# Patient Record
Sex: FEMALE | Race: WHITE | NOT HISPANIC OR LATINO | Employment: OTHER | URBAN - METROPOLITAN AREA
[De-identification: names, ages, dates, MRNs, and addresses within clinical notes are randomized per-mention and may not be internally consistent; named-entity substitution may affect disease eponyms.]

---

## 2017-01-23 ENCOUNTER — HOSPITAL ENCOUNTER (OUTPATIENT)
Facility: MEDICAL CENTER | Age: 72
End: 2017-01-23
Attending: SPECIALIST
Payer: MEDICARE

## 2017-01-23 LAB — CYTOLOGY REG CYTOL: NORMAL

## 2017-01-23 PROCEDURE — 88175 CYTOPATH C/V AUTO FLUID REDO: CPT

## 2017-04-17 ENCOUNTER — HOSPITAL ENCOUNTER (OUTPATIENT)
Facility: MEDICAL CENTER | Age: 72
End: 2017-04-17
Attending: SPECIALIST
Payer: MEDICARE

## 2017-04-17 LAB — CYTOLOGY REG CYTOL: NORMAL

## 2017-04-17 PROCEDURE — 88175 CYTOPATH C/V AUTO FLUID REDO: CPT

## 2017-11-27 ENCOUNTER — HOSPITAL ENCOUNTER (OUTPATIENT)
Facility: MEDICAL CENTER | Age: 72
End: 2017-11-27
Attending: SPECIALIST
Payer: MEDICARE

## 2017-11-27 PROCEDURE — 87624 HPV HI-RISK TYP POOLED RSLT: CPT

## 2017-11-27 PROCEDURE — 88175 CYTOPATH C/V AUTO FLUID REDO: CPT

## 2017-11-29 LAB
CYTOLOGY REG CYTOL: NORMAL
HPV HR 12 DNA CVX QL NAA+PROBE: NEGATIVE
HPV16 DNA SPEC QL NAA+PROBE: NEGATIVE
HPV18 DNA SPEC QL NAA+PROBE: NEGATIVE
SPECIMEN SOURCE: NORMAL

## 2018-03-19 ENCOUNTER — HOSPITAL ENCOUNTER (OUTPATIENT)
Dept: LAB | Facility: MEDICAL CENTER | Age: 73
End: 2018-03-19
Attending: SPECIALIST
Payer: MEDICARE

## 2018-03-19 PROCEDURE — 87624 HPV HI-RISK TYP POOLED RSLT: CPT

## 2018-03-19 PROCEDURE — 88175 CYTOPATH C/V AUTO FLUID REDO: CPT

## 2018-06-10 ENCOUNTER — HOSPITAL ENCOUNTER (EMERGENCY)
Facility: MEDICAL CENTER | Age: 73
End: 2018-06-10
Attending: EMERGENCY MEDICINE
Payer: MEDICARE

## 2018-06-10 VITALS
BODY MASS INDEX: 26.84 KG/M2 | WEIGHT: 136.69 LBS | DIASTOLIC BLOOD PRESSURE: 86 MMHG | RESPIRATION RATE: 16 BRPM | HEART RATE: 95 BPM | HEIGHT: 60 IN | OXYGEN SATURATION: 95 % | TEMPERATURE: 99 F | SYSTOLIC BLOOD PRESSURE: 154 MMHG

## 2018-06-10 DIAGNOSIS — T14.8XXA ABRASION: ICD-10-CM

## 2018-06-10 DIAGNOSIS — F41.9 ANXIETY: ICD-10-CM

## 2018-06-10 LAB
APPEARANCE UR: CLEAR
APPEARANCE UR: CLEAR
BACTERIA #/AREA URNS HPF: NEGATIVE /HPF
BACTERIA #/AREA URNS HPF: NEGATIVE /HPF
BILIRUB UR QL STRIP.AUTO: NEGATIVE
BILIRUB UR QL STRIP.AUTO: NEGATIVE
COLOR UR: YELLOW
COLOR UR: YELLOW
EPI CELLS #/AREA URNS HPF: ABNORMAL /HPF
EPI CELLS #/AREA URNS HPF: ABNORMAL /HPF
GLUCOSE UR STRIP.AUTO-MCNC: NEGATIVE MG/DL
GLUCOSE UR STRIP.AUTO-MCNC: NEGATIVE MG/DL
HYALINE CASTS #/AREA URNS LPF: ABNORMAL /LPF
HYALINE CASTS #/AREA URNS LPF: ABNORMAL /LPF
KETONES UR STRIP.AUTO-MCNC: NEGATIVE MG/DL
KETONES UR STRIP.AUTO-MCNC: NEGATIVE MG/DL
LEUKOCYTE ESTERASE UR QL STRIP.AUTO: ABNORMAL
LEUKOCYTE ESTERASE UR QL STRIP.AUTO: ABNORMAL
MICRO URNS: ABNORMAL
MICRO URNS: ABNORMAL
NITRITE UR QL STRIP.AUTO: NEGATIVE
NITRITE UR QL STRIP.AUTO: NEGATIVE
PH UR STRIP.AUTO: 5 [PH]
PH UR STRIP.AUTO: 5 [PH]
PROT UR QL STRIP: NEGATIVE MG/DL
PROT UR QL STRIP: NEGATIVE MG/DL
RBC # URNS HPF: ABNORMAL /HPF
RBC # URNS HPF: ABNORMAL /HPF
RBC UR QL AUTO: ABNORMAL
RBC UR QL AUTO: ABNORMAL
SP GR UR STRIP.AUTO: 1.01
SP GR UR STRIP.AUTO: 1.01
UROBILINOGEN UR STRIP.AUTO-MCNC: 0.2 MG/DL
UROBILINOGEN UR STRIP.AUTO-MCNC: 0.2 MG/DL
WBC #/AREA URNS HPF: ABNORMAL /HPF
WBC #/AREA URNS HPF: ABNORMAL /HPF

## 2018-06-10 PROCEDURE — 99284 EMERGENCY DEPT VISIT MOD MDM: CPT

## 2018-06-10 PROCEDURE — 81001 URINALYSIS AUTO W/SCOPE: CPT

## 2018-06-10 ASSESSMENT — PAIN SCALES - GENERAL: PAINLEVEL_OUTOF10: 0

## 2018-06-10 ASSESSMENT — LIFESTYLE VARIABLES: DO YOU DRINK ALCOHOL: NO

## 2018-06-10 NOTE — ED TRIAGE NOTES
Pt self caths at home as well as has a nephrostomy to left kidney with drainage bag. Pt provided with 12 Indonesian straight cath and kit to obtain a urine sample.

## 2018-06-10 NOTE — ED TRIAGE NOTES
Chief Complaint   Patient presents with   • Blood in Urine     Ambulatory to room, c/o blood in urine x 1 hour. Hx cancer, not currently receiving treatment for. Being treated for a kidney infection.  Given urine cup for sample.     /86   Pulse 99   Temp 37.2 °C (99 °F)   Resp 16   Ht 1.524 m (5')   Wt 62 kg (136 lb 11 oz)   SpO2 95%   BMI 26.69 kg/m²     Pt Informed regarding triage process and verbalized understanding to inform triage tech or RN for any changes in condition.  Placed in lobby.

## 2018-06-10 NOTE — ED PROVIDER NOTES
ED Provider Note    CHIEF COMPLAINT  Chief Complaint   Patient presents with   • Blood in Urine       HPI  Nessa Dennis is a 72 y.o. female who presents for blood on the toilet paper upon wiping after urinating today. History of cervical cancer status post total abdominal hysterectomy. She's not under currently active treatment. [place] [facility] pyelonephritis and sepsis. She had ureteral obstruction on the left and nephrostomy tube was placed. Patient straight caths at baseline. No diabetes or immune compromise. No blood thinner use. She is finishing a 10 day course of ciprofloxacin and has 4 more days left.    REVIEW OF SYSTEMS  Pertinent positives include: Hematuria, recent UTI, recent left ureteral obstruction.  Pertinent negatives include: Fever, abdominal pain, flank pain, nausea, vomiting, diarrhea, constipation.    PAST MEDICAL HISTORY  Past Medical History:   Diagnosis Date   • Arthritis     tailbone   • Cancer 2005, 2015    cervical   • Colostomy in place    • Other specified symptom associated with female genital organs    • Unspecified urinary incontinence     supra public cath   • Urinary bladder disorder     supra pubic cath       CURRENT MEDICATIONS  Home Medications     Reviewed by Adry Amador R.N. (Registered Nurse) on 06/10/18 at 1427  Med List Status: <None>   Medication Last Dose Status   ascorbic acid (ASCORBIC ACID) 500 MG TABS 10/26/2015 Active   Calcium Carb-Cholecalciferol (CALCIUM 600 + D PO) 10/26/2015 Active   Cholecalciferol (VITAMIN D) 2000 UNITS CAPS 10/26/2015 Active   IRON PO 10/26/2015 Active   ondansetron (ZOFRAN ODT) 4 MG TABLET DISPERSIBLE  Active   Oxycodone-Acetaminophen (PERCOCET)  MG Tab  Active   therapeutic multivitamin-minerals (THERAGRAN-M) TABS 10/26/2015 Active                ALLERGIES  Allergies   Allergen Reactions   • Sulfa Drugs Unspecified     Pt states it runs her down.       PHYSICAL EXAM  VITAL  SIGNS: /86   Pulse 99   Temp 37.2 °C (99 °F)   Resp 16   Ht 1.524 m (5')   Wt 62 kg (136 lb 11 oz)   SpO2 95%   BMI 26.69 kg/m² elevated blood pressure, afebrile  Constitutional : Well developed, Well nourished, well appearing.   Cardiovascular: Regular, No murmurs, No rubs, No gallops.   Respiratory: Respiratory rate and excursion normal.   Abdomen: Soft, nontender.  Genitourinary: No flank tenderness, left nephrostomy tube. Peroneal exam performed with Ángel as a chaperone and there was a small left labial tear which had been the source of bleeding.  Skin: Warm, Dry, No erythema, No rash.   Back: Spine nontender.      LABORATORY:  Results for orders placed or performed during the hospital encounter of 06/10/18   URINALYSIS,CULTURE IF INDICATED   Result Value Ref Range    Color Yellow     Character Clear     Specific Gravity 1.011 <1.035    Ph 5.0 5.0 - 8.0    Glucose Negative Negative mg/dL    Ketones Negative Negative mg/dL    Protein Negative Negative mg/dL    Bilirubin Negative Negative    Urobilinogen, Urine 0.2 Negative    Nitrite Negative Negative    Leukocyte Esterase Trace (A) Negative    Occult Blood Trace (A) Negative    Micro Urine Req Microscopic    URINE MICROSCOPIC (W/UA)   Result Value Ref Range    WBC 5-10 (A) /hpf    RBC 5-10 (A) /hpf    Bacteria Negative None /hpf    Epithelial Cells Few /hpf    Hyaline Cast 0-2 /lpf   URINALYSIS   Result Value Ref Range    Color Yellow     Character Clear     Specific Gravity 1.014 <1.035    Ph 5.0 5.0 - 8.0    Glucose Negative Negative mg/dL    Ketones Negative Negative mg/dL    Protein Negative Negative mg/dL    Bilirubin Negative Negative    Urobilinogen, Urine 0.2 Negative    Nitrite Negative Negative    Leukocyte Esterase Small (A) Negative    Occult Blood Small (A) Negative    Micro Urine Req Microscopic    URINE MICROSCOPIC (W/UA)   Result Value Ref Range    WBC 5-10 (A) /hpf    RBC 10-20 (A) /hpf    Bacteria Negative None /hpf    Epithelial  Cells Few /hpf    Hyaline Cast 6-10 (A) /lpf       COURSE & MEDICAL DECISION MAKING;  This patient presents with blood on the perineum after urination which is due to a small wound on the perineum. Her urinalysis appears to be improving on Omnicef and her urinary tract infection symptoms have resolved. There is no significant blood in the urine.    PLAN:  Reassurance  Follow-up Dr. Hays  Finish antibiotics    CONDITION:  good.    FINAL IMPRESSION  1. Abrasion    2. Anxiety            Electronically signed by: Toro Woodard,

## 2018-06-11 ENCOUNTER — PATIENT OUTREACH (OUTPATIENT)
Dept: HEALTH INFORMATION MANAGEMENT | Facility: OTHER | Age: 73
End: 2018-06-11

## 2018-06-11 NOTE — ED NOTES
Pt discharged home with , given discharged instructions and is understanding. Ambulatory out the door without assistance.

## 2018-06-11 NOTE — DISCHARGE INSTRUCTIONS
Apply bacitracion or polysporin to wound twice daily for three to five days.  Finish the antibiotics.

## 2018-07-30 ENCOUNTER — HOSPITAL ENCOUNTER (OUTPATIENT)
Dept: LAB | Facility: MEDICAL CENTER | Age: 73
End: 2018-07-30
Attending: SPECIALIST
Payer: MEDICARE

## 2018-07-30 ENCOUNTER — HOSPITAL ENCOUNTER (OUTPATIENT)
Dept: RADIOLOGY | Facility: MEDICAL CENTER | Age: 73
End: 2018-07-30
Attending: SPECIALIST
Payer: MEDICARE

## 2018-07-30 DIAGNOSIS — C53.9 MALIGNANT NEOPLASM OF CERVIX, UNSPECIFIED SITE (HCC): ICD-10-CM

## 2018-07-30 PROCEDURE — 87624 HPV HI-RISK TYP POOLED RSLT: CPT

## 2018-07-30 PROCEDURE — A9552 F18 FDG: HCPCS

## 2018-07-30 PROCEDURE — 88175 CYTOPATH C/V AUTO FLUID REDO: CPT

## 2018-08-10 ENCOUNTER — HOSPITAL ENCOUNTER (OUTPATIENT)
Dept: RADIOLOGY | Facility: MEDICAL CENTER | Age: 73
End: 2018-08-10
Attending: SPECIALIST
Payer: MEDICARE

## 2018-08-10 DIAGNOSIS — N13.39 OTHER HYDRONEPHROSIS: ICD-10-CM

## 2018-08-10 PROCEDURE — 700117 HCHG RX CONTRAST REV CODE 255: Performed by: SPECIALIST

## 2018-08-10 PROCEDURE — 50430 NJX PX NFROSGRM &/URTRGRM: CPT | Mod: LT

## 2018-08-10 RX ADMIN — IOHEXOL 22 ML: 300 INJECTION, SOLUTION INTRAVENOUS at 14:10

## 2018-08-16 ENCOUNTER — HOSPITAL ENCOUNTER (OUTPATIENT)
Dept: RADIOLOGY | Facility: MEDICAL CENTER | Age: 73
End: 2018-08-16
Attending: SPECIALIST
Payer: MEDICARE

## 2018-08-16 DIAGNOSIS — N13.39 OTHER HYDRONEPHROSIS: ICD-10-CM

## 2018-08-16 PROCEDURE — 78708 K FLOW/FUNCT IMAGE W/DRUG: CPT

## 2018-08-16 RX ORDER — FUROSEMIDE 10 MG/ML
INJECTION INTRAMUSCULAR; INTRAVENOUS
Status: DISCONTINUED
Start: 2018-08-16 | End: 2018-08-17 | Stop reason: HOSPADM

## 2018-09-10 ENCOUNTER — HOSPITAL ENCOUNTER (OUTPATIENT)
Dept: LAB | Facility: MEDICAL CENTER | Age: 73
End: 2018-09-10
Attending: SPECIALIST
Payer: MEDICARE

## 2018-09-10 ENCOUNTER — HOSPITAL ENCOUNTER (OUTPATIENT)
Dept: RADIOLOGY | Facility: MEDICAL CENTER | Age: 73
End: 2018-09-10
Attending: SPECIALIST
Payer: MEDICARE

## 2018-09-10 DIAGNOSIS — N81.9: ICD-10-CM

## 2018-09-10 DIAGNOSIS — C53.9 MALIGNANT NEOPLASM OF CERVIX, UNSPECIFIED SITE (HCC): ICD-10-CM

## 2018-09-10 DIAGNOSIS — N31.9: ICD-10-CM

## 2018-09-10 LAB
ANION GAP SERPL CALC-SCNC: 9 MMOL/L (ref 0–11.9)
BUN SERPL-MCNC: 27 MG/DL (ref 8–22)
CALCIUM SERPL-MCNC: 10.5 MG/DL (ref 8.5–10.5)
CHLORIDE SERPL-SCNC: 103 MMOL/L (ref 96–112)
CO2 SERPL-SCNC: 24 MMOL/L (ref 20–33)
CREAT SERPL-MCNC: 0.7 MG/DL (ref 0.5–1.4)
GLUCOSE SERPL-MCNC: 86 MG/DL (ref 65–99)
POTASSIUM SERPL-SCNC: 4.1 MMOL/L (ref 3.6–5.5)
SODIUM SERPL-SCNC: 136 MMOL/L (ref 135–145)

## 2018-09-10 PROCEDURE — 76775 US EXAM ABDO BACK WALL LIM: CPT

## 2018-09-10 PROCEDURE — 80048 BASIC METABOLIC PNL TOTAL CA: CPT

## 2018-09-10 PROCEDURE — 36415 COLL VENOUS BLD VENIPUNCTURE: CPT

## 2018-09-15 ENCOUNTER — HOSPITAL ENCOUNTER (EMERGENCY)
Facility: MEDICAL CENTER | Age: 73
End: 2018-09-15
Attending: EMERGENCY MEDICINE
Payer: MEDICARE

## 2018-09-15 VITALS
WEIGHT: 140.65 LBS | BODY MASS INDEX: 27.61 KG/M2 | RESPIRATION RATE: 16 BRPM | HEIGHT: 60 IN | OXYGEN SATURATION: 92 % | HEART RATE: 88 BPM | SYSTOLIC BLOOD PRESSURE: 146 MMHG | TEMPERATURE: 98.7 F | DIASTOLIC BLOOD PRESSURE: 95 MMHG

## 2018-09-15 DIAGNOSIS — T83.098A MALFUNCTION OF NEPHROSTOMY TUBE (HCC): ICD-10-CM

## 2018-09-15 PROCEDURE — 99283 EMERGENCY DEPT VISIT LOW MDM: CPT

## 2018-09-15 PROCEDURE — 303485 HCHG DRESSING MEDIUM

## 2018-09-15 ASSESSMENT — PAIN SCALES - GENERAL: PAINLEVEL_OUTOF10: 0

## 2018-09-15 NOTE — ED NOTES
Gauze bandage applied to L flank.  Pt ready for discharge.  Pt instructions provided.  Pt verbalized understanding & signed.  Leaves ER ambulatory, steady gait, no distress.

## 2018-09-15 NOTE — ED PROVIDER NOTES
ED Provider Note    Scribed for Dr. Rafi Menon M.D. by Dafne Lamb. 9/15/2018  2:17 PM    Primary care provider: Julio Ross M.D.  Means of arrival: walk-in  History obtained from: patient  History limited by: none    CHIEF COMPLAINT  Chief Complaint   Patient presents with   • Other     states nephrostomy tube is possibly clogged. had it for months. today noticed that its not draining.        HPI  Nessa Dennis is a 72 y.o. Female with a history of cervical cancer who presents to the Emergency Department for evaluation of her nephrostomy tube and drainage bag. She reports that she has had this in place for months, however, recently began experiencing difficulties draining the catch bag. The tube does not seem clogged, however, she is unable to empty the contents via the drain in the bag, and it is all 1 unit, unable to take the bag off to empty it that way. Patient was evaluated within the last week by Dr. Saúl URIBE who placed the nephrostomy tube, and they are currently discussing whether this needs to be removed or replaced within the next few days. Patient has no complaints of any flank pain, abdominal pain, fever, or any other complaints at this time.    REVIEW OF SYSTEMS  Pertinent positives include nephrostomy tube malfunction. Pertinent negatives include no  flank pain, abdominal pain, fever, or any other complaints.  See HPI for further details.     PAST MEDICAL HISTORY   has a past medical history of Arthritis; Cancer (2005, 2015); Colostomy in place; Other specified symptom associated with female genital organs; Unspecified urinary incontinence; and Urinary bladder disorder.    SURGICAL HISTORY   has a past surgical history that includes gyn surgery (5/2005); recovery (4/6/2015); colostomy creation laparoscopic (4/14/2015); other (04/2015); pelvic exam under anesthesia (7/31/2015); cervical conization (7/31/2015); and cystoscopy (7/31/2015).    SOCIAL HISTORY  Social History    Substance Use Topics   • Smoking status: Never Smoker   • Smokeless tobacco: Never Used   • Alcohol use No      History   Drug Use No       FAMILY HISTORY  none noted    CURRENT MEDICATIONS  No current facility-administered medications for this encounter.     Current Outpatient Prescriptions:   •  Oxycodone-Acetaminophen (PERCOCET)  MG Tab, Take 1-2 Tabs by mouth every four hours as needed for Severe Pain., Disp: 20 Tab, Rfl: 0  •  ondansetron (ZOFRAN ODT) 4 MG TABLET DISPERSIBLE, Take 1 Tab by mouth every 8 hours as needed for Nausea/Vomiting., Disp: 20 Tab, Rfl: 0  •  IRON PO, Take  by mouth., Disp: , Rfl:   •  ascorbic acid (ASCORBIC ACID) 500 MG TABS, Take 1,000 mg by mouth every day., Disp: , Rfl:   •  therapeutic multivitamin-minerals (THERAGRAN-M) TABS, Take 1 Tab by mouth every day., Disp: , Rfl:   •  Calcium Carb-Cholecalciferol (CALCIUM 600 + D PO), Take  by mouth every day., Disp: , Rfl:   •  Cholecalciferol (VITAMIN D) 2000 UNITS CAPS, Take  by mouth every day., Disp: , Rfl:     ALLERGIES  Allergies   Allergen Reactions   • Sulfa Drugs Unspecified     Pt states it runs her down.       PHYSICAL EXAM  VITAL SIGNS: /99   Pulse 90   Temp 36.9 °C (98.5 °F)   Resp 20   Ht 1.524 m (5')   Wt 63.8 kg (140 lb 10.5 oz)   SpO2 94%   BMI 27.47 kg/m²     Constitutional: Well developed, Well nourished, no distress, Non-toxic appearance.      Abdomen: Soft, No tenderness, No masses, No pulsatile masses.   Skin: Warm, Dry, No erythema, No rash.  Nephrostomy tube left flank.  This is removed per request without difficulty by traction  Extremities:, No edema No cyanosis.   Musculoskeletal: no CVA tenderness, No major deformities noted.  Intact distal pulses  Neurologic: Awake, alert. Moves all extremities spontaneously.  Psychiatric: Affect normal, Judgment normal, Mood normal.     COURSE & MEDICAL DECISION MAKING  Pertinent Labs & Imaging studies reviewed. (See chart for details)    2:17 PM - Patient  seen and examined at bedside. I informed the patient I would consult with Dr. Hays about the next appropriate treatment step. She understands and agrees with treatment plan.    2:23 PM Paged Dr. Hays [facility]    2:42 PM Spoke with NP for Dr. Hays, about the patient's condition. Advises removing the nephrostomy tube currently in place, and re-evaluation in their office in the coming week.    3:10 PM I removed the patient's pigtail nephrostomy tube without difficulty or complication. A sterile dressing will be placed over that site, and patient will be discharged home to follow up with Dr. Hays in the next few days.    Decision Making:  Patient presents with a history of cervical cancer involving the bladder, with nephrostomy tube currently in place, seeking evaluation for a malfunction of her collection bag. Spoke with Dr. Hays's NP. Nephrostomy tube was removed without complication, as suggested, and she will be discharged with follow up for Dr. Hays and strict return precautions.     The patient will return for new or worsening symptoms and is stable at the time of discharge.    DISPOSITION:  Patient will be discharged home in stable condition.    FOLLOW UP:  No follow-up provider specified.    FINAL IMPRESSION  1. Malfunction of nephrostomy tube (HCC)          Dafne BRADY (Scribe), am scribing for, and in the presence of, Rafi Menon M.D..    Electronically signed by: Dafne Lamb (Fatuma), 9/15/2018    Rafi BRADY M.D. personally performed the services described in this documentation, as scribed by Dafne Lamb in my presence, and it is both accurate and complete. E    The note accurately reflects work and decisions made by me.  Rafi Menon  9/15/2018  3:17 PM

## 2018-09-15 NOTE — ED NOTES
Pt amb to 55.  Pt has a L sided nephrostomy tube, the tube itself is draining however the drainage bag will not drain to empty the contents.  Pt states this is just 1 unit and cannot be taken apart.  Changing into a gown, chart up for ERP.

## 2018-09-15 NOTE — ED TRIAGE NOTES
Chief Complaint   Patient presents with   • Other     states nephrostomy tube is possibly clogged. had it for months. today noticed that its not draining.      Denies any complaints. No flank pain/no fever. Educated on triage process.

## 2018-09-16 ENCOUNTER — PATIENT OUTREACH (OUTPATIENT)
Dept: HEALTH INFORMATION MANAGEMENT | Facility: OTHER | Age: 73
End: 2018-09-16

## 2018-09-16 NOTE — PROGRESS NOTES
Placed discharge outreach phone call to pt s/p ER discharge 9/15/18.  Left voicemail providing my contact information and instructions to call with any questions or concerns.

## 2018-09-19 ENCOUNTER — HOSPITAL ENCOUNTER (OUTPATIENT)
Dept: RADIOLOGY | Facility: MEDICAL CENTER | Age: 73
End: 2018-09-19
Attending: SPECIALIST
Payer: MEDICARE

## 2018-09-19 DIAGNOSIS — N13.30 HYDRONEPHROSIS, UNSPECIFIED HYDRONEPHROSIS TYPE: ICD-10-CM

## 2018-09-19 PROCEDURE — 76775 US EXAM ABDO BACK WALL LIM: CPT

## 2018-11-01 ENCOUNTER — HOSPITAL ENCOUNTER (OUTPATIENT)
Dept: LAB | Facility: MEDICAL CENTER | Age: 73
End: 2018-11-01
Attending: SPECIALIST
Payer: MEDICARE

## 2018-11-01 PROCEDURE — 88175 CYTOPATH C/V AUTO FLUID REDO: CPT

## 2018-11-01 PROCEDURE — 87624 HPV HI-RISK TYP POOLED RSLT: CPT

## 2019-02-17 ENCOUNTER — APPOINTMENT (OUTPATIENT)
Dept: RADIOLOGY | Facility: MEDICAL CENTER | Age: 74
DRG: 699 | End: 2019-02-17
Attending: EMERGENCY MEDICINE
Payer: MEDICARE

## 2019-02-17 ENCOUNTER — APPOINTMENT (OUTPATIENT)
Dept: RADIOLOGY | Facility: MEDICAL CENTER | Age: 74
DRG: 699 | End: 2019-02-17
Attending: SPECIALIST
Payer: MEDICARE

## 2019-02-17 ENCOUNTER — HOSPITAL ENCOUNTER (INPATIENT)
Facility: MEDICAL CENTER | Age: 74
LOS: 6 days | DRG: 699 | End: 2019-02-23
Attending: EMERGENCY MEDICINE | Admitting: SPECIALIST
Payer: MEDICARE

## 2019-02-17 DIAGNOSIS — N13.39 OTHER HYDRONEPHROSIS: ICD-10-CM

## 2019-02-17 DIAGNOSIS — C53.0 MALIGNANT NEOPLASM OF ENDOCERVIX (HCC): ICD-10-CM

## 2019-02-17 DIAGNOSIS — N39.0 ACUTE UTI: ICD-10-CM

## 2019-02-17 LAB
ALBUMIN SERPL BCP-MCNC: 4.4 G/DL (ref 3.2–4.9)
ALBUMIN/GLOB SERPL: 1.5 G/DL
ALP SERPL-CCNC: 89 U/L (ref 30–99)
ALT SERPL-CCNC: 8 U/L (ref 2–50)
ANION GAP SERPL CALC-SCNC: 11 MMOL/L (ref 0–11.9)
APPEARANCE UR: ABNORMAL
AST SERPL-CCNC: 17 U/L (ref 12–45)
BACTERIA #/AREA URNS HPF: ABNORMAL /HPF
BASOPHILS # BLD AUTO: 0.3 % (ref 0–1.8)
BASOPHILS # BLD: 0.04 K/UL (ref 0–0.12)
BILIRUB SERPL-MCNC: 0.5 MG/DL (ref 0.1–1.5)
BILIRUB UR QL STRIP.AUTO: NEGATIVE
BUN SERPL-MCNC: 24 MG/DL (ref 8–22)
CALCIUM SERPL-MCNC: 10 MG/DL (ref 8.5–10.5)
CHLORIDE SERPL-SCNC: 106 MMOL/L (ref 96–112)
CO2 SERPL-SCNC: 25 MMOL/L (ref 20–33)
COLOR UR: YELLOW
CREAT SERPL-MCNC: 0.88 MG/DL (ref 0.5–1.4)
EOSINOPHIL # BLD AUTO: 0.07 K/UL (ref 0–0.51)
EOSINOPHIL NFR BLD: 0.5 % (ref 0–6.9)
EPI CELLS #/AREA URNS HPF: ABNORMAL /HPF
ERYTHROCYTE [DISTWIDTH] IN BLOOD BY AUTOMATED COUNT: 40.9 FL (ref 35.9–50)
GLOBULIN SER CALC-MCNC: 3 G/DL (ref 1.9–3.5)
GLUCOSE SERPL-MCNC: 117 MG/DL (ref 65–99)
GLUCOSE UR STRIP.AUTO-MCNC: NEGATIVE MG/DL
HCT VFR BLD AUTO: 48.7 % (ref 37–47)
HGB BLD-MCNC: 16.3 G/DL (ref 12–16)
IMM GRANULOCYTES # BLD AUTO: 0.04 K/UL (ref 0–0.11)
IMM GRANULOCYTES NFR BLD AUTO: 0.3 % (ref 0–0.9)
KETONES UR STRIP.AUTO-MCNC: NEGATIVE MG/DL
LACTATE BLD-SCNC: 1.5 MMOL/L (ref 0.5–2)
LEUKOCYTE ESTERASE UR QL STRIP.AUTO: ABNORMAL
LIPASE SERPL-CCNC: 14 U/L (ref 11–82)
LYMPHOCYTES # BLD AUTO: 1.34 K/UL (ref 1–4.8)
LYMPHOCYTES NFR BLD: 9 % (ref 22–41)
MCH RBC QN AUTO: 27.7 PG (ref 27–33)
MCHC RBC AUTO-ENTMCNC: 33.5 G/DL (ref 33.6–35)
MCV RBC AUTO: 82.7 FL (ref 81.4–97.8)
MICRO URNS: ABNORMAL
MONOCYTES # BLD AUTO: 0.71 K/UL (ref 0–0.85)
MONOCYTES NFR BLD AUTO: 4.8 % (ref 0–13.4)
MUCOUS THREADS #/AREA URNS HPF: ABNORMAL /HPF
NEUTROPHILS # BLD AUTO: 12.74 K/UL (ref 2–7.15)
NEUTROPHILS NFR BLD: 85.1 % (ref 44–72)
NITRITE UR QL STRIP.AUTO: POSITIVE
NRBC # BLD AUTO: 0 K/UL
NRBC BLD-RTO: 0 /100 WBC
PH UR STRIP.AUTO: 6.5 [PH]
PLATELET # BLD AUTO: 211 K/UL (ref 164–446)
PMV BLD AUTO: 9.7 FL (ref 9–12.9)
POTASSIUM SERPL-SCNC: 3.7 MMOL/L (ref 3.6–5.5)
PROT SERPL-MCNC: 7.4 G/DL (ref 6–8.2)
PROT UR QL STRIP: 100 MG/DL
RBC # BLD AUTO: 5.89 M/UL (ref 4.2–5.4)
RBC # URNS HPF: ABNORMAL /HPF
RBC UR QL AUTO: ABNORMAL
SODIUM SERPL-SCNC: 142 MMOL/L (ref 135–145)
SP GR UR STRIP.AUTO: 1.02
UROBILINOGEN UR STRIP.AUTO-MCNC: 0.2 MG/DL
WBC # BLD AUTO: 14.9 K/UL (ref 4.8–10.8)
WBC #/AREA URNS HPF: ABNORMAL /HPF

## 2019-02-17 PROCEDURE — 96375 TX/PRO/DX INJ NEW DRUG ADDON: CPT

## 2019-02-17 PROCEDURE — 96376 TX/PRO/DX INJ SAME DRUG ADON: CPT

## 2019-02-17 PROCEDURE — 87077 CULTURE AEROBIC IDENTIFY: CPT

## 2019-02-17 PROCEDURE — 700105 HCHG RX REV CODE 258: Performed by: EMERGENCY MEDICINE

## 2019-02-17 PROCEDURE — 87086 URINE CULTURE/COLONY COUNT: CPT

## 2019-02-17 PROCEDURE — 96365 THER/PROPH/DIAG IV INF INIT: CPT

## 2019-02-17 PROCEDURE — 700111 HCHG RX REV CODE 636 W/ 250 OVERRIDE (IP): Performed by: EMERGENCY MEDICINE

## 2019-02-17 PROCEDURE — 36415 COLL VENOUS BLD VENIPUNCTURE: CPT

## 2019-02-17 PROCEDURE — 74176 CT ABD & PELVIS W/O CONTRAST: CPT

## 2019-02-17 PROCEDURE — 83690 ASSAY OF LIPASE: CPT

## 2019-02-17 PROCEDURE — 76775 US EXAM ABDO BACK WALL LIM: CPT

## 2019-02-17 PROCEDURE — 99285 EMERGENCY DEPT VISIT HI MDM: CPT

## 2019-02-17 PROCEDURE — 83605 ASSAY OF LACTIC ACID: CPT

## 2019-02-17 PROCEDURE — 80053 COMPREHEN METABOLIC PANEL: CPT

## 2019-02-17 PROCEDURE — 85025 COMPLETE CBC W/AUTO DIFF WBC: CPT

## 2019-02-17 PROCEDURE — 700111 HCHG RX REV CODE 636 W/ 250 OVERRIDE (IP): Performed by: SPECIALIST

## 2019-02-17 PROCEDURE — 87186 SC STD MICRODIL/AGAR DIL: CPT

## 2019-02-17 PROCEDURE — 51701 INSERT BLADDER CATHETER: CPT

## 2019-02-17 PROCEDURE — 770006 HCHG ROOM/CARE - MED/SURG/GYN SEMI*

## 2019-02-17 PROCEDURE — 81001 URINALYSIS AUTO W/SCOPE: CPT

## 2019-02-17 RX ORDER — ONDANSETRON 2 MG/ML
4 INJECTION INTRAMUSCULAR; INTRAVENOUS EVERY 6 HOURS PRN
Status: DISCONTINUED | OUTPATIENT
Start: 2019-02-17 | End: 2019-02-23 | Stop reason: HOSPADM

## 2019-02-17 RX ORDER — OXYCODONE AND ACETAMINOPHEN 10; 325 MG/1; MG/1
1 TABLET ORAL EVERY 4 HOURS PRN
Status: DISCONTINUED | OUTPATIENT
Start: 2019-02-17 | End: 2019-02-23 | Stop reason: HOSPADM

## 2019-02-17 RX ORDER — FERROUS SULFATE 325(65) MG
325 TABLET ORAL DAILY
COMMUNITY
End: 2019-03-04

## 2019-02-17 RX ORDER — CIPROFLOXACIN 500 MG/1
500 TABLET, FILM COATED ORAL 2 TIMES DAILY
Status: ON HOLD | COMMUNITY
End: 2019-02-23

## 2019-02-17 RX ORDER — ONDANSETRON 2 MG/ML
4 INJECTION INTRAMUSCULAR; INTRAVENOUS ONCE
Status: COMPLETED | OUTPATIENT
Start: 2019-02-17 | End: 2019-02-17

## 2019-02-17 RX ORDER — HYDROMORPHONE HYDROCHLORIDE 1 MG/ML
1 INJECTION, SOLUTION INTRAMUSCULAR; INTRAVENOUS; SUBCUTANEOUS ONCE
Status: COMPLETED | OUTPATIENT
Start: 2019-02-17 | End: 2019-02-17

## 2019-02-17 RX ORDER — ACETAMINOPHEN 325 MG/1
650 TABLET ORAL EVERY 6 HOURS PRN
Status: DISCONTINUED | OUTPATIENT
Start: 2019-02-17 | End: 2019-02-23 | Stop reason: HOSPADM

## 2019-02-17 RX ADMIN — HYDROMORPHONE HYDROCHLORIDE 1 MG: 1 INJECTION, SOLUTION INTRAMUSCULAR; INTRAVENOUS; SUBCUTANEOUS at 12:14

## 2019-02-17 RX ADMIN — ONDANSETRON 4 MG: 2 INJECTION INTRAMUSCULAR; INTRAVENOUS at 12:15

## 2019-02-17 RX ADMIN — HYDROMORPHONE HYDROCHLORIDE 1 MG: 1 INJECTION, SOLUTION INTRAMUSCULAR; INTRAVENOUS; SUBCUTANEOUS at 08:54

## 2019-02-17 RX ADMIN — ONDANSETRON 4 MG: 2 INJECTION INTRAMUSCULAR; INTRAVENOUS at 08:55

## 2019-02-17 RX ADMIN — ONDANSETRON 4 MG: 2 INJECTION INTRAMUSCULAR; INTRAVENOUS at 20:37

## 2019-02-17 RX ADMIN — CEFTRIAXONE SODIUM 2 G: 2 INJECTION, POWDER, FOR SOLUTION INTRAMUSCULAR; INTRAVENOUS at 09:19

## 2019-02-17 ASSESSMENT — COGNITIVE AND FUNCTIONAL STATUS - GENERAL
SUGGESTED CMS G CODE MODIFIER MOBILITY: CH
DAILY ACTIVITIY SCORE: 24
SUGGESTED CMS G CODE MODIFIER DAILY ACTIVITY: CH
MOBILITY SCORE: 24

## 2019-02-17 ASSESSMENT — PATIENT HEALTH QUESTIONNAIRE - PHQ9
SUM OF ALL RESPONSES TO PHQ9 QUESTIONS 1 AND 2: 0
1. LITTLE INTEREST OR PLEASURE IN DOING THINGS: NOT AT ALL
2. FEELING DOWN, DEPRESSED, IRRITABLE, OR HOPELESS: NOT AT ALL

## 2019-02-17 ASSESSMENT — LIFESTYLE VARIABLES
ALCOHOL_USE: NO
EVER_SMOKED: NEVER

## 2019-02-17 ASSESSMENT — COPD QUESTIONNAIRES
DO YOU EVER COUGH UP ANY MUCUS OR PHLEGM?: NO/ONLY WITH OCCASIONAL COLDS OR INFECTIONS
DURING THE PAST 4 WEEKS HOW MUCH DID YOU FEEL SHORT OF BREATH: NONE/LITTLE OF THE TIME
COPD SCREENING SCORE: 2
HAVE YOU SMOKED AT LEAST 100 CIGARETTES IN YOUR ENTIRE LIFE: NO/DON'T KNOW
IN THE PAST 12 MONTHS DO YOU DO LESS THAN YOU USED TO BECAUSE OF YOUR BREATHING PROBLEMS: DISAGREE/UNSURE

## 2019-02-17 NOTE — ED TRIAGE NOTES
Wheeled to triage  Chief Complaint   Patient presents with   • Flank Pain     Pt was just treated last week for a UTI, cipro ended on Wed, pain started coming pack to her L kidney on and of yesterday, much worse today.

## 2019-02-17 NOTE — ED NOTES
Med rec updated and complete.  Allergies reviewed.  Pt denies taking prescription medications.  Pt finished a course of ciprofloxacin  On 02/13/18.

## 2019-02-17 NOTE — ED PROVIDER NOTES
ED Provider Note    Scribed for Adin Gonzalez M.D. by Dafne Lamb. 2/17/2019, 8:34 AM.    Primary care provider: Julio Ross M.D.  Means of arrival: walk-in  History obtained from: patient  History limited by: none    CHIEF COMPLAINT  Chief Complaint   Patient presents with   • Flank Pain       HPI  Nessa Dennis is a 73 y.o. female who presents to the Emergency Department for severe left sided flank pain that was intermittently present all of yesterday, significantly worsening to a severe pain this morning with associated nausea and vomiting. Patient has a history of left sided hydronephrosis, requiring a nephrostomy tube in the past. She also used to have a suprapubic catheter, but currently self caths at home. Patient was just treated for a UTI with Cipro, completing this regimen 4 days ago. She did not undergo a CT scan at that evaluation for her UTI. Patient has a history of cervical cancer, but us unsure whether this contributed to her hydronephrosis and subsequent need for a nephrostomy tube.  No complaints of fever, chills, abdominal pain.    REVIEW OF SYSTEMS  Pertinent positives include left flank pain, nausea, vomiting. Pertinent negatives include no fevers, chills, abdominal pain. As above, all other systems reviewed and are negative.   See HPI for further details.     PAST MEDICAL HISTORY   has a past medical history of Arthritis; Cancer (HCC) (2005, 2015); Colostomy in place ([facility]); Other specified symptom associated with female genital organs; Unspecified urinary incontinence; and Urinary bladder disorder.    SURGICAL HISTORY   has a past surgical history that includes gyn surgery (5/2005); recovery (4/6/2015); colostomy creation laparoscopic (4/14/2015); other (04/2015); pelvic exam under anesthesia (7/31/2015); cervical conization (7/31/2015); and cystoscopy (7/31/2015).    SOCIAL HISTORY  Social History   Substance Use Topics   • Smoking status: Never Smoker   • Smokeless  tobacco: Never Used   • Alcohol use No      History   Drug Use No       FAMILY HISTORY  History reviewed. No pertinent family history.    CURRENT MEDICATIONS  No current facility-administered medications for this encounter.     Current Outpatient Prescriptions:   •  Oxycodone-Acetaminophen (PERCOCET)  MG Tab, Take 1-2 Tabs by mouth every four hours as needed for Severe Pain., Disp: 20 Tab, Rfl: 0  •  ondansetron (ZOFRAN ODT) 4 MG TABLET DISPERSIBLE, Take 1 Tab by mouth every 8 hours as needed for Nausea/Vomiting., Disp: 20 Tab, Rfl: 0  •  IRON PO, Take  by mouth., Disp: , Rfl:   •  ascorbic acid (ASCORBIC ACID) 500 MG TABS, Take 1,000 mg by mouth every day., Disp: , Rfl:   •  therapeutic multivitamin-minerals (THERAGRAN-M) TABS, Take 1 Tab by mouth every day., Disp: , Rfl:   •  Calcium Carb-Cholecalciferol (CALCIUM 600 + D PO), Take  by mouth every day., Disp: , Rfl:   •  Cholecalciferol (VITAMIN D) 2000 UNITS CAPS, Take  by mouth every day., Disp: , Rfl:     ALLERGIES  Allergies   Allergen Reactions   • Sulfa Drugs Unspecified     Pt states it runs her down.       PHYSICAL EXAM  VITAL SIGNS: BP (!) 187/96   Pulse 92   Temp 37 °C (98.6 °F) (Temporal)   Resp 17   Ht 1.524 m (5')   Wt 64.3 kg (141 lb 12.1 oz)   SpO2 94%   BMI 27.68 kg/m²   Vitals reviewed.    Constitutional: Alert   HENT: No signs of trauma, Bilateral external ears normal, Nose normal.   Eyes: Pupils are equal and reactive, Conjunctiva normal, Non-icteric.   Neck: Normal range of motion, No tenderness, Supple, No stridor.   Lymphatic: No lymphadenopathy noted.   Cardiovascular: Regular rate and rhythm, no murmurs.   Thorax & Lungs: Normal breath sounds, No respiratory distress, No wheezing, No chest tenderness.   Abdomen: Bowel sounds normal, Soft, No tenderness, No peritoneal signs, No masses, No pulsatile masses.   Skin: Warm, Dry, No erythema, No rash.   Back: No bony tenderness, left CVA tenderness.   Extremities: Intact distal  pulses, No edema, No tenderness, No cyanosis  Musculoskeletal: Good range of motion in all major joints. No tenderness to palpation or major deformities noted.   Neurologic: Alert , Normal motor function, Normal sensory function, No focal deficits noted.   Psychiatric: Affect normal, Judgment normal, Mood normal.     DIAGNOSTIC STUDIES / PROCEDURES    LABS  Labs Reviewed   CBC WITH DIFFERENTIAL - Abnormal; Notable for the following:        Result Value    WBC 14.9 (*)     RBC 5.89 (*)     Hemoglobin 16.3 (*)     Hematocrit 48.7 (*)     MCHC 33.5 (*)     Neutrophils-Polys 85.10 (*)     Lymphocytes 9.00 (*)     Neutrophils (Absolute) 12.74 (*)     All other components within normal limits   COMP METABOLIC PANEL - Abnormal; Notable for the following:     Glucose 117 (*)     Bun 24 (*)     All other components within normal limits   URINALYSIS,CULTURE IF INDICATED - Abnormal; Notable for the following:     Character Turbid (*)     Protein 100 (*)     Nitrite Positive (*)     Leukocyte Esterase Large (*)     Occult Blood Large (*)     All other components within normal limits   URINE MICROSCOPIC (W/UA) - Abnormal; Notable for the following:     WBC Packed (*)     RBC 20-50 (*)     Bacteria Moderate (*)     All other components within normal limits   LIPASE   LACTIC ACID   URINE CULTURE(NEW)   ESTIMATED GFR      All labs reviewed by me.    RADIOLOGY  CT-RENAL COLIC EVALUATION(A/P W/O)   Final Result         1. Severe left hydronephrosis due to suspected stricture of the torturous left proximal ureter.   2. Tiny punctate nonobstructing stone in the left kidney.   3. Ureteral anastomoses in the left mid and distal ureter.   4. Circumferential mural thickening of the bladder, suggestive of cystitis.   5. No lymphadenopathy in abdomen or pelvis.        The radiologist's interpretation of all radiological studies have been reviewed by me.    COURSE & MEDICAL DECISION MAKING  Nursing notes, VS, PMSFHx reviewed in  chart.    Differential diagnoses include but not limited to: hydronephrosis, UTI.     Obtained and reviewed past medical records which indicated a history of cervical cancer with bladder outlet obstruction secondary to this, requiring a suprapubic catheter placed 4/6/15. History of hydronephrosis as well with previous left sided nephrostomy tube. Positive urine in Octover 27, 2015 that grew out E.coli pan sensitive. No controlled substance prescriptions over the last year.    8:34 AM Patient seen and examined at bedside. She presents today, uncomfortable in appearance, with a history of cervical cancer, currently self cathing, and left sided hydronephrosis, for evaluation of severe left flank pain that began earlier this morning. Ordered for renal colic CT, CBC, CMP, lipase, UA, lactic acid to evaluate. Patient will be treated with 4mg IV Zofran and 1mg IV Dilaudid for her symptoms.  I informed the patient that I would evaluate for acute processes with lab work and imaging, and manage her discomfort with pain medication as well as nausea medication. Patient understands and agrees with treatment plan.    12:12 PM Paged Dr. Hays, [facility]. I informed the patient of her imaging and lab results. I explained that it does appear she still has a urine infection, however, treating this does not necessarily treat the hydronephrosis. I explained I would contact her [facility] and form a next appropriate treatment plan step. She understands and agrees with treatment plan. Patient will be treated with IV Rocephin, and I will order for additional pain and nausea medication.    12:30 PM Spoke with BROOKE Mckeon, about the patient's condition. He agrees to admit the patient.    DISPOSITION:  Patient will be admitted to [facility] in guarded condition.    FINAL IMPRESSION  1. Acute UTI    2. Other hydronephrosis          I, Dafne Lamb (Scribe), am scribing for, and in the presence of, Adin Gonzalez M.D..    Electronically  signed by: Dafne Lamb (Scribe), 2/17/2019    IAdin M.D. personally performed the services described in this documentation, as scribed by Dafne Lamb in my presence, and it is both accurate and complete.    The note accurately reflects work and decisions made by me.  Adin Gonzalez  2/17/2019  1:35 PM

## 2019-02-18 ENCOUNTER — APPOINTMENT (OUTPATIENT)
Dept: RADIOLOGY | Facility: MEDICAL CENTER | Age: 74
DRG: 699 | End: 2019-02-18
Attending: SPECIALIST
Payer: MEDICARE

## 2019-02-18 PROCEDURE — 700102 HCHG RX REV CODE 250 W/ 637 OVERRIDE(OP): Performed by: SPECIALIST

## 2019-02-18 PROCEDURE — 770006 HCHG ROOM/CARE - MED/SURG/GYN SEMI*

## 2019-02-18 PROCEDURE — A9270 NON-COVERED ITEM OR SERVICE: HCPCS | Performed by: SPECIALIST

## 2019-02-18 PROCEDURE — 700111 HCHG RX REV CODE 636 W/ 250 OVERRIDE (IP): Performed by: SPECIALIST

## 2019-02-18 PROCEDURE — 76775 US EXAM ABDO BACK WALL LIM: CPT

## 2019-02-18 PROCEDURE — 700105 HCHG RX REV CODE 258: Performed by: SPECIALIST

## 2019-02-18 RX ADMIN — ONDANSETRON 4 MG: 2 INJECTION INTRAMUSCULAR; INTRAVENOUS at 06:05

## 2019-02-18 RX ADMIN — CEFTRIAXONE SODIUM 2 G: 2 INJECTION, POWDER, FOR SOLUTION INTRAMUSCULAR; INTRAVENOUS at 05:25

## 2019-02-18 RX ADMIN — ACETAMINOPHEN 650 MG: 325 TABLET, FILM COATED ORAL at 23:11

## 2019-02-18 RX ADMIN — ONDANSETRON 4 MG: 2 INJECTION INTRAMUSCULAR; INTRAVENOUS at 22:39

## 2019-02-18 NOTE — PROGRESS NOTES
Patient is AOx4. Plan of care discussed. Verbalized understanding. Denies pain. Complains of nausea, given IV zofran, ginger ale and saltines.  On  2 L of O2 per NC. Patient straight caths at home, educated patient on how to use the hospital's straight cath kits. Observed patient successfully straight cath herself. Educated patient about fall prevention strategies. Educated patient to call and wait for staff assistance before attempting to ambulate. Verbalized understanding. Call light in use, belongings within reach, treaded socks on, bed locked in low position

## 2019-02-18 NOTE — PROGRESS NOTES
Late entry 1530 - Patient arrived from ED via transport.  Patient is AOx4, no complaints of pain.   at bedside.  Patient is ambulatory to bathroom, safety education provided.  All needs attended.

## 2019-02-18 NOTE — PROGRESS NOTES
2 RN skin check completed with MAYCOL Vasquez.  Blanchable redness noted to coccyx.  All other areas of skin intact.

## 2019-02-18 NOTE — PROGRESS NOTES
Patient AAOx4, denies pain, no complaints of nausea on shift.  Refusing strip alarm and yellow bracelet, patient deemed a fall risk, has stated she will call for assistance if need to get OOB arises.

## 2019-02-18 NOTE — CARE PLAN
Problem: Knowledge Deficit  Goal: Knowledge of disease process/condition, treatment plan, diagnostic tests, and medications will improve    Intervention: Explain information regarding disease process/condition, treatment plan, diagnostic tests, and medications and document in education  Educated patient on methods to control nausea and vomitting such as zofran, ginger ale, and saltines. Verbalized understanding       Problem: Urinary Elimination:  Goal: Ability to reestablish a normal urinary elimination pattern will improve    Intervention: Assess and monitor for signs and symptoms of urinary retention  Patient self caths at home. Educated patient on use of hospital cath kit and observed patient successfully self cath. Patient also has episodes of stress incontinence. Patient given pads and educated to call staff as needed for more supplies or assistance staying clean and dry

## 2019-02-18 NOTE — PROGRESS NOTES
HD #1  Pt feeling better. No fever and chills and no flank pain.   VSS afebrile  Skin warm   Abdomen soft ND NT  Ext No edema NT  Crt normal   Renal U/S left kidney worse compared to previous  Imp: 1  UTI GNR on ceftriaxone await final cx  2. Left hydro worse based on crt. Not a candidate for NT/Stent at this time due to UTI. Will place NT follow by stent after infection clears.

## 2019-02-18 NOTE — CARE PLAN
Problem: Safety  Goal: Will remain free from falls  Outcome: PROGRESSING AS EXPECTED  Bed locked in lowest position, treaded socks in place, call light within reach.  Pt instructed to call for assistance.    Problem: Knowledge Deficit  Goal: Knowledge of disease process/condition, treatment plan, diagnostic tests, and medications will improve  Outcome: PROGRESSING AS EXPECTED  Patient new to [facility].  Oriented to room, call light, and unit routines.

## 2019-02-18 NOTE — CONSULTS
DATE OF SERVICE:  02/17/2019    GYN ONCOLOGY CONSULTATION    REASON FOR ADMISSION:  Left pyelo, left flank pain.    HISTORY OF PRESENT ILLNESS:  The patient is a very pleasant 73-year-old white   female who is well known to me.  Patient has a history of stage IB, grade III   cervical cancer, which was initially treated with laparoscopic radical   hysterectomy with bilateral salpingo-oophorectomy with bilateral pelvic and   periaortic lymph node dissection on 05/27/2005.  She did not require any   adjuvant therapy.  She remained in remission until 10 years later on   03/20/2015, she developed a recurrence.  The recurrence at that time involved   vaginal vulvar area.  She was treated with primary concurrent chemoradiation   therapy.  She also developed a rectovaginal fistula, which she had a diverting   loop colostomy, which she has not wanted to have this reversed since that   time.  In addition, the patient subsequently developed atonic bladder, which   she has been self-catheterizing since 2015.  Furthermore, in other sequellae   she had developed was back in 2018.  She was admitted for pyelonephritis and   she was noted to have left hydronephrosis.  Left nephrostomy tube was placed.    Left stent was not placed at that time, she discharged.  She then followed up   with me and she underwent further workup which consisted renal ultrasound on   09/19/2018 that showed mild left hydronephrosis.  She underwent nephrostogram   which showed no obvious blockage in the pelvic ureter below the L5, there was   a stricture site.  The patient also had renal Lasix washout for further   evaluation and the renal Lasix washout showed smooth function of 41% on the   left kidney and 59% on the right.  Given the fact that her hydro, there was no   obvious blockage, the left nephrostomy tube was subsequently removed and she   has been monitored in my office.  She was last seen back in 11/2018, which she   had no evidence of disease;  this was 2 months following removal of her   nephrostomy tube.  There was no indication that her renal function has   worsened.  She was in her usual state of health until approximately a week ago   she had developed some urinary tract infection symptoms.  Thus, patient   contacted my office to get treated for these symptoms.  Patient was prescribed   ciprofloxacin, which she completed last Wednesday.   However, her symptoms   have progressively worsened; the left flank pain which was not associated with   fever.  She presented to the emergency room today for further evaluation.    She was seen and evaluated in the emergency room today and her white blood   cell count was noted to be mildly elevated at 15.9, hemoglobin 16.3.    Chemistry panel shows an unremarkable chemistry panel with a creatinine of   0.8.  Urinalysis showed that she had packed white blood cells, nitrites and   leukocyte esterase was positive.  CT scan of the abdomen and pelvis was   performed.  CT renal colic scan on 02/17/2019 showed hydronephrosis.  Dr. Gonzalez who evaluated her in the emergency room had contacted me.    Patient was seen in [place] [id number] in the emergency room today.  Her history is as   stated above.  Again, she denies any nausea or vomiting.  She does complain   about left flank pain and some dysuria.  She is self-catheterizing.  She   otherwise has no other symptoms.    REVIEW OF SYSTEMS:  A 14-point review of system is unremarkable with the   exception of what has been described on HPI.    ALLERGIES:  SULFA.    CURRENT MEDICATIONS:  She takes Percocet 10/325 one to two tablets q. 4 hours   p.r.n. pain, Zofran 4 mg 1 p.o. q. 8 hours p.r.n. nausea and vomiting,   ascorbic acid, multivitamin, calcium, cholecalciferol, and vitamin B.    PAST MEDICAL HISTORY:  Significant for cervical cancer, bladder atony,   rectovaginal fistula, which she does not want surgically repaired and history   of left hydronephrosis.    PAST SURGICAL  HISTORY:  Previous history of total laparoscopic radical   hysterectomy with bilateral salpingooophorectomy with bilateral pelvic and   periaortic lymphadenectomy, previous history of laparoscopic diverting   colostomy and previous history of examination under anesthesia, cone biopsy   and cystoscopy.    SOCIAL HISTORY:  She denies any smoking, drug use or alcohol use.    FAMILY HISTORY:  Unremarkable for ovarian, breast, uterine or colon cancer.    PHYSICAL EXAMINATION:  VITAL SIGNS:  Stable.  She is afebrile.  GENERAL APPEARANCE:  Well-developed, well-nourished female, in no obvious   acute distress.  HEENT:  Normocephalic, atraumatic.  Sclerae is anicteric.  NECK:  Supple.  No thyroid masses noted.  No supraclavicular adenopathy   appreciated.  HEART:  Regular rate and rhythm.  Normal S1, S2.  No murmur, no S3 or S4.  LUNGS:  Clear to auscultation.  ABDOMEN:  Soft, nondistended and nontender.  Diverting colostomy is noted that   is functioning well.  Inspection of the back revealed no obvious CVA   tenderness.  PELVIC:  Deferred.  EXTREMITIES:  Shows no clubbing, cyanosis, or edema, nontender.  NEUROLOGIC:  Grossly intact.    IMPRESSION:  This is a 73-year-old female with history of cervical cancer   diagnosed initially in 2005, now 14 years out and nearly 5 years out from her   recurrent disease.  She has sequellae which includes rectovaginal fistula and   bladder atony, and left hydronephrosis secondary to left pelvic ureteral   stricture.  She is status post nephrostomy tube placement, which we removed   back in 09/2018.  She now presents with recurrent urinary tract infection,   failed outpatient Cipro.  She does not appear to be uroseptic but because of   the fact that she failed outpatient antibiotics therapy, I will go ahead and   admit her.    PLAN:  1.  Admit for IV antibiotics.  I am going to start her on ceftriaxone.  2.  IV hydration.  3.  We will repeat renal ultrasound to assess and compare her left  hydro since   09/2018.    Thank you again for the opportunity for allowing me to participate in her   care.  If you should have any questions or I could be of any help, please do   not hesitate to call.       ____________________________________     MD JAMIE CASTILLO / NTS    DD:  02/17/2019 23:29:41  DT:  02/18/2019 01:31:26    D#:  [id number]  Job#:  [id number]    cc: FAUSTINO SAWANT MD

## 2019-02-19 LAB
BACTERIA UR CULT: ABNORMAL
BACTERIA UR CULT: ABNORMAL
BASOPHILS # BLD AUTO: 0.2 % (ref 0–1.8)
BASOPHILS # BLD: 0.02 K/UL (ref 0–0.12)
EOSINOPHIL # BLD AUTO: 0.16 K/UL (ref 0–0.51)
EOSINOPHIL NFR BLD: 1.7 % (ref 0–6.9)
ERYTHROCYTE [DISTWIDTH] IN BLOOD BY AUTOMATED COUNT: 43 FL (ref 35.9–50)
HCT VFR BLD AUTO: 42.3 % (ref 37–47)
HGB BLD-MCNC: 13.6 G/DL (ref 12–16)
IMM GRANULOCYTES # BLD AUTO: 0.04 K/UL (ref 0–0.11)
IMM GRANULOCYTES NFR BLD AUTO: 0.4 % (ref 0–0.9)
LYMPHOCYTES # BLD AUTO: 1.74 K/UL (ref 1–4.8)
LYMPHOCYTES NFR BLD: 18.2 % (ref 22–41)
MCH RBC QN AUTO: 27.1 PG (ref 27–33)
MCHC RBC AUTO-ENTMCNC: 32.2 G/DL (ref 33.6–35)
MCV RBC AUTO: 84.4 FL (ref 81.4–97.8)
MONOCYTES # BLD AUTO: 0.75 K/UL (ref 0–0.85)
MONOCYTES NFR BLD AUTO: 7.9 % (ref 0–13.4)
NEUTROPHILS # BLD AUTO: 6.84 K/UL (ref 2–7.15)
NEUTROPHILS NFR BLD: 71.6 % (ref 44–72)
NRBC # BLD AUTO: 0 K/UL
NRBC BLD-RTO: 0 /100 WBC
PLATELET # BLD AUTO: 182 K/UL (ref 164–446)
PMV BLD AUTO: 9.7 FL (ref 9–12.9)
RBC # BLD AUTO: 5.01 M/UL (ref 4.2–5.4)
SIGNIFICANT IND 70042: ABNORMAL
SITE SITE: ABNORMAL
SOURCE SOURCE: ABNORMAL
WBC # BLD AUTO: 9.6 K/UL (ref 4.8–10.8)

## 2019-02-19 PROCEDURE — 700111 HCHG RX REV CODE 636 W/ 250 OVERRIDE (IP): Performed by: SPECIALIST

## 2019-02-19 PROCEDURE — 85025 COMPLETE CBC W/AUTO DIFF WBC: CPT

## 2019-02-19 PROCEDURE — 700105 HCHG RX REV CODE 258: Performed by: INTERNAL MEDICINE

## 2019-02-19 PROCEDURE — 700105 HCHG RX REV CODE 258: Performed by: SPECIALIST

## 2019-02-19 PROCEDURE — 770021 HCHG ROOM/CARE - ISO PRIVATE

## 2019-02-19 PROCEDURE — 700111 HCHG RX REV CODE 636 W/ 250 OVERRIDE (IP): Performed by: INTERNAL MEDICINE

## 2019-02-19 PROCEDURE — 36415 COLL VENOUS BLD VENIPUNCTURE: CPT

## 2019-02-19 RX ADMIN — CEFTRIAXONE SODIUM 2 G: 2 INJECTION, POWDER, FOR SOLUTION INTRAMUSCULAR; INTRAVENOUS at 05:38

## 2019-02-19 RX ADMIN — MEROPENEM 500 MG: 500 INJECTION, POWDER, FOR SOLUTION INTRAVENOUS at 21:45

## 2019-02-19 NOTE — PROGRESS NOTES
Gynecology Oncology Progress Note               Author: Veronica Guerrero Date & Time created: 2019  11:34 AM     Interval History:  Patient feels well, understands poc    Review of Systems:  ROS    Physical Exam:  Physical Exam    Labs:          Recent Labs      19   0855   SODIUM  142   POTASSIUM  3.7   CHLORIDE  106   CO2  25   BUN  24*   CREATININE  0.88   CALCIUM  10.0     Recent Labs      19   0855   ALTSGPT  8   ASTSGOT  17   ALKPHOSPHAT  89   TBILIRUBIN  0.5   LIPASE  14   GLUCOSE  117*     Recent Labs      19   0855  19   0412   RBC  5.89*  5.01   HEMOGLOBIN  16.3*  13.6   HEMATOCRIT  48.7*  42.3   PLATELETCT  211  182     Recent Labs      19   0855  19   0412   WBC  14.9*  9.6   NEUTSPOLYS  85.10*  71.60   LYMPHOCYTES  9.00*  18.20*   MONOCYTES  4.80  7.90   EOSINOPHILS  0.50  1.70   BASOPHILS  0.30  0.20   ASTSGOT  17   --    ALTSGPT  8   --    ALKPHOSPHAT  89   --    TBILIRUBIN  0.5   --      Recent Labs      19   0855   SODIUM  142   POTASSIUM  3.7   CHLORIDE  106   CO2  25   GLUCOSE  117*   BUN  24*   CREATININE  0.88   CALCIUM  10.0     Hemodynamics:  Temp (24hrs), Av.7 °C (98 °F), Min:36 °C (96.8 °F), Max:37.3 °C (99.2 °F)  Temperature: 36 °C (96.8 °F)  Pulse  Av.4  Min: 75  Max: 105   Blood Pressure : 134/88     Respiratory:    Respiration: 19, Pulse Oximetry: 91 %           Fluids:    Intake/Output Summary (Last 24 hours) at 19 1134  Last data filed at 19 0900   Gross per 24 hour   Intake              740 ml   Output              700 ml   Net               40 ml        GI/Nutrition:  Orders Placed This Encounter   Procedures   • Diet Order Regular     Standing Status:   Standing     Number of Occurrences:   1     Order Specific Question:   Diet:     Answer:   Regular [1]     Medical Decision Making, by Problem:  There are no active hospital problems to display for this patient.      Plan:  Stage Ib1 grade 3 cervical cancer:  5/27/2005  s/p RAH/BSO/BPLND: 5/27/2015  Recurrence: 3/2015  diverting colostomy by Dr. Claros on 4/4/2015 for rectovaginal fistula  s/p concurrent chemoradiation: 4/20/15 to 5/18/15 (last XRT 5/27/15)    1. Moderate left hydronephrosis- creat 0.88 on 2/17. Not a candidate for stent at this time due to UTI.  2. ESBL Klebsiella pneumoniae- sensitive to cefotan, consult to ID, we appreciate consult.   3. Hx colostomy- stable.    Discussed case with Dr Hays.  Quality-Core Measures

## 2019-02-19 NOTE — PROGRESS NOTES
Patient is AOx4. Plan of care discussed. Verbalized understanding. Denies pain, nausea or vomiting. Educated patient about fall prevention strategies. Educated patient to call and wait for staff assistance before attempting to ambulate. Verbalized understanding. Call light in use, belongings within reach, treaded socks on, bed locked in low position

## 2019-02-19 NOTE — CARE PLAN
Problem: Safety  Goal: Will remain free from falls    Intervention: Implement fall precautions  Patient able to ambulate without assistance. Educated patient to call for assistance if needed. Verbalized understanding. Call light in use, belongings within reach, treaded socks on, bed locked in low position      Problem: Knowledge Deficit  Goal: Knowledge of disease process/condition, treatment plan, diagnostic tests, and medications will improve    Intervention: Explain information regarding disease process/condition, treatment plan, diagnostic tests, and medications and document in education  Educated patient on her plan of care including IV antibiotics and gyn/urology consult. Verbalized understanding

## 2019-02-20 PROCEDURE — 87040 BLOOD CULTURE FOR BACTERIA: CPT | Mod: 91

## 2019-02-20 PROCEDURE — 700105 HCHG RX REV CODE 258: Performed by: INTERNAL MEDICINE

## 2019-02-20 PROCEDURE — 700111 HCHG RX REV CODE 636 W/ 250 OVERRIDE (IP): Performed by: INTERNAL MEDICINE

## 2019-02-20 PROCEDURE — 36415 COLL VENOUS BLD VENIPUNCTURE: CPT

## 2019-02-20 PROCEDURE — 770009 HCHG ROOM/CARE - ONCOLOGY SEMI PRI*

## 2019-02-20 PROCEDURE — 99221 1ST HOSP IP/OBS SF/LOW 40: CPT | Performed by: INTERNAL MEDICINE

## 2019-02-20 RX ADMIN — MEROPENEM 500 MG: 500 INJECTION, POWDER, FOR SOLUTION INTRAVENOUS at 05:34

## 2019-02-20 RX ADMIN — MEROPENEM 500 MG: 500 INJECTION, POWDER, FOR SOLUTION INTRAVENOUS at 21:59

## 2019-02-20 RX ADMIN — MEROPENEM 500 MG: 500 INJECTION, POWDER, FOR SOLUTION INTRAVENOUS at 14:11

## 2019-02-20 ASSESSMENT — ENCOUNTER SYMPTOMS
SPUTUM PRODUCTION: 0
ABDOMINAL PAIN: 0
SHORTNESS OF BREATH: 0
FEVER: 0
CHILLS: 0
MYALGIAS: 0
DIARRHEA: 0
VOMITING: 0
COUGH: 0
NAUSEA: 0
FLANK PAIN: 1

## 2019-02-20 NOTE — PROGRESS NOTES
Assumed care of pt at 19:15. Pt ambulating independently around room and appears comfortable and in no distress. Pt transferred to [place] [id number] at 19:30 via wheelchair on 2L NC with all personal belongings. Verified with receiving oncology nurse that report had been called by day shift.

## 2019-02-20 NOTE — CONSULTS
Consults   INFECTIOUS DISEASES INPATIENT CONSULT NOTE     Date of Service: 2/20/2019    Consult Requested By: Adam Hays M.D.    Reason for Consultation: ESBL Klebsiella UTI     History of Present Illness:   Nessa Dennis is a 73 y.o. female admitted 2/17/2019. Pt has a past medical history of cervical cancer status post radical hysterectomy and bilateral salpingo-oophorectomy with lymph node dissection in 2005.  She then remained in remission until 2015 when she had recurrence.  She was treated with chemoradiation and developed a rectovaginal fistula.  Due to this she had a diverting loop colostomy which has not been reversed due to patient preference.  She also has an atonic bladder and has been self catheterizing since 2015.  She had an episode of pyelonephritis in 2018 with associated left hydronephrosis.  Left nephrostomy tube was placed, no stenting.  Imaging has revealed some partial stricture but no obvious blockage to the left nephrostomy tube was removed in 9/2018.     She recently noticed blood in her urine and left flank pain, due to concern for UTI she was given a course of ciprofloxacin.  She continued to have symptoms with progression to nausea and vomiting and was evaluated in the emergency department.  In the ED she was found to have a mild leukocytosis of 14.9.  UA with packed WBC.  She has been afebrile, initially hypertensive but now resolved, patient had been on 4 L nasal cannula now satting well on 2 L nasal cannula.  CT with severe left hydronephrosis due to suspected stricture in proximal left ureter.  Bladder wall thickening suggestive of cystitis.  She was initially placed on ceftriaxone then transition to meropenem on 2/19 after cultures returned with ESBL Klebsiella pneumo in the urine.     Today she reports no significant hematuria, no cloudiness in urine, she still has some dull flank pain but it is improved.  She reports no ongoing nausea vomiting or diarrhea.         Review  Of Systems:  Review of Systems   Constitutional: Negative for chills, fever and malaise/fatigue.   Respiratory: Negative for cough, sputum production and shortness of breath.    Cardiovascular: Negative for chest pain.   Gastrointestinal: Negative for abdominal pain, diarrhea, nausea and vomiting.   Genitourinary: Positive for flank pain. Negative for hematuria.   Musculoskeletal: Negative for joint pain and myalgias.   Skin: Negative for rash.         PMH:   Past Medical History:   Diagnosis Date   • Arthritis     tailbone   • Cancer (HCC) 2005, 2015    cervical   • Colostomy in place (HCC)    • Other specified symptom associated with female genital organs    • Unspecified urinary incontinence     supra public cath   • Urinary bladder disorder     supra pubic cath       PSH:  Past Surgical History:   Procedure Laterality Date   • PELVIC EXAM UNDER ANESTHESIA  7/31/2015    Procedure: PELVIC EXAM UNDER ANESTHESIA;  Surgeon: Adam Hays M.D.;  Location: SURGERY [facility];  Service:    • CERVICAL CONIZATION  7/31/2015    Procedure: CERVICAL CONIZATION FOR: CONE BIOPSY;  Surgeon: Adam Hays M.D.;  Location: SURGERY [facility];  Service:    • CYSTOSCOPY  7/31/2015    Procedure: CYSTOSCOPY;  Surgeon: Adam Hays M.D.;  Location: SURGERY [facility];  Service:    • COLOSTOMY CREATION LAPAROSCOPIC  4/14/2015    Performed by Mariusz Barajas M.D. at SURGERY [facility]   • RECOVERY  4/6/2015    Performed by [facility] at SURGERY PRE-POST PROC UNIT [facility]   • OTHER  04/2015    supra pubic cath placement   • GYN SURGERY  5/2005    hysterectomy       FAMILY HX:  History reviewed. No pertinent family history.    SOCIAL HX:  Social History     Social History   • Marital status:      Spouse name: N/A   • Number of children: N/A   • Years of education: N/A     Occupational History   • Not on file.     Social History Main Topics   • Smoking status: Never Smoker   • Smokeless tobacco: Never Used    • Alcohol use No   • Drug use: No   • Sexual activity: Not on file     Other Topics Concern   • Not on file     Social History Narrative   • No narrative on file     History   Smoking Status   • Never Smoker   Smokeless Tobacco   • Never Used     History   Alcohol Use No       Allergies/Intolerances:  Allergies   Allergen Reactions   • Sulfa Drugs Unspecified     Pt states it runs her down.       History reviewed with the patient     Other Current Medications:    Current Facility-Administered Medications:   •  meropenem (MERREM) 500 mg in  mL IVPB, 500 mg, Intravenous, Q8HRS, Keisha Arizmendi M.D., Stopped at 19 0604  •  acetaminophen (TYLENOL) tablet 650 mg, 650 mg, Oral, Q6HRS PRN, Adam Hays M.D., 650 mg at 19 2311  •  oxyCODONE-acetaminophen (PERCOCET-10)  MG per tablet 1 Tab, 1 Tab, Oral, Q4HRS PRN, Adam Hays M.D.  •  ondansetron (ZOFRAN) syringe/vial injection 4 mg, 4 mg, Intravenous, Q6HRS PRN, Adam Hays M.D., 4 mg at 19 2239  [unfilled]    Most Recent Vital Signs:  /81   Pulse 74   Temp 36.6 °C (97.8 °F) (Oral)   Resp 16   Ht 1.524 m (5')   Wt 64.3 kg (141 lb 12.1 oz)   SpO2 93%   Breastfeeding? No   BMI 27.68 kg/m²   Temp  Av.6 °C (97.8 °F)  Min: 36 °C (96.8 °F)  Max: 37.3 °C (99.2 °F)    Physical Exam:  Physical Exam   Constitutional: She is oriented to person, place, and time and well-developed, well-nourished, and in no distress.   HENT:   Head: Normocephalic and atraumatic.   Eyes: Pupils are equal, round, and reactive to light. Conjunctivae and EOM are normal.   Cardiovascular: Normal rate, regular rhythm and normal heart sounds.    Pulmonary/Chest: Effort normal and breath sounds normal.   Abdominal: Soft. Bowel sounds are normal. She exhibits no distension. There is no tenderness. There is no rebound.   Left-sided CVA tenderness   Musculoskeletal: Normal range of motion. She exhibits no edema.   Neurological: She is alert and  oriented to person, place, and time.   Skin: Skin is warm and dry.   Psychiatric: Affect and judgment normal.         Pertinent Lab Results:  Recent Labs      02/19/19 0412   WBC  9.6      Recent Labs      02/19/19 0412   HEMOGLOBIN  13.6   HEMATOCRIT  42.3   MCV  84.4   MCH  27.1   PLATELETCT  182         No results for input(s): SODIUM, POTASSIUM, CHLORIDE, CO2, CREATININE in the last 72 hours.    Invalid input(s): UREANITROGEN, EGFR, GULCOSE     No results for input(s): ALBUMIN in the last 72 hours.    Invalid input(s): AST, ALT, ALKPHOS, BILITOT, TOTALBILIRUB, BILIRUBINTOT, BILIRUBINDIR, BILIRUBININD, ALKALINEPHOS     Pertinent Micro:  Results     Procedure Component Value Units Date/Time    URINE CULTURE(NEW) [[id number]]  (Abnormal)  (Susceptibility) Collected:  02/17/19 0836    Order Status:  Completed Specimen:  Urine Updated:  02/19/19 1016     Significant Indicator POS (POS)     Source UR     Site -     Urine Culture - (A)      Klebsiella pneumoniae ESBL  >100,000 cfu/mL  Extended Spectrum Beta-lactamase (ESBL) isolated.  ESBL's may be clinically resistant to therapy with  Penicillins,Cephalosporins or Aztreonam despite  apparent in vitro susceptibility to some of these agents.  The patient requires contact isolation.  Please contact pharmacy or an Infectious Disease Specialist  if you have any questions about appropriate therapy.  Extended Spectrum Beta-lactamase (ESBL) isolated.  ESBL's may be clinically resistant to therapy with  Penicillins,Cephalosporins or Aztreonam despite  apparent in vitro susceptibility to some of these agents.  The patient requires contact isolation.  Please contact pharmacy or an Infectious Disease Specialist  if you have any questions about appropriate therapy.   (A)    Narrative:       CALL  [place]  61 tel. [phone],  CALLED  61 tel. [phone] 02/19/2019, 10:15, RB PERF. RESULTS CALLED  TO:[phone],RN + faxed to inf ctl    Culture & Susceptibility     KLEBSIELLA PNEUMONIAE  ESBL     Antibiotic Sensitivity Microscan Unit Status    Ampicillin Resistant >16 mcg/mL Final    Method: SENSITIVITY, EVELIN    Cefepime Resistant >16 mcg/mL Final    Method: SENSITIVITY, EVELIN    Cefotaxime Extended Spectrum Beta Lactamase >32 mcg/mL Final    Method: SENSITIVITY, EVELIN    Cefotetan Sensitive <=16 mcg/mL Final    Method: SENSITIVITY, EVELIN    Ceftazidime Extended Spectrum Beta Lactamase >16 mcg/mL Final    Method: SENSITIVITY, EVELIN    Ceftriaxone Extended Spectrum Beta Lactamase >32 mcg/mL Final    Method: SENSITIVITY, EVELIN    Cefuroxime Resistant >16 mcg/mL Final    Method: SENSITIVITY, EVELIN    Cephalothin Resistant >16 mcg/mL Final    Method: SENSITIVITY, EVELIN    Ciprofloxacin Resistant >2 mcg/mL Final    Method: SENSITIVITY, EVELIN    Gentamicin Resistant >8 mcg/mL Final    Method: SENSITIVITY, EVELIN    Levofloxacin Resistant >4 mcg/mL Final    Method: SENSITIVITY, EVELIN    Nitrofurantoin Resistant >64 mcg/mL Final    Method: SENSITIVITY, EVELIN    Pip/Tazobactam Intermediate 64 mcg/mL Final    Method: SENSITIVITY, EVELIN    Piperacillin Resistant >64 mcg/mL Final    Method: SENSITIVITY, EVELIN    Tigecycline Sensitive <=2 mcg/mL Final    Method: SENSITIVITY, EVELIN    Tobramycin Resistant >8 mcg/mL Final    Method: SENSITIVITY, EVELIN    Trimeth/Sulfa Resistant >2/38 mcg/mL Final    Method: SENSITIVITY, EVELIN                       URINALYSIS CULTURE, IF INDICATED [[id number]]  (Abnormal) Collected:  02/17/19 0836    Order Status:  Completed Specimen:  Urine Updated:  02/17/19 0903     Color Yellow     Character Turbid (A)     Specific Gravity 1.025     Ph 6.5     Glucose Negative mg/dL      Ketones Negative mg/dL      Protein 100 (A) mg/dL      Bilirubin Negative     Urobilinogen, Urine 0.2     Nitrite Positive (A)     Leukocyte Esterase Large (A)     Occult Blood Large (A)     Micro Urine Req Microscopic        No results found for: BLOODCULTU, BLDCULT, BCHOLD     Studies:  Ct-renal Colic Evaluation(a/p W/o)    Result Date:  2/17/2019 2/17/2019 10:42 AM HISTORY/REASON FOR EXAM:  FLANK PAIN. TECHNIQUE/EXAM DESCRIPTION AND NUMBER OF VIEWS: CT scan renal/colic without contrast. 5 mm helical images of the abdomen and pelvis were obtained from the diaphragmatic domes through the pubic symphysis. Low dose optimization technique was utilized for this CT exam including automated exposure control and adjustment of the mA and/or kV according to patient size. COMPARISON: 10/12/2016 FINDINGS: Lower chest: Atelectasis/scarring in the dependent lower lobes. Liver: No mass. No intrahepatic biliary dilatation. Gallbladder: No mural thickening. No radiopaque gallstones. Common bile duct: Nondilated. Pancreas: Unremarkable. Spleen: No mass. Adrenals: No mass. Kidneys: Severe left hydronephrosis and proximal hydroureter, with suspected stricture of the left proximal ureter. Anastomoses are seen in the mid and distal left ureter. Tiny punctate nonobstructing stone in the upper pole of the left kidney. Normal right kidney. Stomach, small bowel, colon: Tiny hiatal hernia. No bowel wall thickening or obstruction. Prior bowel surgery, with a left lower quadrant ostomy. Peritoneal cavity: No ascites. Lymph nodes: No enlarged nodes by size criteria. Aorta: No aneurysm. Pelvic organs: Circumferential mural thickening of the bladder. Hysterectomy. Musculoskeletal structures: No acute fracture or destructive lesion. Old T11 compression deformity. Thoracolumbar dextroscoliosis. Multilevel spondylosis.     1. Severe left hydronephrosis due to suspected stricture of the torturous left proximal ureter. 2. Tiny punctate nonobstructing stone in the left kidney. 3. Ureteral anastomoses in the left mid and distal ureter. 4. Circumferential mural thickening of the bladder, suggestive of cystitis. 5. No lymphadenopathy in abdomen or pelvis.    Us-renal    Result Date: 2/18/2019 2/18/2019 11:07 AM HISTORY/REASON FOR EXAM:  Left hydronephrosis TECHNIQUE/EXAM DESCRIPTION:  Renal ultrasound. COMPARISON:  2/17/2019 FINDINGS: The right kidney measures 10.06 cm.  No hydronephrosis is seen on the right. The left kidney measures 9.89 cm. There is moderate left hydronephrosis, improved compared to prior. The bladder is partially decompressed. Bladder volume is 32.4 mL. There is a right ureteral jet visualized. Left ureteral jet was not seen.     Moderate left hydronephrosis, improved compared to prior. Left ureteral jet is not seen. No hydronephrosis is seen on the right.     Us-renal    Result Date: 2/17/2019 2/17/2019 9:53 PM HISTORY/REASON FOR EXAM:  HIDA follow-up TECHNIQUE/EXAM DESCRIPTION:  Renal ultrasound. COMPARISON:  September 19, 2018 FINDINGS: The right kidney measures 9.77 cm.  The left kidney measures 9.88 cm. There is severe left hydronephrosis, measuring 5.2 cm. There are no abnormal calcifications. The bladder demonstrates no focal wall abnormality. Ureteral jets are not visualized.     1.  Severe left hydronephrosis, increased since prior study.      ASSESSMENT:     73 y.o. female admitted 2/17/2019. Pt has a past medical history of cervical cancer status post radical hysterectomy and bilateral salpingo-oophorectomy with lymph node dissection in 2005.  She then remained in remission until 2015 when she had recurrence.  She was treated with chemoradiation and developed a rectovaginal fistula.  Due to this she had a diverting loop colostomy which has not been reversed due to patient preference.  She also has an atonic bladder and has been self catheterizing since 2015.  She had an episode of pyelonephritis in 2018 with associated left hydronephrosis.  Left nephrostomy tube was placed, no stenting.  Left nephrostomy tube was removed in 9/2018.     She recently noticed blood in her urine and left flank pain, due to concern for UTI she was given a course of ciprofloxacin.  She continued to have symptoms with progression to nausea and vomiting and was evaluated in the emergency  department.  In the ED she was found to have a mild leukocytosis of 14.9.  UA with packed WBC. CT with severe left hydronephrosis due to suspected stricture in proximal left ureter.  Bladder wall thickening suggestive of cystitis.  She was initially placed on ceftriaxone then transition to meropenem on 2/19 after cultures returned with ESBL Klebsiella pneumo in the urine.       UTI/cystitis/left hydronephritis   - No specific pyelonephritis findings on CT but high suspicion  - UA on 2/17 with packed WBC  - 2/17 UCx+ ESBL Kleb pneumo, susceptible to meropenem   - No plan for neph tube or stent at this time due to infection, per notes we will plan on placing ENT followed by stent after infection clear      Atonic bladder with self-catheterization since 2015  Hx of Cervical cancer, see assessment for details  Antibiotic allergies: sulfa drugs, unspecified       PLAN:     - Continue meropenem 500 mg every 8 hours, she will need 2 weeks of antibiotics (end 3/5/19) there are no viable oral options.  She plans on doing home or infusion center antibiotics we can transition to ertapenem 1 gram daily.   - Discuss antibiotic options with case management and ID will place orders as needed  -Agree with neph tube or stenting to prevent further urinary infections  -Monitor labs with CBC and CMP        Plan of care discussed with SAURABH Hays M.D.. Will continue to follow    Rashida Solorio M.D.

## 2019-02-20 NOTE — PROGRESS NOTES
Patient made aware of isolation status due to urine culture, informed of plan for new IV antibiotics.  Pt is up self, no need ofr strip alarm, asked to call for any needs.  Informed if she wants to get out of room and ambulate hallways to ask staff for a new gown and to wash hands.

## 2019-02-20 NOTE — PROGRESS NOTES
GYN/Oncology Progress Note               Author: Mercedes Llamas Date & Time created: 2019  8:40 AM     Interval History:  Patient up walking around room, self cathing and had a little blood this am    Review of Systems:  Review of Systems   Constitutional: Negative for chills and fever.   Respiratory: Negative for shortness of breath.    Cardiovascular: Negative for chest pain and leg swelling.   Gastrointestinal: Negative for nausea and vomiting.   Genitourinary: Positive for flank pain and hematuria. Negative for dysuria.       Physical Exam:  Physical Exam   Constitutional: She is oriented to person, place, and time. She appears well-developed and well-nourished. No distress.   Pulmonary/Chest: Effort normal.   Abdominal: Soft.   Musculoskeletal: She exhibits no edema.   Neurological: She is alert and oriented to person, place, and time.   Skin: Skin is warm and dry.       Labs:          Recent Labs      19   0855   SODIUM  142   POTASSIUM  3.7   CHLORIDE  106   CO2  25   BUN  24*   CREATININE  0.88   CALCIUM  10.0     Recent Labs      19   0855   ALTSGPT  8   ASTSGOT  17   ALKPHOSPHAT  89   TBILIRUBIN  0.5   LIPASE  14   GLUCOSE  117*     Recent Labs      19   0855  19   0412   RBC  5.89*  5.01   HEMOGLOBIN  16.3*  13.6   HEMATOCRIT  48.7*  42.3   PLATELETCT  211  182     Recent Labs      19   0855  19   0412   WBC  14.9*  9.6   NEUTSPOLYS  85.10*  71.60   LYMPHOCYTES  9.00*  18.20*   MONOCYTES  4.80  7.90   EOSINOPHILS  0.50  1.70   BASOPHILS  0.30  0.20   ASTSGOT  17   --    ALTSGPT  8   --    ALKPHOSPHAT  89   --    TBILIRUBIN  0.5   --      Recent Labs      19   0855   SODIUM  142   POTASSIUM  3.7   CHLORIDE  106   CO2  25   GLUCOSE  117*   BUN  24*   CREATININE  0.88   CALCIUM  10.0     Hemodynamics:  Temp (24hrs), Av.5 °C (97.7 °F), Min:36.1 °C (97 °F), Max:36.7 °C (98.1 °F)  Temperature: 36.6 °C (97.8 °F)  Pulse  Av  Min: 71  Max: 105   Blood  Pressure : 131/81     Respiratory:    Respiration: 16, Pulse Oximetry: 93 %           Fluids:    Intake/Output Summary (Last 24 hours) at 02/20/19 0840  Last data filed at 02/19/19 1912   Gross per 24 hour   Intake              540 ml   Output                0 ml   Net              540 ml        GI/Nutrition:  Orders Placed This Encounter   Procedures   • Diet Order Regular     Standing Status:   Standing     Number of Occurrences:   1     Order Specific Question:   Diet:     Answer:   Regular [1]     Medical Decision Making, by Problem:  There are no active hospital problems to display for this patient.      Plan:  Stage Ib1 grade 3 cervical cancer: 5/27/2005  s/p RAH/BSO/BPLND: 5/27/2015  Recurrence: 3/2015  diverting colostomy by Dr. Claros on 4/4/2015 for rectovaginal fistula  s/p concurrent chemoradiation: 4/20/15 to 5/18/15 (last XRT 5/27/15)     1. Moderate left hydronephrosis- creat 0.88 on 2/17. Hold on stent or NT placement until UTI clears.  2. ESBL Klebsiella pneumoniae- ID consulted, switched from cefotan to meropenum, will follow their recommendations for duration, may need outpatient infusion and PICC placement  3. Hx colostomy- stable.  4. Discharge planning-may need PICC and outpatient antibiotic infusion     Discussed case with Dr Hays.    Quality-Core Measures

## 2019-02-20 NOTE — PROGRESS NOTES
Assumed care @ 0715. Bedside report from MAYCOL Roland. Resting in bed and in no distress. Bed in low position, call light w/in reach.

## 2019-02-20 NOTE — PROGRESS NOTES
"/86   Pulse 83   Temp 36.6 °C (97.9 °F) (Oral)   Resp 18   Ht 1.524 m (5')   Wt 64.3 kg (141 lb 12.1 oz)   SpO2 95%   Breastfeeding? No   BMI 27.68 kg/m²     Pt is AOX4. Denies SOB, chest pain, n/v, pain. Pt states, \"I feel great.\"  Pt is using 2 L O2 via nasal cannula. Right PIV- TKO. Bed is locked in lowest position. POC discussed.   "

## 2019-02-21 DIAGNOSIS — N30.00 ACUTE CYSTITIS WITHOUT HEMATURIA: ICD-10-CM

## 2019-02-21 PROBLEM — N39.0 UTI (URINARY TRACT INFECTION): Status: ACTIVE | Noted: 2019-02-21

## 2019-02-21 LAB
ANION GAP SERPL CALC-SCNC: 8 MMOL/L (ref 0–11.9)
BUN SERPL-MCNC: 25 MG/DL (ref 8–22)
CALCIUM SERPL-MCNC: 10.2 MG/DL (ref 8.5–10.5)
CHLORIDE SERPL-SCNC: 105 MMOL/L (ref 96–112)
CO2 SERPL-SCNC: 26 MMOL/L (ref 20–33)
CREAT SERPL-MCNC: 0.76 MG/DL (ref 0.5–1.4)
GLUCOSE SERPL-MCNC: 99 MG/DL (ref 65–99)
POTASSIUM SERPL-SCNC: 4.1 MMOL/L (ref 3.6–5.5)
SODIUM SERPL-SCNC: 139 MMOL/L (ref 135–145)

## 2019-02-21 PROCEDURE — 36415 COLL VENOUS BLD VENIPUNCTURE: CPT

## 2019-02-21 PROCEDURE — 99232 SBSQ HOSP IP/OBS MODERATE 35: CPT | Performed by: INTERNAL MEDICINE

## 2019-02-21 PROCEDURE — 700111 HCHG RX REV CODE 636 W/ 250 OVERRIDE (IP): Performed by: INTERNAL MEDICINE

## 2019-02-21 PROCEDURE — 700105 HCHG RX REV CODE 258: Performed by: INTERNAL MEDICINE

## 2019-02-21 PROCEDURE — 80048 BASIC METABOLIC PNL TOTAL CA: CPT

## 2019-02-21 PROCEDURE — 770021 HCHG ROOM/CARE - ISO PRIVATE

## 2019-02-21 RX ORDER — 0.9 % SODIUM CHLORIDE 0.9 %
5 VIAL (ML) INJECTION PRN
Status: CANCELLED | OUTPATIENT
Start: 2019-02-21

## 2019-02-21 RX ORDER — 0.9 % SODIUM CHLORIDE 0.9 %
10-20 VIAL (ML) INJECTION PRN
Status: CANCELLED | OUTPATIENT
Start: 2019-02-21

## 2019-02-21 RX ADMIN — MEROPENEM 500 MG: 500 INJECTION, POWDER, FOR SOLUTION INTRAVENOUS at 17:02

## 2019-02-21 RX ADMIN — MEROPENEM 500 MG: 500 INJECTION, POWDER, FOR SOLUTION INTRAVENOUS at 22:39

## 2019-02-21 RX ADMIN — MEROPENEM 500 MG: 500 INJECTION, POWDER, FOR SOLUTION INTRAVENOUS at 05:29

## 2019-02-21 ASSESSMENT — ENCOUNTER SYMPTOMS
CHILLS: 0
SHORTNESS OF BREATH: 0
ABDOMINAL PAIN: 0
FLANK PAIN: 0
FEVER: 0
COUGH: 0
DIARRHEA: 0
VOMITING: 0
NAUSEA: 0
WEAKNESS: 0

## 2019-02-21 NOTE — PROGRESS NOTES
/86   Pulse 92   Temp 36.7 °C (98 °F) (Oral)   Resp 16   Ht 1.524 m (5')   Wt 64.3 kg (141 lb 12.1 oz)   SpO2 96%   Breastfeeding? No   BMI 27.68 kg/m²     Pt is AOX4. Denies SOB, chest pain, n/v, pain. Ambulating in room-steady. On 2 L O2 via nasal cannula. Bed is locked in lowest position. POC discussed.

## 2019-02-21 NOTE — PROGRESS NOTES
Infectious Disease Progress Note    Author: Rashida Solorio M.D. Date & Time of service: 2019  11:58 AM    Chief Complaint:  UTI/Pyelonephritis     Interval History:   Interval 24 hours of Vitals/Labs/Micro,and imaging results reviewed and compared to prior.  See assessment.     Review of Systems:  Review of Systems   Constitutional: Negative for chills, fever and malaise/fatigue.   Respiratory: Negative for cough and shortness of breath.    Cardiovascular: Negative for chest pain.   Gastrointestinal: Negative for abdominal pain, diarrhea, nausea and vomiting.   Genitourinary: Negative for dysuria, flank pain and hematuria.   Skin: Negative for rash.   Neurological: Negative for weakness.       Hemodynamics:  Temp (24hrs), Av.8 °C (98.2 °F), Min:36.6 °C (97.9 °F), Max:36.9 °C (98.5 °F)  Temperature: 36.8 °C (98.3 °F)  Pulse  Av.2  Min: 71  Max: 105   Blood Pressure : 141/81       Physical Exam:  Physical Exam   Constitutional: She is oriented to person, place, and time. She appears well-developed and well-nourished.   HENT:   Head: Normocephalic and atraumatic.   Cardiovascular: Normal rate, regular rhythm and normal heart sounds.    Pulmonary/Chest: Effort normal and breath sounds normal.   Abdominal: Soft. Bowel sounds are normal. She exhibits no distension. There is no tenderness. There is no rebound and no guarding.   Colostomy, no CVA tenderness   Musculoskeletal: Normal range of motion. She exhibits no edema.   Neurological: She is alert and oriented to person, place, and time.   Skin: Skin is warm and dry.   Psychiatric: She has a normal mood and affect. Her behavior is normal.       Meds:    Current Facility-Administered Medications:   •  meropenem  •  acetaminophen  •  oxyCODONE-acetaminophen  •  ondansetron    Labs:  Recent Labs      19   0412   WBC  9.6   RBC  5.01   HEMOGLOBIN  13.6   HEMATOCRIT  42.3   MCV  84.4   MCH  27.1   RDW  43.0   PLATELETCT  182   MPV  9.7    NEUTSPOLYS  71.60   LYMPHOCYTES  18.20*   MONOCYTES  7.90   EOSINOPHILS  1.70   BASOPHILS  0.20     Recent Labs      02/21/19   0019   SODIUM  139   POTASSIUM  4.1   CHLORIDE  105   CO2  26   GLUCOSE  99   BUN  25*     Recent Labs      02/21/19   0019   CREATININE  0.76       Imaging:  Ct-renal Colic Evaluation(a/p W/o)    Result Date: 2/17/2019 2/17/2019 10:42 AM HISTORY/REASON FOR EXAM:  FLANK PAIN. TECHNIQUE/EXAM DESCRIPTION AND NUMBER OF VIEWS: CT scan renal/colic without contrast. 5 mm helical images of the abdomen and pelvis were obtained from the diaphragmatic domes through the pubic symphysis. Low dose optimization technique was utilized for this CT exam including automated exposure control and adjustment of the mA and/or kV according to patient size. COMPARISON: 10/12/2016 FINDINGS: Lower chest: Atelectasis/scarring in the dependent lower lobes. Liver: No mass. No intrahepatic biliary dilatation. Gallbladder: No mural thickening. No radiopaque gallstones. Common bile duct: Nondilated. Pancreas: Unremarkable. Spleen: No mass. Adrenals: No mass. Kidneys: Severe left hydronephrosis and proximal hydroureter, with suspected stricture of the left proximal ureter. Anastomoses are seen in the mid and distal left ureter. Tiny punctate nonobstructing stone in the upper pole of the left kidney. Normal right kidney. Stomach, small bowel, colon: Tiny hiatal hernia. No bowel wall thickening or obstruction. Prior bowel surgery, with a left lower quadrant ostomy. Peritoneal cavity: No ascites. Lymph nodes: No enlarged nodes by size criteria. Aorta: No aneurysm. Pelvic organs: Circumferential mural thickening of the bladder. Hysterectomy. Musculoskeletal structures: No acute fracture or destructive lesion. Old T11 compression deformity. Thoracolumbar dextroscoliosis. Multilevel spondylosis.     1. Severe left hydronephrosis due to suspected stricture of the torturous left proximal ureter. 2. Tiny punctate nonobstructing  "stone in the left kidney. 3. Ureteral anastomoses in the left mid and distal ureter. 4. Circumferential mural thickening of the bladder, suggestive of cystitis. 5. No lymphadenopathy in abdomen or pelvis.    Us-renal    Result Date: 2/18/2019 2/18/2019 11:07 AM HISTORY/REASON FOR EXAM:  Left hydronephrosis TECHNIQUE/EXAM DESCRIPTION: Renal ultrasound. COMPARISON:  2/17/2019 FINDINGS: The right kidney measures 10.06 cm.  No hydronephrosis is seen on the right. The left kidney measures 9.89 cm. There is moderate left hydronephrosis, improved compared to prior. The bladder is partially decompressed. Bladder volume is 32.4 mL. There is a right ureteral jet visualized. Left ureteral jet was not seen.     Moderate left hydronephrosis, improved compared to prior. Left ureteral jet is not seen. No hydronephrosis is seen on the right.     Us-renal    Result Date: 2/17/2019 2/17/2019 9:53 PM HISTORY/REASON FOR EXAM:  HIDA follow-up TECHNIQUE/EXAM DESCRIPTION:  Renal ultrasound. COMPARISON:  September 19, 2018 FINDINGS: The right kidney measures 9.77 cm.  The left kidney measures 9.88 cm. There is severe left hydronephrosis, measuring 5.2 cm. There are no abnormal calcifications. The bladder demonstrates no focal wall abnormality. Ureteral jets are not visualized.     1.  Severe left hydronephrosis, increased since prior study.      Micro:  Results     Procedure Component Value Units Date/Time    BLOOD CULTURE [[id number]] Collected:  02/20/19 1022    Order Status:  Completed Specimen:  Blood from Peripheral Updated:  02/21/19 0849     Significant Indicator NEG     Source BLD     Site PERIPHERAL     Blood Culture No Growth    Note: Blood cultures are incubated for 5 days and  are monitored continuously.Positive blood cultures  are called to the RN and reported as soon as  they are identified.      Narrative:       Contact  Per Hospital Policy: Only change Specimen Src: to \"Line\" if  specified by physician order.    BLOOD " "CULTURE [[id number]] Collected:  02/20/19 1022    Order Status:  Completed Specimen:  Blood from Peripheral Updated:  02/21/19 0849     Significant Indicator NEG     Source BLD     Site PERIPHERAL     Blood Culture No Growth    Note: Blood cultures are incubated for 5 days and  are monitored continuously.Positive blood cultures  are called to the RN and reported as soon as  they are identified.      Narrative:       Contact  Per Hospital Policy: Only change Specimen Src: to \"Line\" if  specified by physician order.    URINE CULTURE(NEW) [[id number]]  (Abnormal)  (Susceptibility) Collected:  02/17/19 0836    Order Status:  Completed Specimen:  Urine Updated:  02/20/19 0935     Significant Indicator POS (POS)     Source UR     Site -     Urine Culture - (A)      Klebsiella pneumoniae ESBL  >100,000 cfu/mL  Extended Spectrum Beta-lactamase (ESBL) isolated.  ESBL's may be clinically resistant to therapy with  Penicillins,Cephalosporins or Aztreonam despite  apparent in vitro susceptibility to some of these agents.  The patient requires contact isolation.  Please contact pharmacy or an Infectious Disease Specialist  if you have any questions about appropriate therapy.   (A)    Narrative:       CALL  [place]  61 tel. [phone],  CALLED  61 tel. [phone] 02/19/2019, 10:15, RB PERF. RESULTS CALLED  TO:[id number],RN + faxed to inf ctl    Culture & Susceptibility     KLEBSIELLA PNEUMONIAE ESBL     Antibiotic Sensitivity Microscan Unit Status    Ampicillin Resistant >16 mcg/mL Final    Method: SENSITIVITY, EVELIN    Cefepime Resistant >16 mcg/mL Final    Method: SENSITIVITY, EVELIN    Cefotaxime Extended Spectrum Beta Lactamase >32 mcg/mL Final    Method: SENSITIVITY, EVELIN    Cefotetan Sensitive <=16 mcg/mL Final    Method: SENSITIVITY, EVELIN    Ceftazidime Extended Spectrum Beta Lactamase >16 mcg/mL Final    Method: SENSITIVITY, EVELIN    Ceftriaxone Extended Spectrum Beta Lactamase >32 mcg/mL Final    Method: SENSITIVITY, EVELIN    Cefuroxime " Resistant >16 mcg/mL Final    Method: SENSITIVITY, EVELIN    Cephalothin Resistant >16 mcg/mL Final    Method: SENSITIVITY, EVELIN    Ciprofloxacin Resistant >2 mcg/mL Final    Method: SENSITIVITY, EVELIN    Ertapenem Sensitive <=1 mcg/mL Final    Method: SENSITIVITY, EVELIN    Gentamicin Resistant >8 mcg/mL Final    Method: SENSITIVITY, EVELIN    Levofloxacin Resistant >4 mcg/mL Final    Method: SENSITIVITY, EVELIN    Meropenem Sensitive <=1 mcg/mL Final    Method: SENSITIVITY, EVELIN    Nitrofurantoin Resistant >64 mcg/mL Final    Method: SENSITIVITY, EVELIN    Pip/Tazobactam Intermediate 64 mcg/mL Final    Method: SENSITIVITY, EVELIN    Piperacillin Resistant >64 mcg/mL Final    Method: SENSITIVITY, EVELIN    Tigecycline Sensitive <=2 mcg/mL Final    Method: SENSITIVITY, EVELIN    Tobramycin Resistant >8 mcg/mL Final    Method: SENSITIVITY, EVELIN    Trimeth/Sulfa Resistant >2/38 mcg/mL Final    Method: SENSITIVITY, EVELIN                       URINALYSIS CULTURE, IF INDICATED [[id number]]  (Abnormal) Collected:  02/17/19 0836    Order Status:  Completed Specimen:  Urine Updated:  02/17/19 0903     Color Yellow     Character Turbid (A)     Specific Gravity 1.025     Ph 6.5     Glucose Negative mg/dL      Ketones Negative mg/dL      Protein 100 (A) mg/dL      Bilirubin Negative     Urobilinogen, Urine 0.2     Nitrite Positive (A)     Leukocyte Esterase Large (A)     Occult Blood Large (A)     Micro Urine Req Microscopic           ASSESSMENT:      73 y.o. female admitted 2/17/2019. Pt has a past medical history of cervical cancer status post radical hysterectomy and bilateral salpingo-oophorectomy with lymph node dissection in 2005.  She then remained in remission until 2015 when she had recurrence.  She was treated with chemoradiation and developed a rectovaginal fistula.  Due to this she had a diverting loop colostomy which has not been reversed due to patient preference.  She also has an atonic bladder and has been self catheterizing since 2015.  She  had an episode of pyelonephritis in 2018 with associated left hydronephrosis.  Left nephrostomy tube was placed, no stenting.  Left nephrostomy tube was removed in 9/2018.      She recently noticed blood in her urine and left flank pain, due to concern for UTI she was given a course of ciprofloxacin.  She continued to have symptoms with progression to nausea and vomiting and was evaluated in the emergency department.  In the ED she was found to have a mild leukocytosis of 14.9.  UA with packed WBC. CT with severe left hydronephrosis due to suspected stricture in proximal left ureter.  Bladder wall thickening suggestive of cystitis.  She was initially placed on ceftriaxone then transition to meropenem on 2/19 after cultures returned with ESBL Klebsiella pneumo in the urine.     Interval 24 hour assessment:    AF, O2 2 L NC, stable     Labs reviewed  Micro reviewed    Pt continues on meropenem 500 mg 3 times daily.  She is doing well today and reports no further hematuria, no CVA tenderness on exam or flank pain.  No change in stool output.    UTI/cystitis/left hydronephritis   - No specific pyelonephritis findings on CT but high suspicion  - UA on 2/17 with packed WBC  - 2/17 UCx+ ESBL Kleb pneumo, susceptible to meropenem   - No plan for neph tube or stent at this time due to infection, per notes we will plan on placing ENT followed by stent after infection clear      Atonic bladder with self-catheterization since 2015  Hx of Cervical cancer, see assessment for details  Antibiotic allergies: sulfa drugs, unspecified         PLAN:      - Continue meropenem 500 mg every 8 hours, she will need 2 weeks of antibiotics (end 3/5/19) there are no viable oral options.  She plans on doing home or infusion center antibiotics we can transition to ertapenem 1 gram daily.  Orders written on 2/21 in epic.  -While on IV antibiotics please obtain CBC and CMP weekly  -Orders given to case management on 2/21   -Agree with neph tube or  stenting to prevent further urinary infections  -Monitor labs with CBC and CMP         Plan of care discussed with patient and  at bedside, case management .  ID will sign off.       Rashida Solorio M.D.

## 2019-02-21 NOTE — PROGRESS NOTES
No new complaints. OOB ad emelia, ambulating in room w/ steady gait. Self care w/ ostomy, straight cath self w/ proper hand hygiene. Juwan po's.

## 2019-02-21 NOTE — PROGRESS NOTES
GYN/Oncology Progress Note               Author: Mercedes LANDIS Farhad Date & Time created: 2019  2:04 PM     Interval History:  Patient denies any dysuria or hematuria    Review of Systems:  Review of Systems   Constitutional: Negative for chills and fever.   Respiratory: Negative for shortness of breath.    Cardiovascular: Negative for chest pain.   Gastrointestinal: Negative for nausea and vomiting.   Genitourinary: Negative for dysuria, flank pain, hematuria and urgency.       Physical Exam:  Physical Exam   Constitutional: She is oriented to person, place, and time. She appears well-developed and well-nourished. No distress.   Pulmonary/Chest: Effort normal.   Abdominal: Soft.   Rounded     Musculoskeletal: She exhibits no edema.   Neurological: She is alert and oriented to person, place, and time.   Skin: Skin is warm and dry.       Labs:          Recent Labs      19   0019   SODIUM  139   POTASSIUM  4.1   CHLORIDE  105   CO2  26   BUN  25*   CREATININE  0.76   CALCIUM  10.2     Recent Labs      19   0019   GLUCOSE  99     Recent Labs      19   0412   RBC  5.01   HEMOGLOBIN  13.6   HEMATOCRIT  42.3   PLATELETCT  182     Recent Labs      19   0412   WBC  9.6   NEUTSPOLYS  71.60   LYMPHOCYTES  18.20*   MONOCYTES  7.90   EOSINOPHILS  1.70   BASOPHILS  0.20     Recent Labs      19   0019   SODIUM  139   POTASSIUM  4.1   CHLORIDE  105   CO2  26   GLUCOSE  99   BUN  25*   CREATININE  0.76   CALCIUM  10.2     Hemodynamics:  Temp (24hrs), Av.8 °C (98.2 °F), Min:36.6 °C (97.9 °F), Max:36.9 °C (98.5 °F)  Temperature: 36.8 °C (98.3 °F)  Pulse  Av.2  Min: 71  Max: 105   Blood Pressure : 141/81     Respiratory:    Respiration: 18, Pulse Oximetry: 90 %           Fluids:    Intake/Output Summary (Last 24 hours) at 19 1404  Last data filed at 19 2212   Gross per 24 hour   Intake              480 ml   Output              200 ml   Net              280 ml         GI/Nutrition:  Orders Placed This Encounter   Procedures   • Diet Order Regular     Standing Status:   Standing     Number of Occurrences:   1     Order Specific Question:   Diet:     Answer:   Regular [1]     Medical Decision Making, by Problem:  There are no active hospital problems to display for this patient.      Plan:  Stage Ib1 grade 3 cervical cancer: 5/27/2005  s/p RAH/BSO/BPLND: 5/27/2015  Recurrence: 3/2015  diverting colostomy by Dr. Claros on 4/4/2015 for rectovaginal fistula  s/p concurrent chemoradiation: 4/20/15 to 5/18/15 (last XRT 5/27/15)     1. Moderate left hydronephrosis- creat 0.76 today. Hold on stent or NT placement until UTI clears.  2. ESBL Klebsiella pneumoniae- ID consulted, switched from cefotan to meropenum, recommendation of 2 week course of outpatient infusion of ertapenem 1 gram daily, PICC order placed  3. Hx colostomy- stable.  4. Discharge planning- await PICC line and arrangement of outpatient infusion, discussed with      Discussed case with Dr Hays.    Quality-Core Measures

## 2019-02-21 NOTE — DISCHARGE PLANNING
Care Transition Team Assessment  SW met with pt at bedside.  Pt stated that she resides with her spouse.  Pt is independent in her ADLs/IADLs.  Pt drives herself.  Per ID's note, pt will need IV ABX until 3/5 and a PICC.  Pt is okay with going to infusion center since Medicare will not cover her medications for home infusion.  SW spoke to Dr. Solorio, she stated that there might be an oral option.  SW will remain available to assist with dc needs.      Information Source Nessa Dennis  Information Given By: Patient  Who is responsible for making decisions for patient? : Patient    Elopement Risk  Legal Hold: No  Ambulatory or Self Mobile in Wheelchair: Yes  Disoriented: No  Psychiatric Symptoms: None  History of Wandering: No  Elopement this Admit: No  Vocalizing Wanting to Leave: No  Displays Behaviors, Body Language Wanting to Leave: No-Not at Risk for Elopement  Elopement Risk: Not at Risk for Elopement    Interdisciplinary Discharge Planning  Does Admitting Nurse Feel This Could be a Complex Discharge?: No  Lives with - Patient's Self Care Capacity: Spouse  Patient or legal guardian wants to designate a caregiver (see row info): No  Support Systems: Spouse / Significant Other  Housing / Facility: [address]  Do You Take your Prescribed Medications Regularly:  (NA)  Able to Return to Previous ADL's: Yes  Mobility Issues: No  Prior Services: Home-Independent  Patient Expects to be Discharged to:: HOME  Assistance Needed: No  Durable Medical Equipment: Not Applicable    Discharge Preparedness  What is your plan after discharge?: Home with help  What are your discharge supports?: Spouse  Prior Functional Level: Ambulatory    Functional Assesment  Prior Functional Level: Ambulatory    Finances  Financial Barriers to Discharge: No  Prescription Coverage: Yes    Vision / Hearing Impairment  Vision Impairment : Yes  Right Eye Vision: Wears Glasses  Left Eye Vision: Wears Glasses  Hearing Impairment : Yes  Hearing  Impairment: Both Ears, Hearing Device(s) Available  Does Pt Need Special Equipment for the Hearing Impaired?: No    Values / Beliefs / Concerns  Values / Beliefs Concerns : Yes  Spiritual Requests During Hospitalization: Yes (LDS)    Domestic Abuse  Have you ever been the victim of abuse or violence?: No  Physical Abuse or Sexual Abuse: No  Verbal Abuse or Emotional Abuse: No  Possible Abuse Reported to:: Not Applicable    Discharge Risks or Barriers  Discharge risks or barriers?: No    Anticipated Discharge Information  Anticipated discharge disposition: Home, Infusion Center (outpatient)

## 2019-02-21 NOTE — CARE PLAN
Problem: Communication  Goal: The ability to communicate needs accurately and effectively will improve  Outcome: PROGRESSING AS EXPECTED  Pt calls appropriately for assistance. Call light within reach.     Problem: Infection  Goal: Will remain free from infection  Outcome: PROGRESSING AS EXPECTED  Assess for signs and symptoms of infection. On IVABX currently.

## 2019-02-22 ENCOUNTER — APPOINTMENT (OUTPATIENT)
Dept: RADIOLOGY | Facility: MEDICAL CENTER | Age: 74
DRG: 699 | End: 2019-02-22
Attending: NURSE PRACTITIONER
Payer: MEDICARE

## 2019-02-22 ENCOUNTER — APPOINTMENT (OUTPATIENT)
Dept: RADIOLOGY | Facility: MEDICAL CENTER | Age: 74
DRG: 699 | End: 2019-02-22
Attending: SPECIALIST
Payer: MEDICARE

## 2019-02-22 PROCEDURE — 700105 HCHG RX REV CODE 258: Performed by: INTERNAL MEDICINE

## 2019-02-22 PROCEDURE — C1751 CATH, INF, PER/CENT/MIDLINE: HCPCS

## 2019-02-22 PROCEDURE — 700111 HCHG RX REV CODE 636 W/ 250 OVERRIDE (IP): Performed by: INTERNAL MEDICINE

## 2019-02-22 PROCEDURE — 770021 HCHG ROOM/CARE - ISO PRIVATE

## 2019-02-22 PROCEDURE — 700111 HCHG RX REV CODE 636 W/ 250 OVERRIDE (IP): Performed by: NURSE PRACTITIONER

## 2019-02-22 PROCEDURE — 700105 HCHG RX REV CODE 258: Performed by: NURSE PRACTITIONER

## 2019-02-22 RX ADMIN — MEROPENEM 500 MG: 500 INJECTION, POWDER, FOR SOLUTION INTRAVENOUS at 22:58

## 2019-02-22 RX ADMIN — MEROPENEM 500 MG: 500 INJECTION, POWDER, FOR SOLUTION INTRAVENOUS at 10:29

## 2019-02-22 RX ADMIN — MEROPENEM 500 MG: 500 INJECTION, POWDER, FOR SOLUTION INTRAVENOUS at 17:02

## 2019-02-22 RX ADMIN — MEROPENEM 500 MG: 500 INJECTION, POWDER, FOR SOLUTION INTRAVENOUS at 04:23

## 2019-02-22 ASSESSMENT — ENCOUNTER SYMPTOMS
NAUSEA: 0
VOMITING: 0
ABDOMINAL PAIN: 0

## 2019-02-22 NOTE — PROCEDURES
Vascular Access Team    Date of Insertion: 2/22/19  Arm Circumference: 27  Internal length: 12  External Length: 0  Vein Occupancy %: 43  Reason for Midline: antibiotic therapy  Labs: on 2/19/19 WBC 9.6, , on 2/21/19BUN 25, Cr 0.76, GFR >60, INR n/a    Orders confirmed, vessel patency confirmed with ultrasound. Risks and benefits of procedure explained to patient and education regarding line associated bloodstream infections provided. Questions answered.     Power Midline placed in RUE per MD order with ultrasound guidance, initial arm circumference 27cm. 4 Fr, single lumen Power Midline placed in basilic vein after 1 attempt(s). 2 cc's of 1% lidocaine injected intradermally, 21 gauge microintroducer needle and modified Seldinger technique used. 12 cm catheter inserted with good blood return. Secured at 0 cm marker. Each lumen flushed without resistance with 10 mL 0.9% normal saline. Midline secured with Biopatch and Tegaderm.     Midline placement is confirmed by nurse using ultrasound and ability to flush and draw blood. Midline is appropriate for use at this time.  No X-ray is needed for placement confirmation. Pt tolerated procedure well.  Patient condition relayed to unit RN or ordering physician via this post procedure note in the EMR.     Ultrasound images uploaded to PACS and viewable in the EMR - yes  Ultrasound imaged printed and placed in paper chart - no     BARD Power Midline ref # [id number], Lot # [id number]

## 2019-02-22 NOTE — PROGRESS NOTES
Assumed patient care at 0700.  Patient is alert, awake, and oriented x4, non labored breathing on room air, no signs of acute distress noted.  Patient denies any chest pain, shortness of breath, or difficulty breathing.  Lungs are clear, on room air.  Abdomen is soft, non tender.  Patient has left quadrant colostomy bag-per patient, she changed 2 piece independently.  Patient straight catheterized x 3 today--educated patient on importance of dayton-care and handwashing during and prior to procedure--patient verbalized understanding.  Patient has been ambulating in the room, up to the bathroom, steady on her feet. Patient on contact precautions for ESBL in urine.  PICC line placement scheduled tomorrow. Bed in lowest position. [facility]

## 2019-02-22 NOTE — CARE PLAN
Problem: Infection  Goal: Will remain free from infection    Intervention: Assess for removal of potential routes of infection, such as IV, central line, intra-arterial or urinary catheters  Patient will have PICC placement tomorrow.  Patient educated regarding signs and symptoms of infection including fever, drainage, redness, tenderness at site. Patient verbalized understanding.       Problem: Knowledge Deficit  Goal: Knowledge of disease process/condition, treatment plan, diagnostic tests, and medications will improve    Intervention: Assess knowledge level of disease process/condition, treatment plan, diagnostic tests, and medications  Patient educated regarding proper way to self-catherize including importance of handwashing prior to procedure and good dayton care techniques-patient verbalized understanding.

## 2019-02-22 NOTE — DISCHARGE PLANNING
Anticipated Discharge Disposition: [facility]    Action: Received call from Hannah with [facility]. Pt has been scheduled for Sunday 2/24 at 0900.   Notified BSN    Barriers to Discharge: None    Plan: D/c when cleared and f/u with [facility] 2/24

## 2019-02-22 NOTE — PROGRESS NOTES
"A&O x4. Up self. RLQ colostomy, stoma cherry red. Pt has atonic bladder & straight caths her self. Pt has no complaints of pain \"0/10.\"Pt had midline placed for antibiotic therapy. D/C tomorrow after giving    "

## 2019-02-22 NOTE — PROGRESS NOTES
Assumed care of patient at 1900. Pt is A&Ox4, up self with steady gait. Denies pain or nausea. LLQ colostomy in place, pt performs self care, stoma pink/red. Pt self catheterizes and performs correctly. Contact precautions in place for ESBL in urine. PIV patent and infusing. No further needs at this time. Call light within reach, will continue to round hourly.

## 2019-02-22 NOTE — DISCHARGE PLANNING
Anticipated Discharge Disposition: [facility]    Action: Called infusion center ext [phone] to schedule pt's outpatient infusions. Spoke with Sarah who requested return call once PICC has been placed.     Barriers to Discharge: PICC placement; [facility] appointment     Plan: F/U with infusion center    Update: Called infusion center to arrange appointment. Spoke thai Young who will call back

## 2019-02-22 NOTE — CARE PLAN
Problem: Safety  Goal: Will remain free from falls    Intervention: Assess risk factors for falls  Pt is A&Ox4, up self with steady gait. Pt calls appropriately if needing assistance.       Problem: Infection  Goal: Will remain free from infection    Intervention: Implement standard precautions and perform hand washing before and after patient contact  Hand hygiene performed before and after patient interaction.

## 2019-02-22 NOTE — PROGRESS NOTES
Gyn Oncology Progress Note               Author: Coco Irwin Date & Time created: 2019  9:12 AM     Interval History:  Doing well and has gotten her picc line    Review of Systems:  Review of Systems   Gastrointestinal: Negative for abdominal pain, nausea and vomiting.       Physical Exam:  Physical Exam   Constitutional: She appears well-developed and well-nourished.   Musculoskeletal:   Left PICC line       Labs:          Recent Labs      19   0019   SODIUM  139   POTASSIUM  4.1   CHLORIDE  105   CO2  26   BUN  25*   CREATININE  0.76   CALCIUM  10.2     Recent Labs      19   0019   GLUCOSE  99     No results for input(s): RBC, HEMOGLOBIN, HEMATOCRIT, PLATELETCT, PROTHROMBTM, APTT, INR, IRON, FERRITIN, TOTIRONBC in the last 72 hours.      Recent Labs      19   0019   SODIUM  139   POTASSIUM  4.1   CHLORIDE  105   CO2  26   GLUCOSE  99   BUN  25*   CREATININE  0.76   CALCIUM  10.2     Hemodynamics:  Temp (24hrs), Av.8 °C (98.3 °F), Min:36.7 °C (98 °F), Max:37.1 °C (98.8 °F)  Temperature: 36.7 °C (98.1 °F)  Pulse  Av.5  Min: 71  Max: 105   Blood Pressure : 152/84     Respiratory:    Respiration: 18, Pulse Oximetry: 92 %           Fluids:    Intake/Output Summary (Last 24 hours) at 19 0912  Last data filed at 19 2100   Gross per 24 hour   Intake                0 ml   Output              200 ml   Net             -200 ml        GI/Nutrition:  Orders Placed This Encounter   Procedures   • Diet Order Regular     Standing Status:   Standing     Number of Occurrences:   1     Order Specific Question:   Diet:     Answer:   Regular [1]     Medical Decision Making, by Problem:  There are no active hospital problems to display for this patient.      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 Moderate left hydronephrosis- Hold on stent or NT placement until UTI fully treated. Monitor  2. ESBL Klebsiella pneumoniae- ID consulted. Thank you ID. Pt switched from cefotan to meropenum, recommendation of 2 week course of outpatient infusion of ertapenem 1 gram daily, PICC order placed  3. Hx colostomy- stable.  4. Discharge planning- PICC line placed on 2/21 and arrangement of outpatient infusion. Once arranged will dc home    Discussed case with Dr Hays.  Quality-Core Measures

## 2019-02-23 VITALS
WEIGHT: 141.76 LBS | SYSTOLIC BLOOD PRESSURE: 131 MMHG | OXYGEN SATURATION: 91 % | DIASTOLIC BLOOD PRESSURE: 83 MMHG | BODY MASS INDEX: 27.83 KG/M2 | HEIGHT: 60 IN | TEMPERATURE: 99.4 F | RESPIRATION RATE: 16 BRPM | HEART RATE: 88 BPM

## 2019-02-23 PROCEDURE — 700111 HCHG RX REV CODE 636 W/ 250 OVERRIDE (IP): Performed by: NURSE PRACTITIONER

## 2019-02-23 PROCEDURE — 700111 HCHG RX REV CODE 636 W/ 250 OVERRIDE (IP): Performed by: SPECIALIST

## 2019-02-23 PROCEDURE — 700105 HCHG RX REV CODE 258: Performed by: NURSE PRACTITIONER

## 2019-02-23 RX ORDER — OXYCODONE HYDROCHLORIDE AND ACETAMINOPHEN 5; 325 MG/1; MG/1
1 TABLET ORAL EVERY 6 HOURS PRN
Qty: 28 TAB | Refills: 0 | Status: SHIPPED | OUTPATIENT
Start: 2019-02-23 | End: 2019-03-02

## 2019-02-23 RX ORDER — ONDANSETRON 4 MG/1
4 TABLET, FILM COATED ORAL EVERY 6 HOURS PRN
Qty: 30 TAB | Refills: 0 | Status: SHIPPED | OUTPATIENT
Start: 2019-02-23 | End: 2019-04-10

## 2019-02-23 RX ADMIN — ONDANSETRON 4 MG: 2 INJECTION INTRAMUSCULAR; INTRAVENOUS at 09:43

## 2019-02-23 RX ADMIN — ERTAPENEM SODIUM 1000 MG: 1 INJECTION, POWDER, LYOPHILIZED, FOR SOLUTION INTRAMUSCULAR; INTRAVENOUS at 09:00

## 2019-02-23 ASSESSMENT — ENCOUNTER SYMPTOMS
SHORTNESS OF BREATH: 0
CHILLS: 0
VOMITING: 0
FEVER: 0
FLANK PAIN: 0
NAUSEA: 0

## 2019-02-23 NOTE — DISCHARGE SUMMARY
DATE OF ADMISSION:  02/17/2019    DATE OF DISCHARGE:  02/23/2019    ADMITTING DIAGNOSES:  1.  Left pyelonephritis.  2.  Left hydronephrosis.  3.  History of cervical cancer.  4.  History of rectovaginal fistula, status post diverting loop colostomy.    DISCHARGE DIAGNOSES:  1.  Left pyelonephritis.  2.  Klebsiella pneumoniae urinary tract infection with extended-spectrum   beta-lactamases.    DISCHARGE LABORATORY:  On 02/19/2019, white blood cell count 9.6 with   hemoglobin 13.6, normal electrolytes with a creatinine of 0.76.    PERTINENT IMAGING STUDY:  02/17/2019 renal colic study demonstrating severe   left hydronephrosis due to suspected stricture of the tortuous left proximal   ureter, circumferential mural thickening of the bladder suggestive of   cystitis, no lymphadenopathy in the abdomen or pelvis.  Renal ultrasound on   02/18/2019 shows moderate left hydronephrosis, improved compared to prior,   left ureteral jets not seen, no hydronephrosis seen on the right.    INHOUSE CONSULTATION:  Infectious disease for treatment of ESBL Klebsiella   pneumoniae.    THERAPEUTIC INTERVENTION:  IV antibiotics, Invanz, and meropenem.  Placement   of a midline catheter for administration of IV antibiotics.    HOSPITAL COURSE:  The patient was admitted on the above date with the above   diagnoses.  She was admitted and was started empirically initially on Zosyn,   as this was suspected to be a UTI with early pyelonephritis.  Patient   underwent the above workup with the above imaging studies.  She never really   had urosepsis.  The cultures eventually grew out ESBL Klebsiella.  ID was   consulted.  Patient's antibiotics were adjusted appropriately and was   eventually on Invanz and meropenem.  Per ID, it was recommended that she   receive a total of 2 weeks of IV antibiotics.    In addition, patient was also noted to have a left hydro.  Patient did not   demonstrate any evidence of renal failure.  Her creatinine was stable.   She   was asymptomatic from a standpoint of hydronephrosis with no evidence of left   flank pain.  The decision was made, given the fact that her creatinine was   normal and she was asymptomatic, to monitor her and not put a stent as she had   a UTI.  She will be reevaluated on an outpatient basis and we will check a   creatinine and assess her.  More than likely, patient will need to have a left   nephrostomy tube and subsequently ureteral stent placement.  I discussed with   her these options during her hospitalization.  She will be followed up in my   office on 03/11/2019 for followup.    DISCHARGE INSTRUCTIONS:  1.  Patient is to complete IV antibiotic therapy arranged in the outpatient   infusion.  2.  Patient to report any fevers and chills.    DIET:  Regular.    ACTIVITIES:  Regular.    DISCHARGE MEDICATIONS:  Patient to resume her home medication.  Patient was   discharged home on ertapenem 1 g daily.  She will continue this and to end on   03/05/2019.    Patient will have a repeat urine culture when she follows up with us on   03/11/2019 to evaluate her response to the IV antibiotic therapy.       ____________________________________     JASON FREY MD    PCL / NTS    DD:  02/23/2019 13:41:55  DT:  02/23/2019 14:10:56    D#:  [id number]  Job#:  [id number]    cc: FAUSTINO SAWANT MD

## 2019-02-23 NOTE — PROGRESS NOTES
Assumed care of patient at 1900. Pt is A&Ox4, up self with steady gait. Denies pain or nausea. Colostomy present, pt performs self care. Atonic bladder, pt self catheterizes. Midline to R upper extremity, TKO. PIV saline locked. Contact isolation for ESBL. No further needs at this time. Call light within reach, will continue to round hourly.

## 2019-02-23 NOTE — CARE PLAN
Problem: Bowel/Gastric:  Goal: Normal bowel function is maintained or improved  Outcome: PROGRESSING AS EXPECTED  Colostomy present. Patient provides self care. Stoma red, small stool present.    Problem: Pain Management  Goal: Pain level will decrease to patient's comfort goal  Outcome: PROGRESSING AS EXPECTED  Pt denies pain at this time. Will continue to assess.

## 2019-02-23 NOTE — CARE PLAN
Problem: Safety  Goal: Will remain free from falls  Outcome: PROGRESSING AS EXPECTED  Pt upself with steady gait. She uses call light for help appropriately. Safety precautions in place.    Problem: Infection  Goal: Will remain free from infection  Outcome: PROGRESSING AS EXPECTED  Contact isolation precautions for ESBL in urine.

## 2019-02-23 NOTE — PROGRESS NOTES
GYN/Oncology Progress Note               Author: Mercedes LANDIS Farhad Date & Time created: 2019  11:57 AM     Interval History:  Patient ready to go home, nauseated with new antibiotic infusion this am    Review of Systems:  Review of Systems   Constitutional: Negative for chills and fever.   Respiratory: Negative for shortness of breath.    Cardiovascular: Negative for chest pain.   Gastrointestinal: Negative for nausea and vomiting.   Genitourinary: Negative for dysuria, flank pain and hematuria.       Physical Exam:  Physical Exam   Constitutional: She is oriented to person, place, and time. No distress.   Pulmonary/Chest: Effort normal.   Abdominal: Soft.   Musculoskeletal: She exhibits no edema.   Neurological: She is alert and oriented to person, place, and time.   Skin: Skin is warm and dry.       Labs:          Recent Labs      19   0019   SODIUM  139   POTASSIUM  4.1   CHLORIDE  105   CO2  26   BUN  25*   CREATININE  0.76   CALCIUM  10.2     Recent Labs      19   0019   GLUCOSE  99     No results for input(s): RBC, HEMOGLOBIN, HEMATOCRIT, PLATELETCT, PROTHROMBTM, APTT, INR, IRON, FERRITIN, TOTIRONBC in the last 72 hours.      Recent Labs      19   0019   SODIUM  139   POTASSIUM  4.1   CHLORIDE  105   CO2  26   GLUCOSE  99   BUN  25*   CREATININE  0.76   CALCIUM  10.2     Hemodynamics:  Temp (24hrs), Av.1 °C (98.7 °F), Min:36.7 °C (98 °F), Max:37.7 °C (99.8 °F)  Temperature: 37.4 °C (99.4 °F)  Pulse  Av.8  Min: 71  Max: 105   Blood Pressure : 131/83     Respiratory:    Respiration: 16, Pulse Oximetry: 91 %           Fluids:  No intake or output data in the 24 hours ending 19 1157     GI/Nutrition:  Orders Placed This Encounter   Procedures   • Diet Order Regular     Standing Status:   Standing     Number of Occurrences:   1     Order Specific Question:   Diet:     Answer:   Regular [1]   • Discontinue Diet Tray     Standing Status:   Standing     Number of Occurrences:   1      Medical Decision Making, by Problem:  There are no active hospital problems to display for this patient.      Plan:  Stage Ib1 grade 3 cervical cancer: 5/27/2005  s/p RAH/BSO/BPLND: 5/27/2015  Recurrence: 3/2015  diverting colostomy by Dr. Claros on 4/4/2015 for rectovaginal fistula  s/p concurrent chemoradiation: 4/20/15 to 5/18/15 (last XRT 5/27/15)     1. Moderate left hydronephrosis- Hold on stent or NT placement until UTI fully treated. Monitor  2. ESBL Klebsiella pneumoniae- ID consulted. Thank you ID. Pt switched from cefotan to meropenum, recommendation of 2 week course of outpatient infusion of ertapenem 1 gram daily, PICC placed  3. Hx colostomy- stable.  4. Discharge planning- [facility] appointment on 2/24 @ 9 am, f/u with our office in 2 weeks      Discussed case with Dr Hays.    Quality-Core Measures

## 2019-02-23 NOTE — CARE PLAN
Problem: Safety  Goal: Will remain free from falls    Intervention: Assess risk factors for falls  Pt is A&Ox4, up self with steady gait. Treaded socks on, bed locked in lowest position, call light within reach.       Problem: Infection  Goal: Will remain free from infection    Intervention: Implement standard precautions and perform hand washing before and after patient contact  Hand hygiene performed before and after pt interaction.

## 2019-02-23 NOTE — DISCHARGE INSTRUCTIONS
Discharge Instructions    Discharged to home by car with relative. Discharged via walking, hospital escort: Yes.  Special equipment needed: Not Applicable    Be sure to schedule a follow-up appointment with your primary care doctor or any specialists as instructed.     Discharge Plan:   Diet Plan: Discussed  Activity Level: Discussed  Confirmed Follow up Appointment: Patient to Call and Schedule Appointment  Confirmed Symptoms Management: Discussed  Medication Reconciliation Updated: Yes  Influenza Vaccine Indication: Not indicated: Previously immunized this influenza season and > 8 years of age    I understand that a diet low in cholesterol, fat, and sodium is recommended for good health. Unless I have been given specific instructions below for another diet, I accept this instruction as my diet prescription.   Other diet: Regular    Special Instructions: None    · Is patient discharged on Warfarin / Coumadin?   No     Depression / Suicide Risk    As you are discharged from this [facility] facility, it is important to learn how to keep safe from harming yourself.    Recognize the warning signs:  · Abrupt changes in personality, positive or negative- including increase in energy   · Giving away possessions  · Change in eating patterns- significant weight changes-  positive or negative  · Change in sleeping patterns- unable to sleep or sleeping all the time   · Unwillingness or inability to communicate  · Depression  · Unusual sadness, discouragement and loneliness  · Talk of wanting to die  · Neglect of personal appearance   · Rebelliousness- reckless behavior  · Withdrawal from people/activities they love  · Confusion- inability to concentrate     If you or a loved one observes any of these behaviors or has concerns about self-harm, here's what you can do:  · Talk about it- your feelings and reasons for harming yourself  · Remove any means that you might use to hurt yourself (examples: pills, rope, extension  cords, firearm)  · Get professional help from the community (Mental Health, Substance Abuse, psychological counseling)  · Do not be alone:Call your Safe Contact- someone whom you trust who will be there for you.  · Call your local CRISIS HOTLINE 214-8124 or 602-232-2910  · Call your local Children's Mobile Crisis Response Team [place] (060) 215-4893 or www.[employer]  · Call the toll free National Suicide Prevention Hotlines   · National Suicide Prevention Lifeline 272-120-BIJM (1955)  · [employer] Line Network 800-SUICIDE (921-3877)        Urinary Tract Infection, Adult  Introduction  A urinary tract infection (UTI) is an infection of any part of the urinary tract. The urinary tract includes the:  · Kidneys.  · Ureters.  · Bladder.  · Urethra.  These organs make, store, and get rid of pee (urine) in the body.  Follow these instructions at home:  · Take over-the-counter and prescription medicines only as told by your doctor.  · If you were prescribed an antibiotic medicine, take it as told by your doctor. Do not stop taking the antibiotic even if you start to feel better.  · Avoid the following drinks:  ¨ Alcohol.  ¨ Caffeine.  ¨ Tea.  ¨ Carbonated drinks.  · Drink enough fluid to keep your pee clear or pale yellow.  · Keep all follow-up visits as told by your doctor. This is important.  · Make sure to:  ¨ Empty your bladder often and completely. Do not to hold pee for long periods of time.  ¨ Empty your bladder before and after sex.  ¨ Wipe from front to back after a bowel movement if you are female. Use each tissue one time when you wipe.  Contact a doctor if:  · You have back pain.  · You have a fever.  · You feel sick to your stomach (nauseous).  · You throw up (vomit).  · Your symptoms do not get better after 3 days.  · Your symptoms go away and then come back.  Get help right away if:  · You have very bad back pain.  · You have very bad lower belly (abdominal) pain.  · You are throwing up and  "cannot keep down any medicines or water.  This information is not intended to replace advice given to you by your health care provider. Make sure you discuss any questions you have with your health care provider.  Document Released: 06/05/2009 Document Revised: 05/25/2017 Document Reviewed: 11/07/2016  © 2017 [employer]    Power Midline Instructions How to Care for your Power Midline:  Do not take a bath, swim, or use hot tubs when you have a Power Midline. Cover Power Midline line with clear plastic wrap and tape to keep it dry while showering. Check the Power Midline insertion site daily for leakage, redness, swelling, or pain. Flush the Power Midline as directed by your health care provider. Let your health care provider know right away if the Power Midline is difficult to flush or does not flush. Do not use force to flush the Power Midline. Do not use a syringe that is less than 10 mL to flush the Power Midline. Avoid blood pressure checks on the arm with the Power Midline. Do not take the Power Midline out yourself. Only a trained clinical professional should remove the Power Midline. Make sure you or anyone who access your Power Midline Line washes their hands before using the line. Make sure the hub of the line is \"scrubbed\" prior to using the line. Dressing Changes Change the Power Midline dressing as instructed by your health care provider. Change your Power Midline dressing if it becomes loose or wet. When to seek medical attention Power Midline is accidentally pulled all the way out. There is any type of drainage, redness, or swelling where the Power Midline enters the skin. Noticeable increase in arm circumference due to swelling of arm. You cannot flush the Power Midline, it is difficult to flush, or the Power Midline leaks around the insertion site when it is flushed. You hear a \"flushing\" sound when the Power Midline is flushed. You notice a hole or tear in the Power Midline. You develop chills or a " fever.

## 2019-02-23 NOTE — PROGRESS NOTES
D/c instructions provided to patient and . PIV removed; gauze and tape applied. Midline saline locked; dressing c/d/i. Patient is d/c home with midline intact for IV abx with [facility]. [facility] appointments scheduled. Patient understands to make appointment with Dr. Vanessa office in 2 weeks. Prescriptions provided to patient. Patient left floor walking with all belongings; all questions answered at this time.

## 2019-02-23 NOTE — PROGRESS NOTES
Patient A&O x4, upself with steady gait. Colostomy present; patient provides self care. Patient with atonic bladder and self catheterizes as needed without difficulty. She denies pain. Midline placed to RUE today. [facility] appt not available until 2/24; will give first dose of ertapenem tomorrow AM before patient d/c per SHILOH Monteiro with Dr. Hays, and ID MD. Patient understands and agrees to POC. Contact precautions for ESBL in urine. All needs met at this time.

## 2019-02-24 ENCOUNTER — OUTPATIENT INFUSION SERVICES (OUTPATIENT)
Dept: ONCOLOGY | Facility: MEDICAL CENTER | Age: 74
End: 2019-02-24
Attending: INTERNAL MEDICINE
Payer: MEDICARE

## 2019-02-24 VITALS
SYSTOLIC BLOOD PRESSURE: 150 MMHG | BODY MASS INDEX: 28.44 KG/M2 | HEART RATE: 83 BPM | TEMPERATURE: 97.1 F | RESPIRATION RATE: 18 BRPM | HEIGHT: 59 IN | DIASTOLIC BLOOD PRESSURE: 92 MMHG | OXYGEN SATURATION: 97 % | WEIGHT: 141.09 LBS

## 2019-02-24 DIAGNOSIS — N30.00 ACUTE CYSTITIS WITHOUT HEMATURIA: ICD-10-CM

## 2019-02-24 PROCEDURE — 96365 THER/PROPH/DIAG IV INF INIT: CPT

## 2019-02-24 PROCEDURE — 700105 HCHG RX REV CODE 258: Performed by: INTERNAL MEDICINE

## 2019-02-24 PROCEDURE — 700111 HCHG RX REV CODE 636 W/ 250 OVERRIDE (IP): Performed by: INTERNAL MEDICINE

## 2019-02-24 RX ORDER — 0.9 % SODIUM CHLORIDE 0.9 %
10-20 VIAL (ML) INJECTION PRN
Status: CANCELLED | OUTPATIENT
Start: 2019-02-24

## 2019-02-24 RX ORDER — 0.9 % SODIUM CHLORIDE 0.9 %
5 VIAL (ML) INJECTION PRN
Status: CANCELLED | OUTPATIENT
Start: 2019-02-24

## 2019-02-24 RX ADMIN — ERTAPENEM SODIUM 1000 MG: 1 INJECTION, POWDER, LYOPHILIZED, FOR SOLUTION INTRAMUSCULAR; INTRAVENOUS at 08:59

## 2019-02-24 NOTE — PROGRESS NOTES
Pt is here for her first Invanz. Midline in place - bruising at the site. Pt voices no concerns except for fatigue.  present. Infusion completed without an incident. Discharged home to self care. Returns daily. Printout of the future appointments provided.

## 2019-02-25 ENCOUNTER — OUTPATIENT INFUSION SERVICES (OUTPATIENT)
Dept: ONCOLOGY | Facility: MEDICAL CENTER | Age: 74
End: 2019-02-25
Attending: INTERNAL MEDICINE
Payer: MEDICARE

## 2019-02-25 VITALS
TEMPERATURE: 97.4 F | BODY MASS INDEX: 28.36 KG/M2 | HEIGHT: 59 IN | SYSTOLIC BLOOD PRESSURE: 150 MMHG | DIASTOLIC BLOOD PRESSURE: 87 MMHG | RESPIRATION RATE: 18 BRPM | WEIGHT: 140.65 LBS | OXYGEN SATURATION: 96 % | HEART RATE: 85 BPM

## 2019-02-25 DIAGNOSIS — N30.00 ACUTE CYSTITIS WITHOUT HEMATURIA: ICD-10-CM

## 2019-02-25 LAB
ALBUMIN SERPL BCP-MCNC: 4 G/DL (ref 3.2–4.9)
ALBUMIN/GLOB SERPL: 1.4 G/DL
ALP SERPL-CCNC: 71 U/L (ref 30–99)
ALT SERPL-CCNC: 7 U/L (ref 2–50)
ANION GAP SERPL CALC-SCNC: 7 MMOL/L (ref 0–11.9)
AST SERPL-CCNC: 14 U/L (ref 12–45)
BACTERIA BLD CULT: NORMAL
BACTERIA BLD CULT: NORMAL
BASOPHILS # BLD AUTO: 0.3 % (ref 0–1.8)
BASOPHILS # BLD: 0.02 K/UL (ref 0–0.12)
BILIRUB SERPL-MCNC: 0.5 MG/DL (ref 0.1–1.5)
BUN SERPL-MCNC: 18 MG/DL (ref 8–22)
CALCIUM SERPL-MCNC: 9.9 MG/DL (ref 8.5–10.5)
CHLORIDE SERPL-SCNC: 105 MMOL/L (ref 96–112)
CO2 SERPL-SCNC: 25 MMOL/L (ref 20–33)
CREAT SERPL-MCNC: 0.6 MG/DL (ref 0.5–1.4)
EOSINOPHIL # BLD AUTO: 0.19 K/UL (ref 0–0.51)
EOSINOPHIL NFR BLD: 3.2 % (ref 0–6.9)
ERYTHROCYTE [DISTWIDTH] IN BLOOD BY AUTOMATED COUNT: 39.8 FL (ref 35.9–50)
GLOBULIN SER CALC-MCNC: 2.9 G/DL (ref 1.9–3.5)
GLUCOSE SERPL-MCNC: 94 MG/DL (ref 65–99)
HCT VFR BLD AUTO: 43.4 % (ref 37–47)
HGB BLD-MCNC: 14.5 G/DL (ref 12–16)
IMM GRANULOCYTES # BLD AUTO: 0.02 K/UL (ref 0–0.11)
IMM GRANULOCYTES NFR BLD AUTO: 0.3 % (ref 0–0.9)
LYMPHOCYTES # BLD AUTO: 1.32 K/UL (ref 1–4.8)
LYMPHOCYTES NFR BLD: 22.3 % (ref 22–41)
MCH RBC QN AUTO: 27.3 PG (ref 27–33)
MCHC RBC AUTO-ENTMCNC: 33.4 G/DL (ref 33.6–35)
MCV RBC AUTO: 81.7 FL (ref 81.4–97.8)
MONOCYTES # BLD AUTO: 0.55 K/UL (ref 0–0.85)
MONOCYTES NFR BLD AUTO: 9.3 % (ref 0–13.4)
NEUTROPHILS # BLD AUTO: 3.82 K/UL (ref 2–7.15)
NEUTROPHILS NFR BLD: 64.6 % (ref 44–72)
NRBC # BLD AUTO: 0 K/UL
NRBC BLD-RTO: 0 /100 WBC
PLATELET # BLD AUTO: 250 K/UL (ref 164–446)
PMV BLD AUTO: 9.4 FL (ref 9–12.9)
POTASSIUM SERPL-SCNC: 3.8 MMOL/L (ref 3.6–5.5)
PROT SERPL-MCNC: 6.9 G/DL (ref 6–8.2)
RBC # BLD AUTO: 5.31 M/UL (ref 4.2–5.4)
SIGNIFICANT IND 70042: NORMAL
SIGNIFICANT IND 70042: NORMAL
SITE SITE: NORMAL
SITE SITE: NORMAL
SODIUM SERPL-SCNC: 137 MMOL/L (ref 135–145)
SOURCE SOURCE: NORMAL
SOURCE SOURCE: NORMAL
WBC # BLD AUTO: 5.9 K/UL (ref 4.8–10.8)

## 2019-02-25 PROCEDURE — 700105 HCHG RX REV CODE 258: Performed by: INTERNAL MEDICINE

## 2019-02-25 PROCEDURE — 80053 COMPREHEN METABOLIC PANEL: CPT

## 2019-02-25 PROCEDURE — 36415 COLL VENOUS BLD VENIPUNCTURE: CPT

## 2019-02-25 PROCEDURE — 96365 THER/PROPH/DIAG IV INF INIT: CPT

## 2019-02-25 PROCEDURE — 85025 COMPLETE CBC W/AUTO DIFF WBC: CPT

## 2019-02-25 PROCEDURE — 700111 HCHG RX REV CODE 636 W/ 250 OVERRIDE (IP): Performed by: INTERNAL MEDICINE

## 2019-02-25 RX ORDER — 0.9 % SODIUM CHLORIDE 0.9 %
10-20 VIAL (ML) INJECTION PRN
Status: CANCELLED | OUTPATIENT
Start: 2019-02-25

## 2019-02-25 RX ORDER — 0.9 % SODIUM CHLORIDE 0.9 %
5 VIAL (ML) INJECTION PRN
Status: CANCELLED | OUTPATIENT
Start: 2019-02-25

## 2019-02-25 RX ADMIN — ERTAPENEM SODIUM 1000 MG: 1 INJECTION, POWDER, LYOPHILIZED, FOR SOLUTION INTRAMUSCULAR; INTRAVENOUS at 07:04

## 2019-02-25 NOTE — PROGRESS NOTES
Patient presents for daily IV antibiotics.  Midline flushes well with no blood return. Labs drawn peripherally as ordered. Invanz infused as ordered, line flushed clear. Midline flushed per protocol and site secured. Patient returns tomorrow and released in no acute distress.

## 2019-02-26 ENCOUNTER — OUTPATIENT INFUSION SERVICES (OUTPATIENT)
Dept: ONCOLOGY | Facility: MEDICAL CENTER | Age: 74
End: 2019-02-26
Attending: INTERNAL MEDICINE
Payer: MEDICARE

## 2019-02-26 VITALS
TEMPERATURE: 97.5 F | RESPIRATION RATE: 18 BRPM | OXYGEN SATURATION: 94 % | DIASTOLIC BLOOD PRESSURE: 91 MMHG | HEART RATE: 87 BPM | SYSTOLIC BLOOD PRESSURE: 151 MMHG

## 2019-02-26 DIAGNOSIS — N30.00 ACUTE CYSTITIS WITHOUT HEMATURIA: ICD-10-CM

## 2019-02-26 PROCEDURE — 96365 THER/PROPH/DIAG IV INF INIT: CPT

## 2019-02-26 PROCEDURE — 700105 HCHG RX REV CODE 258: Performed by: INTERNAL MEDICINE

## 2019-02-26 PROCEDURE — 700111 HCHG RX REV CODE 636 W/ 250 OVERRIDE (IP): Performed by: INTERNAL MEDICINE

## 2019-02-26 RX ORDER — 0.9 % SODIUM CHLORIDE 0.9 %
10-20 VIAL (ML) INJECTION PRN
Status: CANCELLED | OUTPATIENT
Start: 2019-02-26

## 2019-02-26 RX ORDER — 0.9 % SODIUM CHLORIDE 0.9 %
5 VIAL (ML) INJECTION PRN
Status: CANCELLED | OUTPATIENT
Start: 2019-02-26

## 2019-02-26 RX ADMIN — ERTAPENEM SODIUM 1000 MG: 1 INJECTION, POWDER, LYOPHILIZED, FOR SOLUTION INTRAMUSCULAR; INTRAVENOUS at 07:11

## 2019-02-26 NOTE — PROGRESS NOTES
Patient presents for daily IV antibiotics.  Mid-line flushes well with no blood return. Invanz infused as ordered, line flushed clear. Mid-line flushed per protocol and site secured. Patient returns tomorrow and released in no acute distress.

## 2019-02-27 ENCOUNTER — OUTPATIENT INFUSION SERVICES (OUTPATIENT)
Dept: ONCOLOGY | Facility: MEDICAL CENTER | Age: 74
End: 2019-02-27
Attending: INTERNAL MEDICINE
Payer: MEDICARE

## 2019-02-27 VITALS
RESPIRATION RATE: 18 BRPM | HEART RATE: 89 BPM | TEMPERATURE: 97.5 F | DIASTOLIC BLOOD PRESSURE: 91 MMHG | OXYGEN SATURATION: 94 % | SYSTOLIC BLOOD PRESSURE: 149 MMHG

## 2019-02-27 DIAGNOSIS — N30.00 ACUTE CYSTITIS WITHOUT HEMATURIA: ICD-10-CM

## 2019-02-27 PROCEDURE — 96365 THER/PROPH/DIAG IV INF INIT: CPT

## 2019-02-27 PROCEDURE — 700111 HCHG RX REV CODE 636 W/ 250 OVERRIDE (IP): Performed by: INTERNAL MEDICINE

## 2019-02-27 PROCEDURE — 700105 HCHG RX REV CODE 258: Performed by: INTERNAL MEDICINE

## 2019-02-27 RX ORDER — 0.9 % SODIUM CHLORIDE 0.9 %
5 VIAL (ML) INJECTION PRN
Status: CANCELLED | OUTPATIENT
Start: 2019-02-27

## 2019-02-27 RX ORDER — 0.9 % SODIUM CHLORIDE 0.9 %
10-20 VIAL (ML) INJECTION PRN
Status: CANCELLED | OUTPATIENT
Start: 2019-02-27

## 2019-02-27 RX ADMIN — ERTAPENEM SODIUM 1000 MG: 1 INJECTION, POWDER, LYOPHILIZED, FOR SOLUTION INTRAMUSCULAR; INTRAVENOUS at 09:33

## 2019-02-27 NOTE — PROGRESS NOTES
Patient here for daily IV antibiotics today. Denies any complaints. Mid-line flushes well with no blood return. Site is bruised from recent placement, but healing well. No s/s of infection present. Invanz infused as ordered. Line flushed per protocol and secured. Returns tomorrow, discharged in no distress.

## 2019-02-28 ENCOUNTER — OUTPATIENT INFUSION SERVICES (OUTPATIENT)
Dept: ONCOLOGY | Facility: MEDICAL CENTER | Age: 74
End: 2019-02-28
Attending: INTERNAL MEDICINE
Payer: MEDICARE

## 2019-02-28 VITALS
DIASTOLIC BLOOD PRESSURE: 90 MMHG | HEART RATE: 82 BPM | SYSTOLIC BLOOD PRESSURE: 152 MMHG | RESPIRATION RATE: 18 BRPM | TEMPERATURE: 97.1 F | OXYGEN SATURATION: 95 %

## 2019-02-28 DIAGNOSIS — N30.00 ACUTE CYSTITIS WITHOUT HEMATURIA: ICD-10-CM

## 2019-02-28 PROCEDURE — 700105 HCHG RX REV CODE 258: Performed by: INTERNAL MEDICINE

## 2019-02-28 PROCEDURE — 96365 THER/PROPH/DIAG IV INF INIT: CPT

## 2019-02-28 PROCEDURE — 700111 HCHG RX REV CODE 636 W/ 250 OVERRIDE (IP): Performed by: INTERNAL MEDICINE

## 2019-02-28 RX ORDER — 0.9 % SODIUM CHLORIDE 0.9 %
10-20 VIAL (ML) INJECTION PRN
Status: CANCELLED | OUTPATIENT
Start: 2019-02-28

## 2019-02-28 RX ORDER — 0.9 % SODIUM CHLORIDE 0.9 %
5 VIAL (ML) INJECTION PRN
Status: CANCELLED | OUTPATIENT
Start: 2019-02-28

## 2019-02-28 RX ADMIN — ERTAPENEM SODIUM 1000 MG: 1 INJECTION, POWDER, LYOPHILIZED, FOR SOLUTION INTRAMUSCULAR; INTRAVENOUS at 07:43

## 2019-03-01 ENCOUNTER — OUTPATIENT INFUSION SERVICES (OUTPATIENT)
Dept: ONCOLOGY | Facility: MEDICAL CENTER | Age: 74
End: 2019-03-01
Attending: INTERNAL MEDICINE
Payer: MEDICARE

## 2019-03-01 VITALS
OXYGEN SATURATION: 94 % | TEMPERATURE: 98.2 F | SYSTOLIC BLOOD PRESSURE: 153 MMHG | DIASTOLIC BLOOD PRESSURE: 81 MMHG | RESPIRATION RATE: 18 BRPM | HEART RATE: 85 BPM

## 2019-03-01 DIAGNOSIS — N30.00 ACUTE CYSTITIS WITHOUT HEMATURIA: ICD-10-CM

## 2019-03-01 PROCEDURE — 700111 HCHG RX REV CODE 636 W/ 250 OVERRIDE (IP): Performed by: INTERNAL MEDICINE

## 2019-03-01 PROCEDURE — 700105 HCHG RX REV CODE 258: Performed by: INTERNAL MEDICINE

## 2019-03-01 PROCEDURE — 96365 THER/PROPH/DIAG IV INF INIT: CPT

## 2019-03-01 RX ORDER — 0.9 % SODIUM CHLORIDE 0.9 %
10-20 VIAL (ML) INJECTION PRN
Status: CANCELLED | OUTPATIENT
Start: 2019-03-01

## 2019-03-01 RX ORDER — 0.9 % SODIUM CHLORIDE 0.9 %
5 VIAL (ML) INJECTION PRN
Status: CANCELLED | OUTPATIENT
Start: 2019-03-01

## 2019-03-01 RX ADMIN — SODIUM CHLORIDE 1000 MG: 900 INJECTION INTRAVENOUS at 07:42

## 2019-03-01 NOTE — PROGRESS NOTES
Pt is here for her scheduled daily IVABX. Midline dressing change per protocol - site CDI with resolving bruising. Pt voices no concerns. Infusion completed without an incident. Discharged home to self care. Returns daily.

## 2019-03-02 ENCOUNTER — OUTPATIENT INFUSION SERVICES (OUTPATIENT)
Dept: ONCOLOGY | Facility: MEDICAL CENTER | Age: 74
End: 2019-03-02
Attending: INTERNAL MEDICINE
Payer: MEDICARE

## 2019-03-02 VITALS
DIASTOLIC BLOOD PRESSURE: 97 MMHG | RESPIRATION RATE: 18 BRPM | TEMPERATURE: 97.6 F | HEART RATE: 82 BPM | OXYGEN SATURATION: 99 % | SYSTOLIC BLOOD PRESSURE: 151 MMHG

## 2019-03-02 DIAGNOSIS — N30.00 ACUTE CYSTITIS WITHOUT HEMATURIA: ICD-10-CM

## 2019-03-02 PROCEDURE — 96365 THER/PROPH/DIAG IV INF INIT: CPT

## 2019-03-02 PROCEDURE — 700105 HCHG RX REV CODE 258: Performed by: INTERNAL MEDICINE

## 2019-03-02 PROCEDURE — 700111 HCHG RX REV CODE 636 W/ 250 OVERRIDE (IP): Performed by: INTERNAL MEDICINE

## 2019-03-02 RX ORDER — 0.9 % SODIUM CHLORIDE 0.9 %
5 VIAL (ML) INJECTION PRN
Status: CANCELLED | OUTPATIENT
Start: 2019-03-02

## 2019-03-02 RX ORDER — 0.9 % SODIUM CHLORIDE 0.9 %
10-20 VIAL (ML) INJECTION PRN
Status: CANCELLED | OUTPATIENT
Start: 2019-03-02

## 2019-03-02 RX ADMIN — ERTAPENEM SODIUM 1000 MG: 1 INJECTION, POWDER, LYOPHILIZED, FOR SOLUTION INTRAMUSCULAR; INTRAVENOUS at 07:44

## 2019-03-02 NOTE — PROGRESS NOTES
Pt presented to infusion center for daily Invanz. Reports no significant changes from yesterday. Right arm midline present, flushed easily, no blood return observed. Invanz infused without s/s of adverse reaction. Midline flushed, clamped and wrapped in gauze and protective sleeve. Has next appt, left on foot with  in good spirits.

## 2019-03-03 ENCOUNTER — OUTPATIENT INFUSION SERVICES (OUTPATIENT)
Dept: ONCOLOGY | Facility: MEDICAL CENTER | Age: 74
End: 2019-03-03
Attending: INTERNAL MEDICINE
Payer: MEDICARE

## 2019-03-03 VITALS
SYSTOLIC BLOOD PRESSURE: 142 MMHG | OXYGEN SATURATION: 98 % | TEMPERATURE: 97.6 F | HEART RATE: 77 BPM | DIASTOLIC BLOOD PRESSURE: 91 MMHG | RESPIRATION RATE: 18 BRPM

## 2019-03-03 DIAGNOSIS — N30.00 ACUTE CYSTITIS WITHOUT HEMATURIA: ICD-10-CM

## 2019-03-03 PROCEDURE — 700111 HCHG RX REV CODE 636 W/ 250 OVERRIDE (IP): Performed by: INTERNAL MEDICINE

## 2019-03-03 PROCEDURE — 96365 THER/PROPH/DIAG IV INF INIT: CPT

## 2019-03-03 PROCEDURE — 700105 HCHG RX REV CODE 258: Performed by: INTERNAL MEDICINE

## 2019-03-03 RX ORDER — 0.9 % SODIUM CHLORIDE 0.9 %
10-20 VIAL (ML) INJECTION PRN
Status: CANCELLED | OUTPATIENT
Start: 2019-03-03

## 2019-03-03 RX ORDER — 0.9 % SODIUM CHLORIDE 0.9 %
5 VIAL (ML) INJECTION PRN
Status: CANCELLED | OUTPATIENT
Start: 2019-03-03

## 2019-03-03 RX ADMIN — SODIUM CHLORIDE 1000 MG: 900 INJECTION INTRAVENOUS at 07:31

## 2019-03-03 NOTE — PROGRESS NOTES
Pt is here for her scheduled daily IVABX. Pt voices no concerns. Infusion completed without an incident. Discharged home to self care. Returns daily.

## 2019-03-04 ENCOUNTER — OUTPATIENT INFUSION SERVICES (OUTPATIENT)
Dept: ONCOLOGY | Facility: MEDICAL CENTER | Age: 74
End: 2019-03-04
Attending: INTERNAL MEDICINE
Payer: MEDICARE

## 2019-03-04 VITALS
HEIGHT: 60 IN | SYSTOLIC BLOOD PRESSURE: 142 MMHG | BODY MASS INDEX: 27.66 KG/M2 | DIASTOLIC BLOOD PRESSURE: 92 MMHG | WEIGHT: 140.87 LBS | HEART RATE: 91 BPM | OXYGEN SATURATION: 94 % | RESPIRATION RATE: 18 BRPM | TEMPERATURE: 98.1 F

## 2019-03-04 DIAGNOSIS — N30.00 ACUTE CYSTITIS WITHOUT HEMATURIA: ICD-10-CM

## 2019-03-04 LAB
ALBUMIN SERPL BCP-MCNC: 4 G/DL (ref 3.2–4.9)
ALBUMIN/GLOB SERPL: 1.5 G/DL
ALP SERPL-CCNC: 74 U/L (ref 30–99)
ALT SERPL-CCNC: 10 U/L (ref 2–50)
ANION GAP SERPL CALC-SCNC: 10 MMOL/L (ref 0–11.9)
AST SERPL-CCNC: 15 U/L (ref 12–45)
BASOPHILS # BLD AUTO: 0.5 % (ref 0–1.8)
BASOPHILS # BLD: 0.03 K/UL (ref 0–0.12)
BILIRUB SERPL-MCNC: 0.5 MG/DL (ref 0.1–1.5)
BUN SERPL-MCNC: 21 MG/DL (ref 8–22)
CALCIUM SERPL-MCNC: 9.6 MG/DL (ref 8.5–10.5)
CHLORIDE SERPL-SCNC: 106 MMOL/L (ref 96–112)
CO2 SERPL-SCNC: 23 MMOL/L (ref 20–33)
CREAT SERPL-MCNC: 0.61 MG/DL (ref 0.5–1.4)
EOSINOPHIL # BLD AUTO: 0.17 K/UL (ref 0–0.51)
EOSINOPHIL NFR BLD: 2.8 % (ref 0–6.9)
ERYTHROCYTE [DISTWIDTH] IN BLOOD BY AUTOMATED COUNT: 39.8 FL (ref 35.9–50)
GLOBULIN SER CALC-MCNC: 2.7 G/DL (ref 1.9–3.5)
GLUCOSE SERPL-MCNC: 87 MG/DL (ref 65–99)
HCT VFR BLD AUTO: 43.9 % (ref 37–47)
HGB BLD-MCNC: 14.7 G/DL (ref 12–16)
IMM GRANULOCYTES # BLD AUTO: 0.02 K/UL (ref 0–0.11)
IMM GRANULOCYTES NFR BLD AUTO: 0.3 % (ref 0–0.9)
LYMPHOCYTES # BLD AUTO: 1.08 K/UL (ref 1–4.8)
LYMPHOCYTES NFR BLD: 17.8 % (ref 22–41)
MCH RBC QN AUTO: 27.5 PG (ref 27–33)
MCHC RBC AUTO-ENTMCNC: 33.5 G/DL (ref 33.6–35)
MCV RBC AUTO: 82.2 FL (ref 81.4–97.8)
MONOCYTES # BLD AUTO: 0.43 K/UL (ref 0–0.85)
MONOCYTES NFR BLD AUTO: 7.1 % (ref 0–13.4)
NEUTROPHILS # BLD AUTO: 4.35 K/UL (ref 2–7.15)
NEUTROPHILS NFR BLD: 71.5 % (ref 44–72)
NRBC # BLD AUTO: 0 K/UL
NRBC BLD-RTO: 0 /100 WBC
PLATELET # BLD AUTO: 236 K/UL (ref 164–446)
PMV BLD AUTO: 9.9 FL (ref 9–12.9)
POTASSIUM SERPL-SCNC: 3.8 MMOL/L (ref 3.6–5.5)
PROT SERPL-MCNC: 6.7 G/DL (ref 6–8.2)
RBC # BLD AUTO: 5.34 M/UL (ref 4.2–5.4)
SODIUM SERPL-SCNC: 139 MMOL/L (ref 135–145)
WBC # BLD AUTO: 6.1 K/UL (ref 4.8–10.8)

## 2019-03-04 PROCEDURE — 36415 COLL VENOUS BLD VENIPUNCTURE: CPT

## 2019-03-04 PROCEDURE — 700105 HCHG RX REV CODE 258: Performed by: INTERNAL MEDICINE

## 2019-03-04 PROCEDURE — 80053 COMPREHEN METABOLIC PANEL: CPT

## 2019-03-04 PROCEDURE — 96365 THER/PROPH/DIAG IV INF INIT: CPT

## 2019-03-04 PROCEDURE — 85025 COMPLETE CBC W/AUTO DIFF WBC: CPT

## 2019-03-04 PROCEDURE — 700111 HCHG RX REV CODE 636 W/ 250 OVERRIDE (IP): Performed by: INTERNAL MEDICINE

## 2019-03-04 RX ORDER — ERTAPENEM 1 G/1
INJECTION, POWDER, LYOPHILIZED, FOR SOLUTION INTRAMUSCULAR; INTRAVENOUS
Status: DISCONTINUED
Start: 2019-03-04 | End: 2019-03-04 | Stop reason: HOSPADM

## 2019-03-04 RX ORDER — 0.9 % SODIUM CHLORIDE 0.9 %
10-20 VIAL (ML) INJECTION PRN
Status: CANCELLED | OUTPATIENT
Start: 2019-03-04

## 2019-03-04 RX ORDER — 0.9 % SODIUM CHLORIDE 0.9 %
5 VIAL (ML) INJECTION PRN
Status: CANCELLED | OUTPATIENT
Start: 2019-03-04

## 2019-03-04 RX ADMIN — SODIUM CHLORIDE 1000 MG: 900 INJECTION INTRAVENOUS at 07:25

## 2019-03-04 NOTE — PROGRESS NOTES
Patient here for IVABX today.  Patient denies any fevers/chills. Labs drawn with 25g butterfly to L hand.  Site wrapped with gauze and coban.  Patient tolerated infusion without complications.  Midline flushed per protocol.  Patient dc home with spouse.  Patient aware of next appt.

## 2019-03-05 ENCOUNTER — OUTPATIENT INFUSION SERVICES (OUTPATIENT)
Dept: ONCOLOGY | Facility: MEDICAL CENTER | Age: 74
End: 2019-03-05
Attending: INTERNAL MEDICINE
Payer: MEDICARE

## 2019-03-05 VITALS
DIASTOLIC BLOOD PRESSURE: 86 MMHG | TEMPERATURE: 97.3 F | HEART RATE: 84 BPM | RESPIRATION RATE: 18 BRPM | SYSTOLIC BLOOD PRESSURE: 137 MMHG | OXYGEN SATURATION: 94 %

## 2019-03-05 DIAGNOSIS — N30.00 ACUTE CYSTITIS WITHOUT HEMATURIA: ICD-10-CM

## 2019-03-05 PROCEDURE — 700105 HCHG RX REV CODE 258: Performed by: INTERNAL MEDICINE

## 2019-03-05 PROCEDURE — 96365 THER/PROPH/DIAG IV INF INIT: CPT

## 2019-03-05 PROCEDURE — 700111 HCHG RX REV CODE 636 W/ 250 OVERRIDE (IP): Performed by: INTERNAL MEDICINE

## 2019-03-05 RX ORDER — 0.9 % SODIUM CHLORIDE 0.9 %
10-20 VIAL (ML) INJECTION PRN
Status: CANCELLED | OUTPATIENT
Start: 2019-03-05

## 2019-03-05 RX ORDER — 0.9 % SODIUM CHLORIDE 0.9 %
5 VIAL (ML) INJECTION PRN
Status: CANCELLED | OUTPATIENT
Start: 2019-03-05

## 2019-03-05 RX ADMIN — SODIUM CHLORIDE 1000 MG: 900 INJECTION INTRAVENOUS at 07:30

## 2019-03-05 NOTE — PROGRESS NOTES
Patient presents for last dose of IV antibiotics.  Midline flushes well with no blood return. Invanz infused as ordered, line flushed clear. Mid-line flushed per protocol and removed as ordered after last dose. Patient states she will follow up with MD as needed and released in no acute distress with her .

## 2019-03-12 ENCOUNTER — HOSPITAL ENCOUNTER (INPATIENT)
Facility: MEDICAL CENTER | Age: 74
LOS: 5 days | DRG: 690 | End: 2019-03-18
Attending: EMERGENCY MEDICINE | Admitting: SPECIALIST
Payer: MEDICARE

## 2019-03-12 ENCOUNTER — APPOINTMENT (OUTPATIENT)
Dept: RADIOLOGY | Facility: MEDICAL CENTER | Age: 74
DRG: 690 | End: 2019-03-12
Attending: EMERGENCY MEDICINE
Payer: MEDICARE

## 2019-03-12 DIAGNOSIS — N10 ACUTE PYELONEPHRITIS: ICD-10-CM

## 2019-03-12 DIAGNOSIS — R10.9 FLANK PAIN: ICD-10-CM

## 2019-03-12 LAB
APPEARANCE UR: ABNORMAL
BASOPHILS # BLD AUTO: 0.3 % (ref 0–1.8)
BASOPHILS # BLD: 0.04 K/UL (ref 0–0.12)
BILIRUB UR QL STRIP.AUTO: NEGATIVE
COLOR UR: ABNORMAL
EOSINOPHIL # BLD AUTO: 0.03 K/UL (ref 0–0.51)
EOSINOPHIL NFR BLD: 0.2 % (ref 0–6.9)
ERYTHROCYTE [DISTWIDTH] IN BLOOD BY AUTOMATED COUNT: 40.7 FL (ref 35.9–50)
GLUCOSE UR STRIP.AUTO-MCNC: NEGATIVE MG/DL
HCT VFR BLD AUTO: 48.4 % (ref 37–47)
HGB BLD-MCNC: 15.6 G/DL (ref 12–16)
IMM GRANULOCYTES # BLD AUTO: 0.05 K/UL (ref 0–0.11)
IMM GRANULOCYTES NFR BLD AUTO: 0.3 % (ref 0–0.9)
KETONES UR STRIP.AUTO-MCNC: NEGATIVE MG/DL
LEUKOCYTE ESTERASE UR QL STRIP.AUTO: ABNORMAL
LYMPHOCYTES # BLD AUTO: 1.13 K/UL (ref 1–4.8)
LYMPHOCYTES NFR BLD: 7.1 % (ref 22–41)
MCH RBC QN AUTO: 26.9 PG (ref 27–33)
MCHC RBC AUTO-ENTMCNC: 32.2 G/DL (ref 33.6–35)
MCV RBC AUTO: 83.3 FL (ref 81.4–97.8)
MICRO URNS: ABNORMAL
MONOCYTES # BLD AUTO: 0.69 K/UL (ref 0–0.85)
MONOCYTES NFR BLD AUTO: 4.3 % (ref 0–13.4)
NEUTROPHILS # BLD AUTO: 14 K/UL (ref 2–7.15)
NEUTROPHILS NFR BLD: 87.8 % (ref 44–72)
NITRITE UR QL STRIP.AUTO: POSITIVE
NRBC # BLD AUTO: 0 K/UL
NRBC BLD-RTO: 0 /100 WBC
PH UR STRIP.AUTO: 6 [PH]
PLATELET # BLD AUTO: 256 K/UL (ref 164–446)
PMV BLD AUTO: 9.9 FL (ref 9–12.9)
PROT UR QL STRIP: 300 MG/DL
RBC # BLD AUTO: 5.81 M/UL (ref 4.2–5.4)
RBC UR QL AUTO: ABNORMAL
SP GR UR STRIP.AUTO: 1.02
UROBILINOGEN UR STRIP.AUTO-MCNC: 0.2 MG/DL
WBC # BLD AUTO: 15.9 K/UL (ref 4.8–10.8)

## 2019-03-12 PROCEDURE — 87086 URINE CULTURE/COLONY COUNT: CPT

## 2019-03-12 PROCEDURE — 700111 HCHG RX REV CODE 636 W/ 250 OVERRIDE (IP): Performed by: EMERGENCY MEDICINE

## 2019-03-12 PROCEDURE — 81001 URINALYSIS AUTO W/SCOPE: CPT

## 2019-03-12 PROCEDURE — 87186 SC STD MICRODIL/AGAR DIL: CPT

## 2019-03-12 PROCEDURE — 85025 COMPLETE CBC W/AUTO DIFF WBC: CPT

## 2019-03-12 PROCEDURE — 96375 TX/PRO/DX INJ NEW DRUG ADDON: CPT

## 2019-03-12 PROCEDURE — 80053 COMPREHEN METABOLIC PANEL: CPT

## 2019-03-12 PROCEDURE — 99285 EMERGENCY DEPT VISIT HI MDM: CPT

## 2019-03-12 PROCEDURE — 87077 CULTURE AEROBIC IDENTIFY: CPT

## 2019-03-12 RX ORDER — MORPHINE SULFATE 4 MG/ML
4 INJECTION, SOLUTION INTRAMUSCULAR; INTRAVENOUS ONCE
Status: COMPLETED | OUTPATIENT
Start: 2019-03-12 | End: 2019-03-12

## 2019-03-12 RX ADMIN — MORPHINE SULFATE 4 MG: 4 INJECTION INTRAVENOUS at 23:21

## 2019-03-12 ASSESSMENT — PAIN DESCRIPTION - DESCRIPTORS: DESCRIPTORS: ACHING;PRESSURE;SHARP;SHOOTING

## 2019-03-13 ENCOUNTER — APPOINTMENT (OUTPATIENT)
Dept: RADIOLOGY | Facility: MEDICAL CENTER | Age: 74
DRG: 690 | End: 2019-03-13
Attending: NURSE PRACTITIONER
Payer: MEDICARE

## 2019-03-13 LAB
ALBUMIN SERPL BCP-MCNC: 4.6 G/DL (ref 3.2–4.9)
ALBUMIN/GLOB SERPL: 1.6 G/DL
ALP SERPL-CCNC: 92 U/L (ref 30–99)
ALT SERPL-CCNC: 12 U/L (ref 2–50)
ANION GAP SERPL CALC-SCNC: 11 MMOL/L (ref 0–11.9)
AST SERPL-CCNC: 15 U/L (ref 12–45)
BACTERIA #/AREA URNS HPF: ABNORMAL /HPF
BILIRUB SERPL-MCNC: 0.5 MG/DL (ref 0.1–1.5)
BUN SERPL-MCNC: 23 MG/DL (ref 8–22)
CALCIUM SERPL-MCNC: 10.3 MG/DL (ref 8.5–10.5)
CHLORIDE SERPL-SCNC: 103 MMOL/L (ref 96–112)
CO2 SERPL-SCNC: 25 MMOL/L (ref 20–33)
CREAT SERPL-MCNC: 0.98 MG/DL (ref 0.5–1.4)
EPI CELLS #/AREA URNS HPF: ABNORMAL /HPF
GLOBULIN SER CALC-MCNC: 2.8 G/DL (ref 1.9–3.5)
GLUCOSE SERPL-MCNC: 134 MG/DL (ref 65–99)
INR PPP: 0.99 (ref 0.87–1.13)
POTASSIUM SERPL-SCNC: 4 MMOL/L (ref 3.6–5.5)
PROT SERPL-MCNC: 7.4 G/DL (ref 6–8.2)
PROTHROMBIN TIME: 13.2 SEC (ref 12–14.6)
RBC # URNS HPF: ABNORMAL /HPF
SODIUM SERPL-SCNC: 139 MMOL/L (ref 135–145)
WBC #/AREA URNS HPF: ABNORMAL /HPF

## 2019-03-13 PROCEDURE — 700111 HCHG RX REV CODE 636 W/ 250 OVERRIDE (IP): Performed by: RADIOLOGY

## 2019-03-13 PROCEDURE — 700102 HCHG RX REV CODE 250 W/ 637 OVERRIDE(OP): Performed by: FAMILY MEDICINE

## 2019-03-13 PROCEDURE — 700111 HCHG RX REV CODE 636 W/ 250 OVERRIDE (IP): Performed by: EMERGENCY MEDICINE

## 2019-03-13 PROCEDURE — A9270 NON-COVERED ITEM OR SERVICE: HCPCS | Performed by: FAMILY MEDICINE

## 2019-03-13 PROCEDURE — 85610 PROTHROMBIN TIME: CPT

## 2019-03-13 PROCEDURE — 87205 SMEAR GRAM STAIN: CPT

## 2019-03-13 PROCEDURE — 36415 COLL VENOUS BLD VENIPUNCTURE: CPT

## 2019-03-13 PROCEDURE — 700111 HCHG RX REV CODE 636 W/ 250 OVERRIDE (IP): Performed by: NURSE PRACTITIONER

## 2019-03-13 PROCEDURE — 96365 THER/PROPH/DIAG IV INF INIT: CPT

## 2019-03-13 PROCEDURE — 700105 HCHG RX REV CODE 258: Performed by: EMERGENCY MEDICINE

## 2019-03-13 PROCEDURE — 700117 HCHG RX CONTRAST REV CODE 255: Performed by: NURSE PRACTITIONER

## 2019-03-13 PROCEDURE — G0378 HOSPITAL OBSERVATION PER HR: HCPCS

## 2019-03-13 PROCEDURE — 770006 HCHG ROOM/CARE - MED/SURG/GYN SEMI*

## 2019-03-13 PROCEDURE — 76775 US EXAM ABDO BACK WALL LIM: CPT

## 2019-03-13 PROCEDURE — 87186 SC STD MICRODIL/AGAR DIL: CPT

## 2019-03-13 PROCEDURE — BT121ZZ FLUOROSCOPY OF LEFT KIDNEY USING LOW OSMOLAR CONTRAST: ICD-10-PCS | Performed by: RADIOLOGY

## 2019-03-13 PROCEDURE — 87086 URINE CULTURE/COLONY COUNT: CPT

## 2019-03-13 PROCEDURE — 87077 CULTURE AEROBIC IDENTIFY: CPT

## 2019-03-13 PROCEDURE — 160002 HCHG RECOVERY MINUTES (STAT)

## 2019-03-13 PROCEDURE — 700105 HCHG RX REV CODE 258: Performed by: NURSE PRACTITIONER

## 2019-03-13 PROCEDURE — 96376 TX/PRO/DX INJ SAME DRUG ADON: CPT

## 2019-03-13 PROCEDURE — 700111 HCHG RX REV CODE 636 W/ 250 OVERRIDE (IP)

## 2019-03-13 PROCEDURE — 99153 MOD SED SAME PHYS/QHP EA: CPT

## 2019-03-13 PROCEDURE — 96375 TX/PRO/DX INJ NEW DRUG ADDON: CPT

## 2019-03-13 PROCEDURE — 0T9130Z DRAINAGE OF LEFT KIDNEY WITH DRAINAGE DEVICE, PERCUTANEOUS APPROACH: ICD-10-PCS | Performed by: RADIOLOGY

## 2019-03-13 RX ORDER — ONDANSETRON 2 MG/ML
4 INJECTION INTRAMUSCULAR; INTRAVENOUS PRN
Status: DISCONTINUED | OUTPATIENT
Start: 2019-03-13 | End: 2019-03-13 | Stop reason: HOSPADM

## 2019-03-13 RX ORDER — MIDAZOLAM HYDROCHLORIDE 1 MG/ML
INJECTION INTRAMUSCULAR; INTRAVENOUS
Status: COMPLETED
Start: 2019-03-13 | End: 2019-03-13

## 2019-03-13 RX ORDER — ONDANSETRON 2 MG/ML
4 INJECTION INTRAMUSCULAR; INTRAVENOUS ONCE
Status: COMPLETED | OUTPATIENT
Start: 2019-03-13 | End: 2019-03-13

## 2019-03-13 RX ORDER — MORPHINE SULFATE 10 MG/ML
5 INJECTION, SOLUTION INTRAMUSCULAR; INTRAVENOUS
Status: DISCONTINUED | OUTPATIENT
Start: 2019-03-13 | End: 2019-03-18 | Stop reason: HOSPADM

## 2019-03-13 RX ORDER — MIDAZOLAM HYDROCHLORIDE 1 MG/ML
.5-2 INJECTION INTRAMUSCULAR; INTRAVENOUS PRN
Status: DISCONTINUED | OUTPATIENT
Start: 2019-03-13 | End: 2019-03-13 | Stop reason: HOSPADM

## 2019-03-13 RX ORDER — SODIUM CHLORIDE 9 MG/ML
500 INJECTION, SOLUTION INTRAVENOUS
Status: DISCONTINUED | OUTPATIENT
Start: 2019-03-13 | End: 2019-03-13 | Stop reason: HOSPADM

## 2019-03-13 RX ORDER — ONDANSETRON 2 MG/ML
4 INJECTION INTRAMUSCULAR; INTRAVENOUS
Status: COMPLETED | OUTPATIENT
Start: 2019-03-13 | End: 2019-03-13

## 2019-03-13 RX ORDER — SODIUM CHLORIDE 9 MG/ML
INJECTION, SOLUTION INTRAVENOUS CONTINUOUS
Status: DISCONTINUED | OUTPATIENT
Start: 2019-03-13 | End: 2019-03-18 | Stop reason: HOSPADM

## 2019-03-13 RX ORDER — PROMETHAZINE HYDROCHLORIDE 25 MG/1
25 SUPPOSITORY RECTAL EVERY 6 HOURS PRN
Status: DISCONTINUED | OUTPATIENT
Start: 2019-03-13 | End: 2019-03-16

## 2019-03-13 RX ORDER — PROMETHAZINE HYDROCHLORIDE 25 MG/1
25 TABLET ORAL EVERY 6 HOURS PRN
Status: DISCONTINUED | OUTPATIENT
Start: 2019-03-13 | End: 2019-03-16

## 2019-03-13 RX ADMIN — MEROPENEM 500 MG: 500 INJECTION, POWDER, FOR SOLUTION INTRAVENOUS at 01:45

## 2019-03-13 RX ADMIN — FENTANYL CITRATE 50 MCG: 50 INJECTION, SOLUTION INTRAMUSCULAR; INTRAVENOUS at 18:28

## 2019-03-13 RX ADMIN — MORPHINE SULFATE 5 MG: 10 INJECTION INTRAVENOUS at 20:43

## 2019-03-13 RX ADMIN — FENTANYL CITRATE 50 MCG: 50 INJECTION, SOLUTION INTRAMUSCULAR; INTRAVENOUS at 18:05

## 2019-03-13 RX ADMIN — FENTANYL CITRATE 50 MCG: 50 INJECTION, SOLUTION INTRAMUSCULAR; INTRAVENOUS at 18:22

## 2019-03-13 RX ADMIN — MEROPENEM 500 MG: 500 INJECTION, POWDER, FOR SOLUTION INTRAVENOUS at 21:44

## 2019-03-13 RX ADMIN — MIDAZOLAM HYDROCHLORIDE 2 MG: 1 INJECTION, SOLUTION INTRAMUSCULAR; INTRAVENOUS at 18:05

## 2019-03-13 RX ADMIN — SODIUM CHLORIDE: 9 INJECTION, SOLUTION INTRAVENOUS at 13:51

## 2019-03-13 RX ADMIN — SODIUM CHLORIDE: 9 INJECTION, SOLUTION INTRAVENOUS at 05:18

## 2019-03-13 RX ADMIN — MIDAZOLAM 2 MG: 1 INJECTION INTRAMUSCULAR; INTRAVENOUS at 18:05

## 2019-03-13 RX ADMIN — IOHEXOL 15 ML: 300 INJECTION, SOLUTION INTRAVENOUS at 11:49

## 2019-03-13 RX ADMIN — PROMETHAZINE HYDROCHLORIDE 25 MG: 25 TABLET ORAL at 08:39

## 2019-03-13 RX ADMIN — ONDANSETRON 4 MG: 2 INJECTION INTRAMUSCULAR; INTRAVENOUS at 00:15

## 2019-03-13 RX ADMIN — SODIUM CHLORIDE: 9 INJECTION, SOLUTION INTRAVENOUS at 20:47

## 2019-03-13 RX ADMIN — MIDAZOLAM 2 MG: 1 INJECTION INTRAMUSCULAR; INTRAVENOUS at 18:21

## 2019-03-13 RX ADMIN — ONDANSETRON 4 MG: 2 INJECTION INTRAMUSCULAR; INTRAVENOUS at 03:12

## 2019-03-13 ASSESSMENT — COGNITIVE AND FUNCTIONAL STATUS - GENERAL
TURNING FROM BACK TO SIDE WHILE IN FLAT BAD: A LITTLE
MOVING FROM LYING ON BACK TO SITTING ON SIDE OF FLAT BED: A LITTLE
PERSONAL GROOMING: A LITTLE
DRESSING REGULAR LOWER BODY CLOTHING: A LITTLE
HELP NEEDED FOR BATHING: A LITTLE
MOBILITY SCORE: 18
MOVING TO AND FROM BED TO CHAIR: A LITTLE
SUGGESTED CMS G CODE MODIFIER MOBILITY: CK
TOILETING: A LITTLE
WALKING IN HOSPITAL ROOM: A LITTLE
STANDING UP FROM CHAIR USING ARMS: A LITTLE
SUGGESTED CMS G CODE MODIFIER DAILY ACTIVITY: CK
DAILY ACTIVITIY SCORE: 19
CLIMB 3 TO 5 STEPS WITH RAILING: A LITTLE
DRESSING REGULAR UPPER BODY CLOTHING: A LITTLE

## 2019-03-13 ASSESSMENT — PATIENT HEALTH QUESTIONNAIRE - PHQ9
SUM OF ALL RESPONSES TO PHQ9 QUESTIONS 1 AND 2: 0
1. LITTLE INTEREST OR PLEASURE IN DOING THINGS: NOT AT ALL
2. FEELING DOWN, DEPRESSED, IRRITABLE, OR HOPELESS: NOT AT ALL
2. FEELING DOWN, DEPRESSED, IRRITABLE, OR HOPELESS: NOT AT ALL
1. LITTLE INTEREST OR PLEASURE IN DOING THINGS: NOT AT ALL
SUM OF ALL RESPONSES TO PHQ9 QUESTIONS 1 AND 2: 0

## 2019-03-13 ASSESSMENT — ENCOUNTER SYMPTOMS
FEVER: 0
VOMITING: 0
SHORTNESS OF BREATH: 0
CHILLS: 0
NAUSEA: 1

## 2019-03-13 NOTE — ED TRIAGE NOTES
Nessa Dennis  73 y.o. female  Chief Complaint   Patient presents with   • Flank Pain     Chronic L flank pains and complicatoins, to see Urology  tomorrow for possible stent placement. Pt states tonight the pain is too much to bear. N/V today , Self caths x 4 years. No GI/ changes.     BP (!) 174/106   Pulse 100   Temp 37.3 °C (99.2 °F) (Temporal)   Resp 18   Ht 1.524 m (5')   Wt 64.4 kg (141 lb 15.6 oz)   SpO2 91%   BMI 27.73 kg/m²     Pt amb to triage with steady gait for above complaint. Pt is in obvious pain and discomfort, OTC pain medications are not helping.   Pt is alert and oriented, speaking in full sentences, follows commands and responds appropriately to questions. NAD. Resp are even and unlabored.  Pt placed in lobby. Pt educated on triage process. Pt encouraged to alert staff for any changes.

## 2019-03-13 NOTE — PROGRESS NOTES
GYN/Oncology Progress Note               Author: Mercedes Llamas Date & Time created: 3/13/2019  9:47 AM     Interval History:  Still having left flank pain, nauseated this am    Review of Systems:  Review of Systems   Constitutional: Negative for chills and fever.   Respiratory: Negative for shortness of breath.    Cardiovascular: Negative for chest pain.   Gastrointestinal: Positive for nausea. Negative for vomiting.       Physical Exam:  Physical Exam   Constitutional: She is oriented to person, place, and time. She appears well-developed and well-nourished. No distress.   Pulmonary/Chest: Effort normal.   Abdominal: Soft.   Musculoskeletal: She exhibits no edema.   Neurological: She is alert and oriented to person, place, and time.       Labs:          Recent Labs      19   2324   SODIUM  139   POTASSIUM  4.0   CHLORIDE  103   CO2  25   BUN  23*   CREATININE  0.98   CALCIUM  10.3     Recent Labs      19   2324   ALTSGPT  12   ASTSGOT  15   ALKPHOSPHAT  92   TBILIRUBIN  0.5   GLUCOSE  134*     Recent Labs      19   2324   RBC  5.81*   HEMOGLOBIN  15.6   HEMATOCRIT  48.4*   PLATELETCT  256     Recent Labs      19   2324   WBC  15.9*   NEUTSPOLYS  87.80*   LYMPHOCYTES  7.10*   MONOCYTES  4.30   EOSINOPHILS  0.20   BASOPHILS  0.30   ASTSGOT  15   ALTSGPT  12   ALKPHOSPHAT  92   TBILIRUBIN  0.5     Recent Labs      19   2324   SODIUM  139   POTASSIUM  4.0   CHLORIDE  103   CO2  25   GLUCOSE  134*   BUN  23*   CREATININE  0.98   CALCIUM  10.3     Hemodynamics:  Temp (24hrs), Av.8 °C (98.2 °F), Min:36.4 °C (97.6 °F), Max:37.3 °C (99.2 °F)  Temperature: 36.6 °C (97.9 °F)  Pulse  Av.8  Min: 76  Max: 117   Blood Pressure : 156/94     Respiratory:    Respiration: 20, Pulse Oximetry: 96 %        RUL Breath Sounds: Clear, RML Breath Sounds: Clear, RLL Breath Sounds: Diminished, AVERY Breath Sounds: Clear, LLL Breath Sounds: Diminished  Fluids:    Intake/Output Summary (Last 24 hours) at  03/13/19 0947  Last data filed at 03/13/19 0215   Gross per 24 hour   Intake              100 ml   Output                0 ml   Net              100 ml     Weight: 64.4 kg (141 lb 15.6 oz)  GI/Nutrition:  Orders Placed This Encounter   Procedures   • DIET NPO     Standing Status:   Standing     Number of Occurrences:   1     Order Specific Question:   Type:     Answer:   Now [1]     Order Specific Question:   Restrict to:     Answer:   Strict [1]     Medical Decision Making, by Problem:  There are no active hospital problems to display for this patient.      Plan:  1. Recurrent urinary tract infection- elevated WBC, afebrile, emperic IV antibiotics and with hx of ESBL will place on Meropenum, await final culture  2. Left hydronephrosis- cr stable, increased flank pain, unable to internalize stent due to UTI, NT planned for today  3. Leukocytosis- secondary to UTI, possible pylenophorsis, antibiotics  3. Hx cervical cancer- no evidence of recurrence    Case Discussed with Dr. Hays    Quality-Core Measures

## 2019-03-13 NOTE — CARE PLAN
Problem: Bowel/Gastric:  Goal: Normal bowel function is maintained or improved    Intervention: Educate patient and significant other/support system about diet, fluid intake, medications and activity to promote bowel function  Per patient, last bowel movement 3/11. Colostomy to LUQ. Encouraging ambulation, hydration, and oral intake. Medicated per MAR for nausea. Normoactive bowel sounds x 4.      Problem: Urinary Elimination:  Goal: Ability to reestablish a normal urinary elimination pattern will improve    Intervention: Evaluate need to continue indwelling urinary catheter  Patient performs self catheterization. Voiding well.       Problem: Pain Management  Goal: Pain level will decrease to patient's comfort goal    Intervention: Follow pain managment plan developed in collaboration with patient and Interdisciplinary Team  Provided patient education on pain management using pain scale. Patient states pain is 2/10; medicated per MAR. Patient receiving morphine for L. flank pain. Patient verbalized understanding of education and communicates pain level effectively with RN.

## 2019-03-13 NOTE — ED PROVIDER NOTES
ED Provider Note    CHIEF COMPLAINT  Chief Complaint   Patient presents with   • Flank Pain     Chronic L flank pains and complicatoins, to see Oncology and Nephrology tomorrow for possible stent placement. Pt states tonight the pain is too much to bear. N/V today , Self caths x 4 years. No GI/ changes.         HPI  Nessa Dennis is a 73 y.o. female who presents for sudden onset left flank pain around 7:00 tonight.  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  She also notes that she has been on potent antibiotics for a drug resistant bacterial infection that may have caused pyelonephritis recently.  The patient sees the surgical oncologist regarding this issue and is scheduled to see him tomorrow for discussion of a possible stent placement in the ureter itself.  Patient denies any fevers, chills, or chest pain.  She does have nausea and vomiting however.    REVIEW OF SYSTEMS  Constitutional: No fevers or chills  Skin: No rashes, abrasions, lacerations, or pruritus  HEENT: No ear pain, ringing in ears, or decreased hearing. No sore throat, runny nose, sores, trouble swallowing, trouble speaking.  Neck: No neck pain, stiffness, or masses.  Chest: No pain or rashes  Pulm: No shortness of breath, cough, wheezing, stridor, or pain with inspiration/expiration  Gastrointestinal: No diarrhea, constipation, bloating, melena, hematochezia   Genitourinary: No dysuria or hematuria  Musculoskeletal: No recent trauma, pain, swelling, weakness  Neurologic: No sensory or motor changes. No confusion or disorientation.  Heme: No bleeding or bruising problems.   Immuno: No hx of recurrent infections      PAST MEDICAL HISTORY   has a past medical history of Arthritis; Cancer (HCC) (2005, 2015); Colostomy in place (HCC); Other specified symptom associated with female genital organs; Unspecified urinary incontinence; and  Urinary bladder disorder.    SOCIAL HISTORY  Social History     Social History Main Topics   • Smoking status: Never Smoker   • Smokeless tobacco: Never Used   • Alcohol use No   • Drug use: No   • Sexual activity: Not on file       SURGICAL HISTORY   has a past surgical history that includes gyn surgery (5/2005); recovery (4/6/2015); colostomy creation laparoscopic (4/14/2015); other (04/2015); pelvic exam under anesthesia (7/31/2015); cervical conization (7/31/2015); and cystoscopy (7/31/2015).    CURRENT MEDICATIONS  Home Medications     Reviewed by Taylor Palmer R.N. (Registered Nurse) on 03/12/19 at 2147  Med List Status: Complete   Medication Last Dose Status   ascorbic acid (ASCORBIC ACID) 500 MG TABS 3/12/2019 Active   Calcium Carb-Cholecalciferol (CALCIUM 600 + D PO) 3/12/2019 Active   Cholecalciferol (VITAMIN D) 2000 UNITS CAPS 3/12/2019 Active   Cyanocobalamin (VITAMIN B 12 PO)  Active   ondansetron (ZOFRAN) 4 MG Tab tablet 3/11/2019 Active   therapeutic multivitamin-minerals (THERAGRAN-M) TABS 3/11/2019 Active                ALLERGIES  Allergies   Allergen Reactions   • Sulfa Drugs Unspecified     Pt states it runs her down.       PHYSICAL EXAM  VITAL SIGNS: /92   Pulse 97   Temp 37.3 °C (99.1 °F) (Temporal)   Resp 17   Ht 1.524 m (5')   Wt 64.4 kg (141 lb 15.6 oz)   SpO2 93%   Breastfeeding? No   BMI 27.73 kg/m²    Gen: Restless, splinting left flank, occasionally grimacing  HEENT: No signs of trauma, Bilateral external ears normal, Nose normal. Conjunctiva normal, Non-icteric.   Neck:  No tenderness, Supple, No masses  Lymphatic: No cervical lymphadenopathy noted.   Cardiovascular: Tachycardia, no murmurs.   Thorax & Lungs: Normal breath sounds, No respiratory distress, No wheezing bilateral chest rise  Abdomen: Bowel sounds normal, Soft, No tenderness, No masses, No pulsatile masses. No Guarding or rebound  Skin: Warm, Dry, No erythema, No rash.   Back: No bony tenderness, left CVA  tenderness  Extremities: Intact distal pulses, No edema  Neurologic: Alert , no facial droop, grossly normal coordination and strength  Psychiatric: Affect normal, Judgment normal, Mood normal.       INITIAL IMPRESSION  Patient has findings suggestive of ureteral colic however the source is unclear.  It is likely this is a repeat episode of her chronic pain however feel laboratory evaluation is necessary given the recent extended spectrum beta-lactamase producing Klebsiella infection which required rather long course of potent antibiotics.  She does not appear septic or toxic at this time but is quite uncomfortable appearing.  I will treat aggressively with narcotic pain medication while her screening labs and ultrasound are being performed.    LABS  Results for orders placed or performed during the hospital encounter of 03/12/19   CBC WITH DIFFERENTIAL   Result Value Ref Range    WBC 15.9 (H) 4.8 - 10.8 K/uL    RBC 5.81 (H) 4.20 - 5.40 M/uL    Hemoglobin 15.6 12.0 - 16.0 g/dL    Hematocrit 48.4 (H) 37.0 - 47.0 %    MCV 83.3 81.4 - 97.8 fL    MCH 26.9 (L) 27.0 - 33.0 pg    MCHC 32.2 (L) 33.6 - 35.0 g/dL    RDW 40.7 35.9 - 50.0 fL    Platelet Count 256 164 - 446 K/uL    MPV 9.9 9.0 - 12.9 fL    Neutrophils-Polys 87.80 (H) 44.00 - 72.00 %    Lymphocytes 7.10 (L) 22.00 - 41.00 %    Monocytes 4.30 0.00 - 13.40 %    Eosinophils 0.20 0.00 - 6.90 %    Basophils 0.30 0.00 - 1.80 %    Immature Granulocytes 0.30 0.00 - 0.90 %    Nucleated RBC 0.00 /100 WBC    Neutrophils (Absolute) 14.00 (H) 2.00 - 7.15 K/uL    Lymphs (Absolute) 1.13 1.00 - 4.80 K/uL    Monos (Absolute) 0.69 0.00 - 0.85 K/uL    Eos (Absolute) 0.03 0.00 - 0.51 K/uL    Baso (Absolute) 0.04 0.00 - 0.12 K/uL    Immature Granulocytes (abs) 0.05 0.00 - 0.11 K/uL    NRBC (Absolute) 0.00 K/uL   COMP METABOLIC PANEL   Result Value Ref Range    Sodium 139 135 - 145 mmol/L    Potassium 4.0 3.6 - 5.5 mmol/L    Chloride 103 96 - 112 mmol/L    Co2 25 20 - 33 mmol/L     Anion Gap 11.0 0.0 - 11.9    Glucose 134 (H) 65 - 99 mg/dL    Bun 23 (H) 8 - 22 mg/dL    Creatinine 0.98 0.50 - 1.40 mg/dL    Calcium 10.3 8.5 - 10.5 mg/dL    AST(SGOT) 15 12 - 45 U/L    ALT(SGPT) 12 2 - 50 U/L    Alkaline Phosphatase 92 30 - 99 U/L    Total Bilirubin 0.5 0.1 - 1.5 mg/dL    Albumin 4.6 3.2 - 4.9 g/dL    Total Protein 7.4 6.0 - 8.2 g/dL    Globulin 2.8 1.9 - 3.5 g/dL    A-G Ratio 1.6 g/dL   URINALYSIS CULTURE, IF INDICATED   Result Value Ref Range    Color DK Yellow     Character Turbid (A)     Specific Gravity 1.017 <1.035    Ph 6.0 5.0 - 8.0    Glucose Negative Negative mg/dL    Ketones Negative Negative mg/dL    Protein 300 (A) Negative mg/dL    Bilirubin Negative Negative    Urobilinogen, Urine 0.2 Negative    Nitrite Positive (A) Negative    Leukocyte Esterase Large (A) Negative    Occult Blood Moderate (A) Negative    Micro Urine Req Microscopic    URINE MICROSCOPIC (W/UA)   Result Value Ref Range    WBC Packed (A) /hpf    RBC 5-10 (A) /hpf    Bacteria Moderate (A) None /hpf    Epithelial Cells Rare /hpf   ESTIMATED GFR   Result Value Ref Range    GFR If African American >60 >60 mL/min/1.73 m 2    GFR If Non African American 55 (A) >60 mL/min/1.73 m 2   Prothrombin Time   Result Value Ref Range    PT 13.2 12.0 - 14.6 sec    INR 0.99 0.87 - 1.13       RADIOLOGY  US-RENAL   Final Result      1.  Moderate hydronephrosis of the left kidney with no evidence of a left-sided ureteral jet. This appearance has worsened since previous exam.      2.  8 mm right renal cyst.      IR-NEPHROSTOGRAM W/ NEW TUBE PLACEMENT (ALL RADIOLOGY) LEFT    (Results Pending)       Reevaluation   Time:1:06 AM  Vital signs:   Assessment: Sleeping, wakes easily, appears comfortable but tired.  Informed of radiology and lab results.  Will attempt to contact Dr. Hays regarding her condition    Reevaluation   Time:1:17 AM  Vital signs:   Assessment: Discussed the case with Dr. Hays.  Will give the patient a dose of meropenem  presumptively as this may be a recurrence of her Klebsiella infection and will admit the patient to Dr. Hays    COURSE & MEDICAL DECISION MAKING  Pertinent Labs & Imaging studies reviewed. (See chart for details)  Patient appears to have findings once again of ureter colic with obstruction and likely infection superimposed.  Although the patient was not septic or toxic and did not experience any deterioration, she will be admitted to Dr. Hays, treated empirically for Klebsiella and will be seen by him in the morning to discuss treatment options.  Patient stated understanding this and was fine with plan for admission.    FINAL IMPRESSION  1.  Left-sided hydronephrosis  2.  Pyelonephritis  3.  Ureteral colic  Electronically signed by: Justin Gillette, 3/12/2019 10:27 PM

## 2019-03-13 NOTE — H&P
CHIEF COMPLAINT:  left flank pain, N/V     HISTORY OF PRESENT ILLNESS:  The patient is a very pleasant 73-year-old white   female who is well known to me.  Patient has a history of stage IB, grade III   cervical cancer, which was initially treated with laparoscopic radical   hysterectomy with bilateral salpingo-oophorectomy with bilateral pelvic and   periaortic lymph node dissection on 05/27/2005.  She did not require any   adjuvant therapy.  She remained in remission until 10 years later on   03/20/2015, she developed a recurrence.  The recurrence at that time involved   vaginal vulvar area.  She was treated with primary concurrent chemoradiation   therapy.  She also developed a rectovaginal fistula, which she had a diverting   loop colostomy, which she has not wanted to have this reversed since that   time.  In addition, the patient subsequently developed atonic bladder, which   she has been self-catheterizing since 2015.  Furthermore, in other sequellae   she had developed was back in 2018.  She was admitted for pyelonephritis and   she was noted to have left hydronephrosis.  Left nephrostomy tube was placed.    Left stent was not placed at that time, she discharged.  She then followed up   with me and she underwent further workup which consisted renal ultrasound on   09/19/2018 that showed mild left hydronephrosis.  She underwent nephrostogram   which showed no obvious blockage in the pelvic ureter below the L5, there was   a stricture site.  The patient also had renal Lasix washout for further   evaluation and the renal Lasix washout showed smooth function of 41% on the   left kidney and 59% on the right.  Given the fact that her hydro, there was no   obvious blockage, the left nephrostomy tube was subsequently removed and she   has been monitored in my office.  She was last seen back in 11/2018, which she   had no evidence of disease; this was 2 months following removal of her   nephrostomy tube.  There was  no indication that her renal function has   worsened.  She then developed some urinary tract infection symptoms in the beginning of February. Thus, patient contacted my office and was prescribed ciprofloxacin. She failed outpatient treatment and was then admitted to the hospital on 2/17/19.  She was treated with 2 weeks of ertapenum 1 g daily for Klebsiella pneumoniae ESBL. She completed outpatient antibiotics and then developed left flank pain with nausea and vomiting. She presented to the emergency room today for further evaluation.  She was seen and evaluated today and her white blood cell count was noted to be mildly elevated at 15.9, hemoglobin 15.6.    Chemistry panel shows an unremarkable chemistry panel with a creatinine of   0.98.  Urinalysis showed that she had packed white blood cells, nitrites and   leukocyte esterase was positive, culture pending.  Renal ultrasound was performed and showed moderate hydronephrosis of the left kidney with no evidence of a left-sided ureteral jet.     Her history is as stated above. She has had some nausea and vomiting.  She does complain   about left flank pain. She is self-catheterizing.  She otherwise has no other symptoms.     REVIEW OF SYSTEMS:  A 14-point review of system is unremarkable with the   exception of what has been described on HPI.     ALLERGIES:  SULFA.     CURRENT MEDICATIONS:  She takes Percocet 10/325 one to two tablets q. 4 hours   p.r.n. pain, Zofran 4 mg 1 p.o. q. 8 hours p.r.n. nausea and vomiting,   ascorbic acid, multivitamin, calcium, cholecalciferol, and vitamin B.     PAST MEDICAL HISTORY:  Significant for cervical cancer, bladder atony,   rectovaginal fistula, which she does not want surgically repaired and history   of left hydronephrosis.     PAST SURGICAL HISTORY:  Previous history of total laparoscopic radical   hysterectomy with bilateral salpingooophorectomy with bilateral pelvic and   periaortic lymphadenectomy, previous history of  laparoscopic diverting   colostomy and previous history of examination under anesthesia, cone biopsy   and cystoscopy.     SOCIAL HISTORY:  She denies any smoking, drug use or alcohol use.     FAMILY HISTORY:  Unremarkable for ovarian, breast, uterine or colon cancer.     PHYSICAL EXAMINATION:  VITAL SIGNS:  Stable.  She is afebrile.  GENERAL APPEARANCE:  Well-developed, well-nourished female, in no obvious   acute distress.  HEENT:  Normocephalic, atraumatic.  Sclerae is anicteric.  NECK:  Supple.  No thyroid masses noted.  No supraclavicular adenopathy   appreciated.  HEART:  Regular rate and rhythm.  Normal S1, S2.  No murmur, no S3 or S4.  LUNGS:  Clear to auscultation.  ABDOMEN:  Soft, nondistended and nontender.  Diverting colostomy is noted that   is functioning well.  Inspection of the back revealed no obvious CVA   tenderness.  PELVIC:  Deferred.  EXTREMITIES:  Shows no clubbing, cyanosis, or edema, nontender.  NEUROLOGIC:  Grossly intact.     IMPRESSION:  This is a 73-year-old female with history of cervical cancer   diagnosed initially in 2005, now 14 years out and nearly 5 years out from her   recurrent disease.  She has sequellae which includes rectovaginal fistula and   bladder atony, and left hydronephrosis secondary to left pelvic ureteral   stricture.  She is status post nephrostomy tube placement, which we removed   back in 09/2018.  She now presents with recurrent urinary tract infection, despite 2 week course of IV antibiotics of erapenum 1g. She also has a persistent left hydronephrosis. Unable to place ureteral stent at this time due to recurrent infection and will need to place a left nephrostomy tube for symptom management.      PLAN:  1.  Admit for pain management  2.  IV hydration.  3.  Left nephrostomy tube placement  4. IV antibiotics with merepenum.

## 2019-03-13 NOTE — PROGRESS NOTES
Patient received via gurney at approximately 0530AM, report received from ER Nurse. Patient on O2 at 2L/min via NC, no distress noted.     · 2 RN skin check complete with MAYCOL Black.  · Devices in place: SCD, silicone nasal cannula  · Skin assessed under devices: yes, skin intact  · Confirmed pressure ulcers found on: no pressure injury noted  · New potential pressure ulcers noted on: n/a.   · Generalized skin condition intact, blanchable redness to bilateral heels, colostomy in place.  · The following interventions in place: Waffle cushion, pillows in use for repositioning, patient able to turn self from side to side, moisturizer provided.

## 2019-03-13 NOTE — PROGRESS NOTES
Paged IR, nephrostogram with tube placement planned for today. IR will let RN know when procedure is scheduled. Notified patient and answered all questions regarding care. Patient has been NPO since 0000.

## 2019-03-13 NOTE — CARE PLAN
Problem: Communication  Goal: The ability to communicate needs accurately and effectively will improve    Intervention: Reorient patient to environment as needed  Oriented to room and call light use. On moderate risk for falls, bed alarm utilized.       Problem: Safety  Goal: Will remain free from injury    Intervention: Provide assistance with mobility  Assisted by staff with transfers, ambulated to the rest room standby assist x 1, gait steady. Tolerates well.       Problem: Venous Thromboembolism (VTW)/Deep Vein Thrombosis (DVT) Prevention:  Goal: Patient will participate in Venous Thrombosis (VTE)/Deep Vein Thrombosis (DVT)Prevention Measures    Intervention: Ensure patient wears graduated elastic stockings (SULMA hose) and/or SCDs, if ordered, when in bed or chair (Remove at least once per shift for skin check)  SCD in use to BLE

## 2019-03-14 LAB
ANION GAP SERPL CALC-SCNC: 7 MMOL/L (ref 0–11.9)
BUN SERPL-MCNC: 17 MG/DL (ref 8–22)
CALCIUM SERPL-MCNC: 8.8 MG/DL (ref 8.5–10.5)
CHLORIDE SERPL-SCNC: 108 MMOL/L (ref 96–112)
CO2 SERPL-SCNC: 24 MMOL/L (ref 20–33)
CREAT SERPL-MCNC: 0.65 MG/DL (ref 0.5–1.4)
ERYTHROCYTE [DISTWIDTH] IN BLOOD BY AUTOMATED COUNT: 41.4 FL (ref 35.9–50)
GLUCOSE SERPL-MCNC: 108 MG/DL (ref 65–99)
GRAM STN SPEC: NORMAL
HCT VFR BLD AUTO: 40.2 % (ref 37–47)
HGB BLD-MCNC: 13.4 G/DL (ref 12–16)
MCH RBC QN AUTO: 27.5 PG (ref 27–33)
MCHC RBC AUTO-ENTMCNC: 33.3 G/DL (ref 33.6–35)
MCV RBC AUTO: 82.4 FL (ref 81.4–97.8)
PLATELET # BLD AUTO: 137 K/UL (ref 164–446)
PMV BLD AUTO: 12.4 FL (ref 9–12.9)
POTASSIUM SERPL-SCNC: 4.6 MMOL/L (ref 3.6–5.5)
RBC # BLD AUTO: 4.88 M/UL (ref 4.2–5.4)
SIGNIFICANT IND 70042: NORMAL
SITE SITE: NORMAL
SODIUM SERPL-SCNC: 139 MMOL/L (ref 135–145)
SOURCE SOURCE: NORMAL
WBC # BLD AUTO: 8.5 K/UL (ref 4.8–10.8)

## 2019-03-14 PROCEDURE — A9270 NON-COVERED ITEM OR SERVICE: HCPCS | Performed by: FAMILY MEDICINE

## 2019-03-14 PROCEDURE — 700111 HCHG RX REV CODE 636 W/ 250 OVERRIDE (IP): Performed by: NURSE PRACTITIONER

## 2019-03-14 PROCEDURE — 700105 HCHG RX REV CODE 258: Performed by: EMERGENCY MEDICINE

## 2019-03-14 PROCEDURE — 700105 HCHG RX REV CODE 258: Performed by: NURSE PRACTITIONER

## 2019-03-14 PROCEDURE — A9270 NON-COVERED ITEM OR SERVICE: HCPCS | Performed by: NURSE PRACTITIONER

## 2019-03-14 PROCEDURE — 36415 COLL VENOUS BLD VENIPUNCTURE: CPT

## 2019-03-14 PROCEDURE — 700102 HCHG RX REV CODE 250 W/ 637 OVERRIDE(OP): Performed by: NURSE PRACTITIONER

## 2019-03-14 PROCEDURE — 700102 HCHG RX REV CODE 250 W/ 637 OVERRIDE(OP): Performed by: FAMILY MEDICINE

## 2019-03-14 PROCEDURE — 80048 BASIC METABOLIC PNL TOTAL CA: CPT

## 2019-03-14 PROCEDURE — 85027 COMPLETE CBC AUTOMATED: CPT

## 2019-03-14 PROCEDURE — 770021 HCHG ROOM/CARE - ISO PRIVATE

## 2019-03-14 RX ORDER — HYDRALAZINE HYDROCHLORIDE 20 MG/ML
5 INJECTION INTRAMUSCULAR; INTRAVENOUS EVERY 6 HOURS PRN
Status: DISCONTINUED | OUTPATIENT
Start: 2019-03-14 | End: 2019-03-18 | Stop reason: HOSPADM

## 2019-03-14 RX ORDER — ACETAMINOPHEN 325 MG/1
650 TABLET ORAL EVERY 4 HOURS PRN
Status: DISCONTINUED | OUTPATIENT
Start: 2019-03-14 | End: 2019-03-18 | Stop reason: HOSPADM

## 2019-03-14 RX ORDER — OXYCODONE HYDROCHLORIDE AND ACETAMINOPHEN 5; 325 MG/1; MG/1
1 TABLET ORAL EVERY 6 HOURS PRN
Status: DISCONTINUED | OUTPATIENT
Start: 2019-03-14 | End: 2019-03-18 | Stop reason: HOSPADM

## 2019-03-14 RX ADMIN — HYDRALAZINE HYDROCHLORIDE 5 MG: 20 INJECTION INTRAMUSCULAR; INTRAVENOUS at 23:53

## 2019-03-14 RX ADMIN — SODIUM CHLORIDE: 9 INJECTION, SOLUTION INTRAVENOUS at 02:51

## 2019-03-14 RX ADMIN — MEROPENEM 500 MG: 500 INJECTION, POWDER, FOR SOLUTION INTRAVENOUS at 15:24

## 2019-03-14 RX ADMIN — HYDRALAZINE HYDROCHLORIDE 5 MG: 20 INJECTION INTRAMUSCULAR; INTRAVENOUS at 15:18

## 2019-03-14 RX ADMIN — ACETAMINOPHEN 650 MG: 325 TABLET, FILM COATED ORAL at 15:18

## 2019-03-14 RX ADMIN — SODIUM CHLORIDE: 9 INJECTION, SOLUTION INTRAVENOUS at 11:56

## 2019-03-14 RX ADMIN — MEROPENEM 500 MG: 500 INJECTION, POWDER, FOR SOLUTION INTRAVENOUS at 23:12

## 2019-03-14 RX ADMIN — SODIUM CHLORIDE: 9 INJECTION, SOLUTION INTRAVENOUS at 23:18

## 2019-03-14 RX ADMIN — PROMETHAZINE HYDROCHLORIDE 25 MG: 25 TABLET ORAL at 11:54

## 2019-03-14 RX ADMIN — MEROPENEM 500 MG: 500 INJECTION, POWDER, FOR SOLUTION INTRAVENOUS at 05:21

## 2019-03-14 ASSESSMENT — ENCOUNTER SYMPTOMS
VOMITING: 0
NAUSEA: 0
SHORTNESS OF BREATH: 0
HEADACHES: 1
CHILLS: 0
FEVER: 0

## 2019-03-14 NOTE — CARE PLAN
Problem: Knowledge Deficit  Goal: Knowledge of disease process/condition, treatment plan, diagnostic tests, and medications will improve  Outcome: PROGRESSING AS EXPECTED  POC discussed, care board updated    Problem: Pain Management  Goal: Pain level will decrease to patient's comfort goal  Outcome: PROGRESSING AS EXPECTED  Patient pain is well controlled with medication per MAR

## 2019-03-14 NOTE — PROGRESS NOTES
GYN/Oncology Progress Note               Author: Mercedes Katkarol Date & Time created: 3/14/2019  2:00 PM     Interval History:  Patient has a headache today and developed some vaginal bleeding. Left flank pain has resolved with NT.     Review of Systems:  Review of Systems   Constitutional: Negative for chills and fever.   Respiratory: Negative for shortness of breath.    Cardiovascular: Negative for chest pain and leg swelling.   Gastrointestinal: Negative for nausea and vomiting.   Genitourinary:        Vaginal bleeding   Neurological: Positive for headaches.       Physical Exam:  Physical Exam   Constitutional: She is oriented to person, place, and time. No distress.   Pulmonary/Chest: Effort normal.   Abdominal: Soft.   Left ileoconduit, left NT   Musculoskeletal: She exhibits no edema.   Neurological: She is alert and oriented to person, place, and time.   Skin: Skin is warm and dry.       Labs:          Recent Labs      03/12/19 2324 03/14/19 0433   SODIUM  139  139   POTASSIUM  4.0  4.6   CHLORIDE  103  108   CO2  25  24   BUN  23*  17   CREATININE  0.98  0.65   CALCIUM  10.3  8.8     Recent Labs      03/12/19 2324 03/14/19   0433   ALTSGPT  12   --    ASTSGOT  15   --    ALKPHOSPHAT  92   --    TBILIRUBIN  0.5   --    GLUCOSE  134*  108*     Recent Labs      03/12/19 2324 03/13/19   1007  03/14/19 0433   RBC  5.81*   --   4.88   HEMOGLOBIN  15.6   --   13.4   HEMATOCRIT  48.4*   --   40.2   PLATELETCT  256   --   137*   PROTHROMBTM   --   13.2   --    INR   --   0.99   --      Recent Labs      03/12/19 2324 03/14/19 0433   WBC  15.9*  8.5   NEUTSPOLYS  87.80*   --    LYMPHOCYTES  7.10*   --    MONOCYTES  4.30   --    EOSINOPHILS  0.20   --    BASOPHILS  0.30   --    ASTSGOT  15   --    ALTSGPT  12   --    ALKPHOSPHAT  92   --    TBILIRUBIN  0.5   --      Recent Labs      03/12/19 2324 03/14/19 0433   SODIUM  139  139   POTASSIUM  4.0  4.6   CHLORIDE  103  108   CO2  25  24   GLUCOSE   134*  108*   BUN  23*  17   CREATININE  0.98  0.65   CALCIUM  10.3  8.8     Hemodynamics:  Temp (24hrs), Av.7 °C (98.1 °F), Min:36.2 °C (97.2 °F), Max:37.3 °C (99.1 °F)  Temperature: 37.3 °C (99.1 °F)  Pulse  Av.1  Min: 76  Max: 118Heart Rate (Monitored): 99  Blood Pressure : (!) 173/113 (RN notified), NIBP: 152/85     Respiratory:    Respiration: (!) 24 (RN notified), Pulse Oximetry: 92 %        RUL Breath Sounds: Clear, RML Breath Sounds: Clear, RLL Breath Sounds: Diminished, AVERY Breath Sounds: Clear, LLL Breath Sounds: Diminished  Fluids:    Intake/Output Summary (Last 24 hours) at 19 1400  Last data filed at 19 1055   Gross per 24 hour   Intake             1585 ml   Output              504 ml   Net             1081 ml        GI/Nutrition:  Orders Placed This Encounter   Procedures   • Diet Order Regular     Standing Status:   Standing     Number of Occurrences:   1     Order Specific Question:   Diet:     Answer:   Regular [1]     Medical Decision Making, by Problem:  There are no active hospital problems to display for this patient.      Plan:  1. Recurrent urinary tract infection- elevated WBC, afebrile, IV antibiotics and with hx of ESBL will place on Meropenum, await final culture  2. Left hydronephrosis- cr stable, increased flank pain, unable to internalize stent due to UTI, NT placed with resolution of pain  3. Leukocytosis- secondary to UTI, possible pylenophorsis, antibiotics  3. Hx cervical cancer- vaginal bleeding today, will need to further evaluate once acute UTI has resolved  4. Hypertension- associated with headache today, denies pain, ordered hydralazine 5 mg IV   5. Discharge planning- await final cultures, possible need for outpatient IV antibiotics     Case Discussed with Dr. Hays    Quality-Core Measures

## 2019-03-14 NOTE — OR NURSING
Pt on 2 L NC, baseline.  No c/o nausea.  Left flank discomfort tolerable per pt.  Left flank surgical dressing CDI.  Left nephrostomy patent and draining, nicholas, clear drainage.  VSS, afebrile, CLEARY, A/O x4.    Glasses in place.

## 2019-03-14 NOTE — PROGRESS NOTES
Having small sanguinous discharge from vagina with small clots.  Call placed to Dr. Hays's office, spoke with JEEVAN Lizarraga.  Per Veronica drainage to be expected after nephrostomy tube placement, patient updated.  Oral pain medication orders received.

## 2019-03-14 NOTE — PROGRESS NOTES
Received report from PAC-U.  Pt arrived to T331 bed 2 via gurney  No immediate distress.  A&Ox4  C/O pain, medicated per MAR  Denies n/v  Drains nephrostomy  Oriented to room, educated on welcome packet, white board, TV, call light, call before fall, plan of care, oral care expectations, ambulation goals, and up to chair for all meals goal.  Call light and personal belongings within reach.  Fall precautions and hourly rounding in place      Contact precautions

## 2019-03-14 NOTE — PROGRESS NOTES
IR RN note:    Site Marked and Confirmed with MD, patient and RN pre procedure   Left nephrostomy tube placement with moderate sedation by MD Block assisted by RT Rupa,Left flank access site;  End Tidal CO2 range 34-37 during procedure   4 mL sample sent to lab   Left neph tube placed -   BS Flexima Nephrostomy catheter 8 fr x 25 cm REF# [id number] LOT# [id number]    Patient tolerated procedure, hemodynamically stable; pt awake and talking post procedure; report given to \[facility]\""CU RN martha;   patient transported to [facility] via IR RN monitored then transferred care to report RN

## 2019-03-14 NOTE — OR SURGEON
Immediate Post- Operative Note    PostOp Diagnosis: RENAL OBSTRUCTION. PT HAD LEFT PERC NEPH IN THE PAST.       Procedure(s): LEFT PERCUTANEOUS NEPHROSTOMY PLACEMENT AND NEPHROSTOGRAM WITH U/S AND FLUOROSCOPIC GUIDANCE    8F LOCKING LOOP TO GRAVITY.     3 ML SPECIMEN URINE SENT FOR C+S, GRAM.       Estimated Blood Loss: <5CC        Complications: NONE          3/13/2019     6:37 PM     Michael Block

## 2019-03-14 NOTE — PROGRESS NOTES
Call placed to Dr. Hays's office regarding patient's BP of 173/113 .  Patient feels a slight headache.  Awaiting call back.  Dr. Hays here to see patient.  New order rec'd.

## 2019-03-15 LAB
BACTERIA UR CULT: ABNORMAL
BACTERIA UR CULT: ABNORMAL
SIGNIFICANT IND 70042: ABNORMAL
SITE SITE: ABNORMAL
SOURCE SOURCE: ABNORMAL

## 2019-03-15 PROCEDURE — A9270 NON-COVERED ITEM OR SERVICE: HCPCS | Performed by: NURSE PRACTITIONER

## 2019-03-15 PROCEDURE — 99222 1ST HOSP IP/OBS MODERATE 55: CPT | Performed by: INTERNAL MEDICINE

## 2019-03-15 PROCEDURE — 770021 HCHG ROOM/CARE - ISO PRIVATE

## 2019-03-15 PROCEDURE — 87040 BLOOD CULTURE FOR BACTERIA: CPT

## 2019-03-15 PROCEDURE — 700102 HCHG RX REV CODE 250 W/ 637 OVERRIDE(OP): Performed by: NURSE PRACTITIONER

## 2019-03-15 PROCEDURE — 700111 HCHG RX REV CODE 636 W/ 250 OVERRIDE (IP): Performed by: NURSE PRACTITIONER

## 2019-03-15 PROCEDURE — 700105 HCHG RX REV CODE 258: Performed by: NURSE PRACTITIONER

## 2019-03-15 PROCEDURE — 700102 HCHG RX REV CODE 250 W/ 637 OVERRIDE(OP): Performed by: FAMILY MEDICINE

## 2019-03-15 PROCEDURE — 36415 COLL VENOUS BLD VENIPUNCTURE: CPT

## 2019-03-15 PROCEDURE — A9270 NON-COVERED ITEM OR SERVICE: HCPCS | Performed by: FAMILY MEDICINE

## 2019-03-15 RX ORDER — LISINOPRIL 10 MG/1
10 TABLET ORAL
Status: DISCONTINUED | OUTPATIENT
Start: 2019-03-15 | End: 2019-03-17

## 2019-03-15 RX ORDER — 0.9 % SODIUM CHLORIDE 0.9 %
5 VIAL (ML) INJECTION PRN
Status: CANCELLED | OUTPATIENT
Start: 2019-03-18

## 2019-03-15 RX ORDER — 0.9 % SODIUM CHLORIDE 0.9 %
10-20 VIAL (ML) INJECTION PRN
Status: CANCELLED | OUTPATIENT
Start: 2019-03-18

## 2019-03-15 RX ADMIN — MEROPENEM 500 MG: 500 INJECTION, POWDER, FOR SOLUTION INTRAVENOUS at 21:09

## 2019-03-15 RX ADMIN — PROMETHAZINE HYDROCHLORIDE 25 MG: 25 TABLET ORAL at 04:21

## 2019-03-15 RX ADMIN — SODIUM CHLORIDE: 9 INJECTION, SOLUTION INTRAVENOUS at 22:42

## 2019-03-15 RX ADMIN — PROMETHAZINE HYDROCHLORIDE 25 MG: 25 TABLET ORAL at 18:46

## 2019-03-15 RX ADMIN — LISINOPRIL 10 MG: 10 TABLET ORAL at 15:21

## 2019-03-15 RX ADMIN — MEROPENEM 500 MG: 500 INJECTION, POWDER, FOR SOLUTION INTRAVENOUS at 13:26

## 2019-03-15 RX ADMIN — HYDRALAZINE HYDROCHLORIDE 5 MG: 20 INJECTION INTRAMUSCULAR; INTRAVENOUS at 01:07

## 2019-03-15 RX ADMIN — HYDRALAZINE HYDROCHLORIDE 5 MG: 20 INJECTION INTRAMUSCULAR; INTRAVENOUS at 10:07

## 2019-03-15 RX ADMIN — MEROPENEM 500 MG: 500 INJECTION, POWDER, FOR SOLUTION INTRAVENOUS at 04:23

## 2019-03-15 RX ADMIN — HYDRALAZINE HYDROCHLORIDE 5 MG: 20 INJECTION INTRAMUSCULAR; INTRAVENOUS at 21:05

## 2019-03-15 RX ADMIN — HYDRALAZINE HYDROCHLORIDE 5 MG: 20 INJECTION INTRAMUSCULAR; INTRAVENOUS at 13:04

## 2019-03-15 ASSESSMENT — ENCOUNTER SYMPTOMS
ABDOMINAL PAIN: 0
NAUSEA: 0
FEVER: 0
SHORTNESS OF BREATH: 0
VOMITING: 0
CHILLS: 0
FLANK PAIN: 0

## 2019-03-15 NOTE — PROGRESS NOTES
[facility] appointment has been made for Invanz 1 g daily infusion starting on Monday 3/18/19 at 4:45 pm. Plan to repeat urinalysis and urine culture on 3/28/19.

## 2019-03-15 NOTE — PROGRESS NOTES
GYN/Oncology Progress Note               Author: Mercedes LANDIS Farhad Date & Time created: 3/15/2019  9:03 AM     Interval History:  Patients headache has improved, but still comes and goes. Denies any pain, nausea or vomiting    Review of Systems:  Review of Systems   Constitutional: Negative for chills and fever.   Respiratory: Negative for shortness of breath.    Cardiovascular: Negative for chest pain.   Gastrointestinal: Negative for abdominal pain, nausea and vomiting.   Genitourinary: Negative for flank pain.       Physical Exam:  Physical Exam   Constitutional: She is oriented to person, place, and time. She appears well-developed and well-nourished. No distress.   Pulmonary/Chest: Effort normal.   Abdominal: Soft.   Left ostomy   Musculoskeletal: She exhibits no edema.   Neurological: She is alert and oriented to person, place, and time.   Skin: Skin is warm and dry.       Labs:          Recent Labs      03/12/19 2324 03/14/19 0433   SODIUM  139  139   POTASSIUM  4.0  4.6   CHLORIDE  103  108   CO2  25  24   BUN  23*  17   CREATININE  0.98  0.65   CALCIUM  10.3  8.8     Recent Labs      03/12/19 2324 03/14/19   0433   ALTSGPT  12   --    ASTSGOT  15   --    ALKPHOSPHAT  92   --    TBILIRUBIN  0.5   --    GLUCOSE  134*  108*     Recent Labs      03/12/19 2324 03/13/19   1007  03/14/19 0433   RBC  5.81*   --   4.88   HEMOGLOBIN  15.6   --   13.4   HEMATOCRIT  48.4*   --   40.2   PLATELETCT  256   --   137*   PROTHROMBTM   --   13.2   --    INR   --   0.99   --      Recent Labs      03/12/19 2324 03/14/19 0433   WBC  15.9*  8.5   NEUTSPOLYS  87.80*   --    LYMPHOCYTES  7.10*   --    MONOCYTES  4.30   --    EOSINOPHILS  0.20   --    BASOPHILS  0.30   --    ASTSGOT  15   --    ALTSGPT  12   --    ALKPHOSPHAT  92   --    TBILIRUBIN  0.5   --      Recent Labs      03/12/19 2324 03/14/19 0433   SODIUM  139  139   POTASSIUM  4.0  4.6   CHLORIDE  103  108   CO2  25  24   GLUCOSE  134*  108*   BUN   23*  17   CREATININE  0.98  0.65   CALCIUM  10.3  8.8     Hemodynamics:  Temp (24hrs), Av.9 °C (98.4 °F), Min:36.2 °C (97.1 °F), Max:37.3 °C (99.1 °F)  Temperature: 36.9 °C (98.4 °F)  Pulse  Av.7  Min: 76  Max: 118   Blood Pressure : (!) 167/103 (RN notified)     Respiratory:    Respiration: 20, Pulse Oximetry: 91 %        RUL Breath Sounds: Clear, RML Breath Sounds: Clear, RLL Breath Sounds: Diminished, AVERY Breath Sounds: Clear, LLL Breath Sounds: Diminished  Fluids:    Intake/Output Summary (Last 24 hours) at 03/15/19 0903  Last data filed at 03/15/19 0438   Gross per 24 hour   Intake             1550 ml   Output             2900 ml   Net            -1350 ml        GI/Nutrition:  Orders Placed This Encounter   Procedures   • Diet Order Regular     Standing Status:   Standing     Number of Occurrences:   1     Order Specific Question:   Diet:     Answer:   Regular [1]     Medical Decision Making, by Problem:  There are no active hospital problems to display for this patient.      Plan:  1. 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 Left hydronephrosis- cr stable, unable to internalize stent due to UTI, NT placed with resolution of pain  3. Leukocytosis- resolved, secondary to UTI, possible pylenophorsis, antibiotics  4. Hx cervical cancer- vaginal bleeding x1 episode will need to further evaluate once acute UTI has resolved  5. Hypertension- new onset, persistent despite hydralazine PRN, lisinopril started today, mild headache    6. Discharge planning- possible need for outpatient IV antibiotics     Case Discussed with Dr. Hays      Quality-Core Measures

## 2019-03-15 NOTE — PROGRESS NOTES
A&O, VSS, CLEARY.  Left flank dressing is CDI, Leg bag with clear yellow urine present.  LUQ colostomy present with no stool present.  Patient does self care with colostomy.  IVF infusing.  Denies pain.  Denies N/T to all extremities.  SCD's in place to BLE.  POC discussed, pt agrees and verbalizes understanding.  Hourly rounding in place, call light is within reach.  Assessment completed as charted.

## 2019-03-15 NOTE — CARE PLAN
Problem: Safety  Goal: Will remain free from falls    Intervention: Assess risk factors for falls  Fall risk assessment completed.        Problem: Bowel/Gastric:  Goal: Will not experience complications related to bowel motility    Intervention: Assess baseline bowel pattern  Colostomy present on LUQ.

## 2019-03-15 NOTE — DISCHARGE PLANNING
Care Transition Team Assessment    Information Source  Orientation : Oriented x 4  Information Given By: Patient  Informant's Name: Nessa Dennis  Who is responsible for making decisions for patient? : Patient    Readmission Evaluation  Is this a readmission?: No    Elopement Risk  Legal Hold: No  Ambulatory or Self Mobile in Wheelchair: Yes  Disoriented: No  Psychiatric Symptoms: None  History of Wandering: No  Elopement this Admit: No  Vocalizing Wanting to Leave: No  Displays Behaviors, Body Language Wanting to Leave: No-Not at Risk for Elopement  Elopement Risk: Not at Risk for Elopement    Interdisciplinary Discharge Planning  Patient or legal guardian wants to designate a caregiver (see row info): No    Discharge Preparedness  What is your plan after discharge?: Home health care, Home with help  What are your discharge supports?: Spouse  Prior Functional Level: Ambulatory, Independent with Activities of Daily Living, Independent with Medication Management    Functional Assesment  Prior Functional Level: Ambulatory, Independent with Activities of Daily Living, Independent with Medication Management    Finances  Financial Barriers to Discharge: No  Prescription Coverage: Yes    Vision / Hearing Impairment  Vision Impairment : No  Right Eye Vision: Impaired, Wears Glasses  Left Eye Vision: Impaired, Wears Glasses  Hearing Impairment : Yes  Hearing Impairment: Both Ears  Does Pt Need Special Equipment for the Hearing Impaired?: Yes-But Does not Need for Facility to Arrange Equipment              Domestic Abuse  Have you ever been the victim of abuse or violence?: No  Physical Abuse or Sexual Abuse: No  Verbal Abuse or Emotional Abuse: No  Possible Abuse Reported to:: Not Applicable    Psychological Assessment  History of Substance Abuse: None  History of Psychiatric Problems: No  Non-compliant with Treatment: No  Newly Diagnosed Illness: No    Discharge Risks or Barriers  Discharge risks or barriers?:  No    Anticipated Discharge Information  Anticipated discharge disposition: Home, [facility]

## 2019-03-15 NOTE — CARE PLAN
Problem: Infection  Goal: Will remain free from infection  Outcome: PROGRESSING AS EXPECTED  Pt is not showing S/S of developing an infection at this time. IV hub is scrubbed before every use, RN washes hands, dons, gloves, and gown before entering room, and urine is clear.     Problem: Knowledge Deficit  Goal: Knowledge of the prescribed therapeutic regimen will improve  Outcome: PROGRESSING AS EXPECTED  Pt is educated on her medications, test, and procedures and is able to verbalize back why she is getting them and their importance.

## 2019-03-16 PROCEDURE — A9270 NON-COVERED ITEM OR SERVICE: HCPCS | Performed by: NURSE PRACTITIONER

## 2019-03-16 PROCEDURE — 99232 SBSQ HOSP IP/OBS MODERATE 35: CPT | Performed by: INTERNAL MEDICINE

## 2019-03-16 PROCEDURE — 93010 ELECTROCARDIOGRAM REPORT: CPT | Performed by: INTERNAL MEDICINE

## 2019-03-16 PROCEDURE — 700102 HCHG RX REV CODE 250 W/ 637 OVERRIDE(OP): Performed by: NURSE PRACTITIONER

## 2019-03-16 PROCEDURE — A9270 NON-COVERED ITEM OR SERVICE: HCPCS | Performed by: FAMILY MEDICINE

## 2019-03-16 PROCEDURE — 700111 HCHG RX REV CODE 636 W/ 250 OVERRIDE (IP): Performed by: NURSE PRACTITIONER

## 2019-03-16 PROCEDURE — 93005 ELECTROCARDIOGRAM TRACING: CPT | Performed by: NURSE PRACTITIONER

## 2019-03-16 PROCEDURE — 700105 HCHG RX REV CODE 258: Performed by: NURSE PRACTITIONER

## 2019-03-16 PROCEDURE — 700102 HCHG RX REV CODE 250 W/ 637 OVERRIDE(OP): Performed by: FAMILY MEDICINE

## 2019-03-16 PROCEDURE — 770021 HCHG ROOM/CARE - ISO PRIVATE

## 2019-03-16 RX ORDER — ONDANSETRON 2 MG/ML
4 INJECTION INTRAMUSCULAR; INTRAVENOUS EVERY 6 HOURS PRN
Status: DISCONTINUED | OUTPATIENT
Start: 2019-03-16 | End: 2019-03-18 | Stop reason: HOSPADM

## 2019-03-16 RX ADMIN — HYDRALAZINE HYDROCHLORIDE 5 MG: 20 INJECTION INTRAMUSCULAR; INTRAVENOUS at 01:38

## 2019-03-16 RX ADMIN — MEROPENEM 500 MG: 500 INJECTION, POWDER, FOR SOLUTION INTRAVENOUS at 06:02

## 2019-03-16 RX ADMIN — MEROPENEM 500 MG: 500 INJECTION, POWDER, FOR SOLUTION INTRAVENOUS at 18:06

## 2019-03-16 RX ADMIN — PROMETHAZINE HYDROCHLORIDE 25 MG: 25 TABLET ORAL at 02:43

## 2019-03-16 RX ADMIN — HYDRALAZINE HYDROCHLORIDE 5 MG: 20 INJECTION INTRAMUSCULAR; INTRAVENOUS at 08:23

## 2019-03-16 RX ADMIN — MEROPENEM 500 MG: 500 INJECTION, POWDER, FOR SOLUTION INTRAVENOUS at 22:37

## 2019-03-16 RX ADMIN — LISINOPRIL 10 MG: 10 TABLET ORAL at 06:01

## 2019-03-16 ASSESSMENT — ENCOUNTER SYMPTOMS
VOMITING: 0
SHORTNESS OF BREATH: 0
FLANK PAIN: 0
FEVER: 0
HEADACHES: 0
ABDOMINAL PAIN: 0
NAUSEA: 0
CHILLS: 0

## 2019-03-16 ASSESSMENT — PATIENT HEALTH QUESTIONNAIRE - PHQ9
SUM OF ALL RESPONSES TO PHQ9 QUESTIONS 1 AND 2: 0
2. FEELING DOWN, DEPRESSED, IRRITABLE, OR HOPELESS: NOT AT ALL
1. LITTLE INTEREST OR PLEASURE IN DOING THINGS: NOT AT ALL

## 2019-03-16 NOTE — CARE PLAN
Problem: Venous Thromboembolism (VTW)/Deep Vein Thrombosis (DVT) Prevention:  Goal: Patient will participate in Venous Thrombosis (VTE)/Deep Vein Thrombosis (DVT)Prevention Measures  Outcome: PROGRESSING AS EXPECTED  SCDs in place.    Problem: Pain Management  Goal: Pain level will decrease to patient's comfort goal  Outcome: PROGRESSING AS EXPECTED  Pain on flank currently controlled. Declines pain meds at this time.

## 2019-03-16 NOTE — CARE PLAN
Problem: Discharge Barriers/Planning  Goal: Patient's continuum of care needs will be met    Intervention: Assess potential discharge barriers on admission and throughout hospital stay  POC discussed with pt and  at bedside      Problem: Pain Management  Goal: Pain level will decrease to patient's comfort goal    Intervention: Follow pain managment plan developed in collaboration with patient and Interdisciplinary Team  Pt states no pain at the moment, slight nausea

## 2019-03-16 NOTE — PROGRESS NOTES
Pt SBP has been consistently above 170s. Hydralazine given. Midnight vitals show elevated BP and tachycardia - 182/114 pulse 109. Pt is sleeping and nausea appears to be resolved with phenergan. Updated NP Mercedes Llamas. Currently waiting for orders.

## 2019-03-16 NOTE — PROGRESS NOTES
Infectious Disease Progress Note    Author: Micheline Rutledge M.D. Date & Time of service: 3/16/2019  2:45 PM    Chief Complaint:  Extended-spectrum beta-lactamase Klebsiella urinary tract infection.    Interval History:  73-year-old female with cervical cancer, prior rectovaginal fistula and neurogenic bladder and recent treatment for ESBL Klebsiella UTI, presents with leukocytosis, nausea, vomiting, flank pain, and   worsening hydronephrosis  3/16 AF WBC not done No further N/V since yesterday. Decreased flank pain  Labs Reviewed, Medications Reviewed and Wound Reviewed.    Review of Systems:  Review of Systems   Constitutional: Positive for malaise/fatigue. Negative for chills and fever.   Gastrointestinal: Negative for abdominal pain, nausea and vomiting.   Genitourinary: Positive for hematuria. Negative for dysuria and flank pain.   All other systems reviewed and are negative.      Hemodynamics:  Temp (24hrs), Av.9 °C (98.4 °F), Min:36.8 °C (98.2 °F), Max:37.1 °C (98.7 °F)  Temperature: 37.1 °C (98.7 °F)  Pulse  Av  Min: 76  Max: 118   Blood Pressure : 152/94       Physical Exam:  Physical Exam   Constitutional: She is oriented to person, place, and time. No distress.   HENT:   Head: Normocephalic.   Mouth/Throat: No oropharyngeal exudate.   Eyes: Pupils are equal, round, and reactive to light. EOM are normal. No scleral icterus.   Neck: Neck supple.   Cardiovascular: Normal rate.    No murmur heard.  Pulmonary/Chest: Effort normal. No respiratory distress.   Abdominal: Soft. She exhibits no distension. There is no tenderness.   Musculoskeletal: She exhibits no edema or tenderness.   Neurological: She is alert and oriented to person, place, and time. No cranial nerve deficit.   Skin: Skin is warm. No rash noted. She is not diaphoretic. There is erythema.   IV left arm with erythema   Nursing note and vitals reviewed.      Meds:    Current Facility-Administered Medications:   •  ondansetron  •   lisinopril  •  oxyCODONE-acetaminophen  •  hydrALAZINE  •  acetaminophen  •  NS  •  morphine injection  •  meropenem    Labs:  Recent Labs      03/14/19   0433   WBC  8.5   RBC  4.88   HEMOGLOBIN  13.4   HEMATOCRIT  40.2   MCV  82.4   MCH  27.5   RDW  41.4   PLATELETCT  137*   MPV  12.4     Recent Labs      03/14/19   0433   SODIUM  139   POTASSIUM  4.6   CHLORIDE  108   CO2  24   GLUCOSE  108*   BUN  17     Recent Labs      03/14/19   0433   CREATININE  0.65       Imaging:  Ct-renal Colic Evaluation(a/p W/o)    Result Date: 2/17/2019 2/17/2019 10:42 AM HISTORY/REASON FOR EXAM:  FLANK PAIN. TECHNIQUE/EXAM DESCRIPTION AND NUMBER OF VIEWS: CT scan renal/colic without contrast. 5 mm helical images of the abdomen and pelvis were obtained from the diaphragmatic domes through the pubic symphysis. Low dose optimization technique was utilized for this CT exam including automated exposure control and adjustment of the mA and/or kV according to patient size. COMPARISON: 10/12/2016 FINDINGS: Lower chest: Atelectasis/scarring in the dependent lower lobes. Liver: No mass. No intrahepatic biliary dilatation. Gallbladder: No mural thickening. No radiopaque gallstones. Common bile duct: Nondilated. Pancreas: Unremarkable. Spleen: No mass. Adrenals: No mass. Kidneys: Severe left hydronephrosis and proximal hydroureter, with suspected stricture of the left proximal ureter. Anastomoses are seen in the mid and distal left ureter. Tiny punctate nonobstructing stone in the upper pole of the left kidney. Normal right kidney. Stomach, small bowel, colon: Tiny hiatal hernia. No bowel wall thickening or obstruction. Prior bowel surgery, with a left lower quadrant ostomy. Peritoneal cavity: No ascites. Lymph nodes: No enlarged nodes by size criteria. Aorta: No aneurysm. Pelvic organs: Circumferential mural thickening of the bladder. Hysterectomy. Musculoskeletal structures: No acute fracture or destructive lesion. Old T11 compression  deformity. Thoracolumbar dextroscoliosis. Multilevel spondylosis.     1. Severe left hydronephrosis due to suspected stricture of the torturous left proximal ureter. 2. Tiny punctate nonobstructing stone in the left kidney. 3. Ureteral anastomoses in the left mid and distal ureter. 4. Circumferential mural thickening of the bladder, suggestive of cystitis. 5. No lymphadenopathy in abdomen or pelvis.    Us-renal    Result Date: 3/13/2019  3/12/2019 11:38 PM HISTORY/REASON FOR EXAM:  Send onset left flank pain. TECHNIQUE/EXAM DESCRIPTION: Renal ultrasound. COMPARISON:  Renal ultrasound 2/18/2019 FINDINGS: The right kidney measures 10.78 cm.  There is an 8 mm right renal cysts identified. The left kidney measures 11.19 cm. There is moderate hydronephrosis of the left kidney. There is no evidence for left-sided ureteral jet. There is a normal right-sided ureteral jet and no evidence of right-sided hydronephrosis. There are no abnormal calcifications. The bladder demonstrates no focal wall abnormality.     1.  Moderate hydronephrosis of the left kidney with no evidence of a left-sided ureteral jet. This appearance has worsened since previous exam. 2.  8 mm right renal cyst.    Us-renal    Result Date: 2/18/2019 2/18/2019 11:07 AM HISTORY/REASON FOR EXAM:  Left hydronephrosis TECHNIQUE/EXAM DESCRIPTION: Renal ultrasound. COMPARISON:  2/17/2019 FINDINGS: The right kidney measures 10.06 cm.  No hydronephrosis is seen on the right. The left kidney measures 9.89 cm. There is moderate left hydronephrosis, improved compared to prior. The bladder is partially decompressed. Bladder volume is 32.4 mL. There is a right ureteral jet visualized. Left ureteral jet was not seen.     Moderate left hydronephrosis, improved compared to prior. Left ureteral jet is not seen. No hydronephrosis is seen on the right.     Us-renal    Result Date: 2/17/2019 2/17/2019 9:53 PM HISTORY/REASON FOR EXAM:  HIDA follow-up TECHNIQUE/EXAM DESCRIPTION:   Renal ultrasound. COMPARISON:  September 19, 2018 FINDINGS: The right kidney measures 9.77 cm.  The left kidney measures 9.88 cm. There is severe left hydronephrosis, measuring 5.2 cm. There are no abnormal calcifications. The bladder demonstrates no focal wall abnormality. Ureteral jets are not visualized.     1.  Severe left hydronephrosis, increased since prior study.    Ir-nephrostogram W/ New Tube Placement (all Radiology) Left    Result Date: 3/13/2019  3/13/2019 5:40 PM HISTORY/REASON FOR EXAM:  Left hydronephrosis. Stage III cervical cancer. Patient had previous left nephrostomy with films from 8/10/2018 showing a left-sided pigtail nephrostomy catheter. TECHNIQUE/EXAM DESCRIPTION: LEFT Percutaneous Nephrostomy and Nephrostogram with ultrasound and fluoroscopic guidance. PROCEDURE: Informed consent was obtained. The skin was prepped and draped in a sterile fashion using chlorhexidine antiseptic. Moderate sedation with Fentanyl and Versed was administered during the procedure with appropriate continuous patient monitoring by the radiology nurse. SEDATION DURATION: 30 minutes CURRENT ALARA PRINCIPLES FOR DOSE REDUCTION TECHNIQUES WERE UTILIZED FOR THIS EXAMINATION. FLUOROSCOPY TIME: 2.2 minutes NUMBER OF FILMS: 5 fluoroscopic frame capture images and/or digital series obtained. CONTRAST AMOUNT: 15 mL Omnipaque The LEFT kidney was localized with real time ultrasound. Following local anesthesia with 1% lidocaine, an 18 G needle with a francisco tip stylet was advanced into the renal collecting system at a left upper pole calyx under real time ultrasound guidance. A 3 mL urine sample was collected and submitted for culture and sensitivity, Gram stain, and cell count. An antegrade pyelogram was performed. An angled glide wire was advanced into the renal pelvis and following serial tract dilatation, a 8 Upper sor[place] locking loop pigtail nephrostomy catheter was placed and reformed in the renal pelvis. The locking loop was  "secured and a nephrostogram obtained documenting satisfactory catheter position with all side holes within the collecting system. The catheter was secured to the skin and connected to a gravity drainage bag. The patient tolerated the procedure well with no evidence of complication. COMPARISON:  CT of the abdomen and pelvis, renal colic CT 2/17/2019. Renal ultrasound 3/12/2019 FINDINGS:  The nephrostogram shows the catheter to be in satisfactory position. There is no extravasation. There is mild calyceal dilatation more so in the left lower pole, however, there is marked dilatation of the renal pelvis and tortuosity at the UPJ  and proximal ureter. There is prompt transit of contrast from the renal pelvis down the left ureter. No focal \"UPJ\" obstruction is identified.     1. ULTRASOUND AND FLUOROSCOPIC GUIDED PLACEMENT OF A LEFT 8 [place]  PIGTAIL LOCKING LOOP PERCUTANEOUS NEPHROSTOMY CATHETER. 2. NEPHROSTOGRAM SHOWING SATISFACTORY CATHETER POSITION WITHOUT EXTRAVASATION. MARKED DILATATION OF THE LEFT RENAL PELVIS.    Ir-midline Catheter Insertion Wo Guidance > Age 3    Result Date: 2/22/2019  HISTORY/REASON FOR EXAM:  Midline Placement   TECHNIQUE/EXAM DESCRIPTION AND NUMBER OF VIEWS: Midline insertion with ultrasound guidance.  FINDINGS: Midline insertion with Ultrasound Guidance was performed by qualified nursing staff without the assistance of a Radiologist. Midline positioning as measured by RN or as appropriate length of catheter selected.              Ultrasound-guided midline placement performed by qualified nursing staff as above.       Micro:  Results     Procedure Component Value Units Date/Time    URINE CULTURE(NEW) [[id number]]  (Abnormal)  (Susceptibility) Collected:  03/13/19 1833    Order Status:  Completed Specimen:  Urine Updated:  03/16/19 1048     Significant Indicator POS (POS)     Source UR     Site Left Nephrostomy     Urine Culture - (A)      Klebsiella pneumoniae ESBL  400 cfu/mL  Extended " Spectrum Beta-lactamase (ESBL) isolated.  ESBL's may be clinically resistant to therapy with  Penicillins,Cephalosporins or Aztreonam despite  apparent in vitro susceptibility to some of these agents.  The patient requires contact isolation.  Please contact pharmacy or an Infectious Disease Specialist  if you have any questions about appropriate therapy.   (A)    Culture & Susceptibility     KLEBSIELLA PNEUMONIAE ESBL     Antibiotic Sensitivity Microscan Unit Status    Ampicillin Resistant >16 mcg/mL Final    Method: EVELIN    Cefepime Resistant >16 mcg/mL Final    Method: EVELIN    Cefotaxime Extended Spectrum Beta Lactamase >32 mcg/mL Final    Method: EVELIN    Cefotetan Sensitive <=16 mcg/mL Final    Method: EVELIN    Ceftazidime Extended Spectrum Beta Lactamase >16 mcg/mL Final    Method: EVELIN    Ceftriaxone Extended Spectrum Beta Lactamase >32 mcg/mL Final    Method: EVELIN    Cefuroxime Resistant >16 mcg/mL Final    Method: EVELIN    Cephalothin Resistant >16 mcg/mL Final    Method: EVELIN    Ciprofloxacin Resistant >2 mcg/mL Final    Method: EVELIN    Gentamicin Resistant >8 mcg/mL Final    Method: EVELIN    Levofloxacin Resistant >4 mcg/mL Final    Method: EVELIN    Nitrofurantoin Resistant >64 mcg/mL Final    Method: EVELIN    Pip/Tazobactam Resistant >64 mcg/mL Final    Method: EVELIN    Piperacillin Resistant >64 mcg/mL Final    Method: EVELIN    Tigecycline Sensitive <=2 mcg/mL Final    Method: EVELIN    Tobramycin Resistant >8 mcg/mL Final    Method: EVELIN    Trimeth/Sulfa Resistant >2/38 mcg/mL Final    Method: EVELIN                       GRAM STAIN ONLY [[id number]] Collected:  03/13/19 1835    Order Status:  Completed Specimen:  Urine Updated:  03/16/19 1048     Significant Indicator NEG     Source UR     Site Left Nephrostomy     Gram Stain Result Many WBCs.  No organisms seen.      BLOOD CULTURE [[id number]] Collected:  03/15/19 1758    Order Status:  Completed Specimen:  Blood from Peripheral Updated:  03/16/19 0856     Significant Indicator NEG  "    Source BLD     Site PERIPHERAL     Blood Culture No Growth    Note: Blood cultures are incubated for 5 days and  are monitored continuously.Positive blood cultures  are called to the RN and reported as soon as  they are identified.      Narrative:       Contact  Per Hospital Policy: Only change Specimen Src: to \"Line\" if  specified by physician order.    BLOOD CULTURE [[id number]] Collected:  03/15/19 1758    Order Status:  Completed Specimen:  Blood from Peripheral Updated:  03/16/19 0856     Significant Indicator NEG     Source BLD     Site PERIPHERAL     Blood Culture No Growth    Note: Blood cultures are incubated for 5 days and  are monitored continuously.Positive blood cultures  are called to the RN and reported as soon as  they are identified.      Narrative:       Contact  Per Hospital Policy: Only change Specimen Src: to \"Line\" if  specified by physician order.    URINE CULTURE(NEW) [[id number]]  (Abnormal)  (Susceptibility) Collected:  03/12/19 2300    Order Status:  Completed Specimen:  Urine Updated:  03/15/19 1024     Significant Indicator POS (POS)     Source UR     Site -     Urine Culture - (A)      Klebsiella pneumoniae ESBL  >100,000 cfu/mL  Extended Spectrum Beta-lactamase (ESBL) isolated.  ESBL's may be clinically resistant to therapy with  Penicillins,Cephalosporins or Aztreonam despite  apparent in vitro susceptibility to some of these agents.  The patient requires contact isolation.  Please contact pharmacy or an Infectious Disease Specialist  if you have any questions about appropriate therapy.   (A)    Narrative:       CALL  [place]  131 tel. [phone],  CALLED  131 tel. [phone] 03/15/2019, 10:24, RB PERF. RESULTS CALLED  TO:[id number]    Culture & Susceptibility     KLEBSIELLA PNEUMONIAE ESBL     Antibiotic Sensitivity Microscan Unit Status    Ampicillin Resistant >16 mcg/mL Final    Method: EVELIN    Cefepime Resistant >16 mcg/mL Final    Method: EVELIN    Cefotaxime Extended Spectrum Beta " Lactamase >32 mcg/mL Final    Method: EVELIN    Cefotetan Sensitive <=16 mcg/mL Final    Method: EVELIN    Ceftazidime Extended Spectrum Beta Lactamase >16 mcg/mL Final    Method: EVELIN    Ceftriaxone Extended Spectrum Beta Lactamase >32 mcg/mL Final    Method: EVELIN    Cefuroxime Resistant >16 mcg/mL Final    Method: EVELIN    Cephalothin Resistant >16 mcg/mL Final    Method: EVELIN    Ciprofloxacin Resistant >2 mcg/mL Final    Method: EVELIN    Gentamicin Resistant >8 mcg/mL Final    Method: EVELIN    Levofloxacin Resistant >4 mcg/mL Final    Method: EVELIN    Nitrofurantoin Resistant >64 mcg/mL Final    Method: EVELIN    Pip/Tazobactam Intermediate 64 mcg/mL Final    Method: EVELIN    Piperacillin Resistant >64 mcg/mL Final    Method: EVELIN    Tigecycline Sensitive <=2 mcg/mL Final    Method: EVELIN    Tobramycin Resistant >8 mcg/mL Final    Method: EVELIN    Trimeth/Sulfa Resistant >2/38 mcg/mL Final    Method: EVELIN                       FLUID CULTURE W/GRAM STAIN [[id number]]     Order Status:  No result Specimen:  Body Fluid from Other Body Fluid     URINALYSIS CULTURE, IF INDICATED [[id number]]  (Abnormal) Collected:  03/12/19 2300    Order Status:  Completed Specimen:  Urine from Urine, Clean Catch Updated:  03/12/19 6327     Color DK Yellow     Character Turbid (A)     Specific Gravity 1.017     Ph 6.0     Glucose Negative mg/dL      Ketones Negative mg/dL      Protein 300 (A) mg/dL      Bilirubin Negative     Urobilinogen, Urine 0.2     Nitrite Positive (A)     Leukocyte Esterase Large (A)     Occult Blood Moderate (A)     Micro Urine Req Microscopic          Assessment:  ESBL Kleb UTI  Cancer      Plan:  ESBL Klebsiella pneumoniae UTI  Afebrile  No leukocytosis at last check  Decreased pain, N/V  S/p nephrostomy tube 3/13-likely contaminated/biofilm   Blood cultures x2 neg so far   Continue meropenem while in the hospital  Recheck urine culture at the end of the month while she is on the ertapenem  if this is negative, can replace the nephrostomy  tube and place indwelling stent at that time and then continue theIV antibiotics for another week from that procedure.   Anticipate 4 week course  Stop date 4/12/2019-orders in EPIC    IV infiltration  +erythema  DC peripheral IV  OK to place midline    Discussed with Gyn-Onc/RN

## 2019-03-16 NOTE — CONSULTS
DATE OF SERVICE:  03/15/2019    INFECTIOUS DISEASE CONSULTATION    REASON FOR CONSULT:  Extended-spectrum beta-lactamase Klebsiella urinary tract   infection.    CONSULTING PHYSICIAN:  Dr. Adam Hays.    HISTORY OF PRESENT ILLNESS:  This is a very pleasant 73-year-old white female,   who has a history of cervical cancer that has been treated with laparoscopic   radical hysterectomy, bilateral salpingo-oophorectomy, bilateral pelvic and   periaortic lymph node dissection on 05/27/2005.  She was in remission until   2015, when she developed a recurrence, which involves the vaginal vulvar area.    She was treated with chemoradiation.  Unfortunately that was complicated by   rectovaginal fistula for which she required a diverting loop colostomy.    Postoperative course was further complicated by neurogenic bladder and she has   been self cathing since 2015.  In around 01/2018, she started developing   problems with recurrent pyelonephritis, UTI, as well as hydronephrosis.    Because of hydronephrosis she had a left nephrostomy tube placed.    Unfortunately, in February of this year, she feels that ESBL Klebsiella UTI   resistant to oral antibiotics.  She was treated with 2 weeks of ertapenem,   which she completed per the  and the patient.  Her urinary tract   symptoms of flank pain had resolved at the completion of treatment; however,   she has completed her therapy on 03/05/2019.  She felt well for several days.    She was seen in clinic on followup and she was noted to have a leukocytosis,   abnormal urinalysis and increased hydronephrosis of the left kidney.  On   03/12/2019, this associated with left flank pain as well as nausea and   vomiting.  As nephrostomy tube was placed and now urine culture is growing   ESBL Klebsiella, she has been started on meropenem.  Infectious disease was   consulted for antibiotic recommendations and management.  She had been   attempted to be treated with ciprofloxacin as an  outpatient; however, there is   no response to her symptoms, no improvement in her symptoms.  She states her   nausea and vomiting have improved since admission.    ALLERGIES:  SHE IS ALLERGIC TO SULFA DRUGS.    REVIEW OF SYSTEMS:  Positive for left flank pain, nausea, vomiting.  All other   systems are reviewed and are negative.    PAST MEDICAL HISTORY:  Cervical cancer, neurogenic bladder, rectovaginal   fistula, diverting colostomy and hydronephrosis, prior ESBL Klebsiella UTI.    FAMILY HISTORY:  She has a sister with cancer.    SOCIAL HISTORY:  She does not smoke, drink or use illicit drugs.    PHYSICAL EXAMINATION:  GENERAL:  She is a well-nourished, well-developed female, in no acute   distress, pleasant and cooperative.  VITAL SIGNS:  Temperature is 98.7, blood pressure 172/111, pulse 105,   respiratory rate 14, oxygen saturation 93% on 2 L nasal cannula.  She weighs   64 kilos.  HEENT:  Normocephalic, atraumatic.  Pupils equal, round, and reactive to   light.  Extraocular movements intact.  Oropharynx is clear.  She has   conjunctivitis.  She has anicteric sclerae.  NECK:  Supple.  There is no thyromegaly.  No lymphadenopathy.  No cervical or   supraclavicular nodes are noted.  There is no JVD or stridor.  CARDIOVASCULAR:  Tachycardic.  Regular rate and rhythm, unable to auscultate   any murmurs.  CHEST:  Clear to auscultation bilaterally, unlabored.  She has no chest   tenderness.  ABDOMEN:  Obese, soft, no tenderness, nondistended.  There is no rebound or   guarding.  Diverting colostomy is in place.  EXTREMITIES:  Show no cyanosis, clubbing, or edema.  She has no joint   swelling.  SKIN:  Reveals no rashes.  She is somewhat pale.  NEUROLOGIC:  She is awake, alert, oriented.  Speech is fluent without   dysarthria.  Cranial nerves are intact.  She is moving all extremities.  PSYCHIATRIC:  Behavior is normal, affect is normal and she has a left   percutaneous nephrostomy tube in place.    LABORATORY DATA:   Current labs show white blood cell count of 8.5, H and H of   13.4 and 48.2, platelets are 137.  Sodium 139, potassium 4.6, chloride 108,   bicarbonate 24, glucose 108, BUN 17, creatinine 0.65, alkaline phosphatase 92,   total bilirubin 0.5.  Urinalysis done on 03/12, showed positive protein,   positive nitrate, large leukocyte esterase, packed wbc's, 5-10 rbc's.  Culture   is growing ESBL Klebsiella that was resistant to all oral antibiotics.  Blood   cultures have not been done.  Renal ultrasound done on 03/13 showed moderate   hydronephrosis of the left kidney.  It is worse from previous exam.    ASSESSMENT AND PLAN:  A 73-year-old female with cervical cancer, prior   rectovaginal fistula and neurogenic bladder and recent treatment for ESBL   Klebsiella UTI, presents with leukocytosis, nausea, vomiting, flank pain, and   worsening hydronephrosis.  She again has an ESBL Klebsiella pneumoniae urinary   tract infection.  She just had a new nephrostomy tube placed on 03/13.    Unfortunately, this is likely contaminated that ESBL Klebsiella may produced   biofilm which will affect the nephrostomy tube.  Unfortunately, there are no   oral options for treatment.  We will check blood cultures x2.  If those are   negative for 48 hours, can place midline and anticipate a longer course of IV   antimicrobial therapy.  I would recheck urine culture at the end of the month   while she is on the ertapenem, if this is negative, can replace the   nephrostomy tube and place indwelling stent at that time and then continue the   IV antibiotics for another week from that procedure.  Overall, her nausea and   vomiting has improved.  However, she did vomit just prior to exam.  Symptoms   were not completely resolved, she should continue in contact isolation to   discuss with gynecology/oncology.  Further recommendations per culture results   and clinical course.    Thank you and we will follow with you.        ____________________________________     MD KAELA KISER / CARRIE    DD:  03/15/2019 17:42:34  DT:  03/15/2019 21:30:03    D#:  [id number]  Job#:  [id number]

## 2019-03-16 NOTE — PROGRESS NOTES
GYN/Oncology Progress Note               Author: Mercedes Llamas Date & Time created: 3/16/2019  9:15 AM     Interval History:  Patient doing better today, headache has resolved, intermittent nausea    Review of Systems:  Review of Systems   Constitutional: Negative for chills and fever.   Respiratory: Negative for shortness of breath.    Cardiovascular: Negative for chest pain and leg swelling.   Gastrointestinal: Negative for abdominal pain, nausea and vomiting.   Genitourinary: Negative for flank pain.   Neurological: Negative for headaches.       Physical Exam:  Physical Exam   Constitutional: She is oriented to person, place, and time. She appears well-developed and well-nourished. No distress.   Pulmonary/Chest: Effort normal.   Abdominal: Soft.   LLQ ostomy   Musculoskeletal: She exhibits no edema.   Neurological: She is alert and oriented to person, place, and time.   Skin: Skin is warm and dry.       Labs:          Recent Labs      19   0433   SODIUM  139   POTASSIUM  4.6   CHLORIDE  108   CO2  24   BUN  17   CREATININE  0.65   CALCIUM  8.8     Recent Labs      19   0433   GLUCOSE  108*     Recent Labs      19   1007  19   0433   RBC   --   4.88   HEMOGLOBIN   --   13.4   HEMATOCRIT   --   40.2   PLATELETCT   --   137*   PROTHROMBTM  13.2   --    INR  0.99   --      Recent Labs      19   0433   WBC  8.5     Recent Labs      19   0433   SODIUM  139   POTASSIUM  4.6   CHLORIDE  108   CO2  24   GLUCOSE  108*   BUN  17   CREATININE  0.65   CALCIUM  8.8     Hemodynamics:  Temp (24hrs), Av.9 °C (98.4 °F), Min:36.8 °C (98.2 °F), Max:37.1 °C (98.7 °F)  Temperature: 36.8 °C (98.2 °F)  Pulse  Av  Min: 76  Max: 118   Blood Pressure : (!) 176/102 (RN notified )     Respiratory:    Respiration: 17, Pulse Oximetry: 93 %        RUL Breath Sounds: Clear, RML Breath Sounds: Clear, RLL Breath Sounds: Diminished, AVERY Breath Sounds: Clear, LLL Breath Sounds:  Diminished  Fluids:    Intake/Output Summary (Last 24 hours) at 03/16/19 0915  Last data filed at 03/16/19 0602   Gross per 24 hour   Intake                0 ml   Output             1450 ml   Net            -1450 ml        GI/Nutrition:  Orders Placed This Encounter   Procedures   • Diet Order Regular     Standing Status:   Standing     Number of Occurrences:   1     Order Specific Question:   Diet:     Answer:   Regular [1]     Medical Decision Making, by Problem:  There are no active hospital problems to display for this patient.      Plan:  1. Recurrent urinary tract infection- leukocytosis has resolved, afebrile, culture with ESBL, appreciate ID consult, BC x2 obtained per ID, plan for midline placement if BC negative and then outpatient Invanz, recheck urine culture on 3/28  2. Left hydronephrosis- cr stable, unable to internalize stent due to UTI, NT placed with resolution of pain  3. Hx cervical cancer- vaginal bleeding x1 episode will need to further evaluate once acute UTI has resolved  4. Hypertension- improved, new onset, continue hydralazine PRN, lisinopril started 3/15/19   5. Discharge planning- possible need for outpatient IV antibiotics     Case Discussed with Dr. Hays    Quality-Core Measures

## 2019-03-17 ENCOUNTER — APPOINTMENT (OUTPATIENT)
Dept: RADIOLOGY | Facility: MEDICAL CENTER | Age: 74
DRG: 690 | End: 2019-03-17
Attending: INTERNAL MEDICINE
Payer: MEDICARE

## 2019-03-17 LAB
ANION GAP SERPL CALC-SCNC: 10 MMOL/L (ref 0–11.9)
BASOPHILS # BLD AUTO: 0.3 % (ref 0–1.8)
BASOPHILS # BLD: 0.03 K/UL (ref 0–0.12)
BUN SERPL-MCNC: 22 MG/DL (ref 8–22)
CALCIUM SERPL-MCNC: 9.2 MG/DL (ref 8.5–10.5)
CHLORIDE SERPL-SCNC: 105 MMOL/L (ref 96–112)
CO2 SERPL-SCNC: 25 MMOL/L (ref 20–33)
CREAT SERPL-MCNC: 0.56 MG/DL (ref 0.5–1.4)
EKG IMPRESSION: NORMAL
EOSINOPHIL # BLD AUTO: 0.31 K/UL (ref 0–0.51)
EOSINOPHIL NFR BLD: 3.6 % (ref 0–6.9)
ERYTHROCYTE [DISTWIDTH] IN BLOOD BY AUTOMATED COUNT: 39.8 FL (ref 35.9–50)
GLUCOSE SERPL-MCNC: 99 MG/DL (ref 65–99)
HCT VFR BLD AUTO: 41.7 % (ref 37–47)
HGB BLD-MCNC: 13.9 G/DL (ref 12–16)
IMM GRANULOCYTES # BLD AUTO: 0.07 K/UL (ref 0–0.11)
IMM GRANULOCYTES NFR BLD AUTO: 0.8 % (ref 0–0.9)
LYMPHOCYTES # BLD AUTO: 1.51 K/UL (ref 1–4.8)
LYMPHOCYTES NFR BLD: 17.6 % (ref 22–41)
MCH RBC QN AUTO: 26.9 PG (ref 27–33)
MCHC RBC AUTO-ENTMCNC: 33.3 G/DL (ref 33.6–35)
MCV RBC AUTO: 80.8 FL (ref 81.4–97.8)
MONOCYTES # BLD AUTO: 1.02 K/UL (ref 0–0.85)
MONOCYTES NFR BLD AUTO: 11.9 % (ref 0–13.4)
NEUTROPHILS # BLD AUTO: 5.66 K/UL (ref 2–7.15)
NEUTROPHILS NFR BLD: 65.8 % (ref 44–72)
NRBC # BLD AUTO: 0 K/UL
NRBC BLD-RTO: 0 /100 WBC
PLATELET # BLD AUTO: 251 K/UL (ref 164–446)
PMV BLD AUTO: 9.8 FL (ref 9–12.9)
POTASSIUM SERPL-SCNC: 3 MMOL/L (ref 3.6–5.5)
RBC # BLD AUTO: 5.16 M/UL (ref 4.2–5.4)
SODIUM SERPL-SCNC: 140 MMOL/L (ref 135–145)
WBC # BLD AUTO: 8.6 K/UL (ref 4.8–10.8)

## 2019-03-17 PROCEDURE — 700102 HCHG RX REV CODE 250 W/ 637 OVERRIDE(OP): Performed by: NURSE PRACTITIONER

## 2019-03-17 PROCEDURE — 770021 HCHG ROOM/CARE - ISO PRIVATE

## 2019-03-17 PROCEDURE — 700111 HCHG RX REV CODE 636 W/ 250 OVERRIDE (IP): Performed by: NURSE PRACTITIONER

## 2019-03-17 PROCEDURE — 80048 BASIC METABOLIC PNL TOTAL CA: CPT

## 2019-03-17 PROCEDURE — 700105 HCHG RX REV CODE 258: Performed by: NURSE PRACTITIONER

## 2019-03-17 PROCEDURE — C1751 CATH, INF, PER/CENT/MIDLINE: HCPCS

## 2019-03-17 PROCEDURE — 36415 COLL VENOUS BLD VENIPUNCTURE: CPT

## 2019-03-17 PROCEDURE — 85025 COMPLETE CBC W/AUTO DIFF WBC: CPT

## 2019-03-17 PROCEDURE — 05HY33Z INSERTION OF INFUSION DEVICE INTO UPPER VEIN, PERCUTANEOUS APPROACH: ICD-10-PCS | Performed by: INTERNAL MEDICINE

## 2019-03-17 PROCEDURE — A9270 NON-COVERED ITEM OR SERVICE: HCPCS | Performed by: NURSE PRACTITIONER

## 2019-03-17 PROCEDURE — 99232 SBSQ HOSP IP/OBS MODERATE 35: CPT | Performed by: INTERNAL MEDICINE

## 2019-03-17 RX ORDER — POTASSIUM CHLORIDE 20 MEQ/1
40 TABLET, EXTENDED RELEASE ORAL ONCE
Status: COMPLETED | OUTPATIENT
Start: 2019-03-17 | End: 2019-03-17

## 2019-03-17 RX ORDER — LISINOPRIL 10 MG/1
10 TABLET ORAL ONCE
Status: COMPLETED | OUTPATIENT
Start: 2019-03-17 | End: 2019-03-17

## 2019-03-17 RX ORDER — LISINOPRIL 20 MG/1
20 TABLET ORAL
Status: DISCONTINUED | OUTPATIENT
Start: 2019-03-18 | End: 2019-03-18 | Stop reason: HOSPADM

## 2019-03-17 RX ADMIN — POTASSIUM CHLORIDE 40 MEQ: 1500 TABLET, EXTENDED RELEASE ORAL at 13:01

## 2019-03-17 RX ADMIN — MEROPENEM 500 MG: 500 INJECTION, POWDER, FOR SOLUTION INTRAVENOUS at 06:24

## 2019-03-17 RX ADMIN — LISINOPRIL 10 MG: 10 TABLET ORAL at 06:25

## 2019-03-17 RX ADMIN — ERTAPENEM SODIUM 1000 MG: 1 INJECTION, POWDER, LYOPHILIZED, FOR SOLUTION INTRAMUSCULAR; INTRAVENOUS at 14:53

## 2019-03-17 RX ADMIN — ONDANSETRON 4 MG: 2 INJECTION INTRAMUSCULAR; INTRAVENOUS at 21:54

## 2019-03-17 RX ADMIN — LISINOPRIL 10 MG: 10 TABLET ORAL at 14:50

## 2019-03-17 RX ADMIN — HYDRALAZINE HYDROCHLORIDE 5 MG: 20 INJECTION INTRAMUSCULAR; INTRAVENOUS at 06:25

## 2019-03-17 ASSESSMENT — ENCOUNTER SYMPTOMS
FLANK PAIN: 0
VOMITING: 0
ABDOMINAL PAIN: 0
FEVER: 0
NAUSEA: 0
CHILLS: 0
SHORTNESS OF BREATH: 0

## 2019-03-17 NOTE — PROCEDURES
Vascular Access Team    Date of Insertion: 3/17/19  Arm Circumference: 27  Internal length: 14  External Length: 0  Vein Occupancy %: 32  Reason for Midline: IV abx  Labs: WBC 8.6, , BUN 22, Cr 0.56, GFR >60, INR 0.99    Orders confirmed, vessel patency confirmed with ultrasound. Risks and benefits of procedure explained to patient and education regarding line associated bloodstream infections provided. Questions answered.     Power Midline placed in RUE per MD order with ultrasound guidance, initial arm circumference 27cm. 4 Fr, 1 lumen Power Midline placed in brachial vein after 1 attempt(s). 2 cc's of 1% lidocaine injected intradermally, 21 gauge microintroducer needle and modified Seldinger technique used. 14 cm catheter inserted with good blood return. Secured at 0 cm marker. Each lumen flushed without resistance with 10 mL 0.9% normal saline. Midline secured with Biopatch and Tegaderm.     Midline placement is confirmed by nurse using ultrasound and ability to flush and draw blood. Midline is appropriate for use at this time.  No X-ray is needed for placement confirmation. Pt tolerated procedure well.  Patient condition relayed to unit RN or ordering physician via this post procedure note in the EMR.     Ultrasound images uploaded to PACS and viewable in the EMR - yes  Ultrasound imaged printed and placed in paper chart - no     BARD Power Midline ref # [id number], Lot # [id number]

## 2019-03-17 NOTE — CARE PLAN
Problem: Safety  Goal: Will remain free from injury  Outcome: PROGRESSING AS EXPECTED  Pt. Calls appropriately, call light within reach, bed in lowest position, locked, upper side rails up, treaded socks in place, bedside table within reach    Problem: Pain Management  Goal: Pain level will decrease to patient's comfort goal  Outcome: PROGRESSING AS EXPECTED  Pt, calm, repositions herself from side to side when feeling pain, comfort at rest

## 2019-03-17 NOTE — PROGRESS NOTES
Infectious Disease Progress Note    Author: Micheline Rutledge M.D. Date & Time of service: 3/17/2019  1:12 PM    Chief Complaint:  Extended-spectrum beta-lactamase Klebsiella urinary tract infection.    Interval History:  73-year-old female with cervical cancer, prior rectovaginal fistula and neurogenic bladder and recent treatment for ESBL Klebsiella UTI, presents with leukocytosis, nausea, vomiting, flank pain, and   worsening hydronephrosis  3/16 AF WBC not done No further N/V since yesterday. Decreased flank pain  3/17 AF feeling back to her usual self today. No fever, N/V/flank pain. Midline not placed yet  Labs Reviewed, Medications Reviewed and Wound Reviewed.    Review of Systems:  Review of Systems   Constitutional: Negative for chills, fever and malaise/fatigue.   Gastrointestinal: Negative for abdominal pain, nausea and vomiting.   Genitourinary: Negative for dysuria and flank pain.   All other systems reviewed and are negative.      Hemodynamics:  Temp (24hrs), Av.8 °C (98.2 °F), Min:36.7 °C (98 °F), Max:36.9 °C (98.5 °F)  Temperature: 36.7 °C (98 °F)  Pulse  Av.9  Min: 76  Max: 118   Blood Pressure : 160/92       Physical Exam:  Physical Exam   Constitutional: She is oriented to person, place, and time. No distress.   HENT:   Head: Normocephalic.   Mouth/Throat: No oropharyngeal exudate.   Eyes: Pupils are equal, round, and reactive to light. EOM are normal. No scleral icterus.   Neck: Neck supple.   Cardiovascular: Normal rate.    No murmur heard.  Pulmonary/Chest: Effort normal. No respiratory distress.   Abdominal: Soft. She exhibits no distension. There is no tenderness.   Musculoskeletal: She exhibits no edema or tenderness.   Neurological: She is alert and oriented to person, place, and time. No cranial nerve deficit.   Skin: Skin is warm. No rash noted. She is not diaphoretic. There is erythema.   IV left arm with decreased erythema   Nursing note and vitals  reviewed.      Meds:    Current Facility-Administered Medications:   •  ondansetron  •  lisinopril  •  oxyCODONE-acetaminophen  •  hydrALAZINE  •  acetaminophen  •  NS  •  morphine injection  •  meropenem    Labs:  Recent Labs      03/17/19 0412   WBC  8.6   RBC  5.16   HEMOGLOBIN  13.9   HEMATOCRIT  41.7   MCV  80.8*   MCH  26.9*   RDW  39.8   PLATELETCT  251   MPV  9.8   NEUTSPOLYS  65.80   LYMPHOCYTES  17.60*   MONOCYTES  11.90   EOSINOPHILS  3.60   BASOPHILS  0.30     Recent Labs      03/17/19 0412   SODIUM  140   POTASSIUM  3.0*   CHLORIDE  105   CO2  25   GLUCOSE  99   BUN  22     Recent Labs      03/17/19 0412   CREATININE  0.56       Imaging:  Ct-renal Colic Evaluation(a/p W/o)    Result Date: 2/17/2019 2/17/2019 10:42 AM HISTORY/REASON FOR EXAM:  FLANK PAIN. TECHNIQUE/EXAM DESCRIPTION AND NUMBER OF VIEWS: CT scan renal/colic without contrast. 5 mm helical images of the abdomen and pelvis were obtained from the diaphragmatic domes through the pubic symphysis. Low dose optimization technique was utilized for this CT exam including automated exposure control and adjustment of the mA and/or kV according to patient size. COMPARISON: 10/12/2016 FINDINGS: Lower chest: Atelectasis/scarring in the dependent lower lobes. Liver: No mass. No intrahepatic biliary dilatation. Gallbladder: No mural thickening. No radiopaque gallstones. Common bile duct: Nondilated. Pancreas: Unremarkable. Spleen: No mass. Adrenals: No mass. Kidneys: Severe left hydronephrosis and proximal hydroureter, with suspected stricture of the left proximal ureter. Anastomoses are seen in the mid and distal left ureter. Tiny punctate nonobstructing stone in the upper pole of the left kidney. Normal right kidney. Stomach, small bowel, colon: Tiny hiatal hernia. No bowel wall thickening or obstruction. Prior bowel surgery, with a left lower quadrant ostomy. Peritoneal cavity: No ascites. Lymph nodes: No enlarged nodes by size criteria. Aorta:  No aneurysm. Pelvic organs: Circumferential mural thickening of the bladder. Hysterectomy. Musculoskeletal structures: No acute fracture or destructive lesion. Old T11 compression deformity. Thoracolumbar dextroscoliosis. Multilevel spondylosis.     1. Severe left hydronephrosis due to suspected stricture of the torturous left proximal ureter. 2. Tiny punctate nonobstructing stone in the left kidney. 3. Ureteral anastomoses in the left mid and distal ureter. 4. Circumferential mural thickening of the bladder, suggestive of cystitis. 5. No lymphadenopathy in abdomen or pelvis.    Us-renal    Result Date: 3/13/2019  3/12/2019 11:38 PM HISTORY/REASON FOR EXAM:  Send onset left flank pain. TECHNIQUE/EXAM DESCRIPTION: Renal ultrasound. COMPARISON:  Renal ultrasound 2/18/2019 FINDINGS: The right kidney measures 10.78 cm.  There is an 8 mm right renal cysts identified. The left kidney measures 11.19 cm. There is moderate hydronephrosis of the left kidney. There is no evidence for left-sided ureteral jet. There is a normal right-sided ureteral jet and no evidence of right-sided hydronephrosis. There are no abnormal calcifications. The bladder demonstrates no focal wall abnormality.     1.  Moderate hydronephrosis of the left kidney with no evidence of a left-sided ureteral jet. This appearance has worsened since previous exam. 2.  8 mm right renal cyst.    Us-renal    Result Date: 2/18/2019 2/18/2019 11:07 AM HISTORY/REASON FOR EXAM:  Left hydronephrosis TECHNIQUE/EXAM DESCRIPTION: Renal ultrasound. COMPARISON:  2/17/2019 FINDINGS: The right kidney measures 10.06 cm.  No hydronephrosis is seen on the right. The left kidney measures 9.89 cm. There is moderate left hydronephrosis, improved compared to prior. The bladder is partially decompressed. Bladder volume is 32.4 mL. There is a right ureteral jet visualized. Left ureteral jet was not seen.     Moderate left hydronephrosis, improved compared to prior. Left ureteral jet  is not seen. No hydronephrosis is seen on the right.     Us-renal    Result Date: 2/17/2019 2/17/2019 9:53 PM HISTORY/REASON FOR EXAM:  HIDA follow-up TECHNIQUE/EXAM DESCRIPTION:  Renal ultrasound. COMPARISON:  September 19, 2018 FINDINGS: The right kidney measures 9.77 cm.  The left kidney measures 9.88 cm. There is severe left hydronephrosis, measuring 5.2 cm. There are no abnormal calcifications. The bladder demonstrates no focal wall abnormality. Ureteral jets are not visualized.     1.  Severe left hydronephrosis, increased since prior study.    Ir-nephrostogram W/ New Tube Placement (all Radiology) Left    Result Date: 3/13/2019  3/13/2019 5:40 PM HISTORY/REASON FOR EXAM:  Left hydronephrosis. Stage III cervical cancer. Patient had previous left nephrostomy with films from 8/10/2018 showing a left-sided pigtail nephrostomy catheter. TECHNIQUE/EXAM DESCRIPTION: LEFT Percutaneous Nephrostomy and Nephrostogram with ultrasound and fluoroscopic guidance. PROCEDURE: Informed consent was obtained. The skin was prepped and draped in a sterile fashion using chlorhexidine antiseptic. Moderate sedation with Fentanyl and Versed was administered during the procedure with appropriate continuous patient monitoring by the radiology nurse. SEDATION DURATION: 30 minutes CURRENT ALARA PRINCIPLES FOR DOSE REDUCTION TECHNIQUES WERE UTILIZED FOR THIS EXAMINATION. FLUOROSCOPY TIME: 2.2 minutes NUMBER OF FILMS: 5 fluoroscopic frame capture images and/or digital series obtained. CONTRAST AMOUNT: 15 mL Omnipaque The LEFT kidney was localized with real time ultrasound. Following local anesthesia with 1% lidocaine, an 18 G needle with a francisco tip stylet was advanced into the renal collecting system at a left upper pole calyx under real time ultrasound guidance. A 3 mL urine sample was collected and submitted for culture and sensitivity, Gram stain, and cell count. An antegrade pyelogram was performed. An angled glide wire was advanced  "into the renal pelvis and following serial tract dilatation, a 8 Indonesian locking loop pigtail nephrostomy catheter was placed and reformed in the renal pelvis. The locking loop was secured and a nephrostogram obtained documenting satisfactory catheter position with all side holes within the collecting system. The catheter was secured to the skin and connected to a gravity drainage bag. The patient tolerated the procedure well with no evidence of complication. COMPARISON:  CT of the abdomen and pelvis, renal colic CT 2/17/2019. Renal ultrasound 3/12/2019 FINDINGS:  The nephrostogram shows the catheter to be in satisfactory position. There is no extravasation. There is mild calyceal dilatation more so in the left lower pole, however, there is marked dilatation of the renal pelvis and tortuosity at the UPJ  and proximal ureter. There is prompt transit of contrast from the renal pelvis down the left ureter. No focal \"UPJ\" obstruction is identified.     1. ULTRASOUND AND FLUOROSCOPIC GUIDED PLACEMENT OF A LEFT 8 [place]  PIGTAIL LOCKING LOOP PERCUTANEOUS NEPHROSTOMY CATHETER. 2. NEPHROSTOGRAM SHOWING SATISFACTORY CATHETER POSITION WITHOUT EXTRAVASATION. MARKED DILATATION OF THE LEFT RENAL PELVIS.    Ir-midline Catheter Insertion Wo Guidance > Age 3    Result Date: 2/22/2019  HISTORY/REASON FOR EXAM:  Midline Placement   TECHNIQUE/EXAM DESCRIPTION AND NUMBER OF VIEWS: Midline insertion with ultrasound guidance.  FINDINGS: Midline insertion with Ultrasound Guidance was performed by qualified nursing staff without the assistance of a Radiologist. Midline positioning as measured by RN or as appropriate length of catheter selected.              Ultrasound-guided midline placement performed by qualified nursing staff as above.       Micro:  Results     Procedure Component Value Units Date/Time    URINE CULTURE(NEW) [[id number]]  (Abnormal)  (Susceptibility) Collected:  03/13/19 1832    Order Status:  Completed Specimen:  Urine " Updated:  03/16/19 1627     Significant Indicator POS (POS)     Source UR     Site Left Nephrostomy     Urine Culture - (A)      Klebsiella pneumoniae ESBL  400 cfu/mL  Extended Spectrum Beta-lactamase (ESBL) isolated.  ESBL's may be clinically resistant to therapy with  Penicillins,Cephalosporins or Aztreonam despite  apparent in vitro susceptibility to some of these agents.  The patient requires contact isolation.  Please contact pharmacy or an Infectious Disease Specialist  if you have any questions about appropriate therapy.   (A)    Culture & Susceptibility     KLEBSIELLA PNEUMONIAE ESBL     Antibiotic Sensitivity Microscan Unit Status    Ampicillin Resistant >16 mcg/mL Final    Method: EVELIN    Cefepime Resistant >16 mcg/mL Final    Method: EVELIN    Cefotaxime Extended Spectrum Beta Lactamase >32 mcg/mL Final    Method: EVELIN    Cefotetan Sensitive <=16 mcg/mL Final    Method: EVELIN    Ceftazidime Extended Spectrum Beta Lactamase >16 mcg/mL Final    Method: EVELIN    Ceftriaxone Extended Spectrum Beta Lactamase >32 mcg/mL Final    Method: EVELIN    Cefuroxime Resistant >16 mcg/mL Final    Method: EVELIN    Cephalothin Resistant >16 mcg/mL Final    Method: EVELIN    Ciprofloxacin Resistant >2 mcg/mL Final    Method: EVELIN    Ertapenem Sensitive <=1 mcg/mL Final    Method: EVELIN    Gentamicin Resistant >8 mcg/mL Final    Method: EVELIN    Imipenem Sensitive <=1 mcg/mL Final    Method: EVELIN    Levofloxacin Resistant >4 mcg/mL Final    Method: EVELIN    Meropenem Sensitive <=1 mcg/mL Final    Method: EVELIN    Nitrofurantoin Resistant >64 mcg/mL Final    Method: EVELIN    Pip/Tazobactam Resistant >64 mcg/mL Final    Method: EVELIN    Piperacillin Resistant >64 mcg/mL Final    Method: EVELIN    Tigecycline Sensitive <=2 mcg/mL Final    Method: EVELIN    Tobramycin Resistant >8 mcg/mL Final    Method: EVELIN    Trimeth/Sulfa Resistant >2/38 mcg/mL Final    Method: EVELIN                       GRAM STAIN ONLY [[id number]] Collected:  03/13/19 5284    Order Status:   "Completed Specimen:  Urine Updated:  03/16/19 1627     Significant Indicator NEG     Source UR     Site Left Nephrostomy     Gram Stain Result Many WBCs.  No organisms seen.      BLOOD CULTURE [[id number]] Collected:  03/15/19 1758    Order Status:  Completed Specimen:  Blood from Peripheral Updated:  03/16/19 0856     Significant Indicator NEG     Source BLD     Site PERIPHERAL     Blood Culture No Growth    Note: Blood cultures are incubated for 5 days and  are monitored continuously.Positive blood cultures  are called to the RN and reported as soon as  they are identified.      Narrative:       Contact  Per Hospital Policy: Only change Specimen Src: to \"Line\" if  specified by physician order.    BLOOD CULTURE [[id number]] Collected:  03/15/19 1758    Order Status:  Completed Specimen:  Blood from Peripheral Updated:  03/16/19 0856     Significant Indicator NEG     Source BLD     Site PERIPHERAL     Blood Culture No Growth    Note: Blood cultures are incubated for 5 days and  are monitored continuously.Positive blood cultures  are called to the RN and reported as soon as  they are identified.      Narrative:       Contact  Per Hospital Policy: Only change Specimen Src: to \"Line\" if  specified by physician order.    URINE CULTURE(NEW) [[id number]]  (Abnormal)  (Susceptibility) Collected:  03/12/19 2300    Order Status:  Completed Specimen:  Urine Updated:  03/15/19 1024     Significant Indicator POS (POS)     Source UR     Site -     Urine Culture - (A)      Klebsiella pneumoniae ESBL  >100,000 cfu/mL  Extended Spectrum Beta-lactamase (ESBL) isolated.  ESBL's may be clinically resistant to therapy with  Penicillins,Cephalosporins or Aztreonam despite  apparent in vitro susceptibility to some of these agents.  The patient requires contact isolation.  Please contact pharmacy or an Infectious Disease Specialist  if you have any questions about appropriate therapy.   (A)    Narrative:       CALL  [place]  [address] tel. " [phone],  CALLED  131 tel. [phone] 03/15/2019, 10:24, RB PERF. RESULTS CALLED  TO:[id number]    Culture & Susceptibility     KLEBSIELLA PNEUMONIAE ESBL     Antibiotic Sensitivity Microscan Unit Status    Ampicillin Resistant >16 mcg/mL Final    Method: EVELIN    Cefepime Resistant >16 mcg/mL Final    Method: EVELIN    Cefotaxime Extended Spectrum Beta Lactamase >32 mcg/mL Final    Method: EVELIN    Cefotetan Sensitive <=16 mcg/mL Final    Method: EVELIN    Ceftazidime Extended Spectrum Beta Lactamase >16 mcg/mL Final    Method: EVELIN    Ceftriaxone Extended Spectrum Beta Lactamase >32 mcg/mL Final    Method: EVELIN    Cefuroxime Resistant >16 mcg/mL Final    Method: EVELIN    Cephalothin Resistant >16 mcg/mL Final    Method: EVELIN    Ciprofloxacin Resistant >2 mcg/mL Final    Method: EVELIN    Gentamicin Resistant >8 mcg/mL Final    Method: EVELIN    Levofloxacin Resistant >4 mcg/mL Final    Method: EVELIN    Nitrofurantoin Resistant >64 mcg/mL Final    Method: EVELIN    Pip/Tazobactam Intermediate 64 mcg/mL Final    Method: EVELIN    Piperacillin Resistant >64 mcg/mL Final    Method: EVELIN    Tigecycline Sensitive <=2 mcg/mL Final    Method: EVELIN    Tobramycin Resistant >8 mcg/mL Final    Method: EVELIN    Trimeth/Sulfa Resistant >2/38 mcg/mL Final    Method: EVELIN                       FLUID CULTURE W/GRAM STAIN [[id number]]     Order Status:  No result Specimen:  Body Fluid from Other Body Fluid     URINALYSIS CULTURE, IF INDICATED [[id number]]  (Abnormal) Collected:  03/12/19 2300    Order Status:  Completed Specimen:  Urine from Urine, Clean Catch Updated:  03/12/19 3326     Color DK Yellow     Character Turbid (A)     Specific Gravity 1.017     Ph 6.0     Glucose Negative mg/dL      Ketones Negative mg/dL      Protein 300 (A) mg/dL      Bilirubin Negative     Urobilinogen, Urine 0.2     Nitrite Positive (A)     Leukocyte Esterase Large (A)     Occult Blood Moderate (A)     Micro Urine Req Microscopic          Assessment:  ESBL Kleb  UTI  Cancer      Plan:  ESBL Klebsiella pneumoniae UTI  Afebrile  No leukocytosis at last check  Decreased pain, N/V  S/p nephrostomy tube 3/13-likely contaminated/biofilm   Blood cultures x2 neg so far   Continue meropenem for now  Recheck urine culture at the end of the month while she is on the ertapenem  If urine culture negative, can replace the nephrostomy tube or place indwelling stent at that time and then continue theIV antibiotics for another week from that procedure.   Anticipate 4 week course-give dose ertapenem prior to discharge  Stop date 4/12/2019-orders in EPIC    IV infiltration  Decreased erythema  DC'd peripheral IV  OK to place midline    I have performed a physical exam and reviewed and updated ROS as of today.  In review of yesterday's note dated  3/16/2019, there are no changes except as documented above.    FU ID clinic 1-2 weeks after discharge    [facility]

## 2019-03-17 NOTE — CARE PLAN
Problem: Safety  Goal: Will remain free from falls  Outcome: PROGRESSING AS EXPECTED  Pt. Notifies RN/CNA when needing assistance, Uses walker appropriately, treaded socks in place, bed in lowest position, communicates when mobilizing    Problem: Knowledge Deficit  Goal: Knowledge of disease process/condition, treatment plan, diagnostic tests, and medications will improve  Outcome: PROGRESSING AS EXPECTED  Pt. Educated about care, treatment, medications, diagnostic test

## 2019-03-17 NOTE — PROGRESS NOTES
GYN/Oncology Progress Note               Author: Mercedes Llamas Date & Time created: 3/17/2019  11:52 AM     Interval History:  Patient doing well today. Denies any headaches or N/V    Review of Systems:  Review of Systems   Constitutional: Negative for chills and fever.   Respiratory: Negative for shortness of breath.    Cardiovascular: Negative for chest pain and leg swelling.   Gastrointestinal: Negative for nausea and vomiting.   Genitourinary: Negative for flank pain.       Physical Exam:  Physical Exam   Constitutional: She is oriented to person, place, and time. She appears well-developed and well-nourished. No distress.   Pulmonary/Chest: Effort normal.   Abdominal: Soft.   Left ostomy   Musculoskeletal: She exhibits no edema.   Neurological: She is alert and oriented to person, place, and time.   Skin: Skin is warm and dry.       Labs:          Recent Labs      19   0412   SODIUM  140   POTASSIUM  3.0*   CHLORIDE  105   CO2  25   BUN  22   CREATININE  0.56   CALCIUM  9.2     Recent Labs      19   0412   GLUCOSE  99     Recent Labs      19   0412   RBC  5.16   HEMOGLOBIN  13.9   HEMATOCRIT  41.7   PLATELETCT  251     Recent Labs      19   0412   WBC  8.6   NEUTSPOLYS  65.80   LYMPHOCYTES  17.60*   MONOCYTES  11.90   EOSINOPHILS  3.60   BASOPHILS  0.30     Recent Labs      19   0412   SODIUM  140   POTASSIUM  3.0*   CHLORIDE  105   CO2  25   GLUCOSE  99   BUN  22   CREATININE  0.56   CALCIUM  9.2     Hemodynamics:  Temp (24hrs), Av.8 °C (98.3 °F), Min:36.7 °C (98 °F), Max:37.1 °C (98.7 °F)  Temperature: 36.7 °C (98 °F)  Pulse  Av.9  Min: 76  Max: 118   Blood Pressure : 160/99     Respiratory:    Respiration: 16, Pulse Oximetry: 91 %        RUL Breath Sounds: Clear, RML Breath Sounds: Clear, RLL Breath Sounds: Clear, AVERY Breath Sounds: Clear, LLL Breath Sounds: Clear  Fluids:    Intake/Output Summary (Last 24 hours) at 19 [id number]  Last data filed at 19 0813    Gross per 24 hour   Intake                0 ml   Output             1100 ml   Net            -1100 ml        GI/Nutrition:  Orders Placed This Encounter   Procedures   • Diet Order Regular     Standing Status:   Standing     Number of Occurrences:   1     Order Specific Question:   Diet:     Answer:   Regular [1]     Medical Decision Making, by Problem:  There are no active hospital problems to display for this patient.      Plan:  1. Recurrent urinary tract infection- leukocytosis has resolved, afebrile, culture with ESBL, appreciate ID consult, BC x2 obtained per ID, midline in place, plan for outpatient Invanz 1g, recheck urine culture on 3/28  2. Left hydronephrosis- cr stable, unable to internalize stent due to UTI, NT placed with resolution of pain  3. Hypokalemia- K 3.0 today, replace with 40 meQ today, recheck in am  4. Hx cervical cancer- vaginal bleeding x1 episode will need to further evaluate once acute UTI has resolved  5. Hypertension- improved, new onset, continue hydralazine PRN, lisinopril started 3/15/19, increased to 20 mg qday on 3/17  6. Discharge planning- monitor BP with change in medication, [facility] appointment on 3/18 at 4:45 pm, possible d/c home tomorrow     Case Discussed with Dr. Hays    Quality-Core Measures

## 2019-03-18 ENCOUNTER — APPOINTMENT (OUTPATIENT)
Dept: ONCOLOGY | Facility: MEDICAL CENTER | Age: 74
End: 2019-03-18
Attending: SPECIALIST
Payer: MEDICARE

## 2019-03-18 VITALS
WEIGHT: 141.98 LBS | SYSTOLIC BLOOD PRESSURE: 105 MMHG | TEMPERATURE: 97.9 F | DIASTOLIC BLOOD PRESSURE: 66 MMHG | RESPIRATION RATE: 18 BRPM | HEART RATE: 100 BPM | OXYGEN SATURATION: 90 % | HEIGHT: 60 IN | BODY MASS INDEX: 27.87 KG/M2

## 2019-03-18 LAB
ANION GAP SERPL CALC-SCNC: 8 MMOL/L (ref 0–11.9)
BUN SERPL-MCNC: 20 MG/DL (ref 8–22)
CALCIUM SERPL-MCNC: 9.3 MG/DL (ref 8.5–10.5)
CHLORIDE SERPL-SCNC: 105 MMOL/L (ref 96–112)
CO2 SERPL-SCNC: 25 MMOL/L (ref 20–33)
CREAT SERPL-MCNC: 0.57 MG/DL (ref 0.5–1.4)
GLUCOSE SERPL-MCNC: 106 MG/DL (ref 65–99)
POTASSIUM SERPL-SCNC: 3.7 MMOL/L (ref 3.6–5.5)
SODIUM SERPL-SCNC: 138 MMOL/L (ref 135–145)

## 2019-03-18 PROCEDURE — 700105 HCHG RX REV CODE 258: Performed by: NURSE PRACTITIONER

## 2019-03-18 PROCEDURE — 36415 COLL VENOUS BLD VENIPUNCTURE: CPT

## 2019-03-18 PROCEDURE — 700102 HCHG RX REV CODE 250 W/ 637 OVERRIDE(OP): Performed by: NURSE PRACTITIONER

## 2019-03-18 PROCEDURE — A9270 NON-COVERED ITEM OR SERVICE: HCPCS | Performed by: NURSE PRACTITIONER

## 2019-03-18 PROCEDURE — 700111 HCHG RX REV CODE 636 W/ 250 OVERRIDE (IP): Performed by: NURSE PRACTITIONER

## 2019-03-18 PROCEDURE — 80048 BASIC METABOLIC PNL TOTAL CA: CPT

## 2019-03-18 RX ORDER — LISINOPRIL 20 MG/1
20 TABLET ORAL DAILY
Qty: 30 TAB | Refills: 1 | Status: SHIPPED | OUTPATIENT
Start: 2019-03-19 | End: 2019-08-16

## 2019-03-18 RX ORDER — LISINOPRIL 20 MG/1
20 TABLET ORAL DAILY
Qty: 30 TAB | Refills: 1 | Status: SHIPPED | OUTPATIENT
Start: 2019-03-19 | End: 2019-03-18

## 2019-03-18 RX ORDER — OXYCODONE HYDROCHLORIDE AND ACETAMINOPHEN 5; 325 MG/1; MG/1
1 TABLET ORAL EVERY 6 HOURS PRN
Qty: 28 TAB | Refills: 0 | Status: SHIPPED | OUTPATIENT
Start: 2019-03-18 | End: 2019-03-25

## 2019-03-18 RX ADMIN — ERTAPENEM SODIUM 1000 MG: 1 INJECTION, POWDER, LYOPHILIZED, FOR SOLUTION INTRAMUSCULAR; INTRAVENOUS at 04:48

## 2019-03-18 RX ADMIN — LISINOPRIL 20 MG: 20 TABLET ORAL at 04:46

## 2019-03-18 RX ADMIN — SODIUM CHLORIDE: 9 INJECTION, SOLUTION INTRAVENOUS at 04:48

## 2019-03-18 ASSESSMENT — ENCOUNTER SYMPTOMS
FLANK PAIN: 0
VOMITING: 0
FEVER: 0
NAUSEA: 0

## 2019-03-18 NOTE — DISCHARGE INSTRUCTIONS
Discharge Instructions    Discharged to home by car with relative. Discharged via wheelchair, hospital escort: Yes.  Special equipment needed: Not Applicable    Be sure to schedule a follow-up appointment with your primary care doctor or any specialists as instructed.     Discharge Plan:   Diet Plan: Discussed  Activity Level: Discussed  Confirmed Follow up Appointment: Appointment Scheduled  Confirmed Symptoms Management: Discussed  Medication Reconciliation Updated: Yes  Pneumococcal Vaccine Administered/Refused: Not given - Patient refused pneumococcal vaccine  Influenza Vaccine Indication: Patient Refuses (Received 10/2018)    I understand that a diet low in cholesterol, fat, and sodium is recommended for good health. Unless I have been given specific instructions below for another diet, I accept this instruction as my diet prescription.   Other diet: Regular  Depression / Suicide Risk    As you are discharged from this [facility] facility, it is important to learn how to keep safe from harming yourself.    Recognize the warning signs:  · Abrupt changes in personality, positive or negative- including increase in energy   · Giving away possessions  · Change in eating patterns- significant weight changes-  positive or negative  · Change in sleeping patterns- unable to sleep or sleeping all the time   · Unwillingness or inability to communicate  · Depression  · Unusual sadness, discouragement and loneliness  · Talk of wanting to die  · Neglect of personal appearance   · Rebelliousness- reckless behavior  · Withdrawal from people/activities they love  · Confusion- inability to concentrate     If you or a loved one observes any of these behaviors or has concerns about self-harm, here's what you can do:  · Talk about it- your feelings and reasons for harming yourself  · Remove any means that you might use to hurt yourself (examples: pills, rope, extension cords, firearm)  · Get professional help from the community  (Mental Health, Substance Abuse, psychological counseling)  · Do not be alone:Call your Safe Contact- someone whom you trust who will be there for you.  · Call your local CRISIS HOTLINE 570-2643 or 749-764-3338  · Call your local Children's Mobile Crisis Response Team [place] (257) 774-4227 or www.Jelastic  · Call the toll free National Suicide Prevention Hotlines   · National Suicide Prevention Lifeline 395-362-QACV (7422)  · [employer] Network 800-SUICIDE (603-5223)      Percutaneous Nephrostomy  Percutaneous nephrostomy is a procedure to insert a flexible tube into your kidney so that urine can leave your body. This procedure may be done if a medical condition prevents urine from leaving your kidney in the usual way. Urine is normally carried from the kidneys to the bladder through narrow tubes called ureters. A ureter can become blocked because of conditions such as kidney stones, tumors, infection, or blood clots.  The nephrostomy tube will be inserted through your back. After the procedure, the tube will remain in place, and urine will drain from the kidney into a drainage bag outside your body. Draining the urine will relieve pressure and help prevent infection that could damage the kidney. Often, this procedure allows your health care provider to identify the cause of the blockage and plan appropriate treatment.  Tell a health care provider about:  · Any allergies you have.  · All medicines you are taking, including vitamins, herbs, eye drops, creams, and over-the-counter medicines.  · Any problems you or family members have had with anesthetic medicines.  · Any blood disorders you have.  · Any surgeries you have had.  · Any medical conditions you have.  · Whether you are pregnant or may be pregnant.  What are the risks?  Generally, this is a safe procedure. However, problems may occur, including:  · Infection.  · Bleeding.  · Allergic reactions to medicines or dyes used in the  procedure.  · Damage to other structures or organs.  What happens before the procedure?  · Follow instructions from your health care provider about eating or drinking restrictions.  · Ask your health care provider about:  ¨ Changing or stopping your regular medicines. This is especially important if you are taking diabetes medicines or blood thinners.  ¨ Taking medicines such as aspirin and ibuprofen. These medicines can thin your blood. Do not take these medicines before your procedure if your health care provider instructs you not to.  · You may be given antibiotic medicine to help prevent infection.  · You may have blood tests to see how well your kidneys and liver are working and to see how well your blood can clot.  · Plan to have someone take you home from the hospital or clinic.  What happens during the procedure?  · To reduce your risk of infection:  ¨ Your health care team will wash or sanitize their hands.  ¨ Your skin will be washed with soap.  · An IV tube will be inserted into one of your veins. You may be given medicines through this IV tube to help prevent nausea and pain.  · You will be positioned on your abdomen.  · You will be given one or more of the following:  ¨ A medicine to help you relax (sedative).  ¨ A medicine to numb the area (local anesthetic) where the nephrostomy tube will be inserted.  · The nephrostomy tube, which is thin and flexible, will be inserted into a needle.  · The needle will be inserted into your body and guided to your kidney. An imaging method that uses X-ray images (fluoroscopy) will be used to help guide the needle to the kidney.  · A dye will be injected through the nephrostomy tube. Then, X-ray images that highlight your kidney will be taken.  · Next, the needle will be removed, but the nephrostomy tube will be left in your kidney. The tube may be secured to your skin with stitches (sutures).  · A drainage bag will be attached to the nephrostomy tube. Urine will be  able to drain from your kidney to this drainage bag outside your body.  The procedure may vary among health care providers and hospitals.  What happens after the procedure?  · Your blood pressure, heart rate, breathing rate, and blood oxygen level will be monitored until the medicines you were given have worn off.  · You will need to remain lying down for several hours.  · You will be taught how to care for the nephrostomy tube and the drainage bag.  · Do not drive for 24 hours if you were given a sedative.  This information is not intended to replace advice given to you by your health care provider. Make sure you discuss any questions you have with your health care provider.  Document Released: 10/08/2014 Document Revised: 09/29/2017 Document Reviewed: 09/29/2017  [employer] Interactive Patient Education © 2017 [employer]    Percutaneous Nephrostomy Home Guide  A nephrostomy tube allows urine to leave your body when a medical condition prevents it from leaving your kidney normally. Urine is normally carried from the kidneys to the bladder through narrow tubes called ureters. The ureter can become obstructed due to conditions such as kidney stones, tumors, infection, or blood clots. A nephrostomy tube is a hollow, flexible tube placed into the kidney to restore the flow of urine. The tube is placed on the right or left side of your lower back and is connected to an external drainage bag.  PERSONAL HYGIENE  · You may shower unless otherwise told by your health care provider. Prepare for a shower by placing a plastic covering over the nephrostomy tube dressing.  · Change the dressing immediately after showering. Make sure your skin around the nephrostomy tube exit site is dry.  · Avoid immersing your nephrostomy tube in water, such as taking a bath or swimming.  NEPHROSTOMY TUBE CARE  · Your nephrostomy tube is connected to a leg bag or bedside drainage bag. Always keep your tubing, leg bag, or bedside drainage bag  below the level of your kidney so that your urine drains freely.  · Your activity is not restricted as long as your activity does not pull or tug on your tube.  · During the day, if you are connecting your nephrostomy tube to a leg bag, ensure that your tubing does not have any kinks and that your urine is draining freely. One helpful technique to prevent kinking or inadvertent dislodging of your nephrostomy tubing is to gently wrap an elastic bandage over the tubing. This will help to secure the tubing in place. Make sure there is no tension on the tubing so it does not become dislodged.  · At night, you may want to connect your nephrostomy tube to a larger bedside drainage bag.  EMPTYING THE LEG BAG OR BEDSIDE DRAINAGE BAG   The leg bag or bedside drainage bag should be emptied when it becomes  full and before going to sleep. Most leg bags and bedside drainage bags have a drain at the bottom that allows urine to be emptied.  2. Hold the leg bag or bedside drainage bag over a toilet or collection container. Use a measuring container if you are directed to measure your urine.  3. Open the drain and allow the urine to drain.  4. Once all the urine is drained from the leg bag or bedside drainage bag, close the drain fully to avoid urine leakage.  5. Flush urine down toilet. If a collection container was used, rinse the container.  NEPHROSTOMY TUBE EXIT SITE CARE  The exit site for your nephrostomy tube is covered with a bandage (dressing). Clean your exit site and change your dressing as directed by your health care provider or if your dressing becomes wet.  Supplies Needed:  · Mild soap and water.  · 4×4 inch (10x10 cm) split gauze pads.  · 4×4 inch (10x10 cm) gauze pads.  · Paper tape.  Exit Site Care and Dressing Change:For the first 2 weeks after having a nephrostomy tube inserted, you should change the dressing every day. After 2 weeks, you can change the dressing 2 times per week, whenever the dressing becomes  wet, or as told by your health care provider. Because of the location of your nephrostomy tube, you may need help from another person to complete dressing changes. The steps to changing a dressing are:  1. Wash hands well with soap and water.  2. Gently remove the tapes and dressing from around the nephrostomy tube. Be careful not to pull on the tube while removing the dressing. Avoid using scissors to remove the dressing since this may lead to accidental damage to the tube.  3. Wash the skin around the tube with the mild soap and water, rinse well, and dry with a clean cloth.  4. Inspect the skin around the drain for redness, swelling, and foul-smelling yellow or green discharge.  5. If the drain was sutured to the skin, inspect the suture to verify that it is still anchored in the skin.  6. Place two split gauze pads in and around the tube exit site. Do not apply ointments or alcohol to the site.  7. Place a gauze pad on top of the split gauze pad.  8. Coil the tube on top of the gauze. The tubing should rest on the gauze, not on the skin.  9. Place tape around each edge of the gauze pad.  10. Secure the nephrostomy tubing. Ensure that the nephrostomy tube does not kink or become pinched. The tubing should rest on the gauze pad and not on the skin.  11. Dispose of used supplies properly.  FLUSHING YOUR NEPHROSTOMY TUBE   Flush your nephrostomy tube as directed by your health care provider. Flushing of a nephrostomy tube is easier if a three-way stopcock is placed between the tube and the leg bag or bedside drainage bag. One connection of the three-way stopcock connects to your tube, the second connects to the leg bag or bedside drainage bag, and the third connection is usually covered with a cap. The three-way stopcock lever points to the direction on the stopcock that is closed to flow. Normally, the lever points in the direction of the cap to allow urine to drain from the tube to the leg bag or bedside drainage  bag.  Supplies Needed:  · Rubbing alcohol wipe.  · 10 mL 0.9% saline syringe.  Flushing Your Nephrostomy Tube  1. Gather needed supplies.  2. Move the lever of the three-way stopcock so that it points toward the leg bag or bedside drainage bag.  3. Clean the cap with a rubbing alcohol wipe and then screw the tip of a 10 mL 0.9% saline syringe onto the cap.  4. Using the syringe plunger, slowly push the 10 mL 0.9% saline in the syringe over 5-10 seconds. If resistance is met or pain occurs while pushing, stop pushing the saline immediately.  5. Remove the syringe from the cap.  6. Return the stopcock lever to the usual position which is pointing in the direction of the cap.  7. Dispose of used supplies properly.  REPLACING YOUR LEG BAG OR BEDSIDE DRAINAGE BAG   Replace your leg bag or bedside drainage bag, three-way stopcock, and any extension tubing as directed by your health care provider. Make sure you always have an extra drainage bag and connecting tubing available.  1. Empty urine from your leg bag or bedside drainage bag.  2. Gather new leg bag or bedside drainage bag, three-way stopcock, and any extension tubing.  3. Remove the leg bag or bedside drainage bag, three-way stopcock, and any extension tubing from the nephrostomy tube.  4. Attach the new leg bag or bedside drainage bag, three-way stopcock, and any extension tubing to the nephrostomy tube.  5. Dispose of the used leg bag or bedside drainage bag, three-way stopcock, and any extension tubing from the nephrostomy tube.  SEEK IMMEDIATE MEDICAL CARE IF:  · You have a fever.  · You have abdominal pain during the first week after nephrostomy tube insertion.  · You have new appearance of blood in your urine.  · You have drainage, redness, swelling or pain at your nephrostomy tube insertion site.  · You have difficulty or pain with flushing the tube.  · You notice a decrease in your urine output not explained by drinking less fluids.  · Your nephrostomy  tube comes out or the suture securing the tube comes free.     This information is not intended to replace advice given to you by your health care provider. Make sure you discuss any questions you have with your health care provider.     Document Released: 10/08/2014 Document Revised: 12/23/2014 Document Reviewed: 10/08/2014  [employer] Interactive Patient Education ©2016 [employer]    Acetaminophen; Oxycodone tablets  What is this medicine?  ACETAMINOPHEN; OXYCODONE (a set a CARLOS sofia fen; ox i KOE done) is a pain reliever. It is used to treat moderate to severe pain.  This medicine may be used for other purposes; ask your health care provider or pharmacist if you have questions.  COMMON BRAND NAME(S): Endocet, Magnacet, Narvox, Percocet, Perloxx, Primalev, Primlev, Roxicet, Xolox  What should I tell my health care provider before I take this medicine?  They need to know if you have any of these conditions:  -brain tumor  -Crohn's disease, inflammatory bowel disease, or ulcerative colitis  -drug abuse or addiction  -head injury  -heart or circulation problems  -if you often drink alcohol  -kidney disease or problems going to the bathroom  -liver disease  -lung disease, asthma, or breathing problems  -an unusual or allergic reaction to acetaminophen, oxycodone, other opioid analgesics, other medicines, foods, dyes, or preservatives  -pregnant or trying to get pregnant  -breast-feeding  How should I use this medicine?  Take this medicine by mouth with a full glass of water. Follow the directions on the prescription label. You can take it with or without food. If it upsets your stomach, take it with food. Take your medicine at regular intervals. Do not take it more often than directed.  A special MedGuide will be given to you by the pharmacist with each prescription and refill. Be sure to read this information carefully each time.  Talk to your pediatrician regarding the use of this medicine in children. Special care  may be needed.  Overdosage: If you think you have taken too much of this medicine contact a poison control center or emergency room at once.  NOTE: This medicine is only for you. Do not share this medicine with others.  What if I miss a dose?  If you miss a dose, take it as soon as you can. If it is almost time for your next dose, take only that dose. Do not take double or extra doses.  What may interact with this medicine?  This medicine may interact with the following medications:  -alcohol  -antihistamines for allergy, cough and cold  -antiviral medicines for HIV or AIDS  -atropine  -certain antibiotics like clarithromycin, erythromycin, linezolid, rifampin  -certain medicines for anxiety or sleep  -certain medicines for bladder problems like oxybutynin, tolterodine  -certain medicines for depression like amitriptyline, fluoxetine, sertraline  -certain medicines for fungal infections like ketoconazole, itraconazole, voriconazole  -certain medicines for migraine headache like almotriptan, eletriptan, frovatriptan, naratriptan, rizatriptan, sumatriptan, zolmitriptan  -certain medicines for nausea or vomiting like dolasetron, ondansetron, palonosetron  -certain medicines for Parkinson's disease like benztropine, trihexyphenidyl  -certain medicines for seizures like phenobarbital, phenytoin, primidone  -certain medicines for stomach problems like dicyclomine, hyoscyamine  -certain medicines for travel sickness like scopolamine  -diuretics  -general anesthetics like halothane, isoflurane, methoxyflurane, propofol  -ipratropium  -local anesthetics like lidocaine, pramoxine, tetracaine  -MAOIs like Carbex, Eldepryl, Marplan, Nardil, and Parnate  -medicines that relax muscles for surgery  -methylene blue  -nilotinib  -other medicines with acetaminophen  -other narcotic medicines for pain or cough  -phenothiazines like chlorpromazine, mesoridazine, prochlorperazine, thioridazine  This list may not describe all possible  interactions. Give your health care provider a list of all the medicines, herbs, non-prescription drugs, or dietary supplements you use. Also tell them if you smoke, drink alcohol, or use illegal drugs. Some items may interact with your medicine.  What should I watch for while using this medicine?  Tell your doctor or health care professional if your pain does not go away, if it gets worse, or if you have new or a different type of pain. You may develop tolerance to the medicine. Tolerance means that you will need a higher dose of the medication for pain relief. Tolerance is normal and is expected if you take this medicine for a long time.  Do not suddenly stop taking your medicine because you may develop a severe reaction. Your body becomes used to the medicine. This does NOT mean you are addicted. Addiction is a behavior related to getting and using a drug for a non-medical reason. If you have pain, you have a medical reason to take pain medicine. Your doctor will tell you how much medicine to take. If your doctor wants you to stop the medicine, the dose will be slowly lowered over time to avoid any side effects.  There are different types of narcotic medicines (opiates). If you take more than one type at the same time or if you are taking another medicine that also causes drowsiness, you may have more side effects. Give your health care provider a list of all medicines you use. Your doctor will tell you how much medicine to take. Do not take more medicine than directed. Call emergency for help if you have problems breathing or unusual sleepiness.  Do not take other medicines that contain acetaminophen with this medicine. Always read labels carefully. If you have questions, ask your doctor or pharmacist.  If you take too much acetaminophen get medical help right away. Too much acetaminophen can be very dangerous and cause liver damage. Even if you do not have symptoms, it is important to get help right away.  You  may get drowsy or dizzy. Do not drive, use machinery, or do anything that needs mental alertness until you know how this medicine affects you. Do not stand or sit up quickly, especially if you are an older patient. This reduces the risk of dizzy or fainting spells. Alcohol may interfere with the effect of this medicine. Avoid alcoholic drinks.  The medicine will cause constipation. Try to have a bowel movement at least every 2 to 3 days. If you do not have a bowel movement for 3 days, call your doctor or health care professional.  Your mouth may get dry. Chewing sugarless gum or sucking hard candy, and drinking plenty or water may help. Contact your doctor if the problem does not go away or is severe.  What side effects may I notice from receiving this medicine?  Side effects that you should report to your doctor or health care professional as soon as possible:  -allergic reactions like skin rash, itching or hives, swelling of the face, lips, or tongue  -breathing problems  -confusion  -redness, blistering, peeling or loosening of the skin, including inside the mouth  -signs and symptoms of liver injury like dark yellow or brown urine; general ill feeling or flu-like symptoms; light-colored stools; loss of appetite; nausea; right upper belly pain; unusually weak or tired; yellowing of the eyes or skin  -signs and symptoms of low blood pressure like dizziness; feeling faint or lightheaded, falls; unusually weak or tired  -trouble passing urine or change in the amount of urine  Side effects that usually do not require medical attention (report to your doctor or health care professional if they continue or are bothersome):  -constipation  -dry mouth  -nausea, vomiting  -tiredness  This list may not describe all possible side effects. Call your doctor for medical advice about side effects. You may report side effects to FDA at 9-963-FDA-8688.  Where should I keep my medicine?  Keep out of the reach of children. This  medicine can be abused. Keep your medicine in a safe place to protect it from theft. Do not share this medicine with anyone. Selling or giving away this medicine is dangerous and against the law.  This medicine may cause accidental overdose and death if it taken by other adults, children, or pets. Mix any unused medicine with a substance like cat litter or coffee grounds. Then throw the medicine away in a sealed container like a sealed bag or a coffee can with a lid. Do not use the medicine after the expiration date.  Store at room temperature between 20 and 25 degrees C (68 and 77 degrees F).  NOTE: This sheet is a summary. It may not cover all possible information. If you have questions about this medicine, talk to your doctor, pharmacist, or health care provider.  © 2018 [employer] (2016-09-12 21:48:12)    Lisinopril tablets  What is this medicine?  LISINOPRIL (lyse IN oh pril) is an ACE inhibitor. This medicine is used to treat high blood pressure and heart failure. It is also used to protect the heart immediately after a heart attack.  This medicine may be used for other purposes; ask your health care provider or pharmacist if you have questions.  COMMON BRAND NAME(S): Prinivil, Zestril  What should I tell my health care provider before I take this medicine?  They need to know if you have any of these conditions:  -diabetes  -heart or blood vessel disease  -kidney disease  -low blood pressure  -previous swelling of the tongue, face, or lips with difficulty breathing, difficulty swallowing, hoarseness, or tightening of the throat  -an unusual or allergic reaction to lisinopril, other ACE inhibitors, insect venom, foods, dyes, or preservatives  -pregnant or trying to get pregnant  -breast-feeding  How should I use this medicine?  Take this medicine by mouth with a glass of water. Follow the directions on your prescription label. You may take this medicine with or without food. If it upsets your  stomach, take it with food. Take your medicine at regular intervals. Do not take it more often than directed. Do not stop taking except on your doctor's advice.  Talk to your pediatrician regarding the use of this medicine in children. Special care may be needed. While this drug may be prescribed for children as young as 6 years of age for selected conditions, precautions do apply.  Overdosage: If you think you have taken too much of this medicine contact a poison control center or emergency room at once.  NOTE: This medicine is only for you. Do not share this medicine with others.  What if I miss a dose?  If you miss a dose, take it as soon as you can. If it is almost time for your next dose, take only that dose. Do not take double or extra doses.  What may interact with this medicine?  Do not take this medicine with any of the following medications:  -hymenoptera venom  -sacubitril; valsartan  This medicines may also interact with the following medications:  -aliskiren  -angiotensin receptor blockers, like losartan or valsartan  -certain medicines for diabetes  -diuretics  -everolimus  -gold compounds  -lithium  -NSAIDs, medicines for pain and inflammation, like ibuprofen or naproxen  -potassium salts or supplements  -salt substitutes  -sirolimus  -temsirolimus  This list may not describe all possible interactions. Give your health care provider a list of all the medicines, herbs, non-prescription drugs, or dietary supplements you use. Also tell them if you smoke, drink alcohol, or use illegal drugs. Some items may interact with your medicine.  What should I watch for while using this medicine?  Visit your doctor or health care professional for regular check ups. Check your blood pressure as directed. Ask your doctor what your blood pressure should be, and when you should contact him or her.  Do not treat yourself for coughs, colds, or pain while you are using this medicine without asking your doctor or health  care professional for advice. Some ingredients may increase your blood pressure.  Women should inform their doctor if they wish to become pregnant or think they might be pregnant. There is a potential for serious side effects to an unborn child. Talk to your health care professional or pharmacist for more information.  Check with your doctor or health care professional if you get an attack of severe diarrhea, nausea and vomiting, or if you sweat a lot. The loss of too much body fluid can make it dangerous for you to take this medicine.  You may get drowsy or dizzy. Do not drive, use machinery, or do anything that needs mental alertness until you know how this drug affects you. Do not stand or sit up quickly, especially if you are an older patient. This reduces the risk of dizzy or fainting spells. Alcohol can make you more drowsy and dizzy. Avoid alcoholic drinks.  Avoid salt substitutes unless you are told otherwise by your doctor or health care professional.  What side effects may I notice from receiving this medicine?  Side effects that you should report to your doctor or health care professional as soon as possible:  -allergic reactions like skin rash, itching or hives, swelling of the hands, feet, face, lips, throat, or tongue  -breathing problems  -signs and symptoms of kidney injury like trouble passing urine or change in the amount of urine  -signs and symptoms of increased potassium like muscle weakness; chest pain; or fast, irregular heartbeat  -signs and symptoms of liver injury like dark yellow or brown urine; general ill feeling or flu-like symptoms; light-colored stools; loss of appetite; nausea; right upper belly pain; unusually weak or tired; yellowing of the eyes or skin  -signs and symptoms of low blood pressure like dizziness; feeling faint or lightheaded, falls; unusually weak or tired  -stomach pain with or without nausea and vomiting  Side effects that usually do not require medical attention  (report to your doctor or health care professional if they continue or are bothersome):  -changes in taste  -cough  -dizziness  -fever  -headache  -sensitivity to light  This list may not describe all possible side effects. Call your doctor for medical advice about side effects. You may report side effects to FDA at 0-461-FDA-6830.  Where should I keep my medicine?  Keep out of the reach of children.  Store at room temperature between 15 and 30 degrees C (59 and 86 degrees F). Protect from moisture. Keep container tightly closed. Throw away any unused medicine after the expiration date.  NOTE: This sheet is a summary. It may not cover all possible information. If you have questions about this medicine, talk to your doctor, pharmacist, or health care provider.  © 2018 [employer]/Gold Standard (2017-02-06 12:52:35)

## 2019-03-18 NOTE — CARE PLAN
Problem: Safety  Goal: Will remain free from falls    Intervention: Implement fall precautions  Call light and belongings within reach, bed down and locked, hourly rounding in progress. Treaded socks in use, no DME at bedside. Pt calls appropriately.         Problem: Pain Management  Goal: Pain level will decrease to patient's comfort goal  Pt denies pain at this time.

## 2019-03-18 NOTE — PROGRESS NOTES
Oncology/Hematology Progress Note               Author: Mercedes Llamas Date & Time created: 3/18/2019  7:28 AM     Interval History:    Patient amenable to going home and following up at [facility] for abx infusion    Review of Systems:  Review of Systems   Constitutional: Negative for fever.   Gastrointestinal: Negative for nausea and vomiting.   Genitourinary: Negative for dysuria, flank pain, frequency, hematuria and urgency.       Physical Exam:  Physical Exam   Constitutional: She is oriented to person, place, and time. She appears well-developed and well-nourished.   HENT:   Head: Normocephalic and atraumatic.   Cardiovascular: Normal rate.    Musculoskeletal: She exhibits no edema.   Neurological: She is alert and oriented to person, place, and time.   Skin: Skin is warm and dry.       Labs:          Recent Labs      19   SODIUM  140  138   POTASSIUM  3.0*  3.7   CHLORIDE  105  105   CO2  25  25   BUN  22  20   CREATININE  0.56  0.57   CALCIUM  9.2  9.3     Recent Labs      19   GLUCOSE  99  106*     Recent Labs      19   RBC  5.16   HEMOGLOBIN  13.9   HEMATOCRIT  41.7   PLATELETCT  251     Recent Labs      19   WBC  8.6   NEUTSPOLYS  65.80   LYMPHOCYTES  17.60*   MONOCYTES  11.90   EOSINOPHILS  3.60   BASOPHILS  0.30     Recent Labs      19   032   SODIUM  140  138   POTASSIUM  3.0*  3.7   CHLORIDE  105  105   CO2  25  25   GLUCOSE  99  106*   BUN  22  20   CREATININE  0.56  0.57   CALCIUM  9.2  9.3     Hemodynamics:  Temp (24hrs), Av.7 °C (98.1 °F), Min:36.2 °C (97.1 °F), Max:37.1 °C (98.8 °F)  Temperature: 36.2 °C (97.1 °F)  Pulse  Av.1  Min: 76  Max: 118   Blood Pressure : 130/90     Respiratory:    Respiration: 18, Pulse Oximetry: 94 %        RUL Breath Sounds: Clear, RML Breath Sounds: Clear, RLL Breath Sounds: Clear, AVERY Breath Sounds: Clear, LLL Breath Sounds:  Clear  Fluids:    Intake/Output Summary (Last 24 hours) at 03/18/19 0728  Last data filed at 03/18/19 0518   Gross per 24 hour   Intake                0 ml   Output             1200 ml   Net            -1200 ml        GI/Nutrition:  Orders Placed This Encounter   Procedures   • Diet Order Regular     Standing Status:   Standing     Number of Occurrences:   1     Order Specific Question:   Diet:     Answer:   Regular [1]     Medical Decision Making, by Problem:  There are no active hospital problems to display for this patient.      Plan:  1. Recurrent urinary tract infection- leukocytosis has resolved, afebrile, culture with ESBL, appreciate ID consult, BC x2 obtained per ID, midline in place, plan for outpatient Invanz 1g, recheck urine culture on 3/28  2. Left hydronephrosis- cr stable, unable to internalize stent due to UTI, NT placed with resolution of pain  3. Hypokalemia- resolved   4. Hx cervical cancer- vaginal bleeding x1 episode will need to further evaluate once acute UTI has resolved  5. Hypertension- improved, new onset, continue hydralazine PRN, lisinopril started 3/15/19, increased to 20 mg qday on 3/17. Stable   6. Discharge planning- monitor BP with change in medication, [facility] appointment on 3/19 at 4:00pm, d/c home today.     Case Discussed with Dr. Hays  Quality-Core Measures

## 2019-03-19 ENCOUNTER — OUTPATIENT INFUSION SERVICES (OUTPATIENT)
Dept: ONCOLOGY | Facility: MEDICAL CENTER | Age: 74
End: 2019-03-19
Attending: SPECIALIST
Payer: MEDICARE

## 2019-03-19 VITALS
HEIGHT: 59 IN | OXYGEN SATURATION: 90 % | TEMPERATURE: 98.4 F | DIASTOLIC BLOOD PRESSURE: 93 MMHG | WEIGHT: 139.33 LBS | SYSTOLIC BLOOD PRESSURE: 153 MMHG | RESPIRATION RATE: 18 BRPM | BODY MASS INDEX: 28.09 KG/M2 | HEART RATE: 94 BPM

## 2019-03-19 DIAGNOSIS — N30.00 ACUTE CYSTITIS WITHOUT HEMATURIA: ICD-10-CM

## 2019-03-19 PROCEDURE — 700105 HCHG RX REV CODE 258: Performed by: NURSE PRACTITIONER

## 2019-03-19 PROCEDURE — 96365 THER/PROPH/DIAG IV INF INIT: CPT

## 2019-03-19 PROCEDURE — 700111 HCHG RX REV CODE 636 W/ 250 OVERRIDE (IP): Performed by: NURSE PRACTITIONER

## 2019-03-19 RX ORDER — 0.9 % SODIUM CHLORIDE 0.9 %
10-20 VIAL (ML) INJECTION PRN
Status: CANCELLED | OUTPATIENT
Start: 2019-03-19

## 2019-03-19 RX ORDER — 0.9 % SODIUM CHLORIDE 0.9 %
5 VIAL (ML) INJECTION PRN
Status: CANCELLED | OUTPATIENT
Start: 2019-03-19

## 2019-03-19 RX ADMIN — ERTAPENEM SODIUM 1000 MG: 1 INJECTION, POWDER, LYOPHILIZED, FOR SOLUTION INTRAMUSCULAR; INTRAVENOUS at 15:27

## 2019-03-19 ASSESSMENT — PAIN SCALES - GENERAL: PAINLEVEL: NO PAIN

## 2019-03-19 NOTE — DISCHARGE SUMMARY
DATE OF ADMISSION:  03/12/2019    DATE OF DISCHARGE:  03/18/2019    ADMITTING DIAGNOSES:  1.  Recurrent urinary tract infection, status post 2 weeks' IV antibiotics of   ertapenem 1 g.  2.  Left hydronephrosis.  3.  History of cervical cancer initially diagnosed in 2005 with recurrence 5   years ago.  4.  History of left pelvic ureteral stricture, status post nephrostomy tube   placement and removal in 09/2018.    DISCHARGE DIAGNOSES:  1.  Recurrent urinary tract infection with extended-spectrum beta-lactamase.  2.  Left hydronephrosis, status post nephrostomy tube placement.  3.  New-onset hypertension.  4.  History of cervical cancer.    PROCEDURES:  1.  Left percutaneous nephrostomy tube placement with nephrostogram on   03/13/2019.  2.  Midline catheter placed on 03/17/2019 for outpatient IV antibiotics.    LABORATORY DATA:  White blood cells 8.6, hemoglobin 13.9, hematocrit 41.7,   platelets 251.  Sodium 138, potassium 3.7, chloride 105, CO2 of 25, glucose   106, BUN 20, creatinine 0.57.    IMAGING:  Renal ultrasound on 03/13/2019 showed moderate hydronephrosis of the   left kidney with no evidence of left-sided ureteral jet.  This appeared to   have worsened since previous exam.  An 8 mm right renal cyst.    HOSPITAL COURSE:  This patient was admitted on the above date for increased   left-sided flank pain associated with nausea and vomiting.  She was found to   have a urinary tract infection.  Cultures resulted as ESBL.  She was placed on   meropenem on 03/14/2019.  Infectious disease was then consulted;   recommendation for 2 weeks of outpatient IV Invanz, repeat urine culture on   03/28/2019.  If negative, may place ureteral stents and continue Invanz for 1   week more.  During her hospital course, she developed hypertension with a   systolic blood pressure into the 180s, diastolic 110s associated with headache   as well as nausea.  She required hydralazine IV q.6 hours and was placed on   lisinopril p.o.  daily.  This was then increased to lisinopril 20 mg daily on   03/17/2019.  Today, her blood pressure is systolic 130s without requiring IV   hydralazine.  She received her Invanz this a.m. and has a followup outpatient   infusion appointment tomorrow at 4 p.m.  Her headache and nausea has resolved.    She will be discharged home with left nephrostomy tube in place.    DISCHARGE INSTRUCTIONS:  1.  Follow up with Dr. Rutledge, infectious disease in 1-2 weeks.  2.  Outpatient infusion appointment on 03/19/2019 at 4 p.m. for daily   outpatient Invanz 1 g.  3.  Repeat urine culture on 03/28/2019.  4.  Follow up with Dr. Hays's office for urine culture results as well as   preoperative visit for possible stent placement.    DISCHARGE MEDICATIONS:  1.  Lisinopril 20 mg p.o. daily, #30, 1 refill.  2.  Percocet 7.5/325 mg, #28, 7-day supply, ICD-10 R10.9, no refills.  3.  Zofran 4 mg 1 tab p.o. q.6 hours p.r.n. nausea.  4.  May continue home vitamin B12 as well as calcium.  5.  Ertapenem 1 g IV q.24 hours for 4 weeks.    DIET:  As tolerated.    ACTIVITY:  As tolerated.       ____________________________________     SHILOH Candelaria / CARRIE    DD:  03/18/2019 18:14:37  DT:  03/18/2019 22:43:33    D#:  [id number]  Job#:  [id number]

## 2019-03-20 ENCOUNTER — TELEPHONE (OUTPATIENT)
Dept: INFECTIOUS DISEASES | Facility: MEDICAL CENTER | Age: 74
End: 2019-03-20

## 2019-03-20 ENCOUNTER — OUTPATIENT INFUSION SERVICES (OUTPATIENT)
Dept: ONCOLOGY | Facility: MEDICAL CENTER | Age: 74
End: 2019-03-20
Attending: INTERNAL MEDICINE
Payer: MEDICARE

## 2019-03-20 VITALS
TEMPERATURE: 98 F | DIASTOLIC BLOOD PRESSURE: 91 MMHG | RESPIRATION RATE: 18 BRPM | HEART RATE: 88 BPM | SYSTOLIC BLOOD PRESSURE: 148 MMHG | OXYGEN SATURATION: 98 %

## 2019-03-20 DIAGNOSIS — N30.00 ACUTE CYSTITIS WITHOUT HEMATURIA: ICD-10-CM

## 2019-03-20 LAB
BACTERIA BLD CULT: NORMAL
BACTERIA BLD CULT: NORMAL
SIGNIFICANT IND 70042: NORMAL
SIGNIFICANT IND 70042: NORMAL
SITE SITE: NORMAL
SITE SITE: NORMAL
SOURCE SOURCE: NORMAL
SOURCE SOURCE: NORMAL

## 2019-03-20 PROCEDURE — 96365 THER/PROPH/DIAG IV INF INIT: CPT

## 2019-03-20 PROCEDURE — 700105 HCHG RX REV CODE 258: Performed by: NURSE PRACTITIONER

## 2019-03-20 PROCEDURE — 700111 HCHG RX REV CODE 636 W/ 250 OVERRIDE (IP): Performed by: NURSE PRACTITIONER

## 2019-03-20 RX ORDER — 0.9 % SODIUM CHLORIDE 0.9 %
10-20 VIAL (ML) INJECTION PRN
Status: CANCELLED | OUTPATIENT
Start: 2019-03-20

## 2019-03-20 RX ORDER — 0.9 % SODIUM CHLORIDE 0.9 %
5 VIAL (ML) INJECTION PRN
Status: CANCELLED | OUTPATIENT
Start: 2019-03-20

## 2019-03-20 RX ADMIN — ERTAPENEM SODIUM 1000 MG: 1 INJECTION, POWDER, LYOPHILIZED, FOR SOLUTION INTRAMUSCULAR; INTRAVENOUS at 15:10

## 2019-03-20 NOTE — PROGRESS NOTES
Patient here for daily IV antibiotics today. Denies any complaints. Mid-line flushes well with scant blood return.  No s/s of infection present. Invanz infused as ordered. Line flushed per protocol and secured. Returns tomorrow, discharged in no distress.

## 2019-03-20 NOTE — PROGRESS NOTES
Nessa returns for IVABX. Clave changed. Invanz infused as ordered. Nessa tolerated well and without incident. Right mid-line in good condition, no blood return noted. Mid-line flushed with saline per protocol. Next appointment scheduled. Discharged to self care; no apparent distress noted.

## 2019-03-20 NOTE — TELEPHONE ENCOUNTER
Called and LM for pt to return my call to schedule a hospital follow up appointment with Infectious Disease.  -AMP

## 2019-03-21 ENCOUNTER — TELEPHONE (OUTPATIENT)
Dept: INFECTIOUS DISEASES | Facility: MEDICAL CENTER | Age: 74
End: 2019-03-21

## 2019-03-21 ENCOUNTER — OUTPATIENT INFUSION SERVICES (OUTPATIENT)
Dept: ONCOLOGY | Facility: MEDICAL CENTER | Age: 74
End: 2019-03-21
Attending: INTERNAL MEDICINE
Payer: MEDICARE

## 2019-03-21 VITALS
OXYGEN SATURATION: 98 % | RESPIRATION RATE: 18 BRPM | SYSTOLIC BLOOD PRESSURE: 119 MMHG | DIASTOLIC BLOOD PRESSURE: 79 MMHG | HEART RATE: 94 BPM | TEMPERATURE: 97.6 F

## 2019-03-21 DIAGNOSIS — N30.00 ACUTE CYSTITIS WITHOUT HEMATURIA: ICD-10-CM

## 2019-03-21 PROCEDURE — 96365 THER/PROPH/DIAG IV INF INIT: CPT

## 2019-03-21 PROCEDURE — 700111 HCHG RX REV CODE 636 W/ 250 OVERRIDE (IP): Performed by: NURSE PRACTITIONER

## 2019-03-21 PROCEDURE — 700105 HCHG RX REV CODE 258: Performed by: NURSE PRACTITIONER

## 2019-03-21 RX ORDER — 0.9 % SODIUM CHLORIDE 0.9 %
10-20 VIAL (ML) INJECTION PRN
Status: CANCELLED | OUTPATIENT
Start: 2019-03-21

## 2019-03-21 RX ORDER — 0.9 % SODIUM CHLORIDE 0.9 %
5 VIAL (ML) INJECTION PRN
Status: CANCELLED | OUTPATIENT
Start: 2019-03-21

## 2019-03-21 RX ADMIN — ERTAPENEM SODIUM 1000 MG: 1 INJECTION, POWDER, LYOPHILIZED, FOR SOLUTION INTRAMUSCULAR; INTRAVENOUS at 15:41

## 2019-03-21 ASSESSMENT — PAIN SCALES - GENERAL: PAINLEVEL: NO PAIN

## 2019-03-21 NOTE — PROGRESS NOTES
Nessa returns for IVABX. Invanz infused as ordered. Nessa tolerated well and without incident. Right mid-line in good condition, blood return noted. Mid-line flushed with saline per protocol. Next appointment scheduled. Discharged to self care; no apparent distress noted.

## 2019-03-22 ENCOUNTER — OUTPATIENT INFUSION SERVICES (OUTPATIENT)
Dept: ONCOLOGY | Facility: MEDICAL CENTER | Age: 74
End: 2019-03-22
Attending: INTERNAL MEDICINE
Payer: MEDICARE

## 2019-03-22 VITALS
HEART RATE: 96 BPM | TEMPERATURE: 97.2 F | OXYGEN SATURATION: 98 % | RESPIRATION RATE: 18 BRPM | SYSTOLIC BLOOD PRESSURE: 112 MMHG | WEIGHT: 139.33 LBS | DIASTOLIC BLOOD PRESSURE: 64 MMHG | BODY MASS INDEX: 27.9 KG/M2

## 2019-03-22 DIAGNOSIS — N30.00 ACUTE CYSTITIS WITHOUT HEMATURIA: ICD-10-CM

## 2019-03-22 PROCEDURE — 96365 THER/PROPH/DIAG IV INF INIT: CPT

## 2019-03-22 PROCEDURE — 700111 HCHG RX REV CODE 636 W/ 250 OVERRIDE (IP): Performed by: NURSE PRACTITIONER

## 2019-03-22 PROCEDURE — 700105 HCHG RX REV CODE 258: Performed by: NURSE PRACTITIONER

## 2019-03-22 RX ORDER — 0.9 % SODIUM CHLORIDE 0.9 %
10-20 VIAL (ML) INJECTION PRN
Status: CANCELLED | OUTPATIENT
Start: 2019-03-22

## 2019-03-22 RX ORDER — 0.9 % SODIUM CHLORIDE 0.9 %
5 VIAL (ML) INJECTION PRN
Status: CANCELLED | OUTPATIENT
Start: 2019-03-22

## 2019-03-22 RX ADMIN — ERTAPENEM SODIUM 1000 MG: 1 INJECTION, POWDER, LYOPHILIZED, FOR SOLUTION INTRAMUSCULAR; INTRAVENOUS at 16:09

## 2019-03-23 ENCOUNTER — OUTPATIENT INFUSION SERVICES (OUTPATIENT)
Dept: ONCOLOGY | Facility: MEDICAL CENTER | Age: 74
End: 2019-03-23
Attending: INTERNAL MEDICINE
Payer: MEDICARE

## 2019-03-23 VITALS
DIASTOLIC BLOOD PRESSURE: 79 MMHG | TEMPERATURE: 97.7 F | RESPIRATION RATE: 18 BRPM | SYSTOLIC BLOOD PRESSURE: 132 MMHG | HEART RATE: 84 BPM | OXYGEN SATURATION: 95 %

## 2019-03-23 DIAGNOSIS — N30.00 ACUTE CYSTITIS WITHOUT HEMATURIA: ICD-10-CM

## 2019-03-23 PROCEDURE — 700105 HCHG RX REV CODE 258: Performed by: NURSE PRACTITIONER

## 2019-03-23 PROCEDURE — 700111 HCHG RX REV CODE 636 W/ 250 OVERRIDE (IP): Performed by: NURSE PRACTITIONER

## 2019-03-23 PROCEDURE — 96365 THER/PROPH/DIAG IV INF INIT: CPT

## 2019-03-23 RX ORDER — 0.9 % SODIUM CHLORIDE 0.9 %
5 VIAL (ML) INJECTION PRN
Status: CANCELLED | OUTPATIENT
Start: 2019-03-23

## 2019-03-23 RX ORDER — 0.9 % SODIUM CHLORIDE 0.9 %
10-20 VIAL (ML) INJECTION PRN
Status: CANCELLED | OUTPATIENT
Start: 2019-03-23

## 2019-03-23 RX ADMIN — ERTAPENEM SODIUM 1000 MG: 1 INJECTION, POWDER, LYOPHILIZED, FOR SOLUTION INTRAMUSCULAR; INTRAVENOUS at 15:23

## 2019-03-23 NOTE — PROGRESS NOTES
Patient arrives to [facility] for daily antibiotics. RUE midline flushed, no blood return noted and IV connector changed.  Invanz infused as ordered.  Patient tolerated infusion without adverse effects.  Midline flushed per protocol.  Reminded patient of tomorrow's appt time.  Patient dc home with spouse.

## 2019-03-23 NOTE — PROGRESS NOTES
Pt presented to infusion center for daily Invanz. Reports no significant changes from yesterday. Right arm midline present, flushed easily, no blood return observed. Invanz infused without s/s of adverse reaction. Midline flushed, clamped and wrapped in gauze and protective sleeve. Has next appt, left on foot in good spirits.

## 2019-03-24 ENCOUNTER — OUTPATIENT INFUSION SERVICES (OUTPATIENT)
Dept: ONCOLOGY | Facility: MEDICAL CENTER | Age: 74
End: 2019-03-24
Attending: INTERNAL MEDICINE
Payer: MEDICARE

## 2019-03-24 VITALS
RESPIRATION RATE: 18 BRPM | TEMPERATURE: 98.3 F | DIASTOLIC BLOOD PRESSURE: 73 MMHG | HEART RATE: 94 BPM | OXYGEN SATURATION: 92 % | SYSTOLIC BLOOD PRESSURE: 109 MMHG

## 2019-03-24 DIAGNOSIS — N30.00 ACUTE CYSTITIS WITHOUT HEMATURIA: ICD-10-CM

## 2019-03-24 PROCEDURE — 96365 THER/PROPH/DIAG IV INF INIT: CPT

## 2019-03-24 PROCEDURE — 700111 HCHG RX REV CODE 636 W/ 250 OVERRIDE (IP): Performed by: NURSE PRACTITIONER

## 2019-03-24 PROCEDURE — 700105 HCHG RX REV CODE 258: Performed by: NURSE PRACTITIONER

## 2019-03-24 RX ORDER — 0.9 % SODIUM CHLORIDE 0.9 %
10-20 VIAL (ML) INJECTION PRN
Status: CANCELLED | OUTPATIENT
Start: 2019-03-24

## 2019-03-24 RX ORDER — 0.9 % SODIUM CHLORIDE 0.9 %
5 VIAL (ML) INJECTION PRN
Status: CANCELLED | OUTPATIENT
Start: 2019-03-24

## 2019-03-24 RX ADMIN — ERTAPENEM SODIUM 1000 MG: 1 INJECTION, POWDER, LYOPHILIZED, FOR SOLUTION INTRAMUSCULAR; INTRAVENOUS at 15:32

## 2019-03-24 NOTE — PROGRESS NOTES
Pt returns for daily IV abx for chronic UTI. R midline in place. Line flushed and patent, negative for blood return. Invanz infused per MAR. Dressing to midline changed in sterile field. Invanz completed. Line flushed clear. Midline flushed and site wrapped. Pt knows to return for daily treatment. Discharged home under care of  in no apparent distress.

## 2019-03-25 ENCOUNTER — OUTPATIENT INFUSION SERVICES (OUTPATIENT)
Dept: ONCOLOGY | Facility: MEDICAL CENTER | Age: 74
End: 2019-03-25
Attending: INTERNAL MEDICINE
Payer: MEDICARE

## 2019-03-25 VITALS
DIASTOLIC BLOOD PRESSURE: 71 MMHG | HEART RATE: 99 BPM | OXYGEN SATURATION: 93 % | TEMPERATURE: 98.8 F | HEIGHT: 59 IN | BODY MASS INDEX: 27.91 KG/M2 | SYSTOLIC BLOOD PRESSURE: 98 MMHG | RESPIRATION RATE: 18 BRPM | WEIGHT: 138.45 LBS

## 2019-03-25 DIAGNOSIS — N30.00 ACUTE CYSTITIS WITHOUT HEMATURIA: ICD-10-CM

## 2019-03-25 LAB
ALBUMIN SERPL BCP-MCNC: 4 G/DL (ref 3.2–4.9)
ALBUMIN/GLOB SERPL: 1.5 G/DL
ALP SERPL-CCNC: 78 U/L (ref 30–99)
ALT SERPL-CCNC: 7 U/L (ref 2–50)
ANION GAP SERPL CALC-SCNC: 6 MMOL/L (ref 0–11.9)
AST SERPL-CCNC: 14 U/L (ref 12–45)
BASOPHILS # BLD AUTO: 0.3 % (ref 0–1.8)
BASOPHILS # BLD: 0.02 K/UL (ref 0–0.12)
BILIRUB SERPL-MCNC: 0.4 MG/DL (ref 0.1–1.5)
BUN SERPL-MCNC: 22 MG/DL (ref 8–22)
CALCIUM SERPL-MCNC: 9.6 MG/DL (ref 8.5–10.5)
CHLORIDE SERPL-SCNC: 103 MMOL/L (ref 96–112)
CO2 SERPL-SCNC: 28 MMOL/L (ref 20–33)
CREAT SERPL-MCNC: 1.01 MG/DL (ref 0.5–1.4)
CRP SERPL HS-MCNC: 1.07 MG/DL (ref 0–0.75)
EOSINOPHIL # BLD AUTO: 0.11 K/UL (ref 0–0.51)
EOSINOPHIL NFR BLD: 1.5 % (ref 0–6.9)
ERYTHROCYTE [DISTWIDTH] IN BLOOD BY AUTOMATED COUNT: 40.9 FL (ref 35.9–50)
ERYTHROCYTE [SEDIMENTATION RATE] IN BLOOD BY WESTERGREN METHOD: 26 MM/HOUR (ref 0–30)
GLOBULIN SER CALC-MCNC: 2.6 G/DL (ref 1.9–3.5)
GLUCOSE SERPL-MCNC: 117 MG/DL (ref 65–99)
HCT VFR BLD AUTO: 40.3 % (ref 37–47)
HGB BLD-MCNC: 13.2 G/DL (ref 12–16)
IMM GRANULOCYTES # BLD AUTO: 0.03 K/UL (ref 0–0.11)
IMM GRANULOCYTES NFR BLD AUTO: 0.4 % (ref 0–0.9)
LYMPHOCYTES # BLD AUTO: 1.3 K/UL (ref 1–4.8)
LYMPHOCYTES NFR BLD: 17.2 % (ref 22–41)
MCH RBC QN AUTO: 27.3 PG (ref 27–33)
MCHC RBC AUTO-ENTMCNC: 32.8 G/DL (ref 33.6–35)
MCV RBC AUTO: 83.4 FL (ref 81.4–97.8)
MONOCYTES # BLD AUTO: 0.67 K/UL (ref 0–0.85)
MONOCYTES NFR BLD AUTO: 8.9 % (ref 0–13.4)
NEUTROPHILS # BLD AUTO: 5.44 K/UL (ref 2–7.15)
NEUTROPHILS NFR BLD: 71.7 % (ref 44–72)
NRBC # BLD AUTO: 0 K/UL
NRBC BLD-RTO: 0 /100 WBC
PLATELET # BLD AUTO: 300 K/UL (ref 164–446)
PMV BLD AUTO: 9.7 FL (ref 9–12.9)
POTASSIUM SERPL-SCNC: 4.1 MMOL/L (ref 3.6–5.5)
PROT SERPL-MCNC: 6.6 G/DL (ref 6–8.2)
RBC # BLD AUTO: 4.83 M/UL (ref 4.2–5.4)
SODIUM SERPL-SCNC: 137 MMOL/L (ref 135–145)
WBC # BLD AUTO: 7.6 K/UL (ref 4.8–10.8)

## 2019-03-25 PROCEDURE — 700105 HCHG RX REV CODE 258: Performed by: NURSE PRACTITIONER

## 2019-03-25 PROCEDURE — 86140 C-REACTIVE PROTEIN: CPT

## 2019-03-25 PROCEDURE — 96365 THER/PROPH/DIAG IV INF INIT: CPT

## 2019-03-25 PROCEDURE — 80053 COMPREHEN METABOLIC PANEL: CPT

## 2019-03-25 PROCEDURE — 85652 RBC SED RATE AUTOMATED: CPT

## 2019-03-25 PROCEDURE — 36592 COLLECT BLOOD FROM PICC: CPT

## 2019-03-25 PROCEDURE — 700111 HCHG RX REV CODE 636 W/ 250 OVERRIDE (IP): Performed by: NURSE PRACTITIONER

## 2019-03-25 PROCEDURE — 85025 COMPLETE CBC W/AUTO DIFF WBC: CPT

## 2019-03-25 RX ORDER — 0.9 % SODIUM CHLORIDE 0.9 %
10-20 VIAL (ML) INJECTION PRN
Status: CANCELLED | OUTPATIENT
Start: 2019-03-25

## 2019-03-25 RX ORDER — 0.9 % SODIUM CHLORIDE 0.9 %
5 VIAL (ML) INJECTION PRN
Status: CANCELLED | OUTPATIENT
Start: 2019-03-25

## 2019-03-25 RX ADMIN — SODIUM CHLORIDE 1000 MG: 900 INJECTION INTRAVENOUS at 16:10

## 2019-03-26 ENCOUNTER — OUTPATIENT INFUSION SERVICES (OUTPATIENT)
Dept: ONCOLOGY | Facility: MEDICAL CENTER | Age: 74
End: 2019-03-26
Attending: INTERNAL MEDICINE
Payer: MEDICARE

## 2019-03-26 VITALS
DIASTOLIC BLOOD PRESSURE: 80 MMHG | SYSTOLIC BLOOD PRESSURE: 97 MMHG | RESPIRATION RATE: 18 BRPM | TEMPERATURE: 97.6 F | HEART RATE: 98 BPM | OXYGEN SATURATION: 96 %

## 2019-03-26 DIAGNOSIS — N30.00 ACUTE CYSTITIS WITHOUT HEMATURIA: ICD-10-CM

## 2019-03-26 PROCEDURE — 96365 THER/PROPH/DIAG IV INF INIT: CPT

## 2019-03-26 PROCEDURE — 700105 HCHG RX REV CODE 258: Performed by: NURSE PRACTITIONER

## 2019-03-26 PROCEDURE — 700111 HCHG RX REV CODE 636 W/ 250 OVERRIDE (IP): Performed by: NURSE PRACTITIONER

## 2019-03-26 RX ORDER — 0.9 % SODIUM CHLORIDE 0.9 %
5 VIAL (ML) INJECTION PRN
Status: CANCELLED | OUTPATIENT
Start: 2019-03-26

## 2019-03-26 RX ORDER — 0.9 % SODIUM CHLORIDE 0.9 %
10-20 VIAL (ML) INJECTION PRN
Status: CANCELLED | OUTPATIENT
Start: 2019-03-26

## 2019-03-26 RX ADMIN — ERTAPENEM SODIUM 1000 MG: 1 INJECTION, POWDER, LYOPHILIZED, FOR SOLUTION INTRAMUSCULAR; INTRAVENOUS at 15:03

## 2019-03-26 NOTE — DOCUMENTATION QUERY
"                                                                         [facility]                                                                         Query Response Note      PATIENT:               LAURIE BRUNNER  ACCT #:                  [id number]  MRN:                       [id number]  :                       1945  ADMIT DATE:       2019 8:25 AM  DISCH DATE:        2019 1:05 PM  RESPONDING  PROVIDER #:        [id number]           RESPONSE TEXT:    Condition UTI is due to or associated with self catheterizing    QUERY TEXT:    Cause and Effect Relationship [id number]    Please clarify in documentation the relationship, if any, between UTI and self catheterizing.    The patient's Clinical Indicators include:  Clinical Indicators:  \"UTI\" documented.  \"Self Catheterizing\" also documented.    Treatment:  Rocephin 2g IV once on  then Q24H until stopped .   Merrem 500 mg IV Q8H since     Risk Factors:   UTI, Left Hydronephrosis, Self catheterizes for bladder atony.  Query created by: Yuli York on 3/6/2019 12:44 PM        Electronically signed by:  GILMA BAEZ APRN 3/26/2019 9:41 AM         "

## 2019-03-26 NOTE — PROGRESS NOTES
Pt arrived ambulatory to [facility] for daily Invanz infusion.  POC discussed.  Pt reports lightheadedness the past couple of days.  Reviewed medications, pt states she was discharged from hospital on Lisinopril.  Encouraged pt to discuss BP with prescriber, Mercedes Llamas.  Pt verbalized understanding.  RUE midline in place, flushes briskly with no blood return.  Invanz infused as ordered, pt tolerated well.  Line flushed and clave change.  Next appointment confirmed.  Pt discharged from IS in NAD with spouse.

## 2019-03-26 NOTE — PROGRESS NOTES
Patient here for daily Invanz infusion. Patient denies any change in condition since yesterday.  RUE midline flushed and brisk blood return noted.   Labs drawn via midline.  Invanz infused as ordered.  Patient tolerated infusion well. Midline flushed per protocol.  Patient confirmed appt time for tomorrow.  Patient dc home with spouse.

## 2019-03-27 ENCOUNTER — OUTPATIENT INFUSION SERVICES (OUTPATIENT)
Dept: ONCOLOGY | Facility: MEDICAL CENTER | Age: 74
End: 2019-03-27
Attending: INTERNAL MEDICINE
Payer: MEDICARE

## 2019-03-27 VITALS
DIASTOLIC BLOOD PRESSURE: 73 MMHG | OXYGEN SATURATION: 95 % | HEART RATE: 98 BPM | RESPIRATION RATE: 18 BRPM | SYSTOLIC BLOOD PRESSURE: 112 MMHG | TEMPERATURE: 97.6 F

## 2019-03-27 DIAGNOSIS — N30.00 ACUTE CYSTITIS WITHOUT HEMATURIA: ICD-10-CM

## 2019-03-27 PROCEDURE — 700111 HCHG RX REV CODE 636 W/ 250 OVERRIDE (IP): Performed by: NURSE PRACTITIONER

## 2019-03-27 PROCEDURE — 700105 HCHG RX REV CODE 258: Performed by: NURSE PRACTITIONER

## 2019-03-27 PROCEDURE — 96365 THER/PROPH/DIAG IV INF INIT: CPT

## 2019-03-27 RX ORDER — 0.9 % SODIUM CHLORIDE 0.9 %
10-20 VIAL (ML) INJECTION PRN
Status: CANCELLED | OUTPATIENT
Start: 2019-03-27

## 2019-03-27 RX ORDER — 0.9 % SODIUM CHLORIDE 0.9 %
5 VIAL (ML) INJECTION PRN
Status: CANCELLED | OUTPATIENT
Start: 2019-03-27

## 2019-03-27 RX ADMIN — ERTAPENEM SODIUM 1000 MG: 1 INJECTION, POWDER, LYOPHILIZED, FOR SOLUTION INTRAMUSCULAR; INTRAVENOUS at 15:53

## 2019-03-27 NOTE — PROGRESS NOTES
Pt to [facility] for daily Invanz infusion for UTI.  Pt midline line flushed per protocol w/ no blood return noted.  Invanz infused through midline with no adverse reactions.  Pt midline again flushed per protocol w/ no blood return noted, wrapped in gauze and protective sleeve placed.  Pt left on foot in NAD, next appt in place

## 2019-03-28 ENCOUNTER — OUTPATIENT INFUSION SERVICES (OUTPATIENT)
Dept: ONCOLOGY | Facility: MEDICAL CENTER | Age: 74
End: 2019-03-28
Attending: INTERNAL MEDICINE
Payer: MEDICARE

## 2019-03-28 VITALS
DIASTOLIC BLOOD PRESSURE: 88 MMHG | SYSTOLIC BLOOD PRESSURE: 116 MMHG | OXYGEN SATURATION: 96 % | RESPIRATION RATE: 18 BRPM | TEMPERATURE: 97.6 F | HEART RATE: 100 BPM

## 2019-03-28 DIAGNOSIS — N30.00 ACUTE CYSTITIS WITHOUT HEMATURIA: ICD-10-CM

## 2019-03-28 LAB
APPEARANCE UR: CLEAR
BACTERIA #/AREA URNS HPF: NEGATIVE /HPF
BILIRUB UR QL STRIP.AUTO: NEGATIVE
COLOR UR: YELLOW
EPI CELLS #/AREA URNS HPF: NEGATIVE /HPF
GLUCOSE UR STRIP.AUTO-MCNC: NEGATIVE MG/DL
HYALINE CASTS #/AREA URNS LPF: ABNORMAL /LPF
KETONES UR STRIP.AUTO-MCNC: NEGATIVE MG/DL
LEUKOCYTE ESTERASE UR QL STRIP.AUTO: ABNORMAL
MICRO URNS: ABNORMAL
NITRITE UR QL STRIP.AUTO: NEGATIVE
PH UR STRIP.AUTO: 5.5 [PH]
PROT UR QL STRIP: NEGATIVE MG/DL
RBC # URNS HPF: ABNORMAL /HPF
RBC UR QL AUTO: ABNORMAL
SP GR UR STRIP.AUTO: 1.01
UROBILINOGEN UR STRIP.AUTO-MCNC: 0.2 MG/DL
WBC #/AREA URNS HPF: ABNORMAL /HPF

## 2019-03-28 PROCEDURE — 96365 THER/PROPH/DIAG IV INF INIT: CPT

## 2019-03-28 PROCEDURE — 700111 HCHG RX REV CODE 636 W/ 250 OVERRIDE (IP): Performed by: NURSE PRACTITIONER

## 2019-03-28 PROCEDURE — 87086 URINE CULTURE/COLONY COUNT: CPT

## 2019-03-28 PROCEDURE — 81001 URINALYSIS AUTO W/SCOPE: CPT

## 2019-03-28 PROCEDURE — 700105 HCHG RX REV CODE 258: Performed by: NURSE PRACTITIONER

## 2019-03-28 RX ORDER — 0.9 % SODIUM CHLORIDE 0.9 %
10-20 VIAL (ML) INJECTION PRN
Status: CANCELLED | OUTPATIENT
Start: 2019-03-28

## 2019-03-28 RX ORDER — 0.9 % SODIUM CHLORIDE 0.9 %
5 VIAL (ML) INJECTION PRN
Status: CANCELLED | OUTPATIENT
Start: 2019-03-28

## 2019-03-28 RX ADMIN — ERTAPENEM SODIUM 1000 MG: 1 INJECTION, POWDER, LYOPHILIZED, FOR SOLUTION INTRAMUSCULAR; INTRAVENOUS at 15:58

## 2019-03-28 NOTE — PROGRESS NOTES
Pt arrived ambulatory to IS for daily Invanz infusion.  POC discussed.  UA/Cx collected as ordered.  RUE midline in place, flushes briskly, no blood return.  Medication infused as ordered, tolerated well.  Line flushed per policy, site protected with gauze and arm wrap.  Next appointment confirmed.  Pt discharged from IS in NAD with spouse.    UA results faxed to MD office.

## 2019-03-29 ENCOUNTER — OUTPATIENT INFUSION SERVICES (OUTPATIENT)
Dept: ONCOLOGY | Facility: MEDICAL CENTER | Age: 74
End: 2019-03-29
Attending: INTERNAL MEDICINE
Payer: MEDICARE

## 2019-03-29 VITALS
RESPIRATION RATE: 18 BRPM | TEMPERATURE: 97.7 F | HEART RATE: 109 BPM | OXYGEN SATURATION: 97 % | DIASTOLIC BLOOD PRESSURE: 72 MMHG | SYSTOLIC BLOOD PRESSURE: 112 MMHG

## 2019-03-29 DIAGNOSIS — N30.00 ACUTE CYSTITIS WITHOUT HEMATURIA: ICD-10-CM

## 2019-03-29 PROCEDURE — 700111 HCHG RX REV CODE 636 W/ 250 OVERRIDE (IP): Performed by: NURSE PRACTITIONER

## 2019-03-29 PROCEDURE — 96365 THER/PROPH/DIAG IV INF INIT: CPT

## 2019-03-29 PROCEDURE — 700105 HCHG RX REV CODE 258: Performed by: NURSE PRACTITIONER

## 2019-03-29 RX ORDER — 0.9 % SODIUM CHLORIDE 0.9 %
10-20 VIAL (ML) INJECTION PRN
Status: CANCELLED | OUTPATIENT
Start: 2019-03-29

## 2019-03-29 RX ORDER — 0.9 % SODIUM CHLORIDE 0.9 %
5 VIAL (ML) INJECTION PRN
Status: CANCELLED | OUTPATIENT
Start: 2019-03-29

## 2019-03-29 RX ADMIN — SODIUM CHLORIDE 1000 MG: 900 INJECTION INTRAVENOUS at 16:00

## 2019-03-29 NOTE — PROGRESS NOTES
returns for IVABX. Invanz infused as ordered. Clau tolerated well and without incident. MidLine line in good condition and has positive blood return. MidLine line flushed with saline per protocol. Clau DC'd home with  in good condition and in NAD. Returns daily. Appointment confirm for next treatment.  Physician to review UA / Culture results with patient next week.

## 2019-03-30 ENCOUNTER — OUTPATIENT INFUSION SERVICES (OUTPATIENT)
Dept: ONCOLOGY | Facility: MEDICAL CENTER | Age: 74
End: 2019-03-30
Attending: INTERNAL MEDICINE
Payer: MEDICARE

## 2019-03-30 VITALS
RESPIRATION RATE: 18 BRPM | HEART RATE: 95 BPM | DIASTOLIC BLOOD PRESSURE: 75 MMHG | TEMPERATURE: 98.5 F | OXYGEN SATURATION: 93 % | SYSTOLIC BLOOD PRESSURE: 121 MMHG

## 2019-03-30 DIAGNOSIS — N30.00 ACUTE CYSTITIS WITHOUT HEMATURIA: ICD-10-CM

## 2019-03-30 LAB
BACTERIA UR CULT: NORMAL
SIGNIFICANT IND 70042: NORMAL
SITE SITE: NORMAL
SOURCE SOURCE: NORMAL

## 2019-03-30 PROCEDURE — 700111 HCHG RX REV CODE 636 W/ 250 OVERRIDE (IP): Performed by: NURSE PRACTITIONER

## 2019-03-30 PROCEDURE — 700105 HCHG RX REV CODE 258: Performed by: NURSE PRACTITIONER

## 2019-03-30 PROCEDURE — 96365 THER/PROPH/DIAG IV INF INIT: CPT

## 2019-03-30 RX ORDER — 0.9 % SODIUM CHLORIDE 0.9 %
10-20 VIAL (ML) INJECTION PRN
Status: CANCELLED | OUTPATIENT
Start: 2019-03-30

## 2019-03-30 RX ORDER — 0.9 % SODIUM CHLORIDE 0.9 %
5 VIAL (ML) INJECTION PRN
Status: CANCELLED | OUTPATIENT
Start: 2019-03-30

## 2019-03-30 RX ADMIN — SODIUM CHLORIDE 1000 MG: 900 INJECTION INTRAVENOUS at 16:01

## 2019-03-31 ENCOUNTER — OUTPATIENT INFUSION SERVICES (OUTPATIENT)
Dept: ONCOLOGY | Facility: MEDICAL CENTER | Age: 74
End: 2019-03-31
Attending: INTERNAL MEDICINE
Payer: MEDICARE

## 2019-03-31 VITALS
OXYGEN SATURATION: 92 % | SYSTOLIC BLOOD PRESSURE: 107 MMHG | HEART RATE: 110 BPM | DIASTOLIC BLOOD PRESSURE: 68 MMHG | RESPIRATION RATE: 18 BRPM | TEMPERATURE: 97.9 F

## 2019-03-31 DIAGNOSIS — N30.00 ACUTE CYSTITIS WITHOUT HEMATURIA: ICD-10-CM

## 2019-03-31 PROCEDURE — 96365 THER/PROPH/DIAG IV INF INIT: CPT

## 2019-03-31 PROCEDURE — 700111 HCHG RX REV CODE 636 W/ 250 OVERRIDE (IP): Performed by: NURSE PRACTITIONER

## 2019-03-31 PROCEDURE — 700105 HCHG RX REV CODE 258: Performed by: NURSE PRACTITIONER

## 2019-03-31 RX ORDER — 0.9 % SODIUM CHLORIDE 0.9 %
10-20 VIAL (ML) INJECTION PRN
Status: CANCELLED | OUTPATIENT
Start: 2019-03-31

## 2019-03-31 RX ORDER — 0.9 % SODIUM CHLORIDE 0.9 %
5 VIAL (ML) INJECTION PRN
Status: CANCELLED | OUTPATIENT
Start: 2019-03-31

## 2019-03-31 RX ADMIN — SODIUM CHLORIDE 1000 MG: 900 INJECTION INTRAVENOUS at 16:48

## 2019-04-01 ENCOUNTER — OUTPATIENT INFUSION SERVICES (OUTPATIENT)
Dept: ONCOLOGY | Facility: MEDICAL CENTER | Age: 74
End: 2019-04-01
Attending: INTERNAL MEDICINE
Payer: MEDICARE

## 2019-04-01 VITALS
HEART RATE: 102 BPM | TEMPERATURE: 97 F | DIASTOLIC BLOOD PRESSURE: 83 MMHG | RESPIRATION RATE: 18 BRPM | WEIGHT: 139.33 LBS | OXYGEN SATURATION: 95 % | BODY MASS INDEX: 28.09 KG/M2 | SYSTOLIC BLOOD PRESSURE: 140 MMHG | HEIGHT: 59 IN

## 2019-04-01 DIAGNOSIS — N30.00 ACUTE CYSTITIS WITHOUT HEMATURIA: ICD-10-CM

## 2019-04-01 LAB
ALBUMIN SERPL BCP-MCNC: 3.8 G/DL (ref 3.2–4.9)
ALBUMIN/GLOB SERPL: 2 G/DL
ALP SERPL-CCNC: 79 U/L (ref 30–99)
ALT SERPL-CCNC: 9 U/L (ref 2–50)
ANION GAP SERPL CALC-SCNC: 5 MMOL/L (ref 0–11.9)
AST SERPL-CCNC: 13 U/L (ref 12–45)
BASOPHILS # BLD AUTO: 0.6 % (ref 0–1.8)
BASOPHILS # BLD: 0.03 K/UL (ref 0–0.12)
BILIRUB SERPL-MCNC: 0.3 MG/DL (ref 0.1–1.5)
BUN SERPL-MCNC: 17 MG/DL (ref 8–22)
CALCIUM SERPL-MCNC: 9.2 MG/DL (ref 8.5–10.5)
CHLORIDE SERPL-SCNC: 108 MMOL/L (ref 96–112)
CO2 SERPL-SCNC: 24 MMOL/L (ref 20–33)
CREAT SERPL-MCNC: 0.79 MG/DL (ref 0.5–1.4)
EOSINOPHIL # BLD AUTO: 0.11 K/UL (ref 0–0.51)
EOSINOPHIL NFR BLD: 2.3 % (ref 0–6.9)
ERYTHROCYTE [DISTWIDTH] IN BLOOD BY AUTOMATED COUNT: 42 FL (ref 35.9–50)
GLOBULIN SER CALC-MCNC: 1.9 G/DL (ref 1.9–3.5)
GLUCOSE SERPL-MCNC: 109 MG/DL (ref 65–99)
HCT VFR BLD AUTO: 38.2 % (ref 37–47)
HGB BLD-MCNC: 12.4 G/DL (ref 12–16)
IMM GRANULOCYTES # BLD AUTO: 0.03 K/UL (ref 0–0.11)
IMM GRANULOCYTES NFR BLD AUTO: 0.6 % (ref 0–0.9)
LYMPHOCYTES # BLD AUTO: 1.09 K/UL (ref 1–4.8)
LYMPHOCYTES NFR BLD: 22.8 % (ref 22–41)
MCH RBC QN AUTO: 27.1 PG (ref 27–33)
MCHC RBC AUTO-ENTMCNC: 32.5 G/DL (ref 33.6–35)
MCV RBC AUTO: 83.6 FL (ref 81.4–97.8)
MONOCYTES # BLD AUTO: 0.5 K/UL (ref 0–0.85)
MONOCYTES NFR BLD AUTO: 10.5 % (ref 0–13.4)
NEUTROPHILS # BLD AUTO: 3.02 K/UL (ref 2–7.15)
NEUTROPHILS NFR BLD: 63.2 % (ref 44–72)
NRBC # BLD AUTO: 0 K/UL
NRBC BLD-RTO: 0 /100 WBC
PLATELET # BLD AUTO: 249 K/UL (ref 164–446)
PMV BLD AUTO: 9.6 FL (ref 9–12.9)
POTASSIUM SERPL-SCNC: 4.4 MMOL/L (ref 3.6–5.5)
PROT SERPL-MCNC: 5.7 G/DL (ref 6–8.2)
RBC # BLD AUTO: 4.57 M/UL (ref 4.2–5.4)
SODIUM SERPL-SCNC: 137 MMOL/L (ref 135–145)
WBC # BLD AUTO: 4.8 K/UL (ref 4.8–10.8)

## 2019-04-01 PROCEDURE — 96365 THER/PROPH/DIAG IV INF INIT: CPT | Performed by: CLINICAL NURSE SPECIALIST

## 2019-04-01 PROCEDURE — 99195 PHLEBOTOMY: CPT | Performed by: CLINICAL NURSE SPECIALIST

## 2019-04-01 PROCEDURE — 700111 HCHG RX REV CODE 636 W/ 250 OVERRIDE (IP): Performed by: NURSE PRACTITIONER

## 2019-04-01 PROCEDURE — 80053 COMPREHEN METABOLIC PANEL: CPT

## 2019-04-01 PROCEDURE — 700105 HCHG RX REV CODE 258: Performed by: NURSE PRACTITIONER

## 2019-04-01 PROCEDURE — 36415 COLL VENOUS BLD VENIPUNCTURE: CPT

## 2019-04-01 PROCEDURE — 85025 COMPLETE CBC W/AUTO DIFF WBC: CPT

## 2019-04-01 RX ORDER — 0.9 % SODIUM CHLORIDE 0.9 %
10-20 VIAL (ML) INJECTION PRN
Status: CANCELLED | OUTPATIENT
Start: 2019-04-01

## 2019-04-01 RX ORDER — 0.9 % SODIUM CHLORIDE 0.9 %
5 VIAL (ML) INJECTION PRN
Status: CANCELLED | OUTPATIENT
Start: 2019-04-01

## 2019-04-01 RX ADMIN — SODIUM CHLORIDE 1000 MG: 900 INJECTION INTRAVENOUS at 15:56

## 2019-04-01 NOTE — PROGRESS NOTES
Patient arrived to infusion center with  for daily antibiotic infusion of Ertapenem. Patient had no complaints today. Midline flushed with no blood return, but patient stated it was cool and no swelling was observed. After starting Ertapenem (about 10 cc infused) the patient stated it started hurting and some swelling was noted on the inner part of her arm. Stopped infusion and called Dr. Parker. PIV established to left forearm and finished infusing the rest of the Ertapenem. Dr. Parker said to d/c the midline and okay to leave PIV in place over night. Since she was unfamiliar with patient and plan of care, note left in chart for follow up tomorrow morning to determine further plan of care. PIV site covered with gauze and coban. Patient confirms appointment for tomorrow. Discharged from infusion center with  and no apparent distress.

## 2019-04-01 NOTE — PROGRESS NOTES
Patient to infusion for Ertapenem for urinary tract infection. Patient with nephrostomy states draining clear fluid.  Denies pain.  Denies dysuria.  Patient has colostomy in place and denies issues.  PIV in left forearm in place upon arrival.  Ertapenem infused without incident.  Labs drawn.  PIV saline locked and kept in place for treatment tomorrow.  Patient discharged to home with supportive  in stable condition.

## 2019-04-02 ENCOUNTER — OUTPATIENT INFUSION SERVICES (OUTPATIENT)
Dept: ONCOLOGY | Facility: MEDICAL CENTER | Age: 74
End: 2019-04-02
Attending: INTERNAL MEDICINE
Payer: MEDICARE

## 2019-04-02 VITALS
DIASTOLIC BLOOD PRESSURE: 86 MMHG | TEMPERATURE: 97.3 F | SYSTOLIC BLOOD PRESSURE: 127 MMHG | OXYGEN SATURATION: 96 % | HEART RATE: 91 BPM | RESPIRATION RATE: 18 BRPM

## 2019-04-02 DIAGNOSIS — N30.00 ACUTE CYSTITIS WITHOUT HEMATURIA: ICD-10-CM

## 2019-04-02 PROCEDURE — 700111 HCHG RX REV CODE 636 W/ 250 OVERRIDE (IP): Performed by: NURSE PRACTITIONER

## 2019-04-02 PROCEDURE — 700105 HCHG RX REV CODE 258: Performed by: NURSE PRACTITIONER

## 2019-04-02 PROCEDURE — 96365 THER/PROPH/DIAG IV INF INIT: CPT

## 2019-04-02 RX ORDER — 0.9 % SODIUM CHLORIDE 0.9 %
10-20 VIAL (ML) INJECTION PRN
Status: CANCELLED | OUTPATIENT
Start: 2019-04-02

## 2019-04-02 RX ORDER — 0.9 % SODIUM CHLORIDE 0.9 %
5 VIAL (ML) INJECTION PRN
Status: CANCELLED | OUTPATIENT
Start: 2019-04-02

## 2019-04-02 RX ADMIN — SODIUM CHLORIDE 1000 MG: 900 INJECTION INTRAVENOUS at 17:01

## 2019-04-03 ENCOUNTER — OUTPATIENT INFUSION SERVICES (OUTPATIENT)
Dept: ONCOLOGY | Facility: MEDICAL CENTER | Age: 74
End: 2019-04-03
Attending: INTERNAL MEDICINE
Payer: MEDICARE

## 2019-04-03 DIAGNOSIS — N30.00 ACUTE CYSTITIS WITHOUT HEMATURIA: ICD-10-CM

## 2019-04-03 PROCEDURE — 700111 HCHG RX REV CODE 636 W/ 250 OVERRIDE (IP): Performed by: NURSE PRACTITIONER

## 2019-04-03 PROCEDURE — 700105 HCHG RX REV CODE 258: Performed by: NURSE PRACTITIONER

## 2019-04-03 PROCEDURE — 96365 THER/PROPH/DIAG IV INF INIT: CPT

## 2019-04-03 RX ORDER — 0.9 % SODIUM CHLORIDE 0.9 %
5 VIAL (ML) INJECTION PRN
Status: CANCELLED | OUTPATIENT
Start: 2019-04-03

## 2019-04-03 RX ORDER — 0.9 % SODIUM CHLORIDE 0.9 %
10-20 VIAL (ML) INJECTION PRN
Status: CANCELLED | OUTPATIENT
Start: 2019-04-03

## 2019-04-03 RX ADMIN — SODIUM CHLORIDE 1000 MG: 900 INJECTION INTRAVENOUS at 17:34

## 2019-04-03 NOTE — PROGRESS NOTES
Patient arrived for daily Invanz, reports no significant changes or concerns.  PIV previously placed flushed well.  No blood return noted, but pt denies pain or discomfort. No evidence of infiltration.  Invanz infused without issue.  PIV flushed, saline locked.  Confirmed PIV will need to be replaced before tentative last day on 4/8.  Patient agreed.  PIV wrapped in new gauze dressing. Returns tomorrow.  Discharged in no apparent distress.

## 2019-04-04 ENCOUNTER — OFFICE VISIT (OUTPATIENT)
Dept: INFECTIOUS DISEASES | Facility: MEDICAL CENTER | Age: 74
End: 2019-04-04
Payer: MEDICARE

## 2019-04-04 ENCOUNTER — OUTPATIENT INFUSION SERVICES (OUTPATIENT)
Dept: ONCOLOGY | Facility: MEDICAL CENTER | Age: 74
End: 2019-04-04
Attending: INTERNAL MEDICINE
Payer: MEDICARE

## 2019-04-04 VITALS
DIASTOLIC BLOOD PRESSURE: 70 MMHG | WEIGHT: 139 LBS | BODY MASS INDEX: 28.02 KG/M2 | HEART RATE: 87 BPM | SYSTOLIC BLOOD PRESSURE: 122 MMHG | OXYGEN SATURATION: 96 % | HEIGHT: 59 IN | TEMPERATURE: 97.7 F

## 2019-04-04 VITALS
TEMPERATURE: 97.5 F | SYSTOLIC BLOOD PRESSURE: 131 MMHG | OXYGEN SATURATION: 96 % | RESPIRATION RATE: 18 BRPM | HEART RATE: 83 BPM | DIASTOLIC BLOOD PRESSURE: 79 MMHG

## 2019-04-04 DIAGNOSIS — N30.00 ACUTE CYSTITIS WITHOUT HEMATURIA: ICD-10-CM

## 2019-04-04 DIAGNOSIS — N39.0 URINARY TRACT INFECTION DUE TO ESBL KLEBSIELLA: Primary | ICD-10-CM

## 2019-04-04 DIAGNOSIS — B96.89 URINARY TRACT INFECTION DUE TO ESBL KLEBSIELLA: Primary | ICD-10-CM

## 2019-04-04 DIAGNOSIS — N12 PYELONEPHRITIS: ICD-10-CM

## 2019-04-04 PROCEDURE — 700105 HCHG RX REV CODE 258: Performed by: NURSE PRACTITIONER

## 2019-04-04 PROCEDURE — 99214 OFFICE O/P EST MOD 30 MIN: CPT | Performed by: INTERNAL MEDICINE

## 2019-04-04 PROCEDURE — 700111 HCHG RX REV CODE 636 W/ 250 OVERRIDE (IP): Performed by: NURSE PRACTITIONER

## 2019-04-04 PROCEDURE — 96365 THER/PROPH/DIAG IV INF INIT: CPT

## 2019-04-04 RX ORDER — 0.9 % SODIUM CHLORIDE 0.9 %
10-20 VIAL (ML) INJECTION PRN
Status: CANCELLED | OUTPATIENT
Start: 2019-04-04

## 2019-04-04 RX ORDER — 0.9 % SODIUM CHLORIDE 0.9 %
5 VIAL (ML) INJECTION PRN
Status: CANCELLED | OUTPATIENT
Start: 2019-04-04

## 2019-04-04 RX ADMIN — SODIUM CHLORIDE 1000 MG: 900 INJECTION INTRAVENOUS at 14:58

## 2019-04-04 NOTE — PROGRESS NOTES
Infectious Disease Follow up Note      Subjective:     Chief Complaint   Patient presents with   • Hospital Follow-up     ESBL UTI         Interval History:     73-year-old female with cervical cancer, prior rectovaginal fistula and neurogenic bladder and recent treatment for ESBL Klebsiella UTI, presents with leukocytosis, nausea, vomiting, flank pain, and worsening hydronephrosis.  Patient found to have an ESBL Klebsiella urinary tract infection.  She is status post percutaneous nephrostomy tube placement on 3/13/19.  Patient was discharged on ertapenem to be completed on 4/8/19 per the therapy plan.    Hospital records reviewed    Patient here for follow-up.  She is doing well clinically and tolerating IV antibiotics without any issues.  She still has her left percutaneous nephrostomy tube in place and states that the urine has been pale yellow without any blood.  She denies any flank pain.  No fevers, chills, nausea, vomiting, abdominal pain or diarrhea.  She denies any dysuria or hematuria.  Patient will follow up with Dr. Adam Hays in regards to possible bilateral ureteral stent placement.  She had a follow-up urinalysis and urine culture on 3/28/19 with urine cultures negative.  Of note patient lost midline access over the weekend and that she is receiving IV antibiotics through a peripheral IV.    Past Medical History:   Diagnosis Date   • Arthritis     tailbone   • Cancer (HCC) 2005, 2015    cervical   • Colostomy in place ([facility])    • Other specified symptom associated with female genital organs    • Unspecified urinary incontinence     supra public cath   • Urinary bladder disorder     supra pubic cath       Past Surgical History:   Procedure Laterality Date   • PELVIC EXAM UNDER ANESTHESIA  7/31/2015    Procedure: PELVIC EXAM UNDER ANESTHESIA;  Surgeon: Adam Hays M.D.;  Location: SURGERY [facility];  Service:    • CERVICAL CONIZATION  7/31/2015    Procedure: CERVICAL CONIZATION FOR: CONE BIOPSY;   "Surgeon: Adam Hays M.D.;  Location: SURGERY [facility];  Service:    • CYSTOSCOPY  7/31/2015    Procedure: CYSTOSCOPY;  Surgeon: Adam Hays M.D.;  Location: SURGERY [facility];  Service:    • COLOSTOMY CREATION LAPAROSCOPIC  4/14/2015    Performed by Mariusz Barajas M.D. at SURGERY [facility] ORS   • RECOVERY  4/6/2015    Performed by [facility] at SURGERY PRE-POST PROC UNIT [facility]   • OTHER  04/2015    supra pubic cath placement   • GYN SURGERY  5/2005    hysterectomy       Allergies:   Allergies   Allergen Reactions   • Sulfa Drugs Unspecified     Pt states it runs her down.         Medications:  Current Outpatient Prescriptions on File Prior to Visit   Medication Sig Dispense Refill   • lisinopril (PRINIVIL) 20 MG Tab Take 1 Tab by mouth every day. 30 Tab 1   • Cyanocobalamin (VITAMIN B 12 PO) Take  by mouth.     • ascorbic acid (ASCORBIC ACID) 500 MG TABS Take 1,000 mg by mouth every day.     • therapeutic multivitamin-minerals (THERAGRAN-M) TABS Take 1 Tab by mouth every day.     • Calcium Carb-Cholecalciferol (CALCIUM 600 + D PO) Take 1 Tab by mouth every day.     • Cholecalciferol (VITAMIN D) 2000 UNITS CAPS Take 2,000 Units by mouth every day.     • ondansetron (ZOFRAN) 4 MG Tab tablet Take 1 Tab by mouth every 6 hours as needed for Nausea/Vomiting. 30 Tab 0     No current facility-administered medications on file prior to visit.          ROS  As documented above in my HPI       Objective:     PE:  /70   Pulse 87   Temp 36.5 °C (97.7 °F) (Temporal)   Ht 1.51 m (4' 11.45\")   Wt 63 kg (139 lb)   SpO2 96%   BMI 27.65 kg/m²      Vital signs reviewed  Constitutional: patient is oriented to person, place, and time. Appears well-developed and well-nourished. No distress  Eyes: Conjunctivae normal and EOM are normal. Pupils are equal, round, and reactive to light.   Mouth/Throat: Lips without lesions, good dentition, oropharynx is clear and moist.  Neck: Trachea midline. Normal " range of motion. Neck supple. No masses  Cardiovascular: Normal rate, regular rhythm, normal heart sounds and intact distal pulses. No murmur, gallop, or friction rub. No edema.  Pulmonary/Chest: No respiratory distress. Unlabored respiratory effort, lungs clear to auscultation. No wheezes or rales.   Abdominal: Soft, non tender. BS + x 4. No masses or hepatosplenomegaly.  No left CVA tenderness.  Left percutaneous nephrostomy tube with pale yellow urine in bag  Musculoskeletal: Normal range of motion. No tenderness, swelling, erythema, deformity noted.  Neurological: alert and oriented to person, place, and time. No cranial nerve deficit. Coordination normal. Moves all extremities  Skin: Skin is warm and dry. Good turgor. No rashes visable.  Psychiatric: Normal mood and affect. Behavior is normal.     LABS:  WBC   Date/Time Value Ref Range Status   04/01/2019 04:30 PM 4.8 4.8 - 10.8 K/uL Final     RBC   Date/Time Value Ref Range Status   04/01/2019 04:30 PM 4.57 4.20 - 5.40 M/uL Final     Hemoglobin   Date/Time Value Ref Range Status   04/01/2019 04:30 PM 12.4 12.0 - 16.0 g/dL Final     Hematocrit   Date/Time Value Ref Range Status   04/01/2019 04:30 PM 38.2 37.0 - 47.0 % Final     MCV   Date/Time Value Ref Range Status   04/01/2019 04:30 PM 83.6 81.4 - 97.8 fL Final     MCH   Date/Time Value Ref Range Status   04/01/2019 04:30 PM 27.1 27.0 - 33.0 pg Final     MCHC   Date/Time Value Ref Range Status   04/01/2019 04:30 PM 32.5 (L) 33.6 - 35.0 g/dL Final     MPV   Date/Time Value Ref Range Status   04/01/2019 04:30 PM 9.6 9.0 - 12.9 fL Final        Sodium   Date/Time Value Ref Range Status   04/01/2019 04:30  135 - 145 mmol/L Final     Potassium   Date/Time Value Ref Range Status   04/01/2019 04:30 PM 4.4 3.6 - 5.5 mmol/L Final     Chloride   Date/Time Value Ref Range Status   04/01/2019 04:30  96 - 112 mmol/L Final     Co2   Date/Time Value Ref Range Status   04/01/2019 04:30 PM 24 20 - 33 mmol/L Final      Glucose   Date/Time Value Ref Range Status   04/01/2019 04:30  (H) 65 - 99 mg/dL Final     Bun   Date/Time Value Ref Range Status   04/01/2019 04:30 PM 17 8 - 22 mg/dL Final     Creatinine   Date/Time Value Ref Range Status   04/01/2019 04:30 PM 0.79 0.50 - 1.40 mg/dL Final   03/27/2006 02:58 PM 0.7 0.5 - 1.4 mg/dL Final     Alkaline Phosphatase   Date/Time Value Ref Range Status   04/01/2019 04:30 PM 79 30 - 99 U/L Final     AST(SGOT)   Date/Time Value Ref Range Status   04/01/2019 04:30 PM 13 12 - 45 U/L Final     ALT(SGPT)   Date/Time Value Ref Range Status   04/01/2019 04:30 PM 9 2 - 50 U/L Final     Total Bilirubin   Date/Time Value Ref Range Status   04/01/2019 04:30 PM 0.3 0.1 - 1.5 mg/dL Final        No results found for: CPKTOTAL     MICRO:  Blood Culture   Date Value Ref Range Status   03/15/2019 No growth after 5 days of incubation.  Final   03/15/2019 No growth after 5 days of incubation.  Final          IMAGING STUDIES:  Impression   Renal ultrasound on 3/13/19    1.  Moderate hydronephrosis of the left kidney with no evidence of a left-sided ureteral jet. This appearance has worsened since previous exam.    2.  8 mm right renal cyst.         Assessment/Plan:     Problem List Items Addressed This Visit     None      Visit Diagnoses     Urinary tract infection due to ESBL Klebsiella    -  Primary    Pyelonephritis            Patient is doing well clinically and tolerating IV antibiotics without any issues.  Her repeat UA and urine culture on 3/29/19 are negative.  Continue IV ertapenem through 04/08/19 as indicated on the therapy plan.    Patient to follow-up with Dr. Hays regarding possible ureteral stent placement and management of her left percutaneous nephrostomy tube.    Follow-up in the ID clinic as needed    Follow up: PRN RTC sooner if needed. FU with PCP for ongoing chronic medical conditions.     Keisha Arizmendi M.D.

## 2019-04-05 ENCOUNTER — OUTPATIENT INFUSION SERVICES (OUTPATIENT)
Dept: ONCOLOGY | Facility: MEDICAL CENTER | Age: 74
End: 2019-04-05
Attending: INTERNAL MEDICINE
Payer: MEDICARE

## 2019-04-05 VITALS
DIASTOLIC BLOOD PRESSURE: 74 MMHG | OXYGEN SATURATION: 96 % | TEMPERATURE: 98.4 F | RESPIRATION RATE: 18 BRPM | HEART RATE: 101 BPM | SYSTOLIC BLOOD PRESSURE: 144 MMHG

## 2019-04-05 DIAGNOSIS — N30.00 ACUTE CYSTITIS WITHOUT HEMATURIA: ICD-10-CM

## 2019-04-05 PROCEDURE — 700105 HCHG RX REV CODE 258: Performed by: NURSE PRACTITIONER

## 2019-04-05 PROCEDURE — 700111 HCHG RX REV CODE 636 W/ 250 OVERRIDE (IP): Performed by: NURSE PRACTITIONER

## 2019-04-05 PROCEDURE — 96365 THER/PROPH/DIAG IV INF INIT: CPT

## 2019-04-05 RX ORDER — 0.9 % SODIUM CHLORIDE 0.9 %
5 VIAL (ML) INJECTION PRN
Status: CANCELLED | OUTPATIENT
Start: 2019-04-05

## 2019-04-05 RX ORDER — 0.9 % SODIUM CHLORIDE 0.9 %
10-20 VIAL (ML) INJECTION PRN
Status: CANCELLED | OUTPATIENT
Start: 2019-04-05

## 2019-04-05 RX ADMIN — SODIUM CHLORIDE 1000 MG: 900 INJECTION INTRAVENOUS at 14:17

## 2019-04-05 NOTE — PROGRESS NOTES
Pt returns to infusion center for IV antibiotics.  PIV in place, flushes easily but no blood return noted.  Pt tolerated infusion without incident.  PIV flushed with saline per policy, removed, gauze dressing placed.  Pt left infusion center ambulatory and in good condition.  Returns tomorrow.

## 2019-04-05 NOTE — PROGRESS NOTES
Pt arrived to [facility] for Invanz (Ertapenem) infusion. Pt with SL in place from infusion on 4/3/2019. Site is clean/dry/intact and flushed easily, no blood return. Pt's  at chair side. Pt given infusion Invanz. Pt tolerated well. IV site flushed and left in place upon discharge for use tomorrow, 4/5/2019. Pt discharged to self care with no acute distress.

## 2019-04-06 ENCOUNTER — OUTPATIENT INFUSION SERVICES (OUTPATIENT)
Dept: ONCOLOGY | Facility: MEDICAL CENTER | Age: 74
End: 2019-04-06
Attending: INTERNAL MEDICINE
Payer: MEDICARE

## 2019-04-06 VITALS
DIASTOLIC BLOOD PRESSURE: 73 MMHG | HEART RATE: 94 BPM | TEMPERATURE: 98.6 F | OXYGEN SATURATION: 93 % | SYSTOLIC BLOOD PRESSURE: 115 MMHG | RESPIRATION RATE: 18 BRPM

## 2019-04-06 DIAGNOSIS — N30.00 ACUTE CYSTITIS WITHOUT HEMATURIA: ICD-10-CM

## 2019-04-06 PROCEDURE — 700105 HCHG RX REV CODE 258: Performed by: NURSE PRACTITIONER

## 2019-04-06 PROCEDURE — 700111 HCHG RX REV CODE 636 W/ 250 OVERRIDE (IP): Performed by: NURSE PRACTITIONER

## 2019-04-06 PROCEDURE — 96365 THER/PROPH/DIAG IV INF INIT: CPT

## 2019-04-06 RX ORDER — 0.9 % SODIUM CHLORIDE 0.9 %
5 VIAL (ML) INJECTION PRN
Status: CANCELLED | OUTPATIENT
Start: 2019-04-06

## 2019-04-06 RX ORDER — 0.9 % SODIUM CHLORIDE 0.9 %
10-20 VIAL (ML) INJECTION PRN
Status: CANCELLED | OUTPATIENT
Start: 2019-04-06

## 2019-04-06 RX ADMIN — SODIUM CHLORIDE 1000 MG: 900 INJECTION INTRAVENOUS at 14:18

## 2019-04-06 NOTE — PROGRESS NOTES
Pt to [facility] for daily Invanz infusion for UTI.  PIV placed in right medial posterior lower forearm w/ brisk blood return noted, flushed per protocol.  Invanz infused through PIV with no adverse reactions.  Pt PIV again flushed per protocol w/ brisk blood return noted, PIV left in place for tomorrow's infusion, wrapped in gauze and protective sleeve placed.  Pt left on foot in NAD, next appt in place

## 2019-04-07 ENCOUNTER — OUTPATIENT INFUSION SERVICES (OUTPATIENT)
Dept: ONCOLOGY | Facility: MEDICAL CENTER | Age: 74
End: 2019-04-07
Attending: INTERNAL MEDICINE
Payer: MEDICARE

## 2019-04-07 VITALS
DIASTOLIC BLOOD PRESSURE: 84 MMHG | RESPIRATION RATE: 18 BRPM | TEMPERATURE: 97 F | BODY MASS INDEX: 27.63 KG/M2 | SYSTOLIC BLOOD PRESSURE: 136 MMHG | OXYGEN SATURATION: 92 % | HEART RATE: 93 BPM | WEIGHT: 138.89 LBS

## 2019-04-07 DIAGNOSIS — N30.00 ACUTE CYSTITIS WITHOUT HEMATURIA: ICD-10-CM

## 2019-04-07 PROCEDURE — 96365 THER/PROPH/DIAG IV INF INIT: CPT

## 2019-04-07 PROCEDURE — 700111 HCHG RX REV CODE 636 W/ 250 OVERRIDE (IP): Performed by: NURSE PRACTITIONER

## 2019-04-07 PROCEDURE — 700105 HCHG RX REV CODE 258: Performed by: NURSE PRACTITIONER

## 2019-04-07 RX ORDER — 0.9 % SODIUM CHLORIDE 0.9 %
10-20 VIAL (ML) INJECTION PRN
Status: CANCELLED | OUTPATIENT
Start: 2019-04-07

## 2019-04-07 RX ORDER — 0.9 % SODIUM CHLORIDE 0.9 %
5 VIAL (ML) INJECTION PRN
Status: CANCELLED | OUTPATIENT
Start: 2019-04-07

## 2019-04-07 RX ADMIN — SODIUM CHLORIDE 1000 MG: 900 INJECTION INTRAVENOUS at 17:59

## 2019-04-08 ENCOUNTER — OUTPATIENT INFUSION SERVICES (OUTPATIENT)
Dept: ONCOLOGY | Facility: MEDICAL CENTER | Age: 74
End: 2019-04-08
Attending: INTERNAL MEDICINE
Payer: MEDICARE

## 2019-04-08 VITALS
DIASTOLIC BLOOD PRESSURE: 76 MMHG | SYSTOLIC BLOOD PRESSURE: 125 MMHG | OXYGEN SATURATION: 94 % | TEMPERATURE: 97 F | RESPIRATION RATE: 18 BRPM | HEART RATE: 85 BPM | BODY MASS INDEX: 28.27 KG/M2 | WEIGHT: 140.21 LBS | HEIGHT: 59 IN

## 2019-04-08 DIAGNOSIS — N30.00 ACUTE CYSTITIS WITHOUT HEMATURIA: ICD-10-CM

## 2019-04-08 LAB
ALBUMIN SERPL BCP-MCNC: 4 G/DL (ref 3.2–4.9)
ALBUMIN/GLOB SERPL: 1.6 G/DL
ALP SERPL-CCNC: 97 U/L (ref 30–99)
ALT SERPL-CCNC: 13 U/L (ref 2–50)
ANION GAP SERPL CALC-SCNC: 8 MMOL/L (ref 0–11.9)
AST SERPL-CCNC: 19 U/L (ref 12–45)
BASOPHILS # BLD AUTO: 0.4 % (ref 0–1.8)
BASOPHILS # BLD: 0.02 K/UL (ref 0–0.12)
BILIRUB SERPL-MCNC: 0.4 MG/DL (ref 0.1–1.5)
BUN SERPL-MCNC: 21 MG/DL (ref 8–22)
CALCIUM SERPL-MCNC: 10 MG/DL (ref 8.5–10.5)
CHLORIDE SERPL-SCNC: 105 MMOL/L (ref 96–112)
CO2 SERPL-SCNC: 23 MMOL/L (ref 20–33)
CREAT SERPL-MCNC: 0.75 MG/DL (ref 0.5–1.4)
CRP SERPL HS-MCNC: 1.03 MG/DL (ref 0–0.75)
EOSINOPHIL # BLD AUTO: 0.17 K/UL (ref 0–0.51)
EOSINOPHIL NFR BLD: 3.4 % (ref 0–6.9)
ERYTHROCYTE [DISTWIDTH] IN BLOOD BY AUTOMATED COUNT: 41.9 FL (ref 35.9–50)
ERYTHROCYTE [SEDIMENTATION RATE] IN BLOOD BY WESTERGREN METHOD: 22 MM/HOUR (ref 0–30)
GLOBULIN SER CALC-MCNC: 2.5 G/DL (ref 1.9–3.5)
GLUCOSE SERPL-MCNC: 115 MG/DL (ref 65–99)
HCT VFR BLD AUTO: 41.6 % (ref 37–47)
HGB BLD-MCNC: 13.6 G/DL (ref 12–16)
IMM GRANULOCYTES # BLD AUTO: 0.01 K/UL (ref 0–0.11)
IMM GRANULOCYTES NFR BLD AUTO: 0.2 % (ref 0–0.9)
LYMPHOCYTES # BLD AUTO: 1.14 K/UL (ref 1–4.8)
LYMPHOCYTES NFR BLD: 22.6 % (ref 22–41)
MCH RBC QN AUTO: 26.9 PG (ref 27–33)
MCHC RBC AUTO-ENTMCNC: 32.7 G/DL (ref 33.6–35)
MCV RBC AUTO: 82.2 FL (ref 81.4–97.8)
MONOCYTES # BLD AUTO: 0.5 K/UL (ref 0–0.85)
MONOCYTES NFR BLD AUTO: 9.9 % (ref 0–13.4)
NEUTROPHILS # BLD AUTO: 3.21 K/UL (ref 2–7.15)
NEUTROPHILS NFR BLD: 63.5 % (ref 44–72)
NRBC # BLD AUTO: 0 K/UL
NRBC BLD-RTO: 0 /100 WBC
PLATELET # BLD AUTO: 244 K/UL (ref 164–446)
PMV BLD AUTO: 9.9 FL (ref 9–12.9)
POTASSIUM SERPL-SCNC: 3.8 MMOL/L (ref 3.6–5.5)
PROT SERPL-MCNC: 6.5 G/DL (ref 6–8.2)
RBC # BLD AUTO: 5.06 M/UL (ref 4.2–5.4)
SODIUM SERPL-SCNC: 136 MMOL/L (ref 135–145)
WBC # BLD AUTO: 5.1 K/UL (ref 4.8–10.8)

## 2019-04-08 PROCEDURE — 85025 COMPLETE CBC W/AUTO DIFF WBC: CPT

## 2019-04-08 PROCEDURE — 85652 RBC SED RATE AUTOMATED: CPT

## 2019-04-08 PROCEDURE — 36592 COLLECT BLOOD FROM PICC: CPT

## 2019-04-08 PROCEDURE — 700105 HCHG RX REV CODE 258: Performed by: NURSE PRACTITIONER

## 2019-04-08 PROCEDURE — 80053 COMPREHEN METABOLIC PANEL: CPT

## 2019-04-08 PROCEDURE — 86140 C-REACTIVE PROTEIN: CPT

## 2019-04-08 PROCEDURE — 96365 THER/PROPH/DIAG IV INF INIT: CPT

## 2019-04-08 PROCEDURE — 700111 HCHG RX REV CODE 636 W/ 250 OVERRIDE (IP): Performed by: NURSE PRACTITIONER

## 2019-04-08 RX ORDER — 0.9 % SODIUM CHLORIDE 0.9 %
10-20 VIAL (ML) INJECTION PRN
Status: CANCELLED | OUTPATIENT
Start: 2019-04-08

## 2019-04-08 RX ORDER — 0.9 % SODIUM CHLORIDE 0.9 %
5 VIAL (ML) INJECTION PRN
Status: CANCELLED | OUTPATIENT
Start: 2019-04-08

## 2019-04-08 RX ADMIN — SODIUM CHLORIDE 1000 MG: 900 INJECTION INTRAVENOUS at 17:17

## 2019-04-09 NOTE — PROGRESS NOTES
Patient arrived for final Invanz infusion; reports no changes or concerns.  PIV flushed well, unable to draw labs however.  Lab drawn from L hand with 23G butterfly, pressure dressing applied.  Invanz infused per MAR, tolerated well. PIV flushed, removed.  No further appts at this time; discharged home in good spirits under no apparent distress.

## 2019-04-10 ENCOUNTER — APPOINTMENT (OUTPATIENT)
Dept: ADMISSIONS | Facility: MEDICAL CENTER | Age: 74
End: 2019-04-10
Attending: SPECIALIST
Payer: MEDICARE

## 2019-04-10 DIAGNOSIS — Z01.812 PRE-OPERATIVE LABORATORY EXAMINATION: ICD-10-CM

## 2019-04-10 LAB
INR PPP: 0.98 (ref 0.87–1.13)
PROTHROMBIN TIME: 13.1 SEC (ref 12–14.6)

## 2019-04-10 PROCEDURE — 85610 PROTHROMBIN TIME: CPT

## 2019-04-10 PROCEDURE — 36415 COLL VENOUS BLD VENIPUNCTURE: CPT

## 2019-04-12 NOTE — ADDENDUM NOTE
Encounter addended by: Corrie Huston on: 4/12/2019 11:50 AM<BR>    Actions taken: Order list changed, MAR administration accepted

## 2019-04-16 ENCOUNTER — HOSPITAL ENCOUNTER (OUTPATIENT)
Facility: MEDICAL CENTER | Age: 74
End: 2019-04-16
Attending: SPECIALIST | Admitting: SPECIALIST
Payer: MEDICARE

## 2019-04-16 ENCOUNTER — APPOINTMENT (OUTPATIENT)
Dept: RADIOLOGY | Facility: MEDICAL CENTER | Age: 74
End: 2019-04-16
Attending: SPECIALIST
Payer: MEDICARE

## 2019-04-16 VITALS
OXYGEN SATURATION: 95 % | HEIGHT: 60 IN | WEIGHT: 140.21 LBS | RESPIRATION RATE: 16 BRPM | BODY MASS INDEX: 27.53 KG/M2 | SYSTOLIC BLOOD PRESSURE: 136 MMHG | TEMPERATURE: 98 F | HEART RATE: 78 BPM | DIASTOLIC BLOOD PRESSURE: 89 MMHG

## 2019-04-16 DIAGNOSIS — N13.39 OTHER HYDRONEPHROSIS: ICD-10-CM

## 2019-04-16 PROCEDURE — 700111 HCHG RX REV CODE 636 W/ 250 OVERRIDE (IP)

## 2019-04-16 PROCEDURE — 160002 HCHG RECOVERY MINUTES (STAT)

## 2019-04-16 PROCEDURE — 50693 PLMT URETERAL STENT PRQ: CPT

## 2019-04-16 PROCEDURE — 700105 HCHG RX REV CODE 258: Performed by: RADIOLOGY

## 2019-04-16 PROCEDURE — 700117 HCHG RX CONTRAST REV CODE 255: Performed by: RADIOLOGY

## 2019-04-16 RX ORDER — ONDANSETRON 2 MG/ML
4 INJECTION INTRAMUSCULAR; INTRAVENOUS EVERY 8 HOURS PRN
Status: DISCONTINUED | OUTPATIENT
Start: 2019-04-16 | End: 2019-04-16 | Stop reason: HOSPADM

## 2019-04-16 RX ORDER — MIDAZOLAM HYDROCHLORIDE 1 MG/ML
INJECTION INTRAMUSCULAR; INTRAVENOUS
Status: COMPLETED
Start: 2019-04-16 | End: 2019-04-16

## 2019-04-16 RX ORDER — CEFAZOLIN SODIUM 1 G/3ML
INJECTION, POWDER, FOR SOLUTION INTRAMUSCULAR; INTRAVENOUS
Status: COMPLETED
Start: 2019-04-16 | End: 2019-04-16

## 2019-04-16 RX ORDER — ONDANSETRON 2 MG/ML
4 INJECTION INTRAMUSCULAR; INTRAVENOUS PRN
Status: DISCONTINUED | OUTPATIENT
Start: 2019-04-16 | End: 2019-04-16 | Stop reason: HOSPADM

## 2019-04-16 RX ORDER — SODIUM CHLORIDE 9 MG/ML
1000 INJECTION, SOLUTION INTRAVENOUS
Status: DISCONTINUED | OUTPATIENT
Start: 2019-04-16 | End: 2019-04-16 | Stop reason: HOSPADM

## 2019-04-16 RX ORDER — SODIUM CHLORIDE 9 MG/ML
500 INJECTION, SOLUTION INTRAVENOUS
Status: DISCONTINUED | OUTPATIENT
Start: 2019-04-16 | End: 2019-04-16 | Stop reason: HOSPADM

## 2019-04-16 RX ORDER — CEFAZOLIN SODIUM 2 G/100ML
2 INJECTION, SOLUTION INTRAVENOUS ONCE
Status: COMPLETED | OUTPATIENT
Start: 2019-04-16 | End: 2019-04-16

## 2019-04-16 RX ORDER — OXYCODONE HYDROCHLORIDE 10 MG/1
10 TABLET ORAL
Status: DISCONTINUED | OUTPATIENT
Start: 2019-04-16 | End: 2019-04-16 | Stop reason: HOSPADM

## 2019-04-16 RX ORDER — MIDAZOLAM HYDROCHLORIDE 1 MG/ML
.5-2 INJECTION INTRAMUSCULAR; INTRAVENOUS PRN
Status: DISCONTINUED | OUTPATIENT
Start: 2019-04-16 | End: 2019-04-16 | Stop reason: HOSPADM

## 2019-04-16 RX ORDER — OXYCODONE HYDROCHLORIDE 5 MG/1
5 TABLET ORAL
Status: DISCONTINUED | OUTPATIENT
Start: 2019-04-16 | End: 2019-04-16 | Stop reason: HOSPADM

## 2019-04-16 RX ADMIN — FENTANYL CITRATE 50 MCG: 50 INJECTION, SOLUTION INTRAMUSCULAR; INTRAVENOUS at 10:49

## 2019-04-16 RX ADMIN — IOHEXOL 14 ML: 300 INJECTION, SOLUTION INTRAVENOUS at 11:00

## 2019-04-16 RX ADMIN — MIDAZOLAM HYDROCHLORIDE 1 MG: 1 INJECTION INTRAMUSCULAR; INTRAVENOUS at 10:49

## 2019-04-16 RX ADMIN — MIDAZOLAM 1 MG: 1 INJECTION INTRAMUSCULAR; INTRAVENOUS at 10:49

## 2019-04-16 RX ADMIN — CEFAZOLIN 2000 MG: 330 INJECTION, POWDER, FOR SOLUTION INTRAMUSCULAR; INTRAVENOUS at 10:43

## 2019-04-16 RX ADMIN — SODIUM CHLORIDE 1000 ML: 9 INJECTION, SOLUTION INTRAVENOUS at 09:15

## 2019-04-16 NOTE — OR NURSING
1120 Pt arrived from IR, rec'd report from MAYCOL Mcdaniel. VSS. No s/s of resp distress.     1240 Spouse to bedside. Pt denies pain or discomfort. Pt sitting up, drinking water without difficulty.     1315 Pt expressed readiness for d/c. Discussed d/c instructions with pt and pt's spouse. Answered all questions. Pt verbalized understanding. Pt taken out via wheelchair by this RN

## 2019-04-16 NOTE — OR SURGEON
Immediate Post- Operative Note        PostOp Diagnosis: LEFT URETERAL OBSTRUCTION    Procedure(s): LEFT URETERAL STENT PLACEMENT    Estimated Blood Loss: Less than 5 ml        Complications: None            4/16/2019     11:09 AM     Wade Gonzalez

## 2019-04-16 NOTE — PROGRESS NOTES
IR Procedure Note:    Dr. Gonzalez consented patient and  in PPU prior to procedure; all questions answered.    Site confirmed with patient, physician, RT, and RN.     Dr. Gonzalez completed left ureteral stent placement and removal of existing left nephrostomy tube.  The patient tolerated the procedure well; ETCo2 range 26-32, with consistent waveform during the procedure.      Gauze and tegaderm applied to left flank, CDI.  Patient alert, oriented and verbally appropriate post procedure, vital signs stable during procedure and transport, see flow sheet for vital signs.  Report given to MAYCOL Miller.  RN transported [facility] to [facility] and patient attached to bedside monitors and VSS.       Left ureteral stent:  [employer] Percuflex Ureteral Stent 8F x 22cm REF [id number]  LOT [id number]

## 2019-04-16 NOTE — H&P
History and Physical    Date: 4/16/2019    PCP: Julio Ross M.D.      CC: Needs Ureteral Stent  HPI: This is a 73 y.o. female who is presenting placement of ureteral stent    Past Medical History:   Diagnosis Date   • Arthritis     tailbone   • Cancer (HCC) 2005, 2015    cervical   • Colostomy in place ([facility])    • Hypertension    • Other specified symptom associated with female genital organs    • Unspecified urinary incontinence     supra public cath   • Urinary bladder disorder     pt self caths       Past Surgical History:   Procedure Laterality Date   • CYSTOSCOPY  7/31/2015    Procedure: CYSTOSCOPY;  Surgeon: Adam Hays M.D.;  Location: SURGERY [facility];  Service:    • PELVIC EXAM UNDER ANESTHESIA  7/31/2015    Procedure: PELVIC EXAM UNDER ANESTHESIA;  Surgeon: Adam Hays M.D.;  Location: SURGERY [facility];  Service:    • CERVICAL CONIZATION  7/31/2015    Procedure: CERVICAL CONIZATION FOR: CONE BIOPSY;  Surgeon: Adam Hays M.D.;  Location: SURGERY [facility];  Service:    • COLOSTOMY CREATION LAPAROSCOPIC  4/14/2015    Performed by Mariusz Barajas M.D. at SURGERY [facility]   • RECOVERY  4/6/2015    Performed by [facility] Surgery at SURGERY PRE-POST PROC UN[facility]   • OTHER  04/2015    supra pubic cath placement   • GYN SURGERY  5/2005    hysterectomy   • OTHER ABDOMINAL SURGERY      bowel resection- has colostomy       Current Facility-Administered Medications   Medication Dose Route Frequency Provider Last Rate Last Dose   • NS infusion 1,000 mL  1,000 mL Intravenous PPU Continuous Hebert Tirado M.D. 120 mL/hr at 04/16/19 0855 1,000 mL at 04/16/19 0855        Social History     Social History   • Marital status:      Spouse name: N/A   • Number of children: N/A   • Years of education: N/A     Occupational History   • Not on file.     Social History Main Topics   • Smoking status: Never Smoker   • Smokeless tobacco: Never Used   • Alcohol use No   • Drug use:  No   • Sexual activity: Not on file     Other Topics Concern   • Not on file     Social History Narrative   • No narrative on file       History reviewed. No pertinent family history.    Allergies:  Sulfa drugs    Review of Systems:  Negative    Physical Exam    Vital Signs                           Labs:                    Radiology:  IR-PLACEMENT URETERAL STENT THROUGH EXT CATH (ALL RADIOLOGY) LEFT    (Results Pending)             Assessment and Plan:This is a 73 y.o. Presents for placement of ureteral stent.

## 2019-04-16 NOTE — DISCHARGE INSTRUCTIONS
Discharge Instructions      ACTIVITY: Rest and take it easy for the first 24 hours.  A responsible adult is recommended to remain with you during that time.  It is normal to feel sleepy.  We encourage you to not do anything that requires balance, judgment or coordination.    MILD FLU-LIKE SYMPTOMS ARE NORMAL. YOU MAY EXPERIENCE GENERALIZED MUSCLE ACHES, THROAT IRRITATION, HEADACHE AND/OR SOME NAUSEA.    FOR 24 HOURS DO NOT:  Drive, operate machinery or run household appliances.  Drink beer or alcoholic beverages.   Make important decisions or sign legal documents.    SPECIAL INSTRUCTIONS: *  Ureteral Stent Implantation    What happens after the procedure?  · Your blood pressure, heart rate, breathing rate, and blood oxygen level will be monitored often until the medicines you were given have worn off.  · You may continue to receive medicine and fluids through an IV tube.  · You may have some soreness or pain in your abdomen and urethra. Medicines will be available to help you.  · You will be encouraged to get up and walk around as soon as you can.  · You will have some blood in your urine.  · Do not drive for 24 hours if you received a sedative.  This information is not intended to replace advice given to you by your health care provider. Make sure you discuss any questions you have with your health care provider.  Document Released: 12/15/2001 Document Revised: 05/25/2017 Document Reviewed: 07/01/2016  © 2017 [employer]  **    DIET: To avoid nausea, slowly advance diet as tolerated, avoiding spicy or greasy foods for the first day.  Add more substantial food to your diet according to your physician's instructions.  Babies can be fed formula or breast milk as soon as they are hungry.  INCREASE FLUIDS AND FIBER TO AVOID CONSTIPATION.    SURGICAL DRESSING/BATHING: **May shower in 24 hours. No baths, hot tubs, or swimming pools*    FOLLOW-UP APPOINTMENT:  A follow-up appointment should be arranged with your doctor in  *call to schedule*    You should CALL YOUR PHYSICIAN if you develop:  Fever greater than 101 degrees F.  Pain not relieved by medication, or persistent nausea or vomiting.  Excessive bleeding (blood soaking through dressing) or unexpected drainage from the wound.  Extreme redness or swelling around the incision site, drainage of pus or foul smelling drainage.  Inability to urinate or empty your bladder within 8 hours.  Problems with breathing or chest pain.    You should call 911 if you develop problems with breathing or chest pain.  If you are unable to contact your doctor or surgical center, you should go to the nearest emergency room or urgent care center.  Physician's telephone #: * [phone] **    If any questions arise, call your doctor.  If your doctor is not available, please feel free to call the Surgical Center at [phone].  The Center is open Monday through Friday from 7AM to 7PM.  You can also call the HEALTH HOTLINE open 24 hours/day, 7 days/week and speak to a nurse at [phone], or toll free at [phone].    A registered nurse may call you a few days after your surgery to see how you are doing after your procedure.    MEDICATIONS: Resume taking daily medication.  Take prescribed pain medication with food.  If no medication is prescribed, you may take non-aspirin pain medication if needed.  PAIN MEDICATION CAN BE VERY CONSTIPATING.  Take a stool softener or laxative such as senokot, pericolace, or milk of magnesia if needed.      If your physician has prescribed pain medication that includes Acetaminophen (Tylenol), do not take additional Acetaminophen (Tylenol) while taking the prescribed medication.    Depression / Suicide Risk    As you are discharged from this [facility] facility, it is important to learn how to keep safe from harming yourself.    Recognize the warning signs:  · Abrupt changes in personality, positive or negative- including increase in energy   · Giving  away possessions  · Change in eating patterns- significant weight changes-  positive or negative  · Change in sleeping patterns- unable to sleep or sleeping all the time   · Unwillingness or inability to communicate  · Depression  · Unusual sadness, discouragement and loneliness  · Talk of wanting to die  · Neglect of personal appearance   · Rebelliousness- reckless behavior  · Withdrawal from people/activities they love  · Confusion- inability to concentrate     If you or a loved one observes any of these behaviors or has concerns about self-harm, here's what you can do:  · Talk about it- your feelings and reasons for harming yourself  · Remove any means that you might use to hurt yourself (examples: pills, rope, extension cords, firearm)  · Get professional help from the community (Mental Health, Substance Abuse, psychological counseling)  · Do not be alone:Call your Safe Contact- someone whom you trust who will be there for you.  · Call your local CRISIS HOTLINE 505-9308 or 060-370-6042  · Call your local Children's Mobile Crisis Response Team [place] (225) 224-2061 or www.Whatever  · Call the toll free National Suicide Prevention Hotlines   · National Suicide Prevention Lifeline 476-821-ADPW (5491)  · National Hope Line Network 800-SUICIDE (197-4129)

## 2019-05-30 ENCOUNTER — HOSPITAL ENCOUNTER (OUTPATIENT)
Dept: LAB | Facility: MEDICAL CENTER | Age: 74
End: 2019-05-30
Attending: SPECIALIST
Payer: MEDICARE

## 2019-05-30 PROCEDURE — 87624 HPV HI-RISK TYP POOLED RSLT: CPT

## 2019-05-30 PROCEDURE — 88175 CYTOPATH C/V AUTO FLUID REDO: CPT

## 2019-08-16 ENCOUNTER — HOSPITAL ENCOUNTER (OUTPATIENT)
Dept: RADIOLOGY | Facility: MEDICAL CENTER | Age: 74
End: 2019-08-16
Attending: SPECIALIST | Admitting: SPECIALIST
Payer: MEDICARE

## 2019-08-16 DIAGNOSIS — Z01.810 PRE-OPERATIVE CARDIOVASCULAR EXAMINATION: ICD-10-CM

## 2019-08-16 DIAGNOSIS — Z01.811 PRE-OPERATIVE RESPIRATORY EXAMINATION: ICD-10-CM

## 2019-08-16 DIAGNOSIS — Z01.812 PRE-OPERATIVE LABORATORY EXAMINATION: ICD-10-CM

## 2019-08-16 LAB
ABO GROUP BLD: NORMAL
ALBUMIN SERPL BCP-MCNC: 4.5 G/DL (ref 3.2–4.9)
ALBUMIN/GLOB SERPL: 1.7 G/DL
ALP SERPL-CCNC: 90 U/L (ref 30–99)
ALT SERPL-CCNC: 12 U/L (ref 2–50)
ANION GAP SERPL CALC-SCNC: 10 MMOL/L (ref 0–11.9)
APTT PPP: 36.5 SEC (ref 24.7–36)
AST SERPL-CCNC: 16 U/L (ref 12–45)
BASOPHILS # BLD AUTO: 0.6 % (ref 0–1.8)
BASOPHILS # BLD: 0.04 K/UL (ref 0–0.12)
BILIRUB SERPL-MCNC: 0.4 MG/DL (ref 0.1–1.5)
BLD GP AB SCN SERPL QL: NORMAL
BUN SERPL-MCNC: 23 MG/DL (ref 8–22)
CALCIUM SERPL-MCNC: 10.7 MG/DL (ref 8.5–10.5)
CHLORIDE SERPL-SCNC: 107 MMOL/L (ref 96–112)
CO2 SERPL-SCNC: 22 MMOL/L (ref 20–33)
CREAT SERPL-MCNC: 0.91 MG/DL (ref 0.5–1.4)
EKG IMPRESSION: NORMAL
EOSINOPHIL # BLD AUTO: 0.11 K/UL (ref 0–0.51)
EOSINOPHIL NFR BLD: 1.7 % (ref 0–6.9)
ERYTHROCYTE [DISTWIDTH] IN BLOOD BY AUTOMATED COUNT: 44.9 FL (ref 35.9–50)
GLOBULIN SER CALC-MCNC: 2.7 G/DL (ref 1.9–3.5)
GLUCOSE SERPL-MCNC: 97 MG/DL (ref 65–99)
HCT VFR BLD AUTO: 42.9 % (ref 37–47)
HGB BLD-MCNC: 13.4 G/DL (ref 12–16)
IMM GRANULOCYTES # BLD AUTO: 0.02 K/UL (ref 0–0.11)
IMM GRANULOCYTES NFR BLD AUTO: 0.3 % (ref 0–0.9)
INR PPP: 0.98 (ref 0.87–1.13)
LYMPHOCYTES # BLD AUTO: 1.32 K/UL (ref 1–4.8)
LYMPHOCYTES NFR BLD: 20.7 % (ref 22–41)
MCH RBC QN AUTO: 25.8 PG (ref 27–33)
MCHC RBC AUTO-ENTMCNC: 31.2 G/DL (ref 33.6–35)
MCV RBC AUTO: 82.7 FL (ref 81.4–97.8)
MONOCYTES # BLD AUTO: 0.48 K/UL (ref 0–0.85)
MONOCYTES NFR BLD AUTO: 7.5 % (ref 0–13.4)
NEUTROPHILS # BLD AUTO: 4.41 K/UL (ref 2–7.15)
NEUTROPHILS NFR BLD: 69.2 % (ref 44–72)
NRBC # BLD AUTO: 0 K/UL
NRBC BLD-RTO: 0 /100 WBC
PLATELET # BLD AUTO: 232 K/UL (ref 164–446)
PMV BLD AUTO: 10.6 FL (ref 9–12.9)
POTASSIUM SERPL-SCNC: 4.1 MMOL/L (ref 3.6–5.5)
PROT SERPL-MCNC: 7.2 G/DL (ref 6–8.2)
PROTHROMBIN TIME: 13.2 SEC (ref 12–14.6)
RBC # BLD AUTO: 5.19 M/UL (ref 4.2–5.4)
RH BLD: NORMAL
SODIUM SERPL-SCNC: 139 MMOL/L (ref 135–145)
WBC # BLD AUTO: 6.4 K/UL (ref 4.8–10.8)

## 2019-08-16 PROCEDURE — 85730 THROMBOPLASTIN TIME PARTIAL: CPT

## 2019-08-16 PROCEDURE — 71045 X-RAY EXAM CHEST 1 VIEW: CPT

## 2019-08-16 PROCEDURE — 86900 BLOOD TYPING SEROLOGIC ABO: CPT

## 2019-08-16 PROCEDURE — 86850 RBC ANTIBODY SCREEN: CPT

## 2019-08-16 PROCEDURE — 93005 ELECTROCARDIOGRAM TRACING: CPT | Performed by: SPECIALIST

## 2019-08-16 PROCEDURE — 80053 COMPREHEN METABOLIC PANEL: CPT

## 2019-08-16 PROCEDURE — 36415 COLL VENOUS BLD VENIPUNCTURE: CPT

## 2019-08-16 PROCEDURE — 86901 BLOOD TYPING SEROLOGIC RH(D): CPT

## 2019-08-16 PROCEDURE — 85610 PROTHROMBIN TIME: CPT

## 2019-08-16 PROCEDURE — 85025 COMPLETE CBC W/AUTO DIFF WBC: CPT

## 2019-08-16 RX ORDER — LISINOPRIL 20 MG/1
20 TABLET ORAL EVERY MORNING
Status: ON HOLD | COMMUNITY
End: 2019-09-25

## 2019-08-29 ENCOUNTER — HOSPITAL ENCOUNTER (OUTPATIENT)
Facility: MEDICAL CENTER | Age: 74
End: 2019-08-29
Attending: SPECIALIST | Admitting: SPECIALIST
Payer: MEDICARE

## 2019-08-29 ENCOUNTER — APPOINTMENT (OUTPATIENT)
Dept: RADIOLOGY | Facility: MEDICAL CENTER | Age: 74
End: 2019-08-29
Attending: SPECIALIST
Payer: MEDICARE

## 2019-08-29 ENCOUNTER — ANESTHESIA (OUTPATIENT)
Dept: SURGERY | Facility: MEDICAL CENTER | Age: 74
End: 2019-08-29
Payer: MEDICARE

## 2019-08-29 ENCOUNTER — ANESTHESIA EVENT (OUTPATIENT)
Dept: SURGERY | Facility: MEDICAL CENTER | Age: 74
End: 2019-08-29
Payer: MEDICARE

## 2019-08-29 VITALS
RESPIRATION RATE: 17 BRPM | HEART RATE: 74 BPM | TEMPERATURE: 96.9 F | OXYGEN SATURATION: 95 % | HEIGHT: 63 IN | BODY MASS INDEX: 24.18 KG/M2 | SYSTOLIC BLOOD PRESSURE: 133 MMHG | DIASTOLIC BLOOD PRESSURE: 74 MMHG | WEIGHT: 136.47 LBS

## 2019-08-29 LAB
GRAM STN SPEC: NORMAL
SIGNIFICANT IND 70042: NORMAL
SITE SITE: NORMAL
SOURCE SOURCE: NORMAL

## 2019-08-29 PROCEDURE — 160039 HCHG SURGERY MINUTES - EA ADDL 1 MIN LEVEL 3: Performed by: SPECIALIST

## 2019-08-29 PROCEDURE — C1758 CATHETER, URETERAL: HCPCS | Performed by: SPECIALIST

## 2019-08-29 PROCEDURE — 160035 HCHG PACU - 1ST 60 MINS PHASE I: Performed by: SPECIALIST

## 2019-08-29 PROCEDURE — 160009 HCHG ANES TIME/MIN: Performed by: SPECIALIST

## 2019-08-29 PROCEDURE — 160046 HCHG PACU - 1ST 60 MINS PHASE II: Performed by: SPECIALIST

## 2019-08-29 PROCEDURE — 501330 HCHG SET, CYSTO IRRIG TUBING: Performed by: SPECIALIST

## 2019-08-29 PROCEDURE — 160025 RECOVERY II MINUTES (STATS): Performed by: SPECIALIST

## 2019-08-29 PROCEDURE — 87070 CULTURE OTHR SPECIMN AEROBIC: CPT

## 2019-08-29 PROCEDURE — 160048 HCHG OR STATISTICAL LEVEL 1-5: Performed by: SPECIALIST

## 2019-08-29 PROCEDURE — 700105 HCHG RX REV CODE 258: Performed by: ANESTHESIOLOGY

## 2019-08-29 PROCEDURE — C1769 GUIDE WIRE: HCPCS | Performed by: SPECIALIST

## 2019-08-29 PROCEDURE — 87075 CULTR BACTERIA EXCEPT BLOOD: CPT

## 2019-08-29 PROCEDURE — 160028 HCHG SURGERY MINUTES - 1ST 30 MINS LEVEL 3: Performed by: SPECIALIST

## 2019-08-29 PROCEDURE — 700105 HCHG RX REV CODE 258: Performed by: SPECIALIST

## 2019-08-29 PROCEDURE — 87205 SMEAR GRAM STAIN: CPT

## 2019-08-29 PROCEDURE — 160002 HCHG RECOVERY MINUTES (STAT): Performed by: SPECIALIST

## 2019-08-29 PROCEDURE — 500880 HCHG PACK, CYSTO W/SEP LEGGINGS: Performed by: SPECIALIST

## 2019-08-29 PROCEDURE — 87186 SC STD MICRODIL/AGAR DIL: CPT

## 2019-08-29 PROCEDURE — 700101 HCHG RX REV CODE 250: Performed by: ANESTHESIOLOGY

## 2019-08-29 PROCEDURE — 700111 HCHG RX REV CODE 636 W/ 250 OVERRIDE (IP)

## 2019-08-29 PROCEDURE — 87077 CULTURE AEROBIC IDENTIFY: CPT

## 2019-08-29 PROCEDURE — 700111 HCHG RX REV CODE 636 W/ 250 OVERRIDE (IP): Performed by: ANESTHESIOLOGY

## 2019-08-29 RX ORDER — HALOPERIDOL 5 MG/ML
1 INJECTION INTRAMUSCULAR
Status: DISCONTINUED | OUTPATIENT
Start: 2019-08-29 | End: 2019-08-29 | Stop reason: HOSPADM

## 2019-08-29 RX ORDER — HYDROMORPHONE HYDROCHLORIDE 1 MG/ML
0.4 INJECTION, SOLUTION INTRAMUSCULAR; INTRAVENOUS; SUBCUTANEOUS
Status: DISCONTINUED | OUTPATIENT
Start: 2019-08-29 | End: 2019-08-29 | Stop reason: HOSPADM

## 2019-08-29 RX ORDER — SODIUM CHLORIDE, SODIUM LACTATE, POTASSIUM CHLORIDE, CALCIUM CHLORIDE 600; 310; 30; 20 MG/100ML; MG/100ML; MG/100ML; MG/100ML
INJECTION, SOLUTION INTRAVENOUS CONTINUOUS
Status: DISCONTINUED | OUTPATIENT
Start: 2019-08-29 | End: 2019-08-29 | Stop reason: HOSPADM

## 2019-08-29 RX ORDER — CEFAZOLIN SODIUM 1 G/3ML
INJECTION, POWDER, FOR SOLUTION INTRAMUSCULAR; INTRAVENOUS PRN
Status: DISCONTINUED | OUTPATIENT
Start: 2019-08-29 | End: 2019-08-29 | Stop reason: SURG

## 2019-08-29 RX ORDER — HYDROMORPHONE HYDROCHLORIDE 1 MG/ML
1 INJECTION, SOLUTION INTRAMUSCULAR; INTRAVENOUS; SUBCUTANEOUS
Status: DISCONTINUED | OUTPATIENT
Start: 2019-08-29 | End: 2019-08-29 | Stop reason: HOSPADM

## 2019-08-29 RX ORDER — OXYCODONE HCL 5 MG/5 ML
5 SOLUTION, ORAL ORAL
Status: DISCONTINUED | OUTPATIENT
Start: 2019-08-29 | End: 2019-08-29 | Stop reason: HOSPADM

## 2019-08-29 RX ORDER — HYDROMORPHONE HYDROCHLORIDE 1 MG/ML
0.2 INJECTION, SOLUTION INTRAMUSCULAR; INTRAVENOUS; SUBCUTANEOUS
Status: DISCONTINUED | OUTPATIENT
Start: 2019-08-29 | End: 2019-08-29 | Stop reason: HOSPADM

## 2019-08-29 RX ORDER — MEPERIDINE HYDROCHLORIDE 25 MG/ML
12.5 INJECTION INTRAMUSCULAR; INTRAVENOUS; SUBCUTANEOUS
Status: DISCONTINUED | OUTPATIENT
Start: 2019-08-29 | End: 2019-08-29 | Stop reason: HOSPADM

## 2019-08-29 RX ORDER — DIPHENHYDRAMINE HYDROCHLORIDE 50 MG/ML
12.5 INJECTION INTRAMUSCULAR; INTRAVENOUS
Status: DISCONTINUED | OUTPATIENT
Start: 2019-08-29 | End: 2019-08-29 | Stop reason: HOSPADM

## 2019-08-29 RX ORDER — IPRATROPIUM BROMIDE AND ALBUTEROL SULFATE 2.5; .5 MG/3ML; MG/3ML
3 SOLUTION RESPIRATORY (INHALATION)
Status: DISCONTINUED | OUTPATIENT
Start: 2019-08-29 | End: 2019-08-29 | Stop reason: HOSPADM

## 2019-08-29 RX ORDER — KETOROLAC TROMETHAMINE 30 MG/ML
INJECTION, SOLUTION INTRAMUSCULAR; INTRAVENOUS PRN
Status: DISCONTINUED | OUTPATIENT
Start: 2019-08-29 | End: 2019-08-29 | Stop reason: SURG

## 2019-08-29 RX ORDER — LIDOCAINE HYDROCHLORIDE 20 MG/ML
INJECTION, SOLUTION EPIDURAL; INFILTRATION; INTRACAUDAL; PERINEURAL PRN
Status: DISCONTINUED | OUTPATIENT
Start: 2019-08-29 | End: 2019-08-29 | Stop reason: SURG

## 2019-08-29 RX ORDER — LIDOCAINE HYDROCHLORIDE 10 MG/ML
INJECTION, SOLUTION EPIDURAL; INFILTRATION; INTRACAUDAL; PERINEURAL
Status: COMPLETED
Start: 2019-08-29 | End: 2019-08-29

## 2019-08-29 RX ORDER — SODIUM CHLORIDE, SODIUM GLUCONATE, SODIUM ACETATE, POTASSIUM CHLORIDE AND MAGNESIUM CHLORIDE 526; 502; 368; 37; 30 MG/100ML; MG/100ML; MG/100ML; MG/100ML; MG/100ML
500 INJECTION, SOLUTION INTRAVENOUS CONTINUOUS
Status: DISCONTINUED | OUTPATIENT
Start: 2019-08-29 | End: 2019-08-29 | Stop reason: HOSPADM

## 2019-08-29 RX ORDER — ONDANSETRON 2 MG/ML
4 INJECTION INTRAMUSCULAR; INTRAVENOUS
Status: DISCONTINUED | OUTPATIENT
Start: 2019-08-29 | End: 2019-08-29 | Stop reason: HOSPADM

## 2019-08-29 RX ORDER — SODIUM CHLORIDE, SODIUM GLUCONATE, SODIUM ACETATE, POTASSIUM CHLORIDE AND MAGNESIUM CHLORIDE 526; 502; 368; 37; 30 MG/100ML; MG/100ML; MG/100ML; MG/100ML; MG/100ML
INJECTION, SOLUTION INTRAVENOUS
Status: DISCONTINUED | OUTPATIENT
Start: 2019-08-29 | End: 2019-08-29 | Stop reason: SURG

## 2019-08-29 RX ORDER — ROCURONIUM BROMIDE 10 MG/ML
INJECTION, SOLUTION INTRAVENOUS PRN
Status: DISCONTINUED | OUTPATIENT
Start: 2019-08-29 | End: 2019-08-29 | Stop reason: SURG

## 2019-08-29 RX ORDER — METOPROLOL TARTRATE 1 MG/ML
INJECTION, SOLUTION INTRAVENOUS PRN
Status: DISCONTINUED | OUTPATIENT
Start: 2019-08-29 | End: 2019-08-29 | Stop reason: SURG

## 2019-08-29 RX ORDER — OXYCODONE HCL 5 MG/5 ML
10 SOLUTION, ORAL ORAL
Status: DISCONTINUED | OUTPATIENT
Start: 2019-08-29 | End: 2019-08-29 | Stop reason: HOSPADM

## 2019-08-29 RX ORDER — ONDANSETRON 2 MG/ML
INJECTION INTRAMUSCULAR; INTRAVENOUS PRN
Status: DISCONTINUED | OUTPATIENT
Start: 2019-08-29 | End: 2019-08-29 | Stop reason: SURG

## 2019-08-29 RX ORDER — DEXAMETHASONE SODIUM PHOSPHATE 4 MG/ML
INJECTION, SOLUTION INTRA-ARTICULAR; INTRALESIONAL; INTRAMUSCULAR; INTRAVENOUS; SOFT TISSUE PRN
Status: DISCONTINUED | OUTPATIENT
Start: 2019-08-29 | End: 2019-08-29 | Stop reason: SURG

## 2019-08-29 RX ORDER — MIDAZOLAM HYDROCHLORIDE 1 MG/ML
1 INJECTION INTRAMUSCULAR; INTRAVENOUS
Status: DISCONTINUED | OUTPATIENT
Start: 2019-08-29 | End: 2019-08-29 | Stop reason: HOSPADM

## 2019-08-29 RX ADMIN — EPHEDRINE SULFATE 10 MG: 50 INJECTION, SOLUTION INTRAVENOUS at 16:02

## 2019-08-29 RX ADMIN — METOPROLOL TARTRATE 5 MG: 5 INJECTION, SOLUTION INTRAVENOUS at 15:37

## 2019-08-29 RX ADMIN — SUGAMMADEX 200 MG: 100 INJECTION, SOLUTION INTRAVENOUS at 16:14

## 2019-08-29 RX ADMIN — SODIUM CHLORIDE, POTASSIUM CHLORIDE, SODIUM LACTATE AND CALCIUM CHLORIDE: 600; 310; 30; 20 INJECTION, SOLUTION INTRAVENOUS at 11:03

## 2019-08-29 RX ADMIN — Medication 0.5 ML: at 11:04

## 2019-08-29 RX ADMIN — DEXAMETHASONE SODIUM PHOSPHATE 8 MG: 4 INJECTION, SOLUTION INTRA-ARTICULAR; INTRALESIONAL; INTRAMUSCULAR; INTRAVENOUS; SOFT TISSUE at 15:35

## 2019-08-29 RX ADMIN — SODIUM CHLORIDE, SODIUM GLUCONATE, SODIUM ACETATE, POTASSIUM CHLORIDE AND MAGNESIUM CHLORIDE: 526; 502; 368; 37; 30 INJECTION, SOLUTION INTRAVENOUS at 15:46

## 2019-08-29 RX ADMIN — LIDOCAINE HYDROCHLORIDE 2.5 ML: 20 INJECTION, SOLUTION EPIDURAL; INFILTRATION; INTRACAUDAL at 15:33

## 2019-08-29 RX ADMIN — KETOROLAC TROMETHAMINE 15 MG: 30 INJECTION, SOLUTION INTRAMUSCULAR at 16:14

## 2019-08-29 RX ADMIN — ONDANSETRON 4 MG: 2 INJECTION INTRAMUSCULAR; INTRAVENOUS at 16:14

## 2019-08-29 RX ADMIN — PROPOFOL 150 MG: 10 INJECTION, EMULSION INTRAVENOUS at 15:33

## 2019-08-29 RX ADMIN — ROCURONIUM BROMIDE 50 MG: 10 INJECTION, SOLUTION INTRAVENOUS at 15:33

## 2019-08-29 RX ADMIN — CEFAZOLIN 2 G: 330 INJECTION, POWDER, FOR SOLUTION INTRAMUSCULAR; INTRAVENOUS at 15:30

## 2019-08-29 RX ADMIN — LIDOCAINE HYDROCHLORIDE 0.5 ML: 10 INJECTION, SOLUTION EPIDURAL; INFILTRATION; INTRACAUDAL at 11:04

## 2019-08-29 ASSESSMENT — PAIN SCALES - GENERAL: PAIN_LEVEL: 0

## 2019-08-29 NOTE — ANESTHESIA PREPROCEDURE EVALUATION
Relevant Problems   No relevant active problems       Physical Exam    Airway   Mallampati: III  TM distance: >3 FB  Neck ROM: limited       Cardiovascular - normal exam  Rhythm: regular  Rate: normal  (-) murmur     Dental - normal exam         Pulmonary - normal exam  Breath sounds clear to auscultation     Abdominal    Neurological - normal exam                 Anesthesia Plan    ASA 3       Plan - general       Airway plan will be LMA  (CKD, hydronephrosis)                Informed Consent:

## 2019-08-29 NOTE — ANESTHESIA PROCEDURE NOTES
Airway  Date/Time: 8/29/2019 3:33 PM  Performed by: Thomas Villalobos III, M.D.  Authorized by: Thomas Villalobos III, M.D.     Location:  OR  Urgency:  Elective  Difficult Airway: No    Indications for Airway Management:  Anesthesia  Spontaneous Ventilation: absent    Sedation Level:  Deep  Preoxygenated: Yes    Final Airway Type:  Supraglottic airway  Final Supraglottic Airway:  Standard LMA  SGA Size:  3  Number of Attempts at Approach:  1

## 2019-08-29 NOTE — ANESTHESIA TIME REPORT
Anesthesia Start and Stop Event Times     Date Time Event    8/29/2019 1507 Ready for Procedure     1525 Anesthesia Start     1626 Anesthesia Stop        Responsible Staff  08/29/19    Name Role Begin End    Thomas Villalobos III, M.D. Anesth 1525 1626        Preop Diagnosis (Free Text):  Pre-op Diagnosis     URETERAL STRICTURE        Preop Diagnosis (Codes):    Post op Diagnosis  Ureteral stricture      Premium Reason  A. 3PM - 7AM    Comments:

## 2019-08-29 NOTE — ANESTHESIA POSTPROCEDURE EVALUATION
Patient: Nessa Dennis    Procedure Summary     Date:  08/29/19 Room / Location:  [place] [id number] / SURGERY [facility]    Anesthesia Start:  1525 Anesthesia Stop:  1626    Procedure:  CYSTOSCOPY, WITH URETERAL STENT REMOVAL (Left Bladder) Diagnosis:  (URETERAL STRICTURE)    Surgeon:  Adam Hays M.D. Responsible Provider:  Thomas Villalobos III, M.D.    Anesthesia Type:  general ASA Status:  3          Final Anesthesia Type: general  Last vitals  BP   Blood Pressure : 128/82, NIBP: 138/86    Temp   36.4 °C (97.6 °F)    Pulse   Pulse: 95   Resp   18    SpO2   98 %      Anesthesia Post Evaluation    Patient location during evaluation: PACU  Patient participation: complete - patient participated  Level of consciousness: awake and alert  Pain score: 0    Airway patency: patent  Anesthetic complications: no  Cardiovascular status: hemodynamically stable  Respiratory status: acceptable  Hydration status: euvolemic    PONV: none           Nurse Pain Score: 0 (NPRS)

## 2019-08-30 NOTE — DISCHARGE INSTRUCTIONS
ACTIVITY: Rest and take it easy for the first 24 hours.  A responsible adult is recommended to remain with you during that time.  It is normal to feel sleepy.  We encourage you to not do anything that requires balance, judgment or coordination.    MILD FLU-LIKE SYMPTOMS ARE NORMAL. YOU MAY EXPERIENCE GENERALIZED MUSCLE ACHES, THROAT IRRITATION, HEADACHE AND/OR SOME NAUSEA.    FOR 24 HOURS DO NOT:  Drive, operate machinery or run household appliances.  Drink beer or alcoholic beverages.   Make important decisions or sign legal documents.    SPECIAL INSTRUCTIONS:   1. Call [phone] if you have not heard about renal ultrasound by Tuesday    2. Call [phone] for results of renal ultrasound to schedule NT tube and placement through IR (Interventional Radiology)    3. Call [phone] if any questions or concerns      DIET: To avoid nausea, slowly advance diet as tolerated, avoiding spicy or greasy foods for the first day.  Add more substantial food to your diet according to your physician's instructions. INCREASE FLUIDS AND FIBER TO AVOID CONSTIPATION.        FOLLOW-UP APPOINTMENT:  A follow-up appointment should be arranged with your doctor in 1-2 weeks; call to schedule.    You should CALL YOUR PHYSICIAN if you develop:  Fever greater than 101 degrees F.  Pain not relieved by medication, or persistent nausea or vomiting.  Excessive bleeding (blood soaking through dressing) or unexpected drainage from the wound.  Extreme redness or swelling around the incision site, drainage of pus or foul smelling drainage.  Inability to urinate or empty your bladder within 8 hours.  Problems with breathing or chest pain.    You should call 911 if you develop problems with breathing or chest pain.  If you are unable to contact your doctor or surgical center, you should go to the nearest emergency room or urgent care center.    Dr Hays-Physician's telephone #: [phone]    If any questions arise, call your doctor.  If your doctor is  not available, please feel free to call the Surgical Center at [phone].  The Center is open Monday through Friday from 7AM to 7PM.  You can also call the HEALTH HOTLINE open 24 hours/day, 7 days/week and speak to a nurse at [phone], or toll free at [phone].    A registered nurse may call you a few days after your surgery to see how you are doing after your procedure.    MEDICATIONS: Resume taking daily medication.  Take prescribed pain medication with food.  If no medication is prescribed, you may take non-aspirin pain medication if needed.  PAIN MEDICATION CAN BE VERY CONSTIPATING.  Take a stool softener or laxative such as senokot, pericolace, or milk of magnesia if needed.    Prescription given for none.  Last pain medication given at none.    If your physician has prescribed pain medication that includes Acetaminophen (Tylenol), do not take additional Acetaminophen (Tylenol) while taking the prescribed medication.    Depression / Suicide Risk    As you are discharged from this [facility] facility, it is important to learn how to keep safe from harming yourself.    Recognize the warning signs:  · Abrupt changes in personality, positive or negative- including increase in energy   · Giving away possessions  · Change in eating patterns- significant weight changes-  positive or negative  · Change in sleeping patterns- unable to sleep or sleeping all the time   · Unwillingness or inability to communicate  · Depression  · Unusual sadness, discouragement and loneliness  · Talk of wanting to die  · Neglect of personal appearance   · Rebelliousness- reckless behavior  · Withdrawal from people/activities they love  · Confusion- inability to concentrate     If you or a loved one observes any of these behaviors or has concerns about self-harm, here's what you can do:  · Talk about it- your feelings and reasons for harming yourself  · Remove any means that you might use to hurt yourself (examples:  pills, rope, extension cords, firearm)  · Get professional help from the community (Mental Health, Substance Abuse, psychological counseling)  · Do not be alone:Call your Safe Contact- someone whom you trust who will be there for you.  · Call your local CRISIS HOTLINE 585-3094 or 627-607-3624  · Call your local Children's Mobile Crisis Response Team [place] (012) 807-6735 or www.[employer]  · Call the toll free National Suicide Prevention Hotlines   · National Suicide Prevention Lifeline 506-779-KZPR (9898)  · National Hope Line Network 800-SUICIDE (038-9920)

## 2019-08-30 NOTE — OP REPORT
DATE OF SERVICE:  08/29/2019    PREOPERATIVE DIAGNOSES:  1.  History of cervical cancer.  2.  Status post chemoradiation therapy.  3.  History of left ureteral stricture.  4.  History of rectovaginal fistula, status post diverting colostomy.  5.  History of left nephrostomy tube placement with subsequent left ureteral   stent placement in 03/2019.    POSTOPERATIVE DIAGNOSES:  1.  History of cervical cancer.  2.  Persistent left ureteral stricture at the ureterovesical junction.  3.  Calcification of the left ureteral stent.    PROCEDURE PERFORMED:  1.  Cystoscopy with removal of left ureteral stent.  2.  Attempted replacement of the left ureteral stent without success.    SURGEON:  Adam Hays MD    ASSISTANT:  Thomas Villalobos MD    ESTIMATED BLOOD LOSS:  Minimal.    FLUIDS:  Per Dr. Villalobos.    URINE OUTPUT:  Incomplete measure secondary to procedure.    INDICATIONS FOR SURGERY:  The patient is a very pleasant 73-year-old female   who is well known to me.  The patient has had a prolonged remote history of   cervical cancer, which she initially underwent a radical hysterectomy with   pelvic lymphadenectomy.  She remained in remission until she had a recurrence,   which required concurrent chemoradiation therapy.  The patient then   subsequently developed ureteral stricture on the left side requiring left   nephrostomy tube placement and subsequent left ureteral stent placement in   03/2019.  In addition, patient also has a history of rectovaginal fistula with   a subsequent colostomy placement.  Patient is due for left ureteral stent   change.  Most recently, patient has developed left flank pain.  It is since   her last ureteral stent was placed nearly 5 months ago.  Patient was advised   to undergo a left ureteral stent exchange via a guidewire.  Risks, benefits,   and rationale for procedures were reviewed with the patient in detail.    Patient is understanding of these risks and wished to proceed with the  surgery   as planned.    INTRAOPERATIVE FINDINGS:  Examination under anesthesia again revealed a   rectovaginal fistula in the posterior aspect of the vagina.  I could not   appreciate any obvious mass or tumor.  Cystoscopic evaluation of the bladder   revealed normal bladder mucosa; however, the left ureteral stent in the   ureteral orifice had displaced superiorly and medially in relation to the   right ureteral orifice.  The ureteral stent was calcified.  We were unable to   pass a guidewire through this calcified.    PROCEDURE NOTE:  Patient was given IV antibiotics prior to procedure.  Patient   was prepped and draped and placed in modified dorsal lithotomy position.  I   began with a cystoscope.  A 30-degree cystoscope was inserted transurethrally.    Initially, we had a difficult time to find a ureteral stent, mainly because   it was not in its normal location; however, as we examined that in the   superior aspect of the bladder, there appears to be the right ureteral stent.    This ureteral stent was easily retrieved.  I tried to pass a guidewire   through the ureteral stent as we delivered the proximal portion of the   ureteral stent up to the urethral orifice.  Initially we were unable to pass   the guidewire where the proximal opening was mainly because of the   calcification.  I cut the tip of that to 1 cm and then I passed the guidewire.    I was able to pass the guidewire up until the proximity of the proximal   ureteral stent on the side of the kidney.  I was unable to pass guidewire   through; this was confirmed via fluoroscopy.  I suspect the patient had   calcification of the ureteral stent; thus, we removed the entire ureteral   stent along with the guidewire.  I then attempted to place a guidewire through   the left ureteral orifice, it was exceedingly very difficult.  I was not able   to pass it.  I tried sensor guidewire and I was not successful.  After   testing for nearly 20 minutes, the  patient appears to have a ureteral   stricture, right at the level of the ureterovesical junction, which has   precluded me from placing a left ureteral stent.  Thus, I felt that at this   point in time, I should not proceed as I may potentially compromise the   patient more; thus we elected not to proceed.  My plan is to repeat an   ultrasound in the midweek.  If she has urethral stricture, I am anticipating   that the left kidney will be hydronephrotic, for better access for the left   kidney, we will attempt to place the ureteral stent in an antegrade fashion   via nephrostomy.    At this point in time, the bladder was emptied.  The patient was awakened   without any problems, transferred to the PACU in stable condition.       ____________________________________     MD JAMIE CASTILLO / NTS    DD:  08/29/2019 23:45:21  DT:  08/30/2019 01:17:00    D#:  [id number]  Job#:  [id number]

## 2019-09-02 LAB
BACTERIA SPEC ANAEROBE CULT: ABNORMAL
BACTERIA SPEC ANAEROBE CULT: ABNORMAL
BACTERIA WND AEROBE CULT: ABNORMAL
GRAM STN SPEC: ABNORMAL
SIGNIFICANT IND 70042: ABNORMAL
SIGNIFICANT IND 70042: ABNORMAL
SITE SITE: ABNORMAL
SITE SITE: ABNORMAL
SOURCE SOURCE: ABNORMAL
SOURCE SOURCE: ABNORMAL

## 2019-09-04 ENCOUNTER — HOSPITAL ENCOUNTER (OUTPATIENT)
Dept: RADIOLOGY | Facility: MEDICAL CENTER | Age: 74
End: 2019-09-04
Attending: SPECIALIST
Payer: MEDICARE

## 2019-09-04 ENCOUNTER — APPOINTMENT (OUTPATIENT)
Dept: ONCOLOGY | Facility: MEDICAL CENTER | Age: 74
End: 2019-09-04
Attending: SPECIALIST
Payer: MEDICARE

## 2019-09-04 DIAGNOSIS — N13.1 HYDRONEPHROSIS WITH URETERAL STRICTURE: ICD-10-CM

## 2019-09-04 PROCEDURE — 76775 US EXAM ABDO BACK WALL LIM: CPT

## 2019-09-05 ENCOUNTER — APPOINTMENT (OUTPATIENT)
Dept: ONCOLOGY | Facility: MEDICAL CENTER | Age: 74
End: 2019-09-05
Attending: INTERNAL MEDICINE
Payer: MEDICARE

## 2019-09-06 ENCOUNTER — APPOINTMENT (OUTPATIENT)
Dept: ONCOLOGY | Facility: MEDICAL CENTER | Age: 74
End: 2019-09-06
Attending: INTERNAL MEDICINE
Payer: MEDICARE

## 2019-09-07 ENCOUNTER — APPOINTMENT (OUTPATIENT)
Dept: ONCOLOGY | Facility: MEDICAL CENTER | Age: 74
End: 2019-09-07
Attending: INTERNAL MEDICINE
Payer: MEDICARE

## 2019-09-08 ENCOUNTER — APPOINTMENT (OUTPATIENT)
Dept: ONCOLOGY | Facility: MEDICAL CENTER | Age: 74
End: 2019-09-08
Attending: INTERNAL MEDICINE
Payer: MEDICARE

## 2019-09-09 ENCOUNTER — APPOINTMENT (OUTPATIENT)
Dept: ONCOLOGY | Facility: MEDICAL CENTER | Age: 74
End: 2019-09-09
Attending: INTERNAL MEDICINE
Payer: MEDICARE

## 2019-09-09 NOTE — PROGRESS NOTES
Patient arrived ambulatory to the [facility] for Invan[facility]. Reviewed vital signs, labs, and physician order. Patient reports urinating without issue. Arrives with SL midline IV access established in RUE, no blood return visualized, flushes with ease. Ertapenem administered, no adverse reaction observed. IV flushed per protocol, Kenisha SEVERINO to visualize midline prior to infusion on 3/31/19. 4X4 gauze and mesh sleeve placed to RUE for protection. Confirmed upcoming appointment date and time with patient. Patient left the [facility] ambulatory in no sign of distress.   
4

## 2019-09-16 ENCOUNTER — HOSPITAL ENCOUNTER (INPATIENT)
Facility: MEDICAL CENTER | Age: 74
LOS: 9 days | DRG: 872 | End: 2019-09-25
Attending: EMERGENCY MEDICINE | Admitting: INTERNAL MEDICINE
Payer: MEDICARE

## 2019-09-16 ENCOUNTER — APPOINTMENT (OUTPATIENT)
Dept: RADIOLOGY | Facility: MEDICAL CENTER | Age: 74
DRG: 872 | End: 2019-09-16
Attending: EMERGENCY MEDICINE
Payer: MEDICARE

## 2019-09-16 DIAGNOSIS — N12 PYELONEPHRITIS: ICD-10-CM

## 2019-09-16 LAB
ALBUMIN SERPL BCP-MCNC: 4.3 G/DL (ref 3.2–4.9)
ALBUMIN/GLOB SERPL: 1.6 G/DL
ALP SERPL-CCNC: 93 U/L (ref 30–99)
ALT SERPL-CCNC: 12 U/L (ref 2–50)
ANION GAP SERPL CALC-SCNC: 14 MMOL/L (ref 0–11.9)
APPEARANCE UR: ABNORMAL
AST SERPL-CCNC: 22 U/L (ref 12–45)
BACTERIA #/AREA URNS HPF: ABNORMAL /HPF
BASOPHILS # BLD AUTO: 0.3 % (ref 0–1.8)
BASOPHILS # BLD: 0.04 K/UL (ref 0–0.12)
BILIRUB SERPL-MCNC: 0.6 MG/DL (ref 0.1–1.5)
BILIRUB UR QL STRIP.AUTO: NEGATIVE
BUN SERPL-MCNC: 27 MG/DL (ref 8–22)
CALCIUM SERPL-MCNC: 9.7 MG/DL (ref 8.5–10.5)
CHLORIDE SERPL-SCNC: 103 MMOL/L (ref 96–112)
CO2 SERPL-SCNC: 18 MMOL/L (ref 20–33)
COLOR UR: ABNORMAL
CREAT SERPL-MCNC: 1.12 MG/DL (ref 0.5–1.4)
EOSINOPHIL # BLD AUTO: 0.03 K/UL (ref 0–0.51)
EOSINOPHIL NFR BLD: 0.2 % (ref 0–6.9)
EPI CELLS #/AREA URNS HPF: NEGATIVE /HPF
ERYTHROCYTE [DISTWIDTH] IN BLOOD BY AUTOMATED COUNT: 44.8 FL (ref 35.9–50)
GLOBULIN SER CALC-MCNC: 2.7 G/DL (ref 1.9–3.5)
GLUCOSE SERPL-MCNC: 125 MG/DL (ref 65–99)
GLUCOSE UR STRIP.AUTO-MCNC: NEGATIVE MG/DL
HCT VFR BLD AUTO: 43.4 % (ref 37–47)
HGB BLD-MCNC: 14.4 G/DL (ref 12–16)
HYALINE CASTS #/AREA URNS LPF: ABNORMAL /LPF
IMM GRANULOCYTES # BLD AUTO: 0.06 K/UL (ref 0–0.11)
IMM GRANULOCYTES NFR BLD AUTO: 0.4 % (ref 0–0.9)
KETONES UR STRIP.AUTO-MCNC: NEGATIVE MG/DL
LACTATE BLD-SCNC: 1.3 MMOL/L (ref 0.5–2)
LEUKOCYTE ESTERASE UR QL STRIP.AUTO: ABNORMAL
LYMPHOCYTES # BLD AUTO: 0.78 K/UL (ref 1–4.8)
LYMPHOCYTES NFR BLD: 4.9 % (ref 22–41)
MCH RBC QN AUTO: 27.8 PG (ref 27–33)
MCHC RBC AUTO-ENTMCNC: 33.2 G/DL (ref 33.6–35)
MCV RBC AUTO: 83.8 FL (ref 81.4–97.8)
MICRO URNS: ABNORMAL
MONOCYTES # BLD AUTO: 0.85 K/UL (ref 0–0.85)
MONOCYTES NFR BLD AUTO: 5.4 % (ref 0–13.4)
NEUTROPHILS # BLD AUTO: 14.01 K/UL (ref 2–7.15)
NEUTROPHILS NFR BLD: 88.8 % (ref 44–72)
NITRITE UR QL STRIP.AUTO: POSITIVE
NRBC # BLD AUTO: 0 K/UL
NRBC BLD-RTO: 0 /100 WBC
PH UR STRIP.AUTO: 6 [PH] (ref 5–8)
PLATELET # BLD AUTO: 228 K/UL (ref 164–446)
PMV BLD AUTO: 10.4 FL (ref 9–12.9)
POTASSIUM SERPL-SCNC: 4.3 MMOL/L (ref 3.6–5.5)
PROT SERPL-MCNC: 7 G/DL (ref 6–8.2)
PROT UR QL STRIP: 100 MG/DL
RBC # BLD AUTO: 5.18 M/UL (ref 4.2–5.4)
RBC # URNS HPF: ABNORMAL /HPF
RBC UR QL AUTO: ABNORMAL
SODIUM SERPL-SCNC: 135 MMOL/L (ref 135–145)
SP GR UR STRIP.AUTO: 1.02
UROBILINOGEN UR STRIP.AUTO-MCNC: 1 MG/DL
WBC # BLD AUTO: 15.8 K/UL (ref 4.8–10.8)
WBC #/AREA URNS HPF: ABNORMAL /HPF

## 2019-09-16 PROCEDURE — 96376 TX/PRO/DX INJ SAME DRUG ADON: CPT

## 2019-09-16 PROCEDURE — 80053 COMPREHEN METABOLIC PANEL: CPT

## 2019-09-16 PROCEDURE — 83735 ASSAY OF MAGNESIUM: CPT

## 2019-09-16 PROCEDURE — 700111 HCHG RX REV CODE 636 W/ 250 OVERRIDE (IP)

## 2019-09-16 PROCEDURE — 36415 COLL VENOUS BLD VENIPUNCTURE: CPT

## 2019-09-16 PROCEDURE — 87040 BLOOD CULTURE FOR BACTERIA: CPT | Mod: 91

## 2019-09-16 PROCEDURE — 700105 HCHG RX REV CODE 258: Performed by: STUDENT IN AN ORGANIZED HEALTH CARE EDUCATION/TRAINING PROGRAM

## 2019-09-16 PROCEDURE — 87086 URINE CULTURE/COLONY COUNT: CPT

## 2019-09-16 PROCEDURE — 81001 URINALYSIS AUTO W/SCOPE: CPT

## 2019-09-16 PROCEDURE — 83605 ASSAY OF LACTIC ACID: CPT

## 2019-09-16 PROCEDURE — 96375 TX/PRO/DX INJ NEW DRUG ADDON: CPT

## 2019-09-16 PROCEDURE — 700105 HCHG RX REV CODE 258: Performed by: EMERGENCY MEDICINE

## 2019-09-16 PROCEDURE — 99285 EMERGENCY DEPT VISIT HI MDM: CPT

## 2019-09-16 PROCEDURE — 85025 COMPLETE CBC W/AUTO DIFF WBC: CPT

## 2019-09-16 PROCEDURE — 770020 HCHG ROOM/CARE - TELE (206)

## 2019-09-16 PROCEDURE — 96365 THER/PROPH/DIAG IV INF INIT: CPT

## 2019-09-16 PROCEDURE — 74176 CT ABD & PELVIS W/O CONTRAST: CPT

## 2019-09-16 PROCEDURE — 700111 HCHG RX REV CODE 636 W/ 250 OVERRIDE (IP): Performed by: EMERGENCY MEDICINE

## 2019-09-16 RX ORDER — ASCORBIC ACID 500 MG
1000 TABLET ORAL
Status: DISCONTINUED | OUTPATIENT
Start: 2019-09-17 | End: 2019-09-25 | Stop reason: HOSPADM

## 2019-09-16 RX ORDER — BISACODYL 10 MG
10 SUPPOSITORY, RECTAL RECTAL
Status: DISCONTINUED | OUTPATIENT
Start: 2019-09-16 | End: 2019-09-25 | Stop reason: HOSPADM

## 2019-09-16 RX ORDER — HEPARIN SODIUM 5000 [USP'U]/ML
5000 INJECTION, SOLUTION INTRAVENOUS; SUBCUTANEOUS EVERY 8 HOURS
Status: DISCONTINUED | OUTPATIENT
Start: 2019-09-16 | End: 2019-09-19

## 2019-09-16 RX ORDER — ONDANSETRON 4 MG/1
4 TABLET, ORALLY DISINTEGRATING ORAL EVERY 4 HOURS PRN
Status: DISCONTINUED | OUTPATIENT
Start: 2019-09-16 | End: 2019-09-25 | Stop reason: HOSPADM

## 2019-09-16 RX ORDER — ONDANSETRON 2 MG/ML
INJECTION INTRAMUSCULAR; INTRAVENOUS
Status: COMPLETED
Start: 2019-09-16 | End: 2019-09-16

## 2019-09-16 RX ORDER — ONDANSETRON 2 MG/ML
4 INJECTION INTRAMUSCULAR; INTRAVENOUS EVERY 4 HOURS PRN
Status: DISCONTINUED | OUTPATIENT
Start: 2019-09-16 | End: 2019-09-25 | Stop reason: HOSPADM

## 2019-09-16 RX ORDER — SODIUM CHLORIDE, SODIUM LACTATE, POTASSIUM CHLORIDE, AND CALCIUM CHLORIDE .6; .31; .03; .02 G/100ML; G/100ML; G/100ML; G/100ML
1000 INJECTION, SOLUTION INTRAVENOUS
Status: DISCONTINUED | OUTPATIENT
Start: 2019-09-16 | End: 2019-09-25 | Stop reason: HOSPADM

## 2019-09-16 RX ORDER — NITROFURANTOIN 25; 75 MG/1; MG/1
100 CAPSULE ORAL 2 TIMES DAILY
COMMUNITY
Start: 2019-09-03 | End: 2019-10-10

## 2019-09-16 RX ORDER — SODIUM CHLORIDE, SODIUM LACTATE, POTASSIUM CHLORIDE, AND CALCIUM CHLORIDE .6; .31; .03; .02 G/100ML; G/100ML; G/100ML; G/100ML
1000 INJECTION, SOLUTION INTRAVENOUS ONCE
Status: COMPLETED | OUTPATIENT
Start: 2019-09-16 | End: 2019-09-16

## 2019-09-16 RX ORDER — POLYETHYLENE GLYCOL 3350 17 G/17G
1 POWDER, FOR SOLUTION ORAL
Status: DISCONTINUED | OUTPATIENT
Start: 2019-09-16 | End: 2019-09-25 | Stop reason: HOSPADM

## 2019-09-16 RX ORDER — ONDANSETRON 2 MG/ML
4 INJECTION INTRAMUSCULAR; INTRAVENOUS ONCE
Status: COMPLETED | OUTPATIENT
Start: 2019-09-16 | End: 2019-09-16

## 2019-09-16 RX ORDER — AMOXICILLIN 250 MG
2 CAPSULE ORAL 2 TIMES DAILY
Status: DISCONTINUED | OUTPATIENT
Start: 2019-09-16 | End: 2019-09-25 | Stop reason: HOSPADM

## 2019-09-16 RX ORDER — SODIUM CHLORIDE 9 MG/ML
INJECTION, SOLUTION INTRAVENOUS CONTINUOUS
Status: DISCONTINUED | OUTPATIENT
Start: 2019-09-16 | End: 2019-09-21

## 2019-09-16 RX ORDER — ACETAMINOPHEN 325 MG/1
650 TABLET ORAL EVERY 6 HOURS PRN
Status: DISCONTINUED | OUTPATIENT
Start: 2019-09-16 | End: 2019-09-25 | Stop reason: HOSPADM

## 2019-09-16 RX ADMIN — ONDANSETRON 4 MG: 2 INJECTION INTRAMUSCULAR; INTRAVENOUS at 23:55

## 2019-09-16 RX ADMIN — MEROPENEM 500 MG: 500 INJECTION, POWDER, FOR SOLUTION INTRAVENOUS at 20:45

## 2019-09-16 RX ADMIN — SODIUM CHLORIDE, POTASSIUM CHLORIDE, SODIUM LACTATE AND CALCIUM CHLORIDE 1000 ML: 600; 310; 30; 20 INJECTION, SOLUTION INTRAVENOUS at 19:51

## 2019-09-16 RX ADMIN — FENTANYL CITRATE 50 MCG: 50 INJECTION, SOLUTION INTRAMUSCULAR; INTRAVENOUS at 19:53

## 2019-09-16 RX ADMIN — SODIUM CHLORIDE: 9 INJECTION, SOLUTION INTRAVENOUS at 23:45

## 2019-09-16 RX ADMIN — ONDANSETRON 4 MG: 2 INJECTION INTRAMUSCULAR; INTRAVENOUS at 19:51

## 2019-09-16 ASSESSMENT — LIFESTYLE VARIABLES: DO YOU DRINK ALCOHOL: NO

## 2019-09-17 PROBLEM — E87.20 METABOLIC ACIDOSIS: Status: ACTIVE | Noted: 2019-09-17

## 2019-09-17 PROBLEM — N13.30 HYDROURETERONEPHROSIS: Status: ACTIVE | Noted: 2019-09-17

## 2019-09-17 PROBLEM — N12 PYELONEPHRITIS: Status: ACTIVE | Noted: 2019-09-17

## 2019-09-17 PROBLEM — N17.9 AKI (ACUTE KIDNEY INJURY) (HCC): Status: ACTIVE | Noted: 2019-09-17

## 2019-09-17 PROBLEM — A41.9 SEPSIS (HCC): Status: ACTIVE | Noted: 2019-09-17

## 2019-09-17 PROBLEM — I10 HTN (HYPERTENSION): Status: ACTIVE | Noted: 2019-09-17

## 2019-09-17 LAB
ALBUMIN SERPL BCP-MCNC: 3.9 G/DL (ref 3.2–4.9)
ALBUMIN/GLOB SERPL: 1.6 G/DL
ALP SERPL-CCNC: 81 U/L (ref 30–99)
ALT SERPL-CCNC: 9 U/L (ref 2–50)
ANION GAP SERPL CALC-SCNC: 9 MMOL/L (ref 0–11.9)
AST SERPL-CCNC: 11 U/L (ref 12–45)
BASOPHILS # BLD AUTO: 0.3 % (ref 0–1.8)
BASOPHILS # BLD: 0.03 K/UL (ref 0–0.12)
BILIRUB SERPL-MCNC: 0.7 MG/DL (ref 0.1–1.5)
BUN SERPL-MCNC: 23 MG/DL (ref 8–22)
CALCIUM SERPL-MCNC: 9.4 MG/DL (ref 8.5–10.5)
CHLORIDE SERPL-SCNC: 105 MMOL/L (ref 96–112)
CO2 SERPL-SCNC: 24 MMOL/L (ref 20–33)
CREAT SERPL-MCNC: 1.15 MG/DL (ref 0.5–1.4)
EOSINOPHIL # BLD AUTO: 0 K/UL (ref 0–0.51)
EOSINOPHIL NFR BLD: 0 % (ref 0–6.9)
ERYTHROCYTE [DISTWIDTH] IN BLOOD BY AUTOMATED COUNT: 44.6 FL (ref 35.9–50)
GLOBULIN SER CALC-MCNC: 2.5 G/DL (ref 1.9–3.5)
GLUCOSE SERPL-MCNC: 154 MG/DL (ref 65–99)
HCT VFR BLD AUTO: 41.1 % (ref 37–47)
HGB BLD-MCNC: 13.1 G/DL (ref 12–16)
IMM GRANULOCYTES # BLD AUTO: 0.02 K/UL (ref 0–0.11)
IMM GRANULOCYTES NFR BLD AUTO: 0.2 % (ref 0–0.9)
INR PPP: 1.02 (ref 0.87–1.13)
LACTATE BLD-SCNC: 1.1 MMOL/L (ref 0.5–2)
LACTATE BLD-SCNC: 1.2 MMOL/L (ref 0.5–2)
LYMPHOCYTES # BLD AUTO: 0.8 K/UL (ref 1–4.8)
LYMPHOCYTES NFR BLD: 7.2 % (ref 22–41)
MAGNESIUM SERPL-MCNC: 1.8 MG/DL (ref 1.5–2.5)
MCH RBC QN AUTO: 26.3 PG (ref 27–33)
MCHC RBC AUTO-ENTMCNC: 31.9 G/DL (ref 33.6–35)
MCV RBC AUTO: 82.5 FL (ref 81.4–97.8)
MONOCYTES # BLD AUTO: 0.9 K/UL (ref 0–0.85)
MONOCYTES NFR BLD AUTO: 8.1 % (ref 0–13.4)
NEUTROPHILS # BLD AUTO: 9.39 K/UL (ref 2–7.15)
NEUTROPHILS NFR BLD: 84.2 % (ref 44–72)
NRBC # BLD AUTO: 0 K/UL
NRBC BLD-RTO: 0 /100 WBC
PLATELET # BLD AUTO: 197 K/UL (ref 164–446)
PMV BLD AUTO: 9.6 FL (ref 9–12.9)
POTASSIUM SERPL-SCNC: 4.2 MMOL/L (ref 3.6–5.5)
PROT SERPL-MCNC: 6.4 G/DL (ref 6–8.2)
PROTHROMBIN TIME: 13.6 SEC (ref 12–14.6)
RBC # BLD AUTO: 4.98 M/UL (ref 4.2–5.4)
SODIUM SERPL-SCNC: 138 MMOL/L (ref 135–145)
WBC # BLD AUTO: 11.1 K/UL (ref 4.8–10.8)

## 2019-09-17 PROCEDURE — 306782 CATH,FOLEY 16FR LATEX FREE: Performed by: INTERNAL MEDICINE

## 2019-09-17 PROCEDURE — 36415 COLL VENOUS BLD VENIPUNCTURE: CPT

## 2019-09-17 PROCEDURE — 700102 HCHG RX REV CODE 250 W/ 637 OVERRIDE(OP): Performed by: STUDENT IN AN ORGANIZED HEALTH CARE EDUCATION/TRAINING PROGRAM

## 2019-09-17 PROCEDURE — 80053 COMPREHEN METABOLIC PANEL: CPT

## 2019-09-17 PROCEDURE — 700105 HCHG RX REV CODE 258: Performed by: STUDENT IN AN ORGANIZED HEALTH CARE EDUCATION/TRAINING PROGRAM

## 2019-09-17 PROCEDURE — 770020 HCHG ROOM/CARE - TELE (206)

## 2019-09-17 PROCEDURE — 83605 ASSAY OF LACTIC ACID: CPT

## 2019-09-17 PROCEDURE — A9270 NON-COVERED ITEM OR SERVICE: HCPCS | Performed by: STUDENT IN AN ORGANIZED HEALTH CARE EDUCATION/TRAINING PROGRAM

## 2019-09-17 PROCEDURE — 700111 HCHG RX REV CODE 636 W/ 250 OVERRIDE (IP): Performed by: STUDENT IN AN ORGANIZED HEALTH CARE EDUCATION/TRAINING PROGRAM

## 2019-09-17 PROCEDURE — 85610 PROTHROMBIN TIME: CPT

## 2019-09-17 PROCEDURE — 85025 COMPLETE CBC W/AUTO DIFF WBC: CPT

## 2019-09-17 PROCEDURE — 99221 1ST HOSP IP/OBS SF/LOW 40: CPT | Mod: AI,GC | Performed by: INTERNAL MEDICINE

## 2019-09-17 RX ADMIN — OXYCODONE HYDROCHLORIDE AND ACETAMINOPHEN 1000 MG: 500 TABLET ORAL at 12:22

## 2019-09-17 RX ADMIN — SODIUM CHLORIDE: 9 INJECTION, SOLUTION INTRAVENOUS at 20:02

## 2019-09-17 RX ADMIN — MEROPENEM 500 MG: 500 INJECTION, POWDER, FOR SOLUTION INTRAVENOUS at 17:11

## 2019-09-17 RX ADMIN — SODIUM CHLORIDE: 9 INJECTION, SOLUTION INTRAVENOUS at 12:24

## 2019-09-17 RX ADMIN — MEROPENEM 500 MG: 500 INJECTION, POWDER, FOR SOLUTION INTRAVENOUS at 12:22

## 2019-09-17 RX ADMIN — MEROPENEM 500 MG: 500 INJECTION, POWDER, FOR SOLUTION INTRAVENOUS at 03:00

## 2019-09-17 RX ADMIN — SODIUM CHLORIDE: 9 INJECTION, SOLUTION INTRAVENOUS at 03:40

## 2019-09-17 SDOH — ECONOMIC STABILITY: TRANSPORTATION INSECURITY
IN THE PAST 12 MONTHS, HAS THE LACK OF TRANSPORTATION KEPT YOU FROM MEDICAL APPOINTMENTS OR FROM GETTING MEDICATIONS?: NO

## 2019-09-17 SDOH — ECONOMIC STABILITY: FOOD INSECURITY: WITHIN THE PAST 12 MONTHS, YOU WORRIED THAT YOUR FOOD WOULD RUN OUT BEFORE YOU GOT MONEY TO BUY MORE.: NEVER TRUE

## 2019-09-17 SDOH — ECONOMIC STABILITY: FOOD INSECURITY: WITHIN THE PAST 12 MONTHS, THE FOOD YOU BOUGHT JUST DIDN'T LAST AND YOU DIDN'T HAVE MONEY TO GET MORE.: NEVER TRUE

## 2019-09-17 SDOH — ECONOMIC STABILITY: INCOME INSECURITY: HOW HARD IS IT FOR YOU TO PAY FOR THE VERY BASICS LIKE FOOD, HOUSING, MEDICAL CARE, AND HEATING?: NOT HARD AT ALL

## 2019-09-17 SDOH — ECONOMIC STABILITY: TRANSPORTATION INSECURITY
IN THE PAST 12 MONTHS, HAS LACK OF TRANSPORTATION KEPT YOU FROM MEETINGS, WORK, OR FROM GETTING THINGS NEEDED FOR DAILY LIVING?: NO

## 2019-09-17 ASSESSMENT — ENCOUNTER SYMPTOMS
DIZZINESS: 0
HEADACHES: 0
TINGLING: 0
CONSTIPATION: 0
SPUTUM PRODUCTION: 0
HEARTBURN: 0
WEIGHT LOSS: 0
PALPITATIONS: 0
CHILLS: 0
CLAUDICATION: 0
FEVER: 0
BLOOD IN STOOL: 0
PHOTOPHOBIA: 0
ORTHOPNEA: 0
FLANK PAIN: 1
VOMITING: 1
NAUSEA: 1
BRUISES/BLEEDS EASILY: 0
MEMORY LOSS: 0
COUGH: 0
NAUSEA: 0
DIARRHEA: 0
DEPRESSION: 0
MYALGIAS: 0
NECK PAIN: 0
SORE THROAT: 0
LOSS OF CONSCIOUSNESS: 0
ABDOMINAL PAIN: 0
SHORTNESS OF BREATH: 0
VOMITING: 0
DOUBLE VISION: 0
TREMORS: 0
BLURRED VISION: 0
HEMOPTYSIS: 0
BACK PAIN: 0

## 2019-09-17 ASSESSMENT — LIFESTYLE VARIABLES
EVER HAD A DRINK FIRST THING IN THE MORNING TO STEADY YOUR NERVES TO GET RID OF A HANGOVER: NO
HAVE PEOPLE ANNOYED YOU BY CRITICIZING YOUR DRINKING: NO
SUBSTANCE_ABUSE: 0
TOTAL SCORE: 0
CONSUMPTION TOTAL: NEGATIVE
TOTAL SCORE: 0
HAVE YOU EVER FELT YOU SHOULD CUT DOWN ON YOUR DRINKING: NO
AVERAGE NUMBER OF DAYS PER WEEK YOU HAVE A DRINK CONTAINING ALCOHOL: 0
HOW MANY TIMES IN THE PAST YEAR HAVE YOU HAD 5 OR MORE DRINKS IN A DAY: 0
EVER_SMOKED: NEVER
ALCOHOL_USE: NO
ON A TYPICAL DAY WHEN YOU DRINK ALCOHOL HOW MANY DRINKS DO YOU HAVE: 0
DOES PATIENT WANT TO STOP DRINKING: NO
TOTAL SCORE: 0
EVER FELT BAD OR GUILTY ABOUT YOUR DRINKING: NO

## 2019-09-17 ASSESSMENT — COGNITIVE AND FUNCTIONAL STATUS - GENERAL
DAILY ACTIVITIY SCORE: 24
SUGGESTED CMS G CODE MODIFIER DAILY ACTIVITY: CH
SUGGESTED CMS G CODE MODIFIER MOBILITY: CH
MOBILITY SCORE: 24

## 2019-09-17 ASSESSMENT — PATIENT HEALTH QUESTIONNAIRE - PHQ9
2. FEELING DOWN, DEPRESSED, IRRITABLE, OR HOPELESS: NOT AT ALL
SUM OF ALL RESPONSES TO PHQ9 QUESTIONS 1 AND 2: 0
1. LITTLE INTEREST OR PLEASURE IN DOING THINGS: NOT AT ALL

## 2019-09-17 NOTE — PROGRESS NOTES
Internal Medicine Interval Note  Note Author: Andrea Shi M.D.     Name Nessa Dennis     1945   Age/Sex 73 y.o. female   MRN [id number]   Code Status FULL CODE     After 5PM or if no immediate response to page, please call for cross-coverage  Attending/Team: Dr. Briceño/Monica See Patient List for primary contact information  Call [phone] to page    1st Call - Day Intern (R1):   Dr. Shi 2nd Call - Day Sr. Resident (R2/R3):   Dr. Villela         Reason for interval visit  (Principal Problem)   Sepsis  Pyelonephritis    Interval Problem Daily Status Update  (24 hours)   Ms. 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 She was found to have tachycardia , fever 100.1 and elevated WBCs on arrival in ED. U/A is positive for leukocyte esterase, nitrate, WBC, RBC and bacteria. She has been admitted for management of sepsis secondary to pyelonephritis.      No acute events overnight. Patient was laying in bed comfortably. VS are stable.  Patient is afebrile and asymptomatic; nausea/vomiting, left flank pain resolved; normal abdominal exam and no CVA tenderness b/l.   WBC trended down to 11.1; electrolytes are WNL; bicarb and anion gap came back to normal;  LA and WNL.   BUN/Cr 23/1.15; Cr increased from baseline. Continue IVF.  BCs shows NGTD; CT revealed hydroureteronephrosis with perinephric fat stranding.  On meropenem, day 2; IV zofran prn for vomiting.  Will trend Cr and consult IR for nephrostomy tube placement.         Review of Systems   Constitutional: Negative for chills, fever and weight loss.   HENT: Negative for congestion and sore throat.    Eyes: Negative for blurred vision, double vision and photophobia.   Respiratory: Negative for cough, sputum production and shortness of breath.    Cardiovascular: Negative for chest pain, palpitations, orthopnea and leg swelling.   Gastrointestinal: Negative  for abdominal pain, blood in stool, heartburn, nausea and vomiting.   Genitourinary: Negative for dysuria, frequency and urgency.   Musculoskeletal: Negative for back pain and myalgias.   Skin: Negative for rash.   Neurological: Negative for dizziness, loss of consciousness and headaches.   Psychiatric/Behavioral: Negative for depression and memory loss.       Consultants/Specialty  None    Disposition  Patient has been admitted for the management of pyelonephritis.    Quality Measures  Quality-Core Measures   Reviewed items::  EKG reviewed, Labs reviewed, Medications reviewed and Radiology images reviewed  Greene catheter::  No Greene  DVT prophylaxis pharmacological::  Heparin  Antibiotics:  Treating active infection/contamination beyond 24 hours perioperative coverage          Physical Exam       Vitals:    09/17/19 0427 09/17/19 0459 09/17/19 0727 09/17/19 1130   BP:  115/76 119/75 130/90   Pulse:  94 92 93   Resp:  16 16 16   Temp:  37 °C (98.6 °F) 36.9 °C (98.4 °F) 37 °C (98.6 °F)   TempSrc:  Temporal Temporal Temporal   SpO2:  92% 92% 96%   Weight: 63.8 kg (140 lb 10.5 oz)      Height:         Body mass index is 27.47 kg/m². Weight: 63.8 kg (140 lb 10.5 oz)  Oxygen Therapy:  Pulse Oximetry: 96 %, O2 (LPM): 1, O2 Delivery: Silicone Nasal Cannula    Physical Exam   Constitutional: She is oriented to person, place, and time and well-developed, well-nourished, and in no distress. No distress.   HENT:   Head: Normocephalic and atraumatic.   Eyes: Pupils are equal, round, and reactive to light.   Neck: Normal range of motion. Neck supple. No JVD present.   Cardiovascular: Normal rate, regular rhythm, normal heart sounds and intact distal pulses. Exam reveals no gallop and no friction rub.   No murmur heard.  Pulmonary/Chest: Effort normal and breath sounds normal. No respiratory distress. She has no wheezes.   Abdominal: Soft. Bowel sounds are normal. She exhibits no distension. There is no tenderness.   Colostomy  bag present   Musculoskeletal: Normal range of motion. She exhibits no edema or tenderness.   Lymphadenopathy:     She has no cervical adenopathy.   Neurological: She is alert and oriented to person, place, and time.   Skin: Skin is warm. She is not diaphoretic.   Psychiatric: Mood and affect normal.         Lab Data Review:         9/17/2019  1:45 PM    Recent Labs     09/16/19 1921 09/17/19  0303   SODIUM 135 138   POTASSIUM 4.3 4.2   CHLORIDE 103 105   CO2 18* 24   BUN 27* 23*   CREATININE 1.12 1.15   MAGNESIUM 1.8  --    CALCIUM 9.7 9.4       Recent Labs     09/16/19 1921 09/17/19  0303   ALTSGPT 12 9   ASTSGOT 22 11*   ALKPHOSPHAT 93 81   TBILIRUBIN 0.6 0.7   GLUCOSE 125* 154*       Recent Labs     09/16/19 1921 09/17/19  0303   RBC 5.18 4.98   HEMOGLOBIN 14.4 13.1   HEMATOCRIT 43.4 41.1   PLATELETCT 228 197   PROTHROMBTM  --  13.6   INR  --  1.02       Recent Labs     09/16/19 1921 09/17/19  0303   WBC 15.8* 11.1*   NEUTSPOLYS 88.80* 84.20*   LYMPHOCYTES 4.90* 7.20*   MONOCYTES 5.40 8.10   EOSINOPHILS 0.20 0.00   BASOPHILS 0.30 0.30   ASTSGOT 22 11*   ALTSGPT 12 9   ALKPHOSPHAT 93 81   TBILIRUBIN 0.6 0.7           Assessment/Plan     * Sepsis secondary to pyelonephritis- (present on admission)  Assessment & Plan  Patient presented to ED with c/o nausea/vomiting and left flank pain  - tachycardic, fever and elevated WBC; 3/4 SIRS   - U/A U/A is positive for leukocyte esterase, nitrate, WBC, RBC and bacteria  - no CVA tenderness; CT revealed hydroureteronephrosis with perinephric fat stranding  - sepsis secondary to pyelonephritis as a source    -Admitted to telemetry  -IV fluids, received bolus of 1000 ml in ED, resuscitated @30 ml/kg; on  ml/hr  -received first dose Meropenem in ED and received Ertapenem in the past, will continue meropenem; ABx day 5.  - I/O monitoring.   - will continue to monitor    Pyelonephritis- (present on admission)  Assessment & Plan  Patient c/o nausea/vomiting and left  flank pain; no CVA tenderness b/l;  H/o recurrent UTI; treated for ESBL klebsiella UTI in 3/19  With IV ABx and nephrostomy tube; ureteric stent in 4/19 which was removed in 8/29/19; stent culture was positive and was treated with 10 days of nitrofurantoin   - U/A is positive for leukocyte esterase, nitrate, WBC, RBC and bacteria;    -CT revealed hydroureteronephrosis with perinephric fat stranding.  - IV fluids  - IV meropenem.  - Zofran prn for nausea; morphine prn for pain.  - will continue to monitor    Hydroureteronephrosis- (present on admission)  Assessment & Plan  CT revealed left sided hydroureteronephrosis which is secondary to pyelonephritis or ureteric stricture (h/o previous radiation for cervical cancer).  - On IVF and IV meropenem for pyelonephritis  - ureteric stent in 4/19 which was removed on 8/29/19; was unable to put another stent due to mid and distal ureteric stricture.   - will consult IR for a nephrostomy tube tomorrow after Cr trends to baseline.     Metabolic acidosis  Assessment & Plan  - Bicarb was 18 on admission with AG of 14; LA normal   - might be starvation acidosis-   - Bicarb trended up to 24 and AG is 9  - resolved: continue IVF       HTN (hypertension)- (present on admission)  Assessment & Plan  - BP stable  - On lisinopril 20 mg/day  - continue current treatment    ADA (acute kidney injury) (HCC)- (present on admission)  Assessment & Plan  - BUN/Cr 23/1.15; Cr increased from baseline.  - CT revealed left sided hydroureteronephrosis  - ADA might be prerenal (due to sepsis_ or post renal (ureteric stricture)  - Continue IVF; will trend Cr  - will consult IR for nephrostomy tube placement    Cervical cancer (HCC)- (present on admission)  Assessment & Plan  - H/o cervical cancer s/p radial hysterectomy and b/l salpingo-oophorectomy in 2005  - Recurrence in 2015- treated with chemoradiation  - following up with Dr. Hays    Digestive-genital tract fistula, female- (present on  admission)  Assessment & Plan  - H/o cervical cancer treatment with chemoradiation complicated rectovaginal fistula s/p colostomy  - patient is asymptomatic  - will continue to monitor

## 2019-09-17 NOTE — SENIOR ADMIT NOTE
Senior Admit Note     CC: left flank pain     HPI:  Mrs. Dennis is a pleasant 72 yo female with PMHx of Cervical cancer s/p radical hysterectomy with bilateral oopherectomy (2005), with recurrence in 2015 s/p chemoradiation that resulted in urinary retention requiring self catheterization as well as  colostomy and scarring of the left ureter. She recently underwent stent replacement however they were unable to replace the stent due to scarring. She presents today with left flank pain, nausea, 1 episode of emesis, headache and general malaise, she denies fever but has rigors on exam.     In ED, Tmax 100, , RR 18, /101, 93% on RA. Labs remarkable for WBC 15.8 with left shift, bicarb 18, AG 14, Glu 125, BUN/Cr 27/1.12, LA 1.3, urine is cloudy with large occult blood, 100 protein, large LE, + nitrite, packed WBC and few bacteria, previous Cx positive for ESBL.     Renal CT shows severe left hydronephrosis and hyrourter with perinephric fat stranding and mid uretral obstruction.     Physical Exam   Constitutional: She is oriented to person, place, and time. She appears well-developed and well-nourished.   Non-toxic appearing elderly female, resting comfortably in NAD   HENT:   Head: Normocephalic and atraumatic.   Dry mucous membranes    Eyes: Pupils are equal, round, and reactive to light. EOM are normal. No scleral icterus.   Neck: No JVD present.   Cardiovascular: Regular rhythm and normal heart sounds.   No murmur heard.  tachycardic   Pulmonary/Chest: Effort normal and breath sounds normal. No respiratory distress. She has no wheezes. She has no rales.   Abdominal: Soft. Bowel sounds are normal. She exhibits no distension. There is no tenderness. There is no rebound and no guarding.   Right sided colostomy bag in place, mild left CVA tenderness    Musculoskeletal: Normal range of motion. She exhibits no edema.   Neurological: She is alert and oriented to person, place, and time. No cranial nerve deficit.    Skin: Skin is warm and dry. Capillary refill takes less than 2 seconds. No erythema.   Psychiatric: She has a normal mood and affect. Her behavior is normal.     Assessment and Plan:    74 yo female with complex PMHx including cervical cancer s/p chemoradiation causing obstructed ureter with recurrent UTI and hx of ESBL presents with sepsis (2/4 SIRS) due to pyelonephritis.     - admit to tele   - sepsis fluids   - continue meropenum   - blood and urine Cx pending   - ERP to call Dr. Bender requarding IR guided nephrostomy tube placement, per ERP he does not recommend NT placement with active infection  - NPO at midnight, day team to consider NT further  - supportive care with pain control and antiemetics     Chiquis Alcala MD

## 2019-09-17 NOTE — CARE PLAN
Discussed with patient the importance to use call light when assistance is needed. Patient verbalized understanding. Patient has bed alarm on, call light within reach, treaded socks on, and hourly rounding in place.  Patient educated regarding activity, diet, meds and plan of care. Patient verbalized understanding.

## 2019-09-17 NOTE — NON-PROVIDER
Internal Medicine Interval Note  Note Author: Malinda Meyer, Student     Name Nessa Dennis     1945   Age/Sex 73 y.o. female   MRN [id number]   Code Status FULL     After 5PM or if no immediate response to page, please call for cross-coverage  Attending/Team: Dr. Briceño See Patient List for primary contact information  Call [phone] to page    1st Call - Day Intern (R1):   Dr. Shi 2nd Call - Day Sr. Resident (R2/R3):   Dr. Villela         Reason for interval visit  (Principal Problem)   N/V and left flank pain    Interval Problem Daily Status Update  (24 hours)   Mrs. Dennis is a 74 y/o female with a PMH of cervical cancer s/p radical hysterectomy and bilateral salpingoophorectomy in  with relapse of cancer in  treated with chemotherapy (Cisplatin) and radiation which resulted in rectovaginal fistula requiring colostomy, neurogenic bladder, and left ureteral stricture. She presented yesterday due to a one day history of left flank pain and associated N/V. She had one episode of vomiting. She has a history of recurrent UTIs w/ ESBL and usually detects them herself when she notices cloudy urine. She self caths ~4 times per day secondary to neurogenic bladder. She denies F/C, abd pain, and fatigue.  She was admitted to the hospital 3/12/19 for similar symptoms and was diagnosed with ESBL pyelonephritis and hydronephrosis requiring nephrostomy tube placement. She was treated with meropenem in the hospital and discharged on outpatient IV ertapenem x 2 weeks. She was then seen on 19 for removal of NT and ureteral stent placement. She was seen by Dr. Hays for stent replacement on 19, but the replacement could not be done secondary to ureteral calcifications. The stent was found to grow bacteria and she was placed on a 10 day course of nitrofurantoin. She finished her course of ABX 1 day before the onset of her current symptoms.    ED course:  Vitals- pulse-123, POX-93%,  BP-146/100, RR-18/min. Labs showed WBC 15.8, creatinine-1.12, BUN-27. UA was positive for nitrites, leukocyte esterase, occult blood, and WBC. CT abdomen & pelvis showed left hydronephrosis with hydroureter, peripherally calcified structure in the mid and distal left ureter, hazy perinephric fat stranding is seen in the left kidney. She was given 1 L RL and meropenem was started.    She is feeling much better today. She is pain free and is no longer experiencing nausea. Her only complaint today is feeling tired.     Review of Systems   Constitutional: Negative for chills and fever.   HENT: Negative for ear discharge, ear pain, hearing loss and tinnitus.    Eyes: Negative for blurred vision, double vision and photophobia.   Respiratory: Negative for cough, hemoptysis and sputum production.    Cardiovascular: Negative for chest pain, palpitations, and LE swelling.   Gastrointestinal: No nausea or vomiting. Negative for abdominal pain, constipation, diarrhea and heartburn.   Genitourinary: No flank pain.   Musculoskeletal: Negative for back pain, myalgias and neck pain.   Skin: Negative for rash.   Neurological: Negative for dizziness, tingling, tremors and headaches.   Psychiatric/Behavioral: Negative for depression, substance abuse and suicidal ideas.     Quality Measures  Quality-Core Measures   Reviewed items::  EKG reviewed, Labs reviewed, Medications reviewed and Radiology images reviewed  Greene catheter::  No Greene  DVT prophylaxis pharmacological::  Heparin        Physical Exam       Vitals:    09/17/19 0125 09/17/19 0427 09/17/19 0459 09/17/19 0727   BP: 144/94  115/76 119/75   Pulse: (!) 123  94 92   Resp: 16  16 16   Temp: 36.7 °C (98 °F)  37 °C (98.6 °F) 36.9 °C (98.4 °F)   TempSrc: Temporal  Temporal Temporal   SpO2: 93%  92% 92%   Weight: 63.8 kg (140 lb 10.5 oz) 63.8 kg (140 lb 10.5 oz)     Height:         Body mass index is 27.47 kg/m². Weight: 63.8 kg (140 lb 10.5 oz)  Oxygen Therapy:  Pulse Oximetry:  92 %, O2 (LPM): 1, O2 Delivery: Silicone Nasal Cannula    Physical Exam   Constitutional: She is oriented to person, place, and time and well-developed, well-nourished, and in no distress.   HENT:   Head: Normocephalic and atraumatic.   Eyes: Pupils are equal, round, and reactive to light. Conjunctivae and EOM are normal.   Neck: Normal range of motion. Neck supple.   Cardiovascular: Regular rhythm and normal heart sounds. No murmur heard.  Pulmonary/Chest: Effort normal and breath sounds normal. No respiratory distress. She has no wheezes.   Abdominal: Soft. Bowel sounds are normal. She exhibits no distension. There is no tenderness.   Colostomy bag present. No CVA tenderness.   Musculoskeletal: Normal range of motion. She exhibits no edema or deformity.   Neurological: She is alert and oriented to person, place, and time.   Skin: Skin is dry.   Psychiatric: Memory, affect and judgment normal.        Lab Data Review:         9/17/2019  11:32 AM    Recent Labs     09/16/19 1921 09/17/19  0303   SODIUM 135 138   POTASSIUM 4.3 4.2   CHLORIDE 103 105   CO2 18* 24   BUN 27* 23*   CREATININE 1.12 1.15   MAGNESIUM 1.8  --    CALCIUM 9.7 9.4       Recent Labs     09/16/19 1921 09/17/19  0303   ALTSGPT 12 9   ASTSGOT 22 11*   ALKPHOSPHAT 93 81   TBILIRUBIN 0.6 0.7   GLUCOSE 125* 154*       Recent Labs     09/16/19 1921 09/17/19  0303   RBC 5.18 4.98   HEMOGLOBIN 14.4 13.1   HEMATOCRIT 43.4 41.1   PLATELETCT 228 197   PROTHROMBTM  --  13.6   INR  --  1.02       Recent Labs     09/16/19 1921 09/17/19  0303   WBC 15.8* 11.1*   NEUTSPOLYS 88.80* 84.20*   LYMPHOCYTES 4.90* 7.20*   MONOCYTES 5.40 8.10   EOSINOPHILS 0.20 0.00   BASOPHILS 0.30 0.30   ASTSGOT 22 11*   ALTSGPT 12 9   ALKPHOSPHAT 93 81   TBILIRUBIN 0.6 0.7           Assessment/Plan     1. Sepsis secondary to acute pyelonephritis  Nessa Denins is a 74 y/o female with a history of recurrent complicated ESBL UTIs with past nephrostomy tube and ureteral stent placement  presents with left flank pain and N/V consistent with pyelonephrosis. This was confirmed by WBC of 15.8, UA positive for nitrites, leukocyte esterase, and WBCs, and CT abdomen/pelvis showing hydroureteronephrosis with perinephric fat stranding. She met 2/4 SIRS criteria (tachycardic at 128 and WBC). She is starting to feel better already with the current management.     Plan:  -Admitted to tele  -Continue meropenem 500 mg secondary to history of ESBL UTI  -Given 1 L of IV RL in the ED. Will continue with maintenance normal saline at 150 ml/hour  -Zofran 4 mg PRN nausea  -Fentanyl 50 mcg given last night; Tylenol PRN for pain   -Blood cultures and urine cultures pending  -Monitor I/O    2. Hydronephrosis  CT abdomen/pelvis showed hydroureteronephrosis. She has a PMH of pyelonephritis with hydronephrosis that required nephrostomy tube placement in March of this year. Because of her current infection, placement of the nephrostomy tube will be avoided if possible. ED physician consulted Dr. Hays who recommended trending her kidney function to determine need for NT. Cr and BUN today were 1.15 and 23.     Plan:  -Recheck creatinine and BUN tomorrow  -Consult Dr. Hays and likely place NT tomorrow if kidney function worsens    3. Acute kidney injury  Patient presented with a Cr and BUN of 1.12 and 27 and her Cr and BUN today is 1.15 and 23 (baseline 0.79 and 17). She has an acute kidney injury based on KDIGO criteria. Her ADA is most likely post-renal due to obstruction from the left ureteral stricture vs neurogenic bladder. Her BUN/Cr ratio rules out intrinsic renal injury, and she is unlikely to have pre-renal injury as she does not show signs of poor perfusion (hypotension, dehydration, blood loss, etc.). She was placed on an increased dose of her ACE inhibitor in March, but this did not show any signs of kidney injury from her lab work done in April.     Plan:  -Monitor changes in creatinine and BUN, which will also  dictate if nephrostomy is placed  -Hold lisinopril  -Avoid nephrotoxins    4. Hypertension  Patient has a history of HTN with BP measurements at home estimated to be 135/60, per patient. She is currently on lisinopril 20 mg daily    Plan:  -Hold lisinopril secondary to ADA

## 2019-09-17 NOTE — PROGRESS NOTES
Report received from MAYCOL Hollis. Assumed care of patient. Updated patient on plan of care. Fall precautions in place, call light within reach.

## 2019-09-17 NOTE — ED PROVIDER NOTES
ED Provider Note    CHIEF COMPLAINT  Chief Complaint   Patient presents with   • Flank Pain     LT flank pain today. had a stent removed from LT ureter this last month. had been doing well until today. denies dysuria, self caths. recurrent UTIs.        HPI  Nessa Dennis is a 73 y.o. female who presents with left flank pain starting earlier today.  Had a left ureteral stent removed by Dr. Hays, her gynecology oncologist, about 2 weeks ago in late August.  She states that it was supposed to be replaced however he could not advance another ureteral stent after removal.  Patient has a complicated medical history secondary to cervical cancer.  Has undergone surgeries and radiation.  Last treatment for cervical cancer was about 4 5 years ago.  Has been cancer free since then however has been struggling with recurrent urinary tract infections.  The patient has a history of left lower quadrant colostomy.  Has had normal ostomy output.  Had one episode of vomiting earlier today.  Feels feverish.  Patient states that she performs self-catheterization procedures for urination periodically throughout the day.  Patient is concerned that she has developed another urine infection/kidney infection.    REVIEW OF SYSTEMS  See HPI for further details. All other systems are negative.     PAST MEDICAL HISTORY   has a past medical history of Arthritis, Cancer (HCC) (2005, 2015), Colostomy in place (HCC), Hypertension, Other specified symptom associated with female genital organs, Unspecified urinary incontinence, and Urinary bladder disorder.    SOCIAL HISTORY  Social History     Tobacco Use   • Smoking status: Never Smoker   • Smokeless tobacco: Never Used   Substance and Sexual Activity   • Alcohol use: No   • Drug use: No   • Sexual activity: Not on file       SURGICAL HISTORY   has a past surgical history that includes recovery (4/6/2015); colostomy creation laparoscopic (4/14/2015); cystoscopy (7/31/2015); gyn surgery  (5/2005); pelvic exam under anesthesia (7/31/2015); cervical conization (7/31/2015); other abdominal surgery; other (04/2015); and cysto stent placemnt pre surg (Left, 8/29/2019).    CURRENT MEDICATIONS  Home Medications     Reviewed by Murali Ospina R.N. (Registered Nurse) on 09/16/19 at 1804  Med List Status: Partial   Medication Last Dose Status   ascorbic acid (ASCORBIC ACID) 500 MG TABS  Active   Calcium Carb-Cholecalciferol (CALCIUM 600 + D PO)  Active   Cholecalciferol (VITAMIN D) 2000 UNITS CAPS  Active   Cyanocobalamin (VITAMIN B 12 PO)  Active   lisinopril (PRINIVIL) 20 MG Tab  Active   Multiple Vitamins-Minerals (OCULAR VITAMINS PO)  Active   Probiotic Product (PROBIOTIC DAILY PO)  Active   therapeutic multivitamin-minerals (THERAGRAN-M) TABS  Active                ALLERGIES  Allergies   Allergen Reactions   • Sulfa Drugs Unspecified     Pt states it runs her down.       PHYSICAL EXAM  VITAL SIGNS: /101   Pulse (!) 123   Temp 37 °C (98.6 °F) (Temporal)   Resp 18   Ht 1.524 m (5')   Wt 64 kg (141 lb 1.5 oz)   SpO2 93%   BMI 27.56 kg/m²   Pulse ox interpretation: I interpret this pulse ox as normal.  Constitutional: Alert in no apparent distress.  HENT: No signs of trauma, Bilateral external ears normal, Nose normal.   Eyes: Pupils are equal and reactive, Conjunctiva normal, Non-icteric.   Neck: Normal range of motion, No tenderness, Supple, No stridor.   Cardiovascular: Regular rate and rhythm.   Thorax & Lungs: Normal breath sounds, No respiratory distress, No wheezing, No chest tenderness.   Abdomen: Bowel sounds normal, Soft, No tenderness, No masses, No pulsatile masses. No peritoneal signs.  Left lower quadrant colostomy in place.  Skin: Warm, Dry, No erythema, No rash.   Back: No bony tenderness, left CVA tenderness.   Extremities: Intact distal pulses, No edema, No tenderness, No cyanosis  Musculoskeletal: Good range of motion in all major joints. No tenderness to palpation or major  deformities noted.   Neurologic: Alert, No focal deficits noted.       DIAGNOSTIC STUDIES / PROCEDURES      LABS  Labs Reviewed   CBC WITH DIFFERENTIAL - Abnormal; Notable for the following components:       Result Value    WBC 15.8 (*)     MCHC 33.2 (*)     Neutrophils-Polys 88.80 (*)     Lymphocytes 4.90 (*)     Neutrophils (Absolute) 14.01 (*)     Lymphs (Absolute) 0.78 (*)     All other components within normal limits   URINALYSIS,CULTURE IF INDICATED - Abnormal; Notable for the following components:    Character Cloudy (*)     Protein 100 (*)     Nitrite Positive (*)     Leukocyte Esterase Large (*)     Occult Blood Large (*)     All other components within normal limits   COMP METABOLIC PANEL - Abnormal; Notable for the following components:    Co2 18 (*)     Anion Gap 14.0 (*)     Glucose 125 (*)     Bun 27 (*)     All other components within normal limits   ESTIMATED GFR - Abnormal; Notable for the following components:    GFR If  57 (*)     GFR If Non  47 (*)     All other components within normal limits   URINE MICROSCOPIC (W/UA) - Abnormal; Notable for the following components:    WBC Packed (*)     RBC 2-5 (*)     Bacteria Few (*)     Hyaline Cast 3-5 (*)     All other components within normal limits   CBC WITH DIFFERENTIAL - Abnormal; Notable for the following components:    WBC 11.1 (*)     MCH 26.3 (*)     MCHC 31.9 (*)     Neutrophils-Polys 84.20 (*)     Lymphocytes 7.20 (*)     Neutrophils (Absolute) 9.39 (*)     Lymphs (Absolute) 0.80 (*)     Monos (Absolute) 0.90 (*)     All other components within normal limits   COMP METABOLIC PANEL - Abnormal; Notable for the following components:    Glucose 154 (*)     Bun 23 (*)     AST(SGOT) 11 (*)     All other components within normal limits   ESTIMATED GFR - Abnormal; Notable for the following components:    GFR If  56 (*)     GFR If Non  46 (*)     All other components within normal limits  "  LACTIC ACID   BLOOD CULTURE    Narrative:     1 of 2 for Blood Culture x 2 sites order. Per Hospital  Policy: Only change Specimen Src: to \"Line\" if specified by  physician order.   BLOOD CULTURE    Narrative:     2 of 2 blood culture x2  Sites order. Per Hospital Policy:  Only change Specimen Src: to \"Line\" if specified by physician  order.   URINE CULTURE(NEW)   LACTIC ACID   MAGNESIUM   PROTHROMBIN TIME   LACTIC ACID       RADIOLOGY  No orders to display       COURSE & MEDICAL DECISION MAKING    Medications   lactated ringers infusion (BOLUS) (0 mL Intravenous Stopped 9/16/19 2030)   ondansetron (ZOFRAN) syringe/vial injection 4 mg (4 mg Intravenous Given 9/16/19 1951)   fentaNYL (SUBLIMAZE) injection 50 mcg (50 mcg Intravenous Given 9/16/19 1953)   meropenem (MERREM) 500 mg in  mL IVPB (0 mg Intravenous Stopped 9/16/19 2115)       Pertinent Labs & Imaging studies reviewed. (See chart for details)  73 y.o. female presenting with left flank pain.  Has a history of recurrent pyelonephritis and she feels like she has a kidney infection once again.  She performs self catheterizations and recently had a left ureteral stent removed by Dr. Hays.  Was unable to have the stent replaced due to strictures.  Apparently, the patient discussed possibility of nephrostomy tube with Dr. Hays.  Since then, the patient has developed worsening pain similar to prior kidney infections in the past.  Low-grade temperature here at 37 8 upon arrival.  Tachycardic as well.  Also with leukocytosis.  Concerning for sepsis.  She was started on meropenem empirically given prior history of ESBL.  CT renal study was performed without contrast due to patient stated allergy to contrast.  Found to have hydronephrosis on the left consistent with the patient's pain symptoms.  Given the patient's presence of urinary tract infection, I suspect that the patient will likely need to undergo nephrostomy tube placement to drain the potential " hydronephrosis with infected urine.    10:50 PM spoke with [facility] internal medicine resident service.  They are agreeable with the admission.      11:00 PM spoke with Dr. Hays from gynecology oncology.  Recommending observing the patient and should her creatinine and renal function decline, to perform nephrostomy tube placement by IR.  Will be available for reconsultation by telephone tomorrow as needed.    The total critical care time on this patient is 35 minutes, resuscitating patient, speaking with admitting physician, and deciphering test results. This 35 minutes is exclusive of separately billable procedures.      /94   Pulse (!) 123   Temp 36.7 °C (98 °F) (Temporal)   Resp 16   Ht 1.524 m (5')   Wt 63.8 kg (140 lb 10.5 oz)   SpO2 93%   BMI 27.47 kg/m²     FINAL IMPRESSION  1. Pyelonephritis    2.      Sepsis  3.      Hydronephrosis      Electronically signed by: Rafi Black, 9/16/2019 7:16 PM

## 2019-09-17 NOTE — PROGRESS NOTES
Patient admitted to room 824-2. She is alert and oriented x4 with no complaints of pain currently. She is on 1LNC. 88% on RA.

## 2019-09-17 NOTE — ED NOTES
Patient resting in bed, sleeping, no signs of pain or distress, unlabored breathing noted, attached to cardiac monitor, bed in low position, call light within reach. Waiting for bed assignment, pt updated on plan. VSS. Will continue to monitor.

## 2019-09-17 NOTE — ED NOTES
Pt refused RN straight cath, pt self caths at home & successfully cath'd self for urine specimen. UA sent to lab. Pt repositioned for comfort, states pain & nausea almost completely resolved. VSS. Will continue to monitor.

## 2019-09-17 NOTE — ED TRIAGE NOTES
Chief Complaint   Patient presents with   • Flank Pain     LT flank pain today. had a stent removed from LT ureter this last month. had been doing well until today. denies dysuria, self caths. recurrent UTIs.      Pt to triage for above. Appears uncomfortable. HR noted. Denies fevers.    /101   Pulse (!) 123   Temp 37 °C (98.6 °F) (Temporal)   Resp 18   Ht 1.524 m (5')   Wt 64 kg (141 lb 1.5 oz)   SpO2 93%   BMI 27.56 kg/m²

## 2019-09-17 NOTE — ED NOTES
Med rec updated and complete. Allergies reviewed.  Met with pt/family at bedside.  Dicussed current medications and last doses taken.  Pt recently finished a course of antibiotics ( macrobid) on   09/13//19.

## 2019-09-17 NOTE — ED NOTES
Pt wheeled back to ED room blue 21. Pt changed into gown, placed on continuous monitoring, pt is sinus tachycardic on monitor. ERP at bedside.

## 2019-09-17 NOTE — ED NOTES
Pt medicated per EMAR for continued L flank pain & nausea. Pt repositioned for increased comfort, blanket & pillow provided. Will continue to monitor.

## 2019-09-17 NOTE — DISCHARGE PLANNING
Care Transition Team Assessment    LSW met with pt at bedside to complete assessment and discuss discharge plans/barriers. Pt reported her goal is to discharge home and her  will provide transportation. Pt denied any concerns/barriers related to discharge. LSW confirmed Facesheet information and contact information. Pt reported she follows with Dr. Julio Ross and obtains medications from [facility].     Pt is 73 year old  female who lives with her  in a 2 story home. Pt denies any ADL/iADL needs prior to admission and denies any difficulties with meeting her basic/financial needs. She reported her family, friends and [employer] are a good support system for her. Pt denied any substance abuse issues or behavioral health.     Pt reported she does have an Advanced Directive, per chart review, [email or web address] card has been scanned into chart. (This is a system for storing documents such as Advanced Directives but is not the document itself). LSW attempted to obtain copy from storing system via online and through calling but was unsuccessful. System appeared to be down, reporting errors when attempting to obtain. Pt notified and encourage to have spouse/family member bring in physical document to be scanned into her medical record.     Information Source  Orientation : Oriented x 4  Information Given By: Patient  Informant's Name: Nessa  Who is responsible for making decisions for patient? : Patient    Elopement Risk  Legal Hold: No  Ambulatory or Self Mobile in Wheelchair: Yes  Disoriented: No  Psychiatric Symptoms: None  History of Wandering: No  Elopement this Admit: No  Vocalizing Wanting to Leave: No  Displays Behaviors, Body Language Wanting to Leave: No-Not at Risk for Elopement  Elopement Risk: Not at Risk for Elopement    Interdisciplinary Discharge Planning  Primary Care Physician: Julio Ross  Lives with - Patient's Self Care Capacity: Spouse  Patient or legal guardian wants to  designate a caregiver (see row info): No  Support Systems: [place] [employer] [place] [employer], Friends / Neighbors  Housing / Facility: [address]  Prior Services: Home-Independent  Patient Expects to be Discharged to:: Home  Durable Medical Equipment: Not Applicable    Discharge Preparedness  What is your plan after discharge?: Home with help  What are your discharge supports?: Spouse  Prior Functional Level: Ambulatory, Drives Self, Independent with Activities of Daily Living, Independent with Medication Management  Difficulity with ADLs: None  Difficulity with IADLs: None    Functional Assesment  Prior Functional Level: Ambulatory, Drives Self, Independent with Activities of Daily Living, Independent with Medication Management    Finances  Financial Barriers to Discharge: No  Prescription Coverage: Yes    Vision / Hearing Impairment  Vision Impairment : Yes  Right Eye Vision: Impaired, Wears Glasses  Left Eye Vision: Impaired, Wears Glasses  Hearing Impairment : No    Values / Beliefs / Concerns  Values / Beliefs Concerns : No(Identified Hinduism is L[employer])    Domestic Abuse  Have you ever been the victim of abuse or violence?: No  Physical Abuse or Sexual Abuse: No  Verbal Abuse or Emotional Abuse: No  Possible Abuse Reported to:: Not Applicable    Psychological Assessment  History of Substance Abuse: None  History of Psychiatric Problems: No    Discharge Risks or Barriers  Discharge risks or barriers?: No    Anticipated Discharge Information  Anticipated discharge disposition: Home  Discharge Address: [facility] [id number] [facility] [id number]  Discharge Contact Phone Number: [phone]

## 2019-09-17 NOTE — H&P
Internal Medicine Admitting History and Physical    Note Author: Kervin Casper M.D.       Name Nessa Dennis     1945   Age/Sex 73 y.o. female   MRN [id number]   Code Status Full code     After 5PM or if no immediate response to page, please call for cross-coverage  Attending/Team: Dr. Garry Briceño See Patient List for primary contact information  Call [phone] to page    1st Call - Day Intern (R1):   Dr. Shi 2nd Call - Day Sr. Resident (R2/R3):   Dr. Villela       Chief Complaint:   Nausea, vomiting, left flank pain    HPI:  Ms. 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 She has pmh of ESBL klebsiella UTI, pyelonephritis and hydronephrosis in 2018 and treated with antibiotics, nephrostomy tube placement and later ureteric stent. She had her stent removed recently and was on antibiotics till last Friday (pt doesn't remember the name of the antibiotic, I believe Nitrofurantoin as per EMR).   She started developing left flank pain yesterday which worsened today, non radiating pain associated with nausea and an episode of vomiting. She denied any fever and chills, headache, abdominal pain, chest pain, shortness of breath.    ED course:  Vitals- pulse-123, spo2-93%, BP-146/100, RR-18/min.  Labs- leukocytosis with WBC of 15.8, creatine-1.12, BUN-27  UA- positive for Nitrite, occult blood, leukocyte esterase, packed WBC  CT abdomen & pelvis- left hydronephrosis with hydroureter, peripherally calcified structure in the mid and distal left ureter, hazy perinephric fat stranding is seen in the left kidney.    Pt received a bolus of 1000 ml of RL, IV  meropenem.           Review of Systems   Constitutional: Negative for chills and fever.   HENT: Negative for ear discharge, ear pain, hearing loss and tinnitus.    Eyes: Negative for blurred vision, double vision and photophobia.   Respiratory: Negative for cough, hemoptysis and sputum production.    Cardiovascular: Negative for chest pain, palpitations, orthopnea and claudication.   Gastrointestinal: Positive for nausea and vomiting. Negative for abdominal pain, constipation, diarrhea and heartburn.   Genitourinary: Positive for flank pain.        Left flank pain   Musculoskeletal: Negative for back pain, myalgias and neck pain.   Skin: Negative for rash.   Neurological: Negative for dizziness, tingling, tremors and headaches.   Endo/Heme/Allergies: Does not bruise/bleed easily.   Psychiatric/Behavioral: Negative for depression, substance abuse and suicidal ideas.             Past Medical History (Chronic medical problem, known complications and current treatment)    Past Medical History:   Diagnosis Date   • Arthritis     tailbone   • Cancer (HCC) 2005, 2015    cervical   • Colostomy in place (HCC)    • Hypertension    • Other specified symptom associated with female genital organs    • Unspecified urinary incontinence    • Urinary bladder disorder     pt self caths 4x per day           Past Surgical History:  Past Surgical History:   Procedure Laterality Date   • CYSTO STENT PLACEMNT PRE SURG Left 8/29/2019    Procedure: CYSTOSCOPY, WITH URETERAL STENT REMOVAL;  Surgeon: Adam Hays M.D.;  Location: [facility];  Service: Gynecology   • CYSTOSCOPY  7/31/2015    Procedure: CYSTOSCOPY;  Surgeon: Adam Hays M.D.;  Location: [facility];  Service:    • PELVIC EXAM UNDER ANESTHESIA  7/31/2015    Procedure: PELVIC EXAM UNDER ANESTHESIA;  Surgeon: Adam Hays M.D.;  Location: [facility];  Service:    • CERVICAL CONIZATION  7/31/2015    Procedure: CERVICAL CONIZATION FOR: CONE  BIOPSY;  Surgeon: Adam Hays M.D.;  Location: SURGERY [facility];  Service:    • COLOSTOMY CREATION LAPAROSCOPIC  4/14/2015    Performed by Mariusz Barajas M.D. at SURGERY [facility]   • RECOVERY  4/6/2015    Performed by [facility] at SURGERY PRE-POST PROC UNIT [facility]   • OTHER  04/2015    supra pubic cath placement   • GYN SURGERY  5/2005    hysterectomy   • OTHER ABDOMINAL SURGERY      bowel resection- has colostomy       Current Outpatient Medications:  Home Medications     Reviewed by Liz Mcwilliams (Pharmacy Tech) on 09/16/19 at 2040  Med List Status: Complete   Medication Last Dose Status   ascorbic acid (ASCORBIC ACID) 500 MG TABS 9/15/2019 Active   Calcium Carb-Cholecalciferol (CALCIUM 600 + D PO) 9/15/2019 Active   Cholecalciferol (VITAMIN D) 2000 UNITS CAPS 9/16/2019 Active   lisinopril (PRINIVIL) 20 MG Tab 9/16/2019 Active   Multiple Vitamins-Minerals (OCULAR VITAMINS PO) 9/16/2019 Active   nitrofurantoin monohyd macro (MACROBID) 100 MG Cap 9/13/2019 Active   Probiotic Product (PROBIOTIC DAILY PO) 9/15/2019 Active   therapeutic multivitamin-minerals (THERAGRAN-M) TABS 9/16/2019 Active                Medication Allergy/Sensitivities:  Allergies   Allergen Reactions   • Sulfa Drugs Unspecified     Pt states it runs her down.         Family History (mandatory)   No family history on file.    Social History (mandatory)   Social History     Socioeconomic History   • Marital status:      Spouse name: Not on file   • Number of children: Not on file   • Years of education: Not on file   • Highest education level: Not on file   Occupational History   • Not on file   Social Needs   • Financial resource strain: Not on file   • Food insecurity:     Worry: Not on file     Inability: Not on file   • Transportation needs:     Medical: Not on file     Non-medical: Not on file   Tobacco Use   • Smoking status: Never Smoker   • Smokeless tobacco: Never Used   Substance and Sexual Activity    • Alcohol use: No   • Drug use: No   • Sexual activity: Not on file   Lifestyle   • Physical activity:     Days per week: Not on file     Minutes per session: Not on file   • Stress: Not on file   Relationships   • Social connections:     Talks on phone: Not on file     Gets together: Not on file     Attends Che[employer] service: Not on file     Active member of club or organization: Not on file     Attends meetings of clubs or organizations: Not on file     Relationship status: Not on file   • Intimate partner violence:     Fear of current or ex partner: Not on file     Emotionally abused: Not on file     Physically abused: Not on file     Forced sexual activity: Not on file   Other Topics Concern   • Not on file   Social History Narrative   • Not on file     Living situation:   PCP : Julio Ross M.D.    Physical Exam     Vitals:    09/16/19 2100 09/16/19 2200 09/16/19 2300 09/16/19 2338   BP: 139/89 144/87 146/87 153/88   Pulse: (!) 103 (!) 104 (!) 102 (!) 109   Resp: 17 16 18 17   Temp: 37.7 °C (99.8 °F)  37.7 °C (99.9 °F) 37 °C (98.6 °F)   TempSrc: Oral  Temporal Temporal   SpO2: 96% 96% 97% 97%   Weight:       Height:         Body mass index is 27.56 kg/m².  O2 therapy: Pulse Oximetry: 97 %, O2 Delivery: None (Room Air)    Physical Exam   Constitutional: She is oriented to person, place, and time and well-developed, well-nourished, and in no distress.   HENT:   Head: Normocephalic and atraumatic.   Eyes: Pupils are equal, round, and reactive to light. Conjunctivae and EOM are normal.   Neck: Normal range of motion. Neck supple.   Cardiovascular: Regular rhythm and normal heart sounds. Exam reveals no friction rub.   No murmur heard.  tachycardia   Pulmonary/Chest: Effort normal and breath sounds normal. No respiratory distress. She has no wheezes.   Abdominal: Soft. Bowel sounds are normal. She exhibits no distension. There is no tenderness.   Colostomy bag   Musculoskeletal: Normal range of motion. She  exhibits no edema or deformity.   Neurological: She is alert and oriented to person, place, and time. Gait normal.   Skin: Skin is dry.   Psychiatric: Memory, affect and judgment normal.         Data Review       Old Records Request:   Completed  Current Records review/summary: Completed    Lab Data Review:  Recent Results (from the past 24 hour(s))   CBC WITH DIFFERENTIAL    Collection Time: 09/16/19  7:21 PM   Result Value Ref Range    WBC 15.8 (H) 4.8 - 10.8 K/uL    RBC 5.18 4.20 - 5.40 M/uL    Hemoglobin 14.4 12.0 - 16.0 g/dL    Hematocrit 43.4 37.0 - 47.0 %    MCV 83.8 81.4 - 97.8 fL    MCH 27.8 27.0 - 33.0 pg    MCHC 33.2 (L) 33.6 - 35.0 g/dL    RDW 44.8 35.9 - 50.0 fL    Platelet Count 228 164 - 446 K/uL    MPV 10.4 9.0 - 12.9 fL    Neutrophils-Polys 88.80 (H) 44.00 - 72.00 %    Lymphocytes 4.90 (L) 22.00 - 41.00 %    Monocytes 5.40 0.00 - 13.40 %    Eosinophils 0.20 0.00 - 6.90 %    Basophils 0.30 0.00 - 1.80 %    Immature Granulocytes 0.40 0.00 - 0.90 %    Nucleated RBC 0.00 /100 WBC    Neutrophils (Absolute) 14.01 (H) 2.00 - 7.15 K/uL    Lymphs (Absolute) 0.78 (L) 1.00 - 4.80 K/uL    Monos (Absolute) 0.85 0.00 - 0.85 K/uL    Eos (Absolute) 0.03 0.00 - 0.51 K/uL    Baso (Absolute) 0.04 0.00 - 0.12 K/uL    Immature Granulocytes (abs) 0.06 0.00 - 0.11 K/uL    NRBC (Absolute) 0.00 K/uL   LACTIC ACID    Collection Time: 09/16/19  7:21 PM   Result Value Ref Range    Lactic Acid 1.3 0.5 - 2.0 mmol/L   Comp Metabolic Panel    Collection Time: 09/16/19  7:21 PM   Result Value Ref Range    Sodium 135 135 - 145 mmol/L    Potassium 4.3 3.6 - 5.5 mmol/L    Chloride 103 96 - 112 mmol/L    Co2 18 (L) 20 - 33 mmol/L    Anion Gap 14.0 (H) 0.0 - 11.9    Glucose 125 (H) 65 - 99 mg/dL    Bun 27 (H) 8 - 22 mg/dL    Creatinine 1.12 0.50 - 1.40 mg/dL    Calcium 9.7 8.5 - 10.5 mg/dL    AST(SGOT) 22 12 - 45 U/L    ALT(SGPT) 12 2 - 50 U/L    Alkaline Phosphatase 93 30 - 99 U/L    Total Bilirubin 0.6 0.1 - 1.5 mg/dL    Albumin 4.3  3.2 - 4.9 g/dL    Total Protein 7.0 6.0 - 8.2 g/dL    Globulin 2.7 1.9 - 3.5 g/dL    A-G Ratio 1.6 g/dL   ESTIMATED GFR    Collection Time: 09/16/19  7:21 PM   Result Value Ref Range    GFR If  57 (A) >60 mL/min/1.73 m 2    GFR If Non  47 (A) >60 mL/min/1.73 m 2   URINALYSIS,CULTURE IF INDICATED    Collection Time: 09/16/19  9:49 PM   Result Value Ref Range    Color DK Yellow     Character Cloudy (A)     Specific Gravity 1.016 <1.035    Ph 6.0 5.0 - 8.0    Glucose Negative Negative mg/dL    Ketones Negative Negative mg/dL    Protein 100 (A) Negative mg/dL    Bilirubin Negative Negative    Urobilinogen, Urine 1.0 Negative    Nitrite Positive (A) Negative    Leukocyte Esterase Large (A) Negative    Occult Blood Large (A) Negative    Micro Urine Req Microscopic    URINE MICROSCOPIC (W/UA)    Collection Time: 09/16/19  9:49 PM   Result Value Ref Range    WBC Packed (A) /hpf    RBC 2-5 (A) /hpf    Bacteria Few (A) None /hpf    Epithelial Cells Negative /hpf    Hyaline Cast 3-5 (A) /lpf       Imaging/Procedures Review:    Independant Imaging Review: Completed  CT-RENAL COLIC EVALUATION(A/P W/O)   Final Result         1.  Left hydronephrosis and left hydroureter. Peripherally calcified structure in the mid and distal left ureter are seen, which appear similar to prior study.               EKG:   EKG Independent Review: Completed  QTc:, HR: , Normal Sinus Rhythm, no ST/T changes     Records reviewed and summarized in current documentation :  Yes  [employer] teaching service handout given to patient:  No         Assessment/Plan     Sepsis (HCC)  Assessment & Plan  This is sepsis (without associated acute organ dysfunction).   73 yr old pt with elevated WBC count of 10999, tachycardia, source of infection- 2 out of 4 positive for SIRS criteria. However no altered mentation, respiratory rate <22/min and systolic BP >90 mm of Hg.  Likely secondary to UTI and pyelonephritis( CT showed perinephric fat  stranding, however negative for CVA tenderness).  -Admit to telemetry  -IV fluids, received bolus of 1000 ml in ED, resuscitated @30 ml/kg  -received first dose Meropenem in ED and received Ertapenem in the past, will continue meropenem.  -I/O monitoring.     Pyelonephritis  Assessment & Plan  Left flank tenderness, leukocytosis, nausea, vomiting.  -CT revealed hydroureteronephrosis with perinephric fat stranding.  -IV fluids  -Iv antibiotics.  -Zofran for nausea  -morphine prn for pain.    Hydroureteronephrosis  Assessment & Plan  CT revealed left sided hydroureteronephrosis likely secondary to UTI and pyelonephritis.  -IV antibiotics  -will likely need a nephrostomy tube tomorrow.  -NPO for possible nephrostomy.    UTI (urinary tract infection)- (present on admission)  Assessment & Plan  H/o recurrent UTI requiring antibiotics. UA- leukocyte esterase positive, nitrite positive, pyuria.  Urine culture pending.  -IV fluids  -antibiotics.      Anticipated Hospital stay:  >2 midnights        Quality Measures  Quality-Core Measures   Reviewed items::  EKG reviewed, Labs reviewed, Medications reviewed and Radiology images reviewed  Greene catheter::  No Greene  DVT prophylaxis pharmacological::  Heparin    PCP: Julio Ross M.D.

## 2019-09-18 ENCOUNTER — APPOINTMENT (OUTPATIENT)
Dept: RADIOLOGY | Facility: MEDICAL CENTER | Age: 74
DRG: 872 | End: 2019-09-18
Attending: SPECIALIST
Payer: MEDICARE

## 2019-09-18 LAB
ANION GAP SERPL CALC-SCNC: 6 MMOL/L (ref 0–11.9)
BASOPHILS # BLD AUTO: 0.3 % (ref 0–1.8)
BASOPHILS # BLD: 0.02 K/UL (ref 0–0.12)
BUN SERPL-MCNC: 15 MG/DL (ref 8–22)
CALCIUM SERPL-MCNC: 8.7 MG/DL (ref 8.5–10.5)
CHLORIDE SERPL-SCNC: 109 MMOL/L (ref 96–112)
CO2 SERPL-SCNC: 21 MMOL/L (ref 20–33)
CREAT SERPL-MCNC: 0.92 MG/DL (ref 0.5–1.4)
EOSINOPHIL # BLD AUTO: 0.13 K/UL (ref 0–0.51)
EOSINOPHIL NFR BLD: 2.2 % (ref 0–6.9)
ERYTHROCYTE [DISTWIDTH] IN BLOOD BY AUTOMATED COUNT: 45.6 FL (ref 35.9–50)
GLUCOSE SERPL-MCNC: 111 MG/DL (ref 65–99)
HCT VFR BLD AUTO: 37.4 % (ref 37–47)
HGB BLD-MCNC: 11.9 G/DL (ref 12–16)
IMM GRANULOCYTES # BLD AUTO: 0.02 K/UL (ref 0–0.11)
IMM GRANULOCYTES NFR BLD AUTO: 0.3 % (ref 0–0.9)
LYMPHOCYTES # BLD AUTO: 0.75 K/UL (ref 1–4.8)
LYMPHOCYTES NFR BLD: 12.9 % (ref 22–41)
MCH RBC QN AUTO: 26.8 PG (ref 27–33)
MCHC RBC AUTO-ENTMCNC: 31.8 G/DL (ref 33.6–35)
MCV RBC AUTO: 84.2 FL (ref 81.4–97.8)
MONOCYTES # BLD AUTO: 0.56 K/UL (ref 0–0.85)
MONOCYTES NFR BLD AUTO: 9.7 % (ref 0–13.4)
NEUTROPHILS # BLD AUTO: 4.32 K/UL (ref 2–7.15)
NEUTROPHILS NFR BLD: 74.6 % (ref 44–72)
NRBC # BLD AUTO: 0 K/UL
NRBC BLD-RTO: 0 /100 WBC
PLATELET # BLD AUTO: 174 K/UL (ref 164–446)
PMV BLD AUTO: 10 FL (ref 9–12.9)
POTASSIUM SERPL-SCNC: 3.8 MMOL/L (ref 3.6–5.5)
RBC # BLD AUTO: 4.44 M/UL (ref 4.2–5.4)
SODIUM SERPL-SCNC: 136 MMOL/L (ref 135–145)
WBC # BLD AUTO: 5.8 K/UL (ref 4.8–10.8)

## 2019-09-18 PROCEDURE — 770020 HCHG ROOM/CARE - TELE (206)

## 2019-09-18 PROCEDURE — 700111 HCHG RX REV CODE 636 W/ 250 OVERRIDE (IP)

## 2019-09-18 PROCEDURE — 99233 SBSQ HOSP IP/OBS HIGH 50: CPT | Mod: GC | Performed by: INTERNAL MEDICINE

## 2019-09-18 PROCEDURE — 78708 K FLOW/FUNCT IMAGE W/DRUG: CPT

## 2019-09-18 PROCEDURE — A9270 NON-COVERED ITEM OR SERVICE: HCPCS | Performed by: STUDENT IN AN ORGANIZED HEALTH CARE EDUCATION/TRAINING PROGRAM

## 2019-09-18 PROCEDURE — 36415 COLL VENOUS BLD VENIPUNCTURE: CPT

## 2019-09-18 PROCEDURE — 700111 HCHG RX REV CODE 636 W/ 250 OVERRIDE (IP): Performed by: STUDENT IN AN ORGANIZED HEALTH CARE EDUCATION/TRAINING PROGRAM

## 2019-09-18 PROCEDURE — 85025 COMPLETE CBC W/AUTO DIFF WBC: CPT

## 2019-09-18 PROCEDURE — 700102 HCHG RX REV CODE 250 W/ 637 OVERRIDE(OP): Performed by: STUDENT IN AN ORGANIZED HEALTH CARE EDUCATION/TRAINING PROGRAM

## 2019-09-18 PROCEDURE — 306782 CATH,FOLEY 16FR LATEX FREE: Performed by: INTERNAL MEDICINE

## 2019-09-18 PROCEDURE — 80048 BASIC METABOLIC PNL TOTAL CA: CPT

## 2019-09-18 PROCEDURE — 700105 HCHG RX REV CODE 258: Performed by: STUDENT IN AN ORGANIZED HEALTH CARE EDUCATION/TRAINING PROGRAM

## 2019-09-18 RX ORDER — FUROSEMIDE 10 MG/ML
INJECTION INTRAMUSCULAR; INTRAVENOUS
Status: COMPLETED
Start: 2019-09-18 | End: 2019-09-18

## 2019-09-18 RX ADMIN — MEROPENEM 500 MG: 500 INJECTION, POWDER, FOR SOLUTION INTRAVENOUS at 04:48

## 2019-09-18 RX ADMIN — SODIUM CHLORIDE: 9 INJECTION, SOLUTION INTRAVENOUS at 12:15

## 2019-09-18 RX ADMIN — FUROSEMIDE 20 MG: 10 INJECTION, SOLUTION INTRAMUSCULAR; INTRAVENOUS at 14:30

## 2019-09-18 RX ADMIN — ONDANSETRON 4 MG: 4 TABLET, ORALLY DISINTEGRATING ORAL at 04:11

## 2019-09-18 RX ADMIN — MEROPENEM 500 MG: 500 INJECTION, POWDER, FOR SOLUTION INTRAVENOUS at 00:53

## 2019-09-18 RX ADMIN — MEROPENEM 500 MG: 500 INJECTION, POWDER, FOR SOLUTION INTRAVENOUS at 17:09

## 2019-09-18 RX ADMIN — MEROPENEM 500 MG: 500 INJECTION, POWDER, FOR SOLUTION INTRAVENOUS at 23:24

## 2019-09-18 RX ADMIN — SODIUM CHLORIDE: 9 INJECTION, SOLUTION INTRAVENOUS at 21:28

## 2019-09-18 RX ADMIN — MEROPENEM 500 MG: 500 INJECTION, POWDER, FOR SOLUTION INTRAVENOUS at 12:13

## 2019-09-18 RX ADMIN — OXYCODONE HYDROCHLORIDE AND ACETAMINOPHEN 1000 MG: 500 TABLET ORAL at 12:12

## 2019-09-18 RX ADMIN — SODIUM CHLORIDE: 9 INJECTION, SOLUTION INTRAVENOUS at 04:43

## 2019-09-18 ASSESSMENT — ENCOUNTER SYMPTOMS
DEPRESSION: 0
MYALGIAS: 0
DOUBLE VISION: 0
PHOTOPHOBIA: 0
NAUSEA: 1
BLOOD IN STOOL: 0
LOSS OF CONSCIOUSNESS: 0
PALPITATIONS: 0
FEVER: 0
BLURRED VISION: 0
CHILLS: 0
ABDOMINAL PAIN: 0
PND: 0
HEARTBURN: 0
DIZZINESS: 0
BACK PAIN: 0
WHEEZING: 0
HEADACHES: 0
FOCAL WEAKNESS: 0
VOMITING: 0
SORE THROAT: 0
SHORTNESS OF BREATH: 0
MEMORY LOSS: 0

## 2019-09-18 NOTE — CARE PLAN
Problem: Communication  Goal: The ability to communicate needs accurately and effectively will improve  Outcome: PROGRESSING AS EXPECTED  Patient educated on use of call light with any needs. Patient verbalized understanding. Patient calls appropriately. All questions answered at this time. Hourly rounding in place.       Problem: Safety  Goal: Will remain free from falls  Outcome: PROGRESSING AS EXPECTED  Patient educated on level of risk and verbalized understanding. Patient is steady on their feet and is upself. Call light is within reach. Bed is locked and in lowest position. Hourly rounding in place, will continue to monitor.

## 2019-09-18 NOTE — PROGRESS NOTES
Gyn Onc    Dr. Hays saw pt at bedside and discussed role of renal lasix washout. We will await NT tube until after infection is clears as this has been a chronic issue. If kidney not perfusing, may also hold off on NT tube.

## 2019-09-18 NOTE — PROGRESS NOTES
Internal Medicine Interval Note  Note Author: Andrea Shi M.D.     Name Nessa Dennis     1945   Age/Sex 73 y.o. female   MRN [id number]   Code Status FULL CODE     After 5PM or if no immediate response to page, please call for cross-coverage  Attending/Team: Dr. Briceño/Monica See Patient List for primary contact information  Call [phone] to page    1st Call - Day Intern (R1):   Dr. Shi 2nd Call - Day Sr. Resident (R2/R3):   Dr. Villela         Reason for interval visit  (Principal Problem)   Sepsis  Pyelonephritis    Interval Problem Daily Status Update  (24 hours)   Ms. 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 She was found to have tachycardia , fever 100.1 and elevated WBCs on arrival in ED. U/A is positive for leukocyte esterase, nitrate, WBC, RBC and bacteria. She has been admitted for management of sepsis secondary to pyelonephritis.       No acute events overnight. Patient was laying in bed comfortably. VS are stable.  Patient c/o nausea; on IV Zofran prn.   Patient is afebrile and asymptomatic; left flank pain resolved; normal abdominal exam and no CVA tenderness b/l.   WBC trended down to 5.8; electrolytes are WNL; bicarb and anion gap came back to normal;  LA and WNL.   Cr trended down to 0.9; Continue IVF.  BCs shows NGTD; CT revealed hydroureteronephrosis with perinephric fat stranding.  On meropenem, ABx day 3; IV zofran prn for vomiting.  Nephrostomy tube placement after complete ABx treatment.       Review of Systems   Constitutional: Negative for chills, fever and malaise/fatigue.   HENT: Negative for congestion and sore throat.    Eyes: Negative for blurred vision, double vision and photophobia.   Respiratory: Negative for shortness of breath and wheezing.    Cardiovascular: Negative for chest pain, palpitations, leg swelling and PND.   Gastrointestinal: Positive for nausea.  Negative for abdominal pain, blood in stool, heartburn and vomiting.   Genitourinary: Negative for dysuria and urgency.   Musculoskeletal: Negative for back pain and myalgias.   Skin: Negative for rash.   Neurological: Negative for dizziness, focal weakness, loss of consciousness and headaches.   Psychiatric/Behavioral: Negative for depression and memory loss.       Consultants/Specialty  None    Disposition  Patient has been admitted for the management of pyelonephritis.    Quality Measures  Quality-Core Measures   Reviewed items::  Labs reviewed and Medications reviewed  Greene catheter::  No Greene  DVT prophylaxis pharmacological::  Heparin  Antibiotics:  Treating active infection/contamination beyond 24 hours perioperative coverage          Physical Exam       Vitals:    09/18/19 0052 09/18/19 0505 09/18/19 0831 09/18/19 1200   BP: 147/89 133/86 133/85 136/85   Pulse: (!) 103 97 95 80   Resp: 18 17 18 16   Temp: 36.8 °C (98.3 °F) 36.7 °C (98.1 °F) 37.4 °C (99.3 °F) 37.1 °C (98.7 °F)   TempSrc: Temporal Temporal Temporal Temporal   SpO2: 90% 93% 93% 93%   Weight:       Height:         Body mass index is 27.13 kg/m². Weight: 63 kg (138 lb 14.2 oz)  Oxygen Therapy:  Pulse Oximetry: 93 %, O2 (LPM): 1, O2 Delivery: Nasal Cannula    Physical Exam   Constitutional: She is oriented to person, place, and time and well-developed, well-nourished, and in no distress. No distress.   HENT:   Head: Normocephalic and atraumatic.   Eyes: Pupils are equal, round, and reactive to light. Right eye exhibits no discharge.   Neck: No JVD present.   Cardiovascular: Normal rate, regular rhythm, normal heart sounds and intact distal pulses. Exam reveals no gallop and no friction rub.   No murmur heard.  Pulmonary/Chest: Effort normal and breath sounds normal. No respiratory distress. She has no wheezes. She exhibits no tenderness.   Abdominal: Soft. Bowel sounds are normal. She exhibits no distension. There is no tenderness.   Colostomy  bag present    Musculoskeletal: Normal range of motion. She exhibits no edema or tenderness.   Lymphadenopathy:     She has no cervical adenopathy.   Neurological: She is alert and oriented to person, place, and time.   Skin: Skin is warm. No rash noted. She is not diaphoretic. No erythema.   Psychiatric: Mood and affect normal.         Lab Data Review:         9/18/2019  8:11 AM    Recent Labs     09/16/19 1921 09/17/19 0303 09/18/19 0219   SODIUM 135 138 136   POTASSIUM 4.3 4.2 3.8   CHLORIDE 103 105 109   CO2 18* 24 21   BUN 27* 23* 15   CREATININE 1.12 1.15 0.92   MAGNESIUM 1.8  --   --    CALCIUM 9.7 9.4 8.7       Recent Labs     09/16/19 1921 09/17/19 0303 09/18/19 0219   ALTSGPT 12 9  --    ASTSGOT 22 11*  --    ALKPHOSPHAT 93 81  --    TBILIRUBIN 0.6 0.7  --    GLUCOSE 125* 154* 111*       Recent Labs     09/16/19 1921 09/17/19 0303 09/18/19 0219   RBC 5.18 4.98 4.44   HEMOGLOBIN 14.4 13.1 11.9*   HEMATOCRIT 43.4 41.1 37.4   PLATELETCT 228 197 174   PROTHROMBTM  --  13.6  --    INR  --  1.02  --        Recent Labs     09/16/19 1921 09/17/19 0303 09/18/19 0219   WBC 15.8* 11.1* 5.8   NEUTSPOLYS 88.80* 84.20* 74.60*   LYMPHOCYTES 4.90* 7.20* 12.90*   MONOCYTES 5.40 8.10 9.70   EOSINOPHILS 0.20 0.00 2.20   BASOPHILS 0.30 0.30 0.30   ASTSGOT 22 11*  --    ALTSGPT 12 9  --    ALKPHOSPHAT 93 81  --    TBILIRUBIN 0.6 0.7  --            Assessment/Plan     * Sepsis secondary to pyelonephritis- (present on admission)  Assessment & Plan  Patient presented to ED with c/o nausea/vomiting and left flank pain  - tachycardic, fever and elevated WBC; 3/4 SIRS   - U/A is positive for leukocyte esterase, nitrate, WBC, RBC and bacteria  - no CVA tenderness; CT revealed hydroureteronephrosis with perinephric fat stranding  - sepsis secondary to pyelonephritis as a source    -Admitted to telemetry  -IV fluids, received bolus of 1000 ml in ED, resuscitated @30 ml/kg; on  ml/hr  -received first dose Meropenem in  ED and received Ertapenem in the past, will continue meropenem; ABx day 3.  - I/O monitoring.   - will continue to monitor    Pyelonephritis- (present on admission)  Assessment & Plan  Patient c/o nausea/vomiting and left flank pain; no CVA tenderness b/l;  H/o recurrent UTI; treated for ESBL klebsiella UTI in 3/19  With IV ABx and nephrostomy tube; ureteric stent in 4/19 which was removed in 8/29/19; stent culture was positive and was treated with 10 days of nitrofurantoin   - U/A is positive for leukocyte esterase, nitrate, WBC, RBC and bacteria;    -CT revealed hydroureteronephrosis with perinephric fat stranding.  - IV fluids  - IV meropenem.  - Zofran prn for nausea; morphine prn for pain.  - will continue to monitor    Hydroureteronephrosis- (present on admission)  Assessment & Plan  CT revealed left sided hydroureteronephrosis which is secondary to pyelonephritis or ureteric stricture (h/o previous radiation for cervical cancer).  - On IVF and IV meropenem for pyelonephritis  - ureteric stent in 4/19 which was removed on 8/29/19; was unable to put another stent due to mid and distal ureteric stricture.   - will consult IR for a nephrostomy tube tomorrow after Cr trends to baseline.     Metabolic acidosis  Assessment & Plan  - Bicarb was 18 on admission with AG of 14; LA normal   - might be starvation acidosis  - Bicarb and AG normal   - resolved: continue IVF       HTN (hypertension)- (present on admission)  Assessment & Plan  - BP stable  - On lisinopril 20 mg/day  - continue current treatment    ADA (acute kidney injury) (HCC)- (present on admission)  Assessment & Plan  - Cr was increased from baseline; trended down to 0.9.  - CT revealed left sided hydroureteronephrosis  - ADA might be prerenal due to sepsis or post renal (ureteric stricture)  - Continue IVF; will trend Cr  - will consult IR for nephrostomy tube placement    Cervical cancer (HCC)- (present on admission)  Assessment & Plan  - H/o cervical  cancer s/p radial hysterectomy and b/l salpingo-oophorectomy in 2005  - Recurrence in 2015- treated with chemoradiation  - following up with Dr. 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

## 2019-09-18 NOTE — PROGRESS NOTES
Assumed care of patient, bedside report received from Toro SEVERINO. Updated on POC, call light within reach and fall precautions in place. Bed locked and in lowest position. Patient instructed to call with any needs. All questions answered, no other needs at this time.

## 2019-09-18 NOTE — NON-PROVIDER
Internal Medicine Interval Note  Note Author: Malinda Meyer, Student     Name Nessa Dennis     1945   Age/Sex 73 y.o. female   MRN [id number]   Code Status FULL     After 5PM or if no immediate response to page, please call for cross-coverage  Attending/Team: Dr. Briceño See Patient List for primary contact information  Call [phone] to page    1st Call - Day Intern (R1):   Dr. Shi 2nd Call - Day Sr. Resident (R2/R3):   Dr. Villela         Reason for interval visit  (Principal Problem)   Sepsis (HCC) secondary to pyelonephritis    Interval Problem Daily Status Update  (24 hours)   Mrs. Dennis is a 74 y/o female with a PMH of cervical cancer s/p radical hysterectomy and bilateral salpingoophorectomy in  with relapse of cancer in  treated with chemotherapy (Cisplatin) and radiation which resulted in rectovaginal fistula requiring colostomy, neurogenic bladder, left ureteral stricture, and recurrent  UTIs. She presented 2 days due to a one day history of left flank pain and associated N/V. WBC 15.8 and CT abdomen & pelvis showed left hydronephrosis with hydroureter, peripherally calcified structure in the mid and distal left ureter, hazy perinephric fat stranding is seen in the left kidney. She is currently being treated with meropenem secondary to history of ESBL. She also presented with ADA (Cr 1.12 and BUN 27, from baseline of 0.79 and 17). Dr. Hays was consulted to determine if nephrostomy tube should be placed. He recommended not placing the tube until bacteremia resolves, unless her kidney function worsens. Her ADA has since resolved and blood cultures have been negative thus far.     No acute events over night. Patient states she is feeling well and no longer has any flank pain, nausea, or vomiting.     Review of Systems   Constitutional: Negative for chills and fever.   HENT: Negative for ear discharge, ear pain, hearing loss and tinnitus.    Eyes: Negative for blurred  vision, double vision and photophobia.   Respiratory: Negative for cough, hemoptysis and sputum production.    Cardiovascular: Negative for chest pain, palpitations, and LE swelling.   Gastrointestinal: No nausea or vomiting. Negative for abdominal pain, constipation, diarrhea and heartburn.   Genitourinary: No flank pain.   Musculoskeletal: Negative for back pain, myalgias and neck pain.   Skin: Negative for rash.   Neurological: Negative for dizziness, tingling, tremors and headaches.   Psychiatric/Behavioral: Negative for depression, substance abuse and suicidal ideas.      Quality Measures  Quality-Core Measures   Reviewed items::  EKG reviewed, Labs reviewed, Medications reviewed and Radiology images reviewed  Greene catheter::  No Greene  DVT prophylaxis pharmacological::  Heparin      Physical Exam       Vitals:    09/17/19 1603 09/17/19 2020 09/18/19 0052 09/18/19 0505   BP: 128/82 114/69 147/89 133/86   Pulse: 98 (!) 102 (!) 103 97   Resp: 16 18 18 17   Temp: 36.7 °C (98 °F) 36.8 °C (98.3 °F) 36.8 °C (98.3 °F) 36.7 °C (98.1 °F)   TempSrc: Temporal Temporal Temporal Temporal   SpO2: 98% 94% 90% 93%   Weight:  63 kg (138 lb 14.2 oz)     Height:         Body mass index is 27.13 kg/m². Weight: 63 kg (138 lb 14.2 oz)  Oxygen Therapy:  Pulse Oximetry: 93 %, O2 (LPM): 1, O2 Delivery: Silicone Nasal Cannula    Physical Exam   Constitutional: She is oriented to person, place, and time and well-developed, well-nourished, and in no distress.   HENT:   Head: Normocephalic and atraumatic.   Eyes: Pupils are equal, round, and reactive to light. Conjunctivae and EOM are normal.   Neck: Normal range of motion. Neck supple.   Cardiovascular: Regular rhythm and normal heart sounds. No murmur heard.  Pulmonary/Chest: Effort normal and breath sounds normal. No respiratory distress. She has no wheezes.   Abdominal: Soft. Bowel sounds are normal. She exhibits no distension. There is no tenderness.   Colostomy bag present. No CVA  tenderness.   Musculoskeletal: Normal range of motion. She exhibits no edema or deformity.   Neurological: She is alert and oriented to person, place, and time.   Skin: Skin is dry.   Psychiatric: Memory, affect and judgment normal.     Lab Data Review:         9/18/2019  8:14 AM    Recent Labs     09/16/19 1921 09/17/19 0303 09/18/19 0219   SODIUM 135 138 136   POTASSIUM 4.3 4.2 3.8   CHLORIDE 103 105 109   CO2 18* 24 21   BUN 27* 23* 15   CREATININE 1.12 1.15 0.92   MAGNESIUM 1.8  --   --    CALCIUM 9.7 9.4 8.7       Recent Labs     09/16/19 1921 09/17/19 0303 09/18/19 0219   ALTSGPT 12 9  --    ASTSGOT 22 11*  --    ALKPHOSPHAT 93 81  --    TBILIRUBIN 0.6 0.7  --    GLUCOSE 125* 154* 111*       Recent Labs     09/16/19 1921 09/17/19 0303 09/18/19 0219   RBC 5.18 4.98 4.44   HEMOGLOBIN 14.4 13.1 11.9*   HEMATOCRIT 43.4 41.1 37.4   PLATELETCT 228 197 174   PROTHROMBTM  --  13.6  --    INR  --  1.02  --        Recent Labs     09/16/19 1921 09/17/19 0303 09/18/19 0219   WBC 15.8* 11.1* 5.8   NEUTSPOLYS 88.80* 84.20* 74.60*   LYMPHOCYTES 4.90* 7.20* 12.90*   MONOCYTES 5.40 8.10 9.70   EOSINOPHILS 0.20 0.00 2.20   BASOPHILS 0.30 0.30 0.30   ASTSGOT 22 11*  --    ALTSGPT 12 9  --    ALKPHOSPHAT 93 81  --    TBILIRUBIN 0.6 0.7  --            Assessment/Plan       1. Sepsis secondary to acute pyelonephritis  Nessa Dennis is a 74 y/o female with a history of recurrent complicated ESBL UTIs with past nephrostomy tube and ureteral stent placement presents with left flank pain and N/V consistent with pyelonephrosis. This was confirmed by WBC of 15.8, UA positive for nitrites, leukocyte esterase, and WBCs, and CT abdomen/pelvis showing hydroureteronephrosis with perinephric fat stranding. She met 2/4 SIRS criteria (tachycardic at 128 and WBC) at presentation, however blood cultures are negative thus far. She is starting to better already with the current management. WBC is trending down - 5.8  today.     Plan:  -Admitted to tele  -Continue meropenem 500 mg secondary to history of ESBL UTI  -Given 1 L of IV RL in the ED. Will continue with maintenance normal saline at 150 ml/hour  -Zofran 4 mg PRN for nausea  -Tylenol PRN for pain   -Blood cultures and urine cultures pending  -Monitor I/O     2. Hydronephrosis  CT abdomen/pelvis showed hydroureteronephrosis. She has a PMH of pyelonephritis with hydronephrosis that required nephrostomy tube placement in March of this year. Because of her current infection, placement of the nephrostomy tube will be avoided if possible. ED physician consulted Dr. Hays who recommended trending her kidney function to determine need for NT and also recommended not inserting the tube if the patient has bacteremia (cultures still pending). Cr and BUN today were 1.15 and 23 but improved to 0.92 and 15 today.       Plan:  -Per Dr. Hays, nephrostomy tube will be placed once the infection has cleared  -Monitor BUN and Cr     3. Acute kidney injury  Patient presented with a Cr and BUN of 1.12 and 27 and her Cr and BUN today is 0.92 and 15 (baseline 0.79 and 17). She had acute kidney injury based on KDIGO criteria, which has now resolved. Her ADA was most likely post-renal due to obstruction from the left ureteral stricture vs neurogenic bladder. Her BUN/Cr ratio rules out intrinsic renal injury, and she is unlikely to have pre-renal injury as she does not show signs of poor perfusion (hypotension, dehydration, blood loss, etc.). Although, if sepsis is present, this could be a cause of pre-renal ADA. She was placed on an increased dose of her ACE inhibitor in March, but this did not show any signs of kidney injury from her lab work done in April.      Plan:  -Monitor changes in creatinine and BUN  -Hold lisinopril  -Avoid nephrotoxins    4. Metabolic Acidosis  Patient was admitted with a bicarb of 18 and anion gap of 14. Lactic acid was suspected to be the cause, however the lactic acid  level was normal. It is more likely to be caused by starvation ketosis. However, bicarb improved to 21 and anion gap to 6.0 today.      Plan:  -Resolved  -Continue IVF     5. Hypertension  Patient has a history of HTN with BP measurements at home estimated to be 135/60, per patient. She is currently on lisinopril 20 mg daily. BP is stable at 133/86.      Plan:  -Hold lisinopril secondary to ADA

## 2019-09-19 LAB
ANION GAP SERPL CALC-SCNC: 6 MMOL/L (ref 0–11.9)
APPEARANCE UR: CLEAR
BACTERIA #/AREA URNS HPF: NEGATIVE /HPF
BACTERIA UR CULT: NORMAL
BASOPHILS # BLD AUTO: 0.4 % (ref 0–1.8)
BASOPHILS # BLD: 0.02 K/UL (ref 0–0.12)
BILIRUB UR QL STRIP.AUTO: NEGATIVE
BUN SERPL-MCNC: 13 MG/DL (ref 8–22)
CALCIUM SERPL-MCNC: 9.2 MG/DL (ref 8.5–10.5)
CHLORIDE SERPL-SCNC: 111 MMOL/L (ref 96–112)
CO2 SERPL-SCNC: 23 MMOL/L (ref 20–33)
COLOR UR: YELLOW
CREAT SERPL-MCNC: 0.79 MG/DL (ref 0.5–1.4)
EOSINOPHIL # BLD AUTO: 0.23 K/UL (ref 0–0.51)
EOSINOPHIL NFR BLD: 4.2 % (ref 0–6.9)
EPI CELLS #/AREA URNS HPF: ABNORMAL /HPF
ERYTHROCYTE [DISTWIDTH] IN BLOOD BY AUTOMATED COUNT: 44.1 FL (ref 35.9–50)
GLUCOSE SERPL-MCNC: 102 MG/DL (ref 65–99)
GLUCOSE UR STRIP.AUTO-MCNC: NEGATIVE MG/DL
HCT VFR BLD AUTO: 36.1 % (ref 37–47)
HGB BLD-MCNC: 11.6 G/DL (ref 12–16)
HYALINE CASTS #/AREA URNS LPF: ABNORMAL /LPF
IMM GRANULOCYTES # BLD AUTO: 0.02 K/UL (ref 0–0.11)
IMM GRANULOCYTES NFR BLD AUTO: 0.4 % (ref 0–0.9)
KETONES UR STRIP.AUTO-MCNC: NEGATIVE MG/DL
LEUKOCYTE ESTERASE UR QL STRIP.AUTO: ABNORMAL
LYMPHOCYTES # BLD AUTO: 0.77 K/UL (ref 1–4.8)
LYMPHOCYTES NFR BLD: 14.2 % (ref 22–41)
MCH RBC QN AUTO: 26.7 PG (ref 27–33)
MCHC RBC AUTO-ENTMCNC: 32.1 G/DL (ref 33.6–35)
MCV RBC AUTO: 83.2 FL (ref 81.4–97.8)
MICRO URNS: ABNORMAL
MONOCYTES # BLD AUTO: 0.53 K/UL (ref 0–0.85)
MONOCYTES NFR BLD AUTO: 9.8 % (ref 0–13.4)
NEUTROPHILS # BLD AUTO: 3.86 K/UL (ref 2–7.15)
NEUTROPHILS NFR BLD: 71 % (ref 44–72)
NITRITE UR QL STRIP.AUTO: NEGATIVE
NRBC # BLD AUTO: 0 K/UL
NRBC BLD-RTO: 0 /100 WBC
PH UR STRIP.AUTO: 6.5 [PH] (ref 5–8)
PLATELET # BLD AUTO: 190 K/UL (ref 164–446)
PMV BLD AUTO: 9.6 FL (ref 9–12.9)
POTASSIUM SERPL-SCNC: 3.7 MMOL/L (ref 3.6–5.5)
PROT UR QL STRIP: NEGATIVE MG/DL
RBC # BLD AUTO: 4.34 M/UL (ref 4.2–5.4)
RBC # URNS HPF: ABNORMAL /HPF
RBC UR QL AUTO: NEGATIVE
SIGNIFICANT IND 70042: NORMAL
SITE SITE: NORMAL
SODIUM SERPL-SCNC: 140 MMOL/L (ref 135–145)
SOURCE SOURCE: NORMAL
SP GR UR STRIP.AUTO: 1.01
UROBILINOGEN UR STRIP.AUTO-MCNC: 0.2 MG/DL
WBC # BLD AUTO: 5.4 K/UL (ref 4.8–10.8)
WBC #/AREA URNS HPF: ABNORMAL /HPF

## 2019-09-19 PROCEDURE — 770020 HCHG ROOM/CARE - TELE (206)

## 2019-09-19 PROCEDURE — 99233 SBSQ HOSP IP/OBS HIGH 50: CPT | Mod: GC | Performed by: INTERNAL MEDICINE

## 2019-09-19 PROCEDURE — 81001 URINALYSIS AUTO W/SCOPE: CPT

## 2019-09-19 PROCEDURE — 700111 HCHG RX REV CODE 636 W/ 250 OVERRIDE (IP): Performed by: STUDENT IN AN ORGANIZED HEALTH CARE EDUCATION/TRAINING PROGRAM

## 2019-09-19 PROCEDURE — 85025 COMPLETE CBC W/AUTO DIFF WBC: CPT

## 2019-09-19 PROCEDURE — 87086 URINE CULTURE/COLONY COUNT: CPT

## 2019-09-19 PROCEDURE — 80048 BASIC METABOLIC PNL TOTAL CA: CPT

## 2019-09-19 PROCEDURE — A9270 NON-COVERED ITEM OR SERVICE: HCPCS | Performed by: STUDENT IN AN ORGANIZED HEALTH CARE EDUCATION/TRAINING PROGRAM

## 2019-09-19 PROCEDURE — 700102 HCHG RX REV CODE 250 W/ 637 OVERRIDE(OP): Performed by: STUDENT IN AN ORGANIZED HEALTH CARE EDUCATION/TRAINING PROGRAM

## 2019-09-19 PROCEDURE — 700105 HCHG RX REV CODE 258: Performed by: STUDENT IN AN ORGANIZED HEALTH CARE EDUCATION/TRAINING PROGRAM

## 2019-09-19 PROCEDURE — 36415 COLL VENOUS BLD VENIPUNCTURE: CPT

## 2019-09-19 RX ORDER — POTASSIUM CHLORIDE 20 MEQ/1
20 TABLET, EXTENDED RELEASE ORAL ONCE
Status: COMPLETED | OUTPATIENT
Start: 2019-09-19 | End: 2019-09-19

## 2019-09-19 RX ORDER — LISINOPRIL 20 MG/1
20 TABLET ORAL
Status: DISCONTINUED | OUTPATIENT
Start: 2019-09-19 | End: 2019-09-20

## 2019-09-19 RX ADMIN — MEROPENEM 500 MG: 500 INJECTION, POWDER, FOR SOLUTION INTRAVENOUS at 18:10

## 2019-09-19 RX ADMIN — OXYCODONE HYDROCHLORIDE AND ACETAMINOPHEN 1000 MG: 500 TABLET ORAL at 12:09

## 2019-09-19 RX ADMIN — POTASSIUM CHLORIDE 20 MEQ: 20 TABLET, EXTENDED RELEASE ORAL at 10:09

## 2019-09-19 RX ADMIN — LISINOPRIL 20 MG: 20 TABLET ORAL at 12:09

## 2019-09-19 RX ADMIN — MEROPENEM 500 MG: 500 INJECTION, POWDER, FOR SOLUTION INTRAVENOUS at 05:00

## 2019-09-19 RX ADMIN — MEROPENEM 500 MG: 500 INJECTION, POWDER, FOR SOLUTION INTRAVENOUS at 12:10

## 2019-09-19 RX ADMIN — SODIUM CHLORIDE: 9 INJECTION, SOLUTION INTRAVENOUS at 14:13

## 2019-09-19 RX ADMIN — SODIUM CHLORIDE: 9 INJECTION, SOLUTION INTRAVENOUS at 05:00

## 2019-09-19 RX ADMIN — SODIUM CHLORIDE: 9 INJECTION, SOLUTION INTRAVENOUS at 21:30

## 2019-09-19 ASSESSMENT — ENCOUNTER SYMPTOMS
BACK PAIN: 0
DIZZINESS: 0
HEADACHES: 0
DEPRESSION: 0
ORTHOPNEA: 0
BLOOD IN STOOL: 0
SORE THROAT: 0
MEMORY LOSS: 0
ABDOMINAL PAIN: 0
PHOTOPHOBIA: 0
LOSS OF CONSCIOUSNESS: 0
DOUBLE VISION: 0
FEVER: 0
SINUS PAIN: 0
PALPITATIONS: 0
HEARTBURN: 0
WHEEZING: 0
SHORTNESS OF BREATH: 0
SEIZURES: 0
CHILLS: 0
NAUSEA: 0
BLURRED VISION: 0
MYALGIAS: 0
VOMITING: 0
COUGH: 0

## 2019-09-19 NOTE — NON-PROVIDER
Internal Medicine Interval Note  Note Author: Malinda Meyer, Student     Name Nessa Dennis     1945   Age/Sex 73 y.o. female   MRN [id number]   Code Status FULL     After 5PM or if no immediate response to page, please call for cross-coverage  Attending/Team: Dr. Ortez See Patient List for primary contact information  Call [phone] to page    1st Call - Day Intern (R1):   Dr. Shi 2nd Call - Day Sr. Resident (R2/R3):   Dr. Villela         Reason for interval visit  (Principal Problem)   Sepsis secondary to pyelonephritis     Interval Problem Daily Status Update  (24 hours)   Mrs. Dennis is a 72 y/o female with a PMH of cervical cancer s/p radical hysterectomy and bilateral salpingoophorectomy in  with relapse of cancer in  treated with chemotherapy (Cisplatin) and radiation which resulted in rectovaginal fistula requiring colostomy, neurogenic bladder, left ureteral stricture, and recurrent UTIs. She presented 3 days after a one day history of left flank pain and associated N/V. WBC 15.8 and CT abdomen & pelvis showed left hydronephrosis with hydroureter, peripherally calcified structure in the mid and distal left ureter, hazy perinephric fat stranding is seen in the left kidney. She is currently being treated with IV meropenem secondary to history of ESBL. She also presented with ADA (Cr 1.12 and BUN 27, from baseline of 0.79 and 17), however, this has improved to Cr 0.79 and BUN 13 today. Dr. Hays was consulted to determine if nephrostomy tube should be placed. He recommended not placing the tube until bacteremia resolves, unless her kidney function worsens. Her ADA has since resolved and blood cultures have been negative thus far.      No acute events over night. Patient states she is feeling well and no longer has any flank pain, nausea, or vomiting. She has no complaints at this time.      Review of Systems   Constitutional: Negative for chills and fever.   HENT: Negative  for ear discharge, ear pain, hearing loss and tinnitus.    Eyes: Negative for blurred vision, double vision and photophobia.   Respiratory: Negative for cough, hemoptysis and sputum production.    Cardiovascular: Negative for chest pain, palpitations, and LE swelling.   Gastrointestinal: No nausea or vomiting. Negative for abdominal pain, constipation, diarrhea and heartburn.   Genitourinary: No flank pain.   Musculoskeletal: Negative for back pain, myalgias and neck pain.   Skin: Negative for rash.   Neurological: Negative for dizziness, tingling, tremors and headaches.   Psychiatric/Behavioral: Negative for depression, substance abuse and suicidal ideas.       Quality Measures  Quality-Core Measures   Reviewed items::  EKG reviewed, Labs reviewed, Medications reviewed and Radiology images reviewed  Greene catheter::  No Greene  DVT prophylaxis pharmacological::  Heparin      Physical Exam       Vitals:    09/18/19 2008 09/19/19 0006 09/19/19 0500 09/19/19 0743   BP: 140/75 132/89 145/83 160/89   Pulse: (!) 103 91 (!) 101 89   Resp: 18 18 18 18   Temp: 36.7 °C (98 °F) 36.8 °C (98.2 °F) 36.2 °C (97.1 °F)    TempSrc: Temporal Temporal Temporal Temporal   SpO2: 94% 92% 92% 90%   Weight: 64.5 kg (142 lb 3.2 oz)      Height:         Body mass index is 27.77 kg/m². Weight: 64.5 kg (142 lb 3.2 oz)  Oxygen Therapy:  Pulse Oximetry: 90 %, O2 (LPM): 1, O2 Delivery: Silicone Nasal Cannula       Physical Exam   Constitutional: She is oriented to person, place, and time and well-developed, well-nourished, and in no distress.   HENT:   Head: Normocephalic and atraumatic.   Eyes: Pupils are equal, round, and reactive to light. Conjunctivae and EOM are normal.   Neck: Normal range of motion. Neck supple.   Cardiovascular: Regular rhythm and normal heart sounds. No murmur heard.  Pulmonary/Chest: Effort normal and breath sounds normal. No respiratory distress. She has no wheezes.   Abdominal: Soft. Bowel sounds are normal. She  exhibits no distension. There is no tenderness.   Colostomy bag present. No CVA tenderness.   Musculoskeletal: Normal range of motion. She exhibits no edema or deformity.   Neurological: She is alert and oriented to person, place, and time.   Skin: Skin is dry.   Psychiatric: Memory, affect and judgment normal.       Lab Data Review:     9/19/2019  10:50 AM    Recent Labs     09/16/19 1921 09/17/19 0303 09/18/19 0219 09/19/19  0629   SODIUM 135 138 136 140   POTASSIUM 4.3 4.2 3.8 3.7   CHLORIDE 103 105 109 111   CO2 18* 24 21 23   BUN 27* 23* 15 13   CREATININE 1.12 1.15 0.92 0.79   MAGNESIUM 1.8  --   --   --    CALCIUM 9.7 9.4 8.7 9.2       Recent Labs     09/16/19 1921 09/17/19 0303 09/18/19 0219 09/19/19  0629   ALTSGPT 12 9  --   --    ASTSGOT 22 11*  --   --    ALKPHOSPHAT 93 81  --   --    TBILIRUBIN 0.6 0.7  --   --    GLUCOSE 125* 154* 111* 102*       Recent Labs     09/17/19 0303 09/18/19 0219 09/19/19  0629   RBC 4.98 4.44 4.34   HEMOGLOBIN 13.1 11.9* 11.6*   HEMATOCRIT 41.1 37.4 36.1*   PLATELETCT 197 174 190   PROTHROMBTM 13.6  --   --    INR 1.02  --   --        Recent Labs     09/16/19 1921 09/17/19 0303 09/18/19 0219 09/19/19  0629   WBC 15.8* 11.1* 5.8 5.4   NEUTSPOLYS 88.80* 84.20* 74.60* 71.00   LYMPHOCYTES 4.90* 7.20* 12.90* 14.20*   MONOCYTES 5.40 8.10 9.70 9.80   EOSINOPHILS 0.20 0.00 2.20 4.20   BASOPHILS 0.30 0.30 0.30 0.40   ASTSGOT 22 11*  --   --    ALTSGPT 12 9  --   --    ALKPHOSPHAT 93 81  --   --    TBILIRUBIN 0.6 0.7  --   --            Assessment/Plan        1. Sepsis secondary to acute pyelonephritis  Nessa Dennis is a 72 y/o female with a history of recurrent complicated ESBL UTIs with past nephrostomy tube and ureteral stent placement who presented with left flank pain and N/V consistent with pyelonephrosis. This was confirmed by WBC of 15.8, UA positive for nitrites, leukocyte esterase, and WBCs, and CT abdomen/pelvis showing hydroureteronephrosis with perinephric fat  stranding. She met 2/4 SIRS criteria (tachycardic at 128 and WBC) at presentation, however blood cultures are negative thus far. She is starting to better already with the current management. WBC is trending down - 5.4 today. Nuclear medicine renal scan done which showed decreased perfusion to the left kidney, excretion incomplete on the left with increased half emptying time. The split function is 26% on the left and 74% on the right (worsened from 41 and 59 respectively on comparison).      Plan:  -Transfer from [facility] to medicine  -Blood cultures and urine cultures negative  -Continue IV meropenem 500 mg secondary to history of ESBL UTI'  -Continue with maintenance normal saline at 150 ml/hour  -Zofran 4 mg PRN for nausea  -Tylenol PRN for pain   -Monitor I/O  -ID consult  -We will consider continuing current ABX vs deescalating per ID recommendation   -Dr. Hays following for possible NT placement while in hospital     2. Hydronephrosis  CT abdomen/pelvis showed hydroureteronephrosis. She has a PMH of pyelonephritis with hydronephrosis that required nephrostomy tube placement in March of this year. Because of her current infection, placement of the nephrostomy tube will be avoided if possible, per Dr. Hays recommendation. Cr and BUN today were 1.15 and 23 but improved to 0.79 and 13 today.       Plan:  -Monitor BUN and Cr  -Per Dr. Hays, nephrostomy tube will be placed once the infection has cleared     3. Acute kidney injury - resolved  Patient presented with a Cr and BUN of 1.12 and 27 and her Cr and BUN today is 0.79 and 13. She had acute kidney injury based on KDIGO criteria, which has now resolved. Her ADA was most likely post-renal due to obstruction from the left ureteral stricture vs neurogenic bladder. Her BUN/Cr ratio rules out intrinsic renal injury, and she is unlikely to have pre-renal injury as she does not show signs of poor perfusion (hypotension, dehydration, blood loss, etc.). Although, sepsis could  be a cause of pre-renal ADA. She was placed on an increased dose of her ACE inhibitor in March, but this did not show any signs of kidney injury from her lab work done in April.      Plan:  -Monitor changes in creatinine and BUN  -ADA has resolved - no interventions necessary at this time     4. Metabolic Acidosis  Patient was admitted with a bicarb of 18 and anion gap of 14. Lactic acid was suspected to be the cause, however the lactic acid level was normal. It is more likely to be caused by starvation ketosis. However, bicarb improved to 23 and anion gap to 6.0 today.      Plan:  -Resolved  -Continue IVF     5. Hypertension  Patient has a history of HTN with BP measurements at home estimated to be 135/60, per patient. She is currently on lisinopril 20 mg daily.      Plan:  -Restart lisinopril secondary to resolved AAD

## 2019-09-19 NOTE — CONSULTS
INFECTIOUS DISEASES CONSULT NOTE    REASON FOR CONSULT  Pyelonephritis/complicated UTI with hx of ESBL    HPI:  Ms. Dennis is a 73F with PMHx of:   - cervical cancer s/p radical hysterectomy, salpingectomy, and oophorectomy in 2005, with relapse in 2015 treated with radiotherapy. Complicated by rectovaginal fistula requiring diverting colectomy as well as recurrent episodes of complicated UTI with hx of ESBL.   - 03/2018 episode of ESBL UTI, pyelo, hydronephrosis in 03/2018 now s/p nephrostomy tube    She presented on 9/16 with non-radiating flank pain she describes as 8-9/10 associated with n/v but no fevers, chills, dysuria, hematuria, or other symptoms. She reports that she self-catheterizes at home. She reported fever at home and presented with leukocytosis and tachycardia therefore met criteria for sepsis and was given 1L crystalloid and begun on meropenem in the ER. CT renal demonstrated left-sided hydronephrosis and hydroureter as well as a previously noted mid- and distal left ureter calcified stricture (likely secondary to radiotherapy). NM renal perfusion study showed new left-sided obstruction with delayed perfusion/excretion.     Dr. Hays (Gyn/Onc) was consulted and on 9/18 recommended waiting until infection clears to place nephrostomy tube; however, 9/16 urine and blood cultures have remained negative. Therefore on 9/19 Dr. Hays instead recommended one more urine culture, and if persistently negative NT may be placed with subsequent stent via IR.     Today, Ms. Dennis states that following fluids + meropenem she no longer has left flank tenderness or pain (0/10) and her n/v have also resolved. She denies fevers, chills, abdominal/suprapubic pain, chest pain, SOB, or any other sx at this time and feels well.          Past Surgical History:  Past Surgical History:   Procedure Laterality Date   • CYSTO STENT PLACEMNT PRE SURG Left 8/29/2019    Procedure: CYSTOSCOPY, WITH URETERAL STENT REMOVAL;  Surgeon:  Adam Hays M.D.;  Location: SURGERY [facility];  Service: Gynecology   • CYSTOSCOPY  7/31/2015    Procedure: CYSTOSCOPY;  Surgeon: Adam Hays M.D.;  Location: SURGERY [facility];  Service:    • PELVIC EXAM UNDER ANESTHESIA  7/31/2015    Procedure: PELVIC EXAM UNDER ANESTHESIA;  Surgeon: Adam Hays M.D.;  Location: SURGERY [facility];  Service:    • CERVICAL CONIZATION  7/31/2015    Procedure: CERVICAL CONIZATION FOR: CONE BIOPSY;  Surgeon: Adam Hays M.D.;  Location: SURGERY [facility];  Service:    • COLOSTOMY CREATION LAPAROSCOPIC  4/14/2015    Performed by Mariusz Barajas M.D. at SURGERY [facility]   • RECOVERY  4/6/2015    Performed by [facility] at SURGERY [facility]   • OTHER  04/2015    supra pubic cath placement   • GYN SURGERY  5/2005    hysterectomy   • OTHER ABDOMINAL SURGERY      bowel resection- has colostomy       Current Outpatient Medications:  Home Medications     Reviewed by Telma Costa R.N. (Registered Nurse) on 09/17/19 at 0422  Med List Status: Complete   Medication Last Dose Status   ascorbic acid (ASCORBIC ACID) 500 MG TABS 9/15/2019 Active   Calcium Carb-Cholecalciferol (CALCIUM 600 + D PO) 9/15/2019 Active   Cholecalciferol (VITAMIN D) 2000 UNITS CAPS 9/16/2019 Active   lisinopril (PRINIVIL) 20 MG Tab 9/16/2019 Active   Multiple Vitamins-Minerals (OCULAR VITAMINS PO) 9/16/2019 Active   nitrofurantoin monohyd macro (MACROBID) 100 MG Cap 9/13/2019 Active   Probiotic Product (PROBIOTIC DAILY PO) 9/15/2019 Active   therapeutic multivitamin-minerals (THERAGRAN-M) TABS 9/16/2019 Active                Medication Allergy/Sensitivities:  Allergies   Allergen Reactions   • Sulfa Drugs Unspecified     Pt states it runs her down.         Family History (mandatory)   History reviewed. No pertinent family history.    Social History (mandatory)   Social History     Socioeconomic History   • Marital status:      Spouse name: Heron  Sonny   • Number of children: Not on file   • Years of education: Not on file   • Highest education level: Not on file   Occupational History   • Occupation: Retired    Social Needs   • Financial resource strain: Not hard at all   • Food insecurity:     Worry: Never true     Inability: Never true   • Transportation needs:     Medical: No     Non-medical: No   Tobacco Use   • Smoking status: Never Smoker   • Smokeless tobacco: Never Used   Substance and Sexual Activity   • Alcohol use: No   • Drug use: No   • Sexual activity: Not on file   Lifestyle   • Physical activity:     Days per week: Not on file     Minutes per session: Not on file   • Stress: Not on file   Relationships   • Social connections:     Talks on phone: Not on file     Gets together: Not on file     Attends Sikh service: Not on file     Active member of club or organization: Not on file     Attends meetings of clubs or organizations: Not on file     Relationship status: Not on file   • Intimate partner violence:     Fear of current or ex partner: Not on file     Emotionally abused: Not on file     Physically abused: Not on file     Forced sexual activity: Not on file   Other Topics Concern   • Not on file   Social History Narrative   • Not on file       Physical Exam     Vitals:    09/19/19 0006 09/19/19 0500 09/19/19 0743 09/19/19 1152   BP: 132/89 145/83 160/89 (!) 178/100   Pulse: 91 (!) 101 89 94   Resp: 18 18 18 18   Temp: 36.8 °C (98.2 °F) 36.2 °C (97.1 °F)  36.6 °C (97.8 °F)   TempSrc: Temporal Temporal Temporal Temporal   SpO2: 92% 92% 90% 92%   Weight:       Height:         Body mass index is 27.77 kg/m².  O2 therapy: Pulse Oximetry: 92 %, O2 (LPM): 1, O2 Delivery: None (Room Air)    Physical Exam   Constitutional: She is oriented to person, place, and time. No distress.   Non-toxic appearance. Well-appearing elderly female.    HENT:   Head: Normocephalic and atraumatic.   Eyes: Conjunctivae are normal. Right eye exhibits no discharge.  Left eye exhibits no discharge. No scleral icterus.   Cardiovascular: Normal rate, regular rhythm and normal heart sounds. Exam reveals no gallop and no friction rub.   No murmur heard.  Pulmonary/Chest: Effort normal. No respiratory distress. She exhibits no tenderness.   Course basilar BS b/l.   Abdominal: Soft. Bowel sounds are normal. She exhibits no distension. There is no tenderness. There is no rebound and no guarding.   No suprapubic or flank tenderness.   Musculoskeletal: She exhibits no edema, tenderness or deformity.   Neurological: She is alert and oriented to person, place, and time.   Skin: Skin is warm and dry. She is not diaphoretic.   Psychiatric: Mood, memory, affect and judgment normal.   Vitals reviewed.        Data Review     Lab Data Review:  Recent Results (from the past 24 hour(s))   CBC WITH DIFFERENTIAL    Collection Time: 09/19/19  6:29 AM   Result Value Ref Range    WBC 5.4 4.8 - 10.8 K/uL    RBC 4.34 4.20 - 5.40 M/uL    Hemoglobin 11.6 (L) 12.0 - 16.0 g/dL    Hematocrit 36.1 (L) 37.0 - 47.0 %    MCV 83.2 81.4 - 97.8 fL    MCH 26.7 (L) 27.0 - 33.0 pg    MCHC 32.1 (L) 33.6 - 35.0 g/dL    RDW 44.1 35.9 - 50.0 fL    Platelet Count 190 164 - 446 K/uL    MPV 9.6 9.0 - 12.9 fL    Neutrophils-Polys 71.00 44.00 - 72.00 %    Lymphocytes 14.20 (L) 22.00 - 41.00 %    Monocytes 9.80 0.00 - 13.40 %    Eosinophils 4.20 0.00 - 6.90 %    Basophils 0.40 0.00 - 1.80 %    Immature Granulocytes 0.40 0.00 - 0.90 %    Nucleated RBC 0.00 /100 WBC    Neutrophils (Absolute) 3.86 2.00 - 7.15 K/uL    Lymphs (Absolute) 0.77 (L) 1.00 - 4.80 K/uL    Monos (Absolute) 0.53 0.00 - 0.85 K/uL    Eos (Absolute) 0.23 0.00 - 0.51 K/uL    Baso (Absolute) 0.02 0.00 - 0.12 K/uL    Immature Granulocytes (abs) 0.02 0.00 - 0.11 K/uL    NRBC (Absolute) 0.00 K/uL   Basic Metabolic Panel    Collection Time: 09/19/19  6:29 AM   Result Value Ref Range    Sodium 140 135 - 145 mmol/L    Potassium 3.7 3.6 - 5.5 mmol/L    Chloride 111 96 -  112 mmol/L    Co2 23 20 - 33 mmol/L    Glucose 102 (H) 65 - 99 mg/dL    Bun 13 8 - 22 mg/dL    Creatinine 0.79 0.50 - 1.40 mg/dL    Calcium 9.2 8.5 - 10.5 mg/dL    Anion Gap 6.0 0.0 - 11.9   ESTIMATED GFR    Collection Time: 09/19/19  6:29 AM   Result Value Ref Range    GFR If African American >60 >60 mL/min/1.73 m 2    GFR If Non African American >60 >60 mL/min/1.73 m 2       Imaging/Procedures Review:      NM-RENAL WITH DIURETIC WASHOUT   Final Result      New left obstruction with delayed perfusion and excretion.      Normal right perfusion and excretion      CT-RENAL COLIC EVALUATION(A/P W/O)   Final Result         1.  Left hydronephrosis and left hydroureter. Peripherally calcified structure in the mid and distal left ureter are seen, which appear similar to prior study.              Assessment/Plan   # Sepsis secondary to pyelonephritis   -met sepsis criteria on arrival   -sx now resolved following administration of IV meropenem and crystalloid   -urine cultures negative possibly because infection is concentrated behind the new left-sided obstruction   -continue meropenem for 7 days, with plan for Dr. Hays's anticipated procedures (nephrostomy tube, stent) to be done either inpatient during the meropenem course or outpatient following its completion  # Left hydronephrosis and left hydroureter    -secondary to new left-sided ureteral obstruction, likely due to stricture related to prior radiation   -Dr. Hays following, may place NT and have IR place stent if repeat urine culture negative

## 2019-09-19 NOTE — CARE PLAN
Problem: Communication  Goal: The ability to communicate needs accurately and effectively will improve  Outcome: PROGRESSING AS EXPECTED  Intervention: [place] patient and significant other/support system to call light to alert staff of needs  Flowsheets (Taken 9/18/2019 2156)  Oriented to:: All of the Following : Location of Bathroom, Visiting Policy, Unit Routine, Call Light and Bedside Controls, Bedside Rail Policy, Smoking Policy, Rights and Responsibilities, Bedside Report, and Patient Education Notebook  Note:   Pt educated on POC and medications. Pt verbalized understanding.      Problem: Safety  Goal: Will remain free from falls  Outcome: PROGRESSING AS EXPECTED  Intervention: Assess risk factors for falls  Flowsheets  Taken 9/18/2019 2020  Pt Calls for Assistance: Yes  Taken 9/18/2019 2156  History of fall: 0  Note:   Pt bedside table and call-light are within reach, bed in lowest position and locked. Treaded socks on and pt has been educated on call light use and asked to call before getting up.

## 2019-09-19 NOTE — PROGRESS NOTES
Bedside report receive from previous nurse regarding prior 12 hours.  Pt resting comfortably.  No signs of pain or distress.  White board updated.  Bed locked in lowest position.  Call light within reach.

## 2019-09-19 NOTE — CARE PLAN
Problem: Safety  Goal: Will remain free from injury  Outcome: PROGRESSING AS EXPECTED  Note:   Fall precautions in place. Bed in lowest position. Non-skid socks in place. Personal possessions within reach. Mobility sign on door. Bed-alarm on. Call light within reach. Pt educated regarding fall prevention and states understanding.        Problem: Knowledge Deficit  Goal: Knowledge of disease process/condition, treatment plan, diagnostic tests, and medications will improve  Outcome: PROGRESSING AS EXPECTED  Note:   Pt educated regarding plan of care and medications. All questions answered.

## 2019-09-19 NOTE — PROGRESS NOTES
Internal Medicine Interval Note  Note Author: Andrea Shi M.D.     Name Nessa Dennis     1945   Age/Sex 73 y.o. female   MRN [id number]   Code Status FULL CODE     After 5PM or if no immediate response to page, please call for cross-coverage  Attending/Team: Dr. Briceño/Monica See Patient List for primary contact information  Call [phone] to page    1st Call - Day Intern (R1):   Dr. Shi 2nd Call - Day Sr. Resident (R2/R3):   Dr. Villela         Reason for interval visit  (Principal Problem)   Sepsis  Pyelonephritis    Interval Problem Daily Status Update  (24 hours)   Ms. 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 She was found to have tachycardia , fever 100.1 and elevated WBCs on arrival in ED. U/A is positive for leukocyte esterase, nitrate, WBC, RBC and bacteria. She has been admitted for management of sepsis secondary to pyelonephritis.       No acute events overnight. Patient was laying in bed comfortably. VS are stable.  Patient is afebrile and asymptomatic; left flank pain resolved; normal abdominal exam and no CVA tenderness b/l.   WBC trended down to normal; electrolytes are WNL; Cr trended down to 0.7; Continue IVF.  BCs shows NGTD; CT revealed hydroureteronephrosis with perinephric fat stranding.  On meropenem, ABx day 5; IV zofran prn for vomiting.  Nephrostomy tube placement after complete ABx treatment.  ID consulted for ABx treatment; recommended urine cx and continue same ABx.       Review of Systems   Constitutional: Negative for chills and fever.   HENT: Negative for congestion, sinus pain and sore throat.    Eyes: Negative for blurred vision, double vision and photophobia.   Respiratory: Negative for cough, shortness of breath and wheezing.    Cardiovascular: Negative for chest pain, palpitations, orthopnea and leg swelling.   Gastrointestinal: Negative for abdominal pain, blood in  stool, heartburn, nausea and vomiting.   Genitourinary: Negative for dysuria and urgency.   Musculoskeletal: Negative for back pain and myalgias.   Skin: Negative for rash.   Neurological: Negative for dizziness, seizures, loss of consciousness and headaches.   Psychiatric/Behavioral: Negative for depression and memory loss.       Consultants/Specialty  ID    Disposition  Patient has been admitted for the management of pyelonephritis.    Quality Measures  Quality-Core Measures   Reviewed items::  Labs reviewed and Medications reviewed  Greene catheter::  No Greene  DVT prophylaxis pharmacological::  Enoxaparin (Lovenox)  Antibiotics:  Treating active infection/contamination beyond 24 hours perioperative coverage          Physical Exam       Vitals:    09/19/19 0006 09/19/19 0500 09/19/19 0743 09/19/19 1152   BP: 132/89 145/83 160/89 (!) 178/100   Pulse: 91 (!) 101 89 94   Resp: 18 18 18 18   Temp: 36.8 °C (98.2 °F) 36.2 °C (97.1 °F)  36.6 °C (97.8 °F)   TempSrc: Temporal Temporal Temporal Temporal   SpO2: 92% 92% 90% 92%   Weight:       Height:         Body mass index is 27.77 kg/m². Weight: 64.5 kg (142 lb 3.2 oz)  Oxygen Therapy:  Pulse Oximetry: 92 %, O2 (LPM): 1, O2 Delivery: None (Room Air)    Physical Exam   Constitutional: She is oriented to person, place, and time and well-developed, well-nourished, and in no distress. No distress.   HENT:   Head: Normocephalic and atraumatic.   Eyes: Pupils are equal, round, and reactive to light.   Neck: Normal range of motion. Neck supple. No JVD present.   Cardiovascular: Normal rate, regular rhythm, normal heart sounds and intact distal pulses. Exam reveals no gallop and no friction rub.   No murmur heard.  Pulmonary/Chest: Effort normal and breath sounds normal. No respiratory distress. She has no wheezes.   Abdominal: Soft. Bowel sounds are normal. She exhibits no distension. There is no tenderness.   Colostomy bag present     Musculoskeletal: Normal range of motion.  She exhibits no edema or tenderness.   Lymphadenopathy:     She has no cervical adenopathy.   Neurological: She is alert and oriented to person, place, and time.   Skin: Skin is warm. No rash noted. She is not diaphoretic. No erythema.   Psychiatric: Mood and affect normal.         Lab Data Review:         9/19/2019  10:30 AM    Recent Labs     09/16/19 1921 09/17/19 0303 09/18/19 0219 09/19/19  0629   SODIUM 135 138 136 140   POTASSIUM 4.3 4.2 3.8 3.7   CHLORIDE 103 105 109 111   CO2 18* 24 21 23   BUN 27* 23* 15 13   CREATININE 1.12 1.15 0.92 0.79   MAGNESIUM 1.8  --   --   --    CALCIUM 9.7 9.4 8.7 9.2       Recent Labs     09/16/19 1921 09/17/19 0303 09/18/19 0219 09/19/19  0629   ALTSGPT 12 9  --   --    ASTSGOT 22 11*  --   --    ALKPHOSPHAT 93 81  --   --    TBILIRUBIN 0.6 0.7  --   --    GLUCOSE 125* 154* 111* 102*       Recent Labs     09/17/19 0303 09/18/19 0219 09/19/19  0629   RBC 4.98 4.44 4.34   HEMOGLOBIN 13.1 11.9* 11.6*   HEMATOCRIT 41.1 37.4 36.1*   PLATELETCT 197 174 190   PROTHROMBTM 13.6  --   --    INR 1.02  --   --        Recent Labs     09/16/19 1921 09/17/19 0303 09/18/19 0219 09/19/19  0629   WBC 15.8* 11.1* 5.8 5.4   NEUTSPOLYS 88.80* 84.20* 74.60* 71.00   LYMPHOCYTES 4.90* 7.20* 12.90* 14.20*   MONOCYTES 5.40 8.10 9.70 9.80   EOSINOPHILS 0.20 0.00 2.20 4.20   BASOPHILS 0.30 0.30 0.30 0.40   ASTSGOT 22 11*  --   --    ALTSGPT 12 9  --   --    ALKPHOSPHAT 93 81  --   --    TBILIRUBIN 0.6 0.7  --   --            Assessment/Plan     * Sepsis secondary to pyelonephritis- (present on admission)  Assessment & Plan  Patient presented to ED with c/o nausea/vomiting and left flank pain  - tachycardic, fever and elevated WBC; 3/4 SIRS   - U/A is positive for leukocyte esterase, nitrate, WBC, RBC and bacteria  - no CVA tenderness; CT revealed hydroureteronephrosis with perinephric fat stranding  - sepsis secondary to pyelonephritis as a source  - received first dose Meropenem in ED and  received Ertapenem in the past, will continue meropenem; ABx day 5.  - I/O monitoring.   - ID consulted for ABx treatment; recommended urine cx and continue same ABx    Pyelonephritis- (present on admission)  Assessment & Plan  Patient c/o nausea/vomiting and left flank pain; no CVA tenderness b/l;  H/o recurrent UTI; treated for ESBL klebsiella UTI in 3/19  With IV ABx and nephrostomy tube; ureteric stent in 4/19 which was removed in 8/29/19; stent culture was positive and was treated with 10 days of nitrofurantoin   - U/A is positive for leukocyte esterase, nitrate, WBC, RBC and bacteria;    -CT revealed hydroureteronephrosis with perinephric fat stranding.  - IV fluids  - IV meropenem.  - Zofran prn for nausea; morphine prn for pain.  - will continue to monitor    Hydroureteronephrosis- (present on admission)  Assessment & Plan  CT revealed left sided hydroureteronephrosis which is secondary to pyelonephritis or ureteric stricture (h/o previous radiation for cervical cancer).  - On IVF and IV meropenem for pyelonephritis  - ureteric stent in 4/19 which was removed on 8/29/19; was unable to put another stent due to mid and distal ureteric stricture.   - will consult IR for a nephrostomy tube tomorrow after Cr trends to baseline.     Metabolic acidosis  Assessment & Plan  - Bicarb was 18 on admission with AG of 14; LA normal   - might be starvation acidosis  - Bicarb and AG normal   - resolved: continue IVF       HTN (hypertension)- (present on admission)  Assessment & Plan  - BP stable  - On lisinopril 20 mg/day  - continue current treatment    ADA (acute kidney injury) (HCC)- (present on admission)  Assessment & Plan  - Cr was increased from baseline; trended down to 0.7.  - CT revealed left sided hydroureteronephrosis  - ADA might be prerenal due to sepsis or post renal (ureteric stricture)  - Continue IVF; will trend Cr  - will consult IR for nephrostomy tube placement    Cervical cancer (HCC)- (present on  admission)  Assessment & Plan  - H/o cervical cancer s/p radial hysterectomy and b/l salpingo-oophorectomy in 2005  - Recurrence in 2015- treated with chemoradiation  - following up with Dr. 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

## 2019-09-19 NOTE — PROGRESS NOTES
Gyn Onc    Dr. Hays has reviewed renal mag 3/renal lasix washout results and plan is to repeat urine culture and if negative put a NT tube and then stent through IR. Repeat urine culture ordered.

## 2019-09-19 NOTE — PROGRESS NOTES
Assumed care of pt, beside report received from MAYCOL Lama. Updated on POC, call light within reach all fall precautions within place. Bed locked and lowered. Pt instructed to call for assistance before getting up. All questions answered, no other needs at this time.

## 2019-09-20 LAB
ANION GAP SERPL CALC-SCNC: 6 MMOL/L (ref 0–11.9)
BUN SERPL-MCNC: 11 MG/DL (ref 8–22)
CALCIUM SERPL-MCNC: 9.1 MG/DL (ref 8.5–10.5)
CHLORIDE SERPL-SCNC: 109 MMOL/L (ref 96–112)
CO2 SERPL-SCNC: 23 MMOL/L (ref 20–33)
CREAT SERPL-MCNC: 0.68 MG/DL (ref 0.5–1.4)
GLUCOSE SERPL-MCNC: 102 MG/DL (ref 65–99)
POTASSIUM SERPL-SCNC: 3.4 MMOL/L (ref 3.6–5.5)
SODIUM SERPL-SCNC: 138 MMOL/L (ref 135–145)

## 2019-09-20 PROCEDURE — 36415 COLL VENOUS BLD VENIPUNCTURE: CPT

## 2019-09-20 PROCEDURE — 700105 HCHG RX REV CODE 258: Performed by: INTERNAL MEDICINE

## 2019-09-20 PROCEDURE — 700101 HCHG RX REV CODE 250: Performed by: STUDENT IN AN ORGANIZED HEALTH CARE EDUCATION/TRAINING PROGRAM

## 2019-09-20 PROCEDURE — 770020 HCHG ROOM/CARE - TELE (206)

## 2019-09-20 PROCEDURE — 700102 HCHG RX REV CODE 250 W/ 637 OVERRIDE(OP): Performed by: STUDENT IN AN ORGANIZED HEALTH CARE EDUCATION/TRAINING PROGRAM

## 2019-09-20 PROCEDURE — 700111 HCHG RX REV CODE 636 W/ 250 OVERRIDE (IP): Performed by: INTERNAL MEDICINE

## 2019-09-20 PROCEDURE — 700111 HCHG RX REV CODE 636 W/ 250 OVERRIDE (IP): Performed by: STUDENT IN AN ORGANIZED HEALTH CARE EDUCATION/TRAINING PROGRAM

## 2019-09-20 PROCEDURE — A9270 NON-COVERED ITEM OR SERVICE: HCPCS | Performed by: STUDENT IN AN ORGANIZED HEALTH CARE EDUCATION/TRAINING PROGRAM

## 2019-09-20 PROCEDURE — 99232 SBSQ HOSP IP/OBS MODERATE 35: CPT | Mod: GC | Performed by: INTERNAL MEDICINE

## 2019-09-20 PROCEDURE — 700105 HCHG RX REV CODE 258: Performed by: STUDENT IN AN ORGANIZED HEALTH CARE EDUCATION/TRAINING PROGRAM

## 2019-09-20 PROCEDURE — 80048 BASIC METABOLIC PNL TOTAL CA: CPT

## 2019-09-20 RX ORDER — POTASSIUM CHLORIDE 20 MEQ/1
40 TABLET, EXTENDED RELEASE ORAL ONCE
Status: COMPLETED | OUTPATIENT
Start: 2019-09-20 | End: 2019-09-20

## 2019-09-20 RX ORDER — LABETALOL HYDROCHLORIDE 5 MG/ML
10 INJECTION, SOLUTION INTRAVENOUS EVERY 4 HOURS PRN
Status: DISCONTINUED | OUTPATIENT
Start: 2019-09-20 | End: 2019-09-20

## 2019-09-20 RX ORDER — LABETALOL HYDROCHLORIDE 5 MG/ML
10 INJECTION, SOLUTION INTRAVENOUS EVERY 4 HOURS PRN
Status: DISCONTINUED | OUTPATIENT
Start: 2019-09-20 | End: 2019-09-21

## 2019-09-20 RX ORDER — LISINOPRIL 20 MG/1
40 TABLET ORAL
Status: DISCONTINUED | OUTPATIENT
Start: 2019-09-21 | End: 2019-09-25 | Stop reason: HOSPADM

## 2019-09-20 RX ADMIN — SODIUM CHLORIDE: 9 INJECTION, SOLUTION INTRAVENOUS at 11:48

## 2019-09-20 RX ADMIN — MEROPENEM 500 MG: 500 INJECTION, POWDER, FOR SOLUTION INTRAVENOUS at 00:39

## 2019-09-20 RX ADMIN — SODIUM CHLORIDE: 9 INJECTION, SOLUTION INTRAVENOUS at 18:34

## 2019-09-20 RX ADMIN — MEROPENEM 500 MG: 500 INJECTION, POWDER, FOR SOLUTION INTRAVENOUS at 17:35

## 2019-09-20 RX ADMIN — MEROPENEM 500 MG: 500 INJECTION, POWDER, FOR SOLUTION INTRAVENOUS at 13:36

## 2019-09-20 RX ADMIN — POTASSIUM CHLORIDE 40 MEQ: 20 TABLET, EXTENDED RELEASE ORAL at 09:04

## 2019-09-20 RX ADMIN — MEROPENEM 500 MG: 500 INJECTION, POWDER, FOR SOLUTION INTRAVENOUS at 23:00

## 2019-09-20 RX ADMIN — OXYCODONE HYDROCHLORIDE AND ACETAMINOPHEN 1000 MG: 500 TABLET ORAL at 11:48

## 2019-09-20 RX ADMIN — LABETALOL HYDROCHLORIDE 10 MG: 5 INJECTION INTRAVENOUS at 04:33

## 2019-09-20 RX ADMIN — LISINOPRIL 20 MG: 20 TABLET ORAL at 04:32

## 2019-09-20 RX ADMIN — ACETAMINOPHEN 650 MG: 325 TABLET, FILM COATED ORAL at 04:32

## 2019-09-20 RX ADMIN — LABETALOL HYDROCHLORIDE 10 MG: 5 INJECTION INTRAVENOUS at 00:37

## 2019-09-20 RX ADMIN — MEROPENEM 500 MG: 500 INJECTION, POWDER, FOR SOLUTION INTRAVENOUS at 09:06

## 2019-09-20 RX ADMIN — SODIUM CHLORIDE: 9 INJECTION, SOLUTION INTRAVENOUS at 04:34

## 2019-09-20 ASSESSMENT — ENCOUNTER SYMPTOMS
MYALGIAS: 0
PHOTOPHOBIA: 0
ABDOMINAL PAIN: 0
HEARTBURN: 0
CHILLS: 0
ORTHOPNEA: 0
DOUBLE VISION: 0
NAUSEA: 0
SHORTNESS OF BREATH: 0
FEVER: 0
BLURRED VISION: 0
BLOOD IN STOOL: 0
DEPRESSION: 0
COUGH: 0
DIZZINESS: 0
WHEEZING: 0
BACK PAIN: 0
VOMITING: 0
HEADACHES: 0
PALPITATIONS: 0
SORE THROAT: 0
SINUS PAIN: 0

## 2019-09-20 NOTE — PROGRESS NOTES
Bedside report received from day RN, pt care assumed. Pt is A&Ox4, pain 0/10. Updated on POC, questions answered. Bed in lowest, locked position, treaded socks on, call light and belongings within reach.

## 2019-09-20 NOTE — PROGRESS NOTES
Bedside report received from previous nurse regarding prior 12 hours.  Pt resting comfortably.  No signs of pain or distress.  Bed locked in lowest position.  White board updated.  Call light within reach.

## 2019-09-20 NOTE — PROGRESS NOTES
2 RN skin check complete with MAYCOL German.   Devices in place nasal cannula.  Skin assessed under devices - yes.  Confirmed pressure ulcers found on N/A.  New potential pressure ulcers noted on N/A.   Wound consult placed - no.  The following interventions in place pillows in place to float heels    Heels boggy  No other breakdown noted

## 2019-09-20 NOTE — CARE PLAN
Problem: Communication  Goal: The ability to communicate needs accurately and effectively will improve  Outcome: PROGRESSING AS EXPECTED   Pt's whiteboard updated. Pt has been updated on POC. All questions have been answered.    Problem: Safety  Goal: Will remain free from injury  Outcome: PROGRESSING AS EXPECTED   Bed locked and in lowest position with bed alarm on. Treaded socks on. Call light and belongings within reach. Patient educated to call for assistance. Patient verbalized understanding. Hourly rounding in place.

## 2019-09-20 NOTE — PROGRESS NOTES
Internal Medicine Interval Note  Note Author: Glimer Villela M.D.     Name Nessa Dennis 1945   Age/Sex 73 y.o. female   MRN [id number]   Code Status FULL CODE     After 5PM or if no immediate response to page, please call for cross-coverage  Attending/Team: Dr. Briceño/Monica See Patient List for primary contact information  Call [phone] to page    1st Call - Day Intern (R1):   Dr. Shi 2nd Call - Day Sr. Resident (R2/R3):   Dr. Villela         Reason for interval visit  (Principal Problem)   Sepsis, Pyelonephritis    Interval Problem Daily Status Update  (24 hours)   Overnight had HTN and given PRN labetalol, asymptomatic at the time. No other acute events.  BP elevated this morning still but not significantly. Restarted lisinopril yesterday   Meropenem for 7 days per ID recs yesterday   Nephrostomy tube and sten placement per Dr Hays.    Review of Systems   Constitutional: Negative for chills and fever.   HENT: Negative for congestion, sinus pain and sore throat.    Eyes: Negative for blurred vision, double vision and photophobia.   Respiratory: Negative for cough, shortness of breath and wheezing.    Cardiovascular: Negative for chest pain, palpitations, orthopnea and leg swelling.   Gastrointestinal: Negative for abdominal pain, blood in stool, heartburn, nausea and vomiting.   Genitourinary: Negative for dysuria and urgency.   Musculoskeletal: Negative for back pain and myalgias.   Skin: Negative for rash.   Neurological: Negative for dizziness and headaches.   Psychiatric/Behavioral: Negative for depression.     Consultants/Specialty  ID  Surgery     Disposition  Patient has been admitted for the management of pyelonephritis.    Quality Measures  Quality-Core Measures   Reviewed items::  Labs reviewed and Medications reviewed  Greene catheter::  No Greene  DVT prophylaxis pharmacological::  Enoxaparin (Lovenox)  Antibiotics:  Treating active infection/contamination beyond 24 hours  perioperative coverage      Physical Exam       Vitals:    09/20/19 0012 09/20/19 0202 09/20/19 0409 09/20/19 0807   BP: (!) 181/111 152/96 (!) 169/99 143/85   Pulse: 98  99 98   Resp: 18  18 (!) 22   Temp: 36.9 °C (98.4 °F)  37.2 °C (98.9 °F) 37.5 °C (99.5 °F)   TempSrc: Temporal  Temporal Temporal   SpO2: 96%  91% 90%   Weight:       Height:         Body mass index is 28.03 kg/m². Weight: 65.1 kg (143 lb 8.3 oz)  Oxygen Therapy:  Pulse Oximetry: 90 %, O2 (LPM): 0, O2 Delivery: None (Room Air)    Physical Exam   Constitutional: She is oriented to person, place, and time and well-developed, well-nourished, and in no distress. No distress.   HENT:   Head: Normocephalic and atraumatic.   Eyes: Pupils are equal, round, and reactive to light.   Neck: Normal range of motion. No JVD present.   Cardiovascular: Normal rate, regular rhythm, normal heart sounds and intact distal pulses.   No murmur heard.  Pulmonary/Chest: Effort normal and breath sounds normal. No respiratory distress. She has no wheezes.   Abdominal: Soft. Bowel sounds are normal. She exhibits no distension. There is no tenderness.   Colostomy bag present     Musculoskeletal: Normal range of motion. She exhibits no edema or tenderness.   Lymphadenopathy:     She has no cervical adenopathy.   Neurological: She is alert and oriented to person, place, and time.   Skin: Skin is warm. No rash noted. She is not diaphoretic. No erythema.   Psychiatric: Mood and affect normal.     Lab Data Review:   9/19/2019  10:30 AM    Recent Labs     09/18/19  0219 09/19/19  0629 09/20/19  0351   SODIUM 136 140 138   POTASSIUM 3.8 3.7 3.4*   CHLORIDE 109 111 109   CO2 21 23 23   BUN 15 13 11   CREATININE 0.92 0.79 0.68   CALCIUM 8.7 9.2 9.1       Recent Labs     09/18/19  0219 09/19/19  0629 09/20/19  0351   GLUCOSE 111* 102* 102*       Recent Labs     09/18/19  0219 09/19/19  0629   RBC 4.44 4.34   HEMOGLOBIN 11.9* 11.6*   HEMATOCRIT 37.4 36.1*   PLATELETCT 174 190        Recent Labs     09/18/19  0219 09/19/19  0629   WBC 5.8 5.4   NEUTSPOLYS 74.60* 71.00   LYMPHOCYTES 12.90* 14.20*   MONOCYTES 9.70 9.80   EOSINOPHILS 2.20 4.20   BASOPHILS 0.30 0.40       Assessment/Plan     * Sepsis secondary to pyelonephritis- (present on admission)  Assessment & Plan  Resolved with IV fluids and antibiotics. Continue Meropenem for 7 days total.  Met criteria on admission, blood and urine cultures NGTD.     Pyelonephritis- (present on admission)  Assessment & Plan  Patient c/o nausea/vomiting and left flank pain; no CVA tenderness b/l;  H/o recurrent UTI; treated for ESBL klebsiella UTI in 3/19  With IV ABx and nephrostomy tube; ureteric stent in 4/19 which was removed in 8/29/19; stent culture was positive and was treated with 10 days of nitrofurantoin   - U/A is positive for leukocyte esterase, nitrate, WBC, RBC and bacteria;    -CT revealed hydroureteronephrosis with perinephric fat stranding.  - IV fluids  - IV meropenem for 7 days per ID    Hydroureteronephrosis- (present on admission)  Assessment & Plan  CT revealed left sided hydroureteronephrosis which is secondary to pyelonephritis or ureteric stricture (h/o previous radiation for cervical cancer).  - On IVF and IV meropenem for pyelonephritis  - ureteric stent in 4/19 which was removed on 8/29/19; was unable to put another stent due to mid and distal ureteric stricture.   - Nephrostomy tube and ureteral stent per Dr Hyas    Metabolic acidosis- (present on admission)  Assessment & Plan  Resolved with IV fluids     HTN (hypertension)- (present on admission)  Assessment & Plan  - Continue home lisinopril     ADA (acute kidney injury) (HCC)- (present on admission)  Assessment & Plan  Resolved.   Likely secondary to UTI. Continues to have chronic obstruction of course, secondary to stricture     History of Cervical cancer (HCC)- (present on admission)  Assessment & Plan  - H/o cervical cancer s/p radial hysterectomy and b/l  salpingo-oophorectomy in 2005  - Recurrence in 2015- treated with chemoradiation  - following up with Dr. 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

## 2019-09-20 NOTE — NON-PROVIDER
Internal Medicine Interval Note  Note Author: Malinda Meyer, Student     Name Nessa Dennis     1945   Age/Sex 73 y.o. female   MRN [id number]   Code Status Full     After 5PM or if no immediate response to page, please call for cross-coverage  Attending/Team: Dr. Ortez See Patient List for primary contact information  Call [phone] to page    1st Call - Day Intern (R1):   Dr. Shi 2nd Call - Day Sr. Resident (R2/R3):   Dr. Villela         Reason for interval visit  (Principal Problem)   Sepsis secondary to pyelonephritis      Interval Problem Daily Status Update  (24 hours)   Mrs. Dennis is a 74 y/o female with a PMH of cervical cancer s/p radical hysterectomy and bilateral salpingoophorectomy in  with relapse of cancer in  treated with chemotherapy (Cisplatin) and radiation which resulted in rectovaginal fistula requiring colostomy, neurogenic bladder, left ureteral stricture, and recurrent UTIs. She presented 4 days ago after a one day history of left flank pain and associated N/V. WBC 15.8 and CT abdomen & pelvis showed left hydronephrosis with hydroureter, peripherally calcified structure in the mid and distal left ureter, hazy perinephric fat stranding is seen in the left kidney. She is on day 4 of IV meropenem secondary to history of ESBL. She also presented with ADA (Cr 1.12 and BUN 27, from baseline of 0.79 and 17), however, this has improved and is Cr 0.68 and BUN 11 today. Dr. Hays was consulted for placement of nephrostomy tube and stent. ID also consulted, who states she should complete a total of 7 days of meropenem and have NT and stent placed during treatment. If unable to, prophylactic meropenem should be given before procedures.      She did have HTN over night. Around midnight, BP was 181/111 and she was given 10 mg labetalol. She was given another dose at 0400. She had a headache with the elevated blood pressure, but no other sxs. Patient states she is feeling  well and no longer has any flank pain, nausea, or vomiting. She has no complaints at this time. BP is now stable.      Review of Systems   Constitutional: Negative for chills and fever.   HENT: Negative for ear discharge, ear pain, hearing loss and tinnitus.    Eyes: Negative for blurred vision, double vision and photophobia.   Respiratory: Negative for cough, hemoptysis and sputum production.    Cardiovascular: Negative for chest pain, palpitations, and LE swelling.   Gastrointestinal: No nausea or vomiting. Negative for abdominal pain, constipation, diarrhea and heartburn.   Genitourinary: No flank pain.   Musculoskeletal: Negative for back pain, myalgias and neck pain.   Skin: Negative for rash.   Neurological: Negative for dizziness, tingling, tremors and headaches.   Psychiatric/Behavioral: Negative for depression, substance abuse and suicidal ideas.         Quality Measures  Quality-Core Measures   Reviewed items::  EKG reviewed, Labs reviewed, Medications reviewed and Radiology images reviewed  Greene catheter::  No Greene  DVT prophylaxis pharmacological::  Heparin      Physical Exam       Vitals:    09/20/19 0012 09/20/19 0202 09/20/19 0409 09/20/19 0807   BP: (!) 181/111 152/96 (!) 169/99 143/85   Pulse: 98  99 98   Resp: 18  18 (!) 22   Temp: 36.9 °C (98.4 °F)  37.2 °C (98.9 °F) 37.5 °C (99.5 °F)   TempSrc: Temporal  Temporal Temporal   SpO2: 96%  91% 90%   Weight:       Height:         Body mass index is 28.03 kg/m². Weight: 65.1 kg (143 lb 8.3 oz)  Oxygen Therapy:  Pulse Oximetry: 90 %, O2 (LPM): 0, O2 Delivery: None (Room Air)      Physical Exam   Constitutional: She is oriented to person, place, and time and well-developed, well-nourished, and in no distress.   HENT:   Head: Normocephalic and atraumatic.   Eyes: Pupils are equal, round, and reactive to light. Conjunctivae and EOM are normal.   Neck: Normal range of motion. Neck supple.   Cardiovascular: Regular rhythm and normal heart sounds. No  murmur heard.  Pulmonary/Chest: Effort normal and breath sounds normal. No respiratory distress. She has no wheezes.   Abdominal: Soft. Bowel sounds are normal. She exhibits no distension. There is no tenderness.   Colostomy bag present. No CVA tenderness.   Musculoskeletal: Normal range of motion. She exhibits no edema or deformity.   Neurological: She is alert and oriented to person, place, and time.   Skin: Skin is dry.   Psychiatric: Memory, affect and judgment normal.       Lab Data Review:         9/20/2019  12:14 PM    Recent Labs     09/18/19 0219 09/19/19 0629 09/20/19  0351   SODIUM 136 140 138   POTASSIUM 3.8 3.7 3.4*   CHLORIDE 109 111 109   CO2 21 23 23   BUN 15 13 11   CREATININE 0.92 0.79 0.68   CALCIUM 8.7 9.2 9.1       Recent Labs     09/18/19 0219 09/19/19 0629 09/20/19  0351   GLUCOSE 111* 102* 102*       Recent Labs     09/18/19 0219 09/19/19  0629   RBC 4.44 4.34   HEMOGLOBIN 11.9* 11.6*   HEMATOCRIT 37.4 36.1*   PLATELETCT 174 190       Recent Labs     09/18/19 0219 09/19/19  0629   WBC 5.8 5.4   NEUTSPOLYS 74.60* 71.00   LYMPHOCYTES 12.90* 14.20*   MONOCYTES 9.70 9.80   EOSINOPHILS 2.20 4.20   BASOPHILS 0.30 0.40           Assessment/Plan       1. Sepsis secondary to acute pyelonephritis  Nessa Dennsi is a 72 y/o female with a history of recurrent complicated ESBL UTIs with past nephrostomy tube and ureteral stent placement who presented with left flank pain and N/V consistent with pyelonephrosis. This was confirmed by WBC of 15.8, UA positive for nitrites, leukocyte esterase, and WBCs, and CT abdomen/pelvis showing hydroureteronephrosis with perinephric fat stranding. She met 2/4 SIRS criteria (tachycardic at 128 and WBC) at presentation, however blood cultures are negative thus far. She is starting to better already with the current management. WBC is trending down - 5.4 yesterday. Nuclear medicine renal scan done which showed decreased perfusion to the left kidney, excretion incomplete  on the left with increased half emptying time. The split function is 26% on the left and 74% on the right (worsened from 41 and 59 respectively on comparison). Dr. Hyas and ID consulted. ID recommends placing NT and stent while on ABX in the hospital if possible. Blood cultures and urine cultures negative.      Plan:  -Transfer from [facility] to medicine  -Now resolved  -Continue IV meropenem 500 mg for total of 7 days, per ID recommendation  -Continue with maintenance normal saline at 150 ml/hour  -Zofran 4 mg PRN for nausea  -Tylenol PRN for pain   -Monitor I/O  -Dr. Hays following for NT placement and stent placement in hospital     2. Hydroureteronephrosis  CT abdomen/pelvis showed hydroureteronephrosis. She has a PMH of pyelonephritis with hydronephrosis that required nephrostomy tube placement in March of this year. Because of her current infection, placement of the nephrostomy tube will be avoided if possible, per Dr. Hays recommendation. Cr and BUN today were 1.15 and 23 but has improved. Now 0.68 and 11.       Plan:  -Monitor BUN and Cr  -nephrostomy tube and stent will be placed, per Dr. Hays     3. Acute kidney injury - resolved  Patient presented with a Cr and BUN of 1.12 and 27 and her Cr and BUN today is 0.68 and 11. She had acute kidney injury based on KDIGO criteria, which has now resolved. Her ADA was most likely post-renal due to obstruction from the left ureteral stricture vs neurogenic bladder.     Plan:  -Monitor changes in creatinine and BUN  -ADA has resolved - no interventions necessary at this time     4. Metabolic Acidosis  Patient was admitted with a bicarb of 18 and anion gap of 14. Lactic acid was suspected to be the cause, however the lactic acid level was normal. It is more likely to be caused by starvation ketosis. However, bicarb improved to 23 and anion gap to 6.0 today.      Plan:  -Resolved  -Continue IVF     5. Hypertension  Patient has a history of HTN with BP measurements at home  estimated to be 135/60, per patient. She is currently on lisinopril 20 mg daily, which was restarted yesterday. She did have an episode of elevated BP last night at 181/111 and was given two doses of 10 mg labetalol. Her BP at 0800 was 143/85.     Plan:  -Continue lisinopril  -Labetalol 10 mg if SBP >190 or DBP > 104    6. Hypkalemia  Patient's K was 3.4 this AM    Plan:  -Replete with Kdur 40 meq    7. History of Cervical cancer  History of cervical cancer s/p hysterectomy and salpingoophrectomy w/ recurrence treated with chemo and radiation    Plan:  -Follow up with Dr. Hays

## 2019-09-21 LAB
ANION GAP SERPL CALC-SCNC: 9 MMOL/L (ref 0–11.9)
BACTERIA BLD CULT: NORMAL
BACTERIA BLD CULT: NORMAL
BACTERIA UR CULT: NORMAL
BUN SERPL-MCNC: 10 MG/DL (ref 8–22)
CALCIUM SERPL-MCNC: 9.9 MG/DL (ref 8.5–10.5)
CHLORIDE SERPL-SCNC: 107 MMOL/L (ref 96–112)
CO2 SERPL-SCNC: 21 MMOL/L (ref 20–33)
CREAT SERPL-MCNC: 0.84 MG/DL (ref 0.5–1.4)
GLUCOSE SERPL-MCNC: 95 MG/DL (ref 65–99)
POTASSIUM SERPL-SCNC: 3.8 MMOL/L (ref 3.6–5.5)
SIGNIFICANT IND 70042: NORMAL
SITE SITE: NORMAL
SODIUM SERPL-SCNC: 137 MMOL/L (ref 135–145)
SOURCE SOURCE: NORMAL

## 2019-09-21 PROCEDURE — 99232 SBSQ HOSP IP/OBS MODERATE 35: CPT | Mod: GC | Performed by: INTERNAL MEDICINE

## 2019-09-21 PROCEDURE — 700102 HCHG RX REV CODE 250 W/ 637 OVERRIDE(OP): Performed by: STUDENT IN AN ORGANIZED HEALTH CARE EDUCATION/TRAINING PROGRAM

## 2019-09-21 PROCEDURE — A9270 NON-COVERED ITEM OR SERVICE: HCPCS | Performed by: STUDENT IN AN ORGANIZED HEALTH CARE EDUCATION/TRAINING PROGRAM

## 2019-09-21 PROCEDURE — 770006 HCHG ROOM/CARE - MED/SURG/GYN SEMI*

## 2019-09-21 PROCEDURE — 700111 HCHG RX REV CODE 636 W/ 250 OVERRIDE (IP): Performed by: INTERNAL MEDICINE

## 2019-09-21 PROCEDURE — 700105 HCHG RX REV CODE 258: Performed by: INTERNAL MEDICINE

## 2019-09-21 PROCEDURE — 80048 BASIC METABOLIC PNL TOTAL CA: CPT

## 2019-09-21 PROCEDURE — 700105 HCHG RX REV CODE 258: Performed by: STUDENT IN AN ORGANIZED HEALTH CARE EDUCATION/TRAINING PROGRAM

## 2019-09-21 PROCEDURE — 36415 COLL VENOUS BLD VENIPUNCTURE: CPT

## 2019-09-21 RX ORDER — CARVEDILOL 3.12 MG/1
3.12 TABLET ORAL 2 TIMES DAILY WITH MEALS
Status: DISCONTINUED | OUTPATIENT
Start: 2019-09-21 | End: 2019-09-21

## 2019-09-21 RX ORDER — LABETALOL HYDROCHLORIDE 5 MG/ML
10 INJECTION, SOLUTION INTRAVENOUS EVERY 4 HOURS PRN
Status: DISCONTINUED | OUTPATIENT
Start: 2019-09-21 | End: 2019-09-25 | Stop reason: HOSPADM

## 2019-09-21 RX ORDER — POTASSIUM CHLORIDE 20 MEQ/1
20 TABLET, EXTENDED RELEASE ORAL ONCE
Status: COMPLETED | OUTPATIENT
Start: 2019-09-21 | End: 2019-09-21

## 2019-09-21 RX ADMIN — CARVEDILOL 3.12 MG: 3.12 TABLET, FILM COATED ORAL at 09:42

## 2019-09-21 RX ADMIN — POTASSIUM CHLORIDE 20 MEQ: 1500 TABLET, EXTENDED RELEASE ORAL at 13:53

## 2019-09-21 RX ADMIN — MEROPENEM 500 MG: 500 INJECTION, POWDER, FOR SOLUTION INTRAVENOUS at 12:07

## 2019-09-21 RX ADMIN — ACETAMINOPHEN 650 MG: 325 TABLET, FILM COATED ORAL at 01:14

## 2019-09-21 RX ADMIN — ACETAMINOPHEN 650 MG: 325 TABLET, FILM COATED ORAL at 07:05

## 2019-09-21 RX ADMIN — OXYCODONE HYDROCHLORIDE AND ACETAMINOPHEN 1000 MG: 500 TABLET ORAL at 12:07

## 2019-09-21 RX ADMIN — SODIUM CHLORIDE: 9 INJECTION, SOLUTION INTRAVENOUS at 04:31

## 2019-09-21 RX ADMIN — MEROPENEM 500 MG: 500 INJECTION, POWDER, FOR SOLUTION INTRAVENOUS at 17:27

## 2019-09-21 RX ADMIN — MEROPENEM 500 MG: 500 INJECTION, POWDER, FOR SOLUTION INTRAVENOUS at 04:24

## 2019-09-21 RX ADMIN — LISINOPRIL 40 MG: 20 TABLET ORAL at 04:24

## 2019-09-21 ASSESSMENT — ENCOUNTER SYMPTOMS
HEARTBURN: 0
BACK PAIN: 0
MEMORY LOSS: 0
FOCAL WEAKNESS: 0
MYALGIAS: 0
BLOOD IN STOOL: 0
SHORTNESS OF BREATH: 0
DEPRESSION: 0
HEADACHES: 1
DOUBLE VISION: 0
NAUSEA: 0
PALPITATIONS: 0
COUGH: 0
LOSS OF CONSCIOUSNESS: 0
ABDOMINAL PAIN: 0
CHILLS: 0
BLURRED VISION: 0
FEVER: 0
WHEEZING: 0
SORE THROAT: 0
SEIZURES: 0
VOMITING: 0
PHOTOPHOBIA: 0

## 2019-09-21 NOTE — PROGRESS NOTES
Internal Medicine Interval Note  Note Author: Andrea Shi M.D.     Name Nessa Dennis 1945   Age/Sex 73 y.o. female   MRN [id number]   Code Status FULL CODE     After 5PM or if no immediate response to page, please call for cross-coverage  Attending/Team: Dr. Ortez/Monica See Patient List for primary contact information  Call [phone] to page    1st Call - Day Intern (R1):   Dr. Shi 2nd Call - Day Sr. Resident (R2/R3):   Dr. Villela         Reason for interval visit  (Principal Problem)   Sepsis (HCC)   Pyelonephritis    Interval Problem Daily Status Update  (24 hours)   No acute events overnight; patient was c/o headache which resolved in couple of hours with tylenol  BP was 165/98; stopped IVF; continue oral hydration  Patient denies any chest pain, shortness of breath, any urinary symptoms.  Other VS are normal; normal exam.   Will add carvedilol if BP continues to remain high  Cr trended down to normal; electrolytes are WNL  Meropenem for 7 days per ID recs; last date    Nephrostomy tube and stent placement per Dr Hays.      Review of Systems   Constitutional: Negative for chills and fever.   HENT: Negative for nosebleeds and sore throat.    Eyes: Negative for blurred vision, double vision and photophobia.   Respiratory: Negative for cough, shortness of breath and wheezing.    Cardiovascular: Negative for chest pain, palpitations and leg swelling.   Gastrointestinal: Negative for abdominal pain, blood in stool, heartburn, nausea and vomiting.   Genitourinary: Negative for dysuria, hematuria and urgency.   Musculoskeletal: Negative for back pain and myalgias.   Skin: Negative for rash.   Neurological: Positive for headaches. Negative for focal weakness, seizures and loss of consciousness.   Psychiatric/Behavioral: Negative for depression and memory loss.       Consultants/Specialty  ID  Surgery     Disposition  Patient has been admitted for the management of  pyelonephritis.    Quality Measures  Quality-Core Measures   Reviewed items::  Labs reviewed  Greene catheter::  No Greene  DVT prophylaxis pharmacological::  Enoxaparin (Lovenox)  Antibiotics:  Treating active infection/contamination beyond 24 hours perioperative coverage          Physical Exam       Vitals:    09/20/19 2018 09/21/19 0035 09/21/19 0407 09/21/19 0845   BP: 159/100 (!) 171/109 (!) 165/98 151/102   Pulse: (!) 104 97 94 87   Resp: 18 17 17 20   Temp: 36.8 °C (98.3 °F) 36.7 °C (98 °F) 36.1 °C (97 °F) 36.4 °C (97.6 °F)   TempSrc: Temporal Temporal Temporal Temporal   SpO2: 94% 94% 94% 93%   Weight:       Height:         Body mass index is 27.47 kg/m². Weight: 63.8 kg (140 lb 10.5 oz)  Oxygen Therapy:  Pulse Oximetry: 93 %, O2 (LPM): 0, O2 Delivery: None (Room Air)    Physical Exam   Constitutional: She is oriented to person, place, and time and well-developed, well-nourished, and in no distress. No distress.   HENT:   Head: Normocephalic and atraumatic.   Eyes: Pupils are equal, round, and reactive to light.   Neck: Normal range of motion. Neck supple. No JVD present.   Cardiovascular: Normal rate, regular rhythm, normal heart sounds and intact distal pulses. Exam reveals no gallop and no friction rub.   No murmur heard.  Pulmonary/Chest: Effort normal and breath sounds normal. No respiratory distress. She has no wheezes.   Abdominal: Soft. Bowel sounds are normal. She exhibits no distension. There is no tenderness.   Colostomy bag present      Musculoskeletal: Normal range of motion. She exhibits no edema or tenderness.   Lymphadenopathy:     She has no cervical adenopathy.   Neurological: She is alert and oriented to person, place, and time.   Skin: Skin is warm. No rash noted. She is not diaphoretic. No erythema.   Psychiatric: Mood and affect normal.         Lab Data Review:         9/21/2019  10:09 AM    Recent Labs     09/19/19  0629 09/20/19  0351   SODIUM 140 138   POTASSIUM 3.7 3.4*   CHLORIDE 111  109   CO2 23 23   BUN 13 11   CREATININE 0.79 0.68   CALCIUM 9.2 9.1       Recent Labs     09/19/19  0629 09/20/19  0351   GLUCOSE 102* 102*       Recent Labs     09/19/19  0629   RBC 4.34   HEMOGLOBIN 11.6*   HEMATOCRIT 36.1*   PLATELETCT 190       Recent Labs     09/19/19  0629   WBC 5.4   NEUTSPOLYS 71.00   LYMPHOCYTES 14.20*   MONOCYTES 9.80   EOSINOPHILS 4.20   BASOPHILS 0.40           Assessment/Plan     * Sepsis secondary to pyelonephritis- (present on admission)  Assessment & Plan  Resolved with IV fluids and antibiotics. Continue Meropenem for 7 days total.  Met criteria on admission, blood and urine cultures NGTD.     Pyelonephritis- (present on admission)  Assessment & Plan  Patient c/o nausea/vomiting and left flank pain; no CVA tenderness b/l;  H/o recurrent UTI; treated for ESBL klebsiella UTI in 3/19  With IV ABx and nephrostomy tube; ureteric stent in 4/19 which was removed in 8/29/19; stent culture was positive and was treated with 10 days of nitrofurantoin   - U/A is positive for leukocyte esterase, nitrate, WBC, RBC and bacteria;    -CT revealed hydroureteronephrosis with perinephric fat stranding.  - IV fluids  - IV meropenem for 7 days per ID    Hydroureteronephrosis- (present on admission)  Assessment & Plan  CT revealed left sided hydroureteronephrosis which is secondary to pyelonephritis or ureteric stricture (h/o previous radiation for cervical cancer).  - On IVF and IV meropenem for pyelonephritis  - ureteric stent in 4/19 which was removed on 8/29/19; was unable to put another stent due to mid and distal ureteric stricture.   - Nephrostomy tube and ureteral stent per Dr Hays    Metabolic acidosis- (present on admission)  Assessment & Plan  Resolved with IV fluids     HTN (hypertension)- (present on admission)  Assessment & Plan  - BP was 165/98; stopped IVF; continue oral hydration  - Will add carvedilol if BP continues to remain high  - Continue home lisinopril     ADA (acute kidney  injury) (HCC)- (present on admission)  Assessment & Plan  Resolved.   Likely secondary to UTI. Continues to have chronic obstruction of course, secondary to stricture     History of Cervical cancer (HCC)- (present on admission)  Assessment & Plan  - H/o cervical cancer s/p radial hysterectomy and b/l salpingo-oophorectomy in 2005  - Recurrence in 2015- treated with chemoradiation  - following up with Dr. 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

## 2019-09-21 NOTE — PROGRESS NOTES
Bedside report received from previous nurse regarding prior 12 hours.  Pt up to the bathroom.  Denies pain.  White board updated.  Call light within reach.

## 2019-09-21 NOTE — CARE PLAN
Problem: Infection  Goal: Will remain free from infection  Outcome: PROGRESSING AS EXPECTED   Pt showing no s/s of infection  Problem: Venous Thromboembolism (VTW)/Deep Vein Thrombosis (DVT) Prevention:  Goal: Patient will participate in Venous Thrombosis (VTE)/Deep Vein Thrombosis (DVT)Prevention Measures  Outcome: PROGRESSING AS EXPECTED   Pt ambulating frequently.

## 2019-09-21 NOTE — NON-PROVIDER
Internal Medicine Interval Note  Note Author: Malinda Meyer, Student     Name Nessa Dennis     1945   Age/Sex 73 y.o. female   MRN [id number]   Code Status FULL     After 5PM or if no immediate response to page, please call for cross-coverage  Attending/Team: Dr. Ortez See Patient List for primary contact information  Call [phone] to page    1st Call - Day Intern (R1):   Dr. Shi 2nd Call - Day Sr. Resident (R2/R3):   Dr. Villela       Reason for interval visit  (Principal Problem)   Sepsis secondary to pyelonephritis      Interval Problem Daily Status Update  (24 hours)   Mrs. Dennis is a 72 y/o female with a PMH of cervical cancer s/p radical hysterectomy and bilateral salpingoophorectomy in  with relapse of cancer in  treated with chemotherapy (Cisplatin) and radiation which resulted in rectovaginal fistula requiring colostomy, neurogenic bladder, left ureteral stricture, and recurrent UTIs. She presented 5 days ago after a one day history of left flank pain and associated N/V. WBC 15.8 and CT abdomen & pelvis showed left hydronephrosis with hydroureter, peripherally calcified structure in the mid and distal left ureter, hazy perinephric fat stranding is seen in the left kidney. She is on day 5 of IV meropenem secondary to history of ESBL and will be on a total of 7 days, per ID recommendation. She also presented with ADA (Cr 1.12 and BUN 27, from baseline of 0.79 and 17), however, this has improved and is Cr 0.68 and BUN 11. Dr. Hays was consulted for placement of nephrostomy tube and stent and we are currently awaiting his plan.      She has been having hypertension while in the hospital, even after adding her lisinopril back and increasing the dose to 40 mg. She had a headache yesterday and today. Her IV fluids were stopped today and we will see how her BP responds.      Review of Systems   Constitutional: Negative for chills and fever.   HENT: Negative for ear discharge, ear  pain, hearing loss and tinnitus.    Eyes: Negative for blurred vision, double vision and photophobia.   Respiratory: Negative for cough, hemoptysis and sputum production.    Cardiovascular: Negative for chest pain, palpitations, and LE swelling.   Gastrointestinal: No nausea or vomiting. Negative for abdominal pain, constipation, diarrhea and heartburn.   Genitourinary: No flank pain.   Musculoskeletal: Negative for back pain, myalgias and neck pain.   Skin: Negative for rash.   Neurological: +headache, improving after Tylenol. Negative for dizziness, tingling, tremors.  Psychiatric/Behavioral: Negative for depression, substance abuse and suicidal ideas.         Quality Measures  Quality-Core Measures   Reviewed items::  EKG reviewed, Labs reviewed, Medications reviewed and Radiology images reviewed  Greene catheter::  No Greene  DVT prophylaxis pharmacological::  Heparin         Physical Exam       Vitals:    09/20/19 2018 09/21/19 0035 09/21/19 0407 09/21/19 0845   BP: 159/100 (!) 171/109 (!) 165/98 151/102   Pulse: (!) 104 97 94 87   Resp: 18 17 17 20   Temp: 36.8 °C (98.3 °F) 36.7 °C (98 °F) 36.1 °C (97 °F) 36.4 °C (97.6 °F)   TempSrc: Temporal Temporal Temporal Temporal   SpO2: 94% 94% 94% 93%   Weight:       Height:         Body mass index is 27.47 kg/m². Weight: 63.8 kg (140 lb 10.5 oz)  Oxygen Therapy:  Pulse Oximetry: 93 %, O2 (LPM): 0, O2 Delivery: None (Room Air)    Physical Exam   Constitutional: She is oriented to person, place, and time and well-developed, well-nourished, and in no distress.   HENT:   Head: Normocephalic and atraumatic.   Eyes: Pupils are equal, round, and reactive to light. Conjunctivae and EOM are normal.   Neck: Normal range of motion. Neck supple.   Cardiovascular: Regular rhythm and normal heart sounds. No murmur heard.  Pulmonary/Chest: Effort normal and breath sounds normal. No respiratory distress. She has no wheezes.   Abdominal: Soft. Bowel sounds are normal. She exhibits no  distension. There is no tenderness.   Colostomy bag present. No CVA tenderness.   Musculoskeletal: Normal range of motion. She exhibits no edema or deformity.   Neurological: She is alert and oriented to person, place, and time.   Skin: Skin is dry.   Psychiatric: Memory, affect and judgment normal.     Lab Data Review:         9/21/2019  9:57 AM    Recent Labs     09/19/19  0629 09/20/19  0351   SODIUM 140 138   POTASSIUM 3.7 3.4*   CHLORIDE 111 109   CO2 23 23   BUN 13 11   CREATININE 0.79 0.68   CALCIUM 9.2 9.1       Recent Labs     09/19/19  0629 09/20/19  0351   GLUCOSE 102* 102*       Recent Labs     09/19/19 0629   RBC 4.34   HEMOGLOBIN 11.6*   HEMATOCRIT 36.1*   PLATELETCT 190       Recent Labs     09/19/19 0629   WBC 5.4   NEUTSPOLYS 71.00   LYMPHOCYTES 14.20*   MONOCYTES 9.80   EOSINOPHILS 4.20   BASOPHILS 0.40           Assessment/Plan       1. Sepsis secondary to acute pyelonephritis  Nessa Dennis is a 72 y/o female with a history of recurrent complicated ESBL UTIs with past nephrostomy tube and ureteral stent placement who presented with left flank pain and N/V consistent with pyelonephrosis. This was confirmed by WBC of 15.8, UA positive for nitrites, leukocyte esterase, and WBCs, and CT abdomen/pelvis showing hydroureteronephrosis with perinephric fat stranding. She met 2/4 SIRS criteria (tachycardic at 128 and WBC) at presentation, but blood cultures were negative. She is being treated with IV meropenem. WBC is trending down - 5.4 x 2 days ago. Nuclear medicine renal scan done which showed decreased perfusion to the left kidney, excretion incomplete on the left with increased half emptying time. The split function is 26% on the left and 74% on the right (worsened from 41 and 59 respectively on comparison). Dr. Hays and ID consulted. ID recommends placing NT and stent while on ABX in the hospital if possible. Blood cultures and urine cultures negative.      Plan:  -Transfer from [facility] to medicine  -Now  resolved  -Continue IV meropenem 500 mg for total of 7 days, per ID recommendation  -Stop maintenance fluids  -Zofran 4 mg PRN for nausea  -Tylenol PRN for pain   -Dr. Hays following for NT placement and stent placement. Awaiting his plan.      2. Hydroureteronephrosis  CT abdomen/pelvis showed hydroureteronephrosis. She has a PMH of pyelonephritis with hydronephrosis that required nephrostomy tube placement in March of this year. Repeat urine culture was negative and blood cultures were negative, therefore she is cleared for NT placement by Dr. Hays. Cr and BUN 0.68 and 11 yesterday.     Plan:  -nephrostomy tube and stent will be placed, per Dr. Hays. Awaiting his plan     3. Acute kidney injury - resolved  Patient presented with a Cr and BUN of 1.12 and 27 and her Cr and BUN yesterday was 0.68 and 11. She had acute kidney injury based on KDIGO criteria, which has now resolved. Her ADA was most likely post-renal due to obstruction from the left ureteral stricture vs neurogenic bladder.      Plan:  -ADA has resolved - no interventions necessary at this time  -Stop fluids secondary to improved kidney function     4. Metabolic Acidosis  Patient was admitted with a bicarb of 18 and anion gap of 14. Lactic acid was suspected to be the cause, however the lactic acid level was normal. It is more likely to be caused by starvation ketosis. However, bicarb improved to 23 and anion gap to 6.0 yesterday.      Plan:  -Resolves - no intervention necessary at this time     5. Hypertension  Patient has a history of HTN with BP measurements at home estimated to be 135/60, per patient. She is currently on lisinopril 20 mg daily, which was restarted 2 days ago. She did have an episode of elevated BP 2 nights ago at 181/111 and was given two doses of 10 mg labetalol. Her lisinopril was increased to 40 mg yesterday secondary to continued HTN. Her BP today was 165/98 and she complains of a headache.      Plan:  -Continue lisinopril 40 mg  qd  -stop IV fluids and watch BP  -Add carvedilol if no BP improvement   -Labetalol 10 mg if SBP >190 or DBP > 104     6. Hypkalemia  Patient's K was 3.4 yesterday.     Plan:  -Repleted with Kdur 40 meq     7. History of Cervical cancer  History of cervical cancer s/p hysterectomy and salpingoophrectomy w/ recurrence treated with chemo and radiation     Plan:  -Follow up with Dr. Hays

## 2019-09-21 NOTE — PROGRESS NOTES
GYN/Onocology    In regards to her hydronephrosis, renal lasix washout reviewed by Dr. Hays. Repeat urine culture is negative. Plan for left NT placement tomorrow and attempt ureteral stent placement. NPO at midnight, hold lovenox.

## 2019-09-22 ENCOUNTER — APPOINTMENT (OUTPATIENT)
Dept: RADIOLOGY | Facility: MEDICAL CENTER | Age: 74
DRG: 872 | End: 2019-09-22
Attending: NURSE PRACTITIONER
Payer: MEDICARE

## 2019-09-22 LAB
ANION GAP SERPL CALC-SCNC: 12 MMOL/L (ref 0–11.9)
BUN SERPL-MCNC: 16 MG/DL (ref 8–22)
CALCIUM SERPL-MCNC: 9.7 MG/DL (ref 8.5–10.5)
CHLORIDE SERPL-SCNC: 107 MMOL/L (ref 96–112)
CO2 SERPL-SCNC: 20 MMOL/L (ref 20–33)
CREAT SERPL-MCNC: 0.86 MG/DL (ref 0.5–1.4)
GLUCOSE SERPL-MCNC: 102 MG/DL (ref 65–99)
GRAM STN SPEC: NORMAL
GRAM STN SPEC: NORMAL
POTASSIUM SERPL-SCNC: 3.8 MMOL/L (ref 3.6–5.5)
SIGNIFICANT IND 70042: NORMAL
SIGNIFICANT IND 70042: NORMAL
SITE SITE: NORMAL
SITE SITE: NORMAL
SODIUM SERPL-SCNC: 139 MMOL/L (ref 135–145)
SOURCE SOURCE: NORMAL
SOURCE SOURCE: NORMAL

## 2019-09-22 PROCEDURE — 87077 CULTURE AEROBIC IDENTIFY: CPT

## 2019-09-22 PROCEDURE — 700111 HCHG RX REV CODE 636 W/ 250 OVERRIDE (IP): Performed by: RADIOLOGY

## 2019-09-22 PROCEDURE — 0T9130Z DRAINAGE OF LEFT KIDNEY WITH DRAINAGE DEVICE, PERCUTANEOUS APPROACH: ICD-10-PCS | Performed by: RADIOLOGY

## 2019-09-22 PROCEDURE — 700111 HCHG RX REV CODE 636 W/ 250 OVERRIDE (IP): Performed by: INTERNAL MEDICINE

## 2019-09-22 PROCEDURE — 770006 HCHG ROOM/CARE - MED/SURG/GYN SEMI*

## 2019-09-22 PROCEDURE — 87205 SMEAR GRAM STAIN: CPT

## 2019-09-22 PROCEDURE — 87086 URINE CULTURE/COLONY COUNT: CPT

## 2019-09-22 PROCEDURE — C1729 CATH, DRAINAGE: HCPCS

## 2019-09-22 PROCEDURE — A9270 NON-COVERED ITEM OR SERVICE: HCPCS | Performed by: STUDENT IN AN ORGANIZED HEALTH CARE EDUCATION/TRAINING PROGRAM

## 2019-09-22 PROCEDURE — 700102 HCHG RX REV CODE 250 W/ 637 OVERRIDE(OP): Performed by: STUDENT IN AN ORGANIZED HEALTH CARE EDUCATION/TRAINING PROGRAM

## 2019-09-22 PROCEDURE — BT121ZZ FLUOROSCOPY OF LEFT KIDNEY USING LOW OSMOLAR CONTRAST: ICD-10-PCS | Performed by: RADIOLOGY

## 2019-09-22 PROCEDURE — 36415 COLL VENOUS BLD VENIPUNCTURE: CPT

## 2019-09-22 PROCEDURE — 80048 BASIC METABOLIC PNL TOTAL CA: CPT

## 2019-09-22 PROCEDURE — 700111 HCHG RX REV CODE 636 W/ 250 OVERRIDE (IP)

## 2019-09-22 PROCEDURE — 99233 SBSQ HOSP IP/OBS HIGH 50: CPT | Mod: GC | Performed by: INTERNAL MEDICINE

## 2019-09-22 PROCEDURE — 700105 HCHG RX REV CODE 258: Performed by: INTERNAL MEDICINE

## 2019-09-22 PROCEDURE — 87186 SC STD MICRODIL/AGAR DIL: CPT

## 2019-09-22 RX ORDER — SODIUM CHLORIDE 9 MG/ML
500 INJECTION, SOLUTION INTRAVENOUS
Status: ACTIVE | OUTPATIENT
Start: 2019-09-22 | End: 2019-09-22

## 2019-09-22 RX ORDER — MIDAZOLAM HYDROCHLORIDE 1 MG/ML
INJECTION INTRAMUSCULAR; INTRAVENOUS
Status: COMPLETED
Start: 2019-09-22 | End: 2019-09-22

## 2019-09-22 RX ORDER — CEFAZOLIN SODIUM 2 G/100ML
2 INJECTION, SOLUTION INTRAVENOUS ONCE
Status: DISCONTINUED | OUTPATIENT
Start: 2019-09-22 | End: 2019-09-22

## 2019-09-22 RX ORDER — MIDAZOLAM HYDROCHLORIDE 1 MG/ML
.5-2 INJECTION INTRAMUSCULAR; INTRAVENOUS PRN
Status: ACTIVE | OUTPATIENT
Start: 2019-09-22 | End: 2019-09-22

## 2019-09-22 RX ORDER — ONDANSETRON 2 MG/ML
4 INJECTION INTRAMUSCULAR; INTRAVENOUS PRN
Status: ACTIVE | OUTPATIENT
Start: 2019-09-22 | End: 2019-09-22

## 2019-09-22 RX ADMIN — FENTANYL CITRATE 50 MCG: 50 INJECTION, SOLUTION INTRAMUSCULAR; INTRAVENOUS at 09:45

## 2019-09-22 RX ADMIN — MEROPENEM 500 MG: 500 INJECTION, POWDER, FOR SOLUTION INTRAVENOUS at 00:45

## 2019-09-22 RX ADMIN — LISINOPRIL 40 MG: 20 TABLET ORAL at 06:05

## 2019-09-22 RX ADMIN — MIDAZOLAM 1 MG: 1 INJECTION INTRAMUSCULAR; INTRAVENOUS at 09:45

## 2019-09-22 RX ADMIN — MEROPENEM 500 MG: 500 INJECTION, POWDER, FOR SOLUTION INTRAVENOUS at 11:18

## 2019-09-22 RX ADMIN — MEROPENEM 500 MG: 500 INJECTION, POWDER, FOR SOLUTION INTRAVENOUS at 18:25

## 2019-09-22 RX ADMIN — MIDAZOLAM 0.5 MG: 1 INJECTION INTRAMUSCULAR; INTRAVENOUS at 09:53

## 2019-09-22 RX ADMIN — MEROPENEM 500 MG: 500 INJECTION, POWDER, FOR SOLUTION INTRAVENOUS at 06:05

## 2019-09-22 RX ADMIN — OXYCODONE HYDROCHLORIDE AND ACETAMINOPHEN 1000 MG: 500 TABLET ORAL at 15:00

## 2019-09-22 ASSESSMENT — COGNITIVE AND FUNCTIONAL STATUS - GENERAL
SUGGESTED CMS G CODE MODIFIER DAILY ACTIVITY: CH
DAILY ACTIVITIY SCORE: 24
MOBILITY SCORE: 24
SUGGESTED CMS G CODE MODIFIER MOBILITY: CH

## 2019-09-22 ASSESSMENT — ENCOUNTER SYMPTOMS
PHOTOPHOBIA: 0
MEMORY LOSS: 0
SORE THROAT: 0
ABDOMINAL PAIN: 0
DOUBLE VISION: 0
SEIZURES: 0
CHILLS: 0
BLURRED VISION: 0
NAUSEA: 0
SHORTNESS OF BREATH: 0
HEARTBURN: 0
LOSS OF CONSCIOUSNESS: 0
BACK PAIN: 0
PALPITATIONS: 0
WHEEZING: 0
DEPRESSION: 0
DIZZINESS: 0
MYALGIAS: 0
FEVER: 0
COUGH: 0
FOCAL WEAKNESS: 0
BLOOD IN STOOL: 0
VOMITING: 0

## 2019-09-22 NOTE — CARE PLAN
Problem: Safety  Goal: Will remain free from falls  Outcome: PROGRESSING AS EXPECTED  Intervention: Assess risk factors for falls  Note:   Fall precautions in place. Pt calls appropriately before getting OOB. Pt is steady on feet during ambulation and not requiring FWW for walking to bathroom.

## 2019-09-22 NOTE — PROGRESS NOTES
Pt tolerated placement of Left nephrostomy tube. Small amount of bakari drainage in bag. VSS upon arrival. Diet advanced and pt tolerating renal diet.

## 2019-09-22 NOTE — PROGRESS NOTES
Internal Medicine Interval Note  Note Author: Glimer Villela M.D.     Name Nessa Dennis 1945   Age/Sex 73 y.o. female   MRN [id number]   Code Status FULL CODE     After 5PM or if no immediate response to page, please call for cross-coverage  Attending/Team: Dr. Ortez/Monica See Patient List for primary contact information  Call [phone] to page    1st Call - Day Intern (R1):   Dr. Shi 2nd Call - Day Sr. Resident (R2/R3):   Dr. Villela     Reason for interval visit  (Principal Problem)   Sepsis (HCC)   Pyelonephritis    Interval Problem Daily Status Update  (24 hours)   No acute events overnight.  BP improved after stopping fluids.   Nephrostomy tube and stent placement planned for today.     Review of Systems   Constitutional: Negative for chills and fever.   HENT: Negative for nosebleeds and sore throat.    Eyes: Negative for blurred vision, double vision and photophobia.   Respiratory: Negative for cough, shortness of breath and wheezing.    Cardiovascular: Negative for chest pain, palpitations and leg swelling.   Gastrointestinal: Negative for abdominal pain, blood in stool, heartburn, nausea and vomiting.   Genitourinary: Negative for dysuria, hematuria and urgency.   Musculoskeletal: Negative for back pain and myalgias.   Skin: Negative for rash.   Neurological: Negative for dizziness, focal weakness, seizures and loss of consciousness.   Psychiatric/Behavioral: Negative for depression and memory loss.       Consultants/Specialty  ID  Surgery     Disposition  Patient has been admitted for the management of pyelonephritis.    Quality Measures  Quality-Core Measures   Reviewed items::  Labs reviewed  Greene catheter::  No Greene  DVT prophylaxis pharmacological::  Enoxaparin (Lovenox)  Antibiotics:  Treating active infection/contamination beyond 24 hours perioperative coverage      Physical Exam       Vitals:    19 1800 19 2000 19 0400 19 0733   BP: (!) 165/95  154/92 130/88 151/91   Pulse: 94 (!) 106 92 89   Resp: 18 16 16 16   Temp: 36.8 °C (98.2 °F) 36.8 °C (98.3 °F) 36.2 °C (97.1 °F) 37.2 °C (99 °F)   TempSrc: Temporal Temporal Temporal Temporal   SpO2: 95% 95% 90% 91%   Weight:       Height:         Body mass index is 27.47 kg/m².    Oxygen Therapy:  Pulse Oximetry: 91 %, O2 (LPM): 0, O2 Delivery: None (Room Air)    Physical Exam   Constitutional: She is oriented to person, place, and time and well-developed, well-nourished, and in no distress. No distress.   HENT:   Head: Normocephalic and atraumatic.   Eyes: Pupils are equal, round, and reactive to light.   Neck: Normal range of motion. Neck supple. No JVD present.   Cardiovascular: Normal rate, regular rhythm, normal heart sounds and intact distal pulses. Exam reveals no gallop and no friction rub.   No murmur heard.  Pulmonary/Chest: Effort normal and breath sounds normal. No respiratory distress. She has no wheezes.   Abdominal: Soft. Bowel sounds are normal. She exhibits no distension. There is no tenderness.   Colostomy bag present      Musculoskeletal: Normal range of motion. She exhibits no edema or tenderness.   Lymphadenopathy:     She has no cervical adenopathy.   Neurological: She is alert and oriented to person, place, and time.   Skin: Skin is warm. No rash noted. She is not diaphoretic. No erythema.   Psychiatric: Mood and affect normal.     Lab Data Review:   9/21/2019  10:09 AM    Recent Labs     09/20/19  0351 09/21/19  1024 09/22/19  0510   SODIUM 138 137 139   POTASSIUM 3.4* 3.8 3.8   CHLORIDE 109 107 107   CO2 23 21 20   BUN 11 10 16   CREATININE 0.68 0.84 0.86   CALCIUM 9.1 9.9 9.7       Recent Labs     09/20/19  0351 09/21/19  1024 09/22/19  0510   GLUCOSE 102* 95 102*       No results for input(s): RBC, HEMOGLOBIN, HEMATOCRIT, PLATELETCT, PROTHROMBTM, APTT, INR, IRON, FERRITIN, TOTIRONBC in the last 72 hours.        Assessment/Plan     * Sepsis secondary to pyelonephritis- (present on  admission)  Assessment & Plan  Resolved with IV fluids and antibiotics. Continue Meropenem for 7 days total.  Met criteria on admission, blood and urine cultures NGTD.     Pyelonephritis- (present on admission)  Assessment & Plan  Patient c/o nausea/vomiting and left flank pain; no CVA tenderness b/l;  H/o recurrent UTI; treated for ESBL klebsiella UTI in 3/19  With IV ABx and nephrostomy tube; ureteric stent in 4/19 which was removed in 8/29/19; stent culture was positive and was treated with 10 days of nitrofurantoin   - U/A is positive for leukocyte esterase, nitrate, WBC, RBC and bacteria;    -CT revealed hydroureteronephrosis with perinephric fat stranding.  - IV fluids  - IV meropenem for 7 days per ID    Hydroureteronephrosis- (present on admission)  Assessment & Plan  CT revealed left sided hydroureteronephrosis which is secondary to pyelonephritis or ureteric stricture (h/o previous radiation for cervical cancer).  - On IVF and IV meropenem for pyelonephritis  - ureteric stent in 4/19 which was removed on 8/29/19; was unable to put another stent due to mid and distal ureteric stricture.   - Nephrostomy tube and ureteral stent planned for 9/22    Metabolic acidosis- (present on admission)  Assessment & Plan  Resolved with IV fluids     HTN (hypertension)- (present on admission)  Assessment & Plan  - BP was 165/98; stopped IVF  - Will add carvedilol if BP continues to remain high  - increase home lisinopril     ADA (acute kidney injury) (HCC)- (present on admission)  Assessment & Plan  Resolved.   Likely secondary to UTI. Continues to have chronic obstruction of course, secondary to stricture     History of Cervical cancer (HCC)- (present on admission)  Assessment & Plan  - H/o cervical cancer s/p radial hysterectomy and b/l salpingo-oophorectomy in 2005  - Recurrence in 2015- treated with chemoradiation  - following up with Dr. 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

## 2019-09-22 NOTE — CARE PLAN
Problem: Infection  Goal: Will remain free from infection  Outcome: PROGRESSING AS EXPECTED     Problem: Venous Thromboembolism (VTW)/Deep Vein Thrombosis (DVT) Prevention:  Goal: Patient will participate in Venous Thrombosis (VTE)/Deep Vein Thrombosis (DVT)Prevention Measures  Outcome: PROGRESSING AS EXPECTED

## 2019-09-22 NOTE — OR SURGEON
Immediate Post- Operative Note        PostOp Diagnosis: LEFT obstructive uropathy      Procedure(s): LEFT neph tube      Estimated Blood Loss: Less than 5 ml        Complications: None            9/22/2019     10:11 AM     Hebert Tirado

## 2019-09-23 LAB
ALBUMIN SERPL BCP-MCNC: 4 G/DL (ref 3.2–4.9)
ALBUMIN/GLOB SERPL: 1.7 G/DL
ALP SERPL-CCNC: 75 U/L (ref 30–99)
ALT SERPL-CCNC: 10 U/L (ref 2–50)
ANION GAP SERPL CALC-SCNC: 11 MMOL/L (ref 0–11.9)
AST SERPL-CCNC: 17 U/L (ref 12–45)
BILIRUB SERPL-MCNC: 0.4 MG/DL (ref 0.1–1.5)
BUN SERPL-MCNC: 16 MG/DL (ref 8–22)
CALCIUM SERPL-MCNC: 10 MG/DL (ref 8.5–10.5)
CHLORIDE SERPL-SCNC: 105 MMOL/L (ref 96–112)
CO2 SERPL-SCNC: 23 MMOL/L (ref 20–33)
CREAT SERPL-MCNC: 0.8 MG/DL (ref 0.5–1.4)
GLOBULIN SER CALC-MCNC: 2.4 G/DL (ref 1.9–3.5)
GLUCOSE SERPL-MCNC: 91 MG/DL (ref 65–99)
MAGNESIUM SERPL-MCNC: 1.8 MG/DL (ref 1.5–2.5)
POTASSIUM SERPL-SCNC: 3.8 MMOL/L (ref 3.6–5.5)
PROT SERPL-MCNC: 6.4 G/DL (ref 6–8.2)
SODIUM SERPL-SCNC: 139 MMOL/L (ref 135–145)

## 2019-09-23 PROCEDURE — 83735 ASSAY OF MAGNESIUM: CPT

## 2019-09-23 PROCEDURE — 700102 HCHG RX REV CODE 250 W/ 637 OVERRIDE(OP): Performed by: STUDENT IN AN ORGANIZED HEALTH CARE EDUCATION/TRAINING PROGRAM

## 2019-09-23 PROCEDURE — A9270 NON-COVERED ITEM OR SERVICE: HCPCS | Performed by: STUDENT IN AN ORGANIZED HEALTH CARE EDUCATION/TRAINING PROGRAM

## 2019-09-23 PROCEDURE — 700111 HCHG RX REV CODE 636 W/ 250 OVERRIDE (IP): Performed by: INTERNAL MEDICINE

## 2019-09-23 PROCEDURE — 36415 COLL VENOUS BLD VENIPUNCTURE: CPT

## 2019-09-23 PROCEDURE — 770006 HCHG ROOM/CARE - MED/SURG/GYN SEMI*

## 2019-09-23 PROCEDURE — 80053 COMPREHEN METABOLIC PANEL: CPT

## 2019-09-23 PROCEDURE — 99232 SBSQ HOSP IP/OBS MODERATE 35: CPT | Mod: GC | Performed by: INTERNAL MEDICINE

## 2019-09-23 PROCEDURE — 700105 HCHG RX REV CODE 258: Performed by: INTERNAL MEDICINE

## 2019-09-23 RX ADMIN — MEROPENEM 500 MG: 500 INJECTION, POWDER, FOR SOLUTION INTRAVENOUS at 17:52

## 2019-09-23 RX ADMIN — MEROPENEM 500 MG: 500 INJECTION, POWDER, FOR SOLUTION INTRAVENOUS at 00:11

## 2019-09-23 RX ADMIN — OXYCODONE HYDROCHLORIDE AND ACETAMINOPHEN 1000 MG: 500 TABLET ORAL at 13:47

## 2019-09-23 RX ADMIN — MEROPENEM 500 MG: 500 INJECTION, POWDER, FOR SOLUTION INTRAVENOUS at 11:59

## 2019-09-23 RX ADMIN — LISINOPRIL 40 MG: 20 TABLET ORAL at 05:47

## 2019-09-23 RX ADMIN — MEROPENEM 500 MG: 500 INJECTION, POWDER, FOR SOLUTION INTRAVENOUS at 05:47

## 2019-09-23 ASSESSMENT — ENCOUNTER SYMPTOMS
MYALGIAS: 0
FEVER: 0
BLOOD IN STOOL: 0
FOCAL WEAKNESS: 0
COUGH: 0
BACK PAIN: 0
NAUSEA: 0
LOSS OF CONSCIOUSNESS: 0
MEMORY LOSS: 0
PHOTOPHOBIA: 0
SORE THROAT: 0
ABDOMINAL PAIN: 0
DOUBLE VISION: 0
DEPRESSION: 0
BLURRED VISION: 0
CHILLS: 0
PALPITATIONS: 0
VOMITING: 0
WHEEZING: 0
SEIZURES: 0
SHORTNESS OF BREATH: 0
DIZZINESS: 0
HEARTBURN: 0

## 2019-09-23 NOTE — NON-PROVIDER
Internal Medicine Interval Note  Note Author: Malinda Meyer, Student     Name Nessa Dennis     1945   Age/Sex 73 y.o. female   MRN [id number]   Code Status FULL     After 5PM or if no immediate response to page, please call for cross-coverage  Attending/Team: Dr. Ortez See Patient List for primary contact information  Call [phone] to page    1st Call - Day Intern (R1):   Dr. Villela 2nd Call - Day Sr. Resident (R2/R3):   Dr. Villela       Reason for interval visit  (Principal Problem)   Sepsis secondary to pyelonephritis      Interval Problem Daily Status Update  (24 hours)   Mrs. Dennis is a 72 y/o female with a PMH of cervical cancer s/p radical hysterectomy and bilateral salpingoophorectomy in  with relapse of cancer in  treated with chemotherapy (Cisplatin) and radiation which resulted in rectovaginal fistula requiring colostomy, neurogenic bladder, left ureteral stricture, and recurrent UTIs. She presented 7 days ago after a one day history of left flank pain and associated N/V. WBC 15.8 and CT abdomen & pelvis showed left pyelonephritis and hydronephrosis with hydroureter. Today is day 7/7 of IV meropenem, per ID recommendation. She had a nephrostomy tube placed yesterday.   Her blood pressure improved after increasing her lisinopril and stopping IV fluids. She has no new complaints at this time.     Review of Systems   Constitutional: Negative for chills and fever.   HENT: Negative for ear discharge, ear pain, hearing loss and tinnitus.    Eyes: Negative for blurred vision, double vision and photophobia.   Respiratory: Negative for cough, hemoptysis and sputum production.    Cardiovascular: Negative for chest pain, palpitations, and LE swelling.   Gastrointestinal: No nausea or vomiting. Negative for abdominal pain, constipation, diarrhea and heartburn.   Genitourinary: No flank pain. No pain in the nephrostomy tube site.  Musculoskeletal: Negative for back pain, myalgias and  neck pain.   Skin: Negative for rash.   Neurological: Headaches have improved. Negative for dizziness, tingling, tremors.  Psychiatric/Behavioral: Negative for depression, substance abuse and suicidal ideas.         Quality Measures  Quality-Core Measures   Reviewed items::  EKG reviewed, Labs reviewed, Medications reviewed and Radiology images reviewed  Greene catheter::  No Greene  DVT prophylaxis pharmacological::  Heparin      Consultants/Specialty  Dr. Hays (gyn-onc) and IR          Physical Exam       Vitals:    09/22/19 1526 09/22/19 2025 09/22/19 2100 09/23/19 0545   BP: 124/71 151/92  132/77   Pulse: 100 (!) 109 89 81   Resp: 16 18 18 18   Temp: 36.9 °C (98.5 °F) 36.7 °C (98 °F) 36.6 °C (97.9 °F) 36.3 °C (97.4 °F)   TempSrc: Temporal Temporal Oral Temporal   SpO2: 94% 95% 90% 93%   Weight:       Height:         Body mass index is 27.47 kg/m².    Oxygen Therapy:  Pulse Oximetry: 93 %, O2 (LPM): 0, O2 Delivery: None (Room Air)    Physical Exam   Constitutional: She is oriented to person, place, and time and well-developed, well-nourished, and in no distress.   HENT:   Head: Normocephalic and atraumatic.   Eyes: Pupils are equal, round, and reactive to light. Conjunctivae and EOM are normal.   Neck: Normal range of motion. Neck supple.   Cardiovascular: Regular rhythm and normal heart sounds. No murmur heard.  Pulmonary/Chest: Effort normal and breath sounds normal. No respiratory distress. She has no wheezes.   Abdominal: Soft. Bowel sounds are normal. She exhibits no distension. There is no tenderness.   Colostomy bag present. Nephrostomy present draining clear, light yellow fluid. No CVA tenderness.   Musculoskeletal: Normal range of motion. She exhibits no edema or deformity.   Neurological: She is alert and oriented to person, place, and time.   Skin: Skin is dry.   Psychiatric: Memory, affect and judgment normal.     Lab Data Review:         9/23/2019  8:07 AM    Recent Labs     09/21/19  1024  09/22/19  0510   SODIUM 137 139   POTASSIUM 3.8 3.8   CHLORIDE 107 107   CO2 21 20   BUN 10 16   CREATININE 0.84 0.86   CALCIUM 9.9 9.7       Recent Labs     09/21/19  1024 09/22/19  0510   GLUCOSE 95 102*       No results for input(s): RBC, HEMOGLOBIN, HEMATOCRIT, PLATELETCT, PROTHROMBTM, APTT, INR, IRON, FERRITIN, TOTIRONBC in the last 72 hours.            Assessment/Plan         1. Sepsis secondary to acute pyelonephritis  Nessa Dennis is a 72 y/o female with a history of recurrent complicated ESBL UTIs with past nephrostomy tube and ureteral stent placement who presented with left flank pain and N/V consistent with pyelonephrosis. This was confirmed by WBC of 15.8, UA positive for nitrites, leukocyte esterase, and WBCs, and CT abdomen/pelvis showing hydroureteronephrosis with perinephric fat stranding. She met 2/4 SIRS criteria (tachycardic at 128 and WBC) at presentation. WBC improved and blood and urine cultures have been negative. She is being treated with IV meropenem, which will be continued for a total of 7 days.      Plan:  -Now resolved  -Continue IV meropenem 500 mg for total of 7 days, per ID recommendation. Today is day 7  -Stop maintenance fluids  -Zofran 4 mg PRN for nausea  -Tylenol PRN for pain   -Dr. Hays following for stent placement. Awaiting his plan.   -Will d/c tomorrow      2. Hydroureteronephrosis  CT abdomen/pelvis showed hydroureteronephrosis. She has a PMH of pyelonephritis with hydronephrosis that required nephrostomy tube placement in March of this year. Nuclear medicine renal scan done which showed decreased perfusion to the left kidney, excretion incomplete on the left with increased half emptying time. The split function is 26% on the left and 74% on the right (worsened from 41 and 59 respectively on comparison). Nephrostomy tube was placed yesterday by IR for hydronephrosis. Awaiting recommendation for ureteral stent placement.      Plan:  -Nephrostomy tube placed and draining  clear, yellow fluid. Follow up with Dr. Hays  -Awaiting Dr. Hays's plan for stent placement     3. Acute kidney injury - resolved  Patient presented with a Cr and BUN of 1.12 and 27. She had acute kidney injury based on KDIGO criteria, which has now resolved. Her ADA was most likely post-renal due to obstruction from the left ureteral stricture vs neurogenic bladder. This has since resolved with IV fluids.      Plan:  -ADA has resolved - no interventions necessary at this time  -Stop fluids secondary to improved kidney function      4. Metabolic Acidosis - resolved  Patient was admitted with a bicarb of 18 and anion gap of 14. Lactic acid was suspected to be the cause, however the lactic acid level was normal. It is more likely to be caused by starvation ketosis. Bicarb improved to 23 and anion gap to 6.0 after giving IV fluids.     Plan:  -Resolved - no intervention necessary at this time     5. Hypertension  Patient has a history of HTN with BP measurements at home estimated to be 135/60, per patient. She is currently on lisinopril 20 mg daily, which was restarted 4 days ago. She did have an episode of elevated BP 4 nights ago at 181/111 and was given two doses of 10 mg labetalol. Her lisinopril was increased to 40 mg 2 days ago and her IV fluids were stopped secondary to continued HTN. Her BP today is stable at 132/77.      Plan:  -Continue lisinopril 40 mg qd  -stop IV fluids and watch BP  -Add carvedilol if no BP improvement   -Labetalol 10 mg if SBP >190 or DBP > 104     6. History of Cervical cancer  History of cervical cancer s/p hysterectomy and salpingoophrectomy w/ recurrence treated with chemo and radiation     Plan:  -Follow up with Dr. Hays

## 2019-09-23 NOTE — DISCHARGE SUMMARY
Internal Medicine Discharge Summary  Note Author: Gilmer Villela M.D.     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 She has a pmh of cervical cancer s/p radical hysterectomy with bilateral oopherectomy (2005), with recurrence in 2015 s/p chemoradiation that resulted in urinary retention requiring self catheterization as well as colostomy and scarring of the left ureter, and frequent UTI's since then including ESBLE klebsiella. She recently underwent stent replacement however, stent was only removed and they were unable to replace the stent due to scarring per Dr Hays's note. She was recently on nitrofurantoin, completed 10 days course just prior to admission.     In ED met sepsis criteria, UA concerning for UTI and CT showed severe left hydronephrosis and hyrourter with perinephric fat stranding and mid uretral obstruction. She was admitted and started on meropenem due to recent ESBL infection. She had significant improvement the following day in vitals, labs and symptoms on abx. Blood and urine cultures NGTD so ID consulted. Recommended meropenem for 1 week total. Dr Hays's team following pt and per there recs she had a L nephrostomy tube placed 9/22. Ureteral stent placed 9/25 by IR.     She will be discharged in stable medical condition.     Patient /Hospital Summary (Details -- Problem Oriented) :           Pyelonephritis  Assessment & Plan  Resolved   Patient c/o nausea/vomiting and left flank pain; no CVA tenderness b/l;  H/o recurrent UTI; treated for ESBL klebsiella UTI in 3/19  With IV ABx and nephrostomy tube; ureteric stent in 4/19 which was removed in 8/29/19; stent culture was positive and was treated with 10 days of nitrofurantoin   - U/A is positive for leukocyte esterase, nitrate, WBC, RBC and bacteria;    -CT revealed hydroureteronephrosis with perinephric fat stranding.  - completed IV meropenem for 7 days per ID    Hydroureteronephrosis  Assessment & Plan  CT revealed left sided hydroureteronephrosis which is secondary to pyelonephritis or ureteric stricture (h/o previous radiation for cervical cancer).  - On IVF and IV meropenem for pyelonephritis  - ureteric stent in 4/19 which was removed on 8/29/19; was unable to put another stent due to mid and distal ureteric stricture.   - Nephrostomy tube placed 9/22  - stent by IR placed 9/25    * Sepsis secondary to pyelonephritis  Assessment & Plan  Resolved with IV fluids and antibiotics. Continue Meropenem for 7 days total.  Met criteria on admission, blood and urine cultures NGTD.     Metabolic acidosis  Assessment & Plan  Resolved with IV fluids     HTN (hypertension)  Assessment & Plan  - BP was 165/98; stopped IVF  - Will add carvedilol if BP continues to remain high  - increase home lisinopril     ADA (acute kidney injury) (HCC)  Assessment & Plan  Resolved.   Likely secondary to UTI. Continues to have chronic obstruction of course, secondary to stricture     History of Cervical cancer (HCC)  Assessment & Plan  - H/o cervical cancer s/p radial hysterectomy and b/l salpingo-oophorectomy in 2005  - Recurrence in 2015- treated with chemoradiation  - following up with Dr. Hays    Digestive-genital tract fistula, female  Assessment & Plan  - H/o cervical cancer treatment with chemoradiation complicated rectovaginal fistula s/p colostomy  - patient is  asymptomatic      Consultants:     Dr Hays    Procedures:        IR placement of L nephrostomy tube     Imaging/ Testing:      IR-URETERAL STENT REPLACE PERC   Final Result      1.  LEFT Nephrostogram   2.  LEFT Ureteral stent placement   3.  LEFT Nephrostomy tube removal                  IR-NEPHROSTOGRAM W/ NEW TUBE PLACEMENT (ALL RADIOLOGY) LEFT   Final Result      Successful image guided LEFT nephrostomy tube placement.      Plan:  [place] drainage. Monitor outputs. Please contact interventional radiology if there is any concern for tube dysfunction. Could consider ureteral stent placement later this week.            NM-RENAL WITH DIURETIC WASHOUT   Final Result      New left obstruction with delayed perfusion and excretion.      Normal right perfusion and excretion      CT-RENAL COLIC EVALUATION(A/P W/O)   Final Result         1.  Left hydronephrosis and left hydroureter. Peripherally calcified structure in the mid and distal left ureter are seen, which appear similar to prior study.          Discharge Medications:         Medication Reconciliation: Completed       Medication List      CHANGE how you take these medications      Instructions   lisinopril 40 MG tablet  Start taking on:  9/26/2019  What changed:    · medication strength  · how much to take  · when to take this  Commonly known as:  PRINIVIL, ZESTRIL   Take 1 Tab by mouth every day for 30 days.  Dose:  40 mg        CONTINUE taking these medications      Instructions   ascorbic acid 500 MG Tabs  Commonly known as:  ascorbic acid   Take 1,000 mg by mouth ONE-HALF HOUR AFTER LUNCH.  Dose:  1,000 mg     CALCIUM 600 + D PO   Take 1 Tab by mouth every bedtime.  Dose:  1 Tab     nitrofurantoin monohyd macro 100 MG Caps  Commonly known as:  MACROBID   Take 100 mg by mouth 2 Times a Day.  Dose:  100 mg     PROBIOTIC DAILY PO   Take 1 Tab by mouth every bedtime.  Dose:  1 Tab     * therapeutic multivitamin-minerals Tabs   Take 1 Tab by mouth every  morning.  Dose:  1 Tab     * OCULAR VITAMINS PO   Take 2 Tabs by mouth every morning.  Dose:  2 Tab     Vitamin D 2000 units Caps   Take 2,000 Units by mouth every morning.  Dose:  2,000 Units         * This list has 2 medication(s) that are the same as other medications prescribed for you. Read the directions carefully, and ask your doctor or other care provider to review them with you.              Disposition:   home  Diet:   Healthy  Activity:   As tolerated   Instructions:       The patient was instructed to return to the ER in the event of worsening symptoms. I have counseled the patient on the importance of compliance and the patient has agreed to proceed with all medical recommendations and follow up plan indicated above.   The patient understands that all medications come with benefits and risks. Risks may include permanent injury or death and these risks can be minimized with close reassessment and monitoring.        Primary Care Provider:    Julio Ross M.D.  Discharge summary faxed to primary care provider:  Deferred  Copy of discharge summary given to the patient: Deferred    Discharge Time (Minutes) :    50min  Hospital Course Type:  Inpatient Stay >2 midnights  ______________________________________________________________________    Interval history/exam for day of discharge:    Constitutional: She is oriented to person, place, and time and well-developed, well-nourished, and in no distress. No distress.   Cardiovascular: Normal rate, regular rhythm, normal heart sounds and intact distal pulses. Exam reveals no gallop and no friction rub.   No murmur heard.  Pulmonary/Chest: Effort normal and breath sounds normal. No respiratory distress. She has no wheezes.   Abdominal: Soft. Bowel sounds are normal. She exhibits no distension. There is no tenderness.   Colostomy bag. L NT draining well, in dressing no pain    Musculoskeletal: Normal range of motion. She exhibits no edema or tenderness.     Neurological: She is alert and oriented to person, place, and time.   Psychiatric: Mood and affect normal.      Most Recent Labs:    Lab Results   Component Value Date/Time    WBC 5.4 09/19/2019 06:29 AM    RBC 4.34 09/19/2019 06:29 AM    HEMOGLOBIN 11.6 (L) 09/19/2019 06:29 AM    HEMATOCRIT 36.1 (L) 09/19/2019 06:29 AM    MCV 83.2 09/19/2019 06:29 AM    MCH 26.7 (L) 09/19/2019 06:29 AM    MCHC 32.1 (L) 09/19/2019 06:29 AM    MPV 9.6 09/19/2019 06:29 AM    NEUTSPOLYS 71.00 09/19/2019 06:29 AM    LYMPHOCYTES 14.20 (L) 09/19/2019 06:29 AM    MONOCYTES 9.80 09/19/2019 06:29 AM    EOSINOPHILS 4.20 09/19/2019 06:29 AM    BASOPHILS 0.40 09/19/2019 06:29 AM      Lab Results   Component Value Date/Time    SODIUM 139 09/23/2019 01:45 PM    POTASSIUM 3.8 09/23/2019 01:45 PM    CHLORIDE 105 09/23/2019 01:45 PM    CO2 23 09/23/2019 01:45 PM    GLUCOSE 91 09/23/2019 01:45 PM    BUN 16 09/23/2019 01:45 PM    CREATININE 0.80 09/23/2019 01:45 PM    CREATININE 0.7 03/27/2006 02:58 PM      Lab Results   Component Value Date/Time    ALTSGPT 10 09/23/2019 01:45 PM    ASTSGOT 17 09/23/2019 01:45 PM    ALKPHOSPHAT 75 09/23/2019 01:45 PM    TBILIRUBIN 0.4 09/23/2019 01:45 PM    LIPASE 14 02/17/2019 08:55 AM    ALBUMIN 4.0 09/23/2019 01:45 PM    GLOBULIN 2.4 09/23/2019 01:45 PM    INR 1.02 09/17/2019 03:03 AM     Lab Results   Component Value Date/Time    PROTHROMBTM 13.6 09/17/2019 03:03 AM    INR 1.02 09/17/2019 03:03 AM

## 2019-09-23 NOTE — CARE PLAN
Problem: Pain Management  Goal: Pain level will decrease to patient's comfort goal  Outcome: MET  Intervention: Educate and implement non-pharmacologic comfort measures. Examples: relaxation, distration, play therapy, activity therapy, massage, etc.  Note:   Pt denies pain and appears comfortable     Problem: Psychosocial needs  Goal: Anxiety reduction  Outcome: MET  Intervention: Stimuli reduction, calming techniques  Note:   Lights turned low, noise minimized to relieve pt anxiety and promote rest. Pt calm, cooperative and able to sleep comfortably.

## 2019-09-23 NOTE — PROGRESS NOTES
Internal Medicine Interval Note  Note Author: Gilmer Villela M.D.     Name Nessa Dennis 1945   Age/Sex 73 y.o. female   MRN [id number]   Code Status FULL CODE     After 5PM or if no immediate response to page, please call for cross-coverage  Attending/Team: Dr. Ortez/Monica See Patient List for primary contact information  Call [phone] to page    1st Call - Day Intern (R1):   Dr. Shi 2nd Call - Day Sr. Resident (R2/R3):   Dr. Villela     Reason for interval visit  (Principal Problem)   Sepsis (HCC)   Pyelonephritis    Interval Problem Daily Status Update  (24 hours)   No acute events overnight. Vitals stable. BP improved this morning.  L NT placed yesterday, draining well, no pain.  Plan per Dr Hays's team for stent  Last day of meropenem     Review of Systems   Constitutional: Negative for chills and fever.   HENT: Negative for nosebleeds and sore throat.    Eyes: Negative for blurred vision, double vision and photophobia.   Respiratory: Negative for cough, shortness of breath and wheezing.    Cardiovascular: Negative for chest pain, palpitations and leg swelling.   Gastrointestinal: Negative for abdominal pain, blood in stool, heartburn, nausea and vomiting.   Genitourinary: Negative for dysuria, hematuria and urgency.   Musculoskeletal: Negative for back pain and myalgias.   Skin: Negative for rash.   Neurological: Negative for dizziness, focal weakness, seizures and loss of consciousness.   Psychiatric/Behavioral: Negative for depression and memory loss.       Consultants/Specialty  ID  Surgery     Disposition  Patient has been admitted for the management of pyelonephritis.    Quality Measures  Quality-Core Measures   Reviewed items::  Labs reviewed  Greene catheter::  No Greene  DVT prophylaxis pharmacological::  Enoxaparin (Lovenox)  Antibiotics:  Treating active infection/contamination beyond 24 hours perioperative coverage      Physical Exam       Vitals:    19 1526 19  2025 09/22/19 2100 09/23/19 0545   BP: 124/71 151/92  132/77   Pulse: 100 (!) 109 89 81   Resp: 16 18 18 18   Temp: 36.9 °C (98.5 °F) 36.7 °C (98 °F) 36.6 °C (97.9 °F) 36.3 °C (97.4 °F)   TempSrc: Temporal Temporal Oral Temporal   SpO2: 94% 95% 90% 93%   Weight:       Height:         Body mass index is 27.47 kg/m².    Oxygen Therapy:  Pulse Oximetry: 93 %, O2 (LPM): 0, O2 Delivery: None (Room Air)    Physical Exam   Constitutional: She is oriented to person, place, and time and well-developed, well-nourished, and in no distress. No distress.   HENT:   Head: Normocephalic and atraumatic.   Eyes: Pupils are equal, round, and reactive to light.   Neck: Normal range of motion. Neck supple. No JVD present.   Cardiovascular: Normal rate, regular rhythm, normal heart sounds and intact distal pulses. Exam reveals no gallop and no friction rub.   No murmur heard.  Pulmonary/Chest: Effort normal and breath sounds normal. No respiratory distress. She has no wheezes.   Abdominal: Soft. Bowel sounds are normal. She exhibits no distension. There is no tenderness.   Colostomy bag. L NT draining well, in dressing no pain    Musculoskeletal: Normal range of motion. She exhibits no edema or tenderness.   Lymphadenopathy:     She has no cervical adenopathy.   Neurological: She is alert and oriented to person, place, and time.   Skin: Skin is warm. No rash noted. She is not diaphoretic. No erythema.   Psychiatric: Mood and affect normal.     Lab Data Review:   9/21/2019  10:09 AM    Recent Labs     09/21/19  1024 09/22/19  0510   SODIUM 137 139   POTASSIUM 3.8 3.8   CHLORIDE 107 107   CO2 21 20   BUN 10 16   CREATININE 0.84 0.86   CALCIUM 9.9 9.7       Recent Labs     09/21/19  1024 09/22/19  0510   GLUCOSE 95 102*       No results for input(s): RBC, HEMOGLOBIN, HEMATOCRIT, PLATELETCT, PROTHROMBTM, APTT, INR, IRON, FERRITIN, TOTIRONBC in the last 72 hours.        Assessment/Plan     * Sepsis secondary to pyelonephritis- (present on  admission)  Assessment & Plan  Resolved with IV fluids and antibiotics. Continue Meropenem for 7 days total.  Met criteria on admission, blood and urine cultures NGTD.     Pyelonephritis- (present on admission)  Assessment & Plan  Patient c/o nausea/vomiting and left flank pain; no CVA tenderness b/l;  H/o recurrent UTI; treated for ESBL klebsiella UTI in 3/19  With IV ABx and nephrostomy tube; ureteric stent in 4/19 which was removed in 8/29/19; stent culture was positive and was treated with 10 days of nitrofurantoin   - U/A is positive for leukocyte esterase, nitrate, WBC, RBC and bacteria;    -CT revealed hydroureteronephrosis with perinephric fat stranding.  - IV fluids  - IV meropenem for 7 days per ID    Hydroureteronephrosis- (present on admission)  Assessment & Plan  CT revealed left sided hydroureteronephrosis which is secondary to pyelonephritis or ureteric stricture (h/o previous radiation for cervical cancer).  - On IVF and IV meropenem for pyelonephritis  - ureteric stent in 4/19 which was removed on 8/29/19; was unable to put another stent due to mid and distal ureteric stricture.   - Nephrostomy tube placedfor 9/22  - stent per surgery     Metabolic acidosis- (present on admission)  Assessment & Plan  Resolved with IV fluids     HTN (hypertension)- (present on admission)  Assessment & Plan  - BP was 165/98; stopped IVF  - Will add carvedilol if BP continues to remain high  - increase home lisinopril     ADA (acute kidney injury) (HCC)- (present on admission)  Assessment & Plan  Resolved.   Likely secondary to UTI. Continues to have chronic obstruction of course, secondary to stricture     History of Cervical cancer (HCC)- (present on admission)  Assessment & Plan  - H/o cervical cancer s/p radial hysterectomy and b/l salpingo-oophorectomy in 2005  - Recurrence in 2015- treated with chemoradiation  - following up with Dr. 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

## 2019-09-23 NOTE — PROGRESS NOTES
Received report, assumed pt care. Pt a&o x 4, VSS, Assessment completed. L nephrostomy tube in place, small yellow urine observed. Dressing clean, dry and intact. Resting comfortably in bed with call light, bedside table in reach. No c/o at this time. Side rails up x 2. Instructed to use call light when needing assistance,  verbalized understanding. Bed alarm off, pt up self and steady, bed in low position. Will continue to monitor.        English

## 2019-09-24 LAB
BACTERIA UR CULT: ABNORMAL
GRAM STN SPEC: ABNORMAL
SIGNIFICANT IND 70042: ABNORMAL
SITE SITE: ABNORMAL
SOURCE SOURCE: ABNORMAL

## 2019-09-24 PROCEDURE — A9270 NON-COVERED ITEM OR SERVICE: HCPCS | Performed by: STUDENT IN AN ORGANIZED HEALTH CARE EDUCATION/TRAINING PROGRAM

## 2019-09-24 PROCEDURE — 99232 SBSQ HOSP IP/OBS MODERATE 35: CPT | Mod: GC | Performed by: INTERNAL MEDICINE

## 2019-09-24 PROCEDURE — 700102 HCHG RX REV CODE 250 W/ 637 OVERRIDE(OP): Performed by: STUDENT IN AN ORGANIZED HEALTH CARE EDUCATION/TRAINING PROGRAM

## 2019-09-24 PROCEDURE — 770006 HCHG ROOM/CARE - MED/SURG/GYN SEMI*

## 2019-09-24 PROCEDURE — 700111 HCHG RX REV CODE 636 W/ 250 OVERRIDE (IP): Performed by: STUDENT IN AN ORGANIZED HEALTH CARE EDUCATION/TRAINING PROGRAM

## 2019-09-24 RX ADMIN — OXYCODONE HYDROCHLORIDE AND ACETAMINOPHEN 1000 MG: 500 TABLET ORAL at 13:36

## 2019-09-24 RX ADMIN — ENOXAPARIN SODIUM 40 MG: 100 INJECTION SUBCUTANEOUS at 15:41

## 2019-09-24 RX ADMIN — LISINOPRIL 40 MG: 20 TABLET ORAL at 05:10

## 2019-09-24 ASSESSMENT — ENCOUNTER SYMPTOMS
SHORTNESS OF BREATH: 0
CHILLS: 0
LOSS OF CONSCIOUSNESS: 0
COUGH: 0
DIZZINESS: 0
MYALGIAS: 0
NAUSEA: 0
SORE THROAT: 0
PALPITATIONS: 0
DOUBLE VISION: 0
ABDOMINAL PAIN: 0
PHOTOPHOBIA: 0
VOMITING: 0
FOCAL WEAKNESS: 0
BACK PAIN: 0
FEVER: 0
BLURRED VISION: 0
HEARTBURN: 0
MEMORY LOSS: 0
BLOOD IN STOOL: 0
SEIZURES: 0
WHEEZING: 0
DEPRESSION: 0

## 2019-09-24 NOTE — CARE PLAN
Problem: Venous Thromboembolism (VTW)/Deep Vein Thrombosis (DVT) Prevention:  Goal: Patient will participate in Venous Thrombosis (VTE)/Deep Vein Thrombosis (DVT)Prevention Measures  Outcome: PROGRESSING AS EXPECTED   Patient received lovenox  Problem: Knowledge Deficit  Goal: Knowledge of disease process/condition, treatment plan, diagnostic tests, and medications will improve  Outcome: PROGRESSING AS EXPECTED   Educated patient on npo for procedure and medications.

## 2019-09-24 NOTE — NON-PROVIDER
Internal Medicine Interval Note  Note Author: Malinda Meyer, Student     Name Nessa Dennis     1945   Age/Sex 73 y.o. female   MRN [id number]   Code Status FULL     After 5PM or if no immediate response to page, please call for cross-coverage  Attending/Team: Dr. Ortez See Patient List for primary contact information  Call [phone] to page    1st Call - Day Intern (R1):   Dr. Villela 2nd Call - Day Sr. Resident (R2/R3):   Dr. Villela         Reason for interval visit  (Principal Problem)   Sepsis secondary to pyelonephritis      Interval Problem Daily Status Update  (24 hours)   Mrs. Dennis is a 72 y/o female with a PMH of cervical cancer s/p radical hysterectomy and bilateral salpingoophorectomy in  with relapse of cancer in  treated with chemotherapy (Cisplatin) and radiation which resulted in rectovaginal fistula requiring colostomy, neurogenic bladder, left ureteral stricture, and recurrent UTIs. She presented 8 days ago after a one day history of left flank pain and associated N/V. WBC 15.8 and CT abdomen & pelvis showed left pyelonephritis and hydronephrosis with hydroureter. ID was consulted, who recommended a total of 7 days of IV meropenem. Last night was day 7. She had a nephrostomy tube placed 2 days ago and it has been draining fluid appropriately. Awaiting Dr. Hays's recommendation for ureteral stent placement, otherwise patient is cleared for d/c. Her blood pressure has been stable. No acute events overnight. She has no complaints at this time.        Review of Systems   Constitutional: Negative for chills and fever.   HENT: Negative for ear discharge, ear pain, hearing loss and tinnitus.    Eyes: Negative for blurred vision, double vision and photophobia.   Respiratory: Negative for cough, hemoptysis and sputum production.    Cardiovascular: Negative for chest pain, palpitations, and LE swelling.   Gastrointestinal: No nausea or vomiting. Negative for abdominal  pain, constipation, diarrhea and heartburn.   Genitourinary: No flank pain. No pain in the nephrostomy tube site.  Musculoskeletal: Negative for back pain, myalgias and neck pain.   Skin: Negative for rash.   Neurological: Headaches have improved. Negative for dizziness, tingling, tremors.  Psychiatric/Behavioral: Negative for depression, substance abuse and suicidal ideas.         Quality Measures  Quality-Core Measures   Reviewed items::  EKG reviewed, Labs reviewed, Medications reviewed and Radiology images reviewed  Greene catheter::  No Greene  DVT prophylaxis pharmacological::  Heparin        Consultants/Specialty  Dr. Hays (gyn-onc) and IR      Physical Exam       Vitals:    09/23/19 1020 09/23/19 1700 09/23/19 2020 09/24/19 0508   BP: 126/75 100/62 117/73 134/75   Pulse: 93 100 93 82   Resp: 18 18 18 18   Temp: 36.6 °C (97.8 °F) 36.7 °C (98.1 °F) 36.2 °C (97.2 °F) 36.7 °C (98.1 °F)   TempSrc: Temporal Temporal Temporal Oral   SpO2: 92% 94% 97% 91%   Weight:       Height:         Body mass index is 27.47 kg/m².    Oxygen Therapy:  Pulse Oximetry: 91 %, O2 Delivery: None (Room Air)      Physical Exam   Constitutional: She is oriented to person, place, and time and well-developed, well-nourished, and in no distress.   HENT:   Head: Normocephalic and atraumatic.   Eyes: Pupils are equal, round, and reactive to light. Conjunctivae and EOM are normal.   Neck: Normal range of motion. Neck supple.   Cardiovascular: Regular rhythm and normal heart sounds. No murmur heard.  Pulmonary/Chest: Effort normal and breath sounds normal. No respiratory distress. She has no wheezes.   Abdominal: Soft. Bowel sounds are normal. She exhibits no distension. There is no tenderness.   Colostomy bag present. Nephrostomy present draining clear, light yellow fluid. No CVA tenderness.   Musculoskeletal: Normal range of motion. She exhibits no edema or deformity.   Neurological: She is alert and oriented to person, place, and time.   Skin:  Skin is dry.   Psychiatric: Memory, affect and judgment normal.       Lab Data Review:         9/24/2019  6:11 AM    Recent Labs     09/21/19  1024 09/22/19  0510 09/23/19  1345   SODIUM 137 139 139   POTASSIUM 3.8 3.8 3.8   CHLORIDE 107 107 105   CO2 21 20 23   BUN 10 16 16   CREATININE 0.84 0.86 0.80   MAGNESIUM  --   --  1.8   CALCIUM 9.9 9.7 10.0       Recent Labs     09/21/19  1024 09/22/19  0510 09/23/19  1345   ALTSGPT  --   --  10   ASTSGOT  --   --  17   ALKPHOSPHAT  --   --  75   TBILIRUBIN  --   --  0.4   GLUCOSE 95 102* 91       No results for input(s): RBC, HEMOGLOBIN, HEMATOCRIT, PLATELETCT, PROTHROMBTM, APTT, INR, IRON, FERRITIN, TOTIRONBC in the last 72 hours.    Recent Labs     09/23/19  1345   ASTSGOT 17   ALTSGPT 10   ALKPHOSPHAT 75   TBILIRUBIN 0.4         Assessment/Plan         1. Sepsis secondary to acute pyelonephritis  Nessa Dennis is a 72 y/o female with a history of recurrent complicated ESBL UTIs with past nephrostomy tube and ureteral stent placement who presented with left flank pain and N/V consistent with pyelonephrosis. This was confirmed by WBC of 15.8, UA positive for nitrites, leukocyte esterase, and WBCs, and CT abdomen/pelvis showing hydroureteronephrosis with perinephric fat stranding. She met 2/4 SIRS criteria (tachycardic at 128 and WBC) at presentation. WBC improved and blood and urine cultures have been negative. She is being treated with IV meropenem, which will be continued for a total of 7 days, per ID recommendation.     Plan:  -Now resolved  -Yesterday was last day of IV meropenem 500 mg  -Zofran 4 mg PRN for nausea  -Tylenol PRN for pain   -Dr. Hays following for stent placement. Will see patient today  -Will d/c today unless stent placement planned while in hospital     2. Hydroureteronephrosis  CT abdomen/pelvis showed hydroureteronephrosis. She has a PMH of pyelonephritis with hydronephrosis that required nephrostomy tube placement in March of this year. A new stent  was unable to be placed in August due to stricture and calcification. Nuclear medicine renal scan done which showed decreased perfusion to the left kidney, excretion incomplete on the left with increased half emptying time. The split function is 26% on the left and 74% on the right (worsened from 41 and 59 respectively on comparison). Nephrostomy tube was placed 2 days ago by IR for hydronephrosis. Awaiting recommendation for ureteral stent placement.      Plan:  -Nephrostomy tube placed and draining clear, yellow fluid. Follow up with Dr. Hays  -Awaiting Dr. Hays's plan for stent placement     3. Acute kidney injury - resolved  Patient presented with a Cr and BUN of 1.12 and 27. She had acute kidney injury based on KDIGO criteria, which has now resolved. Her ADA was most likely post-renal due to obstruction from the left ureteral stricture vs neurogenic bladder. This has since resolved with IV fluids.      Plan:  -Resolved - no interventions necessary at this time     4. Metabolic Acidosis - resolved  Patient was admitted with a bicarb of 18 and anion gap of 14. Lactic acid was suspected to be the cause, however the lactic acid level was normal. It is more likely to be caused by starvation ketosis. Bicarb improved to 23 and anion gap to 6.0 after giving IV fluids.     Plan:  -Resolved - no intervention necessary at this time     5. Hypertension  Patient has a history of HTN with BP measurements at home estimated to be about 135/60, per patient. She is currently on lisinopril 20 mg daily, which was restarted 5 days ago. She did have an episode of elevated BP 4 nights ago at 181/111 and was given two doses of 10 mg labetalol. Her lisinopril was increased to 40 mg 3 days ago and her IV fluids were stopped secondary to continued HTN. Her BP has been stable since     Plan:  -Continue lisinopril 40 mg qd  -Add carvedilol if no BP improvement   -Labetalol 10 mg if SBP >190 or DBP > 104     6. History of Cervical  cancer  History of cervical cancer s/p hysterectomy and salpingoophrectomy w/ recurrence treated with chemo and radiation     Plan:  -Follow up with Dr. Hays

## 2019-09-24 NOTE — CONSULTS
Gynecology Oncology consult    HISTORY OF PRESENT ILLNESS:   [facility] is [facility] 74 yo female who was diagnosed with Stage Ib1 G3 cervical cancer, after undergoing a laparoscopic radical hysteretcomy with bilateral salphingo-oophorectomy, and bilateral pelvic/aortic lymph node dissection on 5/27/2005. She did not require further adjuvant therapy.     She did well and was in remission for over 10 years, until 3/2015, when she presented with rectal bleeding. She was seen in our office for her annual examination on 3/04/2015 and was noted to have a vaginal/vulvar mass on examination. A biopsy was performed which showed at least SCC in situ. She underwent a PET scan for metastatic workup which showed no metastatic disease. The mass involved vulva and extended into the vagina and parametria. The lesion was too large for surgical resection thus she was advised to undergo concurrent chemoradiation.     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 She completed concurrent chemoradiation therapy consisting of weekly Cisplatin from 4/20/15 to 5/18/15. She had her radiation treatment under Dr. Aldrich and completed therapy on 5/27/15. SPC placed under Dr. Aldrich in May 2015 for urinary retention. Ultimately SPC was dc'd and she has subsequently required routine self cath for atonic bladder.    Post treatment CT scan showed a questionable residual tumor. She underwent an EUA, biopsy, and cystoscopy on 7/31/2015. Pathology showed benign squamous mucosa with ulceration and granulation tissue, no SCC identified.  Intraoperatively, a persistent rectovaginal fistula was noted. Following extensive discussion regarding options, she elected not to pursue any surgical intervention and be monitored.     She has had recurrent pyelonephritis and IV antibiotics. She had left ureteral stent removed on 8/30/19 and was unable to replace due to stricture. She was admitted on 9/16 for flank  pain and fever. She had a left NT placed on 9/23 and has completed abx therapy per hospital medicine.      MEDICATION:     Lisinopril 20 mg tablet 0 Tablet PO PRN  Vitamin D2 50,000 intl units capsule 0 Caplet PO  Vitamin C 100 mg tablet, chewable 0 Tablet PO  Calcium 1250 mg tablet 0 Tablet PO  Multivitamin Lipotropic with Multivitamins capsule 0 Caplet PO    ALLERGIES:  NKA -- No Known Allergies    PAST MEDICAL HISTORY:  Cervical cancer s/p radical hysterectomy, recurrence 2005 with concurrent chemoradiation, colostomy placement, hydronephrosis    PAST SURGICAL HISTORY:   05/26/2005 Uterine Surgery ([facility] RAD HYST, Ureteroneocytotomy Left,Suprapubic catheter)  Foot Surgery  Tonsillectomy/Adenoidectomy    ROS:  Skin: Patient denies rashes, skin ulcers and skin problems.  Neurology: Patient denies numbness of fingers or toes and fainting.  Psychiatry: Patient denies depression and anxiety.  Endocrinology: Patient denies hot flashes, diabetes, and thyroid problems  Genitourinary: Patient denies vaginal bleeding, self cath denies pain  Hematology/Lymphatic: Patient denies bruising easily and bleeding gums.  ENT: Patient denies mouth ulcers and cold symptoms (cough, runny nose, sore throat).  Cardiovascular: Patient denies shortness of breath while walking or climbing stairs; denies chest pain and shortness of breath while lying flat.  Respiratory: Patient denies shortness of breath at rest; denies any wheezing (difficulty breathing).  Gastrointestinal: Patient denies nausea, vomiting, diarrhea.  Musculoskeletal: Patient denies arthritis/painful joints; muscle weakness.  Constitutional: Patient denies weight change, fatigue and change in vision.    EXAM:  GENERAL - Alert and oriented individual, no apparent distress.  LUNGS: - Clear to auscultation bilaterally, no rales, rhonci, or wheezes.  HEENT -no sclera icterus, no pallor of conjunctiva, no supraclavicular or cervical lymphadenopathy detected.   CARDIAC - regular  rate and rhythm, murmur, no clicks, or gallops.  ABDOMEN - soft, non- tender.   PELVIS - deferred.  EXTREMITIES - Non-erythematous, non-tender.   NEURO - grossly intact.     DIAGNOSTIC STUDIES  Preliminary Left nephrostomy tube urine no growth after 24 hours. Creatinine 0.80    ASSESSMENT:  Stage Ib1 grade 3 cervical cancer: 5/27/2005  s/p RAH/BSO/BPLND: 5/27/2005  Recurrence: 3/2015  s/p concurrent chemoradiation: 4/20/15 to 5/18/15 (last XRT 5/27/15)  L stent placed 4/16/19. Ureteral stent removed 8/30/19.    PLAN:  1. Hydronephrosis- s/p L NT placed per IR on 9/23/19 draining well. Continuing to self cath. Preliminary urine culture negative, Recommend internalize ureteral stent per IR if urine culture negative.  2. Cervical cancer- Follow up outpatient with [facility], Call for appointment. Gyn Onc will sign off.    Case discussed with Dr Hays.  Thank you again for the opportunity for allowing me to participate in her care, if you should have any questions or if I could be of any help to you in the future, please do not hesitate to call.

## 2019-09-24 NOTE — PROGRESS NOTES
Internal Medicine Interval Note  Note Author: Gilmer Villela M.D.     Name Nessa Dennis 1945   Age/Sex 73 y.o. female   MRN [id number]   Code Status FULL CODE     After 5PM or if no immediate response to page, please call for cross-coverage  Attending/Team: Dr. Ortez/Monica See Patient List for primary contact information  Call [phone] to page     2nd Call - Day Sr. Resident (R2/R3):   Dr. Villela     Reason for interval visit  (Principal Problem)   Sepsis (HCC)   Pyelonephritis    Interval Problem Daily Status Update  (24 hours)   No acute events overnight. Vitals stable. BP improved this morning.  IR ureteral stent placement planned for tomorrow. One dose of meropenem tomorrow pre-op per ID recs    Review of Systems   Constitutional: Negative for chills and fever.   HENT: Negative for nosebleeds and sore throat.    Eyes: Negative for blurred vision, double vision and photophobia.   Respiratory: Negative for cough, shortness of breath and wheezing.    Cardiovascular: Negative for chest pain, palpitations and leg swelling.   Gastrointestinal: Negative for abdominal pain, blood in stool, heartburn, nausea and vomiting.   Genitourinary: Negative for dysuria, hematuria and urgency.   Musculoskeletal: Negative for back pain and myalgias.   Skin: Negative for rash.   Neurological: Negative for dizziness, focal weakness, seizures and loss of consciousness.   Psychiatric/Behavioral: Negative for depression and memory loss.       Consultants/Specialty  ID  Surgery     Disposition  Patient has been admitted for the management of pyelonephritis.    Quality Measures  Quality-Core Measures   Reviewed items::  Labs reviewed  Greene catheter::  No Greene  DVT prophylaxis pharmacological::  Enoxaparin (Lovenox)  Antibiotics:  Treating active infection/contamination beyond 24 hours perioperative coverage      Physical Exam       Vitals:    19 1700 19 2020 19 0508 19 0739   BP:  100/62 117/73 134/75 135/77   Pulse: 100 93 82 82   Resp: 18 18 18 18   Temp: 36.7 °C (98.1 °F) 36.2 °C (97.2 °F) 36.7 °C (98.1 °F) 36.6 °C (97.9 °F)   TempSrc: Temporal Temporal Oral Temporal   SpO2: 94% 97% 91% 90%   Weight:       Height:         Body mass index is 27.47 kg/m².    Oxygen Therapy:  Pulse Oximetry: 90 %, O2 (LPM): 0, O2 Delivery: None (Room Air)    Physical Exam   Constitutional: She is oriented to person, place, and time and well-developed, well-nourished, and in no distress. No distress.   HENT:   Head: Normocephalic and atraumatic.   Eyes: Pupils are equal, round, and reactive to light.   Neck: Normal range of motion. Neck supple. No JVD present.   Cardiovascular: Normal rate, regular rhythm, normal heart sounds and intact distal pulses. Exam reveals no gallop and no friction rub.   No murmur heard.  Pulmonary/Chest: Effort normal and breath sounds normal. No respiratory distress. She has no wheezes.   Abdominal: Soft. Bowel sounds are normal. She exhibits no distension. There is no tenderness.   Colostomy bag. L NT draining well, in dressing no pain    Musculoskeletal: Normal range of motion. She exhibits no edema or tenderness.   Lymphadenopathy:     She has no cervical adenopathy.   Neurological: She is alert and oriented to person, place, and time.   Skin: Skin is warm. No rash noted. She is not diaphoretic. No erythema.   Psychiatric: Mood and affect normal.     Lab Data Review:   9/21/2019  10:09 AM    Recent Labs     09/22/19  0510 09/23/19  1345   SODIUM 139 139   POTASSIUM 3.8 3.8   CHLORIDE 107 105   CO2 20 23   BUN 16 16   CREATININE 0.86 0.80   MAGNESIUM  --  1.8   CALCIUM 9.7 10.0       Recent Labs     09/22/19  0510 09/23/19  1345   ALTSGPT  --  10   ASTSGOT  --  17   ALKPHOSPHAT  --  75   TBILIRUBIN  --  0.4   GLUCOSE 102* 91       No results for input(s): RBC, HEMOGLOBIN, HEMATOCRIT, PLATELETCT, PROTHROMBTM, APTT, INR, IRON, FERRITIN, TOTIRONBC in the last 72 hours.    Recent Labs      09/23/19  1345   ASTSGOT 17   ALTSGPT 10   ALKPHOSPHAT 75   TBILIRUBIN 0.4     Assessment/Plan     * Sepsis secondary to pyelonephritis- (present on admission)  Assessment & Plan  Resolved with IV fluids and antibiotics. Continue Meropenem for 7 days total.  Met criteria on admission, blood and urine cultures NGTD.     Pyelonephritis- (present on admission)  Assessment & Plan  Patient c/o nausea/vomiting and left flank pain; no CVA tenderness b/l;  H/o recurrent UTI; treated for ESBL klebsiella UTI in 3/19  With IV ABx and nephrostomy tube; ureteric stent in 4/19 which was removed in 8/29/19; stent culture was positive and was treated with 10 days of nitrofurantoin   - U/A is positive for leukocyte esterase, nitrate, WBC, RBC and bacteria;    -CT revealed hydroureteronephrosis with perinephric fat stranding.  - IV fluids  - IV meropenem for 7 days per ID    Hydroureteronephrosis- (present on admission)  Assessment & Plan  CT revealed left sided hydroureteronephrosis which is secondary to pyelonephritis or ureteric stricture (h/o previous radiation for cervical cancer).  - On IVF and IV meropenem for pyelonephritis  - ureteric stent in 4/19 which was removed on 8/29/19; was unable to put another stent due to mid and distal ureteric stricture.   - Nephrostomy tube placedfor 9/22  - stent by IR planned for tomorrow    Metabolic acidosis- (present on admission)  Assessment & Plan  Resolved with IV fluids     HTN (hypertension)- (present on admission)  Assessment & Plan  - BP was 165/98; stopped IVF  - Will add carvedilol if BP continues to remain high  - increase home lisinopril     ADA (acute kidney injury) (HCC)- (present on admission)  Assessment & Plan  Resolved.   Likely secondary to UTI. Continues to have chronic obstruction of course, secondary to stricture     History of Cervical cancer (HCC)- (present on admission)  Assessment & Plan  - H/o cervical cancer s/p radial hysterectomy and b/l  salpingo-oophorectomy in 2005  - Recurrence in 2015- treated with chemoradiation  - following up with Dr. 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

## 2019-09-24 NOTE — PROGRESS NOTES
Assumed patient care at 0700. Received Bedside report. Patient alert and oriented x 4. Nephrostomy tube intact, dressing CDI, urine clear, ostomy in place stoma pink appliance in place. Discussed POC. No s/s of distress. Patient denies any further questions or concerns. Call light in reach, fall precautions in place. Continue hourly rounding. /77   Pulse 82   Temp 36.6 °C (97.9 °F) (Temporal)   Resp 18   Ht 1.524 m (5')   Wt 63.8 kg (140 lb 10.5 oz)   SpO2 90%   BMI 27.47 kg/m²

## 2019-09-25 ENCOUNTER — PATIENT OUTREACH (OUTPATIENT)
Dept: HEALTH INFORMATION MANAGEMENT | Facility: OTHER | Age: 74
End: 2019-09-25

## 2019-09-25 ENCOUNTER — APPOINTMENT (OUTPATIENT)
Dept: RADIOLOGY | Facility: MEDICAL CENTER | Age: 74
DRG: 872 | End: 2019-09-25
Attending: STUDENT IN AN ORGANIZED HEALTH CARE EDUCATION/TRAINING PROGRAM
Payer: MEDICARE

## 2019-09-25 VITALS
WEIGHT: 140.65 LBS | DIASTOLIC BLOOD PRESSURE: 72 MMHG | TEMPERATURE: 97.9 F | HEIGHT: 60 IN | SYSTOLIC BLOOD PRESSURE: 125 MMHG | BODY MASS INDEX: 27.61 KG/M2 | HEART RATE: 74 BPM | OXYGEN SATURATION: 91 % | RESPIRATION RATE: 18 BRPM

## 2019-09-25 LAB
BACTERIA UR CULT: ABNORMAL
BACTERIA UR CULT: ABNORMAL
GRAM STN SPEC: ABNORMAL
SIGNIFICANT IND 70042: ABNORMAL
SITE SITE: ABNORMAL
SOURCE SOURCE: ABNORMAL

## 2019-09-25 PROCEDURE — 99239 HOSP IP/OBS DSCHRG MGMT >30: CPT | Mod: GC | Performed by: INTERNAL MEDICINE

## 2019-09-25 PROCEDURE — 700111 HCHG RX REV CODE 636 W/ 250 OVERRIDE (IP)

## 2019-09-25 PROCEDURE — 700111 HCHG RX REV CODE 636 W/ 250 OVERRIDE (IP): Performed by: RADIOLOGY

## 2019-09-25 PROCEDURE — 700111 HCHG RX REV CODE 636 W/ 250 OVERRIDE (IP): Performed by: STUDENT IN AN ORGANIZED HEALTH CARE EDUCATION/TRAINING PROGRAM

## 2019-09-25 PROCEDURE — 700102 HCHG RX REV CODE 250 W/ 637 OVERRIDE(OP): Performed by: STUDENT IN AN ORGANIZED HEALTH CARE EDUCATION/TRAINING PROGRAM

## 2019-09-25 PROCEDURE — A9270 NON-COVERED ITEM OR SERVICE: HCPCS | Performed by: STUDENT IN AN ORGANIZED HEALTH CARE EDUCATION/TRAINING PROGRAM

## 2019-09-25 PROCEDURE — 0TP5X0Z REMOVAL OF DRAINAGE DEVICE FROM KIDNEY, EXTERNAL APPROACH: ICD-10-PCS | Performed by: RADIOLOGY

## 2019-09-25 PROCEDURE — 0T773DZ DILATION OF LEFT URETER WITH INTRALUMINAL DEVICE, PERCUTANEOUS APPROACH: ICD-10-PCS | Performed by: RADIOLOGY

## 2019-09-25 PROCEDURE — 99153 MOD SED SAME PHYS/QHP EA: CPT

## 2019-09-25 PROCEDURE — 700105 HCHG RX REV CODE 258: Performed by: STUDENT IN AN ORGANIZED HEALTH CARE EDUCATION/TRAINING PROGRAM

## 2019-09-25 PROCEDURE — BT121ZZ FLUOROSCOPY OF LEFT KIDNEY USING LOW OSMOLAR CONTRAST: ICD-10-PCS | Performed by: RADIOLOGY

## 2019-09-25 RX ORDER — CEFAZOLIN SODIUM 2 G/100ML
2 INJECTION, SOLUTION INTRAVENOUS ONCE
Status: COMPLETED | OUTPATIENT
Start: 2019-09-25 | End: 2019-09-25

## 2019-09-25 RX ORDER — NALOXONE HYDROCHLORIDE 0.4 MG/ML
INJECTION, SOLUTION INTRAMUSCULAR; INTRAVENOUS; SUBCUTANEOUS
Status: DISCONTINUED
Start: 2019-09-25 | End: 2019-09-25 | Stop reason: HOSPADM

## 2019-09-25 RX ORDER — ONDANSETRON 2 MG/ML
4 INJECTION INTRAMUSCULAR; INTRAVENOUS PRN
Status: ACTIVE | OUTPATIENT
Start: 2019-09-25 | End: 2019-09-25

## 2019-09-25 RX ORDER — CEFAZOLIN SODIUM 1 G/3ML
INJECTION, POWDER, FOR SOLUTION INTRAMUSCULAR; INTRAVENOUS
Status: COMPLETED
Start: 2019-09-25 | End: 2019-09-25

## 2019-09-25 RX ORDER — SODIUM CHLORIDE 9 MG/ML
500 INJECTION, SOLUTION INTRAVENOUS
Status: ACTIVE | OUTPATIENT
Start: 2019-09-25 | End: 2019-09-25

## 2019-09-25 RX ORDER — MIDAZOLAM HYDROCHLORIDE 1 MG/ML
INJECTION INTRAMUSCULAR; INTRAVENOUS
Status: COMPLETED
Start: 2019-09-25 | End: 2019-09-25

## 2019-09-25 RX ORDER — LISINOPRIL 40 MG/1
40 TABLET ORAL DAILY
Qty: 30 TAB | Refills: 1 | Status: SHIPPED | OUTPATIENT
Start: 2019-09-26 | End: 2019-10-10

## 2019-09-25 RX ORDER — MIDAZOLAM HYDROCHLORIDE 1 MG/ML
.5-2 INJECTION INTRAMUSCULAR; INTRAVENOUS PRN
Status: ACTIVE | OUTPATIENT
Start: 2019-09-25 | End: 2019-09-25

## 2019-09-25 RX ADMIN — CEFAZOLIN 2000 MG: 330 INJECTION, POWDER, FOR SOLUTION INTRAMUSCULAR; INTRAVENOUS at 09:08

## 2019-09-25 RX ADMIN — MEROPENEM 500 MG: 500 INJECTION, POWDER, FOR SOLUTION INTRAVENOUS at 06:44

## 2019-09-25 RX ADMIN — MIDAZOLAM 1 MG: 1 INJECTION INTRAMUSCULAR; INTRAVENOUS at 09:03

## 2019-09-25 RX ADMIN — LISINOPRIL 40 MG: 20 TABLET ORAL at 06:22

## 2019-09-25 RX ADMIN — MIDAZOLAM 1 MG: 1 INJECTION INTRAMUSCULAR; INTRAVENOUS at 08:57

## 2019-09-25 RX ADMIN — FENTANYL CITRATE 50 MCG: 50 INJECTION INTRAMUSCULAR; INTRAVENOUS at 08:57

## 2019-09-25 RX ADMIN — MIDAZOLAM HYDROCHLORIDE 1 MG: 1 INJECTION, SOLUTION INTRAMUSCULAR; INTRAVENOUS at 08:57

## 2019-09-25 ASSESSMENT — ENCOUNTER SYMPTOMS
CONSTIPATION: 0
FEVER: 0
COUGH: 0
FALLS: 0
HEADACHES: 0
PALPITATIONS: 0
SHORTNESS OF BREATH: 0
CHILLS: 0
VOMITING: 0
DIARRHEA: 0
NAUSEA: 0
WEAKNESS: 0
MYALGIAS: 0
DOUBLE VISION: 0
ABDOMINAL PAIN: 0
BLURRED VISION: 0
DIZZINESS: 0

## 2019-09-25 ASSESSMENT — LIFESTYLE VARIABLES: SUBSTANCE_ABUSE: 0

## 2019-09-25 NOTE — NON-PROVIDER
Internal Medicine Interval Note  Note Author: Malinda Meyer, Student     Name Nessa Dennis     1945   Age/Sex 73 y.o. female   MRN [id number]   Code Status FULL     After 5PM or if no immediate response to page, please call for cross-coverage  Attending/Team: Dr. Ortez See Patient List for primary contact information  Call [phone] to page    1st Call - Day Intern (R1):   Dr. Villela 2nd Call - Day Sr. Resident (R2/R3):   Dr. Villela       Reason for interval visit  (Principal Problem)   Sepsis secondary to pyelonephritis      Interval Problem Daily Status Update  (24 hours)   Mrs. Dennis is a 74 y/o female with a PMH of cervical cancer s/p radical hysterectomy and bilateral salpingoophorectomy in  with relapse of cancer in  treated with chemotherapy (Cisplatin) and radiation which resulted in rectovaginal fistula requiring colostomy, neurogenic bladder, left ureteral stricture, and recurrent UTIs. She presented 8 days ago after a one day history of left flank pain and associated N/V. WBC 15.8 and CT abdomen & pelvis showed left pyelonephritis and hydronephrosis with hydroureter. ID was consulted, who recommended a total of 7 days of IV meropenem, which she finished 2 days ago. She had a nephrostomy tube placed 3 days ago and it has been draining fluid appropriately. Plan is to have ureteral stent placed today and receive one dose of meropenem prior to surgery, per ID recommendation. She is feeling well today and has no complaints at this time.         Review of Systems   Constitutional: Negative for chills, fever and malaise/fatigue.   HENT: Negative for hearing loss.    Eyes: Negative for blurred vision and double vision.   Respiratory: Negative for cough and shortness of breath.    Cardiovascular: Negative for chest pain, palpitations and leg swelling.   Gastrointestinal: Negative for abdominal pain, constipation, diarrhea, nausea and vomiting.        Patient has colostomy     Genitourinary:        Left NT tube in place. Denies pain in site   Musculoskeletal: Negative for falls and myalgias.   Skin: Negative for itching and rash.   Neurological: Negative for dizziness, weakness and headaches (Resolved).   Psychiatric/Behavioral: Negative for substance abuse and suicidal ideas.           Quality Measures  Quality-Core Measures   Reviewed items::  EKG reviewed, Labs reviewed, Medications reviewed and Radiology images reviewed  Greene catheter::  No Greene  DVT prophylaxis pharmacological::  Heparin        Consultants/Specialty  Dr. Hays (gyn-onc) and IR           Physical Exam       Vitals:    09/24/19 0739 09/24/19 1712 09/24/19 2013 09/25/19 0424   BP: 135/77 133/79 148/86 135/81   Pulse: 82 86 100 96   Resp: 18 16 18 18   Temp: 36.6 °C (97.9 °F) 36.9 °C (98.5 °F) 37 °C (98.6 °F) 36.8 °C (98.2 °F)   TempSrc: Temporal Temporal Rectal Temporal   SpO2: 90% 93% 94% 96%   Weight:       Height:         Body mass index is 27.47 kg/m².    Oxygen Therapy:  Pulse Oximetry: 96 %, O2 (LPM): 0, O2 Delivery: None (Room Air)    Physical Exam   Constitutional: She is oriented to person, place, and time and well-developed, well-nourished, and in no distress.   HENT:   Head: Normocephalic and atraumatic.   Mouth/Throat: Oropharynx is clear and moist.   Eyes: Pupils are equal, round, and reactive to light. Conjunctivae and EOM are normal.   Neck: Normal range of motion. Neck supple.   Cardiovascular: Normal rate, regular rhythm, normal heart sounds and intact distal pulses.   Pulmonary/Chest: Breath sounds normal. No respiratory distress. She has no wheezes. She has no rales.   Abdominal: Soft. Bowel sounds are normal. She exhibits no distension.   Colostomy present.   Genitourinary:   Genitourinary Comments: Left NT tube in place. Draining clear, yellow fluid. No CVA tenderness bilaterally.    Musculoskeletal: Normal range of motion.   Neurological: She is alert and oriented to person, place, and time. She  has normal sensation and normal strength. No cranial nerve deficit.   Skin: Skin is warm and dry.   Psychiatric: Mood, affect and judgment normal.         Lab Data Review:         9/25/2019  6:21 AM    Recent Labs     09/23/19  1345   SODIUM 139   POTASSIUM 3.8   CHLORIDE 105   CO2 23   BUN 16   CREATININE 0.80   MAGNESIUM 1.8   CALCIUM 10.0       Recent Labs     09/23/19  1345   ALTSGPT 10   ASTSGOT 17   ALKPHOSPHAT 75   TBILIRUBIN 0.4   GLUCOSE 91       No results for input(s): RBC, HEMOGLOBIN, HEMATOCRIT, PLATELETCT, PROTHROMBTM, APTT, INR, IRON, FERRITIN, TOTIRONBC in the last 72 hours.    Recent Labs     09/23/19  1345   ASTSGOT 17   ALTSGPT 10   ALKPHOSPHAT 75   TBILIRUBIN 0.4           Assessment/Plan     1. Sepsis secondary to acute pyelonephritis  Nessa Dennis is a 72 y/o female with a history of recurrent complicated ESBL UTIs with past nephrostomy tube and ureteral stent placement who presented with left flank pain and N/V consistent with pyelonephrosis. This was confirmed by WBC of 15.8, UA positive for nitrites, leukocyte esterase, and WBCs, and CT abdomen/pelvis showing hydroureteronephrosis with perinephric fat stranding. She met 2/4 SIRS criteria (tachycardic at 128 and WBC) at presentation. WBC improved and blood and urine cultures have been negative. She was treated with IV meropenem for a total of 7 days per ID recommendation.     Plan:  -Now resolved  -Zofran 4 mg PRN for nausea  -Tylenol PRN for pain   -D/C planned for after stent placement     2. Hydroureteronephrosis  CT abdomen/pelvis showed hydroureteronephrosis. She has a PMH of pyelonephritis with hydronephrosis that required nephrostomy tube placement in March of this year. A new stent was unable to be placed in August due to stricture and calcification. Nuclear medicine renal scan done which showed decreased perfusion to the left kidney, excretion incomplete on the left with increased half emptying time. The split function is 26% on the  left and 74% on the right (worsened from 41 and 59 respectively on comparison). Nephrostomy tube was placed 3 days ago by IR for hydronephrosis. She is cleared for ureteral stent placement by IR today.      Plan:  -Nephrostomy tube placed and draining clear, yellow fluid. Follow up with Dr. Hays  -Ureteral stent will be placed today. She will receive one dose of meropenem prior to placement  -D/C planned for after stent placement     3. Acute kidney injury - resolved  Patient presented with a Cr and BUN of 1.12 and 27. She had acute kidney injury based on KDIGO criteria, which has now resolved. Her ADA was most likely post-renal due to obstruction from the left ureteral stricture vs neurogenic bladder. This has since resolved with IV fluids.      Plan:  -Resolved - no interventions necessary at this time     4. Metabolic Acidosis - resolved  Patient was admitted with a bicarb of 18 and anion gap of 14. Lactic acid was suspected to be the cause, however the lactic acid level was normal. It is more likely to be caused by starvation ketosis. Bicarb improved to 23 and anion gap to 6.0 after giving IV fluids.     Plan:  -Resolved - no intervention necessary at this time     5. Hypertension  Patient has a history of HTN with BP measurements at home estimated to be about 135/60, per patient. She is currently on lisinopril 40 mg daily. She did have elevated BP in the hospital and required two doses of labetalol. Her BP has been stable over the past few days after stopping IV fluids.       Plan:  -Continue lisinopril 40 mg qd  -Add carvedilol if no BP improvement   -Labetalol 10 mg if SBP >190 or DBP > 104     6. History of Cervical cancer  History of cervical cancer s/p hysterectomy and salpingoophrectomy w/ recurrence treated with chemo and radiation     Plan:  -Follow up with Dr. Hays

## 2019-09-25 NOTE — DISCHARGE INSTRUCTIONS
Discharge Instructions    Discharged to home by car with relative. Discharged via wheelchair, hospital escort: Yes.  Special equipment needed: Not Applicable    Be sure to schedule a follow-up appointment with your primary care doctor or any specialists as instructed.     Discharge Plan:   Diet Plan: Discussed  Activity Level: Discussed  Confirmed Follow up Appointment: Patient to Call and Schedule Appointment  Confirmed Symptoms Management: Discussed  Medication Reconciliation Updated: Yes  Influenza Vaccine Indication: Patient Refuses    I understand that a diet low in cholesterol, fat, and sodium is recommended for good health. Unless I have been given specific instructions below for another diet, I accept this instruction as my diet prescription.   Other diet: regular diet    Special Instructions: None    · Is patient discharged on Warfarin / Coumadin?   No     Depression / Suicide Risk    As you are discharged from this [facility] facility, it is important to learn how to keep safe from harming yourself.    Recognize the warning signs:  · Abrupt changes in personality, positive or negative- including increase in energy   · Giving away possessions  · Change in eating patterns- significant weight changes-  positive or negative  · Change in sleeping patterns- unable to sleep or sleeping all the time   · Unwillingness or inability to communicate  · Depression  · Unusual sadness, discouragement and loneliness  · Talk of wanting to die  · Neglect of personal appearance   · Rebelliousness- reckless behavior  · Withdrawal from people/activities they love  · Confusion- inability to concentrate     If you or a loved one observes any of these behaviors or has concerns about self-harm, here's what you can do:  · Talk about it- your feelings and reasons for harming yourself  · Remove any means that you might use to hurt yourself (examples: pills, rope, extension cords, firearm)  · Get professional help from the community  (Mental Health, Substance Abuse, psychological counseling)  · Do not be alone:Call your Safe Contact- someone whom you trust who will be there for you.  · Call your local CRISIS HOTLINE 865-7444 or 014-300-6441  · Call your local Children's Mobile Crisis Response Team [place] (850) 826-3547 or www.St[employer]um  · Call the toll free National Suicide Prevention Hotlines   · National Suicide Prevention Lifeline 146-013-DLWI (8304)  · Plink Hope Line Network 800-SUICIDE (434-5520)      Urinary Tract Infection, Adult  Introduction  A urinary tract infection (UTI) is an infection of any part of the urinary tract. The urinary tract includes the:  · Kidneys.  · Ureters.  · Bladder.  · Urethra.  These organs make, store, and get rid of pee (urine) in the body.  Follow these instructions at home:  · Take over-the-counter and prescription medicines only as told by your doctor.  · If you were prescribed an antibiotic medicine, take it as told by your doctor. Do not stop taking the antibiotic even if you start to feel better.  · Avoid the following drinks:  ¨ Alcohol.  ¨ Caffeine.  ¨ Tea.  ¨ Carbonated drinks.  · Drink enough fluid to keep your pee clear or pale yellow.  · Keep all follow-up visits as told by your doctor. This is important.  · Make sure to:  ¨ Empty your bladder often and completely. Do not to hold pee for long periods of time.  ¨ Empty your bladder before and after sex.  ¨ Wipe from front to back after a bowel movement if you are female. Use each tissue one time when you wipe.  Contact a doctor if:  · You have back pain.  · You have a fever.  · You feel sick to your stomach (nauseous).  · You throw up (vomit).  · Your symptoms do not get better after 3 days.  · Your symptoms go away and then come back.  Get help right away if:  · You have very bad back pain.  · You have very bad lower belly (abdominal) pain.  · You are throwing up and cannot keep down any medicines or water.  This information is  not intended to replace advice given to you by your health care provider. Make sure you discuss any questions you have with your health care provider.  Document Released: 06/05/2009 Document Revised: 05/25/2017 Document Reviewed: 11/07/2016  © 2017 [employer]    Ureteral Stent Implantation, Care After  Refer to this sheet in the next few weeks. These instructions provide you with information on caring for yourself after your procedure. Your health care provider may also give you more specific instructions. Your treatment has been planned according to current medical practices, but problems sometimes occur. Call your health care provider if you have any problems or questions after your procedure.  WHAT TO EXPECT AFTER THE PROCEDURE  You should be back to normal activity within 48 hours after the procedure. Nausea and vomiting may occur and are commonly the result of anesthesia.  It is common to experience sharp pain in the back or lower abdomen and penis with voiding. This is caused by movement of the ends of the stent with the act of urinating. It usually goes away within minutes after you have stopped urinating.  HOME CARE INSTRUCTIONS  Make sure to drink plenty of fluids. You may have small amounts of bleeding, causing your urine to be red. This is normal. Certain movements may trigger pain or a feeling that you need to urinate. You may be given medicines to prevent infection or bladder spasms. Be sure to take all medicines as directed. Only take over-the-counter or prescription medicines for pain, discomfort, or fever as directed by your health care provider. Do not take aspirin, as this can make bleeding worse.  Your stent will be left in until the blockage is resolved. This may take 2 weeks or longer, depending on the reason for stent implantation. You may have an X-ray exam to make sure your ureter is open and that the stent has not moved out of position (migrated). The stent can be removed by your health care  provider in the office. Medicines may be given for comfort while the stent is being removed. Be sure to keep all follow-up appointments so your health care provider can check that you are healing properly.  SEEK MEDICAL CARE IF:  · You experience increasing pain.  · Your pain medicine is not working.  SEEK IMMEDIATE MEDICAL CARE IF:  · Your urine is dark red or has blood clots.  · You are leaking urine (incontinent).  · You have a fever, chills, feeling sick to your stomach (nausea), or vomiting.  · Your pain is not relieved by pain medicine.  · The end of the stent comes out of the urethra.  · You are unable to urinate.     This information is not intended to replace advice given to you by your health care provider. Make sure you discuss any questions you have with your health care provider.     Document Released: 08/20/2014 Document Revised: 12/23/2014 Document Reviewed: 08/20/2014  [employer] Interactive Patient Education ©2016 [employer]

## 2019-09-25 NOTE — PROGRESS NOTES
Pt returned from IR, left stent site on back clean, dry and intact; gauze and medipore tape in place. Post-procedure VS in effect. Pt to remain on bedrest until 1130, pt understands this information.

## 2019-09-25 NOTE — PROGRESS NOTES
Assumed care after report received from night RN at 0700. Pt A/Ox4, PIV flushed and site WNL, on RA, no complaints pf pain. Pt to go to IR for left uretal stent placement pt has been NPO and all anticoagulants held in anticipation of procedure. Pt has left sided ostomy present, of which she provides self care for (pt does this at home), pt also self caths at home and has been provided with the equipment needed to cath herself while in the hospital. Dressing present on left side of back where neph tube is (this ti be removed in procedure)

## 2019-09-25 NOTE — DISCHARGE PLANNING
Anticipated Discharge Disposition: Home    Action: MICHELLEW discussed pt with YAZAN JAFFE via tiger text. Possible DC today pending medical clearance after procedure.    Barriers to Discharge: None    Plan: No identified discharge needs at this time.

## 2019-09-25 NOTE — CARE PLAN
Problem: Safety  Goal: Will remain free from falls  Outcome: PROGRESSING AS EXPECTED  Intervention: Assess risk factors for falls  Flowsheets  Taken 9/25/2019 0810  Pt Calls for Assistance: No assistance required;Yes  Taken 9/25/2019 0850  Fall Risk: Risk to Fall -  0 - 1 point  History of fall: 0  Mobility Status Assessment: 0-Ambulates & Transfers Independently. No Assistance Required  Risk for Injury-Any positive answers results in the pt being at high risk for fall related injury: Not Applicable     Problem: Knowledge Deficit  Goal: Knowledge of the prescribed therapeutic regimen will improve  Note:   PT AWARE OF ir PROCEDURE TODAY TO REMOVE left neph tube and place uretal stent.

## 2019-09-25 NOTE — PROGRESS NOTES
AVS reviewed with patient and pt  at bedside, signature obtained. All RX sent electronically to pt preferred pharmacy. PIV removed and site WNL. Pt off oxygen from procedure, VS stable. Pt requesting to stay for a bit to have some broth and wake up from sedation prior to discharging home with  via wheelchair    Pt transported off unit via wheelchair with hospital staff.

## 2019-09-25 NOTE — OR SURGEON
Immediate Post- Operative Note        PostOp Diagnosis: cervical cancer, LEFT ureteral obstruction      Procedure(s): nephrostogram, LEFT ureteral stent placement, LEFT neph tube removal      Estimated Blood Loss: Less than 5 ml        Complications: None            9/25/2019     9:12 AM     Hebert Tirado

## 2019-09-25 NOTE — PROGRESS NOTES
0820: pt transported ALBA to IR by IR transport staff. PIV flushed and saline locked, site WNL. Pt self cathed and emptied ostomy on won prior to departure from floor. Pt to get left uretal stent placement and current neph tube removed

## 2019-09-25 NOTE — PROGRESS NOTES
IR RN note  Patient underwent a Left Ureteral Stent Placement by Dr. Tirado. Procedure site was marked by MD and verified using imaging guidance.  IR Alta Osullivan and Essie assisted. Patient was placed in a Prone position.  Vitals were taken every 5 minutes and remained stable during procedure (see doc flow sheet for results).  CO2 waveform capnography was monitored throughout procedure, see chart  A Medepore dressing was placed over surgical site. Report called to Micheline SEVERINO. Pt transported by shola with RN to S535, with monitor .     [employer] Percuflex Glidex Hyrdrogel coating  8 F X22 cm  Ref# [id number]  Lot#[id number]

## 2019-10-10 ENCOUNTER — HOSPITAL ENCOUNTER (INPATIENT)
Facility: MEDICAL CENTER | Age: 74
LOS: 3 days | DRG: 659 | End: 2019-10-14
Attending: EMERGENCY MEDICINE | Admitting: INTERNAL MEDICINE
Payer: MEDICARE

## 2019-10-10 ENCOUNTER — APPOINTMENT (OUTPATIENT)
Dept: RADIOLOGY | Facility: MEDICAL CENTER | Age: 74
DRG: 659 | End: 2019-10-10
Attending: EMERGENCY MEDICINE
Payer: MEDICARE

## 2019-10-10 DIAGNOSIS — N12 PYELONEPHRITIS: Primary | ICD-10-CM

## 2019-10-10 DIAGNOSIS — N17.9 AKI (ACUTE KIDNEY INJURY) (HCC): ICD-10-CM

## 2019-10-10 DIAGNOSIS — R11.2 NON-INTRACTABLE VOMITING WITH NAUSEA, UNSPECIFIED VOMITING TYPE: ICD-10-CM

## 2019-10-10 LAB
ALBUMIN SERPL BCP-MCNC: 4.6 G/DL (ref 3.2–4.9)
ALBUMIN/GLOB SERPL: 1.8 G/DL
ALP SERPL-CCNC: 98 U/L (ref 30–99)
ALT SERPL-CCNC: 8 U/L (ref 2–50)
ANION GAP SERPL CALC-SCNC: 9 MMOL/L (ref 0–11.9)
APPEARANCE UR: ABNORMAL
AST SERPL-CCNC: 15 U/L (ref 12–45)
BACTERIA #/AREA URNS HPF: ABNORMAL /HPF
BASOPHILS # BLD AUTO: 0.1 % (ref 0–1.8)
BASOPHILS # BLD: 0.02 K/UL (ref 0–0.12)
BILIRUB SERPL-MCNC: 0.4 MG/DL (ref 0.1–1.5)
BILIRUB UR QL STRIP.AUTO: NEGATIVE
BUN SERPL-MCNC: 25 MG/DL (ref 8–22)
CALCIUM SERPL-MCNC: 10.3 MG/DL (ref 8.5–10.5)
CHLORIDE SERPL-SCNC: 105 MMOL/L (ref 96–112)
CO2 SERPL-SCNC: 24 MMOL/L (ref 20–33)
COLOR UR: YELLOW
CREAT SERPL-MCNC: 0.96 MG/DL (ref 0.5–1.4)
EOSINOPHIL # BLD AUTO: 0.13 K/UL (ref 0–0.51)
EOSINOPHIL NFR BLD: 0.9 % (ref 0–6.9)
ERYTHROCYTE [DISTWIDTH] IN BLOOD BY AUTOMATED COUNT: 42.4 FL (ref 35.9–50)
GLOBULIN SER CALC-MCNC: 2.6 G/DL (ref 1.9–3.5)
GLUCOSE SERPL-MCNC: 106 MG/DL (ref 65–99)
GLUCOSE UR STRIP.AUTO-MCNC: NEGATIVE MG/DL
HCT VFR BLD AUTO: 41.9 % (ref 37–47)
HGB BLD-MCNC: 13.8 G/DL (ref 12–16)
IMM GRANULOCYTES # BLD AUTO: 0.06 K/UL (ref 0–0.11)
IMM GRANULOCYTES NFR BLD AUTO: 0.4 % (ref 0–0.9)
KETONES UR STRIP.AUTO-MCNC: ABNORMAL MG/DL
LEUKOCYTE ESTERASE UR QL STRIP.AUTO: ABNORMAL
LIPASE SERPL-CCNC: 16 U/L (ref 11–82)
LYMPHOCYTES # BLD AUTO: 0.84 K/UL (ref 1–4.8)
LYMPHOCYTES NFR BLD: 5.8 % (ref 22–41)
MCH RBC QN AUTO: 27.2 PG (ref 27–33)
MCHC RBC AUTO-ENTMCNC: 32.9 G/DL (ref 33.6–35)
MCV RBC AUTO: 82.5 FL (ref 81.4–97.8)
MICRO URNS: ABNORMAL
MONOCYTES # BLD AUTO: 0.75 K/UL (ref 0–0.85)
MONOCYTES NFR BLD AUTO: 5.2 % (ref 0–13.4)
NEUTROPHILS # BLD AUTO: 12.74 K/UL (ref 2–7.15)
NEUTROPHILS NFR BLD: 87.6 % (ref 44–72)
NITRITE UR QL STRIP.AUTO: POSITIVE
NRBC # BLD AUTO: 0 K/UL
NRBC BLD-RTO: 0 /100 WBC
PH UR STRIP.AUTO: >=9 [PH] (ref 5–8)
PLATELET # BLD AUTO: 221 K/UL (ref 164–446)
PMV BLD AUTO: 9.9 FL (ref 9–12.9)
POTASSIUM SERPL-SCNC: 4 MMOL/L (ref 3.6–5.5)
PROT SERPL-MCNC: 7.2 G/DL (ref 6–8.2)
RBC # BLD AUTO: 5.08 M/UL (ref 4.2–5.4)
RBC # URNS HPF: ABNORMAL /HPF
RBC UR QL AUTO: ABNORMAL
SODIUM SERPL-SCNC: 138 MMOL/L (ref 135–145)
SP GR UR STRIP.AUTO: 1.01
UROBILINOGEN UR STRIP.AUTO-MCNC: 0.2 MG/DL
WBC # BLD AUTO: 14.5 K/UL (ref 4.8–10.8)
WBC #/AREA URNS HPF: ABNORMAL /HPF

## 2019-10-10 PROCEDURE — 83690 ASSAY OF LIPASE: CPT

## 2019-10-10 PROCEDURE — 96375 TX/PRO/DX INJ NEW DRUG ADDON: CPT

## 2019-10-10 PROCEDURE — 87186 SC STD MICRODIL/AGAR DIL: CPT

## 2019-10-10 PROCEDURE — 96367 TX/PROPH/DG ADDL SEQ IV INF: CPT

## 2019-10-10 PROCEDURE — 99285 EMERGENCY DEPT VISIT HI MDM: CPT

## 2019-10-10 PROCEDURE — 87040 BLOOD CULTURE FOR BACTERIA: CPT

## 2019-10-10 PROCEDURE — 85025 COMPLETE CBC W/AUTO DIFF WBC: CPT

## 2019-10-10 PROCEDURE — 74176 CT ABD & PELVIS W/O CONTRAST: CPT

## 2019-10-10 PROCEDURE — 80053 COMPREHEN METABOLIC PANEL: CPT

## 2019-10-10 PROCEDURE — 96365 THER/PROPH/DIAG IV INF INIT: CPT

## 2019-10-10 PROCEDURE — 87077 CULTURE AEROBIC IDENTIFY: CPT | Mod: 91

## 2019-10-10 PROCEDURE — 96366 THER/PROPH/DIAG IV INF ADDON: CPT

## 2019-10-10 PROCEDURE — 87086 URINE CULTURE/COLONY COUNT: CPT

## 2019-10-10 PROCEDURE — 81001 URINALYSIS AUTO W/SCOPE: CPT

## 2019-10-10 PROCEDURE — 700105 HCHG RX REV CODE 258: Performed by: EMERGENCY MEDICINE

## 2019-10-10 PROCEDURE — 700111 HCHG RX REV CODE 636 W/ 250 OVERRIDE (IP): Performed by: EMERGENCY MEDICINE

## 2019-10-10 RX ORDER — ONDANSETRON 2 MG/ML
4 INJECTION INTRAMUSCULAR; INTRAVENOUS ONCE
Status: COMPLETED | OUTPATIENT
Start: 2019-10-10 | End: 2019-10-10

## 2019-10-10 RX ORDER — LISINOPRIL 40 MG/1
40 TABLET ORAL EVERY MORNING
COMMUNITY

## 2019-10-10 RX ORDER — MORPHINE SULFATE 4 MG/ML
2 INJECTION, SOLUTION INTRAMUSCULAR; INTRAVENOUS ONCE
Status: COMPLETED | OUTPATIENT
Start: 2019-10-10 | End: 2019-10-10

## 2019-10-10 RX ADMIN — CEFEPIME 2 G: 2 INJECTION, POWDER, FOR SOLUTION INTRAVENOUS at 22:00

## 2019-10-10 RX ADMIN — VANCOMYCIN HYDROCHLORIDE 1600 MG: 500 INJECTION, POWDER, LYOPHILIZED, FOR SOLUTION INTRAVENOUS at 22:15

## 2019-10-10 RX ADMIN — MORPHINE SULFATE 2 MG: 4 INJECTION INTRAVENOUS at 20:30

## 2019-10-10 RX ADMIN — ONDANSETRON 4 MG: 2 INJECTION INTRAMUSCULAR; INTRAVENOUS at 20:30

## 2019-10-10 ASSESSMENT — ENCOUNTER SYMPTOMS
VOMITING: 1
NAUSEA: 1

## 2019-10-11 ENCOUNTER — APPOINTMENT (OUTPATIENT)
Dept: RADIOLOGY | Facility: MEDICAL CENTER | Age: 74
DRG: 659 | End: 2019-10-11
Attending: STUDENT IN AN ORGANIZED HEALTH CARE EDUCATION/TRAINING PROGRAM
Payer: MEDICARE

## 2019-10-11 PROBLEM — N13.1 HYDRONEPHROSIS DUE TO OBSTRUCTION OF URETER: Status: ACTIVE | Noted: 2019-09-17

## 2019-10-11 LAB
ALBUMIN SERPL BCP-MCNC: 3.6 G/DL (ref 3.2–4.9)
ALBUMIN/GLOB SERPL: 1.3 G/DL
ALP SERPL-CCNC: 76 U/L (ref 30–99)
ALT SERPL-CCNC: 5 U/L (ref 2–50)
ANION GAP SERPL CALC-SCNC: 10 MMOL/L (ref 0–11.9)
APPEARANCE FLD: NORMAL
AST SERPL-CCNC: 13 U/L (ref 12–45)
BASOPHILS # BLD AUTO: 0.3 % (ref 0–1.8)
BASOPHILS # BLD: 0.03 K/UL (ref 0–0.12)
BILIRUB SERPL-MCNC: 0.5 MG/DL (ref 0.1–1.5)
BODY FLD TYPE: NORMAL
BUN SERPL-MCNC: 26 MG/DL (ref 8–22)
CALCIUM SERPL-MCNC: 9 MG/DL (ref 8.5–10.5)
CHLORIDE SERPL-SCNC: 108 MMOL/L (ref 96–112)
CO2 SERPL-SCNC: 20 MMOL/L (ref 20–33)
COLOR FLD: NORMAL
CREAT SERPL-MCNC: 1.06 MG/DL (ref 0.5–1.4)
CSF COMMENTS 1658: NORMAL
EKG IMPRESSION: NORMAL
EKG IMPRESSION: NORMAL
EOSINOPHIL # BLD AUTO: 0.02 K/UL (ref 0–0.51)
EOSINOPHIL NFR BLD: 0.2 % (ref 0–6.9)
ERYTHROCYTE [DISTWIDTH] IN BLOOD BY AUTOMATED COUNT: 44 FL (ref 35.9–50)
GLOBULIN SER CALC-MCNC: 2.7 G/DL (ref 1.9–3.5)
GLUCOSE SERPL-MCNC: 112 MG/DL (ref 65–99)
HCT VFR BLD AUTO: 37.8 % (ref 37–47)
HGB BLD-MCNC: 11.9 G/DL (ref 12–16)
IMM GRANULOCYTES # BLD AUTO: 0.06 K/UL (ref 0–0.11)
IMM GRANULOCYTES NFR BLD AUTO: 0.5 % (ref 0–0.9)
INR PPP: 1.06 (ref 0.87–1.13)
LACTATE BLD-SCNC: 1.4 MMOL/L (ref 0.5–2)
LYMPHOCYTES # BLD AUTO: 0.88 K/UL (ref 1–4.8)
LYMPHOCYTES NFR BLD: 7.8 % (ref 22–41)
LYMPHOCYTES NFR FLD: 2 %
MAGNESIUM SERPL-MCNC: 1.7 MG/DL (ref 1.5–2.5)
MCH RBC QN AUTO: 26.5 PG (ref 27–33)
MCHC RBC AUTO-ENTMCNC: 31.5 G/DL (ref 33.6–35)
MCV RBC AUTO: 84.2 FL (ref 81.4–97.8)
MONOCYTES # BLD AUTO: 0.74 K/UL (ref 0–0.85)
MONOCYTES NFR BLD AUTO: 6.6 % (ref 0–13.4)
NEUTROPHILS # BLD AUTO: 9.52 K/UL (ref 2–7.15)
NEUTROPHILS NFR BLD: 84.6 % (ref 44–72)
NEUTROPHILS NFR FLD: 98 %
NRBC # BLD AUTO: 0 K/UL
NRBC BLD-RTO: 0 /100 WBC
PLATELET # BLD AUTO: 219 K/UL (ref 164–446)
PMV BLD AUTO: 10.2 FL (ref 9–12.9)
POTASSIUM SERPL-SCNC: 4.4 MMOL/L (ref 3.6–5.5)
PROCALCITONIN SERPL-MCNC: 0.77 NG/ML
PROT SERPL-MCNC: 6.3 G/DL (ref 6–8.2)
PROTHROMBIN TIME: 14 SEC (ref 12–14.6)
RBC # BLD AUTO: 4.49 M/UL (ref 4.2–5.4)
RBC # FLD: 12 CELLS/UL
SODIUM SERPL-SCNC: 138 MMOL/L (ref 135–145)
WBC # BLD AUTO: 11.3 K/UL (ref 4.8–10.8)
WBC # FLD: 144 CELLS/UL

## 2019-10-11 PROCEDURE — 89051 BODY FLUID CELL COUNT: CPT

## 2019-10-11 PROCEDURE — 306473 SCISSORS,IRIS STERILE DISP: Performed by: INTERNAL MEDICINE

## 2019-10-11 PROCEDURE — 0TP93DZ REMOVAL OF INTRALUMINAL DEVICE FROM URETER, PERCUTANEOUS APPROACH: ICD-10-PCS | Performed by: RADIOLOGY

## 2019-10-11 PROCEDURE — 71045 X-RAY EXAM CHEST 1 VIEW: CPT

## 2019-10-11 PROCEDURE — 93005 ELECTROCARDIOGRAM TRACING: CPT | Performed by: STUDENT IN AN ORGANIZED HEALTH CARE EDUCATION/TRAINING PROGRAM

## 2019-10-11 PROCEDURE — A9270 NON-COVERED ITEM OR SERVICE: HCPCS | Performed by: STUDENT IN AN ORGANIZED HEALTH CARE EDUCATION/TRAINING PROGRAM

## 2019-10-11 PROCEDURE — 700102 HCHG RX REV CODE 250 W/ 637 OVERRIDE(OP): Performed by: STUDENT IN AN ORGANIZED HEALTH CARE EDUCATION/TRAINING PROGRAM

## 2019-10-11 PROCEDURE — 83605 ASSAY OF LACTIC ACID: CPT

## 2019-10-11 PROCEDURE — 700111 HCHG RX REV CODE 636 W/ 250 OVERRIDE (IP): Performed by: STUDENT IN AN ORGANIZED HEALTH CARE EDUCATION/TRAINING PROGRAM

## 2019-10-11 PROCEDURE — 700111 HCHG RX REV CODE 636 W/ 250 OVERRIDE (IP)

## 2019-10-11 PROCEDURE — 36415 COLL VENOUS BLD VENIPUNCTURE: CPT

## 2019-10-11 PROCEDURE — 770020 HCHG ROOM/CARE - TELE (206)

## 2019-10-11 PROCEDURE — 700105 HCHG RX REV CODE 258: Performed by: INTERNAL MEDICINE

## 2019-10-11 PROCEDURE — 700111 HCHG RX REV CODE 636 W/ 250 OVERRIDE (IP): Performed by: INTERNAL MEDICINE

## 2019-10-11 PROCEDURE — 87205 SMEAR GRAM STAIN: CPT

## 2019-10-11 PROCEDURE — 50430 NJX PX NFROSGRM &/URTRGRM: CPT

## 2019-10-11 PROCEDURE — 0T9130Z DRAINAGE OF LEFT KIDNEY WITH DRAINAGE DEVICE, PERCUTANEOUS APPROACH: ICD-10-PCS | Performed by: RADIOLOGY

## 2019-10-11 PROCEDURE — 78708 K FLOW/FUNCT IMAGE W/DRUG: CPT

## 2019-10-11 PROCEDURE — 80053 COMPREHEN METABOLIC PANEL: CPT

## 2019-10-11 PROCEDURE — 85610 PROTHROMBIN TIME: CPT

## 2019-10-11 PROCEDURE — 700105 HCHG RX REV CODE 258: Performed by: STUDENT IN AN ORGANIZED HEALTH CARE EDUCATION/TRAINING PROGRAM

## 2019-10-11 PROCEDURE — 87070 CULTURE OTHR SPECIMN AEROBIC: CPT

## 2019-10-11 PROCEDURE — 99153 MOD SED SAME PHYS/QHP EA: CPT

## 2019-10-11 PROCEDURE — 85025 COMPLETE CBC W/AUTO DIFF WBC: CPT

## 2019-10-11 PROCEDURE — 99223 1ST HOSP IP/OBS HIGH 75: CPT | Mod: GC | Performed by: INTERNAL MEDICINE

## 2019-10-11 PROCEDURE — 83735 ASSAY OF MAGNESIUM: CPT

## 2019-10-11 PROCEDURE — 700117 HCHG RX CONTRAST REV CODE 255: Performed by: STUDENT IN AN ORGANIZED HEALTH CARE EDUCATION/TRAINING PROGRAM

## 2019-10-11 PROCEDURE — 93010 ELECTROCARDIOGRAM REPORT: CPT | Performed by: INTERNAL MEDICINE

## 2019-10-11 PROCEDURE — 84145 PROCALCITONIN (PCT): CPT

## 2019-10-11 PROCEDURE — 96366 THER/PROPH/DIAG IV INF ADDON: CPT

## 2019-10-11 PROCEDURE — 700111 HCHG RX REV CODE 636 W/ 250 OVERRIDE (IP): Performed by: RADIOLOGY

## 2019-10-11 PROCEDURE — 306588 SLEEVE,VASO CALF MED: Performed by: STUDENT IN AN ORGANIZED HEALTH CARE EDUCATION/TRAINING PROGRAM

## 2019-10-11 RX ORDER — POLYETHYLENE GLYCOL 3350 17 G/17G
1 POWDER, FOR SOLUTION ORAL
Status: DISCONTINUED | OUTPATIENT
Start: 2019-10-11 | End: 2019-10-14 | Stop reason: HOSPADM

## 2019-10-11 RX ORDER — HEPARIN SODIUM 5000 [USP'U]/ML
5000 INJECTION, SOLUTION INTRAVENOUS; SUBCUTANEOUS EVERY 8 HOURS
Status: DISCONTINUED | OUTPATIENT
Start: 2019-10-11 | End: 2019-10-11

## 2019-10-11 RX ORDER — POLYETHYLENE GLYCOL 3350 17 G/17G
1 POWDER, FOR SOLUTION ORAL DAILY
Status: DISCONTINUED | OUTPATIENT
Start: 2019-10-11 | End: 2019-10-14 | Stop reason: HOSPADM

## 2019-10-11 RX ORDER — ONDANSETRON 2 MG/ML
8 INJECTION INTRAMUSCULAR; INTRAVENOUS EVERY 8 HOURS PRN
Status: DISCONTINUED | OUTPATIENT
Start: 2019-10-11 | End: 2019-10-14 | Stop reason: HOSPADM

## 2019-10-11 RX ORDER — LISINOPRIL 20 MG/1
40 TABLET ORAL DAILY
Status: DISCONTINUED | OUTPATIENT
Start: 2019-10-11 | End: 2019-10-11

## 2019-10-11 RX ORDER — ONDANSETRON 2 MG/ML
4 INJECTION INTRAMUSCULAR; INTRAVENOUS EVERY 4 HOURS PRN
Status: DISCONTINUED | OUTPATIENT
Start: 2019-10-11 | End: 2019-10-11

## 2019-10-11 RX ORDER — SODIUM CHLORIDE 9 MG/ML
500 INJECTION, SOLUTION INTRAVENOUS
Status: ACTIVE | OUTPATIENT
Start: 2019-10-11 | End: 2019-10-11

## 2019-10-11 RX ORDER — AMOXICILLIN 250 MG
2 CAPSULE ORAL 2 TIMES DAILY
Status: DISCONTINUED | OUTPATIENT
Start: 2019-10-11 | End: 2019-10-14 | Stop reason: HOSPADM

## 2019-10-11 RX ORDER — MIDAZOLAM HYDROCHLORIDE 1 MG/ML
INJECTION INTRAMUSCULAR; INTRAVENOUS
Status: COMPLETED
Start: 2019-10-11 | End: 2019-10-11

## 2019-10-11 RX ORDER — SODIUM CHLORIDE 9 MG/ML
INJECTION, SOLUTION INTRAVENOUS CONTINUOUS
Status: DISCONTINUED | OUTPATIENT
Start: 2019-10-11 | End: 2019-10-13

## 2019-10-11 RX ORDER — FUROSEMIDE 10 MG/ML
INJECTION INTRAMUSCULAR; INTRAVENOUS
Status: COMPLETED
Start: 2019-10-11 | End: 2019-10-11

## 2019-10-11 RX ORDER — ACETAMINOPHEN 325 MG/1
650 TABLET ORAL EVERY 6 HOURS PRN
Status: DISCONTINUED | OUTPATIENT
Start: 2019-10-11 | End: 2019-10-14 | Stop reason: HOSPADM

## 2019-10-11 RX ORDER — HALOPERIDOL 5 MG/ML
1 INJECTION INTRAMUSCULAR EVERY 4 HOURS PRN
Status: DISCONTINUED | OUTPATIENT
Start: 2019-10-11 | End: 2019-10-14 | Stop reason: HOSPADM

## 2019-10-11 RX ORDER — HALOPERIDOL 5 MG/ML
INJECTION INTRAMUSCULAR
Status: COMPLETED
Start: 2019-10-11 | End: 2019-10-11

## 2019-10-11 RX ORDER — LISINOPRIL 5 MG/1
10 TABLET ORAL DAILY
Status: DISCONTINUED | OUTPATIENT
Start: 2019-10-12 | End: 2019-10-13

## 2019-10-11 RX ORDER — BISACODYL 10 MG
10 SUPPOSITORY, RECTAL RECTAL
Status: DISCONTINUED | OUTPATIENT
Start: 2019-10-11 | End: 2019-10-14 | Stop reason: HOSPADM

## 2019-10-11 RX ORDER — SODIUM CHLORIDE, SODIUM LACTATE, POTASSIUM CHLORIDE, AND CALCIUM CHLORIDE .6; .31; .03; .02 G/100ML; G/100ML; G/100ML; G/100ML
30 INJECTION, SOLUTION INTRAVENOUS
Status: DISCONTINUED | OUTPATIENT
Start: 2019-10-11 | End: 2019-10-14 | Stop reason: HOSPADM

## 2019-10-11 RX ORDER — MAGNESIUM SULFATE HEPTAHYDRATE 40 MG/ML
4 INJECTION, SOLUTION INTRAVENOUS ONCE
Status: COMPLETED | OUTPATIENT
Start: 2019-10-11 | End: 2019-10-11

## 2019-10-11 RX ORDER — MIDAZOLAM HYDROCHLORIDE 1 MG/ML
.5-2 INJECTION INTRAMUSCULAR; INTRAVENOUS PRN
Status: ACTIVE | OUTPATIENT
Start: 2019-10-11 | End: 2019-10-11

## 2019-10-11 RX ORDER — PROCHLORPERAZINE EDISYLATE 5 MG/ML
10 INJECTION INTRAMUSCULAR; INTRAVENOUS EVERY 6 HOURS PRN
Status: DISCONTINUED | OUTPATIENT
Start: 2019-10-11 | End: 2019-10-14 | Stop reason: HOSPADM

## 2019-10-11 RX ADMIN — FENTANYL CITRATE 50 MCG: 50 INJECTION, SOLUTION INTRAMUSCULAR; INTRAVENOUS at 18:34

## 2019-10-11 RX ADMIN — ACETAMINOPHEN 650 MG: 325 TABLET, FILM COATED ORAL at 21:46

## 2019-10-11 RX ADMIN — SODIUM CHLORIDE: 9 INJECTION, SOLUTION INTRAVENOUS at 09:13

## 2019-10-11 RX ADMIN — SODIUM CHLORIDE 1000 ML: 9 INJECTION, SOLUTION INTRAVENOUS at 01:42

## 2019-10-11 RX ADMIN — FENTANYL CITRATE 25 MCG: 50 INJECTION, SOLUTION INTRAMUSCULAR; INTRAVENOUS at 18:44

## 2019-10-11 RX ADMIN — LISINOPRIL 40 MG: 20 TABLET ORAL at 05:44

## 2019-10-11 RX ADMIN — MIDAZOLAM 1 MG: 1 INJECTION INTRAMUSCULAR; INTRAVENOUS at 18:18

## 2019-10-11 RX ADMIN — MIDAZOLAM 1 MG: 1 INJECTION INTRAMUSCULAR; INTRAVENOUS at 18:44

## 2019-10-11 RX ADMIN — MIDAZOLAM HYDROCHLORIDE 1 MG: 1 INJECTION, SOLUTION INTRAMUSCULAR; INTRAVENOUS at 18:18

## 2019-10-11 RX ADMIN — MEROPENEM 500 MG: 500 INJECTION, POWDER, FOR SOLUTION INTRAVENOUS at 22:47

## 2019-10-11 RX ADMIN — ONDANSETRON 4 MG: 2 INJECTION INTRAMUSCULAR; INTRAVENOUS at 12:57

## 2019-10-11 RX ADMIN — FENTANYL CITRATE 25 MCG: 50 INJECTION INTRAMUSCULAR; INTRAVENOUS at 18:18

## 2019-10-11 RX ADMIN — MAGNESIUM SULFATE IN WATER 4 G: 40 INJECTION, SOLUTION INTRAVENOUS at 09:13

## 2019-10-11 RX ADMIN — ACETAMINOPHEN 650 MG: 325 TABLET, FILM COATED ORAL at 01:53

## 2019-10-11 RX ADMIN — IOHEXOL 14 ML: 300 INJECTION, SOLUTION INTRAVENOUS at 19:00

## 2019-10-11 RX ADMIN — MEROPENEM 500 MG: 500 INJECTION, POWDER, FOR SOLUTION INTRAVENOUS at 08:00

## 2019-10-11 RX ADMIN — MEROPENEM 500 MG: 500 INJECTION, POWDER, FOR SOLUTION INTRAVENOUS at 14:10

## 2019-10-11 RX ADMIN — ONDANSETRON 4 MG: 2 INJECTION INTRAMUSCULAR; INTRAVENOUS at 14:08

## 2019-10-11 RX ADMIN — CEFTRIAXONE SODIUM 1 G: 1 INJECTION, POWDER, FOR SOLUTION INTRAMUSCULAR; INTRAVENOUS at 05:45

## 2019-10-11 RX ADMIN — VANCOMYCIN HYDROCHLORIDE 900 MG: 500 INJECTION, POWDER, LYOPHILIZED, FOR SOLUTION INTRAVENOUS at 20:54

## 2019-10-11 RX ADMIN — MIDAZOLAM 1 MG: 1 INJECTION INTRAMUSCULAR; INTRAVENOUS at 18:24

## 2019-10-11 RX ADMIN — MIDAZOLAM 1 MG: 1 INJECTION INTRAMUSCULAR; INTRAVENOUS at 18:34

## 2019-10-11 RX ADMIN — HALOPERIDOL 1 MG: 5 INJECTION INTRAMUSCULAR at 18:13

## 2019-10-11 RX ADMIN — FUROSEMIDE 20 MG: 10 INJECTION, SOLUTION INTRAMUSCULAR; INTRAVENOUS at 11:00

## 2019-10-11 RX ADMIN — FENTANYL CITRATE 25 MCG: 50 INJECTION, SOLUTION INTRAMUSCULAR; INTRAVENOUS at 18:18

## 2019-10-11 RX ADMIN — HALOPERIDOL LACTATE 1 MG: 5 INJECTION, SOLUTION INTRAMUSCULAR at 18:13

## 2019-10-11 ASSESSMENT — ENCOUNTER SYMPTOMS
TINGLING: 0
SENSORY CHANGE: 0
HEADACHES: 0
BLURRED VISION: 0
PALPITATIONS: 0
DOUBLE VISION: 0
DIARRHEA: 0
BLOOD IN STOOL: 0
SORE THROAT: 0
FALLS: 0
DIAPHORESIS: 0
PHOTOPHOBIA: 0
CONSTIPATION: 0
EYE REDNESS: 0
SEIZURES: 0
TREMORS: 0
COUGH: 0
NECK PAIN: 0
EYE PAIN: 0
FEVER: 1
CHILLS: 0
BACK PAIN: 0
FLANK PAIN: 1
HEARTBURN: 0
STRIDOR: 0
ORTHOPNEA: 0
FOCAL WEAKNESS: 0
ABDOMINAL PAIN: 0
MYALGIAS: 0
SPEECH CHANGE: 0
FLANK PAIN: 0
WEIGHT LOSS: 0
PND: 0
SINUS PAIN: 0
CLAUDICATION: 0
CHILLS: 1
LOSS OF CONSCIOUSNESS: 0
SPUTUM PRODUCTION: 0
DIZZINESS: 0
HEADACHES: 1
VOMITING: 1
WEAKNESS: 1
SHORTNESS OF BREATH: 0
NAUSEA: 1
HEMOPTYSIS: 0
WHEEZING: 0
EYE DISCHARGE: 0

## 2019-10-11 ASSESSMENT — LIFESTYLE VARIABLES
HAVE PEOPLE ANNOYED YOU BY CRITICIZING YOUR DRINKING: NO
DOES PATIENT WANT TO STOP DRINKING: NO
EVER FELT BAD OR GUILTY ABOUT YOUR DRINKING: NO
HAVE YOU EVER FELT YOU SHOULD CUT DOWN ON YOUR DRINKING: NO
HOW MANY TIMES IN THE PAST YEAR HAVE YOU HAD 5 OR MORE DRINKS IN A DAY: 0
EVER_SMOKED: NEVER
AVERAGE NUMBER OF DAYS PER WEEK YOU HAVE A DRINK CONTAINING ALCOHOL: 0
EVER HAD A DRINK FIRST THING IN THE MORNING TO STEADY YOUR NERVES TO GET RID OF A HANGOVER: NO
TOTAL SCORE: 0
ALCOHOL_USE: NO
TOTAL SCORE: 0
ON A TYPICAL DAY WHEN YOU DRINK ALCOHOL HOW MANY DRINKS DO YOU HAVE: 0
TOTAL SCORE: 0
CONSUMPTION TOTAL: NEGATIVE

## 2019-10-11 ASSESSMENT — COGNITIVE AND FUNCTIONAL STATUS - GENERAL
CLIMB 3 TO 5 STEPS WITH RAILING: A LITTLE
DRESSING REGULAR LOWER BODY CLOTHING: A LITTLE
WALKING IN HOSPITAL ROOM: A LITTLE
SUGGESTED CMS G CODE MODIFIER MOBILITY: CK
MOVING TO AND FROM BED TO CHAIR: A LITTLE
MOBILITY SCORE: 19
STANDING UP FROM CHAIR USING ARMS: A LITTLE
TOILETING: A LITTLE
MOVING FROM LYING ON BACK TO SITTING ON SIDE OF FLAT BED: A LITTLE
SUGGESTED CMS G CODE MODIFIER DAILY ACTIVITY: CJ
DRESSING REGULAR UPPER BODY CLOTHING: A LITTLE
DAILY ACTIVITIY SCORE: 21

## 2019-10-11 NOTE — PROGRESS NOTES
Pharmacy Kinetics 74 y.o. female on vancomycin day # 2 10/11/2019    Received vancomycin loading dose     Indication for Treatment: UTI with hx of enterococcus, ureteric stent, recent nephrostomy tube    Pertinent history per medical record: Admitted on 10/10/2019. Presents to the Emergency Department for evaluation of left flank pain onset today. Patient states that she has a stent in place and notes that the pain started slowly but has been increasing in pain since onset. She rates the pain as a 4/10 in severity. Patient has associated nausea and vomiting, but denies any dysuria, hematemesis or fevers. Complicated urologic hx.    Other antibiotics: Ceftriaxone (cefepime x1 in ED)    Allergies: Sulfa drugs     List concerns for renal function: age, BUN/SCr ratio > 20:1    Pertinent cultures to date:   19 Nephrostomy tubes - enterococcus faecium, vancomycin sensitive, candida low CFU  19 Uretal stent - polymicrobial  3/13/19 nephrostomy tubes - ESBL    Recent Labs     10/10/19  1842   WBC 14.5*   NEUTSPOLYS 87.60*     Recent Labs     10/10/19  1842   BUN 25*   CREATININE 0.96   ALBUMIN 4.6     No results for input(s): VANCOTROUGH, VANCOPEAK, VANCORANDOM in the last 72 hours.No intake or output data in the 24 hours ending 10/11/19 0526   /66   Pulse 98   Temp 36.8 °C (98.3 °F) (Temporal)   Resp 16   Ht 1.524 m (5')   Wt 61.6 kg (135 lb 12.9 oz)   SpO2 90%  Temp (24hrs), Av.3 °C (99.1 °F), Min:36.8 °C (98.2 °F), Max:38.3 °C (100.9 °F)      A/P   1. Vancomycin dose change: Start vancomycin 15mg/kg q24hr (900mg)  2. Next vancomycin level: 2-3 days (not ordered) or sooner if clinically indicated  3. Goal trough: 12-16 mcg/ml  4. Comments: Vancomycin concentrates well in the urine. Dosing as above per protocol. Consider ID consult pending culture results. Pharmacy will follow. Narrow therapy as able.    Morales Werner, PharmD, BCPS, BCCCP

## 2019-10-11 NOTE — ASSESSMENT & PLAN NOTE
Improving Crea of  0.71 from 0.94. S/p nephrostomy and left ureteric stent removal.     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 CT on 9/16 showed left hydrouteronephrosis 2/2 ureteric stricture from cervical cancer radiation therapy. NM renal with diuretic washout shows persistent occlusion at the left ureteric stent.   Multiple factors can cause her hydrouteronephrosis: her ureteric strictures, stent and neurogenic bladder that looks like thickned from rad therapy.     Per gyn onc outpatient consultation will be done regarding if pt will be on stent/NT or a surgical intervention is needed regarding her ureteric stricture. Hence Gyn Onc signed off.       Plan  Follow up UA tomorrow.   Follow up outpatient with gyn onc for stent/nephrostomy tube or surgical intervention for ureteric stricture.   Continue to monitor creatinine.  Post-obstructive diuresis monitoring after nephrostomy tube placement.

## 2019-10-11 NOTE — H&P
Internal Medicine Admitting History and Physical    Note Author: Giuseppe Aguiar M.D.       Name Nessa Dennis     1945   Age/Sex 74 y.o. female   MRN [id number]   Code Status Full     After 5PM or if no immediate response to page, please call for cross-coverage  Attending/Team: Dr Tillman/Monica See Patient List for primary contact information  Call [phone] to page    1st Call - Day Intern (R1):   Josafat 2nd Call - Day Sr. Resident (R2/R3):   Francisco       Chief Complaint:   Left sided flank pain- onset today  Nausea and vomiting-onset 4-5 days    HPI:  Nessa Dennis is a 74 year old female with past medical history of cervical cancer s/p radical hysterectomy and b/l Salpingoophorectomy in , relapse in  treated with chemoradiation which resulted in urinary retention requiring self catheterization x4-5 daily, rectovaginal fistula requiring diverting colostomy, neurogenic bladder, recurrent UTI, pyelonephritis, hydroureteronephrosis requiring ureteric stent in 2019 and removed on 2019 presents to ED for evaluation of left flank pain, nausea and vomiting. Patient said pain started today and describe as pressure like and radiating to left back. Patient also reports ongoing nausea and vomiting from past 4-5 days which worsened today and she had 6 episodes of vomiting. Patient denies diarrhea, fever, chills, chest pain, SOB, abdominal pain.     On 2019 patient presents to ED with same symptoms and found to be septic secondary to pyelonephritis. She was admitted and treated with one week of Meropenem due to recent ESBL infection. Left nephrostomy tube placed on  and ureteral stent placed on  by IR.     ED course: Patient treated with Zofran, morphine and started on cefepime and IV Vancomycin. CT A/P W/O shows enlargement of left kidney, possible stent obstruction or pyelonephritis. Labs shows WBC 14.5, stable electrolytes, Bun 25, Cr 0.96. UA shows cloudy urine,  positive nitrites, small leukocyte esterase and few bacteria. Patient was afebrile, , /98.     Update: Around 1.30am Patient spiked fever of 100.9, , saturating 91% at room air, 137/89, R 18        Review of Systems   Constitutional: Positive for chills, fever (afebrile on admission) and malaise/fatigue. Negative for diaphoresis and weight loss.   HENT: Negative for congestion, ear discharge, ear pain, hearing loss, nosebleeds, sinus pain, sore throat and tinnitus.    Eyes: Negative for blurred vision, double vision, photophobia, pain, discharge and redness.   Respiratory: Negative for cough, hemoptysis, sputum production, shortness of breath, wheezing and stridor.    Cardiovascular: Negative for chest pain, palpitations, orthopnea, claudication, leg swelling and PND.   Gastrointestinal: Positive for nausea and vomiting. Negative for abdominal pain, blood in stool, constipation, diarrhea, heartburn and melena.   Genitourinary: Positive for flank pain, frequency, hematuria and urgency. Negative for dysuria.   Musculoskeletal: Negative for back pain, falls, joint pain, myalgias and neck pain.   Skin: Negative for itching and rash.   Neurological: Positive for weakness and headaches. Negative for dizziness, tingling, tremors, sensory change, speech change, focal weakness, seizures and loss of consciousness.             Past Medical History (Chronic medical problem, known complications and current treatment)    Past Medical History:   Diagnosis Date   • Arthritis     tailbone   • Cancer (HCC) 2005, 2015    cervical   • Colostomy in place ([facility])    • Hypertension    • Other specified symptom associated with female genital organs    • Unspecified urinary incontinence    • Urinary bladder disorder     pt self caths 4x per day           Past Surgical History:  Past Surgical History:   Procedure Laterality Date   • CYSTO STENT PLACEMNT PRE SURG Left 8/29/2019    Procedure: CYSTOSCOPY, WITH URETERAL STENT  REMOVAL;  Surgeon: Adam Hays M.D.;  Location: SURGERY [facility];  Service: Gynecology   • CYSTOSCOPY  7/31/2015    Procedure: CYSTOSCOPY;  Surgeon: Adam Hays M.D.;  Location: SURGERY [facility];  Service:    • PELVIC EXAM UNDER ANESTHESIA  7/31/2015    Procedure: PELVIC EXAM UNDER ANESTHESIA;  Surgeon: Adam Hays M.D.;  Location: SURGERY [facility];  Service:    • CERVICAL CONIZATION  7/31/2015    Procedure: CERVICAL CONIZATION FOR: CONE BIOPSY;  Surgeon: Adam Hays M.D.;  Location: SURGERY [facility];  Service:    • COLOSTOMY CREATION LAPAROSCOPIC  4/14/2015    Performed by Mariusz Barajas M.D. at SURGERY [facility]   • RECOVERY  4/6/2015    Performed by [facility] at SURGERY PRE-POST PROC UNIT [facility] [place]   • OTHER  04/2015    supra pubic cath placement   • GYN SURGERY  5/2005    hysterectomy   • OTHER ABDOMINAL SURGERY      bowel resection- has colostomy       Current Outpatient Medications:  Home Medications     Reviewed by Liz Deal (Pharmacy Tech) on 10/10/19 at 2235  Med List Status: Complete   Medication Last Dose Status   lisinopril (PRINIVIL, ZESTRIL) 40 MG tablet 10/10/2019 Active                Medication Allergy/Sensitivities:  Allergies   Allergen Reactions   • Sulfa Drugs Unspecified     Pt states it runs her down.         Family History (mandatory)   History reviewed. No pertinent family history.    Social History (mandatory)   Social History     Socioeconomic History   • Marital status:      Spouse name: Heron Dennis   • Number of children: Not on file   • Years of education: Not on file   • Highest education level: Not on file   Occupational History   • Occupation: Retired    Social Needs   • Financial resource strain: Not hard at all   • Food insecurity:     Worry: Never true     Inability: Never true   • Transportation needs:     Medical: No     Non-medical: No   Tobacco Use   • Smoking status: Never Smoker   • Smokeless tobacco: Never  Used   Substance and Sexual Activity   • Alcohol use: No   • Drug use: No   • Sexual activity: Not on file   Lifestyle   • Physical activity:     Days per week: Not on file     Minutes per session: Not on file   • Stress: Not on file   Relationships   • Social connections:     Talks on phone: Not on file     Gets together: Not on file     Attends Congregation service: Not on file     Active member of club or organization: Not on file     Attends meetings of clubs or organizations: Not on file     Relationship status: Not on file   • Intimate partner violence:     Fear of current or ex partner: Not on file     Emotionally abused: Not on file     Physically abused: Not on file     Forced sexual activity: Not on file   Other Topics Concern   • Not on file   Social History Narrative   • Not on file     Living situation:   PCP : Julio Ross M.D.    Physical Exam     Vitals:    10/11/19 0000 10/11/19 0015 10/11/19 0030 10/11/19 0134   BP: 108/59  118/76 137/89   Pulse: 91 (!) 104 96 (!) 111   Resp:    18   Temp:    (!) 38.3 °C (100.9 °F)   TempSrc:    Temporal   SpO2: 99% 98% 99% 91%   Weight:    61.6 kg (135 lb 12.9 oz)   Height:         Body mass index is 26.52 kg/m².  O2 therapy: Pulse Oximetry: 91 %, O2 (LPM): 0, O2 Delivery: None (Room Air)    Physical Exam   Constitutional: She is oriented to person, place, and time. She appears distressed.   HENT:   Head: Normocephalic and atraumatic.   Nose: Nose normal.   Mouth/Throat: Oropharynx is clear and moist. No oropharyngeal exudate.   Eyes: Pupils are equal, round, and reactive to light. Conjunctivae and EOM are normal. Right eye exhibits no discharge. Left eye exhibits no discharge. No scleral icterus.   Neck: Normal range of motion. No JVD present. No tracheal deviation present. No thyromegaly present.   Cardiovascular: Normal heart sounds and intact distal pulses. Tachycardia present. Exam reveals no gallop and no friction rub.   No murmur heard.  Pulmonary/Chest:  Effort normal and breath sounds normal. No respiratory distress. She has no wheezes. She has no rales. She exhibits no tenderness.   Abdominal: Soft. Bowel sounds are normal. She exhibits no distension and no mass. There is no tenderness. There is no rebound and no guarding.   Colostomy bag on left side   Musculoskeletal: Normal range of motion. She exhibits no edema, tenderness or deformity.   Neurological: She is alert and oriented to person, place, and time. GCS score is 15.   Skin: Skin is warm. No rash noted. She is not diaphoretic. No erythema. No pallor.         Data Review       Old Records Request:   Completed  Current Records review/summary: Completed    Lab Data Review:  Recent Results (from the past 24 hour(s))   CBC WITH DIFFERENTIAL    Collection Time: 10/10/19  6:42 PM   Result Value Ref Range    WBC 14.5 (H) 4.8 - 10.8 K/uL    RBC 5.08 4.20 - 5.40 M/uL    Hemoglobin 13.8 12.0 - 16.0 g/dL    Hematocrit 41.9 37.0 - 47.0 %    MCV 82.5 81.4 - 97.8 fL    MCH 27.2 27.0 - 33.0 pg    MCHC 32.9 (L) 33.6 - 35.0 g/dL    RDW 42.4 35.9 - 50.0 fL    Platelet Count 221 164 - 446 K/uL    MPV 9.9 9.0 - 12.9 fL    Neutrophils-Polys 87.60 (H) 44.00 - 72.00 %    Lymphocytes 5.80 (L) 22.00 - 41.00 %    Monocytes 5.20 0.00 - 13.40 %    Eosinophils 0.90 0.00 - 6.90 %    Basophils 0.10 0.00 - 1.80 %    Immature Granulocytes 0.40 0.00 - 0.90 %    Nucleated RBC 0.00 /100 WBC    Neutrophils (Absolute) 12.74 (H) 2.00 - 7.15 K/uL    Lymphs (Absolute) 0.84 (L) 1.00 - 4.80 K/uL    Monos (Absolute) 0.75 0.00 - 0.85 K/uL    Eos (Absolute) 0.13 0.00 - 0.51 K/uL    Baso (Absolute) 0.02 0.00 - 0.12 K/uL    Immature Granulocytes (abs) 0.06 0.00 - 0.11 K/uL    NRBC (Absolute) 0.00 K/uL   COMP METABOLIC PANEL    Collection Time: 10/10/19  6:42 PM   Result Value Ref Range    Sodium 138 135 - 145 mmol/L    Potassium 4.0 3.6 - 5.5 mmol/L    Chloride 105 96 - 112 mmol/L    Co2 24 20 - 33 mmol/L    Anion Gap 9.0 0.0 - 11.9    Glucose 106 (H)  65 - 99 mg/dL    Bun 25 (H) 8 - 22 mg/dL    Creatinine 0.96 0.50 - 1.40 mg/dL    Calcium 10.3 8.5 - 10.5 mg/dL    AST(SGOT) 15 12 - 45 U/L    ALT(SGPT) 8 2 - 50 U/L    Alkaline Phosphatase 98 30 - 99 U/L    Total Bilirubin 0.4 0.1 - 1.5 mg/dL    Albumin 4.6 3.2 - 4.9 g/dL    Total Protein 7.2 6.0 - 8.2 g/dL    Globulin 2.6 1.9 - 3.5 g/dL    A-G Ratio 1.8 g/dL   LIPASE    Collection Time: 10/10/19  6:42 PM   Result Value Ref Range    Lipase 16 11 - 82 U/L   ESTIMATED GFR    Collection Time: 10/10/19  6:42 PM   Result Value Ref Range    GFR If African American >60 >60 mL/min/1.73 m 2    GFR If Non  57 (A) >60 mL/min/1.73 m 2   URINALYSIS,CULTURE IF INDICATED    Collection Time: 10/10/19  8:50 PM   Result Value Ref Range    Color Yellow     Character Hazy (A)     Specific Gravity 1.015 <1.035    Ph >=9.0 (A) 5.0 - 8.0    Glucose Negative Negative mg/dL    Ketones Trace (A) Negative mg/dL    Bilirubin Negative Negative    Urobilinogen, Urine 0.2 Negative    Nitrite Positive (A) Negative    Leukocyte Esterase Small (A) Negative    Occult Blood Small (A) Negative    Micro Urine Req Microscopic    URINE MICROSCOPIC (W/UA)    Collection Time: 10/10/19  8:50 PM   Result Value Ref Range    WBC 5-10 (A) /hpf    RBC 0-2 /hpf    Bacteria Few (A) None /hpf   Magnesium    Collection Time: 10/11/19  2:00 AM   Result Value Ref Range    Magnesium 1.7 1.5 - 2.5 mg/dL       Imaging/Procedures Review:    Independant Imaging Review: Completed  CT-RENAL COLIC EVALUATION(A/P W/O)   Final Result         1.  Enlargement of the left kidney with increase hazy left perinephric fat stranding with ureteral stent in place. Consider stent obstruction or pyelonephritis.   2.  Diffuse bladder wall thickening, consider component of cystitis. Stable since prior study.   3.  Parastomal hernia containing small bowel loops without apparent obstructive change.   4.  Hepatomegaly   5.  Trace pericardial effusion      DX-CHEST-LIMITED (1  VIEW)    (Results Pending)        Records reviewed and summarized in current documentation :  Yes  [employer] teaching service handout given to patient:  Yes         Assessment/Plan     * Sepsis secondary to pyelonephritis- (present on admission)  Assessment & Plan  This is Sepsis Present on admission  SIRS criteria identified on my evaluation include: Leukocyosis, with WBC greater than 12,000 .  Afebrile on admission and later spiked fever of 100.9, , saturation 91% @ room air.  Source of infection- 2 out of 4 positive for SIRS criteria. However no altered mentation, respiratory rate <22/min and systolic BP >90 mm of Hg.  Received Cefepime and vanco in ED    Admit to Tele  Starts Vancomycin and Ceftriaxone   Cont Iv fluid   Will repeat CBC and BMP  Cont to monitor closely   Urine culture and blood culture in process           Pyelonephritis- (present on admission)  Assessment & Plan  Patient c/o nausea/vomiting and left flank pain, leukocytosis  H/o recurrent UTI; treated for ESBL klebsiella UTI in 3/19 with IV ABx  CT revealed left perinephric fat stranding, possible stent obstruction or pyelonephritis     IV fluids  IV Antibiotics  Zofran for nausea         Hydronephrosis due to obstruction of ureter stent- (present on admission)  Assessment & Plan  On previous admission 9/16 CT revealed left sided hydroureteronephrosis which is secondary to pyelonephritis or ureteric stricture (h/o previous radiation for cervical cancer).  Ureteric stent in 4/19 which was removed on 8/29/19; was unable to put another stent due to mid and distal ureteric stricture.   Nephrostomy tube placed 9/22  Stent by IR placed 9/25    On 10/10 CT revealed enlargement of left kidney, possible stent obstruction or pyelonephritis.  Will consult IR in the morning    UTI (urinary tract infection)- (present on admission)  Assessment & Plan  H/o recurrent UTI; treated for ESBL klebsiella UTI in 3/19 with IV ABx  Leukocytosis, Spiked fever to  101.9  Left flank pain, nausea and vomiting  UA negative for nitrites and leukocyte esterase     UA culture pending      HTN (hypertension)- (present on admission)  Assessment & Plan  Continue home Lisinopril 40 mg daily     History of Cervical cancer (HCC)- (present on admission)  Assessment & Plan  H/o cervical cancer s/p radial hysterectomy and b/l salpingo-oophorectomy in 2005  Recurrence in 2015- treated with chemoradiation  Following up with Dr. Hays    Digestive-genital tract fistula, female- (present on admission)  Assessment & Plan  H/o cervical cancer treatment with chemoradiation complicated rectovaginal fistula s/p colostomy      Anticipated Hospital stay:  >2 midnights        Quality Measures  Quality-Core Measures   Reviewed items::  Labs reviewed, Medications reviewed and Radiology images reviewed  Greene catheter::  Neurogenic Bladder  DVT prophylaxis - mechanical:  SCDs  Ulcer Prophylaxis::  Not indicated  Antibiotics:  Treating active infection/contamination beyond 24 hours perioperative coverage    PCP: Julio Ross M.D.

## 2019-10-11 NOTE — ED NOTES
Med Rec Updated and Complete per Pt at bedside  Allergies Reviewed  No PO ABX last 14 days.    Pt denies OTC medication at this time.

## 2019-10-11 NOTE — ASSESSMENT & PLAN NOTE
#rectovaginal fistula. 2/2 radical hysterectomy.   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   #hx of recurrent ESBL klebsiella UTI w/ ureteric strictures 2/2 radiation therapy.    UA culture showing proteus vulgaris  Per pt has clear urine since admission. Denies pneumaturia and fecaluria. WBC ct trending down from 15 now 11.0  Admission UA showing nitrate and leukocyte esterase positive. States she self-cath and reuses catheter by washing it with soap and water.       Plan  Started ceftriaxone and discontinued meropenem and vancomycin  Trend WBC ct  Gyn onc follow up outpatient regarding ureteric stent/NT vs surgical intervention  Pt education to use disposable cath

## 2019-10-11 NOTE — ASSESSMENT & PLAN NOTE
H/o cervical cancer s/p radial hysterectomy and b/l salpingo-oophorectomy in 2005  Recurrence in 2015- treated with chemoradiation  Following up with Dr. Hays

## 2019-10-11 NOTE — ASSESSMENT & PLAN NOTE
WBC ct now at 7.1 from 8.3; improved since pt has no nausea, vomiting, resolved left flank pain and leukocytosis.     H/o recurrent UTI; treated for ESBL klebsiella UTI in 3/19 with IV ABx. CT showed pyelonephritis, blood culture was growing non lactose  gram negative sheba. UAs showing proteus vulgaris. Hence we stopped meropenem and vancomycin and switched pt to ceftriaxone. Pt will be on PO tomorrow, awaiting pharm ID consult    Plan  Pharm ID consult tomorrow.   Stopped meropenem and vancomycin  Start ceftriaxone  Continue mIVF.   If nausea and vomiting continue 4mg q6hr PRN  WBC ct monitoring tomorrow  Crea, K, BUN monitoring tomorrow.   Patient education regarding using disposable catheter

## 2019-10-11 NOTE — ASSESSMENT & PLAN NOTE
Resolved.     Blood culture grew gram negative rods that is non lactose . Source control achieved as left stent removed and nephrostomy tube placed. Discontinued vanc and meropenem today, and was switched to ceftriaxone.   Awaiting for culture results from the left ureteric stent     Plan  F/U left ureteric stent culture  Discontinue Vanc and meropenem switch to ceftriaxone  Consult ID tomorrow if pt will go home with PO cephtra or PO quinolone.   Continue mIVF  Monitor fever curve.

## 2019-10-11 NOTE — CONSULTS
Gynecology Oncology consult    HISTORY OF PRESENT ILLNESS:   [facility] [place] [place] 74 [facility] [place] who was diagnosed with Stage Ib1 G3 cervical cancer, after undergoing a laparoscopic radical hysteretcomy with bilateral salphingo-oophorectomy, and bilateral pelvic/aortic lymph node dissection on 5/27/2005. She did not require further adjuvant therapy.     She did well and was in remission for over 10 years, until 3/2015, when she presented with rectal bleeding. 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 She completed concurrent chemoradiation therapy consisting of weekly Cisplatin from 4/20/15 to 5/18/15. She had her radiation treatment under Dr. Aldrich and completed therapy on 5/27/15. she has subsequently required routine self cath for atonic bladder.    She has had recurrent pyelonephritis and IV antibiotics. She had left ureteral stent removed on 8/30/19 and was unable to replace due to stricture. She was admitted on 9/16 for flank pain and fever. She had a left NT placed on 9/23 and has completed abx therapy per hospital medicine.  She then had internal stent placed with NT removed.    She presented to ER for intractable nausea/vomiting, and fever and was admitted for pyelonephritis.  Due to her extensive gyn onc history and recurrent UTI's, [facility] was consulted.      MEDICATION:     Lisinopril 20 mg tablet 0 Tablet PO PRN  Vitamin D2 50,000 intl units capsule 0 Caplet PO  Vitamin C 100 mg tablet, chewable 0 Tablet PO  Calcium 1250 mg tablet 0 Tablet PO  Multivitamin Lipotropic with Multivitamins capsule 0 Caplet PO    ALLERGIES:  NKA -- No Known Allergies    PAST MEDICAL HISTORY:  Cervical cancer s/p radical hysterectomy, recurrence 2005 with concurrent chemoradiation, colostomy placement, hydronephrosis    PAST SURGICAL HISTORY:   05/26/2005 Uterine Surgery ([facility] RAD HYST, Ureteroneocytotomy Left,Suprapubic catheter)  Foot  Surgery  Tonsillectomy/Adenoidectomy    ROS:  Skin: Patient denies rashes, skin ulcers and skin problems.  Neurology: Patient denies numbness of fingers or toes and fainting.  Psychiatry: Patient denies depression and anxiety.  Endocrinology: Patient denies hot flashes, diabetes, and thyroid problems  Genitourinary: Patient denies vaginal bleeding, self cath, pelvic pain  Hematology/Lymphatic: Patient denies bruising easily and bleeding gums.  ENT: Patient denies mouth ulcers  Cardiovascular: Patient denies shortness of breath while walking or climbing stairs; denies chest pain and shortness of breath while lying flat.  Respiratory: Patient denies shortness of breath at rest; denies any wheezing (difficulty breathing).  Gastrointestinal: Patient positive for nausea, vomiting.  Musculoskeletal: Patient denies arthritis/painful joints; muscle weakness.  Constitutional: Patient denies weight change, positive for fever and fatigue.    EXAM:  GENERAL - Alert and oriented individual, febrile.  LUNGS: - Clear to auscultation bilaterally, no rales, rhonci, or wheezes.  HEENT -no sclera icterus, no pallor of conjunctiva, no supraclavicular or cervical lymphadenopathy detected.   CARDIAC - regular rate and rhythm, murmur, no clicks, or gallops.  ABDOMEN - soft, non- tender.   PELVIS - deferred.  EXTREMITIES - Non-erythematous, non-tender.   NEURO - grossly intact.     DIAGNOSTIC STUDIES  Wbc 11.3, hgb 11.9, creat 1.06 bun 26, lactic acid 1.4. Preliminary blood cultures positive for gram negative sheba.     10/10/2019 9:36 PM    HISTORY/REASON FOR EXAM: FLANK PAIN; NAUSEA/VOMITING    TECHNIQUE/EXAM DESCRIPTION:  CT abdomen and pelvis without IV contrast. Sequential axial CT images were obtained from lung bases to the proximal femurs without contrast.    Low dose optimization technique was utilized for this CT exam including automated exposure control and adjustment of the mA and/or kV according to patient size.    COMPARISON:   September 16, 2019    CT ABDOMEN FINDINGS:    Non contrast technique limits evaluation of the solid abdominal organs.    Linear densities are seen within the lung bases, appearance favors atelectasis. Trace pericardial effusion is noted.    The liver is normal in contour. Hepatomegaly is observed. No intrahepatic biliary ductal dilatation is noted.    The gallbladder appears within normal limits.    The spleen is unremarkable.    The pancreas is grossly normal.    Bilateral adrenal glands appear within normal limits.    There is enlargement of the left kidney seen with hazy perinephric fat stranding, which appears increased since prior study. There is severe left hydronephrosis and proximal hydroureter. Left ureteral stent is in place.    Surgical changes of the Bernardo's procedure are seen. There is left lower quadrant colostomy noted. Parastomal hernia containing small bowel loops is observed. The small bowel is unremarkable.    The bony structures are age appropriate.    CT PELVIS FINDINGS:    Symmetric bladder wall thickening is noted. Postsurgical changes of hysterectomy are noted.   Impression         1.  Enlargement of the left kidney with increase hazy left perinephric fat stranding with ureteral stent in place. Consider stent obstruction or pyelonephritis.  2.  Diffuse bladder wall thickening, consider component of cystitis. Stable since prior study.  3.  Parastomal hernia containing small bowel loops without apparent obstructive change.  4.  Hepatomegaly  5.  Trace pericardial effusion   IR Documentation Timeline (10/11/2019 17:06:21 to 10/11/2019 17:06:21)     Reading Provider Reading Date   Peyton Rosario M.D. Oct 10, 2019   Signing Provider Signing Date Signing Time   Peyton Rosario M.D. Oct 10, 2019  9:58 PM         ASSESSMENT:  Stage Ib1 grade 3 cervical cancer: 5/27/2005  s/p RAH/BSO/BPLND: 5/27/2005  Recurrence: 3/2015  s/p concurrent chemoradiation: 4/20/15 to 5/18/15 (last XRT 5/27/15)  L  stent in place.    PLAN:  1. Hydronephrosis- severe left hydronephrosis with likely obstruction. Recommend IR guided removal of stent and placement of NT.  2. Sepsis- preliminary gram negative sheba, managed by unr medicine.  Will continue to follow.  Case discussed with Dr Hays.  Thank you again for the opportunity for allowing me to participate in her care, if you should have any questions or if I could be of any help to you in the future, please do not hesitate to call.

## 2019-10-11 NOTE — ASSESSMENT & PLAN NOTE
Improving s/p sepsis resolution and left stent removal.  Crea  is 0.71 from 0.94 , baseline 0.65 on 3/2019. ADA is multifactorial: hemodynamic ADA due to sepsis/dehydration and inflammation with intra-renal ADA likely a septic ADA with pyelonephritis and obstructive ADA 2/2 neurogenic bladder and persistent Left ureteric stent obstruction as evidence of hydroureteronephrosis.     Plan  Continue ceftriaxone  Improve fluid intake and continue mIVF  Monitor for post-obstructive diuresis post stent removal or nephrostomy tube placement.  Avoid nephrotoxins

## 2019-10-11 NOTE — ASSESSMENT & PLAN NOTE
Admssion SBP of 160s likely sign of sepsis, initially went down and now trending up status post fluid administration.     Plan  Continue home Lisinopril 40 mg daily   Improve hydration status.

## 2019-10-11 NOTE — ED PROVIDER NOTES
ED Provider Note    Scribed for Andreia Vidales M.D. by Allie Kim. 10/10/2019, 7:47 PM.    Primary care provider: Julio Ross M.D.  Means of arrival: Wheel Chair  History obtained from: Patient  History limited by: None    CHIEF COMPLAINT  Chief Complaint   Patient presents with   • Flank Pain     onset today, recent stent placed in left ureter following pyelonephritis admission in september   • N/V     onset today, 4-5 times       HPI  Nessa Dennis is a 74 y.o. female who presents to the Emergency Department for evaluation of left flank pain onset today. Patient states that she has a stent in place and notes that the pain started slowly but has been increasing in pain since onset. She rates the pain as a 4/10 in severity. Patient has associated nausea and vomiting, but denies any dysuria, hematemesis or fevers. She reports at least six episodes of vomit today since around 4:30 PM. Patient notes that she has urinary incontinence but states that it's normal. She adds that she has a history of cervical cancer and has done radiation for it. She reports worries about possibly having her stent tube damaged due to the radiation.     REVIEW OF SYSTEMS  Review of Systems   Gastrointestinal: Positive for nausea and vomiting.        Positive for left flank pain.  Positive for urinary incontinence.    All other systems reviewed and are negative.      PAST MEDICAL HISTORY   has a past medical history of Arthritis, Cancer (HCC) (2005, 2015), Colostomy in place (HCC), Hypertension, Other specified symptom associated with female genital organs, Unspecified urinary incontinence, and Urinary bladder disorder.    SURGICAL HISTORY   has a past surgical history that includes recovery (4/6/2015); colostomy creation laparoscopic (4/14/2015); cystoscopy (7/31/2015); gyn surgery (5/2005); pelvic exam under anesthesia (7/31/2015); cervical conization (7/31/2015); other abdominal surgery; other (04/2015); and cysto  stent placemnt pre surg (Left, 8/29/2019).    SOCIAL HISTORY  Social History     Tobacco Use   • Smoking status: Never Smoker   • Smokeless tobacco: Never Used   Substance Use Topics   • Alcohol use: No   • Drug use: No      Social History     Substance and Sexual Activity   Drug Use No       FAMILY HISTORY  History reviewed. No pertinent family history.    CURRENT MEDICATIONS  Reviewed.  See Encounter Summary.     ALLERGIES  Allergies   Allergen Reactions   • Sulfa Drugs Unspecified     Pt states it runs her down.       PHYSICAL EXAM  VITAL SIGNS: BP (!) 163/98   Pulse (!) 101   Temp 36.8 °C (98.2 °F) (Temporal)   Resp 16   Ht 1.524 m (5')   Wt 62.5 kg (137 lb 12.6 oz)   SpO2 93%   BMI 26.91 kg/m²   Constitutional: Alert in no apparent distress.  HENT: No signs of trauma, Bilateral external ears normal, Nose normal.   Eyes: Pupils are equal and reactive, Conjunctiva normal, Non-icteric.   Neck: Normal range of motion, No tenderness, Supple, No stridor.   Lymphatic: No lymphadenopathy noted.   Cardiovascular: Tachycardic. Regular rhythm, no murmurs.   Thorax & Lungs: Normal breath sounds, No respiratory distress, No wheezing, No chest tenderness.   Abdomen: Colostomy bag on left, no abdominal tenderness. Left CVA tenderness present. Soft. No peritoneal signs.  Skin: Warm, Dry, No erythema, No rash.   Back: No bony tenderness,   Extremities: Intact distal pulses, No edema, No tenderness, No cyanosis  Musculoskeletal: Good range of motion in all major joints. No tenderness to palpation or major deformities noted.   Neurologic: Alert , Normal motor function, Normal sensory function, No focal deficits noted.   Psychiatric: Affect normal, Judgment normal, Mood normal.     DIAGNOSTIC STUDIES / PROCEDURES     LABS  Results for orders placed or performed during the hospital encounter of 10/10/19   CBC WITH DIFFERENTIAL   Result Value Ref Range    WBC 14.5 (H) 4.8 - 10.8 K/uL    RBC 5.08 4.20 - 5.40 M/uL    Hemoglobin  13.8 12.0 - 16.0 g/dL    Hematocrit 41.9 37.0 - 47.0 %    MCV 82.5 81.4 - 97.8 fL    MCH 27.2 27.0 - 33.0 pg    MCHC 32.9 (L) 33.6 - 35.0 g/dL    RDW 42.4 35.9 - 50.0 fL    Platelet Count 221 164 - 446 K/uL    MPV 9.9 9.0 - 12.9 fL    Neutrophils-Polys 87.60 (H) 44.00 - 72.00 %    Lymphocytes 5.80 (L) 22.00 - 41.00 %    Monocytes 5.20 0.00 - 13.40 %    Eosinophils 0.90 0.00 - 6.90 %    Basophils 0.10 0.00 - 1.80 %    Immature Granulocytes 0.40 0.00 - 0.90 %    Nucleated RBC 0.00 /100 WBC    Neutrophils (Absolute) 12.74 (H) 2.00 - 7.15 K/uL    Lymphs (Absolute) 0.84 (L) 1.00 - 4.80 K/uL    Monos (Absolute) 0.75 0.00 - 0.85 K/uL    Eos (Absolute) 0.13 0.00 - 0.51 K/uL    Baso (Absolute) 0.02 0.00 - 0.12 K/uL    Immature Granulocytes (abs) 0.06 0.00 - 0.11 K/uL    NRBC (Absolute) 0.00 K/uL   COMP METABOLIC PANEL   Result Value Ref Range    Sodium 138 135 - 145 mmol/L    Potassium 4.0 3.6 - 5.5 mmol/L    Chloride 105 96 - 112 mmol/L    Co2 24 20 - 33 mmol/L    Anion Gap 9.0 0.0 - 11.9    Glucose 106 (H) 65 - 99 mg/dL    Bun 25 (H) 8 - 22 mg/dL    Creatinine 0.96 0.50 - 1.40 mg/dL    Calcium 10.3 8.5 - 10.5 mg/dL    AST(SGOT) 15 12 - 45 U/L    ALT(SGPT) 8 2 - 50 U/L    Alkaline Phosphatase 98 30 - 99 U/L    Total Bilirubin 0.4 0.1 - 1.5 mg/dL    Albumin 4.6 3.2 - 4.9 g/dL    Total Protein 7.2 6.0 - 8.2 g/dL    Globulin 2.6 1.9 - 3.5 g/dL    A-G Ratio 1.8 g/dL   LIPASE   Result Value Ref Range    Lipase 16 11 - 82 U/L   URINALYSIS,CULTURE IF INDICATED   Result Value Ref Range    Color Yellow     Character Hazy (A)     Specific Gravity 1.015 <1.035    Ph >=9.0 (A) 5.0 - 8.0    Glucose Negative Negative mg/dL    Ketones Trace (A) Negative mg/dL    Bilirubin Negative Negative    Urobilinogen, Urine 0.2 Negative    Nitrite Positive (A) Negative    Leukocyte Esterase Small (A) Negative    Occult Blood Small (A) Negative    Micro Urine Req Microscopic    ESTIMATED GFR   Result Value Ref Range    GFR If  >60 >60  mL/min/1.73 m 2    GFR If Non  57 (A) >60 mL/min/1.73 m 2   URINE MICROSCOPIC (W/UA)   Result Value Ref Range    WBC 5-10 (A) /hpf    RBC 0-2 /hpf    Bacteria Few (A) None /hpf       All labs were reviewed by me.    RADIOLOGY  CT-RENAL COLIC EVALUATION(A/P W/O)   Final Result         1.  Enlargement of the left kidney with increase hazy left perinephric fat stranding with ureteral stent in place. Consider stent obstruction or pyelonephritis.   2.  Diffuse bladder wall thickening, consider component of cystitis. Stable since prior study.   3.  Parastomal hernia containing small bowel loops without apparent obstructive change.   4.  Hepatomegaly   5.  Trace pericardial effusion        The radiologist's interpretation of all radiological studies have been reviewed by me.    COURSE & MEDICAL DECISION MAKING  Pertinent Labs & Imaging studies reviewed. (See chart for details)    7:47 PM - Patient seen and examined at bedside. Patient will be treated with Zofran 4 mg, morphine 4 mg. Ordered Urine microscopic, estimated GFR, CBC with differentials, CMP, Lipase, UA culture if indicated to evaluate her symptoms. Informed the patient the need for labs to rule out any possible infection. Patient agrees with the plan of care.     8:16 PM - Ordered CT renal colic evaluation at this time.     9:13 - Patient will be admitted to the hospital at this time. Patient will be admitted to the hospitalist service for further evaluation and observation. Patient was agreeable to the plan of care. Please see the admission, daily progress, and discharge notes for the ultimate disposition of this patient.    9:25 PM - Patient was reevaluated at bedside. Discussed lab and radiology results with the patient and informed them that they will be receiving IV antibiotics and likely admission.    9:38 PM - I discussed the patient's case and the above findings with Dr. Jessica (Pharmacist) who recommends cefepime and vancomycin for  appropriate coverage.    Decision Making:  This is a 74 y.o. year old female who presents with left flank pain in the setting of known ureteral stent and frequent issues with pyelonephritis.  Differential diagnosis includes but is not limited to pyelonephritis, stent occlusion, dehydration, ADA, electrolyte abnormality, nephrolithiasis    IV access was obtained and laboratory studies were drawn.  The patient was given morphine and Zofran for symptomatic relief with good response.    Labs were significant for leukocytosis to 14.5 with a left shift and evidence of infection on urinalysis.  CT was obtained showing enlargement of the left kidney with increased hazy left perinephric fat stranding with ureteral stent in place.  Feel that these findings are most consistent with pyelonephritis and possible stent occlusion.  Case was discussed with [facility] internal medicine who kindly agreed to admit the patient.     Patient was started on vancomycin and cefepime for empiric coverage of her multidrug-resistant bacteria that she previously cultured.    Patient will be admitted to the [facility] resident service for further evaluation and observation. Patient was agreeable to the plan of care. Please see the admission, daily progress, and discharge notes for the ultimate disposition of this patient.      DISPOSITION:  Patient will be admitted to [facility] in guarded condition.    FINAL IMPRESSION  1. Non-intractable vomiting with nausea, unspecified vomiting type    2. Pyelonephritis          Allie BRADY (Fatuma), am scribing for, and in the presence of, Andreia Vidales M.D..    Electronically signed by: Allie Kim (Fatuma), 10/10/2019    Andreia BRADY M.D. personally performed the services described in this documentation, as scribed by Allie Kim in my presence, and it is both accurate and complete. C.     The note accurately reflects work and decisions made by me.  Andreia Vidales  10/10/2019  11:47  PM

## 2019-10-11 NOTE — SENIOR ADMIT NOTE
Senior Admit Note    Pertinent History  Nessa Dennis is a 74 y.o. female with a history of hypertension and cervical cancer status post radical hysterectomy with bilateral oophorectomy in 2005 with recurrence in 2015 status post chemoradiation resulting in urinary retention who presents to the hospital with left flank pain, cloudy urine and weakness that started a couple of days ago. Of note, patient was recently admitted from 9/16-9/25 for sepsis secondary to pyelonephritis and was treated with IV meropenem, given history of ESBL. Patient had a left ureteral stent placed on 9/25.     In the ED, patient was noted to be hemodynamically stable with pulse in the low 100s. UA was significant for trace ketones, small blood, positive for nitrites and small leukocyte esterase. Previously urine cultures have grown enterococcus. Patient last had ESBL Klebsiella in March, 2019. CT renal colic was performed which showed enlarged left kidney with perinephric stranding around the ureteral stent which could possibly represent stent obstruction or pyelonephritis.      Pertinent Physical Exam:   General: Laying in bed in no acute distress. Non-toxic appearing. Appears fatigued.   Back: No CVA tenderness   Abdomen: No suprapubic tenderness   Extremities: No lower extremity edema     Assessment & Plan  # Sepsis secondary to pyelonephritis in setting of ureteral stent   Patient with leukocytosis, tachycardia and low grade fever presenting with left flank pain.     - Admit to Telemetry   - Vancomycin to cover for enterococcus and   - Ceftriaxone   - However if patient does not clinically improve low threshold to escalate antibiotics   - Follow urine and blood cultures   - IVF   - Consult urology to assess ureteral stent in AM     Please see Dr. Aguiar's H&P for full details

## 2019-10-11 NOTE — PROGRESS NOTES
Bedside report received. Patient A&O x4. Complains of 3/10 abdominal pain, medicated per MAR.  POC discussed with patient. Patient verbalized understanding. Call light and belongings within reach. Bed locked and in lowest position, alarm and fall precautions in place.

## 2019-10-11 NOTE — PROGRESS NOTES
Internal Medicine Interval Note  Note Author: Toro Maurice M.D.     Name Nessa Dennis 1945   Age/Sex 74 y.o. female   MRN [id number]   Code Status Full     After 5PM or if no immediate response to page, please call for cross-coverage  Attending/Team: Dr Tillman/Monica See Patient List for primary contact information  Call [phone] to page    1st Call - Day Intern (R1):   Dr Maurice 2nd Call - Day Sr. Resident (R2/R3):   Dr Gupta         Reason for interval visit  (Principal Problem)   Pt spiked a fever of 37.8 last night while on ceftriaxone and vancomycin. Blood pressure was stable and with normal HR. Pt was tolerating fluids, with good UOP. Otherwise, pt  worsening flank pain, but does have some nausea and vomiting.     Pt was switched to meropenem and still on vancomycin. Blood culture grew gram negative rods hence concerned for ESBL, Klebesiella and Pseudomonas bacteremia.   WBC ct trended down to 11.3 from 14.5;     Update:  Pt has nausea and zofran was increased to 8mg from 4mg.     Nuclear perfusion scan with diuretic washout showed persistent obstruction at left ureteral stent. IR has been consulted and would attempt to remove the stent and definitely will put in nephrostomy tube to relieve pressure to improve hydrouteronephrosis.     Antibiotics   Vancomycin 10/10 to present  Meropenem 10/11 to present  Ceftriaxone 10/10-10/11  Cefapime 10/10-10/11      Review of Systems   Constitutional: Positive for fever. Negative for chills, diaphoresis, malaise/fatigue and weight loss.   Eyes: Negative for blurred vision, double vision and photophobia.   Respiratory: Negative for cough, hemoptysis, sputum production, shortness of breath and wheezing.    Cardiovascular: Negative for chest pain, palpitations, orthopnea and claudication.   Gastrointestinal: Positive for nausea and vomiting. Negative for abdominal pain, constipation, diarrhea and heartburn.   Genitourinary: Negative for  dysuria, flank pain, frequency, hematuria and urgency.   Skin: Negative for itching and rash.   Neurological: Negative for dizziness, sensory change and headaches.       Disposition/Barriers to discharge:   Infection needs to be improved.      PCP: Julio Ross M.D.      Quality Measures  Quality-Core Measures   Reviewed items::  EKG reviewed, Labs reviewed and Medications reviewed  Greene catheter::  No Greene  Not on DVT since needing IR intervention.           Physical Exam       Vitals:    10/11/19 0134 10/11/19 0449 10/11/19 0723 10/11/19 1342   BP: 137/89 101/66 102/70 132/90   Pulse: (!) 111 98 89 95   Resp: 18 16 16 18   Temp: (!) 38.3 °C (100.9 °F) 36.8 °C (98.3 °F) 36.9 °C (98.5 °F) 37.3 °C (99.1 °F)   TempSrc: Temporal Temporal Temporal Temporal   SpO2: 91% 90% 89% 91%   Weight: 61.6 kg (135 lb 12.9 oz)      Height:         Body mass index is 26.52 kg/m². Weight: 61.6 kg (135 lb 12.9 oz)  Oxygen Therapy:  Pulse Oximetry: 91 %, O2 (LPM): 0, O2 Delivery: None (Room Air)    Physical Exam   Constitutional: She is oriented to person, place, and time.   Appears comfortable. Not in distressed. Non diaphoretic   HENT:   Head: Normocephalic.   No oral lesions, MP =2   Eyes:   ALEKSANDR   Neck: No JVD present. No tracheal deviation present. No thyromegaly present.   Cardiovascular:   Normal rate and rhythm, no murmur heard, no JVD noticed, no b/l edema. Euvolemic on fluids   Pulmonary/Chest:   No crackles, or wheezing.    Abdominal:   Non distended, no ascites, no tenderness. Ostomy bag draining billous material with stump red and moist. No acid leak noticed.    Genitourinary:   Genitourinary Comments: No costovertebral tenderness bilaterally, no suprapubic pain on deep palpation   Musculoskeletal: She exhibits no edema or tenderness.   Neurological: She is alert and oriented to person, place, and time. She displays normal reflexes. She exhibits normal muscle tone.   Skin: Skin is warm and dry.   Psychiatric:  Memory, affect and judgment normal.             Assessment/Plan   Sonny is a 73 y/o F w/ hx of neurogenic bladder with ureteric stricture 2/2 cervical cancer radiation treatment 5 years ago, who has hx of recurrent complicated UTI with ESBL Klebsiella UTI, pyelonephritis in the past, had a recent Left ureteric stent  removed on 8/29/2019 due to infection and had new one put in 8/22/2019 who was admitted for left pyelonephritis and persistent left ureteric stent occulusion pending IR intervention to remove stent and nephrostomy tube placement in the context of gram negative bacteremia without any overt  extra-renal and extra-urinary tract infection source on meropenem and vancomycin.         * Sepsis secondary to pyelonephritis- (present on admission)  Assessment & Plan  Stable, afebrile with stable blood pressure. Blood culture grew gram negative bacilli. On vancomycin and meropenem worried over ESBL and Klebsiella. Pt spiked a fever on ceftriaxone and vancomycin. Source control per IR attempting to remove stent and will put nephrostomy tube. Gyn-Onc in agreement of plan. Improvement of hydration complicated by pt has colostomy hence compromised fluid/water absorption via PO.     Plan  Awaiting for identification of blood culture growth.  Continue Vanc and meropenem  Continue mIVF  Monitor fever curve.         ADA (acute kidney injury) (HCC)- (present on admission)  Assessment & Plan  Crea is 1.15, baseline 0.65 on 3/2019. ADA is multifactorial: hemodynamic ADA due to sepsis/dehydration and inflammation with intra-renal ADA likely a septic ADA with pyelonephritis and obstructive ADA 2/2 neurogenic bladder and persistent Left ureteric stent obstruction as evidence of hydroureteronephrosis.     Plan  Improve infection via abx,   Improve effective blood volume via mIVF  Remove stent obstruction; appreciate IR recs  Monitor for post-obstructive diuresis post stent removal or nephrostomy tube placement.  Avoid  nephrotoxins     Pyelonephritis- (present on admission)  Assessment & Plan  Pt has nausea, vomiting, left flank pain and leukocytosis. H/o recurrent UTI; treated for ESBL klebsiella UTI in 3/19 with IV ABx. CT showed pyelonephritis now growing gram negative rods on blood culture.    Plan  Follow up blood culture;   Continue meropenem and vancomycin.   Continue mIVF.   Increased zofran to 8mg q8hr PRN from 4mg q6hr PRN  WBC ct monitoring tomorrow  Crea, K, BUN monitoring tomorrow.         Hydronephrosis due to obstruction of ureter stent- (present on admission)  Assessment & Plan  Likely sub-acute to chronic Hydroureteronephrosisnephrosis. CT on 9/16 showed left hydrouteronephrosis 2/2 ureteric stricture from cervical cancer radiation therapy.     Multiple factors can cause her hydrouteronephrosis: her ureteric strictures, stent and neurogenic bladder that looks like thickned from rad therapy.   NM renal with diuretic washout shows persistent occlusion at the left ureteric stent which needs to be taken out in the context of infection and post-obstruction ADA.     Plan  Appreciate IR recs: attempt to remove stent with nephrostomy tube. .  Will restart heparin post IR intervention.   Continue to monitor creatinine.  Post-obstructive diuresis monitoring after nephrostomy tube placement.         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   #hx of recurrent ESBL klebsiella UTI w/ ureteric strictures 2/2 radiation therapy.      Per pt has clear urine since admission. Denies pneumaturia and fecaluria. WBC ct trending down from 15 now 11.0  Admission UA showing nitrate and leukocyte esterase positive. States she self-cath and reuses catheter by washing it with soap and water.   Source control: IR will attempt to remove stent.     Plan  Continue on meropenem and vancomycin  Follow up UA culture   Trend WBC  ct  Appreciate IR recs            HTN (hypertension)- (present on admission)  Assessment & Plan  Admssion SBP of 160s likely sign of sepsis, initially went down and now trending up status post fluid administration.     Plan  Continue home Lisinopril 40 mg daily   Improve hydration status.     History of Cervical cancer (HCC)- (present on admission)  Assessment & Plan  H/o cervical cancer s/p radial hysterectomy and b/l salpingo-oophorectomy in 2005  Recurrence in 2015- treated with chemoradiation  Following up with Dr. 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

## 2019-10-11 NOTE — WOUND TEAM
"[facility]   Inpatient Services   Established Ostomy Management/ troubleshooting  HPI: Reviewed  PMH: Reviewed   SH: Reviewed   Reason for Ostomy nurse consult:  Assess needs    Subjective: changed appliance yesterday    Objective:   Ostomy type:  colostomy  Stoma location:  LUQ  Stoma assessment:    Appearance:   red   Size:   approx 2\"   Protrusion: budded   Output: None noted   MC jxn:  NA   Peristomal skin:  NA    Ostomy Appliance (type and size): 2 3/4\" two piece Fowler     Interventions and Education:  Checked appliance and stoma. Ordered supplies. Provided nursing with instructions for care     Evaluation: Independent with ostomy care.    Plan: Nursing to assist patient, and to contact ostomy RN for problems.    Anticipated discharge needs: TBD    "

## 2019-10-11 NOTE — NON-PROVIDER
Internal Medicine Medical Student Note  Note Author: Nir Montenegro, Student    Name Nessa Dennis     1945   Age/Sex 74 y.o. female   MRN [id number]   Code Status FULL             Reason for interval visit  (Principal Problem)   Sepsis (HCC)    Interval Problem Daily Status Update  (problem status, last 24 hours, new history, new data )   75 yo female with a hx of cervical cancer status post radical hysterectomy in  with recurrence in , status post chemoradiation resulting in urinary retention. She presents today with L sided flank pain and NV for 4 days. On 19 she was found to be septic 2/2 pyelonephritis, was admitted and treated with one week meropenem due to recent ESBL infection. Left nephrostomy tube placed on  and ureteral stent placed on .    N/P: Alert and oriented, no in acute distress.  ENT: Mallampati score 2.  MSK: CLICK score >17. No articular joint pain.   CV: Normovolemic, mild elevated procalcitonin at 0.77 ng/mL. Per CT trace pericardial effusion.  Pulmonary: Able to protect airway. Per Chest x-ray left basilar atelectasis, no focal infiltrate and atherosclerosis.  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  : CT revealed enlargement of Left kidney possible 2/2 to stent obstruction vs. Pyelonephritis.  Endo: Normal  Derm/rash: normal     Physical Exam       Vitals:    10/11/19 0134 10/11/19 0449 10/11/19 0723 10/11/19 1342   BP: 137/89 101/66 102/70 132/90   Pulse: (!) 111 98 89 95   Resp: 18 16 16 18   Temp: (!) 38.3 °C (100.9 °F) 36.8 °C (98.3 °F) 36.9 °C (98.5 °F) 37.3 °C (99.1 °F)   TempSrc: Temporal Temporal Temporal Temporal   SpO2: 91% 90% 89% 91%   Weight: 61.6 kg (135 lb 12.9 oz)      Height:         Body mass index is 26.52 kg/m². Weight: 61.6 kg (135 lb 12.9 oz)  Oxygen Therapy:  Pulse Oximetry: 91 %, O2 (LPM): 0, O2 Delivery: None (Room Air)    Physical Exam   Constitutional: She is oriented to person, place, and time. Vital  signs are normal. She appears distressed (mildly).   HENT:   Head: Normocephalic and atraumatic.   Right Ear: External ear normal.   Left Ear: External ear normal.   Mouth/Throat: Uvula is midline, oropharynx is clear and moist and mucous membranes are normal.   Eyes: Pupils are equal, round, and reactive to light. Conjunctivae, EOM and lids are normal. No scleral icterus.   Neck: Neck supple.   Cardiovascular: Normal rate, regular rhythm, S1 normal, S2 normal, normal heart sounds and intact distal pulses. Exam reveals no gallop.   No murmur heard.  Pulmonary/Chest: Effort normal. She has decreased breath sounds. She has wheezes in the left upper field and the left middle field. She has no rhonchi. She has no rales.   Abdominal: Soft. Bowel sounds are normal. There is no tenderness. There is CVA tenderness (Left). There is no rebound.   Lymphadenopathy:     She has no cervical adenopathy.   Neurological: She is alert and oriented to person, place, and time.   Skin: Skin is warm, dry and intact.   Psychiatric: Mood, memory, affect and judgment normal.     NM-RENAL WITH DIURETIC WASHOUT   Final Result      1.  Delayed perfusion and excretion of the left kidney suggesting persistent obstruction.   2.  Right renal perfusion and excretion.      DX-CHEST-LIMITED (1 VIEW)   Final Result         1.  Left basilar atelectasis, no focal infiltrate   2.  Atherosclerosis      CT-RENAL COLIC EVALUATION(A/P W/O)   Final Result         1.  Enlargement of the left kidney with increase hazy left perinephric fat stranding with ureteral stent in place. Consider stent obstruction or pyelonephritis.   2.  Diffuse bladder wall thickening, consider component of cystitis. Stable since prior study.   3.  Parastomal hernia containing small bowel loops without apparent obstructive change.   4.  Hepatomegaly   5.  Trace pericardial effusion      IR-CONSULT AND TREAT    (Results Pending)           Assessment/Plan     #Sepsis secondary to  pyelonephritis.   Assessment:  Previous CT on 9/16 revealed hydroureteronephrosis 2/2 pyelonephritis or ureteric stricture possible iatrogenic (hx of radiation for cervical CA). Ureteric stent put on 4/19 was removed on 8/29 2/2 infection. Nephrologist were unable to put another stent due to mid and distal ureteric stricture. A nephrostomy tube was place instead on 9/22. IR successfully put on a new stent on 9/25.     Plan:  -Renal scan with Lasix washout to rule in or out ureteric obstruction.   -IR will attempt to remove stent if no bleeding complications during the procedure.  -IR will attempt to put a nephrostomy tube.   -Start Vanco and Meropenem since there is past hx of ESBL infection.  -Consult Dr. Hays (Gyn/Onco)

## 2019-10-11 NOTE — PROGRESS NOTES
Pt complaining of nausea, vomited 100mL. Called [facility], administered 4mg zofran. 1 hour later pt still complaining of nausea then vomitted again. Called [facility], new order for 8mg zofran, gave the additional 4 mg. Pt resting in bed. Will continue to monitor.

## 2019-10-11 NOTE — ED TRIAGE NOTES
Nessa Dennis  74 y.o.  Chief Complaint   Patient presents with   • Flank Pain     onset today, recent stent placed in left ureter following pyelonephritis admission in september   • N/V     onset today, 4-5 times     Patient wheeled in wc with  to triage room with above complaint.  Patient appears in mild discomfort.  States this feels like when she had a urine infection last month.  Pain is on left side.    Protocol ordered.  Patient escorted to the lobby and instructed to notify staff of any changes in condition.

## 2019-10-11 NOTE — PROGRESS NOTES
DISPLAY PLAN FREE TEXT Pharmacy Kinetics 74 y.o. female on vancomycin day # 3 10/11/2019    Currently on Vancomycin 900 mg iv q24hr  ()  Provider specified end date: TBD    Indication for Treatment: UTI ,r/o Pyelonephritis    Pertinent history per medical record: Admitted on 10/10/2019 for SIRS. 73 y/o f, w/ PMH of cervical cancer s/p radical hysterectomy and b/l Salpingoophorectomy in , relapse in  treated with chemoradiation, ectovaginal fistula requiring diverting colostomy, neurogenic bladder, recurrent UTI, pyelonephritis, hydroureteronephrosis requiring ureteric stent in 2019 and removed on 2019.     Other antibiotics: Merrem    Allergies: Sulfa drugs     List concerns for renal function: elevated baseline SCr for age    Pertinent cultures to date:   10/10/19:PBCX2:NGTD    Previous Cx Hx:  19:Nephro Tube,URSP:Candida albicans ,Enterococcus faecium (vanco sensitive EVELIN 0.5mcg/mL )    MRSA nares swab if pneumonia is a concern (ordered/positive/negative/n-a): NA    Recent Labs     10/10/19  1842 10/11/19  0650   WBC 14.5* 11.3*   NEUTSPOLYS 87.60* 84.60*     Recent Labs     10/10/19  1842 10/11/19  0650   BUN 25* 26*   CREATININE 0.96 1.06   ALBUMIN 4.6 3.6     No results for input(s): VANCOTROUGH, VANCOPEAK, VANCORANDOM in the last 72 hours.No intake or output data in the 24 hours ending 10/11/19 0924   /66   Pulse 98   Temp 36.8 °C (98.3 °F) (Temporal)   Resp 16   Ht 1.524 m (5')   Wt 61.6 kg (135 lb 12.9 oz)   SpO2 90%  Temp (24hrs), Av.3 °C (99.1 °F), Min:36.8 °C (98.2 °F), Max:38.3 °C (100.9 °F)      A/P   1. Vancomycin dose change: no change   2. Next vancomycin level: 10/12/19@1930  3. Goal trough: 12-16 mcg/mL   4. Comments: Leukocytosis , febrile over interval. Cx NGTD. Will follow SCr/BUN daily , cautiously scheduled vancomycin dose. Continue current dose for now.     Toro Jett PharmD BCPS    DISPLAY PLAN FREE TEXT DISPLAY PLAN FREE TEXT DISPLAY PLAN FREE TEXT DISPLAY PLAN FREE TEXT DISPLAY PLAN FREE TEXT DISPLAY PLAN FREE TEXT DISPLAY PLAN FREE TEXT DISPLAY PLAN FREE TEXT DISPLAY PLAN FREE TEXT DISPLAY PLAN FREE TEXT DISPLAY PLAN FREE TEXT DISPLAY PLAN FREE TEXT DISPLAY PLAN FREE TEXT DISPLAY PLAN FREE TEXT DISPLAY PLAN FREE TEXT DISPLAY PLAN FREE TEXT

## 2019-10-11 NOTE — PROGRESS NOTES
· 2 RN skin check complete with MAYCOL Boyd.  · Devices in place: None  · Skin assessed under devices: NA  · Confirmed pressure ulcers found on: None  · New potential pressure ulcers noted on: None  ·  Some redness on top of feet, red and blanching, colostomy bag on left lower quadrant of abdomen, wound consult placed, no other skin concerns.     · The following interventions in place: Wound consult in place for ostomy bag, encouraged frequent turning, will continue to monitor.

## 2019-10-12 LAB
ALBUMIN SERPL BCP-MCNC: 3.9 G/DL (ref 3.2–4.9)
ALBUMIN/GLOB SERPL: 1.7 G/DL
ALP SERPL-CCNC: 76 U/L (ref 30–99)
ALT SERPL-CCNC: 8 U/L (ref 2–50)
ANION GAP SERPL CALC-SCNC: 7 MMOL/L (ref 0–11.9)
AST SERPL-CCNC: 12 U/L (ref 12–45)
BACTERIA UR CULT: ABNORMAL
BACTERIA UR CULT: ABNORMAL
BASOPHILS # BLD AUTO: 0.2 % (ref 0–1.8)
BASOPHILS # BLD: 0.02 K/UL (ref 0–0.12)
BILIRUB SERPL-MCNC: 0.6 MG/DL (ref 0.1–1.5)
BUN SERPL-MCNC: 22 MG/DL (ref 8–22)
CALCIUM SERPL-MCNC: 8.8 MG/DL (ref 8.5–10.5)
CHLORIDE SERPL-SCNC: 109 MMOL/L (ref 96–112)
CO2 SERPL-SCNC: 25 MMOL/L (ref 20–33)
CREAT SERPL-MCNC: 0.94 MG/DL (ref 0.5–1.4)
EOSINOPHIL # BLD AUTO: 0.09 K/UL (ref 0–0.51)
EOSINOPHIL NFR BLD: 1.1 % (ref 0–6.9)
ERYTHROCYTE [DISTWIDTH] IN BLOOD BY AUTOMATED COUNT: 44.4 FL (ref 35.9–50)
FERRITIN SERPL-MCNC: 130.2 NG/ML (ref 10–291)
GLOBULIN SER CALC-MCNC: 2.3 G/DL (ref 1.9–3.5)
GLUCOSE SERPL-MCNC: 101 MG/DL (ref 65–99)
GRAM STN SPEC: NORMAL
HCT VFR BLD AUTO: 37.6 % (ref 37–47)
HGB BLD-MCNC: 11.8 G/DL (ref 12–16)
IMM GRANULOCYTES # BLD AUTO: 0.02 K/UL (ref 0–0.11)
IMM GRANULOCYTES NFR BLD AUTO: 0.2 % (ref 0–0.9)
IRON SATN MFR SERPL: 10 % (ref 15–55)
IRON SERPL-MCNC: 25 UG/DL (ref 40–170)
LYMPHOCYTES # BLD AUTO: 0.68 K/UL (ref 1–4.8)
LYMPHOCYTES NFR BLD: 8.2 % (ref 22–41)
MAGNESIUM SERPL-MCNC: 2.6 MG/DL (ref 1.5–2.5)
MCH RBC QN AUTO: 26.5 PG (ref 27–33)
MCHC RBC AUTO-ENTMCNC: 31.4 G/DL (ref 33.6–35)
MCV RBC AUTO: 84.5 FL (ref 81.4–97.8)
MONOCYTES # BLD AUTO: 0.74 K/UL (ref 0–0.85)
MONOCYTES NFR BLD AUTO: 8.9 % (ref 0–13.4)
NEUTROPHILS # BLD AUTO: 6.75 K/UL (ref 2–7.15)
NEUTROPHILS NFR BLD: 81.4 % (ref 44–72)
NRBC # BLD AUTO: 0 K/UL
NRBC BLD-RTO: 0 /100 WBC
PHOSPHATE SERPL-MCNC: 2.8 MG/DL (ref 2.5–4.5)
PLATELET # BLD AUTO: 191 K/UL (ref 164–446)
PMV BLD AUTO: 9.9 FL (ref 9–12.9)
POTASSIUM SERPL-SCNC: 3.9 MMOL/L (ref 3.6–5.5)
PROT SERPL-MCNC: 6.2 G/DL (ref 6–8.2)
RBC # BLD AUTO: 4.45 M/UL (ref 4.2–5.4)
SIGNIFICANT IND 70042: ABNORMAL
SIGNIFICANT IND 70042: NORMAL
SITE SITE: ABNORMAL
SITE SITE: NORMAL
SODIUM SERPL-SCNC: 141 MMOL/L (ref 135–145)
SOURCE SOURCE: ABNORMAL
SOURCE SOURCE: NORMAL
TIBC SERPL-MCNC: 258 UG/DL (ref 250–450)
VANCOMYCIN TROUGH SERPL-MCNC: 12 UG/ML (ref 10–20)
WBC # BLD AUTO: 8.3 K/UL (ref 4.8–10.8)

## 2019-10-12 PROCEDURE — 36415 COLL VENOUS BLD VENIPUNCTURE: CPT

## 2019-10-12 PROCEDURE — 700105 HCHG RX REV CODE 258: Performed by: STUDENT IN AN ORGANIZED HEALTH CARE EDUCATION/TRAINING PROGRAM

## 2019-10-12 PROCEDURE — 770020 HCHG ROOM/CARE - TELE (206)

## 2019-10-12 PROCEDURE — 84100 ASSAY OF PHOSPHORUS: CPT

## 2019-10-12 PROCEDURE — 83550 IRON BINDING TEST: CPT

## 2019-10-12 PROCEDURE — A9270 NON-COVERED ITEM OR SERVICE: HCPCS | Performed by: INTERNAL MEDICINE

## 2019-10-12 PROCEDURE — 700111 HCHG RX REV CODE 636 W/ 250 OVERRIDE (IP): Performed by: STUDENT IN AN ORGANIZED HEALTH CARE EDUCATION/TRAINING PROGRAM

## 2019-10-12 PROCEDURE — A9270 NON-COVERED ITEM OR SERVICE: HCPCS | Performed by: STUDENT IN AN ORGANIZED HEALTH CARE EDUCATION/TRAINING PROGRAM

## 2019-10-12 PROCEDURE — 700102 HCHG RX REV CODE 250 W/ 637 OVERRIDE(OP): Performed by: INTERNAL MEDICINE

## 2019-10-12 PROCEDURE — 700102 HCHG RX REV CODE 250 W/ 637 OVERRIDE(OP): Performed by: STUDENT IN AN ORGANIZED HEALTH CARE EDUCATION/TRAINING PROGRAM

## 2019-10-12 PROCEDURE — 80053 COMPREHEN METABOLIC PANEL: CPT

## 2019-10-12 PROCEDURE — 82728 ASSAY OF FERRITIN: CPT

## 2019-10-12 PROCEDURE — 80202 ASSAY OF VANCOMYCIN: CPT

## 2019-10-12 PROCEDURE — 99233 SBSQ HOSP IP/OBS HIGH 50: CPT | Mod: GC | Performed by: INTERNAL MEDICINE

## 2019-10-12 PROCEDURE — 85025 COMPLETE CBC W/AUTO DIFF WBC: CPT

## 2019-10-12 PROCEDURE — 83540 ASSAY OF IRON: CPT

## 2019-10-12 PROCEDURE — 83735 ASSAY OF MAGNESIUM: CPT

## 2019-10-12 RX ORDER — FLUTICASONE PROPIONATE 50 MCG
2 SPRAY, SUSPENSION (ML) NASAL DAILY
Status: DISCONTINUED | OUTPATIENT
Start: 2019-10-12 | End: 2019-10-14 | Stop reason: HOSPADM

## 2019-10-12 RX ORDER — ACETAMINOPHEN 325 MG/1
650 TABLET ORAL
Status: DISCONTINUED | OUTPATIENT
Start: 2019-10-12 | End: 2019-10-14 | Stop reason: HOSPADM

## 2019-10-12 RX ORDER — HEPARIN SODIUM 5000 [USP'U]/ML
5000 INJECTION, SOLUTION INTRAVENOUS; SUBCUTANEOUS EVERY 8 HOURS
Status: DISCONTINUED | OUTPATIENT
Start: 2019-10-12 | End: 2019-10-12

## 2019-10-12 RX ORDER — IBUPROFEN 600 MG/1
600 TABLET ORAL 3 TIMES DAILY
Status: DISCONTINUED | OUTPATIENT
Start: 2019-10-12 | End: 2019-10-14 | Stop reason: HOSPADM

## 2019-10-12 RX ORDER — OMEPRAZOLE 20 MG/1
20 CAPSULE, DELAYED RELEASE ORAL DAILY
Status: DISCONTINUED | OUTPATIENT
Start: 2019-10-13 | End: 2019-10-14 | Stop reason: HOSPADM

## 2019-10-12 RX ORDER — ACETAMINOPHEN 325 MG/1
650 TABLET ORAL ONCE
Status: DISCONTINUED | OUTPATIENT
Start: 2019-10-12 | End: 2019-10-12

## 2019-10-12 RX ADMIN — MEROPENEM 500 MG: 500 INJECTION, POWDER, FOR SOLUTION INTRAVENOUS at 04:55

## 2019-10-12 RX ADMIN — MEROPENEM 500 MG: 500 INJECTION, POWDER, FOR SOLUTION INTRAVENOUS at 21:15

## 2019-10-12 RX ADMIN — MEROPENEM 500 MG: 500 INJECTION, POWDER, FOR SOLUTION INTRAVENOUS at 13:51

## 2019-10-12 RX ADMIN — LISINOPRIL 10 MG: 5 TABLET ORAL at 04:55

## 2019-10-12 RX ADMIN — PROCHLORPERAZINE EDISYLATE 10 MG: 5 INJECTION INTRAMUSCULAR; INTRAVENOUS at 19:35

## 2019-10-12 RX ADMIN — ACETAMINOPHEN 650 MG: 325 TABLET, FILM COATED ORAL at 07:39

## 2019-10-12 RX ADMIN — IBUPROFEN 600 MG: 600 TABLET ORAL at 20:32

## 2019-10-12 RX ADMIN — FLUTICASONE PROPIONATE 100 MCG: 50 SPRAY, METERED NASAL at 17:50

## 2019-10-12 RX ADMIN — SODIUM CHLORIDE: 9 INJECTION, SOLUTION INTRAVENOUS at 17:04

## 2019-10-12 RX ADMIN — SODIUM CHLORIDE: 9 INJECTION, SOLUTION INTRAVENOUS at 00:57

## 2019-10-12 RX ADMIN — ONDANSETRON 8 MG: 2 INJECTION INTRAMUSCULAR; INTRAVENOUS at 17:01

## 2019-10-12 RX ADMIN — ACETAMINOPHEN 650 MG: 325 TABLET, FILM COATED ORAL at 13:48

## 2019-10-12 ASSESSMENT — ENCOUNTER SYMPTOMS
WHEEZING: 0
COUGH: 0
BACK PAIN: 1
FEVER: 0
SHORTNESS OF BREATH: 0
WEAKNESS: 0
SORE THROAT: 0
VOMITING: 0
NAUSEA: 1
CHILLS: 0
NAUSEA: 0
DIARRHEA: 0
DIZZINESS: 0
SEIZURES: 0
PALPITATIONS: 0
HEADACHES: 0
ABDOMINAL PAIN: 0

## 2019-10-12 NOTE — PROGRESS NOTES
Telemetry Summary:    Measurements: .12/.08/.32     Rhythm: NSR    Ectopy: NONE    Rate: 87-97    Morales Schafer

## 2019-10-12 NOTE — PROGRESS NOTES
IR Procedure Note:    Dr. Block consented patient prior to procedure; all questions answered.    Site confirmed with imaging guidance by patient, physician, RT, and RN.     Dr. Block completed a left ureteral stent retreival with left nephrostomy tube placement.  The patient tolerated the procedure well; ETCo2 range  25-32, with consistent waveform during the procedure. Urine sample for C& S, gram stain, and cell count.      Suture metapore tape and gauze applied to left flank, CDI and soft.  Patient alert, oriented and verbally appropriate post procedure, patient remained hemodynamically stable during procedure and transport, see flow sheet for vital signs.  Report given to MAYCOL Cuevas.  RN transported patient to T812 with SaO2 monitor @ 96 % on oxygen via NC on  2 lpm.     Sedation End Time: 1905    GoingOn Flexima Regular Nephrostomy Catheter, 10 F x 25 cm, REF#[id number], LOT [id number]

## 2019-10-12 NOTE — PROGRESS NOTES
Bedside report received. POC discussed with pt; all questions answered at this time.     L neph tube is draining. Pt denies nausea at this time.

## 2019-10-12 NOTE — OR SURGEON
Immediate Post- Operative Note    PostOp Diagnosis: RENAL OBSTRUCTION. NON-FUNCTIONING LEFT DOUBLE PIGTAIL URETERAL STENT.      Procedure(s): LEFT 10F PERCUTANEOUS NEPHROSTOMY PLACEMENT AND NEPHROSTOGRAM WITH U/S AND FLUOROSCOPIC GUIDANCE    RETRIEVAL OF LEFT DOUBLE PIGTAIL URETERAL STENT    URINE SPECIMEN 5 ML FROM INITIAL NEEDLE PUNCTURE SENT FOR C+S, GRAM, CELL COUNT      Estimated Blood Loss: <1CC        Complications: NONE          10/11/2019     6:52 PM     Michael Block

## 2019-10-12 NOTE — PROGRESS NOTES
Gynecology Oncology Progress Note               Author: Veronica Guerrero Date & Time created: 10/12/2019  1:13 PM     Interval History:  Patient feels significantly better with NT in place    Review of Systems:  Review of Systems   Constitutional: Negative for chills and fever.   Respiratory: Negative for shortness of breath.    Cardiovascular: Negative for chest pain.   Gastrointestinal: Negative for nausea and vomiting.       Physical Exam:  Physical Exam   Constitutional: She is oriented to person, place, and time. No distress.   Pulmonary/Chest: No respiratory distress.   Abdominal: Soft. She exhibits no distension.   Ostomy stoma pink   Genitourinary:   Genitourinary Comments: L NT    Neurological: She is alert and oriented to person, place, and time.   Psychiatric: She has a normal mood and affect.       Labs:          Recent Labs     10/10/19  1842 10/11/19  0200 10/11/19  0650 10/12/19  0236   SODIUM 138  --  138 141   POTASSIUM 4.0  --  4.4 3.9   CHLORIDE 105  --  108 109   CO2 24  --  20 25   BUN 25*  --  26* 22   CREATININE 0.96  --  1.06 0.94   MAGNESIUM  --  1.7  --  2.6*   PHOSPHORUS  --   --   --  2.8   CALCIUM 10.3  --  9.0 8.8     Recent Labs     10/10/19  1842 10/11/19  0650 10/12/19  0236   ALTSGPT 8 5 8   ASTSGOT 15 13 12   ALKPHOSPHAT 98 76 76   TBILIRUBIN 0.4 0.5 0.6   LIPASE 16  --   --    GLUCOSE 106* 112* 101*     Recent Labs     10/10/19  1842 10/11/19  0650 10/11/19  1302 10/12/19  0236   RBC 5.08 4.49  --  4.45   HEMOGLOBIN 13.8 11.9*  --  11.8*   HEMATOCRIT 41.9 37.8  --  37.6   PLATELETCT 221 219  --  191   PROTHROMBTM  --   --  14.0  --    INR  --   --  1.06  --    IRON  --   --   --  25*   FERRITIN  --   --   --  130.2   TOTIRONBC  --   --   --  258     Recent Labs     10/10/19  1842 10/11/19  0650 10/12/19  0236   WBC 14.5* 11.3* 8.3   NEUTSPOLYS 87.60* 84.60* 81.40*   LYMPHOCYTES 5.80* 7.80* 8.20*   MONOCYTES 5.20 6.60 8.90   EOSINOPHILS 0.90 0.20 1.10   BASOPHILS 0.10 0.30 0.20    ASTSGOT 15 13 12   ALTSGPT 8 5 8   ALKPHOSPHAT 98 76 76   TBILIRUBIN 0.4 0.5 0.6     Recent Labs     10/10/19  1842 10/11/19  0650 10/12/19  0236   SODIUM 138 138 141   POTASSIUM 4.0 4.4 3.9   CHLORIDE 105 108 109   CO2 24 20 25   GLUCOSE 106* 112* 101*   BUN 25* 26* 22   CREATININE 0.96 1.06 0.94   CALCIUM 10.3 9.0 8.8     Hemodynamics:  Temp (24hrs), Av.7 °C (98 °F), Min:35.9 °C (96.7 °F), Max:37.3 °C (99.1 °F)  Temperature: 37.2 °C (99 °F)  Pulse  Av.6  Min: 87  Max: 111   Blood Pressure : 141/87     Respiratory:    Respiration: 16, Pulse Oximetry: 94 %        RUL Breath Sounds: Clear, RML Breath Sounds: Clear, RLL Breath Sounds: Clear;Diminished, AVERY Breath Sounds: Clear, LLL Breath Sounds: Clear;Diminished  Fluids:    Intake/Output Summary (Last 24 hours) at 10/12/2019 1313  Last data filed at 10/12/2019 1200  Gross per 24 hour   Intake 3760 ml   Output 1650 ml   Net 2110 ml        GI/Nutrition:  Orders Placed This Encounter   Procedures   • Diet Order Regular     Standing Status:   Standing     Number of Occurrences:   1     Order Specific Question:   Diet:     Answer:   Regular [1]     Medical Decision Making, by Problem:  Active Hospital Problems    Diagnosis   • *Sepsis secondary to pyelonephritis [A41.9]   • Pyelonephritis [N12]   • Hydronephrosis due to obstruction of ureter stent [N13.2]   • ADA (acute kidney injury) (HCC) [N17.9]   • Complicated UTI  [N39.0]   • HTN (hypertension) [I10]   • History of Cervical cancer (HCC) [C53.9]   • Digestive-genital tract fistula, 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 severe left hydronephrosis . Removal of internal stent and placement L NT on 10/11 with significant relief. Monitor for post obstructive diuresis, may require 1/2 NS bolus if significant output. uo ok now.  2. Sepsis- preliminary gram  negative sheba, managed by [facility] medicine. Wbc normal    Case discussed with Dr Escalera.  Quality-Core Measures

## 2019-10-12 NOTE — DOCUMENTATION QUERY
[facility]                                                                       Query Response Note      PATIENT:               LAURIE BRUNNER  ACCT #:                  [id number]  MRN:                     [id number]  :                      1945  ADMIT DATE:       10/10/2019 7:02 PM  DISCH DATE:          RESPONDING  PROVIDER #:        [id number]           QUERY TEXT:    Pyelonephritis and an indwelling left ureteral stent are documented in the Medical Record. Please clarify the relationship, if any, between pyelonephritis and indwelling ureteral stent.     NOTE: If an appropriate response is not listed below, please respond with a new note.    The patient's Clinical Indicators include:  Per H&P  -Pyelonephritis  -Hydronephrosis due to obstruction of ureter stent  -CT revealed left perinephric fat stranding, possible stent obstruction or pyelonephritis     Treatment:   Ureteral stent removal, placement of nephrostomy tube, urine & blood cxs, IV NS, meropenem, &  vanco    Risk Factors:   Indwelling ureteral stent, obstruction of ureteral stent, hydronephrosis, pyelonephritis, sepsis, & hx of neurogenic bladder with ureteric stricture 2/2 cervical cancer radiation treatment  Options provided:   -- Pyelonephritis is due to or associated with left ureteral stent   -- Pyelonephritis is unrelated to left ureteral stent   -- Unable to determine      Query created by: Joanie Nelson on 10/12/2019 11:55 AM    RESPONSE TEXT:    Pyelonephritis is due to or associated with left ureteral stent          Electronically signed by:  VIOLA IZQUIERDO 10/12/2019 3:28 PM

## 2019-10-12 NOTE — CARE PLAN
Problem: Communication  Goal: The ability to communicate needs accurately and effectively will improve  Outcome: PROGRESSING AS EXPECTED  Note:   Discussed POC with patient, patient agrees to participate in POC.  Patient encouraged to use call bell to ring for assistance.  Patient oriented to room, call bell, and bed controls.  Open line of communication established with the patient.  '     Problem: Safety  Goal: Will remain free from injury  Outcome: PROGRESSING AS EXPECTED  Note:   Instructed patient to use call bell and ring for assistance prior to ambulation.  Non-Skid foot ware in place, bed in low locked position, call bell and personal belongings within reach.         Problem: Venous Thromboembolism (VTW)/Deep Vein Thrombosis (DVT) Prevention:  Goal: Patient will participate in Venous Thrombosis (VTE)/Deep Vein Thrombosis (DVT)Prevention Measures  Outcome: PROGRESSING AS EXPECTED  Note:   SCD's ON     Problem: Bowel/Gastric:  Goal: Normal bowel function is maintained or improved  Outcome: PROGRESSING AS EXPECTED  Note:   LLQ Colostomy      Problem: Knowledge Deficit  Goal: Knowledge of disease process/condition, treatment plan, diagnostic tests, and medications will improve  Outcome: PROGRESSING AS EXPECTED  Note:   Discussed plan of care and medications with patient.  Patient verbalizes understandings of treatment regimen.         Problem: Pain Management  Goal: Pain level will decrease to patient's comfort goal  Outcome: PROGRESSING AS EXPECTED  Note:   PRN Tylenol for Left Flank Nephro Tube Pain

## 2019-10-12 NOTE — PROGRESS NOTES
Pharmacy Kinetics 74 y.o. female on vancomycin day # 4 10/12/2019    Currently on Vancomycin 900 mg iv q24hr  ()  Provider specified end date: TBD     Indication for Treatment: UTI ,r/o Pyelonephritis     Pertinent history per medical record: Admitted on 10/10/2019 for SIRS. 75 y/o f, w/ PMH of cervical cancer s/p radical hysterectomy and b/l Salpingoophorectomy in , relapse in  treated with chemoradiation, ectovaginal fistula requiring diverting colostomy, neurogenic bladder, recurrent UTI, pyelonephritis, hydroureteronephrosis requiring ureteric stent in 2019 and removed on 2019.      Other antibiotics: Merrem 500 mg iv q8hr      Allergies: Sulfa drugs      List concerns for renal function: elevated baseline SCr for age     Pertinent cultures to date:   10/10/19:PBCX2:GNR  10/10/19:UR:Proteus vulgaris   10/11/19:Lt Kidney,BF:NGTD    Previous Cx Hx:  19:Nephro Tube,URSP:Candida albicans ,Enterococcus faecium (vanco sensitive EVELIN 0.5mcg/mL )       MRSA nares swab if pneumonia is a concern (ordered/positive/negative/n-a): NA    Recent Labs     10/10/19  1842 10/11/19  0650 10/12/19  0236   WBC 14.5* 11.3* 8.3   NEUTSPOLYS 87.60* 84.60* 81.40*     Recent Labs     10/10/19  1842 10/11/19  0650 10/12/19  0236   BUN 25* 26* 22   CREATININE 0.96 1.06 0.94   ALBUMIN 4.6 3.6 3.9     No results for input(s): VANCOTROUGH, VANCOPEAK, VANCORANDOM in the last 72 hours.    Intake/Output Summary (Last 24 hours) at 10/12/2019 0941  Last data filed at 10/12/2019 0600  Gross per 24 hour   Intake 3660 ml   Output 850 ml   Net 2810 ml      /73   Pulse 88   Temp 36.7 °C (98 °F) (Temporal)   Resp 16   Ht 1.524 m (5')   Wt 61.6 kg (135 lb 12.9 oz)   SpO2 95%  Temp (24hrs), Av.6 °C (97.9 °F), Min:35.9 °C (96.7 °F), Max:37.3 °C (99.1 °F)      A/P        1. Vancomycin dose change: no change   2. Next vancomycin level: 10/12/19@1930  3. Goal trough: 12-16 mcg/mL   4. Comments: No leukocytosis ,  afebrile over interval. Cx listed above. Will follow SCr/BUN daily , cautiously scheduled vancomycin dose. Continue current dose for now. Consider ID consult .     Toro DesaiD BCPS

## 2019-10-12 NOTE — PROGRESS NOTES
Received pt report from day RN Faith.    Pt awake, alert, oriented X 4, patient returning from procedure.  Talked to recovery Rn and viewed left nephro tube.     Pt resting in bed, until bedrest is over.   Bed in low locked position, call bell at the bedside, tray table & personal belongings within reach. Non-skid footwear intact. White board updated to reflect plan of care for current shift. Pt door tags reviewed. Bed Alarm ON.    Assessed patient’s Left Nephro Tube, Neuro Status, Pain Level.    Vitals WNL-Frequents.    Tele NSR.    Morales Schafer

## 2019-10-12 NOTE — CARE PLAN
Pt educated regarding plan of care and medications. All questions answered.      Fall precautions in place. Bed in lowest position. Non-skid socks in place. Personal possessions within reach. Mobility sign on door. Bed-alarm on. Call light within reach. Pt educated regarding fall prevention and states understanding.

## 2019-10-13 LAB
ALBUMIN SERPL BCP-MCNC: 3.3 G/DL (ref 3.2–4.9)
ALBUMIN/GLOB SERPL: 1.6 G/DL
ALP SERPL-CCNC: 66 U/L (ref 30–99)
ALT SERPL-CCNC: 6 U/L (ref 2–50)
ANION GAP SERPL CALC-SCNC: 5 MMOL/L (ref 0–11.9)
APPEARANCE UR: ABNORMAL
AST SERPL-CCNC: 11 U/L (ref 12–45)
BACTERIA #/AREA URNS HPF: ABNORMAL /HPF
BACTERIA BLD CULT: ABNORMAL
BACTERIA BLD CULT: ABNORMAL
BASOPHILS # BLD AUTO: 0.3 % (ref 0–1.8)
BASOPHILS # BLD: 0.02 K/UL (ref 0–0.12)
BILIRUB SERPL-MCNC: 0.5 MG/DL (ref 0.1–1.5)
BILIRUB UR QL STRIP.AUTO: NEGATIVE
BUN SERPL-MCNC: 16 MG/DL (ref 8–22)
CALCIUM SERPL-MCNC: 8.5 MG/DL (ref 8.5–10.5)
CHLORIDE SERPL-SCNC: 109 MMOL/L (ref 96–112)
CO2 SERPL-SCNC: 25 MMOL/L (ref 20–33)
COLOR UR: YELLOW
CREAT SERPL-MCNC: 0.71 MG/DL (ref 0.5–1.4)
EOSINOPHIL # BLD AUTO: 0.28 K/UL (ref 0–0.51)
EOSINOPHIL NFR BLD: 4 % (ref 0–6.9)
EPI CELLS #/AREA URNS HPF: ABNORMAL /HPF
ERYTHROCYTE [DISTWIDTH] IN BLOOD BY AUTOMATED COUNT: 44.1 FL (ref 35.9–50)
FOLATE SERPL-MCNC: >22.4 NG/ML
GLOBULIN SER CALC-MCNC: 2.1 G/DL (ref 1.9–3.5)
GLUCOSE SERPL-MCNC: 98 MG/DL (ref 65–99)
GLUCOSE UR STRIP.AUTO-MCNC: NEGATIVE MG/DL
HCT VFR BLD AUTO: 33.6 % (ref 37–47)
HGB BLD-MCNC: 11.1 G/DL (ref 12–16)
HYALINE CASTS #/AREA URNS LPF: ABNORMAL /LPF
IMM GRANULOCYTES # BLD AUTO: 0.04 K/UL (ref 0–0.11)
IMM GRANULOCYTES NFR BLD AUTO: 0.6 % (ref 0–0.9)
KETONES UR STRIP.AUTO-MCNC: 15 MG/DL
LEUKOCYTE ESTERASE UR QL STRIP.AUTO: ABNORMAL
LYMPHOCYTES # BLD AUTO: 0.87 K/UL (ref 1–4.8)
LYMPHOCYTES NFR BLD: 12.3 % (ref 22–41)
MAGNESIUM SERPL-MCNC: 1.8 MG/DL (ref 1.5–2.5)
MCH RBC QN AUTO: 27.9 PG (ref 27–33)
MCHC RBC AUTO-ENTMCNC: 33 G/DL (ref 33.6–35)
MCV RBC AUTO: 84.4 FL (ref 81.4–97.8)
MICRO URNS: ABNORMAL
MONOCYTES # BLD AUTO: 0.76 K/UL (ref 0–0.85)
MONOCYTES NFR BLD AUTO: 10.8 % (ref 0–13.4)
NEUTROPHILS # BLD AUTO: 5.09 K/UL (ref 2–7.15)
NEUTROPHILS NFR BLD: 72 % (ref 44–72)
NITRITE UR QL STRIP.AUTO: NEGATIVE
NRBC # BLD AUTO: 0 K/UL
NRBC BLD-RTO: 0 /100 WBC
PH UR STRIP.AUTO: 6 [PH] (ref 5–8)
PHOSPHATE SERPL-MCNC: 1.9 MG/DL (ref 2.5–4.5)
PLATELET # BLD AUTO: 178 K/UL (ref 164–446)
PMV BLD AUTO: 10.3 FL (ref 9–12.9)
POTASSIUM SERPL-SCNC: 3.5 MMOL/L (ref 3.6–5.5)
PROT SERPL-MCNC: 5.4 G/DL (ref 6–8.2)
PROT UR QL STRIP: 100 MG/DL
RBC # BLD AUTO: 3.98 M/UL (ref 4.2–5.4)
RBC # URNS HPF: >150 /HPF
RBC UR QL AUTO: ABNORMAL
SIGNIFICANT IND 70042: ABNORMAL
SITE SITE: ABNORMAL
SODIUM SERPL-SCNC: 139 MMOL/L (ref 135–145)
SOURCE SOURCE: ABNORMAL
SP GR UR STRIP.AUTO: 1.02
T4 FREE SERPL-MCNC: 1.05 NG/DL (ref 0.53–1.43)
TREPONEMA PALLIDUM IGG+IGM AB [PRESENCE] IN SERUM OR PLASMA BY IMMUNOASSAY: NON REACTIVE
TSH SERPL DL<=0.005 MIU/L-ACNC: 1.69 UIU/ML (ref 0.38–5.33)
UROBILINOGEN UR STRIP.AUTO-MCNC: 0.2 MG/DL
VIT B12 SERPL-MCNC: 484 PG/ML (ref 211–911)
WBC # BLD AUTO: 7.1 K/UL (ref 4.8–10.8)
WBC #/AREA URNS HPF: ABNORMAL /HPF

## 2019-10-13 PROCEDURE — 700101 HCHG RX REV CODE 250: Performed by: STUDENT IN AN ORGANIZED HEALTH CARE EDUCATION/TRAINING PROGRAM

## 2019-10-13 PROCEDURE — 700105 HCHG RX REV CODE 258: Performed by: STUDENT IN AN ORGANIZED HEALTH CARE EDUCATION/TRAINING PROGRAM

## 2019-10-13 PROCEDURE — 700111 HCHG RX REV CODE 636 W/ 250 OVERRIDE (IP): Performed by: INTERNAL MEDICINE

## 2019-10-13 PROCEDURE — 81001 URINALYSIS AUTO W/SCOPE: CPT

## 2019-10-13 PROCEDURE — 770020 HCHG ROOM/CARE - TELE (206)

## 2019-10-13 PROCEDURE — 700111 HCHG RX REV CODE 636 W/ 250 OVERRIDE (IP): Performed by: STUDENT IN AN ORGANIZED HEALTH CARE EDUCATION/TRAINING PROGRAM

## 2019-10-13 PROCEDURE — A9270 NON-COVERED ITEM OR SERVICE: HCPCS | Performed by: INTERNAL MEDICINE

## 2019-10-13 PROCEDURE — 85025 COMPLETE CBC W/AUTO DIFF WBC: CPT

## 2019-10-13 PROCEDURE — 36415 COLL VENOUS BLD VENIPUNCTURE: CPT

## 2019-10-13 PROCEDURE — 87086 URINE CULTURE/COLONY COUNT: CPT

## 2019-10-13 PROCEDURE — 84100 ASSAY OF PHOSPHORUS: CPT

## 2019-10-13 PROCEDURE — 84439 ASSAY OF FREE THYROXINE: CPT

## 2019-10-13 PROCEDURE — 82607 VITAMIN B-12: CPT

## 2019-10-13 PROCEDURE — 83735 ASSAY OF MAGNESIUM: CPT

## 2019-10-13 PROCEDURE — 80053 COMPREHEN METABOLIC PANEL: CPT

## 2019-10-13 PROCEDURE — 84443 ASSAY THYROID STIM HORMONE: CPT

## 2019-10-13 PROCEDURE — 82746 ASSAY OF FOLIC ACID SERUM: CPT

## 2019-10-13 PROCEDURE — 86780 TREPONEMA PALLIDUM: CPT

## 2019-10-13 PROCEDURE — A9270 NON-COVERED ITEM OR SERVICE: HCPCS | Performed by: STUDENT IN AN ORGANIZED HEALTH CARE EDUCATION/TRAINING PROGRAM

## 2019-10-13 PROCEDURE — 700102 HCHG RX REV CODE 250 W/ 637 OVERRIDE(OP): Performed by: INTERNAL MEDICINE

## 2019-10-13 PROCEDURE — 99232 SBSQ HOSP IP/OBS MODERATE 35: CPT | Mod: GC | Performed by: INTERNAL MEDICINE

## 2019-10-13 PROCEDURE — 700102 HCHG RX REV CODE 250 W/ 637 OVERRIDE(OP): Performed by: STUDENT IN AN ORGANIZED HEALTH CARE EDUCATION/TRAINING PROGRAM

## 2019-10-13 RX ORDER — FERROUS SULFATE 325(65) MG
325 TABLET ORAL
Status: DISCONTINUED | OUTPATIENT
Start: 2019-10-13 | End: 2019-10-14 | Stop reason: HOSPADM

## 2019-10-13 RX ORDER — POTASSIUM CHLORIDE 20 MEQ/1
40 TABLET, EXTENDED RELEASE ORAL EVERY 6 HOURS
Status: DISCONTINUED | OUTPATIENT
Start: 2019-10-13 | End: 2019-10-13

## 2019-10-13 RX ORDER — LISINOPRIL 20 MG/1
20 TABLET ORAL DAILY
Status: DISCONTINUED | OUTPATIENT
Start: 2019-10-14 | End: 2019-10-14

## 2019-10-13 RX ORDER — MAGNESIUM SULFATE HEPTAHYDRATE 40 MG/ML
2 INJECTION, SOLUTION INTRAVENOUS ONCE
Status: COMPLETED | OUTPATIENT
Start: 2019-10-13 | End: 2019-10-13

## 2019-10-13 RX ORDER — POTASSIUM CHLORIDE 20 MEQ/1
40 TABLET, EXTENDED RELEASE ORAL ONCE
Status: COMPLETED | OUTPATIENT
Start: 2019-10-13 | End: 2019-10-13

## 2019-10-13 RX ADMIN — IBUPROFEN 600 MG: 600 TABLET ORAL at 17:18

## 2019-10-13 RX ADMIN — POTASSIUM CHLORIDE 40 MEQ: 1500 TABLET, EXTENDED RELEASE ORAL at 08:59

## 2019-10-13 RX ADMIN — IBUPROFEN 600 MG: 600 TABLET ORAL at 12:08

## 2019-10-13 RX ADMIN — FLUTICASONE PROPIONATE 100 MCG: 50 SPRAY, METERED NASAL at 05:09

## 2019-10-13 RX ADMIN — FERROUS SULFATE TAB 325 MG (65 MG ELEMENTAL FE) 325 MG: 325 (65 FE) TAB at 08:59

## 2019-10-13 RX ADMIN — MAGNESIUM SULFATE IN WATER 2 G: 40 INJECTION, SOLUTION INTRAVENOUS at 10:04

## 2019-10-13 RX ADMIN — OMEPRAZOLE 20 MG: 20 CAPSULE, DELAYED RELEASE ORAL at 05:09

## 2019-10-13 RX ADMIN — LISINOPRIL 10 MG: 5 TABLET ORAL at 05:09

## 2019-10-13 RX ADMIN — CEFTRIAXONE SODIUM 1 G: 1 INJECTION, POWDER, FOR SOLUTION INTRAMUSCULAR; INTRAVENOUS at 09:00

## 2019-10-13 RX ADMIN — ENOXAPARIN SODIUM 40 MG: 40 INJECTION SUBCUTANEOUS at 05:08

## 2019-10-13 RX ADMIN — POTASSIUM PHOSPHATE, MONOBASIC AND POTASSIUM PHOSPHATE, DIBASIC 30 MMOL: 224; 236 INJECTION, SOLUTION, CONCENTRATE INTRAVENOUS at 12:08

## 2019-10-13 RX ADMIN — MEROPENEM 500 MG: 500 INJECTION, POWDER, FOR SOLUTION INTRAVENOUS at 05:07

## 2019-10-13 RX ADMIN — SODIUM CHLORIDE: 9 INJECTION, SOLUTION INTRAVENOUS at 03:49

## 2019-10-13 RX ADMIN — IBUPROFEN 600 MG: 600 TABLET ORAL at 05:09

## 2019-10-13 ASSESSMENT — ENCOUNTER SYMPTOMS
FLANK PAIN: 1
CHILLS: 0
CONSTITUTIONAL NEGATIVE: 1
CARDIOVASCULAR NEGATIVE: 1
RESPIRATORY NEGATIVE: 1
GASTROINTESTINAL NEGATIVE: 1
DIZZINESS: 0
HEADACHES: 0
PSYCHIATRIC NEGATIVE: 1
VOMITING: 0
FEVER: 0
POLYDIPSIA: 0
ABDOMINAL PAIN: 0
NAUSEA: 0
PALPITATIONS: 0
EYES NEGATIVE: 1

## 2019-10-13 NOTE — PROGRESS NOTES
"Pt complaining of a \"sinus headache\", sinus congestion, and nausea. Pt medicated for nausea. MD paged to notify.   "

## 2019-10-13 NOTE — PROGRESS NOTES
Medicine Cross Coverage Note    Spoke with RN Ruslan Kirby who confirmed that the patient is not in atrial fibrillation and that his previous note was a charting error. Assessed the patient at bedside and she appeared to be in no acute distress. Cardiac exam revealed increased rate but regular rhythm. The patient stated that she was doing well overall but still had residual flank pain. Checked with telemetry nurse and there were no episodes of atrial fibrillation. Will continue to monitor and adjust medications and fluids accordingly.    Heron Bahena, DO  Internal Medicine, PGY2

## 2019-10-13 NOTE — PROGRESS NOTES
Received pt report from day RN Ruslan.    Pt awake, alert, oriented X 4.  Pt resting in bed. Pt up to the bathroom with standby assist.  Bed in low locked position, call bell at the bedside, tray table & personal belongings within reach. Non-skid footwear intact. White board updated to reflect plan of care for current shift. Pt door tags reviewed. Bed Alarm ON.    Assessed patient’s Neuro Status, Pain Level, Immediate Needs, Left Nephro Tube.    Vitals WNL-Hypertensive.    Tele NSR.    Morales Schafer

## 2019-10-13 NOTE — CARE PLAN
Problem: Communication  Goal: The ability to communicate needs accurately and effectively will improve  Outcome: PROGRESSING AS EXPECTED  Note:   Discussed POC with patient, patient agrees to participate in POC.  Patient encouraged to use call bell to ring for assistance.  Patient oriented to room, call bell, and bed controls.  Open line of communication established with the patient.         Problem: Safety  Goal: Will remain free from injury  Outcome: PROGRESSING AS EXPECTED  Note:   Instructed patient to use call bell and ring for assistance prior to ambulation.  Non-Skid foot ware in place, bed in low locked position, call bell and personal belongings within reach.         Problem: Knowledge Deficit  Goal: Knowledge of disease process/condition, treatment plan, diagnostic tests, and medications will improve  Outcome: PROGRESSING AS EXPECTED  Note:   Discussed plan of care and medications with patient.  Patient verbalizes understandings of treatment regimen.         Problem: Pain Management  Goal: Pain level will decrease to patient's comfort goal  Outcome: PROGRESSING AS EXPECTED  Note:   Left Flank Tenderness      Problem: Fluid Volume:  Goal: Will maintain balanced intake and output  Outcome: PROGRESSING AS EXPECTED  Note:   NS at 100cc/hr

## 2019-10-13 NOTE — PROGRESS NOTES
Gynecology Oncology Progress Note               Author: Veronica Guerrero Date & Time created: 10/13/2019  11:50 AM     Interval History:  Patient continues to feel better with NT    Review of Systems:  Review of Systems   Constitutional: Negative for chills and fever.       Physical Exam:  Physical Exam   Abdominal:   colostomy   Genitourinary:   Genitourinary Comments: L NT       Labs:          Recent Labs     10/11/19  0200 10/11/19  0650 10/12/19  0236 10/13/19  0233   SODIUM  --  138 141 139   POTASSIUM  --  4.4 3.9 3.5*   CHLORIDE  --  108 109 109   CO2  --  20 25 25   BUN  --  26* 22 16   CREATININE  --  1.06 0.94 0.71   MAGNESIUM 1.7  --  2.6* 1.8   PHOSPHORUS  --   --  2.8 1.9*   CALCIUM  --  9.0 8.8 8.5     Recent Labs     10/10/19  1842 10/11/19  0650 10/12/19  0236 10/13/19  0233   ALTSGPT 8 5 8 6   ASTSGOT 15 13 12 11*   ALKPHOSPHAT 98 76 76 66   TBILIRUBIN 0.4 0.5 0.6 0.5   LIPASE 16  --   --   --    GLUCOSE 106* 112* 101* 98     Recent Labs     10/11/19  0650 10/11/19  1302 10/12/19  0236 10/13/19  0233   RBC 4.49  --  4.45 3.98*   HEMOGLOBIN 11.9*  --  11.8* 11.1*   HEMATOCRIT 37.8  --  37.6 33.6*   PLATELETCT 219  --  191 178   PROTHROMBTM  --  14.0  --   --    INR  --  1.06  --   --    IRON  --   --  25*  --    FERRITIN  --   --  130.2  --    TOTIRONBC  --   --  258  --      Recent Labs     10/11/19  0650 10/12/19  0236 10/13/19  0233   WBC 11.3* 8.3 7.1   NEUTSPOLYS 84.60* 81.40* 72.00   LYMPHOCYTES 7.80* 8.20* 12.30*   MONOCYTES 6.60 8.90 10.80   EOSINOPHILS 0.20 1.10 4.00   BASOPHILS 0.30 0.20 0.30   ASTSGOT 13 12 11*   ALTSGPT 5 8 6   ALKPHOSPHAT 76 76 66   TBILIRUBIN 0.5 0.6 0.5     Recent Labs     10/11/19  0650 10/12/19  0236 10/13/19  0233   SODIUM 138 141 139   POTASSIUM 4.4 3.9 3.5*   CHLORIDE 108 109 109   CO2 20 25 25   GLUCOSE 112* 101* 98   BUN 26* 22 16   CREATININE 1.06 0.94 0.71   CALCIUM 9.0 8.8 8.5     Hemodynamics:  Temp (24hrs), Av.7 °C (98.1 °F), Min:36.4 °C (97.6 °F),  Max:37.1 °C (98.8 °F)  Temperature: 36.6 °C (97.8 °F)  Pulse  Av.6  Min: 87  Max: 118   Blood Pressure : 124/78     Respiratory:    Respiration: 16, Pulse Oximetry: 89 %        RUL Breath Sounds: Clear, RML Breath Sounds: Clear, RLL Breath Sounds: Clear, AVERY Breath Sounds: Clear, LLL Breath Sounds: Clear  Fluids:    Intake/Output Summary (Last 24 hours) at 10/13/2019 1150  Last data filed at 10/13/2019 1005  Gross per 24 hour   Intake 2995 ml   Output 1950 ml   Net 1045 ml     Weight: 60.7 kg (133 lb 13.1 oz)  GI/Nutrition:  Orders Placed This Encounter   Procedures   • Diet Order Regular     Standing Status:   Standing     Number of Occurrences:   1     Order Specific Question:   Diet:     Answer:   Regular [1]     Medical Decision Making, by Problem:  Active Hospital Problems    Diagnosis   • *Sepsis secondary to pyelonephritis [A41.9]   • Pyelonephritis [N12]   • Hydronephrosis due to obstruction of ureter stent [N13.2]   • ADA (acute kidney injury) (HCC) [N17.9]   • Complicated UTI  [N39.0]   • HTN (hypertension) [I10]   • History of Cervical cancer (HCC) [C53.9]   • Digestive-genital tract fistula, 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 severe left hydronephrosis- . Removal of internal stent and placement L NT on 10/11 with significant relief. Monitor for post obstructive diuresis, may require 1/2 NS bolus if significant output. Dr Hays discussed treatment plan with pt today options including chronic stent/NT vs surgical intervention. F/u outpatient to discuss decision.    Gyn/onc will sign off.  Thank you again for the opportunity for allowing me to participate in her care, if you should have any questions or if I could be of any help to you in the future, please do not hesitate to call.  Quality-Core Measures

## 2019-10-13 NOTE — PROGRESS NOTES
Internal Medicine Interval Note  Note Author: Toro Maurice M.D.     Name Nessa Dennis 1945   Age/Sex 74 y.o. female   MRN [id number]   Code Status Full     After 5PM or if no immediate response to page, please call for cross-coverage  Attending/Team: Dr Tillman/Monica See Patient List for primary contact information  Call [phone] to page    1st Call - Day Intern (R1):   Dr Maurice 2nd Call - Day Sr. Resident (R2/R3):   Dr Gupta         Reason for interval visit  (Principal Problem)   -NAOE.  -Vitals signs stable. Pt remained afebrile on meropenem and vancomycin;   -Improved nausea and vomiting.  -Pain is well controlled s/p left ureteric stent removal and nephrostomy tube placement.  -Gyn Onc aware of patient.    -WBC ct trending down to 8.3 from 11.3.   -Pending blood culture gram negative specie identification  -Crea improved from 1.02 now is 0.94 post left ureteric stent removal and nephrostomy tube placement    Ferritin 130.2; Fe 25  TIBC 250.     Antibiotics:  Vancomycin 10/10 to present  Meropenem 10/11 to present  Ceftriaxone 10/10-10/11  Cefapime 10/10-10/11      Review of Systems   Constitutional: Negative for chills and fever.   HENT: Negative for sore throat.    Respiratory: Negative for cough, shortness of breath and wheezing.    Cardiovascular: Negative for chest pain and palpitations.   Gastrointestinal: Positive for nausea. Negative for abdominal pain and diarrhea.   Genitourinary: Negative for dysuria and urgency.   Musculoskeletal: Positive for back pain.        Left flank pain    Skin: Negative for itching and rash.   Neurological: Negative for dizziness, seizures, weakness and headaches.             PCP: Julio Ross M.D.      Quality Measures  Quality-Core Measures   Reviewed items::  Labs reviewed and Medications reviewed  Greene catheter::  No Greene  DVT prophylaxis pharmacological::  Heparin  DVT prophylaxis - mechanical:  SCDs          Physical Exam        Vitals:    10/12/19 0453 10/12/19 0903 10/12/19 1300 10/12/19 1700   BP: 124/73 141/87 132/78 142/95   Pulse: 88 (!) 105 (!) 111 (!) 107   Resp: 16 16 16 16   Temp: 36.7 °C (98 °F) 37.2 °C (99 °F) 37.1 °C (98.8 °F) 37.1 °C (98.7 °F)   TempSrc: Temporal Temporal Temporal Temporal   SpO2: 95% 94% 93% 93%   Weight:       Height:         Body mass index is 26.52 kg/m².    Oxygen Therapy:  Pulse Oximetry: 93 %, O2 (LPM): 0, O2 Delivery: None (Room Air)    Physical Exam   Constitutional: She is oriented to person, place, and time.   Appears stated age. Non distressed.    HENT:   Head: Normocephalic.   Eyes: Right eye exhibits no discharge. Left eye exhibits discharge. No scleral icterus.   Neck: No JVD present.   Cardiovascular: Normal rate and regular rhythm. Exam reveals no gallop.   No murmur heard.  Pulmonary/Chest: Effort normal and breath sounds normal. No respiratory distress. She has no wheezes.   Abdominal: She exhibits no distension. There is no tenderness. There is no rebound.   Musculoskeletal: She exhibits no edema, tenderness or deformity.   Left flank pain, pt has nephrostomy tube draining serosaginous urine freely. No erythema around side of insertion   Neurological: She is alert and oriented to person, place, and time. No cranial nerve deficit.   Skin: No rash noted. No erythema.   Psychiatric: Mood and affect normal.             Assessment/Plan     Sonny is a 73 y/o F w/ hx of neurogenic bladder with ureteric stricture 2/2 cervical cancer radiation treatment 5 years ago, who has hx of recurrent complicated UTI with ESBL Klebsiella UTI, pyelonephritis in the past and recurrent hydrouteronephrosis 2/2 ureteric stricture due pelvic radiation for cervical cancer  s/p left stent removal and left nephrostomy tube with improving ADA and currently growing proteus vulgaris on her urine culture pending speciation of GNR on blood culture, currently on meropenem and vancomycin      ,* Sepsis secondary to  pyelonephritis- (present on admission)  Assessment & Plan  Stable, afebrile with stable blood pressure. Blood culture grew gram negative bacilli. On vancomycin and meropenem worried over ESBL and Klebsiella. Pt spiked a fever on ceftriaxone and vancomycin. Source control per IR attempting to remove stent and will put nephrostomy tube. Gyn-Onc in agreement of plan. Improvement of hydration complicated by pt has colostomy hence compromised fluid/water absorption via PO.     Awaiting for culture results from the left ureteric stent removed for narrowing down antibiotics coverage.    Plan  Awaiting for identification of blood culture growth.  Awaiting for left ureteric stent culture  Continue Vanc and meropenem  Continue mIVF  Monitor fever curve.         ADA (acute kidney injury) (HCC)- (present on admission)  Assessment & Plan  Crea is 1.15, baseline 0.65 on 3/2019. ADA is multifactorial: hemodynamic ADA due to sepsis/dehydration and inflammation with intra-renal ADA likely a septic ADA with pyelonephritis and obstructive ADA 2/2 neurogenic bladder and persistent Left ureteric stent obstruction as evidence of hydroureteronephrosis.     Plan  Improve infection via abx,   Improve effective blood volume via mIVF  Remove stent obstruction; appreciate IR recs  Monitor for post-obstructive diuresis post stent removal or nephrostomy tube placement.  Avoid nephrotoxins     Pyelonephritis- (present on admission)  Assessment & Plan  Improved  nausea, vomiting. Resolved left flank pain and leukocytosis. H/o recurrent UTI; treated for ESBL klebsiella UTI in 3/19 with IV ABx. CT showed pyelonephritis now growing gram negative rods on blood culture. UAs showing proteus vulgaris    Plan  Follow up blood culture for specication to narrow abx tomorrow.  Continue meropenem and vancomycin.   Continue mIVF.   Increased zofran to 8mg q8hr PRN from 4mg q6hr PRN  WBC ct monitoring tomorrow  Crea, K, BUN monitoring tomorrow.          Hydronephrosis due to obstruction of ureter stent- (present on admission)  Assessment & Plan  Improving Crea of 0.94 from 1.02. S/p nephrostomy and left ureteric stent removal.     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 CT on 9/16 showed left hydrouteronephrosis 2/2 ureteric stricture from cervical cancer radiation therapy.     Multiple factors can cause her hydrouteronephrosis: her ureteric strictures, stent and neurogenic bladder that looks like thickned from rad therapy.   NM renal with diuretic washout shows persistent occlusion at the left ureteric stent which needs to be taken out in the context of infection and post-obstruction ADA.     Would consider gyn onc or urology consult regarding if needing to be on stent again after infection resolution for ureteric stricture.      Plan  Will restart heparin post IR intervention.   Continue to monitor creatinine.  Post-obstructive diuresis monitoring after nephrostomy tube placement.         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   #hx of recurrent ESBL klebsiella UTI w/ ureteric strictures 2/2 radiation therapy.    UA culture showing proteus vulgaris  Per pt has clear urine since admission. Denies pneumaturia and fecaluria. WBC ct trending down from 15 now 11.0  Admission UA showing nitrate and leukocyte esterase positive. States she self-cath and reuses catheter by washing it with soap and water.       Plan  Continue on meropenem and vancomycin  Trend WBC ct              HTN (hypertension)- (present on admission)  Assessment & Plan  Admssion SBP of 160s likely sign of sepsis, initially went down and now trending up status post fluid administration.     Plan  Continue home Lisinopril 40 mg daily   Improve hydration status.     History of Cervical cancer (HCC)- (present on admission)  Assessment & Plan  H/o cervical  cancer s/p radial hysterectomy and b/l salpingo-oophorectomy in 2005  Recurrence in 2015- treated with chemoradiation  Following up with Dr. 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

## 2019-10-13 NOTE — PROGRESS NOTES
Telemetry Summary:    Measurements: .14/.08/.32       Rhythm: NSR-ST    Ectopy: PAC    Rate:     Morales Schafer

## 2019-10-14 ENCOUNTER — PATIENT OUTREACH (OUTPATIENT)
Dept: HEALTH INFORMATION MANAGEMENT | Facility: OTHER | Age: 74
End: 2019-10-14

## 2019-10-14 VITALS
HEIGHT: 60 IN | RESPIRATION RATE: 17 BRPM | TEMPERATURE: 97.9 F | WEIGHT: 141.98 LBS | SYSTOLIC BLOOD PRESSURE: 159 MMHG | OXYGEN SATURATION: 92 % | DIASTOLIC BLOOD PRESSURE: 105 MMHG | HEART RATE: 89 BPM | BODY MASS INDEX: 27.87 KG/M2

## 2019-10-14 PROBLEM — A41.9 SEPSIS (HCC): Status: RESOLVED | Noted: 2019-09-17 | Resolved: 2019-10-14

## 2019-10-14 PROBLEM — N17.9 AKI (ACUTE KIDNEY INJURY) (HCC): Status: RESOLVED | Noted: 2019-09-17 | Resolved: 2019-10-14

## 2019-10-14 LAB
ANION GAP SERPL CALC-SCNC: 6 MMOL/L (ref 0–11.9)
BACTERIA FLD AEROBE CULT: NORMAL
BASOPHILS # BLD AUTO: 0.2 % (ref 0–1.8)
BASOPHILS # BLD: 0.01 K/UL (ref 0–0.12)
BUN SERPL-MCNC: 14 MG/DL (ref 8–22)
CALCIUM SERPL-MCNC: 9.2 MG/DL (ref 8.5–10.5)
CHLORIDE SERPL-SCNC: 106 MMOL/L (ref 96–112)
CO2 SERPL-SCNC: 24 MMOL/L (ref 20–33)
CREAT SERPL-MCNC: 0.69 MG/DL (ref 0.5–1.4)
EOSINOPHIL # BLD AUTO: 0.63 K/UL (ref 0–0.51)
EOSINOPHIL NFR BLD: 9.8 % (ref 0–6.9)
ERYTHROCYTE [DISTWIDTH] IN BLOOD BY AUTOMATED COUNT: 42.6 FL (ref 35.9–50)
GLUCOSE SERPL-MCNC: 96 MG/DL (ref 65–99)
GRAM STN SPEC: NORMAL
HCT VFR BLD AUTO: 34.9 % (ref 37–47)
HGB BLD-MCNC: 11.3 G/DL (ref 12–16)
IMM GRANULOCYTES # BLD AUTO: 0.05 K/UL (ref 0–0.11)
IMM GRANULOCYTES NFR BLD AUTO: 0.8 % (ref 0–0.9)
LYMPHOCYTES # BLD AUTO: 1.07 K/UL (ref 1–4.8)
LYMPHOCYTES NFR BLD: 16.6 % (ref 22–41)
MAGNESIUM SERPL-MCNC: 1.8 MG/DL (ref 1.5–2.5)
MCH RBC QN AUTO: 26.8 PG (ref 27–33)
MCHC RBC AUTO-ENTMCNC: 32.4 G/DL (ref 33.6–35)
MCV RBC AUTO: 82.9 FL (ref 81.4–97.8)
MONOCYTES # BLD AUTO: 0.77 K/UL (ref 0–0.85)
MONOCYTES NFR BLD AUTO: 12 % (ref 0–13.4)
NEUTROPHILS # BLD AUTO: 3.9 K/UL (ref 2–7.15)
NEUTROPHILS NFR BLD: 60.6 % (ref 44–72)
NRBC # BLD AUTO: 0 K/UL
NRBC BLD-RTO: 0 /100 WBC
PHOSPHATE SERPL-MCNC: 2.3 MG/DL (ref 2.5–4.5)
PLATELET # BLD AUTO: 190 K/UL (ref 164–446)
PMV BLD AUTO: 10.2 FL (ref 9–12.9)
POTASSIUM SERPL-SCNC: 4.2 MMOL/L (ref 3.6–5.5)
RBC # BLD AUTO: 4.21 M/UL (ref 4.2–5.4)
SIGNIFICANT IND 70042: NORMAL
SITE SITE: NORMAL
SODIUM SERPL-SCNC: 136 MMOL/L (ref 135–145)
SOURCE SOURCE: NORMAL
WBC # BLD AUTO: 6.4 K/UL (ref 4.8–10.8)

## 2019-10-14 PROCEDURE — 3E02340 INTRODUCTION OF INFLUENZA VACCINE INTO MUSCLE, PERCUTANEOUS APPROACH: ICD-10-PCS | Performed by: INTERNAL MEDICINE

## 2019-10-14 PROCEDURE — A9270 NON-COVERED ITEM OR SERVICE: HCPCS | Performed by: INTERNAL MEDICINE

## 2019-10-14 PROCEDURE — 700111 HCHG RX REV CODE 636 W/ 250 OVERRIDE (IP): Performed by: STUDENT IN AN ORGANIZED HEALTH CARE EDUCATION/TRAINING PROGRAM

## 2019-10-14 PROCEDURE — 36415 COLL VENOUS BLD VENIPUNCTURE: CPT

## 2019-10-14 PROCEDURE — 90471 IMMUNIZATION ADMIN: CPT

## 2019-10-14 PROCEDURE — 700101 HCHG RX REV CODE 250: Performed by: STUDENT IN AN ORGANIZED HEALTH CARE EDUCATION/TRAINING PROGRAM

## 2019-10-14 PROCEDURE — A9270 NON-COVERED ITEM OR SERVICE: HCPCS | Performed by: STUDENT IN AN ORGANIZED HEALTH CARE EDUCATION/TRAINING PROGRAM

## 2019-10-14 PROCEDURE — 83735 ASSAY OF MAGNESIUM: CPT

## 2019-10-14 PROCEDURE — 700105 HCHG RX REV CODE 258: Performed by: STUDENT IN AN ORGANIZED HEALTH CARE EDUCATION/TRAINING PROGRAM

## 2019-10-14 PROCEDURE — 700102 HCHG RX REV CODE 250 W/ 637 OVERRIDE(OP): Performed by: INTERNAL MEDICINE

## 2019-10-14 PROCEDURE — 90662 IIV NO PRSV INCREASED AG IM: CPT | Performed by: INTERNAL MEDICINE

## 2019-10-14 PROCEDURE — 85025 COMPLETE CBC W/AUTO DIFF WBC: CPT

## 2019-10-14 PROCEDURE — 80048 BASIC METABOLIC PNL TOTAL CA: CPT

## 2019-10-14 PROCEDURE — 84100 ASSAY OF PHOSPHORUS: CPT

## 2019-10-14 PROCEDURE — 700111 HCHG RX REV CODE 636 W/ 250 OVERRIDE (IP): Performed by: INTERNAL MEDICINE

## 2019-10-14 PROCEDURE — 700102 HCHG RX REV CODE 250 W/ 637 OVERRIDE(OP): Performed by: STUDENT IN AN ORGANIZED HEALTH CARE EDUCATION/TRAINING PROGRAM

## 2019-10-14 PROCEDURE — 99239 HOSP IP/OBS DSCHRG MGMT >30: CPT | Mod: GC | Performed by: INTERNAL MEDICINE

## 2019-10-14 RX ORDER — CIPROFLOXACIN 500 MG/1
500 TABLET, FILM COATED ORAL 2 TIMES DAILY
Qty: 14 TAB | Refills: 0 | Status: SHIPPED | OUTPATIENT
Start: 2019-10-14 | End: 2019-10-21

## 2019-10-14 RX ORDER — LISINOPRIL 20 MG/1
20 TABLET ORAL ONCE
Status: COMPLETED | OUTPATIENT
Start: 2019-10-14 | End: 2019-10-14

## 2019-10-14 RX ORDER — MAGNESIUM SULFATE HEPTAHYDRATE 40 MG/ML
2 INJECTION, SOLUTION INTRAVENOUS ONCE
Status: COMPLETED | OUTPATIENT
Start: 2019-10-14 | End: 2019-10-14

## 2019-10-14 RX ORDER — LISINOPRIL 20 MG/1
40 TABLET ORAL DAILY
Status: DISCONTINUED | OUTPATIENT
Start: 2019-10-15 | End: 2019-10-14 | Stop reason: HOSPADM

## 2019-10-14 RX ADMIN — MAGNESIUM SULFATE IN WATER 2 G: 40 INJECTION, SOLUTION INTRAVENOUS at 08:38

## 2019-10-14 RX ADMIN — LISINOPRIL 20 MG: 20 TABLET ORAL at 08:38

## 2019-10-14 RX ADMIN — INFLUENZA A VIRUS A/MICHIGAN/45/2015 X-275 (H1N1) ANTIGEN (FORMALDEHYDE INACTIVATED), INFLUENZA A VIRUS A/SINGAPORE/INFIMH-16-0019/2016 IVR-186 (H3N2) ANTIGEN (FORMALDEHYDE INACTIVATED), AND INFLUENZA B VIRUS B/MARYLAND/15/2016 BX-69A (A B/COLORADO/6/2017-LIKE VIRUS) ANTIGEN (FORMALDEHYDE INACTIVATED) 0.5 ML: 60; 60; 60 INJECTION, SUSPENSION INTRAMUSCULAR at 12:31

## 2019-10-14 RX ADMIN — IBUPROFEN 600 MG: 600 TABLET ORAL at 04:47

## 2019-10-14 RX ADMIN — LISINOPRIL 20 MG: 20 TABLET ORAL at 04:46

## 2019-10-14 RX ADMIN — SODIUM PHOSPHATE, MONOBASIC, MONOHYDRATE AND SODIUM PHOSPHATE, DIBASIC, ANHYDROUS 15 MMOL: 276; 142 INJECTION, SOLUTION INTRAVENOUS at 11:03

## 2019-10-14 RX ADMIN — OMEPRAZOLE 20 MG: 20 CAPSULE, DELAYED RELEASE ORAL at 04:47

## 2019-10-14 RX ADMIN — IBUPROFEN 600 MG: 600 TABLET ORAL at 11:03

## 2019-10-14 RX ADMIN — CEFTRIAXONE SODIUM 1 G: 1 INJECTION, POWDER, FOR SOLUTION INTRAMUSCULAR; INTRAVENOUS at 04:51

## 2019-10-14 NOTE — PROGRESS NOTES
Bedside report received. Bed locked and in low position. Call light within reach. On RA, A&O4. No complaints of pain at this time.

## 2019-10-14 NOTE — PROGRESS NOTES
Patient being discharged. Patient educated on discharge instructions and new prescriptions. Patient verbalized understanding. Follow up appt made with PCP. PIV removed, telemetry monitor checked in. Patient going home with . RN to call transport to escort out. Will monitor until patient is off unit.

## 2019-10-14 NOTE — DISCHARGE INSTRUCTIONS
Discharge Instructions    Discharged to home by car with relative. Discharged via wheelchair, hospital escort: Yes.  Special equipment needed: Not Applicable    Be sure to schedule a follow-up appointment with your primary care doctor or any specialists as instructed.     Discharge Plan:   Influenza Vaccine Indication: Indicated: 65 years and older, Patient Refuses    I understand that a diet low in cholesterol, fat, and sodium is recommended for good health. Unless I have been given specific instructions below for another diet, I accept this instruction as my diet prescription.   Other diet: Renal    Special Instructions: None    · Is patient discharged on Warfarin / Coumadin?   No     Depression / Suicide Risk    As you are discharged from this [facility] facility, it is important to learn how to keep safe from harming yourself.    Recognize the warning signs:  · Abrupt changes in personality, positive or negative- including increase in energy   · Giving away possessions  · Change in eating patterns- significant weight changes-  positive or negative  · Change in sleeping patterns- unable to sleep or sleeping all the time   · Unwillingness or inability to communicate  · Depression  · Unusual sadness, discouragement and loneliness  · Talk of wanting to die  · Neglect of personal appearance   · Rebelliousness- reckless behavior  · Withdrawal from people/activities they love  · Confusion- inability to concentrate     If you or a loved one observes any of these behaviors or has concerns about self-harm, here's what you can do:  · Talk about it- your feelings and reasons for harming yourself  · Remove any means that you might use to hurt yourself (examples: pills, rope, extension cords, firearm)  · Get professional help from the community (Mental Health, Substance Abuse, psychological counseling)  · Do not be alone:Call your Safe Contact- someone whom you trust who will be there for you.  · Call your local CRISIS  HOTLINE 856-6130 or 000-682-3105  · Call your local Children's Mobile Crisis Response Team [place] (082) 289-7255 or www.[employer]  · Call the toll free National Suicide Prevention Hotlines   · National Suicide Prevention Lifeline 715-397-TZID (8798)  · National Episencial Line Network 800-SUICIDE (881-4123)        Pyelonephritis, Adult  Introduction  Pyelonephritis is a kidney infection. The kidneys are organs that help clean your blood by moving waste out of your blood and into your pee (urine). This infection can happen quickly, or it can last for a long time. In most cases, it clears up with treatment and does not cause other problems.  Follow these instructions at home:  Medicines  · Take over-the-counter and prescription medicines only as told by your doctor.  · Take your antibiotic medicine as told by your doctor. Do not stop taking the medicine even if you start to feel better.  General instructions  · Drink enough fluid to keep your pee clear or pale yellow.  · Avoid caffeine, tea, and carbonated drinks.  · Pee (urinate) often. Avoid holding in pee for long periods of time.  · Pee before and after sex.  · After pooping (having a bowel movement), women should wipe from front to back. Use each tissue only once.  · Keep all follow-up visits as told by your doctor. This is important.  Contact a doctor if:  · You do not feel better after 2 days.  · Your symptoms get worse.  · You have a fever.  Get help right away if:  · You cannot take your medicine or drink fluids as told.  · You have chills and shaking.  · You throw up (vomit).  · You have very bad pain in your side (flank) or back.  · You feel very weak or you pass out (faint).  This information is not intended to replace advice given to you by your health care provider. Make sure you discuss any questions you have with your health care provider.  Document Released: 01/25/2006 Document Revised: 05/25/2017 Document Reviewed: 04/11/2016  © 2017  [employer]      Hypertension  Hypertension is another name for high blood pressure. High blood pressure forces your heart to work harder to pump blood. A blood pressure reading has two numbers, which includes a higher number over a lower number (example: 110/72).  Follow these instructions at home:  · Have your blood pressure rechecked by your doctor.  · Only take medicine as told by your doctor. Follow the directions carefully. The medicine does not work as well if you skip doses. Skipping doses also puts you at risk for problems.  · Do not smoke.  · Monitor your blood pressure at home as told by your doctor.  Contact a doctor if:  · You think you are having a reaction to the medicine you are taking.  · You have repeat headaches or feel dizzy.  · You have puffiness (swelling) in your ankles.  · You have trouble with your vision.  Get help right away if:  · You get a very bad headache and are confused.  · You feel weak, numb, or faint.  · You get chest or belly (abdominal) pain.  · You throw up (vomit).  · You cannot breathe very well.  This information is not intended to replace advice given to you by your health care provider. Make sure you discuss any questions you have with your health care provider.  Document Released: 06/05/2009 Document Revised: 05/25/2017 Document Reviewed: 10/10/2014  [employer] Interactive Patient Education © 2017 [employer]    Ciprofloxacin tablets  What is this medicine?  CIPROFLOXACIN (sip brandee FLOX a sin) is a quinolone antibiotic. It is used to treat certain kinds of bacterial infections. It will not work for colds, flu, or other viral infections.  This medicine may be used for other purposes; ask your health care provider or pharmacist if you have questions.  COMMON BRAND NAME(S): Cipro  What should I tell my health care provider before I take this medicine?  They need to know if you have any of these conditions:  -bone problems  -history of low levels of potassium in the blood  -joint  problems  -irregular heartbeat  -kidney disease  -myasthenia gravis  -seizures  -tendon problems  -tingling of the fingers or toes, or other nerve disorder  -an unusual or allergic reaction to ciprofloxacin, other antibiotics or medicines, foods, dyes, or preservatives  -pregnant or trying to get pregnant  -breast-feeding  How should I use this medicine?  Take this medicine by mouth with a glass of water. Follow the directions on the prescription label. Take your medicine at regular intervals. Do not take your medicine more often than directed. Take all of your medicine as directed even if you think your are better. Do not skip doses or stop your medicine early.  You can take this medicine with food or on an empty stomach. It can be taken with a meal that contains dairy or calcium, but do not take it alone with a dairy product, like milk or yogurt or calcium-fortified juice.  A special MedGuide will be given to you by the pharmacist with each prescription and refill. Be sure to read this information carefully each time.  Talk to your pediatrician regarding the use of this medicine in children. Special care may be needed.  Overdosage: If you think you have taken too much of this medicine contact a poison control center or emergency room at once.  NOTE: This medicine is only for you. Do not share this medicine with others.  What if I miss a dose?  If you miss a dose, take it as soon as you can. If it is almost time for your next dose, take only that dose. Do not take double or extra doses.  What may interact with this medicine?  Do not take this medicine with any of the following medications:  -cisapride  -dofetilide  -dronedarone  -flibanserin  -lomitapide  -pimozide  -thioridazine  -tizanidine  -ziprasidone  This medicine may also interact with the following medications:  -antacids  -birth control pills  -caffeine  -certain medicines for diabetes, like glipizide or glyburide  -certain medicines that treat or prevent  blood clots like warfarin  -clozapine  -cyclosporine  -didanosine (ddI) buffered tablets or powder  -duloxetine  -lanthanum carbonate  -lidocaine  -methotrexate  -multivitamins  -NSAIDS, medicines for pain and inflammation, like ibuprofen or naproxen  -olanzapine  -omeprazole  -other medicines that prolong the QT interval (cause an abnormal heart rhythm)  -phenytoin  -probenecid  -ropinirole  -sevelamer  -sildenafil  -sucralfate  -theophylline  -zolpidem  This list may not describe all possible interactions. Give your health care provider a list of all the medicines, herbs, non-prescription drugs, or dietary supplements you use. Also tell them if you smoke, drink alcohol, or use illegal drugs. Some items may interact with your medicine.  What should I watch for while using this medicine?  Tell your doctor or health care professional if your symptoms do not improve.  Do not treat diarrhea with over the counter products. Contact your doctor if you have diarrhea that lasts more than 2 days or if it is severe and watery.  You may get drowsy or dizzy. Do not drive, use machinery, or do anything that needs mental alertness until you know how this medicine affects you. Do not stand or sit up quickly, especially if you are an older patient. This reduces the risk of dizzy or fainting spells.  This medicine can make you more sensitive to the sun. Keep out of the sun. If you cannot avoid being in the sun, wear protective clothing and use sunscreen. Do not use sun lamps or tanning beds/booths.  Avoid antacids, aluminum, calcium, iron, magnesium, and zinc products for 6 hours before and 2 hours after taking a dose of this medicine.  What side effects may I notice from receiving this medicine?  Side effects that you should report to your doctor or health care professional as soon as possible:  -allergic reactions like skin rash or hives, swelling of the face, lips, or tongue  -anxious  -confusion  -depressed  mood  -diarrhea  -fast, irregular heartbeat  -hallucination, loss of contact with reality  -joint, muscle, or tendon pain or swelling  -pain, tingling, numbness in the hands or feet  -suicidal thoughts or other mood changes  -sunburn  -unusually weak or tired  Side effects that usually do not require medical attention (report to your doctor or health care professional if they continue or are bothersome):  -dry mouth  -headache  -nausea  -trouble sleeping  This list may not describe all possible side effects. Call your doctor for medical advice about side effects. You may report side effects to FDA at 3-539-FDA-6274.  Where should I keep my medicine?  Keep out of the reach of children.  Store at room temperature below 30 degrees C (86 degrees F). Keep container tightly closed. Throw away any unused medicine after the expiration date.  NOTE: This sheet is a summary. It may not cover all possible information. If you have questions about this medicine, talk to your doctor, pharmacist, or health care provider.  © 2018 [employer]/Gold Standard (2017-07-28 14:42:02)

## 2019-10-14 NOTE — PROGRESS NOTES
MD paged x2 times for high blood pressure. No new orders at this time, MD wants to know if her BP goes any higher.

## 2019-10-14 NOTE — PROGRESS NOTES
Internal Medicine Interval Note  Note Author: Toro Maurice M.D.     Name Nessa Dennis 1945   Age/Sex 74 y.o. female   MRN [id number]   Code Status Full     After 5PM or if no immediate response to page, please call for cross-coverage  Attending/Team: Dr Tillman/Monica See Patient List for primary contact information  Call [phone] to page    1st Call - Day Intern (R1):   Dr Maurice 2nd Call - Day Sr. Resident (R2/R3):   Dr Gupta         Reason for interval visit  (Principal Problem)   No acute overnight events. Pt remained afebrile with blood pressure on the stable side. Pt states pain well controlled on tylenol.  Otherwise patient only complains of left flank pain from nephrostomy tube placement. She denies nausea, vomtiing fever, and chills.    WBC ct trended dwon to 7.1 from 8.3 with electrolytes trending down to 0.71 from 0.94.   Pt's stent culture still has no growth  Blood culture grew non-lactose  gram negative sheba  Urine culture grew proteus vulgaris  De-escalated abx today, switched pt to ceftriaxone, stopped vancomycin.  Ordered UA today to check if still infected.    Update:  Gyn onc signed off today.  They talked about putting pt on chronic stent/NT vs surgical intervention, discussion would be deferred outpatient.         Review of Systems   Constitutional: Negative.  Negative for chills and malaise/fatigue.   HENT: Positive for congestion.    Eyes: Negative.    Respiratory: Negative.    Cardiovascular: Negative.  Negative for chest pain and palpitations.   Gastrointestinal: Negative.  Negative for abdominal pain, nausea and vomiting.   Genitourinary: Positive for flank pain. Negative for dysuria and urgency.   Skin: Negative for itching.   Neurological: Negative for dizziness and headaches.   Endo/Heme/Allergies: Negative for polydipsia.   Psychiatric/Behavioral: Negative.        PCP: Julio Ross M.D.      Quality Measures  Quality-Core Measures    Reviewed items::  Labs reviewed  Greene catheter::  No Greene  DVT prophylaxis pharmacological::  Enoxaparin (Lovenox)  Antibiotics:  Treating active infection/contamination beyond 24 hours perioperative coverage          Physical Exam       Vitals:    10/13/19 0500 10/13/19 0705 10/13/19 1311 10/13/19 1717   BP: 158/103 124/78 136/76 154/92   Pulse:  98 (!) 105 (!) 105   Resp:  16 16 16   Temp:  36.6 °C (97.8 °F) 36.5 °C (97.7 °F) 36.8 °C (98.2 °F)   TempSrc:  Temporal Temporal Temporal   SpO2:  89% 88% 93%   Weight:       Height:         Body mass index is 26.13 kg/m². Weight: 60.7 kg (133 lb 13.1 oz)  Oxygen Therapy:  Pulse Oximetry: 93 %, O2 (LPM): 0, O2 Delivery: None (Room Air)    Physical Exam   Constitutional: She is oriented to person, place, and time and well-developed, well-nourished, and in no distress. No distress.   HENT:   Head: Normocephalic.   MP=2   Eyes: Pupils are equal, round, and reactive to light.   Neck: No JVD present.   Cardiovascular: Normal rate, regular rhythm and normal heart sounds. Exam reveals no gallop and no friction rub.   No murmur heard.  Pulmonary/Chest: Breath sounds normal. No respiratory distress.   No crackles   Abdominal: She exhibits no distension. There is no tenderness. There is no rebound.   Genitourinary:   Genitourinary Comments: Left flank pain around nephrostomy tube insertion site.    Musculoskeletal: Normal range of motion. She exhibits no edema or tenderness.   Neurological: She is alert and oriented to person, place, and time. No cranial nerve deficit.   Skin: Skin is dry. She is not diaphoretic.   Psychiatric: Mood, memory and affect normal.             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 Pt likely stable to go home.      * Sepsis secondary to pyelonephritis- (present on admission)  Assessment & Plan  Resolved.     Blood culture grew gram negative rods that is non lactose . Source control achieved as left stent removed and nephrostomy tube placed. Discontinued vanc and meropenem today, and was switched to ceftriaxone.   Awaiting for culture results from the left ureteric stent     Plan  F/U left ureteric stent culture  Discontinue Vanc and meropenem switch to ceftriaxone  Consult ID tomorrow if pt will go home with PO cephtra or PO quinolone.   Continue mIVF  Monitor fever curve.         ADA (acute kidney injury) (HCC)- (present on admission)  Assessment & Plan  Improving s/p sepsis resolution and left stent removal.  Crea  is 0.71 from 0.94 , baseline 0.65 on 3/2019. ADA is multifactorial: hemodynamic ADA due to sepsis/dehydration and inflammation with intra-renal ADA likely a septic ADA with pyelonephritis and obstructive ADA 2/2 neurogenic bladder and persistent Left ureteric stent obstruction as evidence of hydroureteronephrosis.     Plan  Continue ceftriaxone  Improve fluid intake and continue mIVF  Monitor for post-obstructive diuresis post stent removal or nephrostomy tube placement.  Avoid nephrotoxins     Pyelonephritis- (present on admission)  Assessment & Plan  WBC ct now at 7.1 from 8.3; improved since pt has no nausea, vomiting, resolved left flank pain and leukocytosis.     H/o recurrent UTI; treated for ESBL klebsiella UTI in 3/19 with IV ABx. CT showed pyelonephritis, blood culture was growing non lactose  gram negative sheba. UAs showing proteus  vulgaris. Hence we stopped meropenem and vancomycin and switched pt to ceftriaxone. Pt will be on PO tomorrow, awaiting pharm ID consult    Plan  Pharm ID consult tomorrow.   Stopped meropenem and vancomycin  Start ceftriaxone  Continue mIVF.   If nausea and vomiting continue 4mg q6hr PRN  WBC ct monitoring tomorrow  Crea, K, BUN monitoring tomorrow.   Patient education regarding using disposable catheter        Hydronephrosis due to obstruction of ureter stent- (present on admission)  Assessment & Plan  Improving Crea of  0.71 from 0.94. S/p nephrostomy and left ureteric stent removal.     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 CT on 9/16 showed left hydrouteronephrosis 2/2 ureteric stricture from cervical cancer radiation therapy. NM renal with diuretic washout shows persistent occlusion at the left ureteric stent.   Multiple factors can cause her hydrouteronephrosis: her ureteric strictures, stent and neurogenic bladder that looks like thickned from rad therapy.     Per gyn onc outpatient consultation will be done regarding if pt will be on stent/NT or a surgical intervention is needed regarding her ureteric stricture. Hence Gyn Onc signed off.       Plan  Follow up UA tomorrow.   Follow up outpatient with gyn onc for stent/nephrostomy tube or surgical intervention for ureteric stricture.   Continue to monitor creatinine.  Post-obstructive diuresis monitoring after nephrostomy tube placement.         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   #hx of recurrent ESBL klebsiella UTI w/ ureteric strictures 2/2 radiation therapy.    UA culture showing proteus vulgaris  Per pt has clear urine since admission. Denies pneumaturia and fecaluria. WBC ct trending down from 15 now 11.0  Admission UA showing nitrate and leukocyte esterase positive. States she self-cath and reuses  catheter by washing it with soap and water.       Plan  Started ceftriaxone and discontinued meropenem and vancomycin  Trend WBC ct  Gyn onc follow up outpatient regarding ureteric stent/NT vs surgical intervention  Pt education to use disposable cath              HTN (hypertension)- (present on admission)  Assessment & Plan  Admssion SBP of 160s likely sign of sepsis, initially went down and now trending up status post fluid administration.     Plan  Continue home Lisinopril 40 mg daily   Improve hydration status.     History of Cervical cancer (HCC)- (present on admission)  Assessment & Plan  H/o cervical cancer s/p radial hysterectomy and b/l salpingo-oophorectomy in 2005  Recurrence in 2015- treated with chemoradiation  Following up with Dr. 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

## 2019-10-14 NOTE — DISCHARGE SUMMARY
Internal Medicine Discharge Summary  Note Author: Rory Gupta M.D.       Name Nessa Dennis 1945   Age/Sex 74 y.o. female   MRN [id number]         Admit Date:  10/10/2019       Discharge Date:   10/[id number]    Service:   [facility] Internal Medicine White Team  Attending Physician(s):   Armando Tillman M.D., Leon Ortez M.D.       Senior Resident(s):   Rory Gupta M.D.  Jared Resident(s):   Toro Maurice M.D.  PCP: Julio Ross M.D.      Primary Diagnosis:   Sepsis secondary to pyelonephritis     Secondary Diagnoses:                Principal Problem (Resolved):    Sepsis secondary to pyelonephritis POA: Yes  Active Problems:    Hydronephrosis due to obstruction of ureter stent POA: Yes    Pyelonephritis POA: Yes    Complicated UTI  POA: Yes    History of Cervical cancer (HCC) POA: Yes    HTN (hypertension) POA: Yes  Resolved Problems:    ADA (acute kidney injury) (HCC) POA: Yes    Digestive-genital tract fistula, female POA: Yes      Hospital Summary (Brief Narrative):       Ms. Dennis is a pleasant 74-year-old woman with a history of urethral stricture on the left following chemoradiation in  for cervical cancer recurrence status post radical hysterectomy with bilateral oophorectomy by Dr. Hays in  (Stage Ib1 grade 3 cervical cancer: 2005) .  She also has a history of rectovaginal fistula requiring diverting colostomy as well as a neurogenic bladder and recurrent UTIs.  She had a ureteral stent placed, which became infected with the patient presenting with sepsis secondary to pyelonephritis in 2019.  At that time, the ureteral stent was removed and a nephrostomy tube was placed until the infection was cleared.  Another ureteral stent was replaced by interventional radiology prior to her discharge.  Urine cultures in September in 2019 grew Enterococcus faecium susceptible to vancomycin and daptomycin.  She also has a history of Klebsiella pneumoniae ESBL  complicated urinary tract infection in February 2019 as well as nephrostomy tube placement removal in September 2018.  Patient has been performing self catheterizations.  She presented again with left flank pain as well as fevers and chills.    Patient was admitted again for treatment of sepsis secondary to left pyelonephritis.  She was given cefepime and vancomycin in the ER.  On admission, she was placed on ceftriaxone in lieu of cefepime but was transition to meropenem due to her history of ESBL Klebsiella pneumoniae.  Vancomycin was continued due to her recent history of Enterococcus faecium.  Urine cultures grew Proteus vulgaris sensitive to ceftriaxone.  Blood cultures 1 out of 2 bottles also grew Proteus vulgaris.  Patient's antibiotic was de-escalated to ceftriaxone, on which she continued to recover well.  Interventional radiology was consulted for removal of the ureteral strain as well as placement of a nephrostomy tube.  Dr. Hays's service was also consulted.  Patient was discharged on a course of oral ciprofloxacin for treatment of complicated urinary tract infection with plans for follow-up with Dr. Hays for possible surgical intervention versus stent replacement with removal of the nephrostomy tube.  Patient continued to recover well and was discharged in stable medical condition.          Patient /Hospital Summary (Details -- Problem Oriented) :          Pyelonephritis  Assessment & Plan  As per above.  Cultures positive for Proteus vulgaris susceptible to ciprofloxacin.      Hydronephrosis due to obstruction of ureter stent  Assessment & Plan  As per above.  Nephrostomy tube placed with ureteral stent removed per IR.      * Sepsis secondary to pyelonephritis-resolved as of 10/14/2019  Assessment & Plan  Resolved.  As per above.  1 out of 2 blood culture bottles grew Proteus vulgaris.      Complicated UTI   Assessment & Plan  History of recurrent left pyelonephritis/complicated urinary tract infection  in setting of ureteral stent placement.  Recurrent nephrostomy tube placements during infections.  History of rectovaginal fistula.  High risk of recurrent infections due to urethral stent placement in setting of stricture from radiation therapy.  History of Klebsiella ESBL as well as Enterococcus faecium.  Discharge with course of ciprofloxacin.  Nephrostomy replaced with ureteral stent removed.    HTN (hypertension)  Assessment & Plan  Resumed home lisinopril with increasing blood pressures.    History of Cervical cancer (HCC)  Assessment & Plan  As per above.  Continue to follow with Dr. Hays.    Digestive-genital tract fistula, female-resolved as of 10/14/2019  Assessment & Plan  As per above.      Consultants:     Interventional radiology- Michael Block M.D.  Gynecologic oncology- Adam Hays M.D.    Procedures:        10/11/2019: Left percutaneous nephrostomy placement and nephrostogram with ultrasound and fluoroscopic guidance; retrieval of left double pigtail ureteral stent    Imaging/ Testing:      IR-PERQ NEPH CATH NEW ACCESS (ALL RADIOLOGY) LEFT   Final Result      1. ULTRASOUND AND FLUOROSCOPIC GUIDED LEFT RENAL ACCESS   2. SUCCESSFUL RETRIEVAL OF NONFUNCTIONING DOUBLE PIGTAIL URETERAL STENT WITH SNARE TECHNIQUE.   3. PLACEMENT OF LEFT 10 Jamaican LOCKING LOOP PERCUTANEOUS NEPHROSTOMY CATHETER WHICH IS CONNECTED TO GRAVITY DRAINAGE.      NM-RENAL WITH DIURETIC WASHOUT   Final Result      1.  Delayed perfusion and excretion of the left kidney suggesting persistent obstruction.   2.  Right renal perfusion and excretion.      DX-CHEST-LIMITED (1 VIEW)   Final Result         1.  Left basilar atelectasis, no focal infiltrate   2.  Atherosclerosis      CT-RENAL COLIC EVALUATION(A/P W/O)   Final Result         1.  Enlargement of the left kidney with increase hazy left perinephric fat stranding with ureteral stent in place. Consider stent obstruction or pyelonephritis.   2.  Diffuse bladder wall thickening, consider  component of cystitis. Stable since prior study.   3.  Parastomal hernia containing small bowel loops without apparent obstructive change.   4.  Hepatomegaly   5.  Trace pericardial effusion        Discharge Medications:         Medication Reconciliation: Completed       Medication List      Start taking these medications      Instructions   ciprofloxacin 500 MG Tabs  Commonly known as:  CIPRO   Take 1 Tab by mouth 2 times a day for 7 days.  Dose:  500 mg        Continue taking these medications      Instructions   lisinopril 40 MG tablet  Commonly known as:  PRINIVIL, ZESTRIL   Take 40 mg by mouth every day.  Dose:  40 mg              Disposition:   To home.    Diet:   Regular.    Activity:   As tolerated.    Instructions:      The patient was instructed to return to the ER in the event of worsening symptoms. I have counseled the patient on the importance of compliance and the patient has agreed to proceed with all medical recommendations and follow up plan indicated above.   The patient understands that all medications come with benefits and risks. Risks may include permanent injury or death and these risks can be minimized with close reassessment and monitoring.        Primary Care Provider:    Julio Ross M.D.  Discharge summary faxed to primary care provider:  Completed  Copy of discharge summary given to the patient: Deferred      Follow up appointment details :      Adam Hays M.D.  [address] [facility] [address]  [phone]    Go on 11/8/2019  For follow up of urostomy removal and possible surgery vs. stenting at 1:00 Pm.     LINDA Rousseau  [address] [facility] [address]  [phone]    Go on 10/17/2019  Please arrive at 10:15Am for your hospital follow up appointment at 10:30Am. This is Dr. Nancy DALY. Thank you.      Pending Studies:        None.    Time spent on discharge day patient visit, preparing discharge paperwork and arranging for patient follow up.    Summary of follow up  issues:   -   -Follow up with Dr. Hays to discuss possible surgery and removal of nephrostomy tube and resolution of sepsis and pyelonephritis.  -Follow up with Dr. Ross, your PCP with labs for resolution of pyelonephritis.      Discharge Time (Minutes) :    58  Hospital Course Type:  Inpatient Stay >2 midnights      Condition on Discharge    ______________________________________________________________________    Interval history/exam for day of discharge:    Patient was afebrile without any pain complaint.  She is tolerating a diet.  White count was normal at 6.4.  She was continued to self cath find any issues.  No fevers or chills.  Nausea and vomiting had resolved.      Vitals:    10/14/19 0025 10/14/19 0506 10/14/19 0732 10/14/19 1125   BP: (Abnormal) 162/100 (Abnormal) 168/107 149/99 159/105   Pulse: 93 100 90 89   Resp: 17 16 17 17   Temp: 36.8 °C (98.2 °F) 36.5 °C (97.7 °F) 36.2 °C (97.1 °F) 36.6 °C (97.9 °F)   TempSrc: Temporal Temporal Temporal Temporal   SpO2: 95% 97% 93% 92%   Weight:       Height:           Physical Exam   Constitutional: She is oriented to person, place, and time. She appears well-developed and well-nourished. No distress.   HENT:   Head: Normocephalic and atraumatic.   Right Ear: External ear normal.   Left Ear: External ear normal.   Nose: Nose normal.   Mouth/Throat: Oropharynx is clear and moist. No oropharyngeal exudate.   Eyes: Pupils are equal, round, and reactive to light. Conjunctivae and EOM are normal. Right eye exhibits no discharge. Left eye exhibits no discharge.   Neck: Neck supple. No JVD present. No tracheal deviation present. No thyromegaly present.   Cardiovascular: Normal rate, regular rhythm, normal heart sounds and intact distal pulses. Exam reveals no gallop and no friction rub.   No murmur heard.  Pulmonary/Chest: Effort normal and breath sounds normal. No stridor. No respiratory distress. She has no wheezes. She has no rales.   Abdominal: Soft. Bowel sounds  are normal. She exhibits no distension and no mass. There is no tenderness. There is no rebound and no guarding.   Genitourinary:   Genitourinary Comments: No CVA tenderness.  Left nephrostomy tube in place without drainage or erythema.   Musculoskeletal: She exhibits no edema, tenderness or deformity.   Neurological: She is alert and oriented to person, place, and time. She exhibits normal muscle tone.   Skin: Skin is warm and dry. No rash noted. She is not diaphoretic. No erythema. No pallor.   Psychiatric: She has a normal mood and affect. Her behavior is normal. Judgment and thought content normal.   Nursing note and vitals reviewed.        Most Recent Labs:    Lab Results   Component Value Date/Time    WBC 6.4 10/14/2019 01:58 AM    RBC 4.21 10/14/2019 01:58 AM    HEMOGLOBIN 11.3 (L) 10/14/2019 01:58 AM    HEMATOCRIT 34.9 (L) 10/14/2019 01:58 AM    MCV 82.9 10/14/2019 01:58 AM    MCH 26.8 (L) 10/14/2019 01:58 AM    MCHC 32.4 (L) 10/14/2019 01:58 AM    MPV 10.2 10/14/2019 01:58 AM    NEUTSPOLYS 60.60 10/14/2019 01:58 AM    LYMPHOCYTES 16.60 (L) 10/14/2019 01:58 AM    MONOCYTES 12.00 10/14/2019 01:58 AM    EOSINOPHILS 9.80 (H) 10/14/2019 01:58 AM    BASOPHILS 0.20 10/14/2019 01:58 AM      Lab Results   Component Value Date/Time    SODIUM 136 10/14/2019 01:58 AM    POTASSIUM 4.2 10/14/2019 01:58 AM    CHLORIDE 106 10/14/2019 01:58 AM    CO2 24 10/14/2019 01:58 AM    GLUCOSE 96 10/14/2019 01:58 AM    BUN 14 10/14/2019 01:58 AM    CREATININE 0.69 10/14/2019 01:58 AM    CREATININE 0.7 03/27/2006 02:58 PM      Lab Results   Component Value Date/Time    ALTSGPT 6 10/13/2019 02:33 AM    ASTSGOT 11 (L) 10/13/2019 02:33 AM    ALKPHOSPHAT 66 10/13/2019 02:33 AM    TBILIRUBIN 0.5 10/13/2019 02:33 AM    LIPASE 16 10/10/2019 06:42 PM    ALBUMIN 3.3 10/13/2019 02:33 AM    GLOBULIN 2.1 10/13/2019 02:33 AM    INR 1.06 10/11/2019 01:02 PM     Lab Results   Component Value Date/Time    PROTHROMBTM 14.0 10/11/2019 01:02 PM    INR  1.06 10/11/2019 01:02 PM

## 2019-10-14 NOTE — CARE PLAN
Problem: Communication  Goal: The ability to communicate needs accurately and effectively will improve  Outcome: PROGRESSING AS EXPECTED     PT communicated with effectively.   Problem: Safety  Goal: Will remain free from injury  Outcome: PROGRESSING AS EXPECTED    Pt appropriately assessed for risk for injury and falls. Appropriate assistive devices used.

## 2019-10-15 LAB
BACTERIA BLD CULT: NORMAL
BACTERIA UR CULT: NORMAL
SIGNIFICANT IND 70042: NORMAL
SIGNIFICANT IND 70042: NORMAL
SITE SITE: NORMAL
SITE SITE: NORMAL
SOURCE SOURCE: NORMAL
SOURCE SOURCE: NORMAL

## 2019-10-17 ENCOUNTER — HOSPITAL ENCOUNTER (OUTPATIENT)
Dept: LAB | Facility: MEDICAL CENTER | Age: 74
End: 2019-10-17
Attending: PHYSICIAN ASSISTANT
Payer: MEDICARE

## 2019-10-17 PROCEDURE — 87086 URINE CULTURE/COLONY COUNT: CPT

## 2019-12-31 RX ORDER — SODIUM CHLORIDE 9 MG/ML
INJECTION, SOLUTION INTRAVENOUS CONTINUOUS
Status: CANCELLED | OUTPATIENT
Start: 2020-01-06

## 2020-01-03 DIAGNOSIS — Z01.812 PRE-OPERATIVE LABORATORY EXAMINATION: ICD-10-CM

## 2020-01-03 LAB
BASOPHILS # BLD AUTO: 0.4 % (ref 0–1.8)
BASOPHILS # BLD: 0.03 K/UL (ref 0–0.12)
EOSINOPHIL # BLD AUTO: 0.16 K/UL (ref 0–0.51)
EOSINOPHIL NFR BLD: 2.1 % (ref 0–6.9)
ERYTHROCYTE [DISTWIDTH] IN BLOOD BY AUTOMATED COUNT: 45.8 FL (ref 35.9–50)
HCT VFR BLD AUTO: 41.3 % (ref 37–47)
HGB BLD-MCNC: 12.8 G/DL (ref 12–16)
IMM GRANULOCYTES # BLD AUTO: 0.02 K/UL (ref 0–0.11)
IMM GRANULOCYTES NFR BLD AUTO: 0.3 % (ref 0–0.9)
INR PPP: 0.99 (ref 0.87–1.13)
LYMPHOCYTES # BLD AUTO: 1.44 K/UL (ref 1–4.8)
LYMPHOCYTES NFR BLD: 18.9 % (ref 22–41)
MCH RBC QN AUTO: 25.7 PG (ref 27–33)
MCHC RBC AUTO-ENTMCNC: 31 G/DL (ref 33.6–35)
MCV RBC AUTO: 82.8 FL (ref 81.4–97.8)
MONOCYTES # BLD AUTO: 0.63 K/UL (ref 0–0.85)
MONOCYTES NFR BLD AUTO: 8.3 % (ref 0–13.4)
NEUTROPHILS # BLD AUTO: 5.34 K/UL (ref 2–7.15)
NEUTROPHILS NFR BLD: 70 % (ref 44–72)
NRBC # BLD AUTO: 0 K/UL
NRBC BLD-RTO: 0 /100 WBC
PLATELET # BLD AUTO: 265 K/UL (ref 164–446)
PMV BLD AUTO: 10.4 FL (ref 9–12.9)
PROTHROMBIN TIME: 13.2 SEC (ref 12–14.6)
RBC # BLD AUTO: 4.99 M/UL (ref 4.2–5.4)
WBC # BLD AUTO: 7.6 K/UL (ref 4.8–10.8)

## 2020-01-03 PROCEDURE — 85610 PROTHROMBIN TIME: CPT

## 2020-01-03 PROCEDURE — 36415 COLL VENOUS BLD VENIPUNCTURE: CPT

## 2020-01-03 PROCEDURE — 85025 COMPLETE CBC W/AUTO DIFF WBC: CPT

## 2020-01-06 ENCOUNTER — APPOINTMENT (OUTPATIENT)
Dept: RADIOLOGY | Facility: MEDICAL CENTER | Age: 75
End: 2020-01-06
Attending: SPECIALIST
Payer: MEDICARE

## 2020-01-06 ENCOUNTER — HOSPITAL ENCOUNTER (OUTPATIENT)
Facility: MEDICAL CENTER | Age: 75
End: 2020-01-06
Attending: SPECIALIST | Admitting: SPECIALIST
Payer: MEDICARE

## 2020-01-06 VITALS
BODY MASS INDEX: 26.84 KG/M2 | DIASTOLIC BLOOD PRESSURE: 80 MMHG | TEMPERATURE: 97.3 F | WEIGHT: 136.69 LBS | HEIGHT: 60 IN | OXYGEN SATURATION: 98 % | RESPIRATION RATE: 16 BRPM | SYSTOLIC BLOOD PRESSURE: 131 MMHG | HEART RATE: 79 BPM

## 2020-01-06 DIAGNOSIS — N13.1 HYDRONEPHROSIS WITH URETERAL STRICTURE: ICD-10-CM

## 2020-01-06 PROCEDURE — 700105 HCHG RX REV CODE 258: Performed by: NURSE PRACTITIONER

## 2020-01-06 PROCEDURE — 99153 MOD SED SAME PHYS/QHP EA: CPT

## 2020-01-06 PROCEDURE — 700111 HCHG RX REV CODE 636 W/ 250 OVERRIDE (IP)

## 2020-01-06 PROCEDURE — 700111 HCHG RX REV CODE 636 W/ 250 OVERRIDE (IP): Performed by: RADIOLOGY

## 2020-01-06 PROCEDURE — 700117 HCHG RX CONTRAST REV CODE 255: Performed by: RADIOLOGY

## 2020-01-06 PROCEDURE — 160002 HCHG RECOVERY MINUTES (STAT)

## 2020-01-06 RX ORDER — OXYCODONE HYDROCHLORIDE 5 MG/1
5 TABLET ORAL
Status: DISCONTINUED | OUTPATIENT
Start: 2020-01-06 | End: 2020-01-06 | Stop reason: HOSPADM

## 2020-01-06 RX ORDER — OXYCODONE HYDROCHLORIDE 10 MG/1
10 TABLET ORAL
Status: DISCONTINUED | OUTPATIENT
Start: 2020-01-06 | End: 2020-01-06 | Stop reason: HOSPADM

## 2020-01-06 RX ORDER — ONDANSETRON 2 MG/ML
4 INJECTION INTRAMUSCULAR; INTRAVENOUS EVERY 8 HOURS PRN
Status: DISCONTINUED | OUTPATIENT
Start: 2020-01-06 | End: 2020-01-06 | Stop reason: HOSPADM

## 2020-01-06 RX ORDER — ONDANSETRON 2 MG/ML
4 INJECTION INTRAMUSCULAR; INTRAVENOUS PRN
Status: DISCONTINUED | OUTPATIENT
Start: 2020-01-06 | End: 2020-01-06 | Stop reason: HOSPADM

## 2020-01-06 RX ORDER — CEFAZOLIN SODIUM 1 G/3ML
INJECTION, POWDER, FOR SOLUTION INTRAMUSCULAR; INTRAVENOUS
Status: COMPLETED
Start: 2020-01-06 | End: 2020-01-06

## 2020-01-06 RX ORDER — MIDAZOLAM HYDROCHLORIDE 1 MG/ML
INJECTION INTRAMUSCULAR; INTRAVENOUS
Status: COMPLETED
Start: 2020-01-06 | End: 2020-01-06

## 2020-01-06 RX ORDER — CEFAZOLIN SODIUM 2 G/100ML
2 INJECTION, SOLUTION INTRAVENOUS ONCE
Status: COMPLETED | OUTPATIENT
Start: 2020-01-06 | End: 2020-01-06

## 2020-01-06 RX ORDER — SODIUM CHLORIDE 9 MG/ML
500 INJECTION, SOLUTION INTRAVENOUS
Status: DISCONTINUED | OUTPATIENT
Start: 2020-01-06 | End: 2020-01-06 | Stop reason: HOSPADM

## 2020-01-06 RX ORDER — MIDAZOLAM HYDROCHLORIDE 1 MG/ML
.5-2 INJECTION INTRAMUSCULAR; INTRAVENOUS PRN
Status: DISCONTINUED | OUTPATIENT
Start: 2020-01-06 | End: 2020-01-06 | Stop reason: HOSPADM

## 2020-01-06 RX ORDER — SODIUM CHLORIDE 9 MG/ML
INJECTION, SOLUTION INTRAVENOUS CONTINUOUS
Status: DISCONTINUED | OUTPATIENT
Start: 2020-01-06 | End: 2020-01-06 | Stop reason: HOSPADM

## 2020-01-06 RX ADMIN — FENTANYL CITRATE 50 MCG: 50 INJECTION, SOLUTION INTRAMUSCULAR; INTRAVENOUS at 10:43

## 2020-01-06 RX ADMIN — MIDAZOLAM 1 MG: 1 INJECTION INTRAMUSCULAR; INTRAVENOUS at 10:38

## 2020-01-06 RX ADMIN — FENTANYL CITRATE 25 MCG: 50 INJECTION, SOLUTION INTRAMUSCULAR; INTRAVENOUS at 10:38

## 2020-01-06 RX ADMIN — CEFAZOLIN 2000 MG: 330 INJECTION, POWDER, FOR SOLUTION INTRAMUSCULAR; INTRAVENOUS at 10:43

## 2020-01-06 RX ADMIN — FENTANYL CITRATE 25 MCG: 0.05 INJECTION, SOLUTION INTRAMUSCULAR; INTRAVENOUS at 10:38

## 2020-01-06 RX ADMIN — IOHEXOL 12 ML: 300 INJECTION, SOLUTION INTRAVENOUS at 10:46

## 2020-01-06 RX ADMIN — SODIUM CHLORIDE: 9 INJECTION, SOLUTION INTRAVENOUS at 08:40

## 2020-01-06 RX ADMIN — MIDAZOLAM HYDROCHLORIDE 1 MG: 1 INJECTION, SOLUTION INTRAMUSCULAR; INTRAVENOUS at 10:38

## 2020-01-06 RX ADMIN — MIDAZOLAM 1 MG: 1 INJECTION INTRAMUSCULAR; INTRAVENOUS at 10:43

## 2020-01-06 NOTE — OR NURSING
1112 Patient arrived to unit, awake and alert, but groggy.  Left flank site clean, dry and soft.  Patient denies pain at this time.  Updated on plan of care, verbalized understanding.  1120 Patient's  called and updated on expected discharge time.  1130 Patient sleeping oxygen saturation 89%, placed on 2 liters nasal cannula.  Patient denies discomfort at this time.  1220 Access site clean, dry and soft.  Patient resting in gurney.  1245 Patient up to edge of gurney, denies discomfort.  Access site clean, dry and soft.  1315 Patient taken out via wheelchair by RN.  All lines and monitors discontinued. Reviewed discharge paperwork with pt and . Discussed diet, activity, medications, follow up care and worsening symptoms. No questions at this time. Pt discharged home with .

## 2020-01-06 NOTE — PROGRESS NOTES
IR RN note    Patient underwent a L ureteral stent placement by Dr. Gonzalez.  Procedure site was marked by MD and verified using imaging guidance.  IR jasbir Robbins and Skyler assisted. Patient was placed in a prone position.  Vitals were taken every 5 minutes and remained stable during procedure (see doc flow sheet for results).  CO2 waveform capnography was monitored throughout procedure, see chart.   A guaze and tape dressing was placed over surgical site. Report called to Yu SEVERINO. Pt transported by shola with RN to [facility], with monitor .         RadioRx Percuflex Glidex Hydrogel coating 8FX 22cm  REF # [id number]  LOT # [id number]  Exp. 07-

## 2020-01-06 NOTE — OR SURGEON
Immediate Post- Operative Note        PostOp Diagnosis: Left Ureteral Stricture    Procedure(s): Left Ureteral Stent Placement    Estimated Blood Loss: Less than 5 ml        Complications: None            1/6/2020     10:50 AM     Wade Gonzalez

## 2020-01-06 NOTE — DISCHARGE INSTRUCTIONS
ACTIVITY: Rest and take it easy for the first 24 hours.  A responsible adult is recommended to remain with you during that time.  It is normal to feel sleepy.  We encourage you to not do anything that requires balance, judgment or coordination.    MILD FLU-LIKE SYMPTOMS ARE NORMAL. YOU MAY EXPERIENCE GENERALIZED MUSCLE ACHES, THROAT IRRITATION, HEADACHE AND/OR SOME NAUSEA.    FOR 24 HOURS DO NOT:  Drive, operate machinery or run household appliances.  Drink beer or alcoholic beverages.   Make important decisions or sign legal documents.    SPECIAL INSTRUCTIONS:     Ureteral Stents     A ureteral stent is a soft plastic tube with holes in it. It’s temporarily inserted into a ureter to help drain urine into the bladder. One end goes in the kidney. The other end goes in the bladder. A coil on each end holds the stent in place. The stent can’t be seen from outside the body. It shouldn’t interfere with your normal routine. Your stent will be put in by a doctor trained in treating the urinary tract (a urologist) or another specialist. The procedure is done in a hospital or surgery center. You’ll likely go home the same day.  When is a ureteral stent used?  A ureteral stent may be used:  · To bypass a blockage in a kidney or ureter.  · During kidney stone removal.  · To let a ureter heal after surgery.  Before the Procedure  Your healthcare provider will give you instructions to prepare for the procedure. X-rays or other imaging tests of your kidneys and ureters may be done beforehand.  During the procedure  · You receive medicine to prevent pain and help you relax or sleep during the procedure. Once this takes effect, the procedure starts.  · The doctor inserts a cystoscope (lighted instrument) through the urethra and into the bladder. This shows the opening to the ureter.  · A thin wire is carefully threaded through the cystoscope, up the ureter, and into the kidney. The stent is inserted over the wire.  · A fluoroscope  (special X-ray machine) is used to help position the stent. When the stent is in place, the wire and cystoscope are removed.  While you have a stent  · Some discomfort is normal. Certain movements may trigger pain or a feeling that you need to urinate. You may also feel mild soreness or pressure before or during urination. These symptoms will go away a few days after the stent is removed.  · Medicine to control pain or bladder spasms or to prevent infection may be prescribed. Take this as directed.  · Drink plenty of fluids to help flush out your urinary tract.  · Your urine may be slightly pink or red. This is due to bleeding caused by minor irritation from the stent. This may happen on and off while you have the stent.  · As with any synthetic device placed in the body, there is a risk of infection. The stent may have to be removed if this happens.   How long will you need a stent?  The stent is often taken out after the blockage in the ureter is treated or the ureter has healed. This may take 1 week to 2 weeks, or longer. If a stent is needed for a long time, it may need to be changed every few months.  When to call your healthcare provider  Contact your healthcare provider right away if:  · Your urine contains blood clots or you see a large amount of blood-tinged urine  · You have symptoms similar to those you had before the stent was placed  · You constantly leak urine  · You have a fever over 100.4°F (38°C), chills, nausea, or vomiting  · Your pain is not relieved with medicine  · The end of the stent comes out of the urethra    DIET: To avoid nausea, slowly advance diet as tolerated, avoiding spicy or greasy foods for the first day.  Add more substantial food to your diet according to your physician's instructions.  Babies can be fed formula or breast milk as soon as they are hungry.  INCREASE FLUIDS AND FIBER TO AVOID CONSTIPATION.    SURGICAL DRESSING/BATHING: Keep dressing and incision dry and intact for  24 hours, may remove dressing and shower after 1 PM on 1/7, do not need to replace dressing.  Do not submerge site in water for 7 days.    FOLLOW-UP APPOINTMENT:  A follow-up appointment should be arranged with your Dr. Hays [phone]; call to schedule.    You should CALL YOUR PHYSICIAN if you develop:  Fever greater than 101 degrees F.  Pain not relieved by medication, or persistent nausea or vomiting.  Excessive bleeding (blood soaking through dressing) or unexpected drainage from the wound.  Extreme redness or swelling around the incision site, drainage of pus or foul smelling drainage.  Inability to urinate or empty your bladder within 8 hours.  Problems with breathing or chest pain.    You should call 911 if you develop problems with breathing or chest pain.  If you are unable to contact your doctor or surgical center, you should go to the nearest emergency room or urgent care center.  Physician's telephone #: [phone]    If any questions arise, call your doctor.  If your doctor is not available, please feel free to call the Surgical Center at [phone].  The Center is open Monday through Friday from 7AM to 7PM.  You can also call the [employer] HOTLINE open 24 hours/day, 7 days/week and speak to a nurse at [phone], or toll free at [phone].    A registered nurse may call you a few days after your surgery to see how you are doing after your procedure.    MEDICATIONS: Resume taking daily medication.  Take prescribed pain medication with food.  If no medication is prescribed, you may take non-aspirin pain medication if needed.  PAIN MEDICATION CAN BE VERY CONSTIPATING.  Take a stool softener or laxative such as senokot, pericolace, or milk of magnesia if needed.    If your physician has prescribed pain medication that includes Acetaminophen (Tylenol), do not take additional Acetaminophen (Tylenol) while taking the prescribed medication.    Depression / Suicide Risk    As you are discharged from this  [facility] facility, it is important to learn how to keep safe from harming yourself.    Recognize the warning signs:  · Abrupt changes in personality, positive or negative- including increase in energy   · Giving away possessions  · Change in eating patterns- significant weight changes-  positive or negative  · Change in sleeping patterns- unable to sleep or sleeping all the time   · Unwillingness or inability to communicate  · Depression  · Unusual sadness, discouragement and loneliness  · Talk of wanting to die  · Neglect of personal appearance   · Rebelliousness- reckless behavior  · Withdrawal from people/activities they love  · Confusion- inability to concentrate     If you or a loved one observes any of these behaviors or has concerns about self-harm, here's what you can do:  · Talk about it- your feelings and reasons for harming yourself  · Remove any means that you might use to hurt yourself (examples: pills, rope, extension cords, firearm)  · Get professional help from the community (Mental Health, Substance Abuse, psychological counseling)  · Do not be alone:Call your Safe Contact- someone whom you trust who will be there for you.  · Call your local CRISIS HOTLINE 523-6016 or 488-725-7185  · Call your local Children's Mobile Crisis Response Team [place] (827) 089-8995 or www.[employer]  · Call the toll free National Suicide Prevention Hotlines   · National Suicide Prevention Lifeline 203-935-FRJS (4074)  · National Hope Line Network 800-SUICIDE (100-7676)

## 2020-05-18 DIAGNOSIS — Z01.812 PRE-OPERATIVE LABORATORY EXAMINATION: ICD-10-CM

## 2020-05-18 LAB — COVID ORDER STATUS COVID19: NORMAL

## 2020-05-18 PROCEDURE — C9803 HOPD COVID-19 SPEC COLLECT: HCPCS

## 2020-05-19 DIAGNOSIS — Z01.812 PRE-OPERATIVE LABORATORY EXAMINATION: ICD-10-CM

## 2020-05-19 DIAGNOSIS — Z01.810 PRE-OPERATIVE CARDIOVASCULAR EXAMINATION: ICD-10-CM

## 2020-05-19 LAB
ALBUMIN SERPL BCP-MCNC: 4 G/DL (ref 3.2–4.9)
ALBUMIN/GLOB SERPL: 1.4 G/DL
ALP SERPL-CCNC: 100 U/L (ref 30–99)
ALT SERPL-CCNC: 7 U/L (ref 2–50)
ANION GAP SERPL CALC-SCNC: 11 MMOL/L (ref 7–16)
APTT PPP: 35.1 SEC (ref 24.7–36)
AST SERPL-CCNC: 9 U/L (ref 12–45)
BASOPHILS # BLD AUTO: 0.3 % (ref 0–1.8)
BASOPHILS # BLD: 0.02 K/UL (ref 0–0.12)
BILIRUB SERPL-MCNC: 0.2 MG/DL (ref 0.1–1.5)
BUN SERPL-MCNC: 26 MG/DL (ref 8–22)
CALCIUM SERPL-MCNC: 10.5 MG/DL (ref 8.5–10.5)
CHLORIDE SERPL-SCNC: 99 MMOL/L (ref 96–112)
CO2 SERPL-SCNC: 24 MMOL/L (ref 20–33)
CREAT SERPL-MCNC: 0.98 MG/DL (ref 0.5–1.4)
EKG IMPRESSION: NORMAL
EOSINOPHIL # BLD AUTO: 0.21 K/UL (ref 0–0.51)
EOSINOPHIL NFR BLD: 3 % (ref 0–6.9)
ERYTHROCYTE [DISTWIDTH] IN BLOOD BY AUTOMATED COUNT: 42.8 FL (ref 35.9–50)
GLOBULIN SER CALC-MCNC: 2.9 G/DL (ref 1.9–3.5)
GLUCOSE SERPL-MCNC: 107 MG/DL (ref 65–99)
HCT VFR BLD AUTO: 39.1 % (ref 37–47)
HGB BLD-MCNC: 12.5 G/DL (ref 12–16)
IMM GRANULOCYTES # BLD AUTO: 0.02 K/UL (ref 0–0.11)
IMM GRANULOCYTES NFR BLD AUTO: 0.3 % (ref 0–0.9)
INR PPP: 0.93 (ref 0.87–1.13)
LYMPHOCYTES # BLD AUTO: 1.12 K/UL (ref 1–4.8)
LYMPHOCYTES NFR BLD: 16 % (ref 22–41)
MCH RBC QN AUTO: 26.3 PG (ref 27–33)
MCHC RBC AUTO-ENTMCNC: 32 G/DL (ref 33.6–35)
MCV RBC AUTO: 82.3 FL (ref 81.4–97.8)
MONOCYTES # BLD AUTO: 0.76 K/UL (ref 0–0.85)
MONOCYTES NFR BLD AUTO: 10.8 % (ref 0–13.4)
NEUTROPHILS # BLD AUTO: 4.89 K/UL (ref 2–7.15)
NEUTROPHILS NFR BLD: 69.6 % (ref 44–72)
NRBC # BLD AUTO: 0 K/UL
NRBC BLD-RTO: 0 /100 WBC
PLATELET # BLD AUTO: 257 K/UL (ref 164–446)
PMV BLD AUTO: 10.1 FL (ref 9–12.9)
POTASSIUM SERPL-SCNC: 4.7 MMOL/L (ref 3.6–5.5)
PROT SERPL-MCNC: 6.9 G/DL (ref 6–8.2)
PROTHROMBIN TIME: 12.6 SEC (ref 12–14.6)
RBC # BLD AUTO: 4.75 M/UL (ref 4.2–5.4)
SARS-COV-2 RNA RESP QL NAA+PROBE: NOT DETECTED
SODIUM SERPL-SCNC: 134 MMOL/L (ref 135–145)
SPECIMEN SOURCE: NORMAL
WBC # BLD AUTO: 7 K/UL (ref 4.8–10.8)

## 2020-05-19 PROCEDURE — 36415 COLL VENOUS BLD VENIPUNCTURE: CPT

## 2020-05-19 PROCEDURE — 85610 PROTHROMBIN TIME: CPT

## 2020-05-19 PROCEDURE — 80053 COMPREHEN METABOLIC PANEL: CPT

## 2020-05-19 PROCEDURE — 85025 COMPLETE CBC W/AUTO DIFF WBC: CPT

## 2020-05-19 PROCEDURE — 85730 THROMBOPLASTIN TIME PARTIAL: CPT

## 2020-05-19 PROCEDURE — 93010 ELECTROCARDIOGRAM REPORT: CPT | Performed by: INTERNAL MEDICINE

## 2020-05-19 PROCEDURE — 93005 ELECTROCARDIOGRAM TRACING: CPT

## 2020-05-19 ASSESSMENT — FIBROSIS 4 INDEX: FIB4 SCORE: 1.27

## 2020-05-21 ENCOUNTER — ANESTHESIA (OUTPATIENT)
Dept: SURGERY | Facility: MEDICAL CENTER | Age: 75
End: 2020-05-21
Payer: MEDICARE

## 2020-05-21 ENCOUNTER — APPOINTMENT (OUTPATIENT)
Dept: RADIOLOGY | Facility: MEDICAL CENTER | Age: 75
End: 2020-05-21
Attending: SPECIALIST
Payer: MEDICARE

## 2020-05-21 ENCOUNTER — HOSPITAL ENCOUNTER (OUTPATIENT)
Facility: MEDICAL CENTER | Age: 75
End: 2020-05-21
Attending: SPECIALIST | Admitting: SPECIALIST
Payer: MEDICARE

## 2020-05-21 ENCOUNTER — ANESTHESIA EVENT (OUTPATIENT)
Dept: SURGERY | Facility: MEDICAL CENTER | Age: 75
End: 2020-05-21
Payer: MEDICARE

## 2020-05-21 VITALS
HEIGHT: 60 IN | BODY MASS INDEX: 26.92 KG/M2 | RESPIRATION RATE: 18 BRPM | WEIGHT: 137.13 LBS | SYSTOLIC BLOOD PRESSURE: 137 MMHG | HEART RATE: 96 BPM | TEMPERATURE: 98.4 F | OXYGEN SATURATION: 94 % | DIASTOLIC BLOOD PRESSURE: 77 MMHG

## 2020-05-21 LAB
GRAM STN SPEC: NORMAL
SIGNIFICANT IND 70042: NORMAL
SITE SITE: NORMAL
SOURCE SOURCE: NORMAL

## 2020-05-21 PROCEDURE — 700105 HCHG RX REV CODE 258: Performed by: SPECIALIST

## 2020-05-21 PROCEDURE — 87070 CULTURE OTHR SPECIMN AEROBIC: CPT

## 2020-05-21 PROCEDURE — 160046 HCHG PACU - 1ST 60 MINS PHASE II: Performed by: SPECIALIST

## 2020-05-21 PROCEDURE — 160035 HCHG PACU - 1ST 60 MINS PHASE I: Performed by: SPECIALIST

## 2020-05-21 PROCEDURE — 160028 HCHG SURGERY MINUTES - 1ST 30 MINS LEVEL 3: Performed by: SPECIALIST

## 2020-05-21 PROCEDURE — 87075 CULTR BACTERIA EXCEPT BLOOD: CPT

## 2020-05-21 PROCEDURE — A9270 NON-COVERED ITEM OR SERVICE: HCPCS | Performed by: SPECIALIST

## 2020-05-21 PROCEDURE — 87077 CULTURE AEROBIC IDENTIFY: CPT | Mod: 91

## 2020-05-21 PROCEDURE — 87186 SC STD MICRODIL/AGAR DIL: CPT

## 2020-05-21 PROCEDURE — 160048 HCHG OR STATISTICAL LEVEL 1-5: Performed by: SPECIALIST

## 2020-05-21 PROCEDURE — 700111 HCHG RX REV CODE 636 W/ 250 OVERRIDE (IP): Performed by: ANESTHESIOLOGY

## 2020-05-21 PROCEDURE — C2617 STENT, NON-COR, TEM W/O DEL: HCPCS | Performed by: SPECIALIST

## 2020-05-21 PROCEDURE — 87205 SMEAR GRAM STAIN: CPT

## 2020-05-21 PROCEDURE — 700117 HCHG RX CONTRAST REV CODE 255: Performed by: SPECIALIST

## 2020-05-21 PROCEDURE — C1769 GUIDE WIRE: HCPCS | Performed by: SPECIALIST

## 2020-05-21 PROCEDURE — 160025 RECOVERY II MINUTES (STATS): Performed by: SPECIALIST

## 2020-05-21 PROCEDURE — 160002 HCHG RECOVERY MINUTES (STAT): Performed by: SPECIALIST

## 2020-05-21 PROCEDURE — 700102 HCHG RX REV CODE 250 W/ 637 OVERRIDE(OP): Performed by: SPECIALIST

## 2020-05-21 PROCEDURE — 160039 HCHG SURGERY MINUTES - EA ADDL 1 MIN LEVEL 3: Performed by: SPECIALIST

## 2020-05-21 PROCEDURE — 160009 HCHG ANES TIME/MIN: Performed by: SPECIALIST

## 2020-05-21 PROCEDURE — 87106 FUNGI IDENTIFICATION YEAST: CPT

## 2020-05-21 PROCEDURE — C1758 CATHETER, URETERAL: HCPCS | Performed by: SPECIALIST

## 2020-05-21 DEVICE — IMPLANTABLE DEVICE: Type: IMPLANTABLE DEVICE | Site: URETER | Status: FUNCTIONAL

## 2020-05-21 RX ORDER — SODIUM CHLORIDE, SODIUM LACTATE, POTASSIUM CHLORIDE, CALCIUM CHLORIDE 600; 310; 30; 20 MG/100ML; MG/100ML; MG/100ML; MG/100ML
INJECTION, SOLUTION INTRAVENOUS CONTINUOUS
Status: ACTIVE | OUTPATIENT
Start: 2020-05-21 | End: 2020-05-21

## 2020-05-21 RX ORDER — PHENYLEPHRINE HCL IN 0.9% NACL 0.5 MG/5ML
SYRINGE (ML) INTRAVENOUS PRN
Status: DISCONTINUED | OUTPATIENT
Start: 2020-05-21 | End: 2020-05-21 | Stop reason: SURG

## 2020-05-21 RX ORDER — LABETALOL HYDROCHLORIDE 5 MG/ML
5 INJECTION, SOLUTION INTRAVENOUS
Status: DISCONTINUED | OUTPATIENT
Start: 2020-05-21 | End: 2020-05-21 | Stop reason: HOSPADM

## 2020-05-21 RX ORDER — CEFAZOLIN SODIUM 1 G/3ML
INJECTION, POWDER, FOR SOLUTION INTRAMUSCULAR; INTRAVENOUS PRN
Status: DISCONTINUED | OUTPATIENT
Start: 2020-05-21 | End: 2020-05-21 | Stop reason: SURG

## 2020-05-21 RX ORDER — HALOPERIDOL 5 MG/ML
1 INJECTION INTRAMUSCULAR
Status: DISCONTINUED | OUTPATIENT
Start: 2020-05-21 | End: 2020-05-21 | Stop reason: HOSPADM

## 2020-05-21 RX ORDER — HYDRALAZINE HYDROCHLORIDE 20 MG/ML
5 INJECTION INTRAMUSCULAR; INTRAVENOUS
Status: DISCONTINUED | OUTPATIENT
Start: 2020-05-21 | End: 2020-05-21 | Stop reason: HOSPADM

## 2020-05-21 RX ORDER — MIDAZOLAM HYDROCHLORIDE 1 MG/ML
INJECTION INTRAMUSCULAR; INTRAVENOUS PRN
Status: DISCONTINUED | OUTPATIENT
Start: 2020-05-21 | End: 2020-05-21 | Stop reason: SURG

## 2020-05-21 RX ORDER — SODIUM CHLORIDE, SODIUM LACTATE, POTASSIUM CHLORIDE, CALCIUM CHLORIDE 600; 310; 30; 20 MG/100ML; MG/100ML; MG/100ML; MG/100ML
INJECTION, SOLUTION INTRAVENOUS CONTINUOUS
Status: DISCONTINUED | OUTPATIENT
Start: 2020-05-21 | End: 2020-05-21 | Stop reason: HOSPADM

## 2020-05-21 RX ORDER — OXYCODONE HCL 5 MG/5 ML
10 SOLUTION, ORAL ORAL
Status: DISCONTINUED | OUTPATIENT
Start: 2020-05-21 | End: 2020-05-21 | Stop reason: HOSPADM

## 2020-05-21 RX ORDER — OXYCODONE HCL 5 MG/5 ML
5 SOLUTION, ORAL ORAL
Status: DISCONTINUED | OUTPATIENT
Start: 2020-05-21 | End: 2020-05-21 | Stop reason: HOSPADM

## 2020-05-21 RX ORDER — DEXAMETHASONE SODIUM PHOSPHATE 4 MG/ML
INJECTION, SOLUTION INTRA-ARTICULAR; INTRALESIONAL; INTRAMUSCULAR; INTRAVENOUS; SOFT TISSUE PRN
Status: DISCONTINUED | OUTPATIENT
Start: 2020-05-21 | End: 2020-05-21 | Stop reason: SURG

## 2020-05-21 RX ORDER — DIPHENHYDRAMINE HYDROCHLORIDE 50 MG/ML
12.5 INJECTION INTRAMUSCULAR; INTRAVENOUS
Status: DISCONTINUED | OUTPATIENT
Start: 2020-05-21 | End: 2020-05-21 | Stop reason: HOSPADM

## 2020-05-21 RX ORDER — METOPROLOL TARTRATE 1 MG/ML
1 INJECTION, SOLUTION INTRAVENOUS
Status: DISCONTINUED | OUTPATIENT
Start: 2020-05-21 | End: 2020-05-21 | Stop reason: HOSPADM

## 2020-05-21 RX ADMIN — MIDAZOLAM HYDROCHLORIDE 1 MG: 1 INJECTION, SOLUTION INTRAMUSCULAR; INTRAVENOUS at 14:08

## 2020-05-21 RX ADMIN — CEFAZOLIN 2 G: 330 INJECTION, POWDER, FOR SOLUTION INTRAMUSCULAR; INTRAVENOUS at 14:07

## 2020-05-21 RX ADMIN — FENTANYL CITRATE 100 MCG: 50 INJECTION INTRAMUSCULAR; INTRAVENOUS at 14:19

## 2020-05-21 RX ADMIN — PROPOFOL 100 MG: 10 INJECTION, EMULSION INTRAVENOUS at 14:19

## 2020-05-21 RX ADMIN — Medication 200 MCG: at 14:44

## 2020-05-21 RX ADMIN — POVIDONE-IODINE 15 ML: 10 SOLUTION TOPICAL at 10:18

## 2020-05-21 RX ADMIN — SODIUM CHLORIDE, POTASSIUM CHLORIDE, SODIUM LACTATE AND CALCIUM CHLORIDE: 600; 310; 30; 20 INJECTION, SOLUTION INTRAVENOUS at 10:23

## 2020-05-21 RX ADMIN — Medication 200 MCG: at 14:34

## 2020-05-21 RX ADMIN — DEXAMETHASONE SODIUM PHOSPHATE 8 MG: 4 INJECTION, SOLUTION INTRA-ARTICULAR; INTRALESIONAL; INTRAMUSCULAR; INTRAVENOUS; SOFT TISSUE at 14:13

## 2020-05-21 ASSESSMENT — FIBROSIS 4 INDEX: FIB4 SCORE: 0.99

## 2020-05-21 ASSESSMENT — PAIN SCALES - GENERAL: PAIN_LEVEL: 0

## 2020-05-21 NOTE — DISCHARGE INSTRUCTIONS
ACTIVITY: Rest and take it easy for the first 24 hours.  A responsible adult is recommended to remain with you during that time.  It is normal to feel sleepy.  We encourage you to not do anything that requires balance, judgment or coordination.    MILD FLU-LIKE SYMPTOMS ARE NORMAL. YOU MAY EXPERIENCE GENERALIZED MUSCLE ACHES, THROAT IRRITATION, HEADACHE AND/OR SOME NAUSEA.    FOR 24 HOURS DO NOT:  Drive, operate machinery or run household appliances.  Drink beer or alcoholic beverages.   Make important decisions or sign legal documents.      DIET: To avoid nausea, slowly advance diet as tolerated, avoiding spicy or greasy foods for the first day.  Add more substantial food to your diet according to your physician's instructions.  INCREASE FLUIDS AND FIBER TO AVOID CONSTIPATION.    SURGICAL DRESSING/BATHING: You may shower    FOLLOW-UP APPOINTMENT:  A follow-up appointment should be arranged with your doctor in 1-2 weeks; call to schedule.    You should CALL YOUR PHYSICIAN if you develop:  Fever greater than 101 degrees F.  Pain not relieved by medication, or persistent nausea or vomiting.  Excessive bleeding (blood soaking through dressing) or unexpected drainage from the wound.  Extreme redness or swelling around the incision site, drainage of pus or foul smelling drainage.  Inability to urinate or empty your bladder within 8 hours.  Problems with breathing or chest pain.    You should call 911 if you develop problems with breathing or chest pain.  If you are unable to contact your doctor or surgical center, you should go to the nearest emergency room or urgent care center.    Physician's telephone #: [phone] Dr Hays    If any questions arise, call your doctor.  If your doctor is not available, please feel free to call the Surgical Center at [phone].  The Center is open Monday through Friday from 7AM to 7PM.  You can also call the HEALTH HOTLINE open 24 hours/day, 7 days/week and speak to a nurse at  [phone], or toll free at [phone].    A registered nurse may call you a few days after your surgery to see how you are doing after your procedure.    MEDICATIONS: Resume taking daily medication.  Take prescribed pain medication with food.  If no medication is prescribed, you may take non-aspirin pain medication if needed.  PAIN MEDICATION CAN BE VERY CONSTIPATING.  Take a stool softener or laxative such as senokot, pericolace, or milk of magnesia if needed.    Prescription given for none.  Last pain medication given at none.    If your physician has prescribed pain medication that includes Acetaminophen (Tylenol), do not take additional Acetaminophen (Tylenol) while taking the prescribed medication.    Depression / Suicide Risk    As you are discharged from this [facility] facility, it is important to learn how to keep safe from harming yourself.    Recognize the warning signs:  · Abrupt changes in personality, positive or negative- including increase in energy   · Giving away possessions  · Change in eating patterns- significant weight changes-  positive or negative  · Change in sleeping patterns- unable to sleep or sleeping all the time   · Unwillingness or inability to communicate  · Depression  · Unusual sadness, discouragement and loneliness  · Talk of wanting to die  · Neglect of personal appearance   · Rebelliousness- reckless behavior  · Withdrawal from people/activities they love  · Confusion- inability to concentrate     If you or a loved one observes any of these behaviors or has concerns about self-harm, here's what you can do:  · Talk about it- your feelings and reasons for harming yourself  · Remove any means that you might use to hurt yourself (examples: pills, rope, extension cords, firearm)  · Get professional help from the community (Mental Health, Substance Abuse, psychological counseling)  · Do not be alone:Call your Safe Contact- someone whom you trust who will be there for  you.  · Call your local CRISIS HOTLINE 797-3707 or 479-239-0876  · Call your local Children's Mobile Crisis Response Team [place] (337) 807-6638 or www.[employer]  · Call the toll free National Suicide Prevention Hotlines   · National Suicide Prevention Lifeline 772-116-PPDO (3613)  · National [employer] Line Network 800-SUICIDE (094-6953)

## 2020-05-21 NOTE — ANESTHESIA PREPROCEDURE EVALUATION
Relevant Problems   CARDIAC   (+) HTN (hypertension)         (+) Hydronephrosis due to obstruction of ureter stent       Physical Exam    Airway   Mallampati: II  TM distance: >3 FB  Neck ROM: full       Cardiovascular - normal exam  Rhythm: regular  Rate: normal  (-) murmur     Dental - normal exam           Pulmonary - normal exam  Breath sounds clear to auscultation     Abdominal    Neurological - normal exam                 Anesthesia Plan    ASA 2       Plan - general       Airway plan will be LMA        Induction: intravenous    Postoperative Plan: Postoperative administration of opioids is intended.    Pertinent diagnostic labs and testing reviewed    Informed Consent:    Anesthetic plan and risks discussed with patient.    Use of blood products discussed with: patient whom consented to blood products.

## 2020-05-21 NOTE — OR NURSING
D/c orders received. IV dc'd. Pt changed into clothing with assistance.Discharge instructions given as well as pain management handout; pt and family verbalized understanding and questions answered. Patient states ready to d/c home. No prescriptions given. Pt dc'd in w/c with CNA in stable condition.

## 2020-05-21 NOTE — OR SURGEON
Immediate Post OP Note    PreOp Diagnosis: Left ureteral stricture    PostOp Diagnosis: same    Procedure(s):  CYSTOSCOPY, WITH URETERAL STENT INSERTION - Wound Class: Clean Contaminated    Surgeon(s):  Adam Hays M.D.    Anesthesiologist/Type of Anesthesia:  Anesthesiologist: Tab Lizama D.O./General    Surgical Staff:  Circulator: Juanito Roper R.N.  Scrub Person: Barbra River  Radiology Technologist: James Heredia    Specimens removed if any:  ID Type Source Tests Collected by Time Destination   1 : Left ureter stent  Other Other AEROBIC/ANAEROBIC CULTURE (SURGERY) Adam Hays M.D. 5/21/2020 1445        Estimated Blood Loss: minimal     Findings: left hydro     Complications: none        5/21/2020 3:47 PM Adam Hays M.D.

## 2020-05-21 NOTE — ANESTHESIA PROCEDURE NOTES
Airway    Date/Time: 5/21/2020 2:25 PM  Performed by: Tab Lizama D.O.  Authorized by: Tab Lizama D.O.     Location:  OR  Urgency:  Elective  Indications for Airway Management:  Anesthesia      Spontaneous Ventilation: absent    Sedation Level:  Deep  Preoxygenated: Yes    Final Airway Type:  Supraglottic airway  Final Supraglottic Airway:  Standard LMA    SGA Size:  4  Cuff Pressure (cm H2O):  30  Number of Attempts at Approach:  1

## 2020-05-21 NOTE — PROGRESS NOTES
Pre op orders completed. Patient updated on POC. Pt educated on surgical process and time, pt verbalizes understanding. Patient resting comfortably at this time, call light within reach. Hourly rounding in place.

## 2020-05-21 NOTE — OP REPORT
PreOp Diagnosis: Left ureteral stricture    PostOp Diagnosis: same    Procedure(s):  CYSTOSCOPY, WITH PLACEMENT.  THE LEFT INSERTION - Wound Class: Clean Contaminated    Surgeon(s):  Adam Hays M.D.    Anesthesiologist/Type of Anesthesia:  Anesthesiologist: Tab Lizama D.O./General    Surgical Staff:  Circulator: Juanito Roper R.N.  Scrub Person: Barbra River  Radiology Technologist: James Heredia    Specimens removed if any:  ID Type Source Tests Collected by Time Destination   1 : Left ureter stent  Other Other AEROBIC/ANAEROBIC CULTURE (SURGERY) Adam Hays M.D. 5/21/2020 1445        Estimated Blood Loss: minimal     Complications: none    Indications for surgery: Mrs. Ross is a 74-year-old female who is well-known to me.  Patient has a history of cervical cancer.  She status post radical hysterectomy followed by radiation.  She has had sequelae of left ureteral stricture.  She has required repeating left ureteral stent placement.  Patient is due for left ureteral stent exchange.  Because patient is being brought to the operating room today to undergo placement of a nephroureteral stent.    Findings: Normal bladder mucosa with mucosal edema.  Persistent left hydro is noted.    Procedure note: After achieving adequate general esthesia patient was prepped and draped and placed in modified dorsal lithotomy position.  30 degree cystoscope was inserted transurethrally.  Left ureteral stent was located and it was retrieved via alligator forceps.  Left ureteral stent was brought out through the urethral orifice and under direct fluoroscopy a guidewire a wire was then passed into the left ureter to hold position.  The guidewire was then confirmed to be in proper position.  The left ureteral stent was removed.  Single-J ureteral catheter was then intubated via a guidewire and the guidewire was then subsequently removed.  I then injected retrograde pyelogram to ensure that there was highlighting the  left ureter there was a persistent ureteral stricture noted with left hydro-.  Once this was confirmed a guidewire was then subsequently replaced followed by removal with a single-J ureteral catheter.  I then replaced the ureteral stent with a 7 x 20 double-J ureteral stent.  This was placed without difficulty.  Was confirmed via fluoroscopy.  The guidewire was then subsequently removed.  The bladder was emptied.  Patient tolerated procedure well without any difficulties was extubated transferred to the PACU in stable condition.

## 2020-05-21 NOTE — ANESTHESIA TIME REPORT
Anesthesia Start and Stop Event Times     Date Time Event    5/21/2020 1110 Ready for Procedure     1407 Anesthesia Start     1502 Anesthesia Stop        Responsible Staff  05/21/20    Name Role Begin End    Tab Lizama D.O. Anesth 1407 1502        Preop Diagnosis (Free Text):  Pre-op Diagnosis     URETERAL STRICTURE        Preop Diagnosis (Codes):    Post op Diagnosis  Ureteral stricture      Premium Reason  A. 3PM - 7AM    Comments:

## 2020-05-24 LAB
BACTERIA SPEC ANAEROBE CULT: NORMAL
SIGNIFICANT IND 70042: NORMAL
SITE SITE: NORMAL
SOURCE SOURCE: NORMAL

## 2020-05-26 LAB
BACTERIA WND AEROBE CULT: ABNORMAL
GRAM STN SPEC: ABNORMAL
SIGNIFICANT IND 70042: ABNORMAL
SITE SITE: ABNORMAL
SOURCE SOURCE: ABNORMAL

## 2020-08-18 NOTE — ANESTHESIA POSTPROCEDURE EVALUATION
Patient: Nessa Dennis    Procedure Summary     Date:  05/21/20 Room / Location:  [place] [address] / SURGERY [facility]    Anesthesia Start:  1407 Anesthesia Stop:  1502    Procedure:  CYSTOSCOPY, WITH URETERAL STENT INSERTION (Left Ureter) Diagnosis:  (URETERAL STRICTURE)    Surgeon:  Adam Hays M.D. Responsible Provider:  Tab Lizama D.O.    Anesthesia Type:  general ASA Status:  2          Final Anesthesia Type: general  Last vitals  BP   Blood Pressure : 140/84    Temp   36.1 °C (97 °F)    Pulse   Pulse: 98   Resp   18    SpO2          Anesthesia Post Evaluation    Patient location during evaluation: PACU  Patient participation: complete - patient participated  Level of consciousness: awake and alert  Pain score: 0    Airway patency: patent  Anesthetic complications: no  Cardiovascular status: hemodynamically stable  Respiratory status: acceptable  Hydration status: euvolemic    PONV: none           Nurse Pain Score: 0 (NPRS)        
equal bilaterally

## 2020-08-31 ENCOUNTER — PRE-ADMISSION TESTING (OUTPATIENT)
Dept: ADMISSIONS | Facility: MEDICAL CENTER | Age: 75
End: 2020-08-31
Attending: SPECIALIST
Payer: MEDICARE

## 2020-08-31 DIAGNOSIS — Z01.812 PRE-OPERATIVE LABORATORY EXAMINATION: ICD-10-CM

## 2020-08-31 LAB
ALBUMIN SERPL BCP-MCNC: 4.6 G/DL (ref 3.2–4.9)
ALBUMIN/GLOB SERPL: 1.8 G/DL
ALP SERPL-CCNC: 116 U/L (ref 30–99)
ALT SERPL-CCNC: 18 U/L (ref 2–50)
ANION GAP SERPL CALC-SCNC: 16 MMOL/L (ref 7–16)
APTT PPP: 29 SEC (ref 24.7–36)
AST SERPL-CCNC: 18 U/L (ref 12–45)
BASOPHILS # BLD AUTO: 0.4 % (ref 0–1.8)
BASOPHILS # BLD: 0.03 K/UL (ref 0–0.12)
BILIRUB SERPL-MCNC: 0.4 MG/DL (ref 0.1–1.5)
BUN SERPL-MCNC: 22 MG/DL (ref 8–22)
CALCIUM SERPL-MCNC: 10.7 MG/DL (ref 8.5–10.5)
CHLORIDE SERPL-SCNC: 104 MMOL/L (ref 96–112)
CO2 SERPL-SCNC: 20 MMOL/L (ref 20–33)
COVID ORDER STATUS COVID19: NORMAL
CREAT SERPL-MCNC: 0.88 MG/DL (ref 0.5–1.4)
EOSINOPHIL # BLD AUTO: 0.11 K/UL (ref 0–0.51)
EOSINOPHIL NFR BLD: 1.6 % (ref 0–6.9)
ERYTHROCYTE [DISTWIDTH] IN BLOOD BY AUTOMATED COUNT: 44.4 FL (ref 35.9–50)
GLOBULIN SER CALC-MCNC: 2.5 G/DL (ref 1.9–3.5)
GLUCOSE SERPL-MCNC: 94 MG/DL (ref 65–99)
HCT VFR BLD AUTO: 45.2 % (ref 37–47)
HGB BLD-MCNC: 14.6 G/DL (ref 12–16)
IMM GRANULOCYTES # BLD AUTO: 0.02 K/UL (ref 0–0.11)
IMM GRANULOCYTES NFR BLD AUTO: 0.3 % (ref 0–0.9)
INR PPP: 0.95 (ref 0.87–1.13)
LYMPHOCYTES # BLD AUTO: 1.44 K/UL (ref 1–4.8)
LYMPHOCYTES NFR BLD: 20.4 % (ref 22–41)
MCH RBC QN AUTO: 27 PG (ref 27–33)
MCHC RBC AUTO-ENTMCNC: 32.3 G/DL (ref 33.6–35)
MCV RBC AUTO: 83.5 FL (ref 81.4–97.8)
MONOCYTES # BLD AUTO: 0.57 K/UL (ref 0–0.85)
MONOCYTES NFR BLD AUTO: 8.1 % (ref 0–13.4)
NEUTROPHILS # BLD AUTO: 4.88 K/UL (ref 2–7.15)
NEUTROPHILS NFR BLD: 69.2 % (ref 44–72)
NRBC # BLD AUTO: 0 K/UL
NRBC BLD-RTO: 0 /100 WBC
PLATELET # BLD AUTO: 240 K/UL (ref 164–446)
PMV BLD AUTO: 10.1 FL (ref 9–12.9)
POTASSIUM SERPL-SCNC: 4.5 MMOL/L (ref 3.6–5.5)
PROT SERPL-MCNC: 7.1 G/DL (ref 6–8.2)
PROTHROMBIN TIME: 12.9 SEC (ref 12–14.6)
RBC # BLD AUTO: 5.41 M/UL (ref 4.2–5.4)
SARS-COV-2 RNA RESP QL NAA+PROBE: NOTDETECTED
SODIUM SERPL-SCNC: 140 MMOL/L (ref 135–145)
SPECIMEN SOURCE: NORMAL
WBC # BLD AUTO: 7.1 K/UL (ref 4.8–10.8)

## 2020-08-31 PROCEDURE — 85610 PROTHROMBIN TIME: CPT

## 2020-08-31 PROCEDURE — 85730 THROMBOPLASTIN TIME PARTIAL: CPT

## 2020-08-31 PROCEDURE — 36415 COLL VENOUS BLD VENIPUNCTURE: CPT

## 2020-08-31 PROCEDURE — 80053 COMPREHEN METABOLIC PANEL: CPT

## 2020-08-31 PROCEDURE — 85025 COMPLETE CBC W/AUTO DIFF WBC: CPT

## 2020-08-31 PROCEDURE — U0003 INFECTIOUS AGENT DETECTION BY NUCLEIC ACID (DNA OR RNA); SEVERE ACUTE RESPIRATORY SYNDROME CORONAVIRUS 2 (SARS-COV-2) (CORONAVIRUS DISEASE [COVID-19]), AMPLIFIED PROBE TECHNIQUE, MAKING USE OF HIGH THROUGHPUT TECHNOLOGIES AS DESCRIBED BY CMS-2020-01-R: HCPCS

## 2020-08-31 RX ORDER — ATORVASTATIN CALCIUM 20 MG/1
20 TABLET, FILM COATED ORAL EVERY EVENING
COMMUNITY

## 2020-08-31 ASSESSMENT — FIBROSIS 4 INDEX: FIB4 SCORE: 0.99

## 2020-09-02 NOTE — OR NURSING
COVID-19 Pre-surgery screenin. Do you have an undiagnosed respiratory illness or symptoms such as coughing or sneezing? No (Yes/No)  a. Onset of Sx No  b. Acute vs. chronic respiratory illness No    2. Do you have an unexplained fever greater than 100.4 degrees Fahrenheit or 38 degrees Celsius?     No (Yes/No)    3. Have you had direct exposure to a patient who tested positive for Covid-19?    No (Yes/No)    4. Have you had any loss of your sense of taste or smell? Have you had N/V or sore throat? No    Patient has been informed of visitor policy and asked to wear a mask upon entering the hospital   YES (Yes/No)

## 2020-09-03 ENCOUNTER — APPOINTMENT (OUTPATIENT)
Dept: RADIOLOGY | Facility: MEDICAL CENTER | Age: 75
End: 2020-09-03
Attending: SPECIALIST
Payer: MEDICARE

## 2020-09-03 ENCOUNTER — ANESTHESIA (OUTPATIENT)
Dept: SURGERY | Facility: MEDICAL CENTER | Age: 75
End: 2020-09-03
Payer: MEDICARE

## 2020-09-03 ENCOUNTER — HOSPITAL ENCOUNTER (OUTPATIENT)
Facility: MEDICAL CENTER | Age: 75
End: 2020-09-03
Attending: SPECIALIST | Admitting: SPECIALIST
Payer: MEDICARE

## 2020-09-03 ENCOUNTER — ANESTHESIA EVENT (OUTPATIENT)
Dept: SURGERY | Facility: MEDICAL CENTER | Age: 75
End: 2020-09-03
Payer: MEDICARE

## 2020-09-03 VITALS
SYSTOLIC BLOOD PRESSURE: 126 MMHG | OXYGEN SATURATION: 93 % | WEIGHT: 143.52 LBS | TEMPERATURE: 97.9 F | RESPIRATION RATE: 14 BRPM | HEIGHT: 60 IN | BODY MASS INDEX: 28.18 KG/M2 | DIASTOLIC BLOOD PRESSURE: 77 MMHG | HEART RATE: 85 BPM

## 2020-09-03 LAB
GRAM STN SPEC: NORMAL
SIGNIFICANT IND 70042: NORMAL
SITE SITE: NORMAL
SOURCE SOURCE: NORMAL

## 2020-09-03 PROCEDURE — 700117 HCHG RX CONTRAST REV CODE 255: Performed by: SPECIALIST

## 2020-09-03 PROCEDURE — C1758 CATHETER, URETERAL: HCPCS | Performed by: SPECIALIST

## 2020-09-03 PROCEDURE — 160025 RECOVERY II MINUTES (STATS): Performed by: SPECIALIST

## 2020-09-03 PROCEDURE — 87075 CULTR BACTERIA EXCEPT BLOOD: CPT

## 2020-09-03 PROCEDURE — 87070 CULTURE OTHR SPECIMN AEROBIC: CPT

## 2020-09-03 PROCEDURE — 700101 HCHG RX REV CODE 250: Performed by: ANESTHESIOLOGY

## 2020-09-03 PROCEDURE — 700105 HCHG RX REV CODE 258: Performed by: ANESTHESIOLOGY

## 2020-09-03 PROCEDURE — 160002 HCHG RECOVERY MINUTES (STAT): Performed by: SPECIALIST

## 2020-09-03 PROCEDURE — 700102 HCHG RX REV CODE 250 W/ 637 OVERRIDE(OP)

## 2020-09-03 PROCEDURE — 87077 CULTURE AEROBIC IDENTIFY: CPT

## 2020-09-03 PROCEDURE — 700105 HCHG RX REV CODE 258: Performed by: SPECIALIST

## 2020-09-03 PROCEDURE — 160028 HCHG SURGERY MINUTES - 1ST 30 MINS LEVEL 3: Performed by: SPECIALIST

## 2020-09-03 PROCEDURE — 87106 FUNGI IDENTIFICATION YEAST: CPT

## 2020-09-03 PROCEDURE — 87186 SC STD MICRODIL/AGAR DIL: CPT

## 2020-09-03 PROCEDURE — 501572 HCHG TROCAR, SHIELD OBTU 5X100: Performed by: SPECIALIST

## 2020-09-03 PROCEDURE — 87205 SMEAR GRAM STAIN: CPT

## 2020-09-03 PROCEDURE — 160048 HCHG OR STATISTICAL LEVEL 1-5: Performed by: SPECIALIST

## 2020-09-03 PROCEDURE — 160039 HCHG SURGERY MINUTES - EA ADDL 1 MIN LEVEL 3: Performed by: SPECIALIST

## 2020-09-03 PROCEDURE — 160035 HCHG PACU - 1ST 60 MINS PHASE I: Performed by: SPECIALIST

## 2020-09-03 PROCEDURE — 700111 HCHG RX REV CODE 636 W/ 250 OVERRIDE (IP): Performed by: ANESTHESIOLOGY

## 2020-09-03 PROCEDURE — A9270 NON-COVERED ITEM OR SERVICE: HCPCS

## 2020-09-03 PROCEDURE — 160036 HCHG PACU - EA ADDL 30 MINS PHASE I: Performed by: SPECIALIST

## 2020-09-03 PROCEDURE — C2617 STENT, NON-COR, TEM W/O DEL: HCPCS | Performed by: SPECIALIST

## 2020-09-03 PROCEDURE — C1769 GUIDE WIRE: HCPCS | Performed by: SPECIALIST

## 2020-09-03 PROCEDURE — 160046 HCHG PACU - 1ST 60 MINS PHASE II: Performed by: SPECIALIST

## 2020-09-03 PROCEDURE — 160009 HCHG ANES TIME/MIN: Performed by: SPECIALIST

## 2020-09-03 DEVICE — STENT UROLOGICAL POLARIS 6X22  ULTRA: Type: IMPLANTABLE DEVICE | Site: URETER | Status: FUNCTIONAL

## 2020-09-03 RX ORDER — HYDROMORPHONE HYDROCHLORIDE 1 MG/ML
0.4 INJECTION, SOLUTION INTRAMUSCULAR; INTRAVENOUS; SUBCUTANEOUS
Status: DISCONTINUED | OUTPATIENT
Start: 2020-09-03 | End: 2020-09-03 | Stop reason: HOSPADM

## 2020-09-03 RX ORDER — ONDANSETRON 2 MG/ML
4 INJECTION INTRAMUSCULAR; INTRAVENOUS
Status: DISCONTINUED | OUTPATIENT
Start: 2020-09-03 | End: 2020-09-03 | Stop reason: HOSPADM

## 2020-09-03 RX ORDER — ONDANSETRON 2 MG/ML
INJECTION INTRAMUSCULAR; INTRAVENOUS PRN
Status: DISCONTINUED | OUTPATIENT
Start: 2020-09-03 | End: 2020-09-03 | Stop reason: SURG

## 2020-09-03 RX ORDER — HYDROMORPHONE HYDROCHLORIDE 1 MG/ML
0.1 INJECTION, SOLUTION INTRAMUSCULAR; INTRAVENOUS; SUBCUTANEOUS
Status: DISCONTINUED | OUTPATIENT
Start: 2020-09-03 | End: 2020-09-03 | Stop reason: HOSPADM

## 2020-09-03 RX ORDER — OXYCODONE HCL 5 MG/5 ML
10 SOLUTION, ORAL ORAL
Status: COMPLETED | OUTPATIENT
Start: 2020-09-03 | End: 2020-09-03

## 2020-09-03 RX ORDER — LABETALOL HYDROCHLORIDE 5 MG/ML
5 INJECTION, SOLUTION INTRAVENOUS
Status: DISCONTINUED | OUTPATIENT
Start: 2020-09-03 | End: 2020-09-03 | Stop reason: HOSPADM

## 2020-09-03 RX ORDER — SODIUM CHLORIDE, SODIUM LACTATE, POTASSIUM CHLORIDE, CALCIUM CHLORIDE 600; 310; 30; 20 MG/100ML; MG/100ML; MG/100ML; MG/100ML
INJECTION, SOLUTION INTRAVENOUS CONTINUOUS
Status: DISCONTINUED | OUTPATIENT
Start: 2020-09-03 | End: 2020-09-03 | Stop reason: HOSPADM

## 2020-09-03 RX ORDER — DEXAMETHASONE SODIUM PHOSPHATE 4 MG/ML
INJECTION, SOLUTION INTRA-ARTICULAR; INTRALESIONAL; INTRAMUSCULAR; INTRAVENOUS; SOFT TISSUE PRN
Status: DISCONTINUED | OUTPATIENT
Start: 2020-09-03 | End: 2020-09-03 | Stop reason: SURG

## 2020-09-03 RX ORDER — HYDROMORPHONE HYDROCHLORIDE 1 MG/ML
0.2 INJECTION, SOLUTION INTRAMUSCULAR; INTRAVENOUS; SUBCUTANEOUS
Status: DISCONTINUED | OUTPATIENT
Start: 2020-09-03 | End: 2020-09-03 | Stop reason: HOSPADM

## 2020-09-03 RX ORDER — KETOROLAC TROMETHAMINE 30 MG/ML
INJECTION, SOLUTION INTRAMUSCULAR; INTRAVENOUS PRN
Status: DISCONTINUED | OUTPATIENT
Start: 2020-09-03 | End: 2020-09-03 | Stop reason: SURG

## 2020-09-03 RX ORDER — OXYCODONE HCL 5 MG/5 ML
5 SOLUTION, ORAL ORAL
Status: COMPLETED | OUTPATIENT
Start: 2020-09-03 | End: 2020-09-03

## 2020-09-03 RX ORDER — SODIUM CHLORIDE, SODIUM LACTATE, POTASSIUM CHLORIDE, CALCIUM CHLORIDE 600; 310; 30; 20 MG/100ML; MG/100ML; MG/100ML; MG/100ML
INJECTION, SOLUTION INTRAVENOUS
Status: DISCONTINUED | OUTPATIENT
Start: 2020-09-03 | End: 2020-09-03 | Stop reason: SURG

## 2020-09-03 RX ORDER — HALOPERIDOL 5 MG/ML
1 INJECTION INTRAMUSCULAR
Status: DISCONTINUED | OUTPATIENT
Start: 2020-09-03 | End: 2020-09-03 | Stop reason: HOSPADM

## 2020-09-03 RX ORDER — CEFAZOLIN SODIUM 1 G/3ML
INJECTION, POWDER, FOR SOLUTION INTRAMUSCULAR; INTRAVENOUS PRN
Status: DISCONTINUED | OUTPATIENT
Start: 2020-09-03 | End: 2020-09-03 | Stop reason: SURG

## 2020-09-03 RX ORDER — METOPROLOL TARTRATE 1 MG/ML
1 INJECTION, SOLUTION INTRAVENOUS
Status: DISCONTINUED | OUTPATIENT
Start: 2020-09-03 | End: 2020-09-03 | Stop reason: HOSPADM

## 2020-09-03 RX ADMIN — SODIUM CHLORIDE, POTASSIUM CHLORIDE, SODIUM LACTATE AND CALCIUM CHLORIDE: 600; 310; 30; 20 INJECTION, SOLUTION INTRAVENOUS at 08:27

## 2020-09-03 RX ADMIN — ONDANSETRON 4 MG: 2 INJECTION INTRAMUSCULAR; INTRAVENOUS at 11:38

## 2020-09-03 RX ADMIN — DEXAMETHASONE SODIUM PHOSPHATE 8 MG: 4 INJECTION, SOLUTION INTRA-ARTICULAR; INTRALESIONAL; INTRAMUSCULAR; INTRAVENOUS; SOFT TISSUE at 11:08

## 2020-09-03 RX ADMIN — METOPROLOL TARTRATE 1 MG: 5 INJECTION, SOLUTION INTRAVENOUS at 11:58

## 2020-09-03 RX ADMIN — FENTANYL CITRATE 50 MCG: 50 INJECTION INTRAMUSCULAR; INTRAVENOUS at 11:08

## 2020-09-03 RX ADMIN — EPHEDRINE SULFATE 10 MG: 50 INJECTION INTRAMUSCULAR; INTRAVENOUS; SUBCUTANEOUS at 11:26

## 2020-09-03 RX ADMIN — EPHEDRINE SULFATE 10 MG: 50 INJECTION INTRAMUSCULAR; INTRAVENOUS; SUBCUTANEOUS at 11:18

## 2020-09-03 RX ADMIN — KETOROLAC TROMETHAMINE 30 MG: 30 INJECTION, SOLUTION INTRAMUSCULAR at 11:40

## 2020-09-03 RX ADMIN — CEFAZOLIN 2 G: 330 INJECTION, POWDER, FOR SOLUTION INTRAMUSCULAR; INTRAVENOUS at 11:08

## 2020-09-03 RX ADMIN — PROPOFOL 50 MG: 10 INJECTION, EMULSION INTRAVENOUS at 11:10

## 2020-09-03 RX ADMIN — FENTANYL CITRATE 50 MCG: 50 INJECTION INTRAMUSCULAR; INTRAVENOUS at 11:32

## 2020-09-03 RX ADMIN — PROPOFOL 100 MG: 10 INJECTION, EMULSION INTRAVENOUS at 11:08

## 2020-09-03 RX ADMIN — SODIUM CHLORIDE, POTASSIUM CHLORIDE, SODIUM LACTATE AND CALCIUM CHLORIDE: 600; 310; 30; 20 INJECTION, SOLUTION INTRAVENOUS at 11:04

## 2020-09-03 RX ADMIN — EPHEDRINE SULFATE 10 MG: 50 INJECTION INTRAMUSCULAR; INTRAVENOUS; SUBCUTANEOUS at 11:32

## 2020-09-03 RX ADMIN — POVIDONE-IODINE 15 ML: 10 SOLUTION TOPICAL at 08:27

## 2020-09-03 ASSESSMENT — FIBROSIS 4 INDEX: FIB4 SCORE: 1.325825214724776608

## 2020-09-03 ASSESSMENT — PAIN SCALES - GENERAL: PAIN_LEVEL: 0

## 2020-09-03 NOTE — ANESTHESIA PROCEDURE NOTES
Airway    Date/Time: 9/3/2020 11:08 AM  Performed by: Harjeet Veloz D.O.  Authorized by: Harjeet Veloz D.O.     Location:  OR  Urgency:  Elective  Indications for Airway Management:  Anesthesia      Spontaneous Ventilation: absent    Sedation Level:  Deep  Preoxygenated: Yes    Mask Difficulty Assessment:  0 - not attempted  Final Airway Type:  Supraglottic airway  Final Supraglottic Airway:  Standard LMA    SGA Size:  4  Number of Attempts at Approach:  1

## 2020-09-03 NOTE — ANESTHESIA POSTPROCEDURE EVALUATION
Patient: Nessa Dennis    Procedure Summary     Date: 09/03/20 Room / Location: [place] [address] / SURGERY [facility]    Anesthesia Start: 1104 Anesthesia Stop: 1149    Procedures:       CYSTOSCOPY, WITH URETERAL STENT INSERTION- RIGHT AND OR LEFT EXCHANGE (Left Ureter)      RETROGRADE PYELOGRAM (Left Ureter) Diagnosis: (URETHRAL STRICTURE CERVICAL CANCER)    Surgeon: Adam Hays M.D. Responsible Provider: Harjeet Veloz D.O.    Anesthesia Type: general ASA Status: 2          Final Anesthesia Type: general  Last vitals  BP   Blood Pressure : 108/55    Temp   36.4 °C (97.5 °F)    Pulse   Pulse: 87   Resp   18    SpO2   95 %      Anesthesia Post Evaluation    Patient location during evaluation: PACU  Patient participation: complete - patient participated  Level of consciousness: awake and alert  Pain score: 0    Airway patency: patent  Anesthetic complications: no  Cardiovascular status: hemodynamically stable  Respiratory status: acceptable  Hydration status: euvolemic    PONV: none           Nurse Pain Score: 0 (NPRS)

## 2020-09-03 NOTE — OR SURGEON
Immediate Post OP Note    PreOp Diagnosis: History of left ureteral stricture, strip cervical cancer status post radical hysterectomy with BSO and pelvic lymphadenectomy, history of pelvic radiotherapy.  Requiring stent exchange.    PostOp Diagnosis: Same    Procedure(s):  CYSTOSCOPY, WITH URETERAL STENT INSERTION- RIGHT AND OR LEFT EXCHANGE - Wound Class: Clean Contaminated  RETROGRADE PYELOGRAM - Wound Class: Clean Contaminated    Surgeon(s):  Adam Hays M.D.    Anesthesiologist/Type of Anesthesia:  Anesthesiologist: Harjeet Veloz D.O./General    Surgical Staff:  Circulator: Juanito Roper R.N.  Scrub Person: Barbra River    Specimens removed if any:  ID Type Source Tests Collected by Time Destination   1 : Ureteral stent  Other Other AEROBIC/ANAEROBIC CULTURE (SURGERY) Adam Hays M.D. 9/3/2020 11:27 AM        Estimated Blood Loss: None    Findings: Radiation cystitis grade 2.  Left ureter on pyelogram continues to remain somewhat narrowing thus requiring replacement of a ureteral stent.    Complications: None        9/3/2020 12:33 PM Adam Hays M.D.

## 2020-09-03 NOTE — OP REPORT
PreOp Diagnosis: History of left ureteral stricture, strip cervical cancer status post radical hysterectomy with BSO and pelvic lymphadenectomy, history of pelvic radiotherapy.  Requiring stent exchange.     PostOp Diagnosis: Same     Procedure(s):  CYSTOSCOPY, WITH URETERAL STENT INSERTION- RIGHT AND OR LEFT EXCHANGE - Wound Class: Clean Contaminated  RETROGRADE PYELOGRAM - Wound Class: Clean Contaminated     Surgeon(s):  Adam Hays M.D.     Anesthesiologist/Type of Anesthesia:  Anesthesiologist: Harjeet Veloz D.O./General     Surgical Staff:  Circulator: Juanito Roper R.N.  Scrub Person: Barbra River     Specimens removed if any:  ID Type Source Tests Collected by Time Destination   1 : Ureteral stent  Other Other AEROBIC/ANAEROBIC CULTURE (SURGERY) Adam Hays M.D. 9/3/2020 11:27 AM           Estimated Blood Loss: None    Complications: None    Indication: Mrs. Dennis is well-known to me.  She has history of cervical cancer in the remote past and had undergone a primary surgical resection.  She then subsequently developed recurrence and had undergone chemoradiation therapy.  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  She has a persistent ureteral stent by secondary to the ureteral stricture.  Her last ureteral stent exchange was approximately 3 months ago thus she is due for ureteral stent exchange.  Patient is being brought to the operating room today to undergo evaluation of the ureter and possible replacement of a ureteral stent.    Risk benefits and rational procedures were reviewed with the patient in detail patient's understanding of these risk and wished to proceed with the surgery as planned.     Findings: Radiation cystitis grade 2.  Left ureter on pyelogram continues to remain somewhat narrowing thus requiring replacement of a ureteral stent.    Procedure: After achieving adequate anesthesia patient was prepped and draped in place  modified dorsolithotomy position.  30 degree cystoscope was inserted transurethrally.  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  The left ureteral stent was easily located.  This was easily retrieved and the stent was brought out to the urethral orifice.  Under direct fluoroscopy I then placed a guidewire to hold the position of the left ureter.  This was all done fluoroscopically to ensure that we were in the proper place.  I then subsequently removed the left ureteral stent.  A single-J ureteral catheter was then placed through the guidewire and the guidewire was subsequently pulled.  Retrograde pyelogram was then performed that showed the narrowing thus I elected to replace the ureteral stent.  At this point in time I replaced the guidewire back to the left ureter holding position while I remove the single-J ureteral catheter.  Once this was established I then placed a 6 x 22 double-J ureteral stent with a guidewire which was in good position.  Once this was confirmed the guidewire was drawn.  The bladder was empty.  Patient tolerated procedure well without any difficulties was extubated and transferred to the PACU in stable condition.

## 2020-09-03 NOTE — ANESTHESIA TIME REPORT
Anesthesia Start and Stop Event Times     Date Time Event    9/3/2020 1104 Ready for Procedure     1104 Anesthesia Start     1149 Anesthesia Stop        Responsible Staff  09/03/20    Name Role Begin End    Harjeet Veloz D.O. Anesth 1104 1149        Preop Diagnosis (Free Text):  Pre-op Diagnosis     URETHRAL STRICTURE CERVICAL CANCER        Preop Diagnosis (Codes):    Post op Diagnosis  Urethral stricture      Premium Reason  Non-Premium    Comments:

## 2020-09-03 NOTE — OR NURSING
1148: Pt arrived from OR, handoff received from anesthesiologist and RN. Pt heart rate in 120's, medicated per anesthesia orders.     1215: Pt awake and alert, no complaints of pain or nausea.     1230: Call made to patient's  to update patient status.     1246: Handoff to MAYCOL Watkins in Phase 2. Pt transported by RN.

## 2020-09-03 NOTE — DISCHARGE INSTRUCTIONS
ACTIVITY: Rest and take it easy for the first 24 hours.  A responsible adult is recommended to remain with you during that time.  It is normal to feel sleepy.  We encourage you to not do anything that requires balance, judgment or coordination.    MILD FLU-LIKE SYMPTOMS ARE NORMAL. YOU MAY EXPERIENCE GENERALIZED MUSCLE ACHES, THROAT IRRITATION, HEADACHE AND/OR SOME NAUSEA.    FOR 24 HOURS DO NOT:  Drive, operate machinery or run household appliances.  Drink beer or alcoholic beverages.   Make important decisions or sign legal documents.   to  SPECIAL INSTRUCTIONS: Follow-up with Dr. Hays as scheduled. You need to have stent exchange 3 months from now    Ureteral Stent Implantation, Care After  This sheet gives you information about how to care for yourself after your procedure. Your health care provider may also give you more specific instructions. If you have problems or questions, contact your health care provider.  What can I expect after the procedure?  After the procedure, it is common to have:  · Nausea.  · Mild pain when you urinate. You may feel this pain in your lower back or lower abdomen. The pain should stop within a few minutes after you urinate. This may last for up to 1 week.  · A small amount of blood in your urine for several days.  Follow these instructions at home:  Medicines  · Take over-the-counter and prescription medicines only as told by your health care provider.  · If you were prescribed an antibiotic medicine, take it as told by your health care provider. Do not stop taking the antibiotic even if you start to feel better.  · Do not drive for 24 hours if you were given a sedative during your procedure.  · Ask your health care provider if the medicine prescribed to you requires you to avoid driving or using heavy machinery.  Activity  · Rest as told by your health care provider.  · Avoid sitting for a long time without moving. Get up to take short walks every 1-2 hours. This is important to  improve blood flow and breathing. Ask for help if you feel weak or unsteady.  · Return to your normal activities as told by your health care provider. Ask your health care provider what activities are safe for you.  General instructions    · Watch for any blood in your urine. Call your health care provider if the amount of blood in your urine increases.  · If you have a catheter:  ? Follow instructions from your health care provider about taking care of your catheter and collection bag.  ? Do not take baths, swim, or use a hot tub until your health care provider approves. Ask your health care provider if you may take showers. You may only be allowed to take sponge baths.  · Drink enough fluid to keep your urine pale yellow.  · Do not use any products that contain nicotine or tobacco, such as cigarettes, e-cigarettes, and chewing tobacco. These can delay healing after surgery. If you need help quitting, ask your health care provider.  · Keep all follow-up visits as told by your health care provider. This is important.  Contact a health care provider if:  · You have pain that gets worse or does not get better with medicine, especially pain when you urinate.  · You have difficulty urinating.  · You feel nauseous or you vomit repeatedly during a period of more than 2 days after the procedure.  Get help right away if:  · Your urine is dark red or has blood clots in it.  · You are leaking urine (have incontinence).  · The end of the stent comes out of your urethra.  · You cannot urinate.  · You have sudden, sharp, or severe pain in your abdomen or lower back.  · You have a fever.  · You have swelling or pain in your legs.  · You have difficulty breathing.  Summary  · After the procedure, it is common to have mild pain when you urinate that goes away within a few minutes after you urinate. This may last for up to 1 week.  · Watch for any blood in your urine. Call your health care provider if the amount of blood in your  urine increases.  · Take over-the-counter and prescription medicines only as told by your health care provider.  · Drink enough fluid to keep your urine pale yellow.  This information is not intended to replace advice given to you by your health care provider. Make sure you discuss any questions you have with your health care provider.  Document Released: 08/20/2014 Document Revised: 09/24/2019 Document Reviewed: 09/25/2019  [employer] Patient Education © 2020 [employer]    DIET: To avoid nausea, slowly advance diet as tolerated, avoiding spicy or greasy foods for the first day.  Add more substantial food to your diet according to your physician's instructions.  Babies can be fed formula or breast milk as soon as they are hungry.  INCREASE FLUIDS AND FIBER TO AVOID CONSTIPATION.    SURGICAL DRESSING/BATHING: **You may shower.*    FOLLOW-UP APPOINTMENT:  A follow-up appointment should be arranged with your doctor. Call to schedule# [phone].    You should CALL YOUR PHYSICIAN if you develop:  Fever greater than 101 degrees F.  Pain not relieved by medication, or persistent nausea or vomiting.  Excessive bleeding (blood soaking through dressing) or unexpected drainage from the wound.  Extreme redness or swelling around the incision site, drainage of pus or foul smelling drainage.  Inability to urinate or empty your bladder within 8 hours.  Problems with breathing or chest pain.    You should call 911 if you develop problems with breathing or chest pain.  If you are unable to contact your doctor or surgical center, you should go to the nearest emergency room or urgent care center.  Physician's telephone #: **[phone].*    If any questions arise, call your doctor.  If your doctor is not available, please feel free to call the Surgical Center at [phone].  The Center is open Monday through Friday from 7AM to 7PM.  You can also call the HEALTH HOTLINE open 24 hours/day, 7 days/week and speak to a nurse at  [phone], or toll free at [phone].    A registered nurse may call you a few days after your surgery to see how you are doing after your procedure.    MEDICATIONS: Resume taking daily medication.  Take prescribed pain medication with food.  If no medication is prescribed, you may take non-aspirin pain medication if needed.  PAIN MEDICATION CAN BE VERY CONSTIPATING.  Take a stool softener or laxative such as senokot, pericolace, or milk of magnesia if needed.      Last pain medication given at **11:55am*.    If your physician has prescribed pain medication that includes Acetaminophen (Tylenol), do not take additional Acetaminophen (Tylenol) while taking the prescribed medication.    Depression / Suicide Risk    As you are discharged from this [facility] facility, it is important to learn how to keep safe from harming yourself.    Recognize the warning signs:  · Abrupt changes in personality, positive or negative- including increase in energy   · Giving away possessions  · Change in eating patterns- significant weight changes-  positive or negative  · Change in sleeping patterns- unable to sleep or sleeping all the time   · Unwillingness or inability to communicate  · Depression  · Unusual sadness, discouragement and loneliness  · Talk of wanting to die  · Neglect of personal appearance   · Rebelliousness- reckless behavior  · Withdrawal from people/activities they love  · Confusion- inability to concentrate     If you or a loved one observes any of these behaviors or has concerns about self-harm, here's what you can do:  · Talk about it- your feelings and reasons for harming yourself  · Remove any means that you might use to hurt yourself (examples: pills, rope, extension cords, firearm)  · Get professional help from the community (Mental Health, Substance Abuse, psychological counseling)  · Do not be alone:Call your Safe Contact- someone whom you trust who will be there for you.  · Call your local  CRISIS HOTLINE 633-8708 or 252-661-9071  · Call your local Children's Mobile Crisis Response Team [place] (555) 610-6796 or www.[employer]  · Call the toll free National Suicide Prevention Hotlines   · National Suicide Prevention Lifeline 244-714-EXSY (1515)  · National [employer] Line Network 800-SUICIDE (572-5331)

## 2020-09-03 NOTE — OR NURSING
1334-Pt discharged home with  via wheelchair escort to car.  Discharge instructions given to pt.  Pt verbalized understanding.  Pt denies pain or nausea.  No s/s of distress noted at time of discharge,

## 2020-09-10 LAB
BACTERIA SPEC ANAEROBE CULT: ABNORMAL
BACTERIA SPEC ANAEROBE CULT: ABNORMAL
SIGNIFICANT IND 70042: ABNORMAL
SITE SITE: ABNORMAL
SOURCE SOURCE: ABNORMAL

## 2020-11-18 NOTE — ASSESSMENT & PLAN NOTE
- BP was 165/98; stopped IVF  - Will add carvedilol if BP continues to remain high  - increase home lisinopril   
- H/o cervical cancer s/p radial hysterectomy and b/l salpingo-oophorectomy in 2005  - Recurrence in 2015- treated with chemoradiation  - following up with Dr. Hays  
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  
CT revealed left sided hydroureteronephrosis which is secondary to pyelonephritis or ureteric stricture (h/o previous radiation for cervical cancer).  - On IVF and IV meropenem for pyelonephritis  - ureteric stent in 4/19 which was removed on 8/29/19; was unable to put another stent due to mid and distal ureteric stricture.   - Nephrostomy tube placed 9/22  - stent by IR placed 9/25  
H/o recurrent UTI requiring antibiotics. UA- leukocyte esterase positive, nitrite positive, pyuria.  Urine culture pending.  -IV fluids  -antibiotics.  
Resolved   Patient c/o nausea/vomiting and left flank pain; no CVA tenderness b/l;  H/o recurrent UTI; treated for ESBL klebsiella UTI in 3/19  With IV ABx and nephrostomy tube; ureteric stent in 4/19 which was removed in 8/29/19; stent culture was positive and was treated with 10 days of nitrofurantoin   - U/A is positive for leukocyte esterase, nitrate, WBC, RBC and bacteria;    -CT revealed hydroureteronephrosis with perinephric fat stranding.  - completed IV meropenem for 7 days per ID  
Resolved with IV fluids   
Resolved with IV fluids and antibiotics. Continue Meropenem for 7 days total.  Met criteria on admission, blood and urine cultures NGTD.   
Resolved.   Likely secondary to UTI. Continues to have chronic obstruction of course, secondary to stricture   
Teacher//

## 2020-12-28 ENCOUNTER — PRE-ADMISSION TESTING (OUTPATIENT)
Dept: ADMISSIONS | Facility: MEDICAL CENTER | Age: 75
End: 2020-12-28
Attending: SPECIALIST
Payer: MEDICARE

## 2020-12-28 DIAGNOSIS — Z01.812 PRE-OPERATIVE LABORATORY EXAMINATION: ICD-10-CM

## 2020-12-28 DIAGNOSIS — Z01.810 PRE-OPERATIVE CARDIOVASCULAR EXAMINATION: ICD-10-CM

## 2020-12-28 LAB
ALBUMIN SERPL BCP-MCNC: 4.6 G/DL (ref 3.2–4.9)
ALBUMIN/GLOB SERPL: 1.6 G/DL
ALP SERPL-CCNC: 129 U/L (ref 30–99)
ALT SERPL-CCNC: 9 U/L (ref 2–50)
ANION GAP SERPL CALC-SCNC: 10 MMOL/L (ref 7–16)
APTT PPP: 37 SEC (ref 24.7–36)
AST SERPL-CCNC: 9 U/L (ref 12–45)
BASOPHILS # BLD AUTO: 0.5 % (ref 0–1.8)
BASOPHILS # BLD: 0.04 K/UL (ref 0–0.12)
BILIRUB SERPL-MCNC: 0.4 MG/DL (ref 0.1–1.5)
BUN SERPL-MCNC: 19 MG/DL (ref 8–22)
CALCIUM SERPL-MCNC: 10.8 MG/DL (ref 8.5–10.5)
CHLORIDE SERPL-SCNC: 102 MMOL/L (ref 96–112)
CO2 SERPL-SCNC: 24 MMOL/L (ref 20–33)
COVID ORDER STATUS COVID19: NORMAL
CREAT SERPL-MCNC: 0.9 MG/DL (ref 0.5–1.4)
EKG IMPRESSION: NORMAL
EOSINOPHIL # BLD AUTO: 0.08 K/UL (ref 0–0.51)
EOSINOPHIL NFR BLD: 1 % (ref 0–6.9)
ERYTHROCYTE [DISTWIDTH] IN BLOOD BY AUTOMATED COUNT: 41.9 FL (ref 35.9–50)
GLOBULIN SER CALC-MCNC: 2.8 G/DL (ref 1.9–3.5)
GLUCOSE SERPL-MCNC: 99 MG/DL (ref 65–99)
HCT VFR BLD AUTO: 43 % (ref 37–47)
HGB BLD-MCNC: 14.1 G/DL (ref 12–16)
IMM GRANULOCYTES # BLD AUTO: 0.03 K/UL (ref 0–0.11)
IMM GRANULOCYTES NFR BLD AUTO: 0.4 % (ref 0–0.9)
INR PPP: 0.96 (ref 0.87–1.13)
LYMPHOCYTES # BLD AUTO: 1.24 K/UL (ref 1–4.8)
LYMPHOCYTES NFR BLD: 15.5 % (ref 22–41)
MCH RBC QN AUTO: 27.6 PG (ref 27–33)
MCHC RBC AUTO-ENTMCNC: 32.8 G/DL (ref 33.6–35)
MCV RBC AUTO: 84.3 FL (ref 81.4–97.8)
MONOCYTES # BLD AUTO: 0.55 K/UL (ref 0–0.85)
MONOCYTES NFR BLD AUTO: 6.9 % (ref 0–13.4)
NEUTROPHILS # BLD AUTO: 6.05 K/UL (ref 2–7.15)
NEUTROPHILS NFR BLD: 75.7 % (ref 44–72)
NRBC # BLD AUTO: 0 K/UL
NRBC BLD-RTO: 0 /100 WBC
PLATELET # BLD AUTO: 257 K/UL (ref 164–446)
PMV BLD AUTO: 9.9 FL (ref 9–12.9)
POTASSIUM SERPL-SCNC: 4.5 MMOL/L (ref 3.6–5.5)
PROT SERPL-MCNC: 7.4 G/DL (ref 6–8.2)
PROTHROMBIN TIME: 13.1 SEC (ref 12–14.6)
RBC # BLD AUTO: 5.1 M/UL (ref 4.2–5.4)
SARS-COV-2 RNA RESP QL NAA+PROBE: NOTDETECTED
SODIUM SERPL-SCNC: 136 MMOL/L (ref 135–145)
SPECIMEN SOURCE: NORMAL
WBC # BLD AUTO: 8 K/UL (ref 4.8–10.8)

## 2020-12-28 PROCEDURE — 85025 COMPLETE CBC W/AUTO DIFF WBC: CPT

## 2020-12-28 PROCEDURE — 80053 COMPREHEN METABOLIC PANEL: CPT

## 2020-12-28 PROCEDURE — 93005 ELECTROCARDIOGRAM TRACING: CPT

## 2020-12-28 PROCEDURE — 36415 COLL VENOUS BLD VENIPUNCTURE: CPT

## 2020-12-28 PROCEDURE — 85610 PROTHROMBIN TIME: CPT

## 2020-12-28 PROCEDURE — 93010 ELECTROCARDIOGRAM REPORT: CPT | Performed by: INTERNAL MEDICINE

## 2020-12-28 PROCEDURE — U0003 INFECTIOUS AGENT DETECTION BY NUCLEIC ACID (DNA OR RNA); SEVERE ACUTE RESPIRATORY SYNDROME CORONAVIRUS 2 (SARS-COV-2) (CORONAVIRUS DISEASE [COVID-19]), AMPLIFIED PROBE TECHNIQUE, MAKING USE OF HIGH THROUGHPUT TECHNOLOGIES AS DESCRIBED BY CMS-2020-01-R: HCPCS

## 2020-12-28 PROCEDURE — 85730 THROMBOPLASTIN TIME PARTIAL: CPT

## 2020-12-28 ASSESSMENT — FIBROSIS 4 INDEX: FIB4 SCORE: 1.34

## 2020-12-30 NOTE — OR NURSING
COVID-19 Pre-surgery screenin. Do you have an undiagnosed respiratory illness or symptoms such as coughing or sneezing? No(Yes/No)  a. Onset of Sx No  b. Acute vs. chronic respiratory illness No    2. Do you have an unexplained fever greater than 100.4 degrees Fahrenheit or 38 degrees Celsius?     No     3. Have you had direct exposure to a patient who tested positive for Covid-19?    No     4. Have you had any loss of your sense of taste or smell? Have you had N/V or sore throat? No    Patient has been informed of visitor policy and asked to wear a mask upon entering the hospital   Yes

## 2020-12-31 ENCOUNTER — ANESTHESIA (OUTPATIENT)
Dept: SURGERY | Facility: MEDICAL CENTER | Age: 75
End: 2020-12-31
Payer: MEDICARE

## 2020-12-31 ENCOUNTER — ANESTHESIA EVENT (OUTPATIENT)
Dept: SURGERY | Facility: MEDICAL CENTER | Age: 75
End: 2020-12-31
Payer: MEDICARE

## 2020-12-31 ENCOUNTER — HOSPITAL ENCOUNTER (OUTPATIENT)
Facility: MEDICAL CENTER | Age: 75
End: 2020-12-31
Attending: SPECIALIST | Admitting: SPECIALIST
Payer: MEDICARE

## 2020-12-31 ENCOUNTER — APPOINTMENT (OUTPATIENT)
Dept: RADIOLOGY | Facility: MEDICAL CENTER | Age: 75
End: 2020-12-31
Attending: SPECIALIST
Payer: MEDICARE

## 2020-12-31 VITALS
SYSTOLIC BLOOD PRESSURE: 110 MMHG | OXYGEN SATURATION: 97 % | RESPIRATION RATE: 20 BRPM | HEIGHT: 60 IN | WEIGHT: 143.3 LBS | BODY MASS INDEX: 28.13 KG/M2 | TEMPERATURE: 97.4 F | DIASTOLIC BLOOD PRESSURE: 72 MMHG | HEART RATE: 95 BPM

## 2020-12-31 LAB
GRAM STN SPEC: NORMAL
SIGNIFICANT IND 70042: NORMAL
SITE SITE: NORMAL
SOURCE SOURCE: NORMAL

## 2020-12-31 PROCEDURE — 160048 HCHG OR STATISTICAL LEVEL 1-5: Performed by: SPECIALIST

## 2020-12-31 PROCEDURE — 87070 CULTURE OTHR SPECIMN AEROBIC: CPT

## 2020-12-31 PROCEDURE — A9270 NON-COVERED ITEM OR SERVICE: HCPCS | Performed by: SPECIALIST

## 2020-12-31 PROCEDURE — 160025 RECOVERY II MINUTES (STATS): Performed by: SPECIALIST

## 2020-12-31 PROCEDURE — 160035 HCHG PACU - 1ST 60 MINS PHASE I: Performed by: SPECIALIST

## 2020-12-31 PROCEDURE — C1769 GUIDE WIRE: HCPCS | Performed by: SPECIALIST

## 2020-12-31 PROCEDURE — 160002 HCHG RECOVERY MINUTES (STAT): Performed by: SPECIALIST

## 2020-12-31 PROCEDURE — 700117 HCHG RX CONTRAST REV CODE 255: Performed by: SPECIALIST

## 2020-12-31 PROCEDURE — 87075 CULTR BACTERIA EXCEPT BLOOD: CPT

## 2020-12-31 PROCEDURE — A9270 NON-COVERED ITEM OR SERVICE: HCPCS | Performed by: ANESTHESIOLOGY

## 2020-12-31 PROCEDURE — C2617 STENT, NON-COR, TEM W/O DEL: HCPCS | Performed by: SPECIALIST

## 2020-12-31 PROCEDURE — 87186 SC STD MICRODIL/AGAR DIL: CPT

## 2020-12-31 PROCEDURE — 160036 HCHG PACU - EA ADDL 30 MINS PHASE I: Performed by: SPECIALIST

## 2020-12-31 PROCEDURE — 160039 HCHG SURGERY MINUTES - EA ADDL 1 MIN LEVEL 3: Performed by: SPECIALIST

## 2020-12-31 PROCEDURE — 160028 HCHG SURGERY MINUTES - 1ST 30 MINS LEVEL 3: Performed by: SPECIALIST

## 2020-12-31 PROCEDURE — 87076 CULTURE ANAEROBE IDENT EACH: CPT

## 2020-12-31 PROCEDURE — 700102 HCHG RX REV CODE 250 W/ 637 OVERRIDE(OP): Performed by: ANESTHESIOLOGY

## 2020-12-31 PROCEDURE — 700101 HCHG RX REV CODE 250: Performed by: SPECIALIST

## 2020-12-31 PROCEDURE — 160009 HCHG ANES TIME/MIN: Performed by: SPECIALIST

## 2020-12-31 PROCEDURE — 700105 HCHG RX REV CODE 258: Performed by: SPECIALIST

## 2020-12-31 PROCEDURE — 160046 HCHG PACU - 1ST 60 MINS PHASE II: Performed by: SPECIALIST

## 2020-12-31 PROCEDURE — 87077 CULTURE AEROBIC IDENTIFY: CPT | Mod: 91

## 2020-12-31 PROCEDURE — 700111 HCHG RX REV CODE 636 W/ 250 OVERRIDE (IP): Performed by: ANESTHESIOLOGY

## 2020-12-31 PROCEDURE — 87205 SMEAR GRAM STAIN: CPT

## 2020-12-31 DEVICE — STENT UROLOGICAL POLARIS 7X22  ULTRA: Type: IMPLANTABLE DEVICE | Site: URETER | Status: FUNCTIONAL

## 2020-12-31 RX ORDER — ONDANSETRON 2 MG/ML
4 INJECTION INTRAMUSCULAR; INTRAVENOUS
Status: DISCONTINUED | OUTPATIENT
Start: 2020-12-31 | End: 2020-12-31 | Stop reason: HOSPADM

## 2020-12-31 RX ORDER — DIPHENHYDRAMINE HYDROCHLORIDE 50 MG/ML
12.5 INJECTION INTRAMUSCULAR; INTRAVENOUS
Status: DISCONTINUED | OUTPATIENT
Start: 2020-12-31 | End: 2020-12-31 | Stop reason: HOSPADM

## 2020-12-31 RX ORDER — OXYCODONE HCL 5 MG/5 ML
5 SOLUTION, ORAL ORAL
Status: COMPLETED | OUTPATIENT
Start: 2020-12-31 | End: 2020-12-31

## 2020-12-31 RX ORDER — SODIUM CHLORIDE, SODIUM LACTATE, POTASSIUM CHLORIDE, CALCIUM CHLORIDE 600; 310; 30; 20 MG/100ML; MG/100ML; MG/100ML; MG/100ML
INJECTION, SOLUTION INTRAVENOUS CONTINUOUS
Status: DISCONTINUED | OUTPATIENT
Start: 2020-12-31 | End: 2020-12-31 | Stop reason: HOSPADM

## 2020-12-31 RX ORDER — ONDANSETRON 2 MG/ML
INJECTION INTRAMUSCULAR; INTRAVENOUS PRN
Status: DISCONTINUED | OUTPATIENT
Start: 2020-12-31 | End: 2020-12-31 | Stop reason: SURG

## 2020-12-31 RX ORDER — CIPROFLOXACIN 2 MG/ML
INJECTION, SOLUTION INTRAVENOUS PRN
Status: DISCONTINUED | OUTPATIENT
Start: 2020-12-31 | End: 2020-12-31 | Stop reason: SURG

## 2020-12-31 RX ORDER — HALOPERIDOL 5 MG/ML
1 INJECTION INTRAMUSCULAR
Status: DISCONTINUED | OUTPATIENT
Start: 2020-12-31 | End: 2020-12-31 | Stop reason: HOSPADM

## 2020-12-31 RX ORDER — OXYCODONE HCL 5 MG/5 ML
10 SOLUTION, ORAL ORAL
Status: COMPLETED | OUTPATIENT
Start: 2020-12-31 | End: 2020-12-31

## 2020-12-31 RX ORDER — MIDAZOLAM HYDROCHLORIDE 1 MG/ML
INJECTION INTRAMUSCULAR; INTRAVENOUS PRN
Status: DISCONTINUED | OUTPATIENT
Start: 2020-12-31 | End: 2020-12-31 | Stop reason: SURG

## 2020-12-31 RX ADMIN — CIPROFLOXACIN 400 MG: 2 INJECTION, SOLUTION INTRAVENOUS at 10:08

## 2020-12-31 RX ADMIN — POVIDONE IODINE 15 ML: 100 SOLUTION TOPICAL at 08:17

## 2020-12-31 RX ADMIN — PROPOFOL 100 MG: 10 INJECTION, EMULSION INTRAVENOUS at 10:23

## 2020-12-31 RX ADMIN — MIDAZOLAM HYDROCHLORIDE 2 MG: 1 INJECTION, SOLUTION INTRAMUSCULAR; INTRAVENOUS at 10:07

## 2020-12-31 RX ADMIN — PROPOFOL 100 MG: 10 INJECTION, EMULSION INTRAVENOUS at 10:09

## 2020-12-31 RX ADMIN — FENTANYL CITRATE 100 MCG: 50 INJECTION, SOLUTION INTRAMUSCULAR; INTRAVENOUS at 10:08

## 2020-12-31 RX ADMIN — SODIUM CHLORIDE, POTASSIUM CHLORIDE, SODIUM LACTATE AND CALCIUM CHLORIDE: 600; 310; 30; 20 INJECTION, SOLUTION INTRAVENOUS at 08:30

## 2020-12-31 RX ADMIN — ONDANSETRON 4 MG: 2 INJECTION INTRAMUSCULAR; INTRAVENOUS at 10:07

## 2020-12-31 RX ADMIN — OXYCODONE HYDROCHLORIDE 5 MG: 5 SOLUTION ORAL at 11:27

## 2020-12-31 ASSESSMENT — FIBROSIS 4 INDEX: FIB4 SCORE: 0.89

## 2020-12-31 ASSESSMENT — PAIN SCALES - GENERAL: PAIN_LEVEL: 0

## 2020-12-31 ASSESSMENT — PAIN DESCRIPTION - PAIN TYPE
TYPE: ACUTE PAIN;SURGICAL PAIN
TYPE: ACUTE PAIN;SURGICAL PAIN
TYPE: SURGICAL PAIN
TYPE: ACUTE PAIN;SURGICAL PAIN

## 2020-12-31 NOTE — OR NURSING
Pt on RA.  No c/o nausea, tolerating PO fluids and medication.  Surgical discomfort tolerable per pt, 2/10.   Baseline, pt has LUQ colostomy, CDI. Pt self caths to void.  0700 was pt's last cath. VSS, afebrile, CLEARY, A/O x4.      No belongings in PACU.  No prescriptions.   Transferred with mask in place.

## 2020-12-31 NOTE — OR SURGEON
Immediate Post OP Note    PreOp Diagnosis: Left ureteral stricture  History of cervical cancer status post concurrent chemoradiation therapy  Left ureteral stricture status post left ureteral stent placement    PostOp Diagnosis: Calcified left ureteral stent next: Persistent left ureteral stricture    Procedure(s):  CYSTOSCOPY, WITH Left URETERAL STENT  removal and replacement - Wound Class: Clean Contaminated  CYSTOSCOPY, WITH left RETROGRADE PYELOGRAM - Wound Class: Clean Contaminated    Surgeon(s):  Adam Hays M.D.    Anesthesiologist/Type of Anesthesia:  Anesthesiologist: Hansel Chairez M.D./General    Surgical Staff:  Circulator: John Chance R.N.  Relief Circulator: Taylor Schafer R.N.  Scrub Person: Barbra River  Radiology Technologist: Paula Schulz    Specimens removed if any:  ID Type Source Tests Collected by Time Destination   1 : Ureteral Stent  Other Other AEROBIC/ANAEROBIC CULTURE (SURGERY) Adam Hays M.D. 12/31/2020 10:30 AM        Estimated Blood Loss: None    Findings: Calcified left ureteral stent with multiple calcium deposits in the bladder which was washed and irrigated out.  Bilateral retrograde pyelogram revealed a tortuous right ureter with no evidence of stricture however on the left side there is a persistent stricture at the level of the pelvic ureter.    Complications: None        12/31/2020 11:44 AM Adam Hays M.D.

## 2020-12-31 NOTE — DISCHARGE INSTRUCTIONS
ACTIVITY: Rest and take it easy for the first 24 hours.  A responsible adult is recommended to remain with you during that time.  It is normal to feel sleepy.  We encourage you to not do anything that requires balance, judgment or coordination.    MILD FLU-LIKE SYMPTOMS ARE NORMAL. YOU MAY EXPERIENCE GENERALIZED MUSCLE ACHES, THROAT IRRITATION, HEADACHE AND/OR SOME NAUSEA.    FOR 24 HOURS DO NOT:  Drive, operate machinery or run household appliances.  Drink beer or alcoholic beverages.   Make important decisions or sign legal documents.    SPECIAL INSTRUCTIONS:     Cystoscopy  Cystoscopy is a procedure that is used to help diagnose and sometimes treat conditions that affect the lower urinary tract. The lower urinary tract includes the bladder and the urethra. The urethra is the tube that drains urine from the bladder. Cystoscopy is done using a thin, tube-shaped instrument with a light and camera at the end (cystoscope). The cystoscope may be hard or flexible, depending on the goal of the procedure. The cystoscope is inserted through the urethra, into the bladder.  Cystoscopy may be recommended if you have:  · Urinary tract infections that keep coming back.  · Blood in the urine (hematuria).  · An inability to control when you urinate (urinary incontinence) or an overactive bladder.  · Unusual cells found in a urine sample.  · A blockage in the urethra, such as a urinary stone.  · Painful urination.  · An abnormality in the bladder found during an intravenous pyelogram (IVP) or CT scan.  Cystoscopy may also be done to remove a sample of tissue to be examined under a microscope (biopsy).  Tell a health care provider about:  · Any allergies you have.  · All medicines you are taking, including vitamins, herbs, eye drops, creams, and over-the-counter medicines.  · Any problems you or family members have had with anesthetic medicines.  · Any blood disorders you have.  · Any surgeries you have had.  · Any medical  conditions you have.  · Whether you are pregnant or may be pregnant.  What are the risks?  Generally, this is a safe procedure. However, problems may occur, including:  · Infection.  · Bleeding.  · Allergic reactions to medicines.  · Damage to other structures or organs.  What happens before the procedure?  · Ask your health care provider about:  ? Changing or stopping your regular medicines. This is especially important if you are taking diabetes medicines or blood thinners.  ? Taking medicines such as aspirin and ibuprofen. These medicines can thin your blood. Do not take these medicines unless your health care provider tells you to take them.  ? Taking over-the-counter medicines, vitamins, herbs, and supplements.  · Follow instructions from your health care provider about eating or drinking restrictions.  · Ask your health care provider what steps will be taken to help prevent infection. These may include:  ? Washing skin with a germ-killing soap.  ? Taking antibiotic medicine.  · You may have an exam or testing, such as:  ? X-rays of the bladder, urethra, or kidneys.  ? Urine tests to check for signs of infection.  · Plan to have someone take you home from the hospital or clinic.  What happens during the procedure?    · You will be given one or more of the following:  ? A medicine to help you relax (sedative).  ? A medicine to numb the area (local anesthetic).  · The area around the opening of your urethra will be cleaned.  · The cystoscope will be passed through your urethra into your bladder.  · Germ-free (sterile) fluid will flow through the cystoscope to fill your bladder. The fluid will stretch your bladder so that your health care provider can clearly examine your bladder walls.  · Your doctor will look at the urethra and bladder. Your doctor may take a biopsy or remove stones.  · The cystoscope will be removed, and your bladder will be emptied.  The procedure may vary among health care providers and  [facility].  What can I expect after the procedure?  After the procedure, it is common to have:  · Some soreness or pain in your abdomen and urethra.  · Urinary symptoms. These include:  ? Mild pain or burning when you urinate. Pain should stop within a few minutes after you urinate. This may last for up to 1 week.  ? A small amount of blood in your urine for several days.  ? Feeling like you need to urinate but producing only a small amount of urine.  Follow these instructions at home:  Medicines  · Take over-the-counter and prescription medicines only as told by your health care provider.  · If you were prescribed an antibiotic medicine, take it as told by your health care provider. Do not stop taking the antibiotic even if you start to feel better.  General instructions  · Return to your normal activities as told by your health care provider. Ask your health care provider what activities are safe for you.  · Do not drive for 24 hours if you were given a sedative during your procedure.  · Watch for any blood in your urine. If the amount of blood in your urine increases, call your health care provider.  · Follow instructions from your health care provider about eating or drinking restrictions.  · If a tissue sample was removed for testing (biopsy) during your procedure, it is up to you to get your test results. Ask your health care provider, or the department that is doing the test, when your results will be ready.  · Drink enough fluid to keep your urine pale yellow.  · Keep all follow-up visits as told by your health care provider. This is important.  Contact a health care provider if you:  · Have pain that gets worse or does not get better with medicine, especially pain when you urinate.  · Have trouble urinating.  · Have more blood in your urine.  Get help right away if you:  · Have blood clots in your urine.  · Have abdominal pain.  · Have a fever or chills.  · Are unable to urinate.  Summary  · Cystoscopy is a  procedure that is used to help diagnose and sometimes treat conditions that affect the lower urinary tract.  · Cystoscopy is done using a thin, tube-shaped instrument with a light and camera at the end.  · After the procedure, it is common to have some soreness or pain in your abdomen and urethra.  · Watch for any blood in your urine. If the amount of blood in your urine increases, call your health care provider.  · If you were prescribed an antibiotic medicine, take it as told by your health care provider. Do not stop taking the antibiotic even if you start to feel better.        DIET: To avoid nausea, slowly advance diet as tolerated, avoiding spicy or greasy foods for the first day.  Add more substantial food to your diet according to your physician's instructions. INCREASE FLUIDS AND FIBER TO AVOID CONSTIPATION.    SURGICAL DRESSING/BATHING: Ok to shower.    FOLLOW-UP APPOINTMENT:  A follow-up appointment should be arranged with your doctor in 1-2 weeks; call to schedule.    You should CALL YOUR PHYSICIAN if you develop:  Fever greater than 101 degrees F.  Pain not relieved by medication, or persistent nausea or vomiting.  Excessive bleeding (blood soaking through dressing) or unexpected drainage from the wound.  Extreme redness or swelling around the incision site, drainage of pus or foul smelling drainage.  Inability to urinate or empty your bladder within 8 hours.  Problems with breathing or chest pain.    You should call 911 if you develop problems with breathing or chest pain.  If you are unable to contact your doctor or surgical center, you should go to the nearest emergency room or urgent care center.  Physician's telephone #: Dr. Hays ([phone]).    If any questions arise, call your doctor.  If your doctor is not available, please feel free to call the Surgical Center at [phone]. The Contact Center is open Monday through Friday 7AM to 5PM and may speak to a nurse at [phone], or toll free  at [phone].     A registered nurse may call you a few days after your surgery to see how you are doing after your procedure.    MEDICATIONS: Resume taking daily medication.  Take prescribed pain medication with food.  If no medication is prescribed, you may take non-aspirin pain medication if needed.  PAIN MEDICATION CAN BE VERY CONSTIPATING.  Take a stool softener or laxative such as senokot, pericolace, or milk of magnesia if needed.    Last pain medication given at 11:27am.    If your physician has prescribed pain medication that includes Acetaminophen (Tylenol), do not take additional Acetaminophen (Tylenol) while taking the prescribed medication.    Depression / Suicide Risk    As you are discharged from this [facility] facility, it is important to learn how to keep safe from harming yourself.    Recognize the warning signs:  · Abrupt changes in personality, positive or negative- including increase in energy   · Giving away possessions  · Change in eating patterns- significant weight changes-  positive or negative  · Change in sleeping patterns- unable to sleep or sleeping all the time   · Unwillingness or inability to communicate  · Depression  · Unusual sadness, discouragement and loneliness  · Talk of wanting to die  · Neglect of personal appearance   · Rebelliousness- reckless behavior  · Withdrawal from people/activities they love  · Confusion- inability to concentrate     If you or a loved one observes any of these behaviors or has concerns about self-harm, here's what you can do:  · Talk about it- your feelings and reasons for harming yourself  · Remove any means that you might use to hurt yourself (examples: pills, rope, extension cords, firearm)  · Get professional help from the community (Mental Health, Substance Abuse, psychological counseling)  · Do not be alone:Call your Safe Contact- someone whom you trust who will be there for you.  · Call your local CRISIS HOTLINE 160-3649 or  081-175-9878  · Call your local Children's Mobile Crisis Response Team [place] (682) 967-2671 or www.[employer]  · Call the toll free National Suicide Prevention Hotlines   · National Suicide Prevention Lifeline 022-882-GCUF (9346)  · National [employer] Intelligence Line Network 800-SUICIDE (446-3033)

## 2020-12-31 NOTE — ANESTHESIA TIME REPORT
Anesthesia Start and Stop Event Times     Date Time Event    12/31/2020 0953 Ready for Procedure     1005 Anesthesia Start     1058 Anesthesia Stop        Responsible Staff  12/31/20    Name Role Begin End    Hansel Chairez M.D. Anesth 1005 1058        Preop Diagnosis (Free Text):  Pre-op Diagnosis     HYDRONEPHROSIS, CERVICAL CANCER        Preop Diagnosis (Codes):    Post op Diagnosis  Hydronephrosis      Premium Reason  Non-Premium    Comments:

## 2020-12-31 NOTE — OR NURSING
Pt arrived to PACU II at 1207. Pt aa/ox4, VSS. Discharge criteria met. Pt changed into clothing with assistance. Discharge instructions given; pt and family verbalized understanding and questions answered.  IV removed, tip intact. Will follow-up with Dr. Hays in 1-2 weeks. Pt discharged home and escorted via w/c with CNA in stable condition.

## 2020-12-31 NOTE — ANESTHESIA POSTPROCEDURE EVALUATION
Patient: Nessa Dennis    Procedure Summary     Date: 12/31/20 Room / Location: [place] [address] / SURGERY [facility]    Anesthesia Start: 1005 Anesthesia Stop: 1058    Procedures:       CYSTOSCOPY, WITH Left URETERAL STENT  removal and replacement (Left Ureter)      CYSTOSCOPY, WITH left RETROGRADE PYELOGRAM (Left Ureter) Diagnosis: (HYDRONEPHROSIS, CERVICAL CANCER)    Surgeons: Adam Hays M.D. Responsible Provider: Hansel Chairez M.D.    Anesthesia Type: general ASA Status: 2          Final Anesthesia Type: general  Last vitals  BP   Blood Pressure : 105/72    Temp   36.4 °C (97.5 °F)    Pulse   Pulse: 88   Resp   18    SpO2   97 %      Anesthesia Post Evaluation    Patient location during evaluation: PACU  Patient participation: complete - patient participated  Level of consciousness: awake and alert  Pain score: 0    Airway patency: patent  Anesthetic complications: no  Cardiovascular status: hemodynamically stable  Respiratory status: acceptable  Hydration status: euvolemic    PONV: none           Nurse Pain Score: 0 (NPRS)

## 2020-12-31 NOTE — ANESTHESIA PROCEDURE NOTES
Airway    Date/Time: 12/31/2020 10:10 AM  Performed by: Hansel Chairez M.D.  Authorized by: Hansel Chairez M.D.     Location:  OR  Urgency:  Elective  Difficult Airway: No    Indications for Airway Management:  Anesthesia      Spontaneous Ventilation: absent    Sedation Level:  Deep  Preoxygenated: Yes    Mask Difficulty Assessment:  0 - not attempted  Final Airway Type:  Supraglottic airway  Final Supraglottic Airway:  Standard LMA    SGA Size:  4  Number of Attempts at Approach:  1

## 2020-12-31 NOTE — ANESTHESIA QCDR
2019 [facility] Clinical Data Registry (for Quality Improvement)     Postoperative nausea/vomiting risk protocol (Adult = 18 yrs and Pediatric 3-17 yrs)- (430 and 463)  General inhalation anesthetic (NOT TIVA) with PONV risk factors: Yes  Provision of anti-emetic therapy with at least 2 different classes of agents: Yes   Patient DID NOT receive anti-emetic therapy and reason is documented in Medical Record:  N/A    Multimodal Pain Management- (477)  Non-emergent surgery AND patient age >= 18: Yes  Use of Multimodal Pain Management, two or more drugs and/or interventions, NOT including systemic opioids: No  Exception: Documented allergy to multiple classes of analgesics: No       Smoking Abstinence (404)  Patient is current smoker (cigarette, pipe, e-cig, marijuanna): No  Elective Surgery:   Abstinence instructions provided prior to day of surgery:   Patient abstained from smoking on day of surgery:     Pre-Op Beta-Blocker in Isolated CABG (44)  Isolated CABG AND patient age >= 18: No  Beta-blocker admin within 24 hours of surgical incision:   Exception:of medical reason(s) for not administering beta blocker within 24 hours prior to surgical incision (e.g., not  indicated,other medical reason):     PACU assessment of acute postoperative pain prior to Anesthesia Care End- Applies to Patients Age = 18- (ABG7)  Initial PACU pain score is which of the following: < 7/10  Patient unable to report pain score: N/A    Post-anesthetic transfer of care checklist/protocol to PACU/ICU- (426 and 427)  Upon conclusion of case, patient transferred to which of the following locations: PACU/Non-ICU  Use of transfer checklist/protocol: Yes  Exclusion: Service Performed in Patient Hospital Room (and thus did not require transfer): N/A  Unplanned admission to ICU related to anesthesia service up through end of PACU care- (MD51)  Unplanned admission to ICU (not initially anticipated at anesthesia start time): No

## 2020-12-31 NOTE — OP REPORT
PreOp Diagnosis: Left ureteral stricture  History of cervical cancer status post concurrent chemoradiation therapy  Left ureteral stricture status post left ureteral stent placement    PostOp Diagnosis: Calcified left ureteral stent next: Persistent left ureteral stricture    Procedure(s):  CYSTOSCOPY, WITH Left URETERAL STENT  removal and replacement - Wound Class: Clean Contaminated  CYSTOSCOPY, WITH left RETROGRADE PYELOGRAM - Wound Class: Clean Contaminated    Surgeon(s):  Adam Hays M.D.    Anesthesiologist/Type of Anesthesia:  Anesthesiologist: Hansel Chairez M.D./General    Surgical Staff:  Circulator: John Chance R.N.  Relief Circulator: Taylor Schafer R.N.  Scrub Person: Barbra River  Radiology Technologist: Paula Schulz    Specimens removed if any:  ID Type Source Tests Collected by Time Destination   1 : Ureteral Stent  Other Other AEROBIC/ANAEROBIC CULTURE (SURGERY) Adam Hays M.D. 12/31/2020 10:30 AM        Estimated Blood Loss: None    Complications: None    Indication: Mrs. Dennis is well-known to me.  She is a 75-year-old female who was diagnosed with 1B cervical cancer nearly over a decade ago.  Patient underwent a radical hysterectomy initially and subsequently had a recurrence.  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  The ureteral stricture has require ureteral stent placement.  Patient is not interested in revising the stricture she is due for her stent exchange today thus patient be brought to the operating room today to undergo removal of the left stent.  Thus patient is undergoing interval ureteral stent exchange    Findings: Calcified left ureteral stent with multiple calcium deposits in the bladder which was washed and irrigated out.  Bilateral retrograde pyelogram revealed a tortuous right ureter with no  evidence of stricture however on the left side there is a persistent stricture at the level of the pelvic ureter.    Procedure: After achieving adequate anesthesia patient was prepped and draped in place modified dorsolithotomy position.  30 degree cystoscope was inserted transurethrally.  Left ureteral stent was identified.  It was calcified.  I was able to retrieve it and remove it.  Under direct fluoroscopy fluoroscopy a Glidewire was then intubated on left ureteral orifice initially was difficult.  We were able to intubated and then placed a left ureteral catheter with the aid of a guidewire.  The guidewire was then removed and a retrograde pyelogram was performed which revealed persistent ureteral stricture thus we replaced the left ureteral stent with a new left 7 x 22 double-J ureteral stent.  After the retrograde pyelogram was performed a guidewire was then floated through the left ureteral catheter to hold the position of the left ureter and then a 7 x 22 double-J ureteral stent was placed without difficulty.  After this was confirmed right retrograde pyelogram was performed which revealed a tortuous right ureter in the pelvis however there was no evidence of stricture thus we did not proceed with a ureteral stent placement.  After completion of this the calcified stones that is noted in the bladder was removed.  The bladder was washed out and irrigated patient tolerated procedure well without any difficulties was extubated and transferred to the PACU in stable condition.

## 2020-12-31 NOTE — OR NURSING
Pt's  Castro phoned and updated on pt status in Recovery.   Castro will return to the hospital at 1140 to pick pt up from discharge.

## 2021-01-14 DIAGNOSIS — Z23 NEED FOR VACCINATION: ICD-10-CM

## 2021-03-10 ENCOUNTER — PRE-ADMISSION TESTING (OUTPATIENT)
Dept: ADMISSIONS | Facility: MEDICAL CENTER | Age: 76
End: 2021-03-10
Attending: SPECIALIST
Payer: MEDICARE

## 2021-03-10 DIAGNOSIS — Z01.812 PRE-OPERATIVE LABORATORY EXAMINATION: ICD-10-CM

## 2021-03-10 LAB
ALBUMIN SERPL BCP-MCNC: 4.3 G/DL (ref 3.2–4.9)
ALBUMIN/GLOB SERPL: 1.5 G/DL
ALP SERPL-CCNC: 141 U/L (ref 30–99)
ALT SERPL-CCNC: 8 U/L (ref 2–50)
ANION GAP SERPL CALC-SCNC: 10 MMOL/L (ref 7–16)
APTT PPP: 38.8 SEC (ref 24.7–36)
AST SERPL-CCNC: 15 U/L (ref 12–45)
BASOPHILS # BLD AUTO: 0.3 % (ref 0–1.8)
BASOPHILS # BLD: 0.02 K/UL (ref 0–0.12)
BILIRUB SERPL-MCNC: 0.4 MG/DL (ref 0.1–1.5)
BUN SERPL-MCNC: 18 MG/DL (ref 8–22)
CALCIUM SERPL-MCNC: 10.6 MG/DL (ref 8.5–10.5)
CHLORIDE SERPL-SCNC: 103 MMOL/L (ref 96–112)
CO2 SERPL-SCNC: 23 MMOL/L (ref 20–33)
CREAT SERPL-MCNC: 0.84 MG/DL (ref 0.5–1.4)
EOSINOPHIL # BLD AUTO: 0.1 K/UL (ref 0–0.51)
EOSINOPHIL NFR BLD: 1.5 % (ref 0–6.9)
ERYTHROCYTE [DISTWIDTH] IN BLOOD BY AUTOMATED COUNT: 42.9 FL (ref 35.9–50)
GLOBULIN SER CALC-MCNC: 2.9 G/DL (ref 1.9–3.5)
GLUCOSE SERPL-MCNC: 100 MG/DL (ref 65–99)
HCT VFR BLD AUTO: 41.6 % (ref 37–47)
HGB BLD-MCNC: 13.2 G/DL (ref 12–16)
IMM GRANULOCYTES # BLD AUTO: 0.03 K/UL (ref 0–0.11)
IMM GRANULOCYTES NFR BLD AUTO: 0.4 % (ref 0–0.9)
INR PPP: 0.99 (ref 0.87–1.13)
LYMPHOCYTES # BLD AUTO: 0.94 K/UL (ref 1–4.8)
LYMPHOCYTES NFR BLD: 14 % (ref 22–41)
MCH RBC QN AUTO: 25.6 PG (ref 27–33)
MCHC RBC AUTO-ENTMCNC: 31.7 G/DL (ref 33.6–35)
MCV RBC AUTO: 80.8 FL (ref 81.4–97.8)
MONOCYTES # BLD AUTO: 0.61 K/UL (ref 0–0.85)
MONOCYTES NFR BLD AUTO: 9.1 % (ref 0–13.4)
NEUTROPHILS # BLD AUTO: 5.01 K/UL (ref 2–7.15)
NEUTROPHILS NFR BLD: 74.7 % (ref 44–72)
NRBC # BLD AUTO: 0 K/UL
NRBC BLD-RTO: 0 /100 WBC
PLATELET # BLD AUTO: 257 K/UL (ref 164–446)
PMV BLD AUTO: 10.4 FL (ref 9–12.9)
POTASSIUM SERPL-SCNC: 4.5 MMOL/L (ref 3.6–5.5)
PROT SERPL-MCNC: 7.2 G/DL (ref 6–8.2)
PROTHROMBIN TIME: 13.4 SEC (ref 12–14.6)
RBC # BLD AUTO: 5.15 M/UL (ref 4.2–5.4)
SODIUM SERPL-SCNC: 136 MMOL/L (ref 135–145)
WBC # BLD AUTO: 6.7 K/UL (ref 4.8–10.8)

## 2021-03-10 PROCEDURE — 36415 COLL VENOUS BLD VENIPUNCTURE: CPT

## 2021-03-10 PROCEDURE — 85025 COMPLETE CBC W/AUTO DIFF WBC: CPT

## 2021-03-10 PROCEDURE — 85610 PROTHROMBIN TIME: CPT

## 2021-03-10 PROCEDURE — 85730 THROMBOPLASTIN TIME PARTIAL: CPT

## 2021-03-10 PROCEDURE — 80053 COMPREHEN METABOLIC PANEL: CPT

## 2021-03-10 ASSESSMENT — FIBROSIS 4 INDEX: FIB4 SCORE: 0.89

## 2021-03-22 ENCOUNTER — PRE-ADMISSION TESTING (OUTPATIENT)
Dept: ADMISSIONS | Facility: MEDICAL CENTER | Age: 76
End: 2021-03-22
Attending: SPECIALIST
Payer: MEDICARE

## 2021-03-22 DIAGNOSIS — Z01.812 PRE-OPERATIVE LABORATORY EXAMINATION: ICD-10-CM

## 2021-03-22 LAB
COVID ORDER STATUS COVID19: NORMAL
SARS-COV-2 RNA RESP QL NAA+PROBE: NOTDETECTED
SPECIMEN SOURCE: NORMAL

## 2021-03-22 PROCEDURE — U0005 INFEC AGEN DETEC AMPLI PROBE: HCPCS

## 2021-03-22 PROCEDURE — C9803 HOPD COVID-19 SPEC COLLECT: HCPCS

## 2021-03-22 PROCEDURE — U0003 INFECTIOUS AGENT DETECTION BY NUCLEIC ACID (DNA OR RNA); SEVERE ACUTE RESPIRATORY SYNDROME CORONAVIRUS 2 (SARS-COV-2) (CORONAVIRUS DISEASE [COVID-19]), AMPLIFIED PROBE TECHNIQUE, MAKING USE OF HIGH THROUGHPUT TECHNOLOGIES AS DESCRIBED BY CMS-2020-01-R: HCPCS

## 2021-03-25 ENCOUNTER — ANESTHESIA EVENT (OUTPATIENT)
Dept: SURGERY | Facility: MEDICAL CENTER | Age: 76
End: 2021-03-25
Payer: MEDICARE

## 2021-03-25 ENCOUNTER — APPOINTMENT (OUTPATIENT)
Dept: RADIOLOGY | Facility: MEDICAL CENTER | Age: 76
End: 2021-03-25
Attending: SPECIALIST
Payer: MEDICARE

## 2021-03-25 ENCOUNTER — HOSPITAL ENCOUNTER (OUTPATIENT)
Facility: MEDICAL CENTER | Age: 76
End: 2021-03-25
Attending: SPECIALIST | Admitting: SPECIALIST
Payer: MEDICARE

## 2021-03-25 ENCOUNTER — ANESTHESIA (OUTPATIENT)
Dept: SURGERY | Facility: MEDICAL CENTER | Age: 76
End: 2021-03-25
Payer: MEDICARE

## 2021-03-25 VITALS
RESPIRATION RATE: 16 BRPM | OXYGEN SATURATION: 92 % | SYSTOLIC BLOOD PRESSURE: 155 MMHG | BODY MASS INDEX: 28.18 KG/M2 | HEIGHT: 60 IN | HEART RATE: 98 BPM | TEMPERATURE: 97.4 F | DIASTOLIC BLOOD PRESSURE: 70 MMHG | WEIGHT: 143.52 LBS

## 2021-03-25 LAB
GRAM STN SPEC: NORMAL
GRAM STN SPEC: NORMAL
SIGNIFICANT IND 70042: NORMAL
SIGNIFICANT IND 70042: NORMAL
SITE SITE: NORMAL
SITE SITE: NORMAL
SOURCE SOURCE: NORMAL
SOURCE SOURCE: NORMAL

## 2021-03-25 PROCEDURE — C1769 GUIDE WIRE: HCPCS | Performed by: SPECIALIST

## 2021-03-25 PROCEDURE — 160028 HCHG SURGERY MINUTES - 1ST 30 MINS LEVEL 3: Performed by: SPECIALIST

## 2021-03-25 PROCEDURE — 160039 HCHG SURGERY MINUTES - EA ADDL 1 MIN LEVEL 3: Performed by: SPECIALIST

## 2021-03-25 PROCEDURE — 160025 RECOVERY II MINUTES (STATS): Performed by: SPECIALIST

## 2021-03-25 PROCEDURE — 87206 SMEAR FLUORESCENT/ACID STAI: CPT

## 2021-03-25 PROCEDURE — 160046 HCHG PACU - 1ST 60 MINS PHASE II: Performed by: SPECIALIST

## 2021-03-25 PROCEDURE — 87070 CULTURE OTHR SPECIMN AEROBIC: CPT | Mod: XU

## 2021-03-25 PROCEDURE — 87102 FUNGUS ISOLATION CULTURE: CPT

## 2021-03-25 PROCEDURE — 87015 SPECIMEN INFECT AGNT CONCNTJ: CPT

## 2021-03-25 PROCEDURE — A9270 NON-COVERED ITEM OR SERVICE: HCPCS | Performed by: SPECIALIST

## 2021-03-25 PROCEDURE — 700101 HCHG RX REV CODE 250: Performed by: SPECIALIST

## 2021-03-25 PROCEDURE — 87077 CULTURE AEROBIC IDENTIFY: CPT | Mod: 91

## 2021-03-25 PROCEDURE — 700117 HCHG RX CONTRAST REV CODE 255: Performed by: SPECIALIST

## 2021-03-25 PROCEDURE — 160036 HCHG PACU - EA ADDL 30 MINS PHASE I: Performed by: SPECIALIST

## 2021-03-25 PROCEDURE — 700105 HCHG RX REV CODE 258: Performed by: SPECIALIST

## 2021-03-25 PROCEDURE — 160035 HCHG PACU - 1ST 60 MINS PHASE I: Performed by: SPECIALIST

## 2021-03-25 PROCEDURE — C1758 CATHETER, URETERAL: HCPCS | Performed by: SPECIALIST

## 2021-03-25 PROCEDURE — 160002 HCHG RECOVERY MINUTES (STAT): Performed by: SPECIALIST

## 2021-03-25 PROCEDURE — 87205 SMEAR GRAM STAIN: CPT

## 2021-03-25 PROCEDURE — 87116 MYCOBACTERIA CULTURE: CPT

## 2021-03-25 PROCEDURE — 160048 HCHG OR STATISTICAL LEVEL 1-5: Performed by: SPECIALIST

## 2021-03-25 PROCEDURE — 87075 CULTR BACTERIA EXCEPT BLOOD: CPT | Mod: 91,XU

## 2021-03-25 PROCEDURE — 700111 HCHG RX REV CODE 636 W/ 250 OVERRIDE (IP): Performed by: ANESTHESIOLOGY

## 2021-03-25 PROCEDURE — 160009 HCHG ANES TIME/MIN: Performed by: SPECIALIST

## 2021-03-25 PROCEDURE — 700101 HCHG RX REV CODE 250: Performed by: ANESTHESIOLOGY

## 2021-03-25 PROCEDURE — 87086 URINE CULTURE/COLONY COUNT: CPT

## 2021-03-25 RX ORDER — HYDROMORPHONE HYDROCHLORIDE 1 MG/ML
0.1 INJECTION, SOLUTION INTRAMUSCULAR; INTRAVENOUS; SUBCUTANEOUS
Status: DISCONTINUED | OUTPATIENT
Start: 2021-03-25 | End: 2021-03-25 | Stop reason: HOSPADM

## 2021-03-25 RX ORDER — HYDRALAZINE HYDROCHLORIDE 20 MG/ML
5 INJECTION INTRAMUSCULAR; INTRAVENOUS
Status: DISCONTINUED | OUTPATIENT
Start: 2021-03-25 | End: 2021-03-25 | Stop reason: HOSPADM

## 2021-03-25 RX ORDER — HYDROMORPHONE HYDROCHLORIDE 1 MG/ML
0.2 INJECTION, SOLUTION INTRAMUSCULAR; INTRAVENOUS; SUBCUTANEOUS
Status: DISCONTINUED | OUTPATIENT
Start: 2021-03-25 | End: 2021-03-25 | Stop reason: HOSPADM

## 2021-03-25 RX ORDER — SODIUM CHLORIDE, SODIUM LACTATE, POTASSIUM CHLORIDE, CALCIUM CHLORIDE 600; 310; 30; 20 MG/100ML; MG/100ML; MG/100ML; MG/100ML
INJECTION, SOLUTION INTRAVENOUS CONTINUOUS
Status: DISCONTINUED | OUTPATIENT
Start: 2021-03-25 | End: 2021-03-25 | Stop reason: HOSPADM

## 2021-03-25 RX ORDER — LIDOCAINE HYDROCHLORIDE 20 MG/ML
INJECTION, SOLUTION EPIDURAL; INFILTRATION; INTRACAUDAL; PERINEURAL PRN
Status: DISCONTINUED | OUTPATIENT
Start: 2021-03-25 | End: 2021-03-25 | Stop reason: SURG

## 2021-03-25 RX ORDER — OXYCODONE HCL 5 MG/5 ML
10 SOLUTION, ORAL ORAL
Status: DISCONTINUED | OUTPATIENT
Start: 2021-03-25 | End: 2021-03-25 | Stop reason: HOSPADM

## 2021-03-25 RX ORDER — PROPOFOL 10 MG/ML
INJECTION, EMULSION INTRAVENOUS PRN
Status: DISCONTINUED | OUTPATIENT
Start: 2021-03-25 | End: 2021-03-25 | Stop reason: SURG

## 2021-03-25 RX ORDER — MEPERIDINE HYDROCHLORIDE 25 MG/ML
12.5 INJECTION INTRAMUSCULAR; INTRAVENOUS; SUBCUTANEOUS
Status: DISCONTINUED | OUTPATIENT
Start: 2021-03-25 | End: 2021-03-25 | Stop reason: HOSPADM

## 2021-03-25 RX ORDER — ONDANSETRON 2 MG/ML
INJECTION INTRAMUSCULAR; INTRAVENOUS PRN
Status: DISCONTINUED | OUTPATIENT
Start: 2021-03-25 | End: 2021-03-25 | Stop reason: SURG

## 2021-03-25 RX ORDER — OXYCODONE HCL 5 MG/5 ML
5 SOLUTION, ORAL ORAL
Status: DISCONTINUED | OUTPATIENT
Start: 2021-03-25 | End: 2021-03-25 | Stop reason: HOSPADM

## 2021-03-25 RX ORDER — MIDAZOLAM HYDROCHLORIDE 1 MG/ML
1 INJECTION INTRAMUSCULAR; INTRAVENOUS
Status: DISCONTINUED | OUTPATIENT
Start: 2021-03-25 | End: 2021-03-25 | Stop reason: HOSPADM

## 2021-03-25 RX ORDER — HALOPERIDOL 5 MG/ML
1 INJECTION INTRAMUSCULAR
Status: DISCONTINUED | OUTPATIENT
Start: 2021-03-25 | End: 2021-03-25 | Stop reason: HOSPADM

## 2021-03-25 RX ORDER — DIPHENHYDRAMINE HYDROCHLORIDE 50 MG/ML
12.5 INJECTION INTRAMUSCULAR; INTRAVENOUS
Status: DISCONTINUED | OUTPATIENT
Start: 2021-03-25 | End: 2021-03-25 | Stop reason: HOSPADM

## 2021-03-25 RX ORDER — DEXAMETHASONE SODIUM PHOSPHATE 4 MG/ML
INJECTION, SOLUTION INTRA-ARTICULAR; INTRALESIONAL; INTRAMUSCULAR; INTRAVENOUS; SOFT TISSUE PRN
Status: DISCONTINUED | OUTPATIENT
Start: 2021-03-25 | End: 2021-03-25 | Stop reason: SURG

## 2021-03-25 RX ORDER — HYDROMORPHONE HYDROCHLORIDE 1 MG/ML
0.4 INJECTION, SOLUTION INTRAMUSCULAR; INTRAVENOUS; SUBCUTANEOUS
Status: DISCONTINUED | OUTPATIENT
Start: 2021-03-25 | End: 2021-03-25 | Stop reason: HOSPADM

## 2021-03-25 RX ORDER — ONDANSETRON 2 MG/ML
4 INJECTION INTRAMUSCULAR; INTRAVENOUS
Status: DISCONTINUED | OUTPATIENT
Start: 2021-03-25 | End: 2021-03-25 | Stop reason: HOSPADM

## 2021-03-25 RX ORDER — VANCOMYCIN HYDROCHLORIDE 1 G/20ML
INJECTION, POWDER, LYOPHILIZED, FOR SOLUTION INTRAVENOUS PRN
Status: DISCONTINUED | OUTPATIENT
Start: 2021-03-25 | End: 2021-03-25 | Stop reason: SURG

## 2021-03-25 RX ORDER — SODIUM CHLORIDE, SODIUM LACTATE, POTASSIUM CHLORIDE, CALCIUM CHLORIDE 600; 310; 30; 20 MG/100ML; MG/100ML; MG/100ML; MG/100ML
INJECTION, SOLUTION INTRAVENOUS CONTINUOUS
Status: ACTIVE | OUTPATIENT
Start: 2021-03-25 | End: 2021-03-25

## 2021-03-25 RX ADMIN — VANCOMYCIN HYDROCHLORIDE 1 G: 1 INJECTION, POWDER, LYOPHILIZED, FOR SOLUTION INTRAVENOUS at 10:12

## 2021-03-25 RX ADMIN — LIDOCAINE HYDROCHLORIDE 40 MG: 20 INJECTION, SOLUTION EPIDURAL; INFILTRATION; INTRACAUDAL at 09:50

## 2021-03-25 RX ADMIN — SODIUM CHLORIDE, POTASSIUM CHLORIDE, SODIUM LACTATE AND CALCIUM CHLORIDE: 600; 310; 30; 20 INJECTION, SOLUTION INTRAVENOUS at 09:45

## 2021-03-25 RX ADMIN — SODIUM CHLORIDE, POTASSIUM CHLORIDE, SODIUM LACTATE AND CALCIUM CHLORIDE: 600; 310; 30; 20 INJECTION, SOLUTION INTRAVENOUS at 09:01

## 2021-03-25 RX ADMIN — DEXAMETHASONE SODIUM PHOSPHATE 8 MG: 4 INJECTION, SOLUTION INTRA-ARTICULAR; INTRALESIONAL; INTRAMUSCULAR; INTRAVENOUS; SOFT TISSUE at 10:06

## 2021-03-25 RX ADMIN — PROPOFOL 150 MG: 10 INJECTION, EMULSION INTRAVENOUS at 09:50

## 2021-03-25 RX ADMIN — EPHEDRINE SULFATE 10 MG: 50 INJECTION, SOLUTION INTRAVENOUS at 10:07

## 2021-03-25 RX ADMIN — FENTANYL CITRATE 100 MCG: 50 INJECTION, SOLUTION INTRAMUSCULAR; INTRAVENOUS at 09:48

## 2021-03-25 RX ADMIN — POVIDONE IODINE 15 ML: 100 SOLUTION TOPICAL at 08:39

## 2021-03-25 RX ADMIN — ONDANSETRON 4 MG: 2 INJECTION INTRAMUSCULAR; INTRAVENOUS at 11:08

## 2021-03-25 ASSESSMENT — PAIN SCALES - GENERAL: PAIN_LEVEL: 2

## 2021-03-25 ASSESSMENT — FIBROSIS 4 INDEX: FIB4 SCORE: 1.57

## 2021-03-25 NOTE — OR NURSING
PIV removed.  Pt to phase 2.  Report to MAYCOL Snell. No DC order in Epic. Dr Hays aware and will place order when done with current case (~5 mins).

## 2021-03-25 NOTE — OR NURSING
IV dc'd. Pt changed into clothing with assistance. Pt up and ambulated to BR, steady gait, voided adequately. Discharge instructions given pt verbalized understanding and questions answered. Patient states ready to d/c home. No prescriptions given. Pt dc'd in w/c with CNA  in stable condition.

## 2021-03-25 NOTE — DISCHARGE INSTRUCTIONS
x1. Renal ultrasound and renal lasix washout next week, call office for results    ACTIVITY: Rest and take it easy for the first 24 hours.  A responsible adult is recommended to remain with you during that time.  It is normal to feel sleepy.  We encourage you to not do anything that requires balance, judgment or coordination.    MILD FLU-LIKE SYMPTOMS ARE NORMAL. YOU MAY EXPERIENCE GENERALIZED MUSCLE ACHES, THROAT IRRITATION, HEADACHE AND/OR SOME NAUSEA.    FOR 24 HOURS DO NOT:  Drive, operate machinery or run household appliances.  Drink beer or alcoholic beverages.   Make important decisions or sign legal documents.    SPECIAL INSTRUCTIONS:   1. Renal ultrasound and renal lasix washout next week, call office for results      DIET: To avoid nausea, slowly advance diet as tolerated, avoiding spicy or greasy foods for the first day.  Add more substantial food to your diet according to your physician's instructions.  INCREASE FLUIDS AND FIBER TO AVOID CONSTIPATION.    SURGICAL DRESSING/BATHING: ok to shower     FOLLOW-UP APPOINTMENT:  A follow-up appointment should be arranged with your doctor in 1-2 weeks ; call to schedule.    You should CALL YOUR PHYSICIAN if you develop:  Fever greater than 101 degrees F.  Pain not relieved by medication, or persistent nausea or vomiting.  Excessive bleeding (blood soaking through dressing) or unexpected drainage from the wound.  Extreme redness or swelling around the incision site, drainage of pus or foul smelling drainage.  Inability to urinate or empty your bladder within 8 hours.  Problems with breathing or chest pain.    You should call 911 if you develop problems with breathing or chest pain.  If you are unable to contact your doctor or surgical center, you should go to the nearest emergency room or urgent care center.  Physician's telephone #: [phone]  If any questions arise, call your doctor.  If your doctor is not available, please feel free to call the Surgical  Center at {Surgical Dept Numbers:[phone]}. The Contact Center is open Monday through Friday 7AM to 5PM and may speak to a nurse at [phone], or toll free at [phone].     A registered nurse may call you a few days after your surgery to see how you are doing after your procedure.    MEDICATIONS: Resume taking daily medication.  Take prescribed pain medication with food.  If no medication is prescribed, you may take non-aspirin pain medication if needed.  PAIN MEDICATION CAN BE VERY CONSTIPATING.  Take a stool softener or laxative such as senokot, pericolace, or milk of magnesia if needed.     No Prescriptions given and no pain medications given in recovery.    If your physician has prescribed pain medication that includes Acetaminophen (Tylenol), do not take additional Acetaminophen (Tylenol) while taking the prescribed medication.    Depression / Suicide Risk    As you are discharged from this [facility] facility, it is important to learn how to keep safe from harming yourself.    Recognize the warning signs:  · Abrupt changes in personality, positive or negative- including increase in energy   · Giving away possessions  · Change in eating patterns- significant weight changes-  positive or negative  · Change in sleeping patterns- unable to sleep or sleeping all the time   · Unwillingness or inability to communicate  · Depression  · Unusual sadness, discouragement and loneliness  · Talk of wanting to die  · Neglect of personal appearance   · Rebelliousness- reckless behavior  · Withdrawal from people/activities they love  · Confusion- inability to concentrate     If you or a loved one observes any of these behaviors or has concerns about self-harm, here's what you can do:  · Talk about it- your feelings and reasons for harming yourself  · Remove any means that you might use to hurt yourself (examples: pills, rope, extension cords, firearm)  · Get professional help from the community (Mental Health,  Substance Abuse, psychological counseling)  · Do not be alone:Call your Safe Contact- someone whom you trust who will be there for you.  · Call your local CRISIS HOTLINE 519-5211 or 171-980-9747  · Call your local Children's Mobile Crisis Response Team [place] (250) 928-0932 or www.[employer]  · Call the toll free National Suicide Prevention Hotlines   · National Suicide Prevention Lifeline 352-981-EKXH (9655)  · National Hope Line Network 800-SUICIDE (997-6280)

## 2021-03-25 NOTE — ANESTHESIA TIME REPORT
Anesthesia Start and Stop Event Times     Date Time Event    3/25/2021 0945 Anesthesia Start     1121 Anesthesia Stop        Responsible Staff  03/25/21    Name Role Begin End    Haresh Mathur M.D. Anesth 0945 1121        Preop Diagnosis (Free Text):  Pre-op Diagnosis     HYDRONEPHROSIS        Preop Diagnosis (Codes):    Post op Diagnosis  Hydronephrosis      Premium Reason  Non-Premium    Comments:

## 2021-03-25 NOTE — ANESTHESIA PROCEDURE NOTES
Airway    Date/Time: 3/25/2021 9:51 AM  Performed by: Haresh Mathur M.D.  Authorized by: Haresh Mathur M.D.     Location:  OR  Urgency:  Elective  Difficult Airway: No    Indications for Airway Management:  Anesthesia      Spontaneous Ventilation: absent    Sedation Level:  Deep  Preoxygenated: Yes    Final Airway Type:  Supraglottic airway  Final Supraglottic Airway:  Standard LMA    SGA Size:  3  Number of Attempts at Approach:  1

## 2021-03-25 NOTE — ANESTHESIA POSTPROCEDURE EVALUATION
Patient: Nessa Dennis    Procedure Summary     Date: 03/25/21 Room / Location: [place] [address] / SURGERY [facility]    Anesthesia Start: 0945 Anesthesia Stop: 1121    Procedure: CYSTOSCOPY, WITH URETERAL STENT REMOVAL LEFT (Left Bladder) Diagnosis: (HYDRONEPHROSIS, LEFT URETERAL STRICTURE)    Surgeons: Adam Hays M.D. Responsible Provider: Haresh Mathur M.D.    Anesthesia Type: general ASA Status: 2          Final Anesthesia Type: general  Last vitals  BP   Blood Pressure : 148/83    Temp   36.3 °C (97.4 °F)    Pulse   99   Resp   15    SpO2   93 %      Anesthesia Post Evaluation    Patient location during evaluation: PACU  Patient participation: complete - patient participated  Level of consciousness: awake and alert  Pain score: 2    Airway patency: patent  Anesthetic complications: no  Cardiovascular status: hemodynamically stable  Respiratory status: acceptable  Hydration status: euvolemic    PONV: none          No complications documented.     Nurse Pain Score: 0 (NPRS)

## 2021-03-25 NOTE — OR NURSING
Awake, alert and sipping water in PACU.  Vitals stable.  On monitors with alarms audible.    Called  with update and approximate DC timeline.

## 2021-03-25 NOTE — OP REPORT
PreOp Diagnosis: Left ureteral stricture  Hx of cervical cancer.      PostOp Diagnosis: same     Procedure(s):  CYSTOSCOPY, WITH URETERAL STENT REMOVAL LEFT - Wound Class: Clean Contaminated     Surgeon(s):  Adam Hays M.D.     Anesthesiologist/Type of Anesthesia:  Anesthesiologist: Haresh Mathur M.D./General     Surgical Staff:  Circulator: Carla Slaughter R.N.  Scrub Person: Barbra River  Radiology Technologist: Paula Schulz     Specimens removed if any:  ID Type Source Tests Collected by Time Destination   1 : URINE CULTURE Other Other URINE CULTURE-EXISTING-LESS THAN 48 HOURS, AEROBIC/ANAEROBIC CULTURE (SURGERY) Adam Hays M.D. 3/25/2021 10:07 AM     2 : LEFT URETERAL STENT Other Other AFB CULTURE, FUNGAL CULTURE, AEROBIC/ANAEROBIC CULTURE (SURGERY) Adam Hays M.D. 3/25/2021 10:30 AM           Estimated Blood Loss: none     Complications: none    Indication: Mrs. Dennis is a pleasant 75-year-old female who has history of cervical cancer.  Patient had primary concurrent radical hysterectomy with bilateral salpingo-oophorectomy with bilateral pelvic lymphadenectomy in the remote past.  Patient had a recurrent disease 25 years ago.  She underwent salvage concurrent chemoradiation therapy with excellent response.  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  Thus she underwent a diverting colostomy and a ureteral stent placement.  She has been getting ureter stent exchange on a every 3 to 4-month basis.  She is due for her left ureteral stent exchange today.  Thus patient is being brought to the operating room today to undergo removal of the left ureteral stent.    Risk benefits and rational procedures were reviewed with the patient in detail patient's understanding of these risk and wished to proceed with the surgery as planned.     Findings: Persistent left ureteral stricture. Left ureteral stent calcified. Unable to intubate the left ureteral orifice. No  evidence of indigo carmine dye effluxing out of the left ureteral orifice.     Procedure: After achieving adequate general esthesia patient was prepped and draped in place modified dorsolithotomy position.  30 degree cystoscope was inserted transurethrally.  The ureteral stent was then retrieved and brought out to the urethral orifice.  Under direct fluoroscopy I then placed the guidewire into the ureteral stent while the stent was in position of the left ureter.  I was not able to pass the guidewire into the ureteral stent as it appears that was blocked probably as a result of calcification.  Following completion of this I then attempted to place a guidewire through the left ureteral orifice.  I was able to intubate the left ureteral orifice but it would not get past the UVJ.  This was noted under fluoroscopy.  Multiple attempts were made without success.  I would try a rigid ureteroscope to perhaps may be placed in the proximal left ureteral orifice and this was not successful.  I then injected indigo carmine and we noted E flux of the indigo carmine in the right ureteral orifice however of the left ureteral orifice we did not see any dye come out this is nearly after 40 minutes of doing this procedure.  Thus at this point in time I felt that I would not be able to place a ureteral stent via retrograde fashion we elected to not to proceed with it.  We left the ureteral stent out and patient will have a follow-up renal ultrasound and a BMP to monitor her creatinine and a renal Lasix washout to evaluate the integrity of the left kidney.    At this point in time the bladder was empty.  Patient was awakened and transferred to the PACU in stable condition.

## 2021-03-25 NOTE — OR SURGEON
Immediate Post OP Note    PreOp Diagnosis: Left ureteral stricture  Hx of cervical cancer.     PostOp Diagnosis: same    Procedure(s):  CYSTOSCOPY, WITH URETERAL STENT REMOVAL LEFT - Wound Class: Clean Contaminated    Surgeon(s):  Adam Hays M.D.    Anesthesiologist/Type of Anesthesia:  Anesthesiologist: Haresh Mathur M.D./General    Surgical Staff:  Circulator: Carla Slaughter R.N.  Scrub Person: Barbra River  Radiology Technologist: Paula Schulz    Specimens removed if any:  ID Type Source Tests Collected by Time Destination   1 : URINE CULTURE Other Other URINE CULTURE-EXISTING-LESS THAN 48 HOURS, AEROBIC/ANAEROBIC CULTURE (SURGERY) Adam Hays M.D. 3/25/2021 10:07 AM    2 : LEFT URETERAL STENT Other Other AFB CULTURE, FUNGAL CULTURE, AEROBIC/ANAEROBIC CULTURE (SURGERY) Adam Hays M.D. 3/25/2021 10:30 AM        Estimated Blood Loss: none    Findings: Persistent left ureteral stricture. Left ureteral stent calcified. Unable to intubate the left ureteral orifice. No evidence of indigo carmine dye effluxing out of the left ureteral orifice.     Complications: none        3/25/2021 1:16 PM Adam Hays M.D.

## 2021-03-26 LAB
RHODAMINE-AURAMINE STN SPEC: NORMAL
SIGNIFICANT IND 70042: NORMAL
SITE SITE: NORMAL
SOURCE SOURCE: NORMAL

## 2021-03-29 LAB
BACTERIA SPEC ANAEROBE CULT: ABNORMAL
BACTERIA WND AEROBE CULT: ABNORMAL
BACTERIA WND AEROBE CULT: ABNORMAL
GRAM STN SPEC: ABNORMAL
SIGNIFICANT IND 70042: ABNORMAL
SITE SITE: ABNORMAL
SOURCE SOURCE: ABNORMAL

## 2021-04-06 ENCOUNTER — HOSPITAL ENCOUNTER (OUTPATIENT)
Dept: RADIOLOGY | Facility: MEDICAL CENTER | Age: 76
DRG: 690 | End: 2021-04-06
Attending: NURSE PRACTITIONER
Payer: MEDICARE

## 2021-04-06 DIAGNOSIS — R30.9 MICTURITION PAINFUL: ICD-10-CM

## 2021-04-06 DIAGNOSIS — Z96.0 PRESENCE UROGENITAL IMPLANT: ICD-10-CM

## 2021-04-06 DIAGNOSIS — R19.00 INTRA-ABDOMINAL AND PELVIC SWELLING, MASS AND LUMP, UNSPECIFIED SITE: ICD-10-CM

## 2021-04-06 DIAGNOSIS — N82.9 FEMALE GENITAL TRACT FISTULA, UNSPECIFIED: ICD-10-CM

## 2021-04-06 DIAGNOSIS — N82.3 FISTULA OF VAGINA TO LARGE INTESTINE: ICD-10-CM

## 2021-04-06 DIAGNOSIS — N13.1 HYDRONEPHROSIS WITH URETERAL STRICTURE: ICD-10-CM

## 2021-04-06 DIAGNOSIS — C53.9 MALIGNANT NEOPLASM OF CERVIX, UNSPECIFIED SITE (HCC): ICD-10-CM

## 2021-04-06 DIAGNOSIS — R31.9 HEMATURIA, UNSPECIFIED TYPE: ICD-10-CM

## 2021-04-06 PROCEDURE — 76775 US EXAM ABDO BACK WALL LIM: CPT

## 2021-04-07 ENCOUNTER — HOSPITAL ENCOUNTER (OUTPATIENT)
Dept: RADIOLOGY | Facility: MEDICAL CENTER | Age: 76
DRG: 690 | End: 2021-04-07
Attending: NURSE PRACTITIONER
Payer: MEDICARE

## 2021-04-07 DIAGNOSIS — N13.1 HYDRONEPHROSIS WITH URETERAL STRICTURE: ICD-10-CM

## 2021-04-07 DIAGNOSIS — C53.9 MALIGNANT NEOPLASM OF CERVIX, UNSPECIFIED SITE (HCC): ICD-10-CM

## 2021-04-07 DIAGNOSIS — N82.3 FISTULA OF VAGINA TO LARGE INTESTINE: ICD-10-CM

## 2021-04-07 DIAGNOSIS — Z96.0 PRESENCE UROGENITAL IMPLANT: ICD-10-CM

## 2021-04-07 DIAGNOSIS — R19.00 INTRA-ABDOMINAL AND PELVIC SWELLING, MASS AND LUMP, UNSPECIFIED SITE: ICD-10-CM

## 2021-04-07 DIAGNOSIS — N82.9 FEMALE GENITAL TRACT FISTULA, UNSPECIFIED: ICD-10-CM

## 2021-04-07 DIAGNOSIS — R30.9 MICTURITION PAINFUL: ICD-10-CM

## 2021-04-07 DIAGNOSIS — R31.9 HEMATURIA, UNSPECIFIED TYPE: ICD-10-CM

## 2021-04-07 PROCEDURE — 78708 K FLOW/FUNCT IMAGE W/DRUG: CPT | Mod: MH

## 2021-04-09 ENCOUNTER — HOSPITAL ENCOUNTER (INPATIENT)
Facility: MEDICAL CENTER | Age: 76
LOS: 4 days | DRG: 690 | End: 2021-04-13
Attending: EMERGENCY MEDICINE | Admitting: SPECIALIST
Payer: MEDICARE

## 2021-04-09 ENCOUNTER — APPOINTMENT (OUTPATIENT)
Dept: RADIOLOGY | Facility: MEDICAL CENTER | Age: 76
DRG: 690 | End: 2021-04-09
Attending: EMERGENCY MEDICINE
Payer: MEDICARE

## 2021-04-09 LAB
ANION GAP SERPL CALC-SCNC: 11 MMOL/L (ref 7–16)
APPEARANCE UR: ABNORMAL
BACTERIA #/AREA URNS HPF: ABNORMAL /HPF
BASOPHILS # BLD AUTO: 0.2 % (ref 0–1.8)
BASOPHILS # BLD: 0.04 K/UL (ref 0–0.12)
BILIRUB UR QL STRIP.AUTO: NEGATIVE
BUN SERPL-MCNC: 27 MG/DL (ref 8–22)
CALCIUM SERPL-MCNC: 10.1 MG/DL (ref 8.5–10.5)
CHLORIDE SERPL-SCNC: 103 MMOL/L (ref 96–112)
CO2 SERPL-SCNC: 23 MMOL/L (ref 20–33)
COLOR UR: YELLOW
CREAT SERPL-MCNC: 1 MG/DL (ref 0.5–1.4)
EOSINOPHIL # BLD AUTO: 0.03 K/UL (ref 0–0.51)
EOSINOPHIL NFR BLD: 0.2 % (ref 0–6.9)
EPI CELLS #/AREA URNS HPF: NEGATIVE /HPF
ERYTHROCYTE [DISTWIDTH] IN BLOOD BY AUTOMATED COUNT: 45.9 FL (ref 35.9–50)
GLUCOSE SERPL-MCNC: 143 MG/DL (ref 65–99)
GLUCOSE UR STRIP.AUTO-MCNC: NEGATIVE MG/DL
HCT VFR BLD AUTO: 46.5 % (ref 37–47)
HGB BLD-MCNC: 14.5 G/DL (ref 12–16)
HYALINE CASTS #/AREA URNS LPF: ABNORMAL /LPF
IMM GRANULOCYTES # BLD AUTO: 0.1 K/UL (ref 0–0.11)
IMM GRANULOCYTES NFR BLD AUTO: 0.5 % (ref 0–0.9)
KETONES UR STRIP.AUTO-MCNC: NEGATIVE MG/DL
LACTATE BLD-SCNC: 2.1 MMOL/L (ref 0.5–2)
LEUKOCYTE ESTERASE UR QL STRIP.AUTO: ABNORMAL
LYMPHOCYTES # BLD AUTO: 0.98 K/UL (ref 1–4.8)
LYMPHOCYTES NFR BLD: 5.1 % (ref 22–41)
MCH RBC QN AUTO: 25.5 PG (ref 27–33)
MCHC RBC AUTO-ENTMCNC: 31.2 G/DL (ref 33.6–35)
MCV RBC AUTO: 81.9 FL (ref 81.4–97.8)
MICRO URNS: ABNORMAL
MONOCYTES # BLD AUTO: 0.87 K/UL (ref 0–0.85)
MONOCYTES NFR BLD AUTO: 4.6 % (ref 0–13.4)
NEUTROPHILS # BLD AUTO: 17.09 K/UL (ref 2–7.15)
NEUTROPHILS NFR BLD: 89.4 % (ref 44–72)
NITRITE UR QL STRIP.AUTO: POSITIVE
NRBC # BLD AUTO: 0 K/UL
NRBC BLD-RTO: 0 /100 WBC
PH UR STRIP.AUTO: 7 [PH] (ref 5–8)
PLATELET # BLD AUTO: 279 K/UL (ref 164–446)
PMV BLD AUTO: 10.4 FL (ref 9–12.9)
POTASSIUM SERPL-SCNC: 5.2 MMOL/L (ref 3.6–5.5)
PROT UR QL STRIP: >=1000 MG/DL
RBC # BLD AUTO: 5.68 M/UL (ref 4.2–5.4)
RBC # URNS HPF: >150 /HPF
RBC UR QL AUTO: ABNORMAL
SARS-COV+SARS-COV-2 AG RESP QL IA.RAPID: NOTDETECTED
SODIUM SERPL-SCNC: 137 MMOL/L (ref 135–145)
SP GR UR STRIP.AUTO: 1.04
SPECIMEN SOURCE: NORMAL
UROBILINOGEN UR STRIP.AUTO-MCNC: 0.2 MG/DL
WBC # BLD AUTO: 19.1 K/UL (ref 4.8–10.8)
WBC #/AREA URNS HPF: ABNORMAL /HPF

## 2021-04-09 PROCEDURE — 0T9430Z DRAINAGE OF LEFT KIDNEY PELVIS WITH DRAINAGE DEVICE, PERCUTANEOUS APPROACH: ICD-10-PCS | Performed by: RADIOLOGY

## 2021-04-09 PROCEDURE — 87040 BLOOD CULTURE FOR BACTERIA: CPT | Mod: 91

## 2021-04-09 PROCEDURE — 96375 TX/PRO/DX INJ NEW DRUG ADDON: CPT

## 2021-04-09 PROCEDURE — 83605 ASSAY OF LACTIC ACID: CPT | Mod: 91

## 2021-04-09 PROCEDURE — 99285 EMERGENCY DEPT VISIT HI MDM: CPT

## 2021-04-09 PROCEDURE — 96365 THER/PROPH/DIAG IV INF INIT: CPT

## 2021-04-09 PROCEDURE — 87086 URINE CULTURE/COLONY COUNT: CPT

## 2021-04-09 PROCEDURE — 80053 COMPREHEN METABOLIC PANEL: CPT

## 2021-04-09 PROCEDURE — 87426 SARSCOV CORONAVIRUS AG IA: CPT

## 2021-04-09 PROCEDURE — 770004 HCHG ROOM/CARE - ONCOLOGY PRIVATE *

## 2021-04-09 PROCEDURE — U0003 INFECTIOUS AGENT DETECTION BY NUCLEIC ACID (DNA OR RNA); SEVERE ACUTE RESPIRATORY SYNDROME CORONAVIRUS 2 (SARS-COV-2) (CORONAVIRUS DISEASE [COVID-19]), AMPLIFIED PROBE TECHNIQUE, MAKING USE OF HIGH THROUGHPUT TECHNOLOGIES AS DESCRIBED BY CMS-2020-01-R: HCPCS

## 2021-04-09 PROCEDURE — 36415 COLL VENOUS BLD VENIPUNCTURE: CPT

## 2021-04-09 PROCEDURE — 700105 HCHG RX REV CODE 258: Performed by: EMERGENCY MEDICINE

## 2021-04-09 PROCEDURE — 96376 TX/PRO/DX INJ SAME DRUG ADON: CPT

## 2021-04-09 PROCEDURE — 87150 DNA/RNA AMPLIFIED PROBE: CPT

## 2021-04-09 PROCEDURE — 74177 CT ABD & PELVIS W/CONTRAST: CPT | Mod: MG

## 2021-04-09 PROCEDURE — 87186 SC STD MICRODIL/AGAR DIL: CPT

## 2021-04-09 PROCEDURE — 87077 CULTURE AEROBIC IDENTIFY: CPT

## 2021-04-09 PROCEDURE — 80048 BASIC METABOLIC PNL TOTAL CA: CPT

## 2021-04-09 PROCEDURE — U0005 INFEC AGEN DETEC AMPLI PROBE: HCPCS

## 2021-04-09 PROCEDURE — 700111 HCHG RX REV CODE 636 W/ 250 OVERRIDE (IP): Performed by: EMERGENCY MEDICINE

## 2021-04-09 PROCEDURE — 81001 URINALYSIS AUTO W/SCOPE: CPT

## 2021-04-09 PROCEDURE — 85025 COMPLETE CBC W/AUTO DIFF WBC: CPT

## 2021-04-09 PROCEDURE — 700117 HCHG RX CONTRAST REV CODE 255: Performed by: EMERGENCY MEDICINE

## 2021-04-09 PROCEDURE — C9803 HOPD COVID-19 SPEC COLLECT: HCPCS | Performed by: EMERGENCY MEDICINE

## 2021-04-09 PROCEDURE — BT121ZZ FLUOROSCOPY OF LEFT KIDNEY USING LOW OSMOLAR CONTRAST: ICD-10-PCS | Performed by: RADIOLOGY

## 2021-04-09 PROCEDURE — 71045 X-RAY EXAM CHEST 1 VIEW: CPT

## 2021-04-09 RX ORDER — ALENDRONATE SODIUM 70 MG/1
70 TABLET ORAL
COMMUNITY
End: 2021-08-17

## 2021-04-09 RX ORDER — SODIUM CHLORIDE 450 MG/100ML
INJECTION, SOLUTION INTRAVENOUS CONTINUOUS
Status: DISCONTINUED | OUTPATIENT
Start: 2021-04-09 | End: 2021-04-13 | Stop reason: HOSPADM

## 2021-04-09 RX ORDER — SODIUM CHLORIDE 9 MG/ML
1000 INJECTION, SOLUTION INTRAVENOUS ONCE
Status: COMPLETED | OUTPATIENT
Start: 2021-04-09 | End: 2021-04-09

## 2021-04-09 RX ORDER — KETOROLAC TROMETHAMINE 30 MG/ML
15 INJECTION, SOLUTION INTRAMUSCULAR; INTRAVENOUS EVERY 6 HOURS PRN
Status: DISCONTINUED | OUTPATIENT
Start: 2021-04-09 | End: 2021-04-13 | Stop reason: HOSPADM

## 2021-04-09 RX ORDER — ONDANSETRON 2 MG/ML
4 INJECTION INTRAMUSCULAR; INTRAVENOUS ONCE
Status: COMPLETED | OUTPATIENT
Start: 2021-04-09 | End: 2021-04-09

## 2021-04-09 RX ORDER — CIPROFLOXACIN 2 MG/ML
400 INJECTION, SOLUTION INTRAVENOUS EVERY 12 HOURS
Status: DISCONTINUED | OUTPATIENT
Start: 2021-04-10 | End: 2021-04-11 | Stop reason: ALTCHOICE

## 2021-04-09 RX ORDER — MULTIVIT WITH MINERALS/LUTEIN
1000 TABLET ORAL 2 TIMES DAILY
COMMUNITY

## 2021-04-09 RX ORDER — OXYCODONE HYDROCHLORIDE AND ACETAMINOPHEN 5; 325 MG/1; MG/1
1 TABLET ORAL EVERY 6 HOURS PRN
Status: DISCONTINUED | OUTPATIENT
Start: 2021-04-09 | End: 2021-04-13 | Stop reason: HOSPADM

## 2021-04-09 RX ORDER — MORPHINE SULFATE 10 MG/ML
6 INJECTION, SOLUTION INTRAMUSCULAR; INTRAVENOUS ONCE
Status: COMPLETED | OUTPATIENT
Start: 2021-04-09 | End: 2021-04-09

## 2021-04-09 RX ORDER — ONDANSETRON 2 MG/ML
4 INJECTION INTRAMUSCULAR; INTRAVENOUS EVERY 6 HOURS PRN
Status: DISCONTINUED | OUTPATIENT
Start: 2021-04-09 | End: 2021-04-13 | Stop reason: HOSPADM

## 2021-04-09 RX ORDER — MORPHINE SULFATE 4 MG/ML
2-4 INJECTION, SOLUTION INTRAMUSCULAR; INTRAVENOUS EVERY 4 HOURS PRN
Status: DISCONTINUED | OUTPATIENT
Start: 2021-04-09 | End: 2021-04-13 | Stop reason: HOSPADM

## 2021-04-09 RX ADMIN — IOHEXOL 95 ML: 350 INJECTION, SOLUTION INTRAVENOUS at 19:54

## 2021-04-09 RX ADMIN — MORPHINE SULFATE 6 MG: 10 INJECTION INTRAVENOUS at 21:05

## 2021-04-09 RX ADMIN — FENTANYL CITRATE 100 MCG: 50 INJECTION, SOLUTION INTRAMUSCULAR; INTRAVENOUS at 18:45

## 2021-04-09 RX ADMIN — ONDANSETRON 4 MG: 2 INJECTION INTRAMUSCULAR; INTRAVENOUS at 21:04

## 2021-04-09 RX ADMIN — SODIUM CHLORIDE 1000 ML: 9 INJECTION, SOLUTION INTRAVENOUS at 18:43

## 2021-04-09 RX ADMIN — ONDANSETRON 4 MG: 2 INJECTION INTRAMUSCULAR; INTRAVENOUS at 18:45

## 2021-04-09 RX ADMIN — CEFTRIAXONE SODIUM 2 G: 2 INJECTION, POWDER, FOR SOLUTION INTRAMUSCULAR; INTRAVENOUS at 21:10

## 2021-04-09 ASSESSMENT — LIFESTYLE VARIABLES
TOTAL SCORE: 0
TOTAL SCORE: 0
DO YOU DRINK ALCOHOL: NO
HAVE PEOPLE ANNOYED YOU BY CRITICIZING YOUR DRINKING: NO
HAVE YOU EVER FELT YOU SHOULD CUT DOWN ON YOUR DRINKING: NO
EVER FELT BAD OR GUILTY ABOUT YOUR DRINKING: NO
TOTAL SCORE: 0
DOES PATIENT WANT TO STOP DRINKING: NO
CONSUMPTION TOTAL: INCOMPLETE
EVER HAD A DRINK FIRST THING IN THE MORNING TO STEADY YOUR NERVES TO GET RID OF A HANGOVER: NO

## 2021-04-09 ASSESSMENT — FIBROSIS 4 INDEX: FIB4 SCORE: 1.57

## 2021-04-09 ASSESSMENT — PAIN DESCRIPTION - PAIN TYPE: TYPE: ACUTE PAIN

## 2021-04-10 ENCOUNTER — APPOINTMENT (OUTPATIENT)
Dept: RADIOLOGY | Facility: MEDICAL CENTER | Age: 76
DRG: 690 | End: 2021-04-10
Attending: SPECIALIST
Payer: MEDICARE

## 2021-04-10 LAB
ALBUMIN SERPL BCP-MCNC: 3.6 G/DL (ref 3.2–4.9)
ALBUMIN/GLOB SERPL: 1.6 G/DL
ALP SERPL-CCNC: 111 U/L (ref 30–99)
ALT SERPL-CCNC: 9 U/L (ref 2–50)
ANION GAP SERPL CALC-SCNC: 8 MMOL/L (ref 7–16)
AST SERPL-CCNC: 10 U/L (ref 12–45)
BASOPHILS # BLD AUTO: 0.1 % (ref 0–1.8)
BASOPHILS # BLD: 0.02 K/UL (ref 0–0.12)
BILIRUB SERPL-MCNC: 0.2 MG/DL (ref 0.1–1.5)
BUN SERPL-MCNC: 23 MG/DL (ref 8–22)
CALCIUM SERPL-MCNC: 8.6 MG/DL (ref 8.5–10.5)
CHLORIDE SERPL-SCNC: 107 MMOL/L (ref 96–112)
CO2 SERPL-SCNC: 22 MMOL/L (ref 20–33)
CREAT SERPL-MCNC: 0.94 MG/DL (ref 0.5–1.4)
EOSINOPHIL # BLD AUTO: 0 K/UL (ref 0–0.51)
EOSINOPHIL NFR BLD: 0 % (ref 0–6.9)
ERYTHROCYTE [DISTWIDTH] IN BLOOD BY AUTOMATED COUNT: 44.7 FL (ref 35.9–50)
GLOBULIN SER CALC-MCNC: 2.2 G/DL (ref 1.9–3.5)
GLUCOSE SERPL-MCNC: 150 MG/DL (ref 65–99)
GRAM STN SPEC: NORMAL
HCT VFR BLD AUTO: 39.8 % (ref 37–47)
HGB BLD-MCNC: 12.9 G/DL (ref 12–16)
IMM GRANULOCYTES # BLD AUTO: 0.06 K/UL (ref 0–0.11)
IMM GRANULOCYTES NFR BLD AUTO: 0.4 % (ref 0–0.9)
INR PPP: 1.07 (ref 0.87–1.13)
LACTATE BLD-SCNC: 1.8 MMOL/L (ref 0.5–2)
LYMPHOCYTES # BLD AUTO: 0.74 K/UL (ref 1–4.8)
LYMPHOCYTES NFR BLD: 5.5 % (ref 22–41)
MCH RBC QN AUTO: 25.9 PG (ref 27–33)
MCHC RBC AUTO-ENTMCNC: 32.4 G/DL (ref 33.6–35)
MCV RBC AUTO: 79.9 FL (ref 81.4–97.8)
MONOCYTES # BLD AUTO: 0.63 K/UL (ref 0–0.85)
MONOCYTES NFR BLD AUTO: 4.7 % (ref 0–13.4)
NEUTROPHILS # BLD AUTO: 11.89 K/UL (ref 2–7.15)
NEUTROPHILS NFR BLD: 89.3 % (ref 44–72)
NRBC # BLD AUTO: 0 K/UL
NRBC BLD-RTO: 0 /100 WBC
PLATELET # BLD AUTO: 253 K/UL (ref 164–446)
PMV BLD AUTO: 10.2 FL (ref 9–12.9)
POTASSIUM SERPL-SCNC: 4.8 MMOL/L (ref 3.6–5.5)
PROT SERPL-MCNC: 5.8 G/DL (ref 6–8.2)
PROTHROMBIN TIME: 14.3 SEC (ref 12–14.6)
RBC # BLD AUTO: 4.98 M/UL (ref 4.2–5.4)
SARS-COV-2 RNA RESP QL NAA+PROBE: NOTDETECTED
SIGNIFICANT IND 70042: NORMAL
SITE SITE: NORMAL
SODIUM SERPL-SCNC: 137 MMOL/L (ref 135–145)
SOURCE SOURCE: NORMAL
SPECIMEN SOURCE: NORMAL
WBC # BLD AUTO: 13.3 K/UL (ref 4.8–10.8)

## 2021-04-10 PROCEDURE — 700111 HCHG RX REV CODE 636 W/ 250 OVERRIDE (IP): Performed by: NURSE PRACTITIONER

## 2021-04-10 PROCEDURE — 700111 HCHG RX REV CODE 636 W/ 250 OVERRIDE (IP)

## 2021-04-10 PROCEDURE — C1729 CATH, DRAINAGE: HCPCS

## 2021-04-10 PROCEDURE — 700117 HCHG RX CONTRAST REV CODE 255: Performed by: NURSE PRACTITIONER

## 2021-04-10 PROCEDURE — 770004 HCHG ROOM/CARE - ONCOLOGY PRIVATE *

## 2021-04-10 PROCEDURE — 700111 HCHG RX REV CODE 636 W/ 250 OVERRIDE (IP): Performed by: RADIOLOGY

## 2021-04-10 PROCEDURE — 87205 SMEAR GRAM STAIN: CPT

## 2021-04-10 PROCEDURE — 87086 URINE CULTURE/COLONY COUNT: CPT

## 2021-04-10 PROCEDURE — 85610 PROTHROMBIN TIME: CPT

## 2021-04-10 PROCEDURE — 36415 COLL VENOUS BLD VENIPUNCTURE: CPT

## 2021-04-10 PROCEDURE — 700105 HCHG RX REV CODE 258: Performed by: NURSE PRACTITIONER

## 2021-04-10 RX ORDER — MIDAZOLAM HYDROCHLORIDE 1 MG/ML
.5-2 INJECTION INTRAMUSCULAR; INTRAVENOUS PRN
Status: ACTIVE | OUTPATIENT
Start: 2021-04-10 | End: 2021-04-10

## 2021-04-10 RX ORDER — MIDAZOLAM HYDROCHLORIDE 1 MG/ML
INJECTION INTRAMUSCULAR; INTRAVENOUS
Status: COMPLETED
Start: 2021-04-10 | End: 2021-04-10

## 2021-04-10 RX ORDER — CEFAZOLIN SODIUM 2 G/100ML
2 INJECTION, SOLUTION INTRAVENOUS ONCE
Status: COMPLETED | OUTPATIENT
Start: 2021-04-10 | End: 2021-04-10

## 2021-04-10 RX ORDER — ONDANSETRON 2 MG/ML
4 INJECTION INTRAMUSCULAR; INTRAVENOUS PRN
Status: ACTIVE | OUTPATIENT
Start: 2021-04-10 | End: 2021-04-10

## 2021-04-10 RX ORDER — SODIUM CHLORIDE 9 MG/ML
500 INJECTION, SOLUTION INTRAVENOUS
Status: ACTIVE | OUTPATIENT
Start: 2021-04-10 | End: 2021-04-10

## 2021-04-10 RX ORDER — CEFAZOLIN SODIUM 1 G/3ML
INJECTION, POWDER, FOR SOLUTION INTRAMUSCULAR; INTRAVENOUS
Status: DISPENSED
Start: 2021-04-10 | End: 2021-04-11

## 2021-04-10 RX ADMIN — CIPROFLOXACIN 400 MG: 2 INJECTION, SOLUTION INTRAVENOUS at 05:37

## 2021-04-10 RX ADMIN — CEFAZOLIN SODIUM 2 G: 2 INJECTION, SOLUTION INTRAVENOUS at 14:50

## 2021-04-10 RX ADMIN — IOHEXOL 16 ML: 300 INJECTION, SOLUTION INTRAVENOUS at 15:13

## 2021-04-10 RX ADMIN — KETOROLAC TROMETHAMINE 15 MG: 30 INJECTION, SOLUTION INTRAMUSCULAR; INTRAVENOUS at 21:37

## 2021-04-10 RX ADMIN — SODIUM CHLORIDE: 4.5 INJECTION, SOLUTION INTRAVENOUS at 21:37

## 2021-04-10 RX ADMIN — SODIUM CHLORIDE: 4.5 INJECTION, SOLUTION INTRAVENOUS at 02:03

## 2021-04-10 RX ADMIN — MIDAZOLAM HYDROCHLORIDE 1 MG: 1 INJECTION INTRAMUSCULAR; INTRAVENOUS at 14:50

## 2021-04-10 RX ADMIN — FENTANYL CITRATE 50 MCG: 50 INJECTION, SOLUTION INTRAMUSCULAR; INTRAVENOUS at 14:50

## 2021-04-10 RX ADMIN — FENTANYL CITRATE 25 MCG: 50 INJECTION, SOLUTION INTRAMUSCULAR; INTRAVENOUS at 15:03

## 2021-04-10 RX ADMIN — ONDANSETRON 4 MG: 2 INJECTION INTRAMUSCULAR; INTRAVENOUS at 02:39

## 2021-04-10 RX ADMIN — MIDAZOLAM HYDROCHLORIDE 1 MG: 1 INJECTION, SOLUTION INTRAMUSCULAR; INTRAVENOUS at 14:50

## 2021-04-10 RX ADMIN — MORPHINE SULFATE 2 MG: 4 INJECTION INTRAVENOUS at 02:39

## 2021-04-10 ASSESSMENT — LIFESTYLE VARIABLES
TOTAL SCORE: 0
ALCOHOL_USE: NO
HAVE YOU EVER FELT YOU SHOULD CUT DOWN ON YOUR DRINKING: NO
CONSUMPTION TOTAL: INCOMPLETE
HAVE PEOPLE ANNOYED YOU BY CRITICIZING YOUR DRINKING: NO
EVER HAD A DRINK FIRST THING IN THE MORNING TO STEADY YOUR NERVES TO GET RID OF A HANGOVER: NO
EVER FELT BAD OR GUILTY ABOUT YOUR DRINKING: NO
TOTAL SCORE: 0
TOTAL SCORE: 0

## 2021-04-10 ASSESSMENT — ENCOUNTER SYMPTOMS
DIZZINESS: 0
SHORTNESS OF BREATH: 0
VOMITING: 0
NAUSEA: 0
FEVER: 0
FLANK PAIN: 1
CHILLS: 0
COUGH: 0

## 2021-04-10 ASSESSMENT — COGNITIVE AND FUNCTIONAL STATUS - GENERAL
MOBILITY SCORE: 22
SUGGESTED CMS G CODE MODIFIER MOBILITY: CJ
SUGGESTED CMS G CODE MODIFIER DAILY ACTIVITY: CH
MOVING TO AND FROM BED TO CHAIR: A LITTLE
DAILY ACTIVITIY SCORE: 24
STANDING UP FROM CHAIR USING ARMS: A LITTLE

## 2021-04-10 ASSESSMENT — PAIN DESCRIPTION - PAIN TYPE
TYPE: ACUTE PAIN;CHRONIC PAIN
TYPE: ACUTE PAIN
TYPE: ACUTE PAIN

## 2021-04-10 ASSESSMENT — FIBROSIS 4 INDEX: FIB4 SCORE: 1

## 2021-04-10 ASSESSMENT — PATIENT HEALTH QUESTIONNAIRE - PHQ9
1. LITTLE INTEREST OR PLEASURE IN DOING THINGS: NOT AT ALL
SUM OF ALL RESPONSES TO PHQ9 QUESTIONS 1 AND 2: 0
2. FEELING DOWN, DEPRESSED, IRRITABLE, OR HOPELESS: NOT AT ALL

## 2021-04-10 NOTE — ED TRIAGE NOTES
Chief Complaint   Patient presents with   • Flank Pain     LT sided flank pain. reports she normally has a stent there but they couldn't get it back in 2 weeks ago. began getting pain around 1400 today.    • N/V     Pt to triage for above. Appears very uncomfortable. VSS. Denies fevers.     /85   Pulse 97   Temp 37.1 °C (98.8 °F) (Temporal)   Resp 18   Ht 1.524 m (5')   Wt 63.5 kg (140 lb)   LMP  (LMP Unknown)   SpO2 93%   BMI 27.34 kg/m²

## 2021-04-10 NOTE — PROGRESS NOTES
Pt performs self catheterization at home. Updated Mercedes Llamas APRN. New orders to straight cath patient prn.

## 2021-04-10 NOTE — PROGRESS NOTES
GYN/Oncology Progress Note               Author: DANIEL Barakat Date & Time created: 4/10/2021  2:47 PM     Interval History:  Patient about to go down for left NT placement. She has not required any pain medication today.    Review of Systems:  Review of Systems   Constitutional: Negative for chills and fever.   Respiratory: Negative for cough and shortness of breath.    Cardiovascular: Negative for chest pain.   Gastrointestinal: Negative for nausea and vomiting.   Genitourinary: Positive for flank pain (decreased).        Self caths   Neurological: Negative for dizziness.       Physical Exam:  Physical Exam  Constitutional:       Appearance: Normal appearance.   Cardiovascular:      Rate and Rhythm: Normal rate.      Pulses: Normal pulses.   Pulmonary:      Effort: Pulmonary effort is normal.   Abdominal:      Palpations: Abdomen is soft.      Tenderness: There is left CVA tenderness.   Musculoskeletal:         General: No swelling.   Skin:     General: Skin is warm and dry.      Capillary Refill: Capillary refill takes 2 to 3 seconds.   Neurological:      Mental Status: She is alert and oriented to person, place, and time.         Labs:          Recent Labs     04/09/21 1841 04/09/21 2359   SODIUM 137 137   POTASSIUM 5.2 4.8   CHLORIDE 103 107   CO2 23 22   BUN 27* 23*   CREATININE 1.00 0.94   CALCIUM 10.1 8.6     Recent Labs     04/09/21 1841 04/09/21 2359   ALTSGPT  --  9   ASTSGOT  --  10*   ALKPHOSPHAT  --  111*   TBILIRUBIN  --  0.2   GLUCOSE 143* 150*     Recent Labs     04/09/21  1841 04/09/21  2359 04/10/21  0812   RBC 5.68* 4.98  --    HEMOGLOBIN 14.5 12.9  --    HEMATOCRIT 46.5 39.8  --    PLATELETCT 279 253  --    PROTHROMBTM  --   --  14.3   INR  --   --  1.07     Recent Labs     04/09/21 1841 04/09/21 2359   WBC 19.1* 13.3*   NEUTSPOLYS 89.40* 89.30*   LYMPHOCYTES 5.10* 5.50*   MONOCYTES 4.60 4.70   EOSINOPHILS 0.20 0.00   BASOPHILS 0.20 0.10   ASTSGOT  --  10*   ALTSGPT  --  9    ALKPHOSPHAT  --  111*   TBILIRUBIN  --  0.2     Recent Labs     21  1841 21  2359   SODIUM 137 137   POTASSIUM 5.2 4.8   CHLORIDE 103 107   CO2 23 22   GLUCOSE 143* 150*   BUN 27* 23*   CREATININE 1.00 0.94   CALCIUM 10.1 8.6     Hemodynamics:  Temp (24hrs), Av.7 °C (98 °F), Min:36.4 °C (97.5 °F), Max:37.1 °C (98.8 °F)  Temperature: 36.5 °C (97.7 °F)  Pulse  Av.7  Min: 85  Max: 107   Blood Pressure : 103/70     Respiratory:    Respiration: 20, Pulse Oximetry: 93 %           Fluids:    Intake/Output Summary (Last 24 hours) at 4/10/2021 1447  Last data filed at 4/10/2021 1331  Gross per 24 hour   Intake 1100 ml   Output 775 ml   Net 325 ml     Weight: 63.3 kg (139 lb 8.8 oz)  GI/Nutrition:  Orders Placed This Encounter   Procedures   • Diet NPO     Standing Status:   Standing     Number of Occurrences:   1     Order Specific Question:   Restrict to:     Answer:   Sips with Medications [3]     Medical Decision Making, by Problem:  There are no active hospital problems to display for this patient.      Plan:  74 y/o hx of cervical cancer and persistent left ureteral stricture, recent failed Left ureteral stent placement on 3/25/21 secondary severe stricture    1. Left hydronephrosis: now with onset of flank pain, stable BUN/Cr, plan for left NT today  2. Left flank pain secondary to above: improved  3. Nausea/Vomiting: resolved  4. Possible pyelonephritis: Follow urine culture, plan to culture NT output after placement, Cipro IV started  5. Leukocytosis: WBC 19.1 on admit > 13.3, secondary to above, continue Cipro adjust if needed pending culture results  6. Dispo: possible d/c home tomorrow pending patient condition after NT placement    Quality-Core Measures

## 2021-04-10 NOTE — PROGRESS NOTES
2 RN skin check completed with Fabienne SEVERINO.  Devices in place: PIV, LLQ ostomy, silicone oxygen tubing.  Skin assessed under devices: intact.  Confirmed pressure ulcers found on: n/a.  New potential pressure ulcers noted on: n/a. Wound consult placed: for ostomy.  The following interventions in place: pt turns self in bed, moisturizer prn, barrier paste/cream for incontinence prn, foam padding to ears to offload tubing.    Sacrum with redness, blanching. Pt with own dayton pads in place for occasional incontinence.

## 2021-04-10 NOTE — PROGRESS NOTES
IR Nursing Note:    Patient underwent a Left nephrostomy tube placement by Dr. Tirado. Procedure site was marked by MD and verified using imaging guidance.  Patient was placed in a prone position.  Vitals were taken every 5 minutes and remained stable during procedure (see doc flow sheet for results).  Tube was sutured in place.  A Gauze and metapore tape dressing was placed over surgical site. Report called to Altagracia SEVERINO. Pt transported by bed with RN to R322. 10 mL hand delivered to lab.     [employer] Flexima Regular Nephrostomy tube 8 F x 25 cm Ref # [id number] Lot # [id number] Expiration 12-

## 2021-04-10 NOTE — OR SURGEON
Immediate Post- Operative Note        PostOp Diagnosis: LEFT UPJ obstruction      Procedure(s): LEFT neph tube      Estimated Blood Loss: Less than 5 ml        Complications: None            4/10/2021     3:07 PM     Hebert Tirado M.D.

## 2021-04-10 NOTE — H&P
CC: Left flank pain, N/V    HPI: Mrs. Dennis is a pleasant 75-year-old female who has history of cervical cancer.  Patient had primary concurrent radical hysterectomy with bilateral salpingo-oophorectomy with bilateral pelvic lymphadenectomy in the remote past.  Patient had a recurrent disease 25 years ago.  She underwent salvage concurrent chemoradiation therapy with excellent response.  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  Thus she underwent a diverting colostomy and a ureteral stent placement.  She has been getting ureter stent exchange on a every 3 to 4-month basis.      She had a failed attempt at a left ureteral stent exchange on 3/25/21 secondary to stenosis. She then underwent follow up lab work, renal US and renal lasix washout. Renal ultrasound on 4/6/21 showed Moderate left hydronephrosis, improved since prior 2019 CT study. No more recent ultrasound or CT studies are available for comparison. Mild right pelviectasis, without calyceal dilatation. Renal lasix washout on 4/7/21 showed split function is 31% on the left and 69% on the right and delayed cortical uptake and absent excretion from the left kidney following Lasix administration. Lasix half-time is 46.36 minutes. Left hydronephrosis.     Plan was to follow up with renal lasix washout in 1 week, however patient then developed severe left flank pain and presented to the ED. She underwent a CT abdomen/pelvis which showed Severe dilatation of the left renal pelvis, in keeping with UPJ obstruction. Wall thickening and stranding in the left proximal ureter could relate to inflammation/infection/scarring. Diffuse wall thickening of the urinary bladder could relate to infection/ inflammation as well. We were consulted at that time and decided to admit to [facility] for nephrostomy tube placement and treatment of possible pyelonephritis.     Patient is resting comfortable in bed. She states her left flank pain has  "greatly improved. She is no longer having any nausea or vomiting as prior. She is self cathing without difficulty and states her urine looks \"nasty.\" She denies any headaches, shortness of breath, chest pain, abdominal pain, lower extremity swelling or rashes.     ROS: A 14 point review of system was performed and as negative with the exception of what has been listed in the HPI.    ALLERGIES: Sulfa drugs    Home Medications    Medication Sig Taking? Last Dose Authorizing Provider   ARTIFICIAL TEAR SOLUTION OP Administer 1 Drop into both eyes 2 times a day. Yes 4/9/2021 at 0900 Physician Outpatient   Ascorbic Acid (VITAMIN C) 1000 MG Tab Take 1,000 mg by mouth 2 times a day. Yes 4/8/2021 at AM Physician Outpatient   alendronate (FOSAMAX) 70 MG Tab Take 70 mg by mouth every 7 days. Yes 4/8/2021 at 0900 Physician Outpatient   Coenzyme Q10 (CO Q 10 PO) Take 1 tablet by mouth every evening.  4/8/2021 at 2100 Physician Outpatient   atorvastatin (LIPITOR) 20 MG Tab Take 20 mg by mouth every evening.  4/8/2021 at 2100 Physician Outpatient   Multiple Vitamins-Minerals (DAILY MULTIVITAMIN PO) Take 1 tablet by mouth every morning.  4/8/2021 at AM Physician Outpatient   Cholecalciferol (VITAMIN D PO) Take 2 Capsules by mouth every morning.  4/8/2021 at AM Physician Outpatient   Multiple Vitamins-Minerals (EYE VITAMINS & MINERALS PO) Take 2 Tablets by mouth every morning.  4/8/2021 at AM Physician Outpatient   Probiotic Product (PROBIOTIC PO) Take 1 capsule by mouth every evening.  4/8/2021 at 2100 Physician Outpatient   lisinopril (PRINIVIL, ZESTRIL) 40 MG tablet Take 40 mg by mouth every morning. Indications: High Blood Pressure Disorder  4/9/2021 at 1100 Physician Outpatient     Past Medical History:   Diagnosis Date   • Cancer ([facility]) 2005, 2015    cervical   • Colostomy in place ([facility])    • Environmental and seasonal allergies    • High cholesterol    • Hydronephrosis with ureteral stricture    • Hypertension    • Renal " disorder     left nephrostomy tube   • Unspecified urinary incontinence     wears pads   • Urinary bladder disorder     pt self caths 4x per day      Past Surgical History:   Procedure Laterality Date   • PB CYSTOSCOPY,INSERT URETERAL STENT Left 3/25/2021    Procedure: CYSTOSCOPY, WITH URETERAL STENT REMOVAL LEFT;  Surgeon: Adam Hays M.D.;  Location: [facility];  Service: Gynecology Oncology   • PB CYSTOURETHROSCOPY,URETER CATHETER Left 12/31/2020    Procedure: CYSTOSCOPY, WITH left RETROGRADE PYELOGRAM;  Surgeon: Adam Hays M.D.;  Location: [facility];  Service: Gynecology Oncology   • CYSTO STENT PLACEMNT PRE SURG Left 12/31/2020    Procedure: CYSTOSCOPY, WITH Left URETERAL STENT  removal and replacement;  Surgeon: Adam Hays M.D.;  Location: [facility];  Service: Gynecology Oncology   • PB CYSTOSCOPY,INSERT URETERAL STENT Left 9/3/2020    Procedure: CYSTOSCOPY, WITH URETERAL STENT INSERTION- RIGHT AND OR LEFT EXCHANGE;  Surgeon: Adam Hays M.D.;  Location: [facility];  Service: Gynecology Oncology   • PB CYSTOURETHROSCOPY,URETER CATHETER Left 9/3/2020    Procedure: RETROGRADE PYELOGRAM;  Surgeon: Adam Hays M.D.;  Location: [facility];  Service: Gynecology Oncology   • PB CYSTOSCOPY,INSERT URETERAL STENT Left 5/21/2020    Procedure: CYSTOSCOPY, WITH URETERAL STENT INSERTION;  Surgeon: Adam Hays M.D.;  Location: [facility];  Service: Gynecology Oncology   • CYSTO STENT PLACEMNT PRE SURG Left 8/29/2019    Procedure: CYSTOSCOPY, WITH URETERAL STENT REMOVAL;  Surgeon: Adam Hays M.D.;  Location: [facility];  Service: Gynecology   • CYSTOSCOPY  7/31/2015    Procedure: CYSTOSCOPY;  Surgeon: Adam Hays M.D.;  Location: [facility];  Service:    • PELVIC EXAM UNDER ANESTHESIA  7/31/2015    Procedure: PELVIC EXAM UNDER ANESTHESIA;  Surgeon: Adam Hays M.D.;  Location: [facility];  Service:    • CERVICAL  CONIZATION  7/31/2015    Procedure: CERVICAL CONIZATION FOR: CONE BIOPSY;  Surgeon: Adam Hays M.D.;  Location: SURGERY [facility];  Service:    • COLOSTOMY CREATION LAPAROSCOPIC  4/14/2015    Performed by Mariusz Barajas M.D. at SURGERY [facility] ORS   • RECOVERY  4/6/2015    Performed by [facility] at SURGERY PRE-POST PROC UNIT [facility]   • OTHER  04/2015    supra pubic cath placement   • OTHER ABDOMINAL SURGERY  2015    bowel resection- has colostomy   • GYN SURGERY  5/2005    hysterectomy     Social History     Socioeconomic History   • Marital status:      Spouse name: Heron Dennis   • Number of children: Not on file   • Years of education: Not on file   • Highest education level: Not on file   Occupational History   • Occupation: Retired    Tobacco Use   • Smoking status: Never Smoker   • Smokeless tobacco: Never Used   Substance and Sexual Activity   • Alcohol use: Not Currently   • Drug use: Not Currently   • Sexual activity: Not on file   Other Topics Concern   • Not on file   Social History Narrative   • Not on file     Social Determinants of Health     Financial Resource Strain:    • Difficulty of Paying Living Expenses:    Food Insecurity:    • Worried About Running Out of Food in the Last Year:    • Ran Out of Food in the Last Year:    Transportation Needs:    • Lack of Transportation (Medical):    • Lack of Transportation (Non-Medical):    Physical Activity:    • Days of Exercise per Week:    • Minutes of Exercise per Session:    Stress:    • Feeling of Stress :    Social Connections:    • Frequency of Communication with Friends and Family:    • Frequency of Social Gatherings with Friends and Family:    • Attends [employer] Services:    • Active Member of Clubs or Organizations:    • Attends Club or Organization Meetings:    • Marital Status:    Intimate Partner Violence:    • Fear of Current or Ex-Partner:    • Emotionally Abused:    • Physically Abused:    • Sexually Abused:       Family History   Problem Relation Age of Onset   • Cancer Father    • Hypertension Father    • Cancer Sister    • Stroke Brother    Physical Exam   Constitutional: She is oriented to person, place, and time. She appears well-developed and well-nourished. She appears distressed.   HENT:   Head: Normocephalic and atraumatic.   Eyes: Pupils are equal, round, and reactive to light. Conjunctivae are normal. No scleral icterus.   Cardiovascular: Normal rate, regular rhythm and normal heart sounds. Exam reveals no gallop.   No murmur heard.  Pulmonary/Chest: Effort normal and breath sounds normal.   Abdominal: Soft. Bowel sounds are normal.   Left ostomy   Genitourinary:    Genitourinary Comments: Left CVA tenderness     Musculoskeletal:         General: Edema present. Normal range of motion.      Cervical back: Normal range of motion.   Lymphadenopathy:     She has no cervical adenopathy.   Neurological: She is oriented to person, place, and time.   Skin: Skin is warm and dry. No rash noted. No erythema.     Lab Results   Component Value Date/Time    SODIUM 137 04/09/2021 11:59 PM    POTASSIUM 4.8 04/09/2021 11:59 PM    CHLORIDE 107 04/09/2021 11:59 PM    CO2 22 04/09/2021 11:59 PM    GLUCOSE 150 (H) 04/09/2021 11:59 PM    BUN 23 (H) 04/09/2021 11:59 PM    CREATININE 0.94 04/09/2021 11:59 PM    CREATININE 0.7 03/27/2006 02:58 PM      Lab Results   Component Value Date/Time    WBC 13.3 (H) 04/09/2021 11:59 PM    RBC 4.98 04/09/2021 11:59 PM    HEMOGLOBIN 12.9 04/09/2021 11:59 PM    HEMATOCRIT 39.8 04/09/2021 11:59 PM    MCV 79.9 (L) 04/09/2021 11:59 PM    MCH 25.9 (L) 04/09/2021 11:59 PM    MCHC 32.4 (L) 04/09/2021 11:59 PM    MPV 10.2 04/09/2021 11:59 PM    NEUTSPOLYS 89.30 (H) 04/09/2021 11:59 PM    LYMPHOCYTES 5.50 (L) 04/09/2021 11:59 PM    MONOCYTES 4.70 04/09/2021 11:59 PM    EOSINOPHILS 0.00 04/09/2021 11:59 PM    BASOPHILS 0.10 04/09/2021 11:59 PM        RADIOLOGY: please see HPI    ASSESSMENT AND PLAN:    1.  Left hydronephrosis: now with onset of flank pain, stable BUN/Cr, plan for left NT tomorrow  2. Left flank pain secondary to above: MS and percocet PRN pain  3. Nausea/Vomiting: zofran PRN  4. Possible pyelonephritis: Patient does not appear septic, VSS. Follow urine culture, plan to culture NT output after placement, Cipro IV started  5. Leukocytosis: WBC 19.1, secondary to above, continue Cipro adjust if needed pending culture results, plan to follow with am lab work    This is a pleasant 76 y/o with hx of cervical cancer and persistent left ureteral stricture, recent failed Left ureteral stent placement on 3/25/21 secondary severe stricture. Now presenting with left flank pain associated with N/V secondary to hydronephrosis and possibly pyelonephritis. She will be admitted to [facility] for nephrostomy tube placement tomorrow. She will be placed on Cipro for possible pyelonephritis, we will continue to follow culture.     Thank you for allowing me to participate in this patients care. The above plan was made in conjunction with Dr. Hays.

## 2021-04-10 NOTE — ED PROVIDER NOTES
"ED Provider Note    Scribed for Redd Loco M.D. by Redd Loco M.D.. 4/9/2021,  6:27 PM.    CHIEF COMPLAINT  Chief Complaint   Patient presents with    Flank Pain     LT sided flank pain. reports she normally has a stent there but they couldn't get it back in 2 weeks ago. began getting pain around 1400 today.     N/V       HPI  Nessa Dennis is a 75 y.o. female who presents to the Emergency Department due to sudden onset of left flank pain with nausea and vomiting around 2 PM today.  She reports that because of a history of cancer, with chemo and radiation, she developed a ureteral stricture on the left side, and her oncologist, Dr. Hays, has had to repeatedly place and exchange ureteral stents.  She says that this usually happens every 4 months or so.  She says the last time the stent was removed, he reported that he could not get a replacement stent back in because of calcification.  She said that they have been \"playing it by ear,\" in terms of how she is feeling.  She said that she had a \"renal flush\" recently, and received a phone call telling her that \"your left kidney is not working,\" and that phone call was today, but she was feeling fine.  She had that when within 2 hours of hanging up the phone from that call, she started developing symptoms.  She has not had shortness of breath or cough or chest pain or dysuria.    Records from Dr. Hays show that her stent removal was March 25.  These records clarify a history of cervical cancer.  Confirms ureteral stent placement and exchange every 3 to 4 months.  The record from this procedure also confirmed that attempts were made to place a new stent but were unsuccessful.  The \"renal flush\" that the patient is describing turns out to be a renal Lasix washout.    REVIEW OF SYSTEMS  See HPI for further details. All other systems are negative.     PAST MEDICAL HISTORY   has a past medical history of Cancer (HCC) (2005, 2015), Colostomy in place " ([facility]), Environmental and seasonal allergies, High cholesterol, Hydronephrosis with ureteral stricture, Hypertension, Renal disorder, Unspecified urinary incontinence, and Urinary bladder disorder.    SOCIAL HISTORY  Social History     Tobacco Use    Smoking status: Never Smoker    Smokeless tobacco: Never Used   Substance and Sexual Activity    Alcohol use: Not Currently    Drug use: Not Currently    Sexual activity: Not on file     Social History     Substance and Sexual Activity   Drug Use Not Currently       SURGICAL HISTORY   has a past surgical history that includes recovery (4/6/2015); colostomy creation laparoscopic (4/14/2015); cystoscopy (7/31/2015); other (04/2015); cysto stent placemnt pre surg (Left, 8/29/2019); other abdominal surgery (2015); gyn surgery (5/2005); pelvic exam under anesthesia (7/31/2015); cervical conization (7/31/2015); cystoscopy,insert ureteral stent (Left, 5/21/2020); cystoscopy,insert ureteral stent (Left, 9/3/2020); cystourethroscopy,ureter catheter (Left, 9/3/2020); cystourethroscopy,ureter catheter (Left, 12/31/2020); cysto stent placemnt pre surg (Left, 12/31/2020); and cystoscopy,insert ureteral stent (Left, 3/25/2021).    CURRENT MEDICATIONS  Home Medications       Reviewed by Liz Hidalgo (Pharmacy Tech) on 04/09/21 at 2015  Med List Status: Complete     Medication Last Dose Status   alendronate (FOSAMAX) 70 MG Tab 4/8/2021 Active   ARTIFICIAL TEAR SOLUTION OP 4/9/2021 Active   Ascorbic Acid (VITAMIN C) 1000 MG Tab 4/8/2021 Active   atorvastatin (LIPITOR) 20 MG Tab 4/8/2021 Active   Cholecalciferol (VITAMIN D PO) 4/8/2021 Active   Coenzyme Q10 (CO Q 10 PO) 4/8/2021 Active   lisinopril (PRINIVIL, ZESTRIL) 40 MG tablet 4/9/2021 Active   Multiple Vitamins-Minerals (DAILY MULTIVITAMIN PO) 4/8/2021 Active   Multiple Vitamins-Minerals (EYE VITAMINS & MINERALS PO) 4/8/2021 Active   Probiotic Product (PROBIOTIC PO) 4/8/2021 Active                    ALLERGIES  Allergies  "  Allergen Reactions    Sulfa Drugs Unspecified     Pt states \" it really runs me down.\"       PHYSICAL EXAM  VITAL SIGNS: /79   Pulse (!) 101   Temp 36.7 °C (98.1 °F) (Temporal)   Resp 18   Ht 1.524 m (5')   Wt 63.5 kg (140 lb)   LMP  (LMP Unknown)   SpO2 96%   BMI 27.34 kg/m²   Pulse ox interpretation: I interpret this pulse ox as normal.  Constitutional: Alert in obvious distress.  HENT: No signs of trauma, Bilateral external ears normal, Nose normal.   Eyes: Conjunctiva normal, Non-icteric.   Neck: Normal range of motion, Supple, No stridor.   Lymphatic: No lymphadenopathy noted.   Cardiovascular: Regular rate and rhythm, no murmurs.   Thorax & Lungs: Normal breath sounds, No respiratory distress, No wheezing, No chest tenderness.   Abdomen: Bowel sounds normal, Soft, No tenderness, No masses, No pulsatile masses. No peritoneal signs.  Skin: Warm, Dry, No erythema, No rash.   Back: Left CVA and flank tenderness.  Extremities: Intact distal pulses, No edema, No cyanosis.  Musculoskeletal: Good range of motion in all major joints. No or major deformities noted.   Neurologic: Alert , Normal motor function, Normal sensory function, No focal deficits noted.   Psychiatric: Affect normal, Judgment normal, Mood normal.     DIAGNOSTIC STUDIES / PROCEDURES    LABS  Labs Reviewed   CBC WITH DIFFERENTIAL - Abnormal; Notable for the following components:       Result Value    WBC 19.1 (*)     RBC 5.68 (*)     MCH 25.5 (*)     MCHC 31.2 (*)     Neutrophils-Polys 89.40 (*)     Lymphocytes 5.10 (*)     Neutrophils (Absolute) 17.09 (*)     Lymphs (Absolute) 0.98 (*)     Monos (Absolute) 0.87 (*)     All other components within normal limits   BASIC METABOLIC PANEL - Abnormal; Notable for the following components:    Glucose 143 (*)     Bun 27 (*)     All other components within normal limits   URINALYSIS,CULTURE IF INDICATED - Abnormal; Notable for the following components:    Character Turbid (*)     Protein " ">=1000 (*)     Nitrite Positive (*)     Leukocyte Esterase Moderate (*)     Occult Blood Large (*)     All other components within normal limits    Narrative:     Indication for culture:->Unexplained new onset of Flank pain  and/or Costovertebral angle tenderness   ESTIMATED GFR - Abnormal; Notable for the following components:    GFR If Non  54 (*)     All other components within normal limits   LACTIC ACID - Abnormal; Notable for the following components:    Lactic Acid 2.1 (*)     All other components within normal limits   URINE MICROSCOPIC (W/UA) - Abnormal; Notable for the following components:    WBC Packed (*)     RBC >150 (*)     Bacteria Moderate (*)     Hyaline Cast 3-5 (*)     All other components within normal limits    Narrative:     Indication for culture:->Unexplained new onset of Flank pain  and/or Costovertebral angle tenderness   URINE CULTURE(NEW)    Narrative:     Indication for culture:->Emergency Room Patient   BLOOD CULTURE    Narrative:     Per Hospital Policy: Only change Specimen Src: to \"Line\" if  specified by physician order.   BLOOD CULTURE    Narrative:     Per Hospital Policy: Only change Specimen Src: to \"Line\" if  specified by physician order.   SARS-COV ANTIGEN JOSTIN    Narrative:     Have you been in close contact with a person who is suspected  or known to be positive for COVID-19 within the last 30 days  (e.g. last seen that person < 30 days ago)->No   SARS-COV-2, PCR (IN-HOUSE)    Narrative:     Have you been in close contact with a person who is suspected  or known to be positive for COVID-19 within the last 30 days  (e.g. last seen that person < 30 days ago)->No   URINE CULTURE(NEW)    Narrative:     Indication for culture:->Unexplained new onset of Flank pain  and/or Costovertebral angle tenderness   URINE CULTURE(NEW)   LACTIC ACID   COMP METABOLIC PANEL   CBC WITH DIFFERENTIAL     All labs reviewed by me.    RADIOLOGY  DX-CHEST-PORTABLE (1 VIEW)   Final " Result         Patchy left basilar opacity, atelectasis or infection      CT-ABDOMEN-PELVIS WITH   Final Result         Severe dilatation of the left renal pelvis, in keeping with UPJ obstruction.      Wall thickening and stranding in the left proximal ureter could relate to inflammation/infection/scarring.      Diffuse wall thickening of the urinary bladder could relate to infection/ inflammation as well.      Small focus of gas in the urinary bladder. Correlate with recent instrumentation.      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 No small bowel obstruction.      IR-NEPHROSTOGRAM W/ NEW TUBE PLACEMENT (ALL RADIOLOGY) LEFT    (Results Pending)     The radiologist's interpretation of all radiological studies have been reviewed by me.    COURSE & MEDICAL DECISION MAKING  Nursing notes, VS, PMSFHx reviewed in chart.     6:27 PM Patient seen and examined at bedside. Differential diagnosis includes but is not limited to ureteral obstruction, hydronephrosis, ADA, kidney stone, urinary tract infection. Ordered for CBC, BMP, urinalysis, and CT abdomen and pelvis with contrast to evaluate. Patient will be treated with fentanyl and Zofran, and an IV fluid bolus to replace which she has lost, and to keep her n.p.o. in case of a need for procedure.    7:57 PM this patient's laboratory tests are consistent with infection, and her CT scan, though the official read is still pending, shows significant hydronephrosis on the left, with signs of associated infection.  I have ordered the balance of the sepsis protocol laboratory evaluation, and have added appropriate antibiotics for presumed urinary tract infection.    8:20 PM I spoke with Dr. Hays's physician assistant, who will place admission orders, and plan for operative intervention with likely nephrostomy tube tomorrow.  Have ordered a repeat dose of pain medication for the patient.  The patient's labs are consistent with urinary tract infection.  The patient's  leukocytosis of 19 is also concerning for systemic infection, though she has not been febrile or hypotensive or tachycardic.    DISPOSITION:  Patient will be admitted to Dr. Hays's service in stable condition.      FINAL IMPRESSION  Acute UTI  Ureteral obstruction

## 2021-04-10 NOTE — ED NOTES
Med rec completed per Pt and Pt's spouse at bedside.  Allergies reviewed with Pt.  No oral antibiotics in last 14 days.

## 2021-04-11 ENCOUNTER — APPOINTMENT (OUTPATIENT)
Dept: RADIOLOGY | Facility: MEDICAL CENTER | Age: 76
DRG: 690 | End: 2021-04-11
Attending: NURSE PRACTITIONER
Payer: MEDICARE

## 2021-04-11 ENCOUNTER — APPOINTMENT (OUTPATIENT)
Dept: RADIOLOGY | Facility: MEDICAL CENTER | Age: 76
DRG: 690 | End: 2021-04-11
Attending: SPECIALIST
Payer: MEDICARE

## 2021-04-11 LAB
ANION GAP SERPL CALC-SCNC: 8 MMOL/L (ref 7–16)
BACTERIA BLD CULT: ABNORMAL
BACTERIA BLD CULT: ABNORMAL
BACTERIA UR CULT: ABNORMAL
BACTERIA UR CULT: ABNORMAL
BASOPHILS # BLD AUTO: 0.3 % (ref 0–1.8)
BASOPHILS # BLD: 0.03 K/UL (ref 0–0.12)
BUN SERPL-MCNC: 18 MG/DL (ref 8–22)
CALCIUM SERPL-MCNC: 8.8 MG/DL (ref 8.5–10.5)
CHLORIDE SERPL-SCNC: 105 MMOL/L (ref 96–112)
CO2 SERPL-SCNC: 23 MMOL/L (ref 20–33)
CREAT SERPL-MCNC: 0.95 MG/DL (ref 0.5–1.4)
EOSINOPHIL # BLD AUTO: 0.12 K/UL (ref 0–0.51)
EOSINOPHIL NFR BLD: 1.4 % (ref 0–6.9)
ERYTHROCYTE [DISTWIDTH] IN BLOOD BY AUTOMATED COUNT: 45.8 FL (ref 35.9–50)
GLUCOSE SERPL-MCNC: 109 MG/DL (ref 65–99)
HCT VFR BLD AUTO: 38.5 % (ref 37–47)
HGB BLD-MCNC: 12.2 G/DL (ref 12–16)
IMM GRANULOCYTES # BLD AUTO: 0.03 K/UL (ref 0–0.11)
IMM GRANULOCYTES NFR BLD AUTO: 0.3 % (ref 0–0.9)
LYMPHOCYTES # BLD AUTO: 0.88 K/UL (ref 1–4.8)
LYMPHOCYTES NFR BLD: 10.1 % (ref 22–41)
MCH RBC QN AUTO: 25.7 PG (ref 27–33)
MCHC RBC AUTO-ENTMCNC: 31.7 G/DL (ref 33.6–35)
MCV RBC AUTO: 81.2 FL (ref 81.4–97.8)
MONOCYTES # BLD AUTO: 0.63 K/UL (ref 0–0.85)
MONOCYTES NFR BLD AUTO: 7.3 % (ref 0–13.4)
NEUTROPHILS # BLD AUTO: 6.99 K/UL (ref 2–7.15)
NEUTROPHILS NFR BLD: 80.6 % (ref 44–72)
NRBC # BLD AUTO: 0 K/UL
NRBC BLD-RTO: 0 /100 WBC
PLATELET # BLD AUTO: 223 K/UL (ref 164–446)
PMV BLD AUTO: 10.2 FL (ref 9–12.9)
POTASSIUM SERPL-SCNC: 4.1 MMOL/L (ref 3.6–5.5)
RBC # BLD AUTO: 4.74 M/UL (ref 4.2–5.4)
SIGNIFICANT IND 70042: ABNORMAL
SIGNIFICANT IND 70042: ABNORMAL
SITE SITE: ABNORMAL
SITE SITE: ABNORMAL
SODIUM SERPL-SCNC: 136 MMOL/L (ref 135–145)
SOURCE SOURCE: ABNORMAL
SOURCE SOURCE: ABNORMAL
WBC # BLD AUTO: 8.7 K/UL (ref 4.8–10.8)

## 2021-04-11 PROCEDURE — 85025 COMPLETE CBC W/AUTO DIFF WBC: CPT

## 2021-04-11 PROCEDURE — 770004 HCHG ROOM/CARE - ONCOLOGY PRIVATE *

## 2021-04-11 PROCEDURE — 302098 PASTE RING (FLAT): Performed by: SPECIALIST

## 2021-04-11 PROCEDURE — 302102 BAG OST N IMG 2.25IN 2PC (FECAL): Performed by: SPECIALIST

## 2021-04-11 PROCEDURE — 36415 COLL VENOUS BLD VENIPUNCTURE: CPT

## 2021-04-11 PROCEDURE — 80048 BASIC METABOLIC PNL TOTAL CA: CPT

## 2021-04-11 PROCEDURE — 700111 HCHG RX REV CODE 636 W/ 250 OVERRIDE (IP): Performed by: NURSE PRACTITIONER

## 2021-04-11 PROCEDURE — 700105 HCHG RX REV CODE 258: Performed by: NURSE PRACTITIONER

## 2021-04-11 PROCEDURE — 302111 WAFER OST 2.25IN N IMG RD 2 PC (BARRIER): Performed by: SPECIALIST

## 2021-04-11 RX ADMIN — PIPERACILLIN AND TAZOBACTAM 3.38 G: 3; .375 INJECTION, POWDER, LYOPHILIZED, FOR SOLUTION INTRAVENOUS; PARENTERAL at 06:00

## 2021-04-11 RX ADMIN — PIPERACILLIN AND TAZOBACTAM 3.38 G: 3; .375 INJECTION, POWDER, LYOPHILIZED, FOR SOLUTION INTRAVENOUS; PARENTERAL at 13:24

## 2021-04-11 RX ADMIN — SODIUM CHLORIDE: 4.5 INJECTION, SOLUTION INTRAVENOUS at 21:09

## 2021-04-11 RX ADMIN — PIPERACILLIN AND TAZOBACTAM 3.38 G: 3; .375 INJECTION, POWDER, LYOPHILIZED, FOR SOLUTION INTRAVENOUS; PARENTERAL at 02:06

## 2021-04-11 RX ADMIN — PIPERACILLIN AND TAZOBACTAM 3.38 G: 3; .375 INJECTION, POWDER, LYOPHILIZED, FOR SOLUTION INTRAVENOUS; PARENTERAL at 21:09

## 2021-04-11 ASSESSMENT — ENCOUNTER SYMPTOMS
SHORTNESS OF BREATH: 0
CHILLS: 0
VOMITING: 0
FEVER: 0
COUGH: 0
NAUSEA: 0
DIZZINESS: 0
FLANK PAIN: 0

## 2021-04-11 ASSESSMENT — PAIN DESCRIPTION - PAIN TYPE: TYPE: ACUTE PAIN;CHRONIC PAIN

## 2021-04-11 NOTE — PROGRESS NOTES
Lab called with critical result of + blood cultures (one bottle, collected in ED on 4/9) with possible staph species not staph aureus or MRSA, at 0037. Critical lab result read back to .   Mercedes MATAMOROS notified of critical lab result at 0056.  Critical lab result read back by to Mercedes.      New orders to discontinue IV Cipro and give IV zosyn 3.375 mg IV q 6 hours, not extended dose.

## 2021-04-11 NOTE — CARE PLAN
Problem: Safety  Goal: Will remain free from falls  Outcome: PROGRESSING AS EXPECTED     Problem: Infection  Goal: Will remain free from infection  Outcome: PROGRESSING AS EXPECTED  Intervention: Assess for removal of potential routes of infection, such as IV, central line, intra-arterial or urinary catheters  Note: Provided education for straight cath and infection prevention

## 2021-04-11 NOTE — PROGRESS NOTES
Pt had 300 mL of urine output from her nephrostomy tube overnight. No urine incontinence observed, no straight cath performed. Pt reported pain and nausea has improved, medicated once with IV Toradol per MAR. LLQ ostomy with no output overnight. Pt would like to mobilize today. New  IV to right upper arm, IV antibiotics per MAR.

## 2021-04-11 NOTE — PROGRESS NOTES
GYN/Oncology Progress Note               Author: DANIEL Barakat Date & Time created: 4/11/2021  10:25 AM     Interval History:  Patient denies any pain, fever or chills    Review of Systems:  Review of Systems   Constitutional: Negative for chills and fever.   Respiratory: Negative for cough and shortness of breath.    Cardiovascular: Negative for chest pain.   Gastrointestinal: Negative for nausea and vomiting.   Genitourinary: Negative for flank pain.        Self caths   Neurological: Negative for dizziness.       Physical Exam:  Physical Exam  Constitutional:       Appearance: Normal appearance.   Cardiovascular:      Rate and Rhythm: Normal rate.      Pulses: Normal pulses.   Pulmonary:      Effort: Pulmonary effort is normal.   Abdominal:      Palpations: Abdomen is soft.      Tenderness: There is no left CVA tenderness.   Musculoskeletal:         General: No swelling.   Skin:     General: Skin is warm and dry.      Capillary Refill: Capillary refill takes 2 to 3 seconds.   Neurological:      Mental Status: She is alert and oriented to person, place, and time.         Labs:          Recent Labs     04/09/21 1841 04/09/21 2359   SODIUM 137 137   POTASSIUM 5.2 4.8   CHLORIDE 103 107   CO2 23 22   BUN 27* 23*   CREATININE 1.00 0.94   CALCIUM 10.1 8.6     Recent Labs     04/09/21 1841 04/09/21 2359   ALTSGPT  --  9   ASTSGOT  --  10*   ALKPHOSPHAT  --  111*   TBILIRUBIN  --  0.2   GLUCOSE 143* 150*     Recent Labs     04/09/21  1841 04/09/21  2359 04/10/21  0812   RBC 5.68* 4.98  --    HEMOGLOBIN 14.5 12.9  --    HEMATOCRIT 46.5 39.8  --    PLATELETCT 279 253  --    PROTHROMBTM  --   --  14.3   INR  --   --  1.07     Recent Labs     04/09/21 1841 04/09/21 2359   WBC 19.1* 13.3*   NEUTSPOLYS 89.40* 89.30*   LYMPHOCYTES 5.10* 5.50*   MONOCYTES 4.60 4.70   EOSINOPHILS 0.20 0.00   BASOPHILS 0.20 0.10   ASTSGOT  --  10*   ALTSGPT  --  9   ALKPHOSPHAT  --  111*   TBILIRUBIN  --  0.2     Recent Labs      21  1841 21  2359   SODIUM 137 137   POTASSIUM 5.2 4.8   CHLORIDE 103 107   CO2 23 22   GLUCOSE 143* 150*   BUN 27* 23*   CREATININE 1.00 0.94   CALCIUM 10.1 8.6     Hemodynamics:  Temp (24hrs), Av.7 °C (98 °F), Min:36.2 °C (97.2 °F), Max:37 °C (98.6 °F)  Temperature: 36.8 °C (98.2 °F)  Pulse  Av  Min: 72  Max: 107   Blood Pressure : 115/72     Respiratory:    Respiration: 15, Pulse Oximetry: 92 %           Fluids:    Intake/Output Summary (Last 24 hours) at 4/10/2021 1447  Last data filed at 4/10/2021 1331  Gross per 24 hour   Intake 1100 ml   Output 775 ml   Net 325 ml     Weight: 63.3 kg (139 lb 8.8 oz)  GI/Nutrition:  Orders Placed This Encounter   Procedures   • Diet Order Diet: Renal     Standing Status:   Standing     Number of Occurrences:   1     Order Specific Question:   Diet:     Answer:   Renal [8]     Medical Decision Making, by Problem:  There are no active hospital problems to display for this patient.      Plan:  76 y/o hx of cervical cancer and persistent left ureteral stricture, recent failed Left ureteral stent placement on 3/25/21 secondary severe stricture    1. Left hydronephrosis: stable BUN/Cr, left NT on 4/10/21,   2. Left flank pain secondary to above: resolved  3. Nausea/Vomiting: resolved  4. Pyelonephritis: urine culture showed e. coli, Cipro stopped, zosyn started with positive BC  5. Leukocytosis: WBC 19.1 on admit > 8.7 today, secondary to above, continue Zosyn adjust pending BC culture results  6. Bacteremia: 1/2 BC positive for gram +cocci, Zosyn started, will follow final culture.   7. Dispo: continue IP treatment of bacteremia and UTI    Plan if BC is a contaminate then d/c home with abx for the UTI (e. Coli) repeat urine culture in 1 week and if negative attempt internal stent through NT. If BC is a true bacteremia then will need abx treatment for 2 weeks, then repeat cultures and if negative attempt stent placement at that time. This was discussed in detail  with the patient.     Quality-Core Measures

## 2021-04-11 NOTE — WOUND TEAM
"[facility]   Inpatient Services   Established Ostomy Management/ troubleshooting  HPI: Reviewed  PMH: Reviewed   SH: Reviewed   Reason for Ostomy nurse consult:  Assess ostomy needs  Subjective: \"I usually change it 2x/week.\"  Objective: appliance with stool on tape border  Ostomy type: colostomy    Colostomy Descending/sigmoid LLQ (Active)   Stomal Appliance Assessment Changed    Stoma Assessment Beefy red;Prolapse    Stoma Shape Budded Greater Than One Inch;Prolapsed    Stoma Size (in) 1.25    Peristomal Assessment Intact    Mucocutaneous Junction Intact    Treatment Cleansed with water/washcloth;Appliance Changed    Peristomal Protectant Paste Ring    Stomal Appliance 2 1/4\" (57mm) CTF;Paste Ring, 2\"    Output (mL) 0 mL 04/10/21 2130   WOUND RN ONLY - Stomal Appliance  2 Piece;2 1/4\" (57mm) CTF;Transparent Pouch Lock & Roll;Paste Ring, 2\"    Appliance (Pouch) # [id number]    Appliance Brand Nixon        Ostomy Appliance (type and size): 2.25\" with paste ring  Assessment: Pt has prolapsed stoma, tacky texture, stuck to pouch when it was removed, had to gently detach.   Interventions:  Removed appliance, cleaned  Skin, cut barrier, placed paste ring on barrier.Applied barrier to skin, attached pouch and closed end.   Pt education: Pt able to perform care  Plan: RN to assist pt with ostomy care. Ostomy nurses to follow for ostomy needs PRN pt request or difficulties  Anticipated discharge needs:none @ this time        "

## 2021-04-11 NOTE — CARE PLAN
Problem: Venous Thromboembolism (VTW)/Deep Vein Thrombosis (DVT) Prevention:  Goal: Patient will participate in Venous Thrombosis (VTE)/Deep Vein Thrombosis (DVT)Prevention Measures  Outcome: PROGRESSING AS EXPECTED  Flowsheets (Taken 4/11/2021 1131)  Mechanical Prophylaxis: SCDs, Sequential Compression Device  SCDs, Sequential Compression Device: On  Pharmacologic Prophylaxis Used: Contraindicated per MD  Note: Education provided; PT wearing when not ambulting     Problem: Pain Management  Goal: Pain level will decrease to patient's comfort goal  Outcome: PROGRESSING AS EXPECTED  Intervention: Educate and implement non-pharmacologic comfort measures. Examples: relaxation, distration, play therapy, activity therapy, massage, etc.  Flowsheets (Taken 4/11/2021 0830)  Intervention: Declines  Note: No post operative pain. PRS reinforced. Call light within reach and all interventions discussed if pain arises.

## 2021-04-12 PROCEDURE — 700105 HCHG RX REV CODE 258: Performed by: NURSE PRACTITIONER

## 2021-04-12 PROCEDURE — 770004 HCHG ROOM/CARE - ONCOLOGY PRIVATE *

## 2021-04-12 PROCEDURE — 94760 N-INVAS EAR/PLS OXIMETRY 1: CPT

## 2021-04-12 PROCEDURE — 700111 HCHG RX REV CODE 636 W/ 250 OVERRIDE (IP): Performed by: NURSE PRACTITIONER

## 2021-04-12 RX ORDER — CARBOXYMETHYLCELLULOSE SODIUM 5 MG/ML
2 SOLUTION/ DROPS OPHTHALMIC PRN
Status: DISCONTINUED | OUTPATIENT
Start: 2021-04-12 | End: 2021-04-13 | Stop reason: HOSPADM

## 2021-04-12 RX ORDER — CIPROFLOXACIN 500 MG/1
500 TABLET, FILM COATED ORAL EVERY 12 HOURS
Status: DISCONTINUED | OUTPATIENT
Start: 2021-04-13 | End: 2021-04-13 | Stop reason: HOSPADM

## 2021-04-12 RX ADMIN — PIPERACILLIN AND TAZOBACTAM 3.38 G: 3; .375 INJECTION, POWDER, LYOPHILIZED, FOR SOLUTION INTRAVENOUS; PARENTERAL at 04:34

## 2021-04-12 RX ADMIN — PIPERACILLIN AND TAZOBACTAM 3.38 G: 3; .375 INJECTION, POWDER, LYOPHILIZED, FOR SOLUTION INTRAVENOUS; PARENTERAL at 20:08

## 2021-04-12 RX ADMIN — PIPERACILLIN AND TAZOBACTAM 3.38 G: 3; .375 INJECTION, POWDER, LYOPHILIZED, FOR SOLUTION INTRAVENOUS; PARENTERAL at 14:20

## 2021-04-12 ASSESSMENT — PAIN DESCRIPTION - PAIN TYPE: TYPE: ACUTE PAIN

## 2021-04-12 NOTE — DISCHARGE PLANNING
Care Transition Team Assessment    Per Md's Notes:  74 y/o hx of cervical cancer and persistent left ureteral stricture, recent failed Left ureteral stent placement on 3/25/21 secondary severe stricture.     Anticipated Discharge Disposition: Home to her prior living arrangement, patient lives with her spouse in a two story home.     Action: This RN,  completed an initial case management assessment via chart review. Patient has a colostomy, patient may need antibiotic for two weeks, Provider is checking blood cultures. Patient's address is correct on face sheet, PCP: Dr. Julio Ross.    Barriers to Discharge: Pending medical clearance    Plan: Hospital Care Management will continue to assist with discharge planning needs.     Information Source  Orientation : Oriented x 4  Information Given By: Other (Comments)(chart review)  Informant's Name: (N/A)  Who is responsible for making decisions for patient? : Patient    Readmission Evaluation  Is this a readmission?: No    Elopement Risk  Legal Hold: No  Ambulatory or Self Mobile in Wheelchair: Yes  Disoriented: No  Psychiatric Symptoms: None  History of Wandering: No  Elopement this Admit: No  Vocalizing Wanting to Leave: No  Displays Behaviors, Body Language Wanting to Leave: No-Not at Risk for Elopement  Elopement Risk: Not at Risk for Elopement    Interdisciplinary Discharge Planning  Patient or legal guardian wants to designate a caregiver: No    Discharge Preparedness  What is your plan after discharge?: Uncertain - pending medical team collaboration  What are your discharge supports?: Spouse  Prior Functional Level: Ambulatory  Difficulity with ADLs: None    Functional Assesment  Prior Functional Level: Ambulatory    Finances  Financial Barriers to Discharge: No  Prescription Coverage: Yes    Vision / Hearing Impairment  Right Eye Vision: Impaired, Wears Glasses  Left Eye Vision: Impaired, Wears Glasses  Hearing Impairment: Both Ears, Hearing  Device(s) Available         Advance Directive  Advance Directive?: None  Advance Directive offered?: AD Booklet refused    Domestic Abuse  Have you ever been the victim of abuse or violence?: No         Discharge Risks or Barriers  Discharge risks or barriers?: No  Patient risk factors: Vulnerable adult    Anticipated Discharge Information  Discharge Disposition: Still a Patient (30)  Discharge Address: [address] [place] [place] [address])  Discharge Contact Phone Number: ([phone])

## 2021-04-12 NOTE — CARE PLAN
Problem: Communication  Goal: The ability to communicate needs accurately and effectively will improve  Outcome: PROGRESSING AS EXPECTED  Note: Pt able to make needs known; calls appropriately.     Problem: Knowledge Deficit  Goal: Knowledge of the prescribed therapeutic regimen will improve  Outcome: PROGRESSING AS EXPECTED  Note: Education provided on safe self straight cath methods. Pt verbalized understanding of teaching.

## 2021-04-12 NOTE — PROGRESS NOTES
Surgical Progress Note    Author: Iris Escalera M.D. Date & Time created: 2021   3:57 PM     Interval Events:  Feels well and denies c/o. States pain has significantly improved. No fevers. NT draining well and self cathing w/o difficulty. Visiting w/  at bedside.     Hemodynamics:  Temp (24hrs), Av.9 °C (98.5 °F), Min:36.7 °C (98 °F), Max:37.5 °C (99.5 °F)  Temperature: 36.9 °C (98.4 °F)  Pulse  Av.1  Min: 72  Max: 107   Blood Pressure : 147/93     Respiratory:    Respiration: 18, Pulse Oximetry: 93 %     Work Of Breathing / Effort: Mild     Neuro:  GCS       Fluids:    Intake/Output Summary (Last 24 hours) at 2021 1557  Last data filed at 2021 1330  Gross per 24 hour   Intake 480 ml   Output 1655 ml   Net -1175 ml        Current Diet Order   Procedures   • Diet Order Diet: Renal     Physical Exam   Constitutional: She is oriented to person, place, and time. She appears well-developed.   HENT:   Head: Normocephalic.   Cardiovascular: Normal rate.   Pulmonary/Chest: Effort normal.   Abdominal: Soft. She exhibits no distension. There is no abdominal tenderness.   LLQ ostomy pink and well perfused w/ small amount of stool in bag   Musculoskeletal:         General: No edema.   Neurological: She is alert and oriented to person, place, and time.     Labs:  No results found for this or any previous visit (from the past 24 hour(s)).  Medical Decision Making, by Problem:  There are no active hospital problems to display for this patient.    Plan:  74 y/o hx of cervical cancer and persistent left ureteral stricture, recent failed Left ureteral stent placement on 3/25/21 secondary to severe stricture     1. Left hydronephrosis: stable BUN/Cr, left NT on 4/10/21, draining well  2. Left flank pain secondary to above: resolved  3. Nausea/Vomiting: resolved  4. Pyelonephritis: urine culture showed e. coli, Cipro stopped, zosyn started with positive BC  5. Leukocytosis: WBC 19.1 on admit > 8.7 on  4/11, secondary to above, BCx c/w staph epi which is likely a contaminant given 2nd culture negative.   6. Bacteremia: due to contaminant per above, clinically appears well and is afebrile, d/c zosyn after this administration and resume cipro  7. Dispo: continue IP treatment of bacteremia and UTI, plan to d/c in AM w/ cipro x 1 week, repeat urine culture in 1 week and if negative attempt internal stent through NT, patient has had NT in past and feels comfortable managing

## 2021-04-13 VITALS
SYSTOLIC BLOOD PRESSURE: 149 MMHG | WEIGHT: 139.55 LBS | TEMPERATURE: 98.4 F | HEIGHT: 60 IN | HEART RATE: 88 BPM | DIASTOLIC BLOOD PRESSURE: 95 MMHG | OXYGEN SATURATION: 90 % | RESPIRATION RATE: 17 BRPM | BODY MASS INDEX: 27.4 KG/M2

## 2021-04-13 LAB
BACTERIA UR CULT: NORMAL
GRAM STN SPEC: NORMAL
SIGNIFICANT IND 70042: NORMAL
SITE SITE: NORMAL
SOURCE SOURCE: NORMAL

## 2021-04-13 PROCEDURE — 700111 HCHG RX REV CODE 636 W/ 250 OVERRIDE (IP): Performed by: NURSE PRACTITIONER

## 2021-04-13 PROCEDURE — 700102 HCHG RX REV CODE 250 W/ 637 OVERRIDE(OP): Performed by: OBSTETRICS & GYNECOLOGY

## 2021-04-13 PROCEDURE — A9270 NON-COVERED ITEM OR SERVICE: HCPCS | Performed by: OBSTETRICS & GYNECOLOGY

## 2021-04-13 RX ADMIN — CIPROFLOXACIN HYDROCHLORIDE 500 MG: 500 TABLET, FILM COATED ORAL at 05:38

## 2021-04-13 RX ADMIN — ONDANSETRON 4 MG: 2 INJECTION INTRAMUSCULAR; INTRAVENOUS at 07:15

## 2021-04-13 NOTE — DISCHARGE PLANNING
Care Transition Team Discharge Planning    Anticipated Discharge Information  Discharge Disposition: Discharged to home/self care (01)  Discharge Address: ([address] [place] [address])  Discharge Contact Phone Number: ([phone])      Discharge Plan:  Patient is discharging home to her prior living situation, patient lives with her spouse. No needs from case management.     Veronica Hdz RN,

## 2021-04-13 NOTE — CARE PLAN
Problem: Discharge Barriers/Planning  Goal: Patient's continuum of care needs will be met  Outcome: PROGRESSING AS EXPECTED  Note: PA came to see pt. POC discussed. Plan to d/c today. Awaiting d/c orders.     Problem: Pain Management  Goal: Pain level will decrease to patient's comfort goal  Outcome: PROGRESSING AS EXPECTED  Note: Pt. Denies pain. Will medicate for pain per MAR if indicated.

## 2021-04-13 NOTE — DISCHARGE INSTRUCTIONS
Discharge Instructions    Prescription for antibiotics sent to pharmacy electronically.   Take antibiotics for 7 days. Repeat urinalysis and culture on 4/21/21, order faxed to [facility], call office with any concerns. We will call you about scheduling the stent replacement.    Discharged to home by car with relative. Discharged via wheelchair, hospital escort: Yes.  Special equipment needed: Not Applicable    Be sure to schedule a follow-up appointment with your primary care doctor or any specialists as instructed.     Discharge Plan:   Diet Plan: Discussed  Activity Level: Discussed  Confirmed Follow up Appointment: Appointment Scheduled  Confirmed Symptoms Management: Discussed  Medication Reconciliation Updated: Yes    I understand that a diet low in cholesterol, fat, and sodium is recommended for good health. Unless I have been given specific instructions below for another diet, I accept this instruction as my diet prescription.   Other diet: Regular    Special Instructions: None    · Is patient discharged on Warfarin / Coumadin?   No     Depression / Suicide Risk    As you are discharged from this [facility] facility, it is important to learn how to keep safe from harming yourself.    Recognize the warning signs:  · Abrupt changes in personality, positive or negative- including increase in energy   · Giving away possessions  · Change in eating patterns- significant weight changes-  positive or negative  · Change in sleeping patterns- unable to sleep or sleeping all the time   · Unwillingness or inability to communicate  · Depression  · Unusual sadness, discouragement and loneliness  · Talk of wanting to die  · Neglect of personal appearance   · Rebelliousness- reckless behavior  · Withdrawal from people/activities they love  · Confusion- inability to concentrate     If you or a loved one observes any of these behaviors or has concerns about self-harm, here's what you can do:  · Talk about it-  your feelings and reasons for harming yourself  · Remove any means that you might use to hurt yourself (examples: pills, rope, extension cords, firearm)  · Get professional help from the community (Mental Health, Substance Abuse, psychological counseling)  · Do not be alone:Call your Safe Contact- someone whom you trust who will be there for you.  · Call your local CRISIS HOTLINE 837-3620 or 433-206-2997  · Call your local Children's Mobile Crisis Response Team [place] (548) 270-4633 or www.[employer]  · Call the toll free National Suicide Prevention Hotlines   · National Suicide Prevention Lifeline 178-100-TJMO (7330)  · National Hope Line Network 800-SUICIDE (506-2851)

## 2021-04-13 NOTE — DISCHARGE SUMMARY
Discharge Summary    CHIEF COMPLAINT ON ADMISSION  Chief Complaint   Patient presents with   • Flank Pain     LT sided flank pain. reports she normally has a stent there but they couldn't get it back in 2 weeks ago. began getting pain around 1400 today.    • N/V       Reason for Admission  Flank pain  Nausea and vomiting   Left hydronephrosis   Pyelonephritis     Admission Date  4/9/2021    CODE STATUS  Full Code    HPI & HOSPITAL COURSE  This is a 75 y.o. female with a history of cervical cancer s/p radical hysterectomy, bilateral salpino-oophrectomy and bilateral lymphadenectomy in remote past. She had recurrence 25 years ago and underwent salvage chemoradiation therapy with excellence response. 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 Since then,she's been getting stent exchanges q 3 - 4 months.  She had a failed stent exchange on 3/25/21 secondary to stenosis.  She was evaluated with renal US and renal lasix washout revealing moderate left hydronephrosis and 31% function of the left kidney and 69% function on the right, she was scheduled for a repeat lasix washout, however she developed severe left flank pain and presented to the ED. 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 Wall thickening and stranding in the left proximal ureter could relate to inflammation/infection/scarring. Diffuse wall thickening of the urinary bladder could relate to infection/ inflammation as well. She was admitted for nephrostomy tube placement and treatment for possible pyelonephritis.      Her pain was controlled during admission, and on hospital day one had a left nephrostomy tube placement. She was started on Cipro for pyelonephritis and a urine culture was ordered. After her nephrostomy tube placement, her BUN and creatinine improved and her flank pain resolved. Her urine culture  grew E. Coli and had 1 out of 2 blood culture positive for gram positive cocci, thus she was transitioned from Cipro to Zosyn. Her final blood culture grew 1/2 staph epi and 2/2 was negative, given her clinical presentation, the final culture results and only one positive culture, it was likely positive due to contamination and she was transitioned back to Cipro. At this point she felt comfortable with her NT as she has had them in the past,  BUN/Cr was stable, her flank pain resolved, she was afebrile and had no leukocytosis.   Therefore, she is discharged in good and stable condition to home with close outpatient follow-up.    The patient met 2-midnight criteria for an inpatient stay at the time of discharge.    Discharge Date  4/13/2021    FOLLOW UP ITEMS POST DISCHARGE  Continue for Cipro for 1 week   Repeat Urine culture in 1 week  Follow up with [facility]     DISCHARGE DIAGNOSES  Hydronephrosis with ureteral stricture  Urinary bladder disorder   Urinary incontinence   Hx of cervical cancer  Hyperlipedema   Hypertension    FOLLOW UP  With Dr. Hays at [facility]   MEDICATIONS ON DISCHARGE     Medication List      CONTINUE taking these medications      Instructions   alendronate 70 MG Tabs  Commonly known as: FOSAMAX   Take 70 mg by mouth every 7 days.  Dose: 70 mg     ARTIFICIAL TEAR SOLUTION OP   Administer 1 Drop into both eyes 2 times a day.  Dose: 1 Drop     atorvastatin 20 MG Tabs  Commonly known as: LIPITOR   Take 20 mg by mouth every evening.  Dose: 20 mg     CO Q 10 PO   Take 1 tablet by mouth every evening.  Dose: 1 tablet     * DAILY MULTIVITAMIN PO   Take 1 tablet by mouth every morning.  Dose: 1 tablet     * EYE VITAMINS & MINERALS PO   Take 2 Tablets by mouth every morning.  Dose: 2 tablet     lisinopril 40 MG tablet  Commonly known as: PRINIVIL   Take 40 mg by mouth every morning. Indications: High Blood Pressure Disorder  Dose: 40 mg     PROBIOTIC PO   Take 1 capsule by mouth every  "evening.  Dose: 1 capsule     Vitamin C 1000 MG Tabs   Take 1,000 mg by mouth 2 times a day.  Dose: 1,000 mg     VITAMIN D PO   Take 2 Capsules by mouth every morning.  Dose: 2 capsule         * This list has 2 medication(s) that are the same as other medications prescribed for you. Read the directions carefully, and ask your doctor or other care provider to review them with you.                Allergies  Allergies   Allergen Reactions   • Sulfa Drugs Unspecified     Pt states \" it really runs me down.\"       DIET  Orders Placed This Encounter   Procedures   • Diet Order Diet: Renal     Standing Status:   Standing     Number of Occurrences:   1     Order Specific Question:   Diet:     Answer:   Renal [8]       ACTIVITY  As tolerated.  Weight bearing as tolerated    CONSULTATIONS  None    PROCEDURES  IR placement of Left Nephrostomy Tube     LABORATORY  Lab Results   Component Value Date    SODIUM 136 04/11/2021    POTASSIUM 4.1 04/11/2021    CHLORIDE 105 04/11/2021    CO2 23 04/11/2021    GLUCOSE 109 (H) 04/11/2021    BUN 18 04/11/2021    CREATININE 0.95 04/11/2021    CREATININE 0.7 03/27/2006        Lab Results   Component Value Date    WBC 8.7 04/11/2021    HEMOGLOBIN 12.2 04/11/2021    HEMATOCRIT 38.5 04/11/2021    PLATELETCT 223 04/11/2021          "

## 2021-04-14 LAB
BACTERIA BLD CULT: NORMAL
SIGNIFICANT IND 70042: NORMAL
SITE SITE: NORMAL
SOURCE SOURCE: NORMAL

## 2021-04-28 LAB
FUNGUS SPEC CULT: NORMAL
SIGNIFICANT IND 70042: NORMAL
SITE SITE: NORMAL
SOURCE SOURCE: NORMAL

## 2021-05-03 ENCOUNTER — HOSPITAL ENCOUNTER (OUTPATIENT)
Dept: RADIOLOGY | Facility: MEDICAL CENTER | Age: 76
End: 2021-05-03
Attending: NURSE PRACTITIONER
Payer: MEDICARE

## 2021-05-03 DIAGNOSIS — R19.00 INTRA-ABDOMINAL AND PELVIC SWELLING, MASS AND LUMP, UNSPECIFIED SITE: ICD-10-CM

## 2021-05-03 DIAGNOSIS — R30.9 PAINFUL MICTURITION, UNSPECIFIED: ICD-10-CM

## 2021-05-03 DIAGNOSIS — N13.1 HYDRONEPHROSIS WITH URETERAL STRICTURE, NOT ELSEWHERE CLASSIFIED: ICD-10-CM

## 2021-05-03 DIAGNOSIS — N82.3 FISTULA OF VAGINA TO LARGE INTESTINE: ICD-10-CM

## 2021-05-03 DIAGNOSIS — R31.9 HEMATURIA, UNSPECIFIED TYPE: ICD-10-CM

## 2021-05-03 DIAGNOSIS — Z96.0 PRESENCE OF UROGENITAL IMPLANT: ICD-10-CM

## 2021-05-03 DIAGNOSIS — N82.9 FEMALE GENITAL TRACT FISTULA, UNSPECIFIED: ICD-10-CM

## 2021-05-03 DIAGNOSIS — C53.9 MALIGNANT NEOPLASM OF CERVIX, UNSPECIFIED SITE (HCC): ICD-10-CM

## 2021-05-03 PROCEDURE — 78708 K FLOW/FUNCT IMAGE W/DRUG: CPT | Mod: MH

## 2021-05-03 RX ORDER — FUROSEMIDE 10 MG/ML
INJECTION INTRAMUSCULAR; INTRAVENOUS
Status: DISPENSED
Start: 2021-05-03 | End: 2021-05-03

## 2021-05-07 ENCOUNTER — HOSPITAL ENCOUNTER (OUTPATIENT)
Dept: LAB | Facility: MEDICAL CENTER | Age: 76
End: 2021-05-07
Attending: NURSE PRACTITIONER
Payer: MEDICARE

## 2021-05-07 LAB
ANION GAP SERPL CALC-SCNC: 11 MMOL/L (ref 7–16)
APPEARANCE UR: ABNORMAL
BACTERIA #/AREA URNS HPF: NEGATIVE /HPF
BASOPHILS # BLD AUTO: 0.3 % (ref 0–1.8)
BASOPHILS # BLD: 0.03 K/UL (ref 0–0.12)
BILIRUB UR QL STRIP.AUTO: NEGATIVE
BUN SERPL-MCNC: 16 MG/DL (ref 8–22)
CALCIUM SERPL-MCNC: 10.2 MG/DL (ref 8.5–10.5)
CHLORIDE SERPL-SCNC: 105 MMOL/L (ref 96–112)
CO2 SERPL-SCNC: 23 MMOL/L (ref 20–33)
COLOR UR: YELLOW
CREAT SERPL-MCNC: 0.85 MG/DL (ref 0.5–1.4)
EOSINOPHIL # BLD AUTO: 0.16 K/UL (ref 0–0.51)
EOSINOPHIL NFR BLD: 1.6 % (ref 0–6.9)
EPI CELLS #/AREA URNS HPF: NEGATIVE /HPF
ERYTHROCYTE [DISTWIDTH] IN BLOOD BY AUTOMATED COUNT: 45.6 FL (ref 35.9–50)
GLUCOSE SERPL-MCNC: 83 MG/DL (ref 65–99)
GLUCOSE UR STRIP.AUTO-MCNC: NEGATIVE MG/DL
HCT VFR BLD AUTO: 43.7 % (ref 37–47)
HGB BLD-MCNC: 13.6 G/DL (ref 12–16)
HYALINE CASTS #/AREA URNS LPF: ABNORMAL /LPF
IMM GRANULOCYTES # BLD AUTO: 0.06 K/UL (ref 0–0.11)
IMM GRANULOCYTES NFR BLD AUTO: 0.6 % (ref 0–0.9)
KETONES UR STRIP.AUTO-MCNC: NEGATIVE MG/DL
LEUKOCYTE ESTERASE UR QL STRIP.AUTO: ABNORMAL
LYMPHOCYTES # BLD AUTO: 1.43 K/UL (ref 1–4.8)
LYMPHOCYTES NFR BLD: 14 % (ref 22–41)
MCH RBC QN AUTO: 25.1 PG (ref 27–33)
MCHC RBC AUTO-ENTMCNC: 31.1 G/DL (ref 33.6–35)
MCV RBC AUTO: 80.6 FL (ref 81.4–97.8)
MICRO URNS: ABNORMAL
MONOCYTES # BLD AUTO: 0.8 K/UL (ref 0–0.85)
MONOCYTES NFR BLD AUTO: 7.8 % (ref 0–13.4)
NEUTROPHILS # BLD AUTO: 7.73 K/UL (ref 2–7.15)
NEUTROPHILS NFR BLD: 75.7 % (ref 44–72)
NITRITE UR QL STRIP.AUTO: POSITIVE
NRBC # BLD AUTO: 0 K/UL
NRBC BLD-RTO: 0 /100 WBC
PH UR STRIP.AUTO: 6.5 [PH] (ref 5–8)
PLATELET # BLD AUTO: 313 K/UL (ref 164–446)
PMV BLD AUTO: 10.6 FL (ref 9–12.9)
POTASSIUM SERPL-SCNC: 4.5 MMOL/L (ref 3.6–5.5)
PROT UR QL STRIP: 100 MG/DL
RBC # BLD AUTO: 5.42 M/UL (ref 4.2–5.4)
RBC # URNS HPF: ABNORMAL /HPF
RBC UR QL AUTO: ABNORMAL
SODIUM SERPL-SCNC: 139 MMOL/L (ref 135–145)
SP GR UR STRIP.AUTO: 1.01
UROBILINOGEN UR STRIP.AUTO-MCNC: 0.2 MG/DL
WBC # BLD AUTO: 10.2 K/UL (ref 4.8–10.8)
WBC #/AREA URNS HPF: ABNORMAL /HPF

## 2021-05-07 PROCEDURE — 87086 URINE CULTURE/COLONY COUNT: CPT

## 2021-05-07 PROCEDURE — 85025 COMPLETE CBC W/AUTO DIFF WBC: CPT

## 2021-05-07 PROCEDURE — 80048 BASIC METABOLIC PNL TOTAL CA: CPT

## 2021-05-07 PROCEDURE — 36415 COLL VENOUS BLD VENIPUNCTURE: CPT

## 2021-05-07 PROCEDURE — 87186 SC STD MICRODIL/AGAR DIL: CPT

## 2021-05-07 PROCEDURE — 81001 URINALYSIS AUTO W/SCOPE: CPT

## 2021-05-07 PROCEDURE — 87077 CULTURE AEROBIC IDENTIFY: CPT | Mod: 91

## 2021-05-10 DIAGNOSIS — N39.0 URINARY TRACT INFECTION WITHOUT HEMATURIA, SITE UNSPECIFIED: ICD-10-CM

## 2021-05-17 ENCOUNTER — HOSPITAL ENCOUNTER (OUTPATIENT)
Dept: LAB | Facility: MEDICAL CENTER | Age: 76
End: 2021-05-17
Attending: NURSE PRACTITIONER
Payer: MEDICARE

## 2021-05-17 DIAGNOSIS — N39.0 URINARY TRACT INFECTION WITHOUT HEMATURIA, SITE UNSPECIFIED: ICD-10-CM

## 2021-05-17 LAB
AMORPH CRY #/AREA URNS HPF: PRESENT /HPF
APPEARANCE UR: ABNORMAL
BACTERIA #/AREA URNS HPF: NEGATIVE /HPF
BILIRUB UR QL STRIP.AUTO: NEGATIVE
COLOR UR: YELLOW
EPI CELLS #/AREA URNS HPF: ABNORMAL /HPF
GLUCOSE UR STRIP.AUTO-MCNC: NEGATIVE MG/DL
HYALINE CASTS #/AREA URNS LPF: ABNORMAL /LPF
KETONES UR STRIP.AUTO-MCNC: NEGATIVE MG/DL
LEUKOCYTE ESTERASE UR QL STRIP.AUTO: ABNORMAL
MICRO URNS: ABNORMAL
NITRITE UR QL STRIP.AUTO: NEGATIVE
PH UR STRIP.AUTO: 6.5 [PH] (ref 5–8)
PROT UR QL STRIP: 100 MG/DL
RBC # URNS HPF: ABNORMAL /HPF
RBC UR QL AUTO: NEGATIVE
SP GR UR STRIP.AUTO: 1.01
UROBILINOGEN UR STRIP.AUTO-MCNC: 0.2 MG/DL
WBC #/AREA URNS HPF: ABNORMAL /HPF

## 2021-05-17 PROCEDURE — 87077 CULTURE AEROBIC IDENTIFY: CPT

## 2021-05-17 PROCEDURE — 87086 URINE CULTURE/COLONY COUNT: CPT

## 2021-05-17 PROCEDURE — 81001 URINALYSIS AUTO W/SCOPE: CPT

## 2021-05-27 ENCOUNTER — PRE-ADMISSION TESTING (OUTPATIENT)
Dept: ADMISSIONS | Facility: MEDICAL CENTER | Age: 76
End: 2021-05-27
Attending: SPECIALIST
Payer: MEDICARE

## 2021-05-27 DIAGNOSIS — N13.1 HYDRONEPHROSIS WITH URETERAL STRICTURE, NOT ELSEWHERE CLASSIFIED: ICD-10-CM

## 2021-05-27 RX ORDER — NITROFURANTOIN 25; 75 MG/1; MG/1
100 CAPSULE ORAL 2 TIMES DAILY
COMMUNITY
End: 2021-08-17

## 2021-05-27 NOTE — OR NURSING
COVID-19 Pre-surgery screening:    Pt did not answer generic voicemail was left with call back number.

## 2021-05-28 ENCOUNTER — APPOINTMENT (OUTPATIENT)
Dept: RADIOLOGY | Facility: MEDICAL CENTER | Age: 76
End: 2021-05-28
Attending: NURSE PRACTITIONER
Payer: MEDICARE

## 2021-05-28 ENCOUNTER — HOSPITAL ENCOUNTER (OUTPATIENT)
Facility: MEDICAL CENTER | Age: 76
End: 2021-05-28
Attending: SPECIALIST | Admitting: RADIOLOGY
Payer: MEDICARE

## 2021-05-28 VITALS
HEART RATE: 97 BPM | SYSTOLIC BLOOD PRESSURE: 118 MMHG | BODY MASS INDEX: 26.62 KG/M2 | OXYGEN SATURATION: 90 % | RESPIRATION RATE: 16 BRPM | TEMPERATURE: 97.3 F | DIASTOLIC BLOOD PRESSURE: 81 MMHG | HEIGHT: 60 IN | WEIGHT: 135.58 LBS

## 2021-05-28 DIAGNOSIS — R31.9 HEMATURIA, UNSPECIFIED TYPE: ICD-10-CM

## 2021-05-28 DIAGNOSIS — N82.9 FEMALE GENITAL TRACT FISTULA, UNSPECIFIED: ICD-10-CM

## 2021-05-28 DIAGNOSIS — N82.3 FISTULA OF VAGINA TO LARGE INTESTINE: ICD-10-CM

## 2021-05-28 DIAGNOSIS — N13.1 HYDRONEPHROSIS WITH URETERAL STRICTURE, NOT ELSEWHERE CLASSIFIED: ICD-10-CM

## 2021-05-28 DIAGNOSIS — R19.00 INTRA-ABDOMINAL AND PELVIC SWELLING, MASS AND LUMP, UNSPECIFIED SITE: ICD-10-CM

## 2021-05-28 DIAGNOSIS — C53.9: ICD-10-CM

## 2021-05-28 DIAGNOSIS — R30.9 PAINFUL MICTURITION, UNSPECIFIED: ICD-10-CM

## 2021-05-28 LAB
ANION GAP SERPL CALC-SCNC: 9 MMOL/L (ref 7–16)
BUN SERPL-MCNC: 20 MG/DL (ref 8–22)
CALCIUM SERPL-MCNC: 9.8 MG/DL (ref 8.5–10.5)
CHLORIDE SERPL-SCNC: 106 MMOL/L (ref 96–112)
CO2 SERPL-SCNC: 25 MMOL/L (ref 20–33)
CREAT SERPL-MCNC: 0.84 MG/DL (ref 0.5–1.4)
ERYTHROCYTE [DISTWIDTH] IN BLOOD BY AUTOMATED COUNT: 45 FL (ref 35.9–50)
GLUCOSE SERPL-MCNC: 90 MG/DL (ref 65–99)
HCT VFR BLD AUTO: 40.4 % (ref 37–47)
HGB BLD-MCNC: 12.7 G/DL (ref 12–16)
INR PPP: 0.97 (ref 0.87–1.13)
MCH RBC QN AUTO: 24.8 PG (ref 27–33)
MCHC RBC AUTO-ENTMCNC: 31.4 G/DL (ref 33.6–35)
MCV RBC AUTO: 78.9 FL (ref 81.4–97.8)
PLATELET # BLD AUTO: 263 K/UL (ref 164–446)
PMV BLD AUTO: 10.2 FL (ref 9–12.9)
POTASSIUM SERPL-SCNC: 5.3 MMOL/L (ref 3.6–5.5)
PROTHROMBIN TIME: 13.2 SEC (ref 12–14.6)
RBC # BLD AUTO: 5.12 M/UL (ref 4.2–5.4)
SODIUM SERPL-SCNC: 140 MMOL/L (ref 135–145)
WBC # BLD AUTO: 8.1 K/UL (ref 4.8–10.8)

## 2021-05-28 PROCEDURE — 700117 HCHG RX CONTRAST REV CODE 255: Performed by: RADIOLOGY

## 2021-05-28 PROCEDURE — 85027 COMPLETE CBC AUTOMATED: CPT

## 2021-05-28 PROCEDURE — C1729 CATH, DRAINAGE: HCPCS

## 2021-05-28 PROCEDURE — 160002 HCHG RECOVERY MINUTES (STAT)

## 2021-05-28 PROCEDURE — 700111 HCHG RX REV CODE 636 W/ 250 OVERRIDE (IP)

## 2021-05-28 PROCEDURE — 85610 PROTHROMBIN TIME: CPT

## 2021-05-28 PROCEDURE — 700111 HCHG RX REV CODE 636 W/ 250 OVERRIDE (IP): Performed by: RADIOLOGY

## 2021-05-28 PROCEDURE — 80048 BASIC METABOLIC PNL TOTAL CA: CPT

## 2021-05-28 RX ORDER — ONDANSETRON 2 MG/ML
4 INJECTION INTRAMUSCULAR; INTRAVENOUS PRN
Status: DISCONTINUED | OUTPATIENT
Start: 2021-05-28 | End: 2021-05-28 | Stop reason: HOSPADM

## 2021-05-28 RX ORDER — OXYCODONE HYDROCHLORIDE 10 MG/1
10 TABLET ORAL
Status: DISCONTINUED | OUTPATIENT
Start: 2021-05-28 | End: 2021-05-28 | Stop reason: HOSPADM

## 2021-05-28 RX ORDER — SODIUM CHLORIDE 9 MG/ML
500 INJECTION, SOLUTION INTRAVENOUS
Status: DISCONTINUED | OUTPATIENT
Start: 2021-05-28 | End: 2021-05-28 | Stop reason: HOSPADM

## 2021-05-28 RX ORDER — ONDANSETRON 2 MG/ML
4 INJECTION INTRAMUSCULAR; INTRAVENOUS EVERY 8 HOURS PRN
Status: DISCONTINUED | OUTPATIENT
Start: 2021-05-28 | End: 2021-05-28 | Stop reason: HOSPADM

## 2021-05-28 RX ORDER — MORPHINE SULFATE 4 MG/ML
4 INJECTION, SOLUTION INTRAMUSCULAR; INTRAVENOUS
Status: DISCONTINUED | OUTPATIENT
Start: 2021-05-28 | End: 2021-05-28 | Stop reason: HOSPADM

## 2021-05-28 RX ORDER — MIDAZOLAM HYDROCHLORIDE 1 MG/ML
.5-2 INJECTION INTRAMUSCULAR; INTRAVENOUS PRN
Status: DISCONTINUED | OUTPATIENT
Start: 2021-05-28 | End: 2021-05-28 | Stop reason: HOSPADM

## 2021-05-28 RX ORDER — MIDAZOLAM HYDROCHLORIDE 1 MG/ML
INJECTION INTRAMUSCULAR; INTRAVENOUS
Status: COMPLETED
Start: 2021-05-28 | End: 2021-05-28

## 2021-05-28 RX ORDER — OXYCODONE HYDROCHLORIDE 5 MG/1
5 TABLET ORAL
Status: DISCONTINUED | OUTPATIENT
Start: 2021-05-28 | End: 2021-05-28 | Stop reason: HOSPADM

## 2021-05-28 RX ADMIN — MIDAZOLAM HYDROCHLORIDE 1 MG: 1 INJECTION, SOLUTION INTRAMUSCULAR; INTRAVENOUS at 14:00

## 2021-05-28 RX ADMIN — FENTANYL CITRATE 50 MCG: 50 INJECTION, SOLUTION INTRAMUSCULAR; INTRAVENOUS at 14:18

## 2021-05-28 RX ADMIN — FENTANYL CITRATE 50 MCG: 50 INJECTION, SOLUTION INTRAMUSCULAR; INTRAVENOUS at 14:25

## 2021-05-28 RX ADMIN — MIDAZOLAM HYDROCHLORIDE 1 MG: 1 INJECTION, SOLUTION INTRAMUSCULAR; INTRAVENOUS at 14:25

## 2021-05-28 RX ADMIN — IOHEXOL 20 ML: 300 INJECTION, SOLUTION INTRAVENOUS at 14:36

## 2021-05-28 ASSESSMENT — FIBROSIS 4 INDEX: FIB4 SCORE: 0.81

## 2021-05-28 NOTE — PROGRESS NOTES
Site Marked and Procedure Confirmed with MD, patient and RN pre procedure. Consent obtained by MD Gillette with all questions answered, placed in Patient's chart. Patient assisted to the table in the Prone position with all bony prominences padded and draped in sterile fashion. Left Ureteral stent placed using existing nephrostomy tube with new nephrostomy tube placed.     Left Ureteral Stent Placement by MD Gillette assisted by RT Erica Barrios access site.  ?  Patient tolerated procedure, hemodynamically stable; pt drowsy post procedure; report given to Mindyt; patient transported to PPU 12 via IR RN monitored then transferred care to report RN. Alert and talking during handoff.        [employer]  Percuflex Glidex-Hydrogel   Ureteral stent  8F x 24 cm  REF:[id number]  LOT:[id number]  EXP: 01-    [employer]  Flexima  Regular Nephrostomy Catheter  8F x 25 cm  REF:[id number]  LOT: [id number]  EXP: 04-

## 2021-05-28 NOTE — OR SURGEON
Immediate Post- Operative Note        PostOp Diagnosis: L ureteral obstruction. S/p PNT.       Procedure(s): L nephrostgram, L ureteral stent placement, L PNT replacement.       Estimated Blood Loss: Less than 5 ml        Complications: None            5/28/2021     1437 PM     Toro Gillette M.D.

## 2021-05-28 NOTE — OR NURSING
1447 Pt arrived to PPU with IR RN.  A&Ox4.  VSS.  Denies nausea and pain.  Left flank nephrostomy tube, dressing CDI. Belongings with pt bedside.  Bedrest ordered for 1 hour post procedure.  1517 Pt tolerating PO intake with no nausea.  1540 Per Dr Gillette clamped nephrostomy tube.  Instructed patient on s/s and when to unclamp as necessary per .  1550 Pt given discharge instructions.  Discussed diet, activity, follow-up, symptoms and home care. All questions answered.  Verbalized understanding and demonstrates teach back.  1550 Discharge criteria met, IV discontinued.  Pt dressed and ambulated to the bathroom. Waiting for ride. No concerns at this time.  1640 Pt discharged in good condition via wheelchair with RN escort to responsible adult.  All belongings and paperwork with pt on discharge.

## 2021-05-28 NOTE — DISCHARGE INSTRUCTIONS
ACTIVITY: Rest and take it easy for the first 24 hours.  A responsible adult is recommended to remain with you during that time.  It is normal to feel sleepy.  We encourage you to not do anything that requires balance, judgment or coordination.    MILD FLU-LIKE SYMPTOMS ARE NORMAL. YOU MAY EXPERIENCE GENERALIZED MUSCLE ACHES, THROAT IRRITATION, HEADACHE AND/OR SOME NAUSEA.    FOR 24 HOURS DO NOT:  Drive, operate machinery or run household appliances.  Drink beer or alcoholic beverages.   Make important decisions or sign legal documents.    SPECIAL INSTRUCTIONS: Ureteral Stent Implantation, Care After  This sheet gives you information about how to care for yourself after your procedure. Your health care provider may also give you more specific instructions. If you have problems or questions, contact your health care provider.  What can I expect after the procedure?  After the procedure, it is common to have:  · Nausea.  · Mild pain when you urinate. You may feel this pain in your lower back or lower abdomen. The pain should stop within a few minutes after you urinate. This may last for up to 1 week.  · A small amount of blood in your urine for several days.  Follow these instructions at home:  Medicines  · Take over-the-counter and prescription medicines only as told by your health care provider.  · If you were prescribed an antibiotic medicine, take it as told by your health care provider. Do not stop taking the antibiotic even if you start to feel better.  · Do not drive for 24 hours if you were given a sedative during your procedure.  · Ask your health care provider if the medicine prescribed to you requires you to avoid driving or using heavy machinery.  Activity  · Rest as told by your health care provider.  · Avoid sitting for a long time without moving. Get up to take short walks every 1-2 hours. This is important to improve blood flow and breathing. Ask for help if you feel weak or  unsteady.  · Return to your normal activities as told by your health care provider. Ask your health care provider what activities are safe for you.  General instructions  You are being discharged with the nephrostomy tube clamped per Doctors order.  As RN reviewed with you, if you are unable to urinate or have kidney symptoms, unclamp the tube as instructed.    · Watch for any blood in your urine. Call your health care provider if the amount of blood in your urine increases.  · If you have a catheter:  ? Follow instructions from your health care provider about taking care of your catheter and collection bag.  ? Do not take baths, swim, or use a hot tub until your health care provider approves. Ask your health care provider if you may take showers. You may only be allowed to take sponge baths.  · Drink enough fluid to keep your urine pale yellow.  · Do not use any products that contain nicotine or tobacco, such as cigarettes, e-cigarettes, and chewing tobacco. These can delay healing after surgery. If you need help quitting, ask your health care provider.  · Keep all follow-up visits as told by your health care provider. This is important.  Contact a health care provider if:  · You have pain that gets worse or does not get better with medicine, especially pain when you urinate.  · You have difficulty urinating.  · You feel nauseous or you vomit repeatedly during a period of more than 2 days after the procedure.  Get help right away if:  · Your urine is dark red or has blood clots in it.  · You are leaking urine (have incontinence).  · The end of the stent comes out of your urethra.  · You cannot urinate.  · You have sudden, sharp, or severe pain in your abdomen or lower back.  · You have a fever.  · You have swelling or pain in your legs.  · You have difficulty breathing.  Summary  · After the procedure, it is common to have mild pain when you urinate that goes away within a few minutes after you urinate. This may  last for up to 1 week.  · Watch for any blood in your urine. Call your health care provider if the amount of blood in your urine increases.  · Take over-the-counter and prescription medicines only as told by your health care provider.  · Drink enough fluid to keep your urine pale yellow.    DIET: To avoid nausea, slowly advance diet as tolerated, avoiding spicy or greasy foods for the first day.  Add more substantial food to your diet according to your physician's instructions.  Babies can be fed formula or breast milk as soon as they are hungry.  INCREASE FLUIDS AND FIBER TO AVOID CONSTIPATION.    SURGICAL DRESSING/BATHING: May shower 24 hours after discharge.  Keep site clean and dry.  Do not submerge site; no pools, hot tubs or bath tubs.    FOLLOW-UP APPOINTMENT:  A follow-up appointment should be arranged with your doctor in 1 week; call to schedule.    You should CALL YOUR PHYSICIAN if you develop:  Fever greater than 101 degrees F.  Pain not relieved by medication, or persistent nausea or vomiting.  Excessive bleeding (blood soaking through dressing) or unexpected drainage from the wound.  Extreme redness or swelling around the incision site, drainage of pus or foul smelling drainage.  Inability to urinate or empty your bladder within 8 hours.  Problems with breathing or chest pain.    You should call 911 if you develop problems with breathing or chest pain.  If you are unable to contact your doctor or surgical center, you should go to the nearest emergency room or urgent care center.  Physician's telephone #: [phone]    If any questions arise, call your doctor.  If your doctor is not available, please feel free to call the Surgical Center at [phone]. The Contact Center is open Monday through Friday 7AM to 5PM and may speak to a nurse at [phone], or toll free at [phone].     A registered nurse may call you a few days after your surgery to see how you are doing after your  procedure.    MEDICATIONS: Resume taking daily medication.  Take prescribed pain medication with food.  If no medication is prescribed, you may take non-aspirin pain medication if needed.  PAIN MEDICATION CAN BE VERY CONSTIPATING.  Take a stool softener or laxative such as senokot, pericolace, or milk of magnesia if needed.  If your physician has prescribed pain medication that includes Acetaminophen (Tylenol), do not take additional Acetaminophen (Tylenol) while taking the prescribed medication.    Depression / Suicide Risk    As you are discharged from this [facility] facility, it is important to learn how to keep safe from harming yourself.    Recognize the warning signs:  · Abrupt changes in personality, positive or negative- including increase in energy   · Giving away possessions  · Change in eating patterns- significant weight changes-  positive or negative  · Change in sleeping patterns- unable to sleep or sleeping all the time   · Unwillingness or inability to communicate  · Depression  · Unusual sadness, discouragement and loneliness  · Talk of wanting to die  · Neglect of personal appearance   · Rebelliousness- reckless behavior  · Withdrawal from people/activities they love  · Confusion- inability to concentrate     If you or a loved one observes any of these behaviors or has concerns about self-harm, here's what you can do:  · Talk about it- your feelings and reasons for harming yourself  · Remove any means that you might use to hurt yourself (examples: pills, rope, extension cords, firearm)  · Get professional help from the community (Mental Health, Substance Abuse, psychological counseling)  · Do not be alone:Call your Safe Contact- someone whom you trust who will be there for you.  · Call your local CRISIS HOTLINE 111-6423 or 914-928-6116  · Call your local Children's Mobile Crisis Response Team [place] (869) 379-6632 or www.[employer]  · Call the toll free National Suicide Prevention  Hotlines   · National Suicide Prevention Lifeline 009-548-NXVT (9436)  · National Hope Line Network 800-SUICIDE (400-4218)

## 2021-06-02 ENCOUNTER — HOSPITAL ENCOUNTER (OUTPATIENT)
Dept: LAB | Facility: MEDICAL CENTER | Age: 76
End: 2021-06-02
Attending: NURSE PRACTITIONER
Payer: MEDICARE

## 2021-06-02 DIAGNOSIS — R30.0 DYSURIA: ICD-10-CM

## 2021-06-04 ENCOUNTER — HOSPITAL ENCOUNTER (OUTPATIENT)
Dept: LAB | Facility: MEDICAL CENTER | Age: 76
End: 2021-06-04
Attending: NURSE PRACTITIONER
Payer: MEDICARE

## 2021-06-04 DIAGNOSIS — R30.0 DYSURIA: ICD-10-CM

## 2021-06-04 DIAGNOSIS — N13.1 HYDRONEPHROSIS WITH URETERAL STRICTURE, NOT ELSEWHERE CLASSIFIED: ICD-10-CM

## 2021-06-04 LAB
ANION GAP SERPL CALC-SCNC: 10 MMOL/L (ref 7–16)
APPEARANCE UR: ABNORMAL
BACTERIA #/AREA URNS HPF: ABNORMAL /HPF
BILIRUB UR QL STRIP.AUTO: NEGATIVE
BUN SERPL-MCNC: 22 MG/DL (ref 8–22)
CALCIUM SERPL-MCNC: 10.1 MG/DL (ref 8.5–10.5)
CHLORIDE SERPL-SCNC: 107 MMOL/L (ref 96–112)
CO2 SERPL-SCNC: 21 MMOL/L (ref 20–33)
COLOR UR: YELLOW
CREAT SERPL-MCNC: 0.76 MG/DL (ref 0.5–1.4)
EPI CELLS #/AREA URNS HPF: ABNORMAL /HPF
GLUCOSE SERPL-MCNC: 93 MG/DL (ref 65–99)
GLUCOSE UR STRIP.AUTO-MCNC: NEGATIVE MG/DL
KETONES UR STRIP.AUTO-MCNC: NEGATIVE MG/DL
LEUKOCYTE ESTERASE UR QL STRIP.AUTO: ABNORMAL
MICRO URNS: ABNORMAL
NITRITE UR QL STRIP.AUTO: POSITIVE
PH UR STRIP.AUTO: 6 [PH] (ref 5–8)
POTASSIUM SERPL-SCNC: 4.8 MMOL/L (ref 3.6–5.5)
PROT UR QL STRIP: 100 MG/DL
RBC # URNS HPF: ABNORMAL /HPF
RBC UR QL AUTO: ABNORMAL
SODIUM SERPL-SCNC: 138 MMOL/L (ref 135–145)
SP GR UR STRIP.AUTO: 1.02
UROBILINOGEN UR STRIP.AUTO-MCNC: 0.2 MG/DL
WBC #/AREA URNS HPF: ABNORMAL /HPF

## 2021-06-04 PROCEDURE — 81001 URINALYSIS AUTO W/SCOPE: CPT

## 2021-06-04 PROCEDURE — 36415 COLL VENOUS BLD VENIPUNCTURE: CPT

## 2021-06-04 PROCEDURE — 80048 BASIC METABOLIC PNL TOTAL CA: CPT

## 2021-06-04 PROCEDURE — 87086 URINE CULTURE/COLONY COUNT: CPT

## 2021-06-06 LAB
BACTERIA UR CULT: NORMAL
SIGNIFICANT IND 70042: NORMAL
SITE SITE: NORMAL
SOURCE SOURCE: NORMAL

## 2021-06-17 ENCOUNTER — HOSPITAL ENCOUNTER (OUTPATIENT)
Dept: RADIOLOGY | Facility: MEDICAL CENTER | Age: 76
End: 2021-06-17
Attending: NURSE PRACTITIONER
Payer: MEDICARE

## 2021-06-17 DIAGNOSIS — R19.00 PELVIC SWELLING: ICD-10-CM

## 2021-06-17 DIAGNOSIS — N13.1 HYDRONEPHROSIS WITH URETERAL STRICTURE: ICD-10-CM

## 2021-06-17 PROCEDURE — 50387 CHANGE NEPHROURETERAL CATH: CPT

## 2021-06-17 PROCEDURE — 700117 HCHG RX CONTRAST REV CODE 255: Performed by: RADIOLOGY

## 2021-06-17 RX ADMIN — IOHEXOL 10 ML: 300 INJECTION, SOLUTION INTRAVENOUS at 12:30

## 2021-06-17 NOTE — PROGRESS NOTES
Patient underwent a nephrostogram with left nephrostomy tube removal by Dr. Gonzalez. Procedure site was marked by MD and verified using imaging guidance. Patient was placed in a prone position. Time Out taken per safety protocol. MD Gonzalez removed the left nephrostomy tube using imaging guidance. Patient appeared to tolerate procedure well. A tegaderm and gauze dressing was placed over left flank surgical site. Patient left with all personal belongings, her  is driving her home, ambulatory.

## 2021-07-29 ENCOUNTER — HOSPITAL ENCOUNTER (OUTPATIENT)
Dept: LAB | Facility: MEDICAL CENTER | Age: 76
End: 2021-07-29
Attending: SPECIALIST
Payer: MEDICARE

## 2021-07-29 LAB
AMORPH CRY #/AREA URNS HPF: PRESENT /HPF
APPEARANCE UR: ABNORMAL
BACTERIA #/AREA URNS HPF: ABNORMAL /HPF
BILIRUB UR QL STRIP.AUTO: NEGATIVE
COLOR UR: YELLOW
EPI CELLS #/AREA URNS HPF: ABNORMAL /HPF
GLUCOSE UR STRIP.AUTO-MCNC: NEGATIVE MG/DL
KETONES UR STRIP.AUTO-MCNC: NEGATIVE MG/DL
LEUKOCYTE ESTERASE UR QL STRIP.AUTO: ABNORMAL
MICRO URNS: ABNORMAL
MUCOUS THREADS #/AREA URNS HPF: ABNORMAL /HPF
NITRITE UR QL STRIP.AUTO: NEGATIVE
PH UR STRIP.AUTO: 6 [PH] (ref 5–8)
PROT UR QL STRIP: 30 MG/DL
RBC # URNS HPF: ABNORMAL /HPF
RBC UR QL AUTO: ABNORMAL
SP GR UR STRIP.AUTO: 1.02
UROBILINOGEN UR STRIP.AUTO-MCNC: 0.2 MG/DL
WBC #/AREA URNS HPF: ABNORMAL /HPF

## 2021-07-29 PROCEDURE — 81001 URINALYSIS AUTO W/SCOPE: CPT

## 2021-07-29 PROCEDURE — 87086 URINE CULTURE/COLONY COUNT: CPT

## 2021-07-29 PROCEDURE — 87077 CULTURE AEROBIC IDENTIFY: CPT

## 2021-07-31 LAB
BACTERIA UR CULT: ABNORMAL
SIGNIFICANT IND 70042: ABNORMAL
SITE SITE: ABNORMAL
SOURCE SOURCE: ABNORMAL

## 2021-08-17 ENCOUNTER — HOSPITAL ENCOUNTER (OUTPATIENT)
Dept: RADIOLOGY | Facility: MEDICAL CENTER | Age: 76
End: 2021-08-17
Attending: SPECIALIST | Admitting: SPECIALIST
Payer: MEDICARE

## 2021-08-17 ENCOUNTER — PRE-ADMISSION TESTING (OUTPATIENT)
Dept: ADMISSIONS | Facility: MEDICAL CENTER | Age: 76
End: 2021-08-17
Attending: SPECIALIST
Payer: MEDICARE

## 2021-08-17 DIAGNOSIS — Z01.810 PRE-OPERATIVE CARDIOVASCULAR EXAMINATION: ICD-10-CM

## 2021-08-17 DIAGNOSIS — Z01.811 PRE-OPERATIVE RESPIRATORY EXAMINATION: ICD-10-CM

## 2021-08-17 DIAGNOSIS — Z01.812 PRE-OPERATIVE LABORATORY EXAMINATION: ICD-10-CM

## 2021-08-17 LAB
ABO GROUP BLD: NORMAL
ALBUMIN SERPL BCP-MCNC: 4.1 G/DL (ref 3.2–4.9)
ALBUMIN/GLOB SERPL: 1.4 G/DL
ALP SERPL-CCNC: 110 U/L (ref 30–99)
ALT SERPL-CCNC: 8 U/L (ref 2–50)
ANION GAP SERPL CALC-SCNC: 14 MMOL/L (ref 7–16)
APTT PPP: 36.4 SEC (ref 24.7–36)
AST SERPL-CCNC: 11 U/L (ref 12–45)
BASOPHILS # BLD AUTO: 0.2 % (ref 0–1.8)
BASOPHILS # BLD: 0.02 K/UL (ref 0–0.12)
BILIRUB SERPL-MCNC: 0.3 MG/DL (ref 0.1–1.5)
BLD GP AB SCN SERPL QL: NORMAL
BUN SERPL-MCNC: 24 MG/DL (ref 8–22)
CALCIUM SERPL-MCNC: 10.4 MG/DL (ref 8.5–10.5)
CHLORIDE SERPL-SCNC: 103 MMOL/L (ref 96–112)
CO2 SERPL-SCNC: 21 MMOL/L (ref 20–33)
CREAT SERPL-MCNC: 1.13 MG/DL (ref 0.5–1.4)
EKG IMPRESSION: NORMAL
EOSINOPHIL # BLD AUTO: 0.14 K/UL (ref 0–0.51)
EOSINOPHIL NFR BLD: 1.6 % (ref 0–6.9)
ERYTHROCYTE [DISTWIDTH] IN BLOOD BY AUTOMATED COUNT: 48 FL (ref 35.9–50)
GLOBULIN SER CALC-MCNC: 3 G/DL (ref 1.9–3.5)
GLUCOSE SERPL-MCNC: 104 MG/DL (ref 65–99)
HCT VFR BLD AUTO: 38.7 % (ref 37–47)
HGB BLD-MCNC: 12.3 G/DL (ref 12–16)
IMM GRANULOCYTES # BLD AUTO: 0.02 K/UL (ref 0–0.11)
IMM GRANULOCYTES NFR BLD AUTO: 0.2 % (ref 0–0.9)
INR PPP: 1.08 (ref 0.87–1.13)
LYMPHOCYTES # BLD AUTO: 1.48 K/UL (ref 1–4.8)
LYMPHOCYTES NFR BLD: 17 % (ref 22–41)
MCH RBC QN AUTO: 25.4 PG (ref 27–33)
MCHC RBC AUTO-ENTMCNC: 31.8 G/DL (ref 33.6–35)
MCV RBC AUTO: 79.8 FL (ref 81.4–97.8)
MONOCYTES # BLD AUTO: 0.82 K/UL (ref 0–0.85)
MONOCYTES NFR BLD AUTO: 9.4 % (ref 0–13.4)
NEUTROPHILS # BLD AUTO: 6.24 K/UL (ref 2–7.15)
NEUTROPHILS NFR BLD: 71.6 % (ref 44–72)
NRBC # BLD AUTO: 0 K/UL
NRBC BLD-RTO: 0 /100 WBC
PLATELET # BLD AUTO: 293 K/UL (ref 164–446)
PMV BLD AUTO: 10.6 FL (ref 9–12.9)
POTASSIUM SERPL-SCNC: 4.7 MMOL/L (ref 3.6–5.5)
PROT SERPL-MCNC: 7.1 G/DL (ref 6–8.2)
PROTHROMBIN TIME: 13.7 SEC (ref 12–14.6)
RBC # BLD AUTO: 4.85 M/UL (ref 4.2–5.4)
RH BLD: NORMAL
SODIUM SERPL-SCNC: 138 MMOL/L (ref 135–145)
WBC # BLD AUTO: 8.7 K/UL (ref 4.8–10.8)

## 2021-08-17 PROCEDURE — 71045 X-RAY EXAM CHEST 1 VIEW: CPT

## 2021-08-17 PROCEDURE — 85730 THROMBOPLASTIN TIME PARTIAL: CPT

## 2021-08-17 PROCEDURE — 85610 PROTHROMBIN TIME: CPT

## 2021-08-17 PROCEDURE — 93010 ELECTROCARDIOGRAM REPORT: CPT | Performed by: INTERNAL MEDICINE

## 2021-08-17 PROCEDURE — 36415 COLL VENOUS BLD VENIPUNCTURE: CPT

## 2021-08-17 PROCEDURE — 86901 BLOOD TYPING SEROLOGIC RH(D): CPT

## 2021-08-17 PROCEDURE — 86850 RBC ANTIBODY SCREEN: CPT

## 2021-08-17 PROCEDURE — 93005 ELECTROCARDIOGRAM TRACING: CPT

## 2021-08-17 PROCEDURE — 85025 COMPLETE CBC W/AUTO DIFF WBC: CPT

## 2021-08-17 PROCEDURE — 86900 BLOOD TYPING SEROLOGIC ABO: CPT

## 2021-08-17 PROCEDURE — 80053 COMPREHEN METABOLIC PANEL: CPT

## 2021-08-17 ASSESSMENT — FIBROSIS 4 INDEX: FIB4 SCORE: 0.96

## 2021-08-19 ENCOUNTER — HOSPITAL ENCOUNTER (OUTPATIENT)
Dept: LAB | Facility: MEDICAL CENTER | Age: 76
End: 2021-08-19
Attending: NURSE PRACTITIONER
Payer: MEDICARE

## 2021-08-19 LAB
APPEARANCE UR: ABNORMAL
BACTERIA #/AREA URNS HPF: ABNORMAL /HPF
BILIRUB UR QL STRIP.AUTO: NEGATIVE
COLOR UR: YELLOW
EPI CELLS #/AREA URNS HPF: ABNORMAL /HPF
GLUCOSE UR STRIP.AUTO-MCNC: NEGATIVE MG/DL
HYALINE CASTS #/AREA URNS LPF: ABNORMAL /LPF
KETONES UR STRIP.AUTO-MCNC: NEGATIVE MG/DL
LEUKOCYTE ESTERASE UR QL STRIP.AUTO: ABNORMAL
MICRO URNS: ABNORMAL
NITRITE UR QL STRIP.AUTO: NEGATIVE
PH UR STRIP.AUTO: 6.5 [PH] (ref 5–8)
PROT UR QL STRIP: 100 MG/DL
RBC # URNS HPF: ABNORMAL /HPF
RBC UR QL AUTO: ABNORMAL
SP GR UR STRIP.AUTO: 1.01
UROBILINOGEN UR STRIP.AUTO-MCNC: 0.2 MG/DL
WBC #/AREA URNS HPF: ABNORMAL /HPF

## 2021-08-19 PROCEDURE — 81001 URINALYSIS AUTO W/SCOPE: CPT

## 2021-08-19 PROCEDURE — 87086 URINE CULTURE/COLONY COUNT: CPT

## 2021-08-25 ENCOUNTER — APPOINTMENT (OUTPATIENT)
Dept: RADIOLOGY | Facility: MEDICAL CENTER | Age: 76
End: 2021-08-25
Attending: SPECIALIST
Payer: MEDICARE

## 2021-08-25 NOTE — OR NURSING
COVID-19 Pre-surgery screening:  ?  1. Do you have an undiagnosed respiratory illness or symptoms such as coughing or sneezing, SOB, loss of taste or smell? NO      2. Do you have an unexplained fever greater than 100.4 degrees Fahrenheit or 38 degrees Celsius? NO     3. Have you had direct exposure to a patient who tested positive for Covid-19? NO  ?  4. Have you traveled within the last 14 days? NO  ?  Informed of visitor policy and mask use requirement   YES

## 2021-08-26 ENCOUNTER — HOSPITAL ENCOUNTER (OUTPATIENT)
Facility: MEDICAL CENTER | Age: 76
End: 2021-08-26
Attending: SPECIALIST | Admitting: SPECIALIST
Payer: MEDICARE

## 2021-08-26 ENCOUNTER — ANESTHESIA (OUTPATIENT)
Dept: SURGERY | Facility: MEDICAL CENTER | Age: 76
End: 2021-08-26
Payer: MEDICARE

## 2021-08-26 ENCOUNTER — APPOINTMENT (OUTPATIENT)
Dept: RADIOLOGY | Facility: MEDICAL CENTER | Age: 76
End: 2021-08-26
Attending: SPECIALIST
Payer: MEDICARE

## 2021-08-26 ENCOUNTER — ANESTHESIA EVENT (OUTPATIENT)
Dept: SURGERY | Facility: MEDICAL CENTER | Age: 76
End: 2021-08-26
Payer: MEDICARE

## 2021-08-26 VITALS
TEMPERATURE: 98.1 F | HEIGHT: 60 IN | DIASTOLIC BLOOD PRESSURE: 75 MMHG | SYSTOLIC BLOOD PRESSURE: 147 MMHG | OXYGEN SATURATION: 93 % | WEIGHT: 131.61 LBS | BODY MASS INDEX: 25.84 KG/M2 | RESPIRATION RATE: 16 BRPM | HEART RATE: 110 BPM

## 2021-08-26 DIAGNOSIS — N13.1 HYDRONEPHROSIS DUE TO OBSTRUCTION OF URETER: Primary | ICD-10-CM

## 2021-08-26 LAB
GRAM STN SPEC: NORMAL
SIGNIFICANT IND 70042: NORMAL
SITE SITE: NORMAL
SOURCE SOURCE: NORMAL

## 2021-08-26 PROCEDURE — 700111 HCHG RX REV CODE 636 W/ 250 OVERRIDE (IP): Performed by: ANESTHESIOLOGY

## 2021-08-26 PROCEDURE — C1758 CATHETER, URETERAL: HCPCS | Performed by: SPECIALIST

## 2021-08-26 PROCEDURE — 700117 HCHG RX CONTRAST REV CODE 255: Performed by: SPECIALIST

## 2021-08-26 PROCEDURE — 160048 HCHG OR STATISTICAL LEVEL 1-5: Performed by: SPECIALIST

## 2021-08-26 PROCEDURE — 700101 HCHG RX REV CODE 250: Performed by: ANESTHESIOLOGY

## 2021-08-26 PROCEDURE — 160028 HCHG SURGERY MINUTES - 1ST 30 MINS LEVEL 3: Performed by: SPECIALIST

## 2021-08-26 PROCEDURE — 160025 RECOVERY II MINUTES (STATS): Performed by: SPECIALIST

## 2021-08-26 PROCEDURE — C2617 STENT, NON-COR, TEM W/O DEL: HCPCS | Performed by: SPECIALIST

## 2021-08-26 PROCEDURE — 87070 CULTURE OTHR SPECIMN AEROBIC: CPT

## 2021-08-26 PROCEDURE — 87077 CULTURE AEROBIC IDENTIFY: CPT | Mod: 91

## 2021-08-26 PROCEDURE — 700105 HCHG RX REV CODE 258: Performed by: SPECIALIST

## 2021-08-26 PROCEDURE — 160039 HCHG SURGERY MINUTES - EA ADDL 1 MIN LEVEL 3: Performed by: SPECIALIST

## 2021-08-26 PROCEDURE — 87076 CULTURE ANAEROBE IDENT EACH: CPT | Mod: 91

## 2021-08-26 PROCEDURE — 700101 HCHG RX REV CODE 250: Performed by: SPECIALIST

## 2021-08-26 PROCEDURE — 160009 HCHG ANES TIME/MIN: Performed by: SPECIALIST

## 2021-08-26 PROCEDURE — 160002 HCHG RECOVERY MINUTES (STAT): Performed by: SPECIALIST

## 2021-08-26 PROCEDURE — 74018 RADEX ABDOMEN 1 VIEW: CPT

## 2021-08-26 PROCEDURE — 160035 HCHG PACU - 1ST 60 MINS PHASE I: Performed by: SPECIALIST

## 2021-08-26 PROCEDURE — C1769 GUIDE WIRE: HCPCS | Performed by: SPECIALIST

## 2021-08-26 PROCEDURE — 87075 CULTR BACTERIA EXCEPT BLOOD: CPT

## 2021-08-26 PROCEDURE — 160046 HCHG PACU - 1ST 60 MINS PHASE II: Performed by: SPECIALIST

## 2021-08-26 PROCEDURE — 160036 HCHG PACU - EA ADDL 30 MINS PHASE I: Performed by: SPECIALIST

## 2021-08-26 PROCEDURE — 160047 HCHG PACU  - EA ADDL 30 MINS PHASE II: Performed by: SPECIALIST

## 2021-08-26 PROCEDURE — 87205 SMEAR GRAM STAIN: CPT

## 2021-08-26 DEVICE — STENT UROLOGICAL POLARIS 7X22  ULTRA: Type: IMPLANTABLE DEVICE | Site: BLADDER | Status: FUNCTIONAL

## 2021-08-26 RX ORDER — OXYCODONE HYDROCHLORIDE AND ACETAMINOPHEN 5; 325 MG/1; MG/1
1 TABLET ORAL
Status: DISCONTINUED | OUTPATIENT
Start: 2021-08-26 | End: 2021-08-26 | Stop reason: HOSPADM

## 2021-08-26 RX ORDER — HYDROMORPHONE HYDROCHLORIDE 1 MG/ML
0.4 INJECTION, SOLUTION INTRAMUSCULAR; INTRAVENOUS; SUBCUTANEOUS
Status: DISCONTINUED | OUTPATIENT
Start: 2021-08-26 | End: 2021-08-26 | Stop reason: HOSPADM

## 2021-08-26 RX ORDER — HALOPERIDOL 5 MG/ML
1 INJECTION INTRAMUSCULAR
Status: DISCONTINUED | OUTPATIENT
Start: 2021-08-26 | End: 2021-08-26 | Stop reason: HOSPADM

## 2021-08-26 RX ORDER — ONDANSETRON 2 MG/ML
4 INJECTION INTRAMUSCULAR; INTRAVENOUS
Status: DISCONTINUED | OUTPATIENT
Start: 2021-08-26 | End: 2021-08-26 | Stop reason: HOSPADM

## 2021-08-26 RX ORDER — SODIUM CHLORIDE, SODIUM LACTATE, POTASSIUM CHLORIDE, CALCIUM CHLORIDE 600; 310; 30; 20 MG/100ML; MG/100ML; MG/100ML; MG/100ML
INJECTION, SOLUTION INTRAVENOUS CONTINUOUS
Status: ACTIVE | OUTPATIENT
Start: 2021-08-26 | End: 2021-08-26

## 2021-08-26 RX ORDER — LIDOCAINE HYDROCHLORIDE 20 MG/ML
INJECTION, SOLUTION EPIDURAL; INFILTRATION; INTRACAUDAL; PERINEURAL PRN
Status: DISCONTINUED | OUTPATIENT
Start: 2021-08-26 | End: 2021-08-26 | Stop reason: SURG

## 2021-08-26 RX ORDER — SODIUM CHLORIDE, SODIUM LACTATE, POTASSIUM CHLORIDE, CALCIUM CHLORIDE 600; 310; 30; 20 MG/100ML; MG/100ML; MG/100ML; MG/100ML
INJECTION, SOLUTION INTRAVENOUS CONTINUOUS
Status: DISCONTINUED | OUTPATIENT
Start: 2021-08-26 | End: 2021-08-26 | Stop reason: HOSPADM

## 2021-08-26 RX ORDER — HYDROMORPHONE HYDROCHLORIDE 1 MG/ML
0.2 INJECTION, SOLUTION INTRAMUSCULAR; INTRAVENOUS; SUBCUTANEOUS
Status: DISCONTINUED | OUTPATIENT
Start: 2021-08-26 | End: 2021-08-26 | Stop reason: HOSPADM

## 2021-08-26 RX ORDER — DIPHENHYDRAMINE HYDROCHLORIDE 50 MG/ML
12.5 INJECTION INTRAMUSCULAR; INTRAVENOUS
Status: DISCONTINUED | OUTPATIENT
Start: 2021-08-26 | End: 2021-08-26 | Stop reason: HOSPADM

## 2021-08-26 RX ORDER — PHENYLEPHRINE HCL IN 0.9% NACL 0.5 MG/5ML
SYRINGE (ML) INTRAVENOUS PRN
Status: DISCONTINUED | OUTPATIENT
Start: 2021-08-26 | End: 2021-08-26 | Stop reason: SURG

## 2021-08-26 RX ORDER — DEXAMETHASONE SODIUM PHOSPHATE 4 MG/ML
INJECTION, SOLUTION INTRA-ARTICULAR; INTRALESIONAL; INTRAMUSCULAR; INTRAVENOUS; SOFT TISSUE PRN
Status: DISCONTINUED | OUTPATIENT
Start: 2021-08-26 | End: 2021-08-26 | Stop reason: SURG

## 2021-08-26 RX ORDER — PROPOFOL 10 MG/ML
INJECTION, EMULSION INTRAVENOUS PRN
Status: DISCONTINUED | OUTPATIENT
Start: 2021-08-26 | End: 2021-08-26 | Stop reason: SURG

## 2021-08-26 RX ORDER — HYDROMORPHONE HYDROCHLORIDE 1 MG/ML
0.1 INJECTION, SOLUTION INTRAMUSCULAR; INTRAVENOUS; SUBCUTANEOUS
Status: DISCONTINUED | OUTPATIENT
Start: 2021-08-26 | End: 2021-08-26 | Stop reason: HOSPADM

## 2021-08-26 RX ORDER — AMPICILLIN 500 MG/1
500 CAPSULE ORAL 4 TIMES DAILY
COMMUNITY
End: 2021-10-01

## 2021-08-26 RX ORDER — MEPERIDINE HYDROCHLORIDE 25 MG/ML
6.25 INJECTION INTRAMUSCULAR; INTRAVENOUS; SUBCUTANEOUS
Status: DISCONTINUED | OUTPATIENT
Start: 2021-08-26 | End: 2021-08-26 | Stop reason: HOSPADM

## 2021-08-26 RX ORDER — OXYCODONE HYDROCHLORIDE AND ACETAMINOPHEN 5; 325 MG/1; MG/1
2 TABLET ORAL
Status: DISCONTINUED | OUTPATIENT
Start: 2021-08-26 | End: 2021-08-26 | Stop reason: HOSPADM

## 2021-08-26 RX ADMIN — Medication 200 MCG: at 08:19

## 2021-08-26 RX ADMIN — DEXAMETHASONE SODIUM PHOSPHATE 8 MG: 4 INJECTION, SOLUTION INTRA-ARTICULAR; INTRALESIONAL; INTRAMUSCULAR; INTRAVENOUS; SOFT TISSUE at 07:49

## 2021-08-26 RX ADMIN — FENTANYL CITRATE 50 MCG: 50 INJECTION, SOLUTION INTRAMUSCULAR; INTRAVENOUS at 08:15

## 2021-08-26 RX ADMIN — PROPOFOL 150 MG: 10 INJECTION, EMULSION INTRAVENOUS at 07:49

## 2021-08-26 RX ADMIN — Medication 100 MCG: at 08:04

## 2021-08-26 RX ADMIN — Medication 150 MCG: at 07:57

## 2021-08-26 RX ADMIN — LIDOCAINE HYDROCHLORIDE 60 MG: 20 INJECTION, SOLUTION EPIDURAL; INFILTRATION; INTRACAUDAL at 07:49

## 2021-08-26 RX ADMIN — Medication 100 MCG: at 08:15

## 2021-08-26 RX ADMIN — Medication 150 MCG: at 08:09

## 2021-08-26 RX ADMIN — FENTANYL CITRATE 50 MCG: 50 INJECTION, SOLUTION INTRAMUSCULAR; INTRAVENOUS at 07:47

## 2021-08-26 RX ADMIN — SODIUM CHLORIDE, POTASSIUM CHLORIDE, SODIUM LACTATE AND CALCIUM CHLORIDE: 600; 310; 30; 20 INJECTION, SOLUTION INTRAVENOUS at 06:22

## 2021-08-26 RX ADMIN — CEFOTETAN DISODIUM 2 G: 2 INJECTION, POWDER, FOR SOLUTION INTRAMUSCULAR; INTRAVENOUS at 07:55

## 2021-08-26 ASSESSMENT — PAIN SCALES - GENERAL: PAIN_LEVEL: 0

## 2021-08-26 ASSESSMENT — FIBROSIS 4 INDEX: FIB4 SCORE: 1.01

## 2021-08-26 NOTE — OR NURSING
Received from [facility] at 1033 and home at 1140. abd xray done at 1115 and then home. Denies pain and verbalized understanding of dc instructions.

## 2021-08-26 NOTE — ANESTHESIA TIME REPORT
Anesthesia Start and Stop Event Times     Date Time Event    8/26/2021 0723 Ready for Procedure     0743 Anesthesia Start     0831 Anesthesia Stop        Responsible Staff  08/26/21    Name Role Begin End    Geovanny York M.D. Anesth 0743 0831        Preop Diagnosis (Free Text):  Pre-op Diagnosis     HYDRONEPHROSIS        Preop Diagnosis (Codes):    Post op Diagnosis  Hydronephrosis      Premium Reason  Non-Premium    Comments:

## 2021-08-26 NOTE — DISCHARGE INSTRUCTIONS
ACTIVITY: Rest and take it easy for the first 24 hours.  A responsible adult is recommended to remain with you during that time.  It is normal to feel sleepy.  We encourage you to not do anything that requires balance, judgment or coordination.    MILD FLU-LIKE SYMPTOMS ARE NORMAL. YOU MAY EXPERIENCE GENERALIZED MUSCLE ACHES, THROAT IRRITATION, HEADACHE AND/OR SOME NAUSEA.    FOR 24 HOURS DO NOT:  Drive, operate machinery or run household appliances.  Drink beer or alcoholic beverages.   Make important decisions or sign legal documents.      Cystoscopy  Cystoscopy is a procedure that is used to help diagnose and sometimes treat conditions that affect the lower urinary tract. The lower urinary tract includes the bladder and the urethra. The urethra is the tube that drains urine from the bladder. Cystoscopy is done using a thin, tube-shaped instrument with a light and camera at the end (cystoscope). The cystoscope may be hard or flexible, depending on the goal of the procedure. The cystoscope is inserted through the urethra, into the bladder.  Cystoscopy may be recommended if you have:  · Urinary tract infections that keep coming back.  · Blood in the urine (hematuria).  · An inability to control when you urinate (urinary incontinence) or an overactive bladder.  · Unusual cells found in a urine sample.  · A blockage in the urethra, such as a urinary stone.  · Painful urination.  · An abnormality in the bladder found during an intravenous pyelogram (IVP) or CT scan.  Cystoscopy may also be done to remove a sample of tissue to be examined under a microscope (biopsy).  Tell a health care provider about:  · Any allergies you have.  · All medicines you are taking, including vitamins, herbs, eye drops, creams, and over-the-counter medicines.  · Any problems you or family members have had with anesthetic medicines.  · Any blood disorders you have.  · Any surgeries you have had.  · Any medical conditions you  have.  · Whether you are pregnant or may be pregnant.  What are the risks?  Generally, this is a safe procedure. However, problems may occur, including:  · Infection.  · Bleeding.  · Allergic reactions to medicines.  · Damage to other structures or organs.  What happens before the procedure?  · Ask your health care provider about:  ? Changing or stopping your regular medicines. This is especially important if you are taking diabetes medicines or blood thinners.  ? Taking medicines such as aspirin and ibuprofen. These medicines can thin your blood. Do not take these medicines unless your health care provider tells you to take them.  ? Taking over-the-counter medicines, vitamins, herbs, and supplements.  · Follow instructions from your health care provider about eating or drinking restrictions.  · Ask your health care provider what steps will be taken to help prevent infection. These may include:  ? Washing skin with a germ-killing soap.  ? Taking antibiotic medicine.  · You may have an exam or testing, such as:  ? X-rays of the bladder, urethra, or kidneys.  ? Urine tests to check for signs of infection.  · Plan to have someone take you home from the hospital or clinic.  What happens during the procedure?    · You will be given one or more of the following:  ? A medicine to help you relax (sedative).  ? A medicine to numb the area (local anesthetic).  · The area around the opening of your urethra will be cleaned.  · The cystoscope will be passed through your urethra into your bladder.  · Germ-free (sterile) fluid will flow through the cystoscope to fill your bladder. The fluid will stretch your bladder so that your health care provider can clearly examine your bladder walls.  · Your doctor will look at the urethra and bladder. Your doctor may take a biopsy or remove stones.  · The cystoscope will be removed, and your bladder will be emptied.  The procedure may vary among health care providers and hospitals.  What can  I expect after the procedure?  After the procedure, it is common to have:  · Some soreness or pain in your abdomen and urethra.  · Urinary symptoms. These include:  ? Mild pain or burning when you urinate. Pain should stop within a few minutes after you urinate. This may last for up to 1 week.  ? A small amount of blood in your urine for several days.  ? Feeling like you need to urinate but producing only a small amount of urine.  Follow these instructions at home:  Medicines  · Take over-the-counter and prescription medicines only as told by your health care provider.  · If you were prescribed an antibiotic medicine, take it as told by your health care provider. Do not stop taking the antibiotic even if you start to feel better.    General instructions  · Return to your normal activities as told by your health care provider. Ask your health care provider what activities are safe for you.  · Do not drive for 24 hours if you were given a sedative during your procedure.  · Watch for any blood in your urine. If the amount of blood in your urine increases, call your health care provider.  · Follow instructions from your health care provider about eating or drinking restrictions.  · If a tissue sample was removed for testing (biopsy) during your procedure, it is up to you to get your test results. Ask your health care provider, or the department that is doing the test, when your results will be ready.  · Drink enough fluid to keep your urine pale yellow.  · Keep all follow-up visits as told by your health care provider. This is important.      Contact a health care provider if you:  · Have pain that gets worse or does not get better with medicine, especially pain when you urinate.  · Have trouble urinating.  · Have more blood in your urine.  Get help right away if you:  · Have blood clots in your urine.  · Have abdominal pain.  · Have a fever or chills.  · Are unable to urinate.  Summary  · Cystoscopy is a procedure that  is used to help diagnose and sometimes treat conditions that affect the lower urinary tract.  · Cystoscopy is done using a thin, tube-shaped instrument with a light and camera at the end.  · After the procedure, it is common to have some soreness or pain in your abdomen and urethra.  · Watch for any blood in your urine. If the amount of blood in your urine increases, call your health care provider.  · If you were prescribed an antibiotic medicine, take it as told by your health care provider. Do not stop taking the antibiotic even if you start to feel better.  This information is not intended to replace advice given to you by your health care provider. Make sure you discuss any questions you have with your health care provider.        DIET: To avoid nausea, slowly advance diet as tolerated, avoiding spicy or greasy foods for the first day.  Add more substantial food to your diet according to your physician's instructions.  Babies can be fed formula or breast milk as soon as they are hungry.  INCREASE FLUIDS AND FIBER TO AVOID CONSTIPATION.    SURGICAL DRESSING/BATHING: **NO RESTRICTIONS*    FOLLOW-UP APPOINTMENT:  A follow-up appointment should be arranged with your doctor in 1-2 weeks; call to schedule.    You should CALL YOUR PHYSICIAN if you develop:  Fever greater than 101 degrees F.  Pain not relieved by medication, or persistent nausea or vomiting.  Excessive bleeding (blood soaking through dressing) or unexpected drainage from the wound.  Extreme redness or swelling around the incision site, drainage of pus or foul smelling drainage.  Inability to urinate or empty your bladder within 8 hours.  Problems with breathing or chest pain.    You should call 911 if you develop problems with breathing or chest pain.  If you are unable to contact your doctor or surgical center, you should go to the nearest emergency room or urgent care center.  Physician's telephone #: Dr. Hays [phone]    If any questions arise,  call your doctor.  If your doctor is not available, please feel free to call the Surgical Center at [phone]. The Contact Center is open Monday through Friday 7AM to 5PM and may speak to a nurse at [phone], or toll free at [phone].     A registered nurse may call you a few days after your surgery to see how you are doing after your procedure.    MEDICATIONS: Resume taking daily medication.  Take prescribed pain medication with food.  If no medication is prescribed, you may take non-aspirin pain medication if needed.  PAIN MEDICATION CAN BE VERY CONSTIPATING.  Take a stool softener or laxative such as senokot, pericolace, or milk of magnesia if needed.    Prescription given for *NONE**.  Last pain medication given at *NONE**.    If your physician has prescribed pain medication that includes Acetaminophen (Tylenol), do not take additional Acetaminophen (Tylenol) while taking the prescribed medication.    Depression / Suicide Risk    As you are discharged from this [facility] facility, it is important to learn how to keep safe from harming yourself.    Recognize the warning signs:  · Abrupt changes in personality, positive or negative- including increase in energy   · Giving away possessions  · Change in eating patterns- significant weight changes-  positive or negative  · Change in sleeping patterns- unable to sleep or sleeping all the time   · Unwillingness or inability to communicate  · Depression  · Unusual sadness, discouragement and loneliness  · Talk of wanting to die  · Neglect of personal appearance   · Rebelliousness- reckless behavior  · Withdrawal from people/activities they love  · Confusion- inability to concentrate     If you or a loved one observes any of these behaviors or has concerns about self-harm, here's what you can do:  · Talk about it- your feelings and reasons for harming yourself  · Remove any means that you might use to hurt yourself (examples: pills, rope, extension  cords, firearm)  · Get professional help from the community (Mental Health, Substance Abuse, psychological counseling)  · Do not be alone:Call your Safe Contact- someone whom you trust who will be there for you.  · Call your local CRISIS HOTLINE 526-4739 or 518-778-0399  · Call your local Children's Mobile Crisis Response Team [place] (023) 110-4026 or www.[facility]  · Call the toll free National Suicide Prevention Hotlines   · National Suicide Prevention Lifeline 222-353-FCCK (5879)  · National Hope Line Network 800-SUICIDE (300-0122)

## 2021-08-26 NOTE — OP REPORT
PreOp Diagnosis: Left ureteral stricture  Hx of cervical cancer        PostOp Diagnosis: same        Procedure(s):  CYSTOSCOPY, WITH URETERAL STENT INSERTION OR REMOVAL - RIGHT AND/OR LEFT STENT EXCHANGE. - Wound Class: Clean Contaminated     Surgeon(s):  Adam Hasy M.D.     Anesthesiologist/Type of Anesthesia:  Anesthesiologist: Geovanny York M.D./General     Surgical Staff:  Circulator: Taylor Schafer R.N.  Scrub Person: Barbra River  Radiology Technologist: Yamile Bradshaw     Specimens removed if any:  ID Type Source Tests Collected by Time Destination   1 : Left Ureteral Stent Other Other AEROBIC/ANAEROBIC CULTURE (SURGERY) Adam Hays M.D. 8/26/2021  8:12 AM           Estimated Blood Loss: minimal            Complications: none            Indication: Mrs. Dennis is a pleasant 75-year-old female with history of cervical cancer.  Patient status post radical hysterectomy followed by chemoradiation appreciate a recurrence.  Unfortunately patient has had the left ureteral stricture requiring ureteral stent placement.  Her last         stent exchange was in May 2021.  She is due for her left next stent exchange thus patient's been brought to the operating room today to undergo left ureteral stent exchange under direct fluoroscopy.            Risk benefits and rationale procedures were reviewed with the patient in detail patient's understanding of these risk and wished to proceed with surgery as planned.     Findings: Persistent hydronephrosis and left ureteral stricture.     Procedure: After achieving adequate anesthesia patient was prepped and draped in place modified dorsolithotomy position.  30 degree cystoscope was inserted transurethrally.  Examination revealed a normal bladder with left ureteral stent noted.  The ureteral stent was calcified.  We easily retrieved it with alligator forceps.  Under direct fluoroscopy a guidewire was passed through to hold position of the left ureter.  The left  ureteral stent was then subsequently removed.  An open ended catheter was then inserted through the guidewire and the guidewire was then subsequently removed to perform a retrograde pyelogram outlining the left ureter and the left kidney.  We noted the persistent ureteral stricture with the left Hydro.  Once this was confirmed with a retrograde a Glidewire was then subsequently delivered again through the left ureteral open catheter holding the position of the ureter.  The open-ended catheter was then of removed and a 7 x 22 double-J ureteral stent was then placed in good position.  Once this was confirmed we then sent the stent for culture the bladder was empty.  Patient tolerated procedure well without any difficulties and was extubated and transferred to the PACU in stable condition.

## 2021-08-26 NOTE — ANESTHESIA PROCEDURE NOTES
Airway    Date/Time: 8/26/2021 7:51 AM  Performed by: Geovanny York M.D.  Authorized by: Geovanny York M.D.     Location:  OR  Urgency:  Elective  Indications for Airway Management:  Anesthesia      Spontaneous Ventilation: absent    Sedation Level:  Deep  Preoxygenated: Yes    Mask Difficulty Assessment:  0 - not attempted  Final Airway Type:  Supraglottic airway  Final Supraglottic Airway:  Standard LMA    SGA Size:  4  Number of Attempts at Approach:  1   Good seal

## 2021-08-26 NOTE — OR SURGEON
Immediate Post OP Note    PreOp Diagnosis: Left ureteral stricture  Hx of cervical cancer      PostOp Diagnosis: same      Procedure(s):  CYSTOSCOPY, WITH URETERAL STENT INSERTION OR REMOVAL - RIGHT AND/OR LEFT STENT EXCHANGE. - Wound Class: Clean Contaminated    Surgeon(s):  Adam Hays M.D.    Anesthesiologist/Type of Anesthesia:  Anesthesiologist: Geovanny York M.D./General    Surgical Staff:  Circulator: Taylor Schafer R.N.  Scrub Person: Barbra River  Radiology Technologist: Yamile Bradshaw    Specimens removed if any:  ID Type Source Tests Collected by Time Destination   1 : Left Ureteral Stent Other Other AEROBIC/ANAEROBIC CULTURE (SURGERY) Adam Hays M.D. 8/26/2021  8:12 AM        Estimated Blood Loss: minimal    Findings: Persistent hydronephrosis and left ureteral stricture.     Complications: none      8/26/2021 8:43 AM Adam Hays M.D.

## 2021-08-26 NOTE — ANESTHESIA POSTPROCEDURE EVALUATION
Patient: Nessa Dennis    Procedure Summary     Date: 08/26/21 Room / Location: [place] [id number] / SURGERY [facility]    Anesthesia Start: 0743 Anesthesia Stop: 0831    Procedure: CYSTOSCOPY, WITH URETERAL STENT INSERTION OR REMOVAL - RIGHT AND/OR LEFT STENT EXCHANGE. (Left Bladder) Diagnosis: (HYDRONEPHROSIS)    Surgeons: Adam Hays M.D. Responsible Provider: Geovanny York M.D.    Anesthesia Type: general ASA Status: 2          Final Anesthesia Type: general  Last vitals  BP   Blood Pressure : 129/70    Temp   36.6 °C (97.8 °F)    Pulse   90   Resp   (!) 11    SpO2   97 %      Anesthesia Post Evaluation    Patient location during evaluation: PACU  Patient participation: complete - patient participated  Level of consciousness: sleepy but conscious  Pain score: 0    Airway patency: patent  Anesthetic complications: no  Cardiovascular status: hemodynamically stable  Respiratory status: acceptable  Hydration status: balanced    PONV: none          No complications documented.     Nurse Pain Score: 0 (NPRS)

## 2021-08-26 NOTE — OR NURSING
0829 Pt out from OR. Pt sleeping. VSS.   0938 Report to Melanie SEVERINO   1028 Pt over to Phase II

## 2021-08-30 LAB
BACTERIA SPEC ANAEROBE CULT: ABNORMAL
SIGNIFICANT IND 70042: ABNORMAL
SITE SITE: ABNORMAL
SOURCE SOURCE: ABNORMAL

## 2021-09-20 ENCOUNTER — HOSPITAL ENCOUNTER (OUTPATIENT)
Dept: RADIOLOGY | Facility: MEDICAL CENTER | Age: 76
End: 2021-09-20
Attending: SPECIALIST
Payer: MEDICARE

## 2021-09-20 DIAGNOSIS — R30.9 MICTURITION PAINFUL: ICD-10-CM

## 2021-09-20 DIAGNOSIS — N82.3 RECTOVAGINAL FISTULA: ICD-10-CM

## 2021-09-20 DIAGNOSIS — N82.9: ICD-10-CM

## 2021-09-20 DIAGNOSIS — C53.9 MALIGNANT NEOPLASM OF CERVIX, UNSPECIFIED SITE (HCC): ICD-10-CM

## 2021-09-20 DIAGNOSIS — R19.00 ABDOMINAL MASS, UNSPECIFIED ABDOMINAL LOCATION: ICD-10-CM

## 2021-09-20 DIAGNOSIS — N13.1 HYDRONEPHROSIS WITH URETERAL STRICTURE: ICD-10-CM

## 2021-09-20 DIAGNOSIS — R31.9 HEMATURIA SYNDROME: ICD-10-CM

## 2021-09-20 DIAGNOSIS — Z96.0 BLADDER REPLACED: ICD-10-CM

## 2021-09-20 PROCEDURE — 71260 CT THORAX DX C+: CPT | Mod: MH

## 2021-09-20 PROCEDURE — 700117 HCHG RX CONTRAST REV CODE 255: Performed by: SPECIALIST

## 2021-09-20 RX ADMIN — IOHEXOL 75 ML: 350 INJECTION, SOLUTION INTRAVENOUS at 10:10

## 2021-09-22 ENCOUNTER — HOSPITAL ENCOUNTER (OUTPATIENT)
Dept: LAB | Facility: MEDICAL CENTER | Age: 76
End: 2021-09-22
Attending: FAMILY MEDICINE
Payer: MEDICARE

## 2021-09-22 LAB
25(OH)D3 SERPL-MCNC: 61 NG/ML (ref 30–100)
ALBUMIN SERPL BCP-MCNC: 4.2 G/DL (ref 3.2–4.9)
ALBUMIN/GLOB SERPL: 1.4 G/DL
ALP SERPL-CCNC: 115 U/L (ref 30–99)
ALT SERPL-CCNC: 7 U/L (ref 2–50)
ANION GAP SERPL CALC-SCNC: 11 MMOL/L (ref 7–16)
AST SERPL-CCNC: 15 U/L (ref 12–45)
BASOPHILS # BLD AUTO: 0.4 % (ref 0–1.8)
BASOPHILS # BLD: 0.03 K/UL (ref 0–0.12)
BILIRUB SERPL-MCNC: 0.4 MG/DL (ref 0.1–1.5)
BUN SERPL-MCNC: 16 MG/DL (ref 8–22)
CALCIUM SERPL-MCNC: 10.2 MG/DL (ref 8.5–10.5)
CHLORIDE SERPL-SCNC: 103 MMOL/L (ref 96–112)
CHOLEST SERPL-MCNC: 153 MG/DL (ref 100–199)
CO2 SERPL-SCNC: 21 MMOL/L (ref 20–33)
CREAT SERPL-MCNC: 0.78 MG/DL (ref 0.5–1.4)
EOSINOPHIL # BLD AUTO: 0.15 K/UL (ref 0–0.51)
EOSINOPHIL NFR BLD: 2.2 % (ref 0–6.9)
ERYTHROCYTE [DISTWIDTH] IN BLOOD BY AUTOMATED COUNT: 49 FL (ref 35.9–50)
FASTING STATUS PATIENT QL REPORTED: NORMAL
FOLATE SERPL-MCNC: 39.6 NG/ML
GLOBULIN SER CALC-MCNC: 2.9 G/DL (ref 1.9–3.5)
GLUCOSE SERPL-MCNC: 89 MG/DL (ref 65–99)
HCT VFR BLD AUTO: 39.1 % (ref 37–47)
HDLC SERPL-MCNC: 48 MG/DL
HGB BLD-MCNC: 12.3 G/DL (ref 12–16)
IMM GRANULOCYTES # BLD AUTO: 0.02 K/UL (ref 0–0.11)
IMM GRANULOCYTES NFR BLD AUTO: 0.3 % (ref 0–0.9)
LDLC SERPL CALC-MCNC: 78 MG/DL
LYMPHOCYTES # BLD AUTO: 1.34 K/UL (ref 1–4.8)
LYMPHOCYTES NFR BLD: 19.7 % (ref 22–41)
MCH RBC QN AUTO: 25.7 PG (ref 27–33)
MCHC RBC AUTO-ENTMCNC: 31.5 G/DL (ref 33.6–35)
MCV RBC AUTO: 81.6 FL (ref 81.4–97.8)
MONOCYTES # BLD AUTO: 0.62 K/UL (ref 0–0.85)
MONOCYTES NFR BLD AUTO: 9.1 % (ref 0–13.4)
NEUTROPHILS # BLD AUTO: 4.63 K/UL (ref 2–7.15)
NEUTROPHILS NFR BLD: 68.3 % (ref 44–72)
NRBC # BLD AUTO: 0 K/UL
NRBC BLD-RTO: 0 /100 WBC
PLATELET # BLD AUTO: 261 K/UL (ref 164–446)
PMV BLD AUTO: 11.1 FL (ref 9–12.9)
POTASSIUM SERPL-SCNC: 4.4 MMOL/L (ref 3.6–5.5)
PROT SERPL-MCNC: 7.1 G/DL (ref 6–8.2)
PTH-INTACT SERPL-MCNC: 57.2 PG/ML (ref 14–72)
RBC # BLD AUTO: 4.79 M/UL (ref 4.2–5.4)
SODIUM SERPL-SCNC: 135 MMOL/L (ref 135–145)
T3FREE SERPL-MCNC: 2.85 PG/ML (ref 2–4.4)
T4 FREE SERPL-MCNC: 1.17 NG/DL (ref 0.93–1.7)
TRIGL SERPL-MCNC: 134 MG/DL (ref 0–149)
TSH SERPL DL<=0.005 MIU/L-ACNC: 1.23 UIU/ML (ref 0.38–5.33)
VIT B12 SERPL-MCNC: 770 PG/ML (ref 211–911)
WBC # BLD AUTO: 6.8 K/UL (ref 4.8–10.8)

## 2021-09-22 PROCEDURE — 82607 VITAMIN B-12: CPT

## 2021-09-22 PROCEDURE — 80053 COMPREHEN METABOLIC PANEL: CPT

## 2021-09-22 PROCEDURE — 84443 ASSAY THYROID STIM HORMONE: CPT

## 2021-09-22 PROCEDURE — 84481 FREE ASSAY (FT-3): CPT

## 2021-09-22 PROCEDURE — 83970 ASSAY OF PARATHORMONE: CPT

## 2021-09-22 PROCEDURE — 80061 LIPID PANEL: CPT

## 2021-09-22 PROCEDURE — 82306 VITAMIN D 25 HYDROXY: CPT

## 2021-09-22 PROCEDURE — 86800 THYROGLOBULIN ANTIBODY: CPT

## 2021-09-22 PROCEDURE — 36415 COLL VENOUS BLD VENIPUNCTURE: CPT

## 2021-09-22 PROCEDURE — 82746 ASSAY OF FOLIC ACID SERUM: CPT

## 2021-09-22 PROCEDURE — 85025 COMPLETE CBC W/AUTO DIFF WBC: CPT

## 2021-09-22 PROCEDURE — 84439 ASSAY OF FREE THYROXINE: CPT

## 2021-09-24 LAB — THYROGLOB AB SERPL-ACNC: <0.9 IU/ML (ref 0–4)

## 2021-09-29 ENCOUNTER — HOSPITAL ENCOUNTER (OUTPATIENT)
Facility: MEDICAL CENTER | Age: 76
End: 2021-09-29
Attending: SPECIALIST | Admitting: SPECIALIST
Payer: MEDICARE

## 2021-10-01 ENCOUNTER — PRE-ADMISSION TESTING (OUTPATIENT)
Dept: ADMISSIONS | Facility: MEDICAL CENTER | Age: 76
End: 2021-10-01
Attending: SPECIALIST
Payer: MEDICARE

## 2021-10-21 ENCOUNTER — APPOINTMENT (OUTPATIENT)
Dept: RADIOLOGY | Facility: MEDICAL CENTER | Age: 76
End: 2021-10-21
Attending: NURSE PRACTITIONER
Payer: MEDICARE

## 2021-11-21 ENCOUNTER — HOSPITAL ENCOUNTER (OUTPATIENT)
Dept: RADIOLOGY | Facility: MEDICAL CENTER | Age: 76
End: 2021-11-21
Payer: MEDICARE

## 2021-12-15 ENCOUNTER — PRE-ADMISSION TESTING (OUTPATIENT)
Dept: ADMISSIONS | Facility: MEDICAL CENTER | Age: 76
End: 2021-12-15
Attending: SURGERY
Payer: MEDICARE

## 2021-12-17 ENCOUNTER — HOSPITAL ENCOUNTER (OUTPATIENT)
Dept: LAB | Facility: MEDICAL CENTER | Age: 76
End: 2021-12-17
Attending: SPECIALIST
Payer: MEDICARE

## 2021-12-17 PROCEDURE — 81001 URINALYSIS AUTO W/SCOPE: CPT

## 2021-12-17 PROCEDURE — 87077 CULTURE AEROBIC IDENTIFY: CPT

## 2021-12-17 PROCEDURE — 87086 URINE CULTURE/COLONY COUNT: CPT

## 2021-12-18 LAB
AMORPH CRY #/AREA URNS HPF: PRESENT /HPF
APPEARANCE UR: ABNORMAL
BACTERIA #/AREA URNS HPF: ABNORMAL /HPF
BILIRUB UR QL STRIP.AUTO: NEGATIVE
CAOX CRY #/AREA URNS HPF: ABNORMAL /HPF
COLOR UR: ABNORMAL
EPI CELLS #/AREA URNS HPF: ABNORMAL /HPF
GLUCOSE UR STRIP.AUTO-MCNC: NEGATIVE MG/DL
HYALINE CASTS #/AREA URNS LPF: ABNORMAL /LPF
KETONES UR STRIP.AUTO-MCNC: NEGATIVE MG/DL
LEUKOCYTE ESTERASE UR QL STRIP.AUTO: ABNORMAL
MICRO URNS: ABNORMAL
NITRITE UR QL STRIP.AUTO: POSITIVE
PH UR STRIP.AUTO: 6 [PH] (ref 5–8)
PROT UR QL STRIP: 100 MG/DL
RBC # URNS HPF: ABNORMAL /HPF
RBC UR QL AUTO: ABNORMAL
SP GR UR STRIP.AUTO: 1.02
UROBILINOGEN UR STRIP.AUTO-MCNC: 0.2 MG/DL
WBC #/AREA URNS HPF: ABNORMAL /HPF

## 2021-12-22 ENCOUNTER — PRE-ADMISSION TESTING (OUTPATIENT)
Dept: ADMISSIONS | Facility: MEDICAL CENTER | Age: 76
End: 2021-12-22
Attending: SPECIALIST
Payer: MEDICARE

## 2021-12-22 ENCOUNTER — HOSPITAL ENCOUNTER (OUTPATIENT)
Dept: RADIOLOGY | Facility: MEDICAL CENTER | Age: 76
End: 2021-12-22
Attending: SPECIALIST | Admitting: SPECIALIST
Payer: MEDICARE

## 2021-12-22 DIAGNOSIS — Z01.812 PRE-OPERATIVE LABORATORY EXAMINATION: ICD-10-CM

## 2021-12-22 DIAGNOSIS — Z01.810 PRE-OPERATIVE CARDIOVASCULAR EXAMINATION: ICD-10-CM

## 2021-12-22 DIAGNOSIS — Z01.811 PRE-OPERATIVE RESPIRATORY EXAMINATION: ICD-10-CM

## 2021-12-22 LAB
ALBUMIN SERPL BCP-MCNC: 4.2 G/DL (ref 3.2–4.9)
ALBUMIN/GLOB SERPL: 1.4 G/DL
ALP SERPL-CCNC: 124 U/L (ref 30–99)
ALT SERPL-CCNC: 12 U/L (ref 2–50)
ANION GAP SERPL CALC-SCNC: 10 MMOL/L (ref 7–16)
APTT PPP: 32.5 SEC (ref 24.7–36)
AST SERPL-CCNC: 11 U/L (ref 12–45)
BASOPHILS # BLD AUTO: 0.4 % (ref 0–1.8)
BASOPHILS # BLD: 0.04 K/UL (ref 0–0.12)
BILIRUB SERPL-MCNC: 0.2 MG/DL (ref 0.1–1.5)
BUN SERPL-MCNC: 22 MG/DL (ref 8–22)
CALCIUM SERPL-MCNC: 10.5 MG/DL (ref 8.5–10.5)
CHLORIDE SERPL-SCNC: 105 MMOL/L (ref 96–112)
CO2 SERPL-SCNC: 23 MMOL/L (ref 20–33)
CREAT SERPL-MCNC: 0.94 MG/DL (ref 0.5–1.4)
EKG IMPRESSION: NORMAL
EOSINOPHIL # BLD AUTO: 0.11 K/UL (ref 0–0.51)
EOSINOPHIL NFR BLD: 1.2 % (ref 0–6.9)
ERYTHROCYTE [DISTWIDTH] IN BLOOD BY AUTOMATED COUNT: 44.8 FL (ref 35.9–50)
GLOBULIN SER CALC-MCNC: 3 G/DL (ref 1.9–3.5)
GLUCOSE SERPL-MCNC: 102 MG/DL (ref 65–99)
HCT VFR BLD AUTO: 41.4 % (ref 37–47)
HGB BLD-MCNC: 13.3 G/DL (ref 12–16)
IMM GRANULOCYTES # BLD AUTO: 0.04 K/UL (ref 0–0.11)
IMM GRANULOCYTES NFR BLD AUTO: 0.4 % (ref 0–0.9)
INR PPP: 1.08 (ref 0.87–1.13)
LYMPHOCYTES # BLD AUTO: 1.4 K/UL (ref 1–4.8)
LYMPHOCYTES NFR BLD: 15.4 % (ref 22–41)
MCH RBC QN AUTO: 25.7 PG (ref 27–33)
MCHC RBC AUTO-ENTMCNC: 32.1 G/DL (ref 33.6–35)
MCV RBC AUTO: 79.9 FL (ref 81.4–97.8)
MONOCYTES # BLD AUTO: 0.83 K/UL (ref 0–0.85)
MONOCYTES NFR BLD AUTO: 9.1 % (ref 0–13.4)
NEUTROPHILS # BLD AUTO: 6.7 K/UL (ref 2–7.15)
NEUTROPHILS NFR BLD: 73.5 % (ref 44–72)
NRBC # BLD AUTO: 0 K/UL
NRBC BLD-RTO: 0 /100 WBC
PLATELET # BLD AUTO: 269 K/UL (ref 164–446)
PMV BLD AUTO: 10.3 FL (ref 9–12.9)
POTASSIUM SERPL-SCNC: 4.6 MMOL/L (ref 3.6–5.5)
PROT SERPL-MCNC: 7.2 G/DL (ref 6–8.2)
PROTHROMBIN TIME: 13.7 SEC (ref 12–14.6)
RBC # BLD AUTO: 5.18 M/UL (ref 4.2–5.4)
SODIUM SERPL-SCNC: 138 MMOL/L (ref 135–145)
WBC # BLD AUTO: 9.1 K/UL (ref 4.8–10.8)

## 2021-12-22 PROCEDURE — 36415 COLL VENOUS BLD VENIPUNCTURE: CPT

## 2021-12-22 PROCEDURE — 85025 COMPLETE CBC W/AUTO DIFF WBC: CPT

## 2021-12-22 PROCEDURE — 93010 ELECTROCARDIOGRAM REPORT: CPT | Performed by: INTERNAL MEDICINE

## 2021-12-22 PROCEDURE — 85610 PROTHROMBIN TIME: CPT

## 2021-12-22 PROCEDURE — 71045 X-RAY EXAM CHEST 1 VIEW: CPT

## 2021-12-22 PROCEDURE — 80053 COMPREHEN METABOLIC PANEL: CPT

## 2021-12-22 PROCEDURE — 85730 THROMBOPLASTIN TIME PARTIAL: CPT

## 2021-12-22 PROCEDURE — 93005 ELECTROCARDIOGRAM TRACING: CPT

## 2021-12-22 ASSESSMENT — FIBROSIS 4 INDEX: FIB4 SCORE: 1.67

## 2021-12-23 ENCOUNTER — PRE-ADMISSION TESTING (OUTPATIENT)
Dept: ADMISSIONS | Facility: MEDICAL CENTER | Age: 76
End: 2021-12-23
Attending: SURGERY
Payer: MEDICARE

## 2021-12-23 DIAGNOSIS — Z01.812 PRE-OPERATIVE LABORATORY EXAMINATION: ICD-10-CM

## 2021-12-23 LAB — COVID ORDER STATUS COVID19: NORMAL

## 2021-12-23 PROCEDURE — U0005 INFEC AGEN DETEC AMPLI PROBE: HCPCS

## 2021-12-23 PROCEDURE — U0003 INFECTIOUS AGENT DETECTION BY NUCLEIC ACID (DNA OR RNA); SEVERE ACUTE RESPIRATORY SYNDROME CORONAVIRUS 2 (SARS-COV-2) (CORONAVIRUS DISEASE [COVID-19]), AMPLIFIED PROBE TECHNIQUE, MAKING USE OF HIGH THROUGHPUT TECHNOLOGIES AS DESCRIBED BY CMS-2020-01-R: HCPCS

## 2021-12-25 LAB
SARS-COV-2 RNA RESP QL NAA+PROBE: NOTDETECTED
SPECIMEN SOURCE: NORMAL

## 2021-12-28 ENCOUNTER — HOSPITAL ENCOUNTER (OUTPATIENT)
Facility: MEDICAL CENTER | Age: 76
End: 2021-12-28
Attending: SURGERY | Admitting: RADIOLOGY
Payer: MEDICARE

## 2021-12-28 ENCOUNTER — APPOINTMENT (OUTPATIENT)
Dept: RADIOLOGY | Facility: MEDICAL CENTER | Age: 76
End: 2021-12-28
Attending: SURGERY
Payer: MEDICARE

## 2021-12-28 ENCOUNTER — APPOINTMENT (OUTPATIENT)
Dept: RADIOLOGY | Facility: MEDICAL CENTER | Age: 76
End: 2021-12-28
Attending: RADIOLOGY
Payer: MEDICARE

## 2021-12-28 VITALS
OXYGEN SATURATION: 93 % | RESPIRATION RATE: 16 BRPM | WEIGHT: 133.16 LBS | SYSTOLIC BLOOD PRESSURE: 135 MMHG | DIASTOLIC BLOOD PRESSURE: 68 MMHG | TEMPERATURE: 97 F | BODY MASS INDEX: 26.14 KG/M2 | HEIGHT: 60 IN | HEART RATE: 81 BPM

## 2021-12-28 DIAGNOSIS — R91.8 NONSPECIFIC ABNORMAL FINDING OF LUNG FIELD: ICD-10-CM

## 2021-12-28 DIAGNOSIS — Z01.812 PRE-OPERATIVE LABORATORY EXAMINATION: ICD-10-CM

## 2021-12-28 LAB — PATHOLOGY CONSULT NOTE: NORMAL

## 2021-12-28 PROCEDURE — 88341 IMHCHEM/IMCYTCHM EA ADD ANTB: CPT | Mod: 91

## 2021-12-28 PROCEDURE — 71045 X-RAY EXAM CHEST 1 VIEW: CPT

## 2021-12-28 PROCEDURE — 99152 MOD SED SAME PHYS/QHP 5/>YRS: CPT

## 2021-12-28 PROCEDURE — 88305 TISSUE EXAM BY PATHOLOGIST: CPT

## 2021-12-28 PROCEDURE — 88342 IMHCHEM/IMCYTCHM 1ST ANTB: CPT

## 2021-12-28 PROCEDURE — 88360 TUMOR IMMUNOHISTOCHEM/MANUAL: CPT

## 2021-12-28 PROCEDURE — 700111 HCHG RX REV CODE 636 W/ 250 OVERRIDE (IP)

## 2021-12-28 PROCEDURE — 160002 HCHG RECOVERY MINUTES (STAT)

## 2021-12-28 PROCEDURE — 88341 IMHCHEM/IMCYTCHM EA ADD ANTB: CPT

## 2021-12-28 PROCEDURE — 700105 HCHG RX REV CODE 258: Performed by: SURGERY

## 2021-12-28 PROCEDURE — 88313 SPECIAL STAINS GROUP 2: CPT

## 2021-12-28 RX ORDER — OXYCODONE HYDROCHLORIDE 5 MG/1
2.5 TABLET ORAL
Status: DISCONTINUED | OUTPATIENT
Start: 2021-12-28 | End: 2021-12-28 | Stop reason: HOSPADM

## 2021-12-28 RX ORDER — MIDAZOLAM HYDROCHLORIDE 1 MG/ML
INJECTION INTRAMUSCULAR; INTRAVENOUS
Status: COMPLETED
Start: 2021-12-28 | End: 2021-12-28

## 2021-12-28 RX ORDER — SODIUM CHLORIDE 9 MG/ML
1000 INJECTION, SOLUTION INTRAVENOUS
Status: COMPLETED | OUTPATIENT
Start: 2021-12-28 | End: 2021-12-28

## 2021-12-28 RX ORDER — SODIUM CHLORIDE 9 MG/ML
500 INJECTION, SOLUTION INTRAVENOUS
Status: ACTIVE | OUTPATIENT
Start: 2021-12-28 | End: 2021-12-28

## 2021-12-28 RX ORDER — ONDANSETRON 2 MG/ML
4 INJECTION INTRAMUSCULAR; INTRAVENOUS PRN
Status: DISCONTINUED | OUTPATIENT
Start: 2021-12-28 | End: 2021-12-28

## 2021-12-28 RX ORDER — HYDROMORPHONE HYDROCHLORIDE 1 MG/ML
0.25 INJECTION, SOLUTION INTRAMUSCULAR; INTRAVENOUS; SUBCUTANEOUS
Status: DISCONTINUED | OUTPATIENT
Start: 2021-12-28 | End: 2021-12-28 | Stop reason: HOSPADM

## 2021-12-28 RX ORDER — MIDAZOLAM HYDROCHLORIDE 1 MG/ML
.5-2 INJECTION INTRAMUSCULAR; INTRAVENOUS PRN
Status: DISCONTINUED | OUTPATIENT
Start: 2021-12-28 | End: 2021-12-28

## 2021-12-28 RX ORDER — ONDANSETRON 2 MG/ML
4 INJECTION INTRAMUSCULAR; INTRAVENOUS EVERY 8 HOURS PRN
Status: DISCONTINUED | OUTPATIENT
Start: 2021-12-28 | End: 2021-12-28 | Stop reason: HOSPADM

## 2021-12-28 RX ADMIN — MIDAZOLAM HYDROCHLORIDE 1 MG: 1 INJECTION, SOLUTION INTRAMUSCULAR; INTRAVENOUS at 13:04

## 2021-12-28 RX ADMIN — FENTANYL CITRATE 50 MCG: 50 INJECTION, SOLUTION INTRAMUSCULAR; INTRAVENOUS at 13:04

## 2021-12-28 RX ADMIN — MIDAZOLAM HYDROCHLORIDE 1 MG: 1 INJECTION INTRAMUSCULAR; INTRAVENOUS at 13:04

## 2021-12-28 RX ADMIN — SODIUM CHLORIDE 1000 ML: 9 INJECTION, SOLUTION INTRAVENOUS at 10:17

## 2021-12-28 ASSESSMENT — FIBROSIS 4 INDEX: FIB4 SCORE: 0.91

## 2021-12-28 NOTE — OR SURGEON
Immediate Post- Operative Note    PostOp Diagnosis: AVERY LUNG NODULE. HX GYN MALIGNANCY. QUESTION OF PRIMARY LUNG CA VS MET.       Procedure(s): CT GUIDED LEFT LUNG BIOPSY      Estimated Blood Loss: <5CC      FINDINGS: NEEDLE CONFIRMED IN AVERY 25 MM LUNG MASS TOWARD LEFT LUNG APEX.    20G CORES X 6 IN FORMALIN.         Complications: MILD PARENCHYMAL HEMORRHAGE ABOUT BX SITE. NO HEMOPTYSIS. NO PTX.           12/28/2021     1:19 PM     Michael Block M.D.

## 2021-12-28 NOTE — OR NURSING
1355- handoff report received from MAYCOL Hyatt.  Patient resting, no complaint of pain/nausea.      0004- report given to MAYCOL Hyatt

## 2021-12-28 NOTE — OR NURSING
1342 Pt over from IR post left lung biopsy. Site CDI and soft, no signs of bleeding. Pt awake and alert, denies pain and nausea. VSS.  1449 Xray at bedside  1500 Xray results back, no sign of pneumothorax. Pt tolerating orals  1504 Called Castro, pt' spouse, and updated him on POC.  1538 Report to Larisa SEVERINO. Pt to Phase II.

## 2021-12-28 NOTE — DISCHARGE INSTRUCTIONS
ACTIVITY: Rest and take it easy for the first 24 hours.  A responsible adult is recommended to remain with you during that time.  It is normal to feel sleepy.  We encourage you to not do anything that requires balance, judgment or coordination.    MILD FLU-LIKE SYMPTOMS ARE NORMAL. YOU MAY EXPERIENCE GENERALIZED MUSCLE ACHES, THROAT IRRITATION, HEADACHE AND/OR SOME NAUSEA.    FOR 24 HOURS DO NOT:  Drive, operate machinery or run household appliances.  Drink beer or alcoholic beverages.   Make important decisions or sign legal documents.    SPECIAL INSTRUCTIONS: Lung Biopsy, Care After  This sheet gives you information about how to care for yourself after your procedure. Your health care provider may also give you more specific instructions depending on the type of biopsy you had. If you have problems or questions, contact your health care provider.  What can I expect after the procedure?  After the procedure, it is common to have:  · A cough.  · A sore throat.  · Pain where a needle, bronchoscope, or incision was used to collect a biopsy sample (biopsy site).  Follow these instructions at home:  Medicines  · Take over-the-counter and prescription medicines only as told by your health care provider.  · Do not drink alcohol if your health care provider tells you not to drink.  · Ask your health care provider if the medicine prescribed to you:  ? Requires you to avoid driving or using heavy machinery.  ? Can cause constipation. You may need to take these actions to prevent or treat constipation:  § Drink enough fluid to keep your urine pale yellow.  § Take over-the-counter or prescription medicines.  § Eat foods that are high in fiber, such as beans, whole grains, and fresh fruits and vegetables.  § Limit foods that are high in fat and processed sugars, such as fried or sweet foods.  · Do not drive for 24 hours if you were given a sedative.  Biopsy site care  · Follow instructions from your health care provider about  how to take care of your biopsy site. Make sure you:  ? Wash your hands with soap and water before and after you change your bandage (dressing). If soap and water are not available, use hand .  ? Change your dressing as told by your health care provider.  ? Leave stitches (sutures), skin glue, or adhesive strips in place. These skin closures may need to stay in place for 2 weeks or longer. If adhesive strip edges start to loosen and curl up, you may trim the loose edges. Do not remove adhesive strips completely unless your health care provider tells you to do that.  · Do not take baths, swim, or use a hot tub until your health care provider approves. Ask your health care provider if you may take showers. You may only be allowed to take sponge baths.  · Check your biopsy site every day for signs of infection. Check for:  ? Redness, swelling, or more pain.  ? Fluid or blood.  ? Warmth.  ? Pus or a bad smell.  General instructions  · Return to your normal activities as told by your health care provider. Ask your health care provider what activities are safe for you.  · It is up to you to get the results of your procedure. Ask your health care provider, or the department that is doing the procedure, when your results will be ready.  · Keep all follow-up visits as told by your health care provider. This is important.  Contact a health care provider if:  · You have a fever.  · You have redness, swelling, or more pain around your biopsy site.  · You have fluid or blood coming from your biopsy site.  · Your biopsy site feels warm to the touch.  · You have pus or a bad smell coming from your biopsy site.  · You have pain that does not get better with medicine.  Get help right away if:  · You cough up blood.  · You have trouble breathing.  · You have chest pain.  · You lose consciousness.  Summary  · After the procedure, it is common to have a sore throat and a cough.  · Return to your normal activities as told by  your health care provider. Ask your health care provider what activities are safe for you.  · Take over-the-counter and prescription medicines only as told by your health care provider.  · Report any unusual symptoms to your health care provider.    DIET: To avoid nausea, slowly advance diet as tolerated, avoiding spicy or greasy foods for the first day.  Add more substantial food to your diet according to your physician's instructions.  INCREASE FLUIDS AND FIBER TO AVOID CONSTIPATION.    SURGICAL DRESSING/BATHING: Keep dressing dry for 24 hours. May remove dressing and shower after 3pm on 12/29, do not need to replace dressing. Do not submerge in water or bath for 7 days.     FOLLOW-UP APPOINTMENT:  A follow-up appointment should be arranged with your doctor in 1 week with Dr. Etienne [phone]; call to schedule.    You should CALL YOUR PHYSICIAN if you develop:  Fever greater than 101 degrees F.  Pain not relieved by medication, or persistent nausea or vomiting.  Excessive bleeding (blood soaking through dressing) or unexpected drainage from the wound.  Extreme redness or swelling around the incision site, drainage of pus or foul smelling drainage.  Inability to urinate or empty your bladder within 8 hours.  Problems with breathing or chest pain.    You should call 911 if you develop problems with breathing or chest pain.  If you are unable to contact your doctor or surgical center, you should go to the nearest emergency room or urgent care center.  Physician's telephone #: [facility] [phone]    If any questions arise, call your doctor.  If your doctor is not available, please feel free to call the Surgical Center at [phone].     A registered nurse may call you a few days after your surgery to see how you are doing after your procedure.    MEDICATIONS: Resume taking daily medication.  Take prescribed pain medication with food.  If no medication is prescribed, you may take non-aspirin pain  medication if needed.  PAIN MEDICATION CAN BE VERY CONSTIPATING.  Take a stool softener or laxative such as senokot, pericolace, or milk of magnesia if needed.      If your physician has prescribed pain medication that includes Acetaminophen (Tylenol), do not take additional Acetaminophen (Tylenol) while taking the prescribed medication.    Depression / Suicide Risk    As you are discharged from this [facility] facility, it is important to learn how to keep safe from harming yourself.    Recognize the warning signs:  · Abrupt changes in personality, positive or negative- including increase in energy   · Giving away possessions  · Change in eating patterns- significant weight changes-  positive or negative  · Change in sleeping patterns- unable to sleep or sleeping all the time   · Unwillingness or inability to communicate  · Depression  · Unusual sadness, discouragement and loneliness  · Talk of wanting to die  · Neglect of personal appearance   · Rebelliousness- reckless behavior  · Withdrawal from people/activities they love  · Confusion- inability to concentrate     If you or a loved one observes any of these behaviors or has concerns about self-harm, here's what you can do:  · Talk about it- your feelings and reasons for harming yourself  · Remove any means that you might use to hurt yourself (examples: pills, rope, extension cords, firearm)  · Get professional help from the community (Mental Health, Substance Abuse, psychological counseling)  · Do not be alone:Call your Safe Contact- someone whom you trust who will be there for you.  · Call your local CRISIS HOTLINE 527-8355 or 558-865-9852  · Call your local Children's Mobile Crisis Response Team [place] (275) 449-0285 or www.Sprout  · Call the toll free National Suicide Prevention Hotlines   · National Suicide Prevention Lifeline 594-472-SYWC (1617)  · National Hope Line Network 800-SUICIDE (747-5673)

## 2021-12-28 NOTE — OR NURSING
1530 Report received from MAYCOL Hyatt.     1600 Pt arrived to Phase II into room 22. AAOx4. VSS. Denies pain and nausea. Left upper chest dressing CDI and soft. Belongings returned to pt bedside. Castro, spouse updated on POC, will come in after 2nd CXR around 1645 for dc instructions.     1640 CXR at bedside.    1700 Discharge instructions reviewed with pt and spouse. All verbalize understanding and demonstrate teach back. Discharge criteria met.      1710 CXR shows no pneumo, meets discharge criteria. PIV removed. Pt dressed and ambulated to bathroom.    1720 Discharged via wheelchair with CNA escort to responsible adult. All belongings with pt.

## 2021-12-30 ENCOUNTER — ANESTHESIA EVENT (OUTPATIENT)
Dept: SURGERY | Facility: MEDICAL CENTER | Age: 76
End: 2021-12-30
Payer: MEDICARE

## 2021-12-30 ENCOUNTER — HOSPITAL ENCOUNTER (OUTPATIENT)
Facility: MEDICAL CENTER | Age: 76
End: 2021-12-30
Attending: SPECIALIST | Admitting: SPECIALIST
Payer: MEDICARE

## 2021-12-30 ENCOUNTER — APPOINTMENT (OUTPATIENT)
Dept: RADIOLOGY | Facility: MEDICAL CENTER | Age: 76
End: 2021-12-30
Attending: SPECIALIST
Payer: MEDICARE

## 2021-12-30 ENCOUNTER — ANESTHESIA (OUTPATIENT)
Dept: SURGERY | Facility: MEDICAL CENTER | Age: 76
End: 2021-12-30
Payer: MEDICARE

## 2021-12-30 VITALS
HEIGHT: 60 IN | WEIGHT: 132.72 LBS | DIASTOLIC BLOOD PRESSURE: 76 MMHG | OXYGEN SATURATION: 94 % | TEMPERATURE: 97.9 F | BODY MASS INDEX: 26.06 KG/M2 | RESPIRATION RATE: 16 BRPM | HEART RATE: 95 BPM | SYSTOLIC BLOOD PRESSURE: 138 MMHG

## 2021-12-30 LAB
ABO GROUP BLD: NORMAL
BLD GP AB SCN SERPL QL: NORMAL
GRAM STN SPEC: NORMAL
RH BLD: NORMAL
SIGNIFICANT IND 70042: NORMAL
SITE SITE: NORMAL
SOURCE SOURCE: NORMAL

## 2021-12-30 PROCEDURE — 160046 HCHG PACU - 1ST 60 MINS PHASE II: Performed by: SPECIALIST

## 2021-12-30 PROCEDURE — 87186 SC STD MICRODIL/AGAR DIL: CPT

## 2021-12-30 PROCEDURE — 86850 RBC ANTIBODY SCREEN: CPT

## 2021-12-30 PROCEDURE — 87205 SMEAR GRAM STAIN: CPT

## 2021-12-30 PROCEDURE — 86901 BLOOD TYPING SEROLOGIC RH(D): CPT

## 2021-12-30 PROCEDURE — 160039 HCHG SURGERY MINUTES - EA ADDL 1 MIN LEVEL 3: Performed by: SPECIALIST

## 2021-12-30 PROCEDURE — C2617 STENT, NON-COR, TEM W/O DEL: HCPCS | Performed by: SPECIALIST

## 2021-12-30 PROCEDURE — 700105 HCHG RX REV CODE 258: Performed by: ANESTHESIOLOGY

## 2021-12-30 PROCEDURE — 160035 HCHG PACU - 1ST 60 MINS PHASE I: Performed by: SPECIALIST

## 2021-12-30 PROCEDURE — 700105 HCHG RX REV CODE 258: Performed by: SPECIALIST

## 2021-12-30 PROCEDURE — 160025 RECOVERY II MINUTES (STATS): Performed by: SPECIALIST

## 2021-12-30 PROCEDURE — C1769 GUIDE WIRE: HCPCS | Performed by: SPECIALIST

## 2021-12-30 PROCEDURE — 700117 HCHG RX CONTRAST REV CODE 255: Performed by: SPECIALIST

## 2021-12-30 PROCEDURE — 700101 HCHG RX REV CODE 250: Performed by: ANESTHESIOLOGY

## 2021-12-30 PROCEDURE — 700111 HCHG RX REV CODE 636 W/ 250 OVERRIDE (IP): Performed by: ANESTHESIOLOGY

## 2021-12-30 PROCEDURE — 86900 BLOOD TYPING SEROLOGIC ABO: CPT

## 2021-12-30 PROCEDURE — 87076 CULTURE ANAEROBE IDENT EACH: CPT

## 2021-12-30 PROCEDURE — 87070 CULTURE OTHR SPECIMN AEROBIC: CPT

## 2021-12-30 PROCEDURE — 160028 HCHG SURGERY MINUTES - 1ST 30 MINS LEVEL 3: Performed by: SPECIALIST

## 2021-12-30 PROCEDURE — 160002 HCHG RECOVERY MINUTES (STAT): Performed by: SPECIALIST

## 2021-12-30 PROCEDURE — 160009 HCHG ANES TIME/MIN: Performed by: SPECIALIST

## 2021-12-30 PROCEDURE — 87077 CULTURE AEROBIC IDENTIFY: CPT

## 2021-12-30 PROCEDURE — 160036 HCHG PACU - EA ADDL 30 MINS PHASE I: Performed by: SPECIALIST

## 2021-12-30 PROCEDURE — C1758 CATHETER, URETERAL: HCPCS | Performed by: SPECIALIST

## 2021-12-30 PROCEDURE — 160048 HCHG OR STATISTICAL LEVEL 1-5: Performed by: SPECIALIST

## 2021-12-30 PROCEDURE — 700101 HCHG RX REV CODE 250: Performed by: SPECIALIST

## 2021-12-30 DEVICE — STENT UROLOGICAL POLARIS 7X22  ULTRA: Type: IMPLANTABLE DEVICE | Site: URETER | Status: FUNCTIONAL

## 2021-12-30 RX ORDER — SUCCINYLCHOLINE CHLORIDE 20 MG/ML
INJECTION INTRAMUSCULAR; INTRAVENOUS PRN
Status: DISCONTINUED | OUTPATIENT
Start: 2021-12-30 | End: 2021-12-30 | Stop reason: SURG

## 2021-12-30 RX ORDER — SODIUM CHLORIDE, SODIUM LACTATE, POTASSIUM CHLORIDE, CALCIUM CHLORIDE 600; 310; 30; 20 MG/100ML; MG/100ML; MG/100ML; MG/100ML
INJECTION, SOLUTION INTRAVENOUS
Status: DISCONTINUED | OUTPATIENT
Start: 2021-12-30 | End: 2021-12-30 | Stop reason: SURG

## 2021-12-30 RX ORDER — PROPOFOL 10 MG/ML
INJECTION, EMULSION INTRAVENOUS PRN
Status: DISCONTINUED | OUTPATIENT
Start: 2021-12-30 | End: 2021-12-30 | Stop reason: SURG

## 2021-12-30 RX ORDER — HYDROMORPHONE HYDROCHLORIDE 1 MG/ML
0.2 INJECTION, SOLUTION INTRAMUSCULAR; INTRAVENOUS; SUBCUTANEOUS
Status: DISCONTINUED | OUTPATIENT
Start: 2021-12-30 | End: 2021-12-30 | Stop reason: HOSPADM

## 2021-12-30 RX ORDER — ONDANSETRON 2 MG/ML
INJECTION INTRAMUSCULAR; INTRAVENOUS PRN
Status: DISCONTINUED | OUTPATIENT
Start: 2021-12-30 | End: 2021-12-30 | Stop reason: SURG

## 2021-12-30 RX ORDER — DEXAMETHASONE SODIUM PHOSPHATE 4 MG/ML
INJECTION, SOLUTION INTRA-ARTICULAR; INTRALESIONAL; INTRAMUSCULAR; INTRAVENOUS; SOFT TISSUE PRN
Status: DISCONTINUED | OUTPATIENT
Start: 2021-12-30 | End: 2021-12-30 | Stop reason: SURG

## 2021-12-30 RX ORDER — LIDOCAINE HYDROCHLORIDE 20 MG/ML
INJECTION, SOLUTION EPIDURAL; INFILTRATION; INTRACAUDAL; PERINEURAL PRN
Status: DISCONTINUED | OUTPATIENT
Start: 2021-12-30 | End: 2021-12-30 | Stop reason: SURG

## 2021-12-30 RX ORDER — HALOPERIDOL 5 MG/ML
1 INJECTION INTRAMUSCULAR
Status: DISCONTINUED | OUTPATIENT
Start: 2021-12-30 | End: 2021-12-30 | Stop reason: HOSPADM

## 2021-12-30 RX ORDER — PHENYLEPHRINE HCL IN 0.9% NACL 0.5 MG/5ML
SYRINGE (ML) INTRAVENOUS PRN
Status: DISCONTINUED | OUTPATIENT
Start: 2021-12-30 | End: 2021-12-30 | Stop reason: SURG

## 2021-12-30 RX ORDER — HYDRALAZINE HYDROCHLORIDE 20 MG/ML
5 INJECTION INTRAMUSCULAR; INTRAVENOUS
Status: DISCONTINUED | OUTPATIENT
Start: 2021-12-30 | End: 2021-12-30 | Stop reason: HOSPADM

## 2021-12-30 RX ORDER — LABETALOL HYDROCHLORIDE 5 MG/ML
5 INJECTION, SOLUTION INTRAVENOUS
Status: DISCONTINUED | OUTPATIENT
Start: 2021-12-30 | End: 2021-12-30 | Stop reason: HOSPADM

## 2021-12-30 RX ORDER — ACETAMINOPHEN 500 MG
1000 TABLET ORAL ONCE
Status: DISCONTINUED | OUTPATIENT
Start: 2021-12-30 | End: 2021-12-30 | Stop reason: HOSPADM

## 2021-12-30 RX ORDER — KETOROLAC TROMETHAMINE 30 MG/ML
INJECTION, SOLUTION INTRAMUSCULAR; INTRAVENOUS PRN
Status: DISCONTINUED | OUTPATIENT
Start: 2021-12-30 | End: 2021-12-30 | Stop reason: SURG

## 2021-12-30 RX ORDER — HYDROMORPHONE HYDROCHLORIDE 1 MG/ML
0.1 INJECTION, SOLUTION INTRAMUSCULAR; INTRAVENOUS; SUBCUTANEOUS
Status: DISCONTINUED | OUTPATIENT
Start: 2021-12-30 | End: 2021-12-30 | Stop reason: HOSPADM

## 2021-12-30 RX ORDER — HYDROMORPHONE HYDROCHLORIDE 1 MG/ML
0.4 INJECTION, SOLUTION INTRAMUSCULAR; INTRAVENOUS; SUBCUTANEOUS
Status: DISCONTINUED | OUTPATIENT
Start: 2021-12-30 | End: 2021-12-30 | Stop reason: HOSPADM

## 2021-12-30 RX ORDER — SODIUM CHLORIDE, SODIUM LACTATE, POTASSIUM CHLORIDE, CALCIUM CHLORIDE 600; 310; 30; 20 MG/100ML; MG/100ML; MG/100ML; MG/100ML
INJECTION, SOLUTION INTRAVENOUS CONTINUOUS
Status: ACTIVE | OUTPATIENT
Start: 2021-12-30 | End: 2021-12-30

## 2021-12-30 RX ORDER — ONDANSETRON 2 MG/ML
4 INJECTION INTRAMUSCULAR; INTRAVENOUS
Status: DISCONTINUED | OUTPATIENT
Start: 2021-12-30 | End: 2021-12-30 | Stop reason: HOSPADM

## 2021-12-30 RX ORDER — CEFAZOLIN SODIUM 1 G/3ML
INJECTION, POWDER, FOR SOLUTION INTRAMUSCULAR; INTRAVENOUS PRN
Status: DISCONTINUED | OUTPATIENT
Start: 2021-12-30 | End: 2021-12-30 | Stop reason: SURG

## 2021-12-30 RX ORDER — SODIUM CHLORIDE, SODIUM LACTATE, POTASSIUM CHLORIDE, CALCIUM CHLORIDE 600; 310; 30; 20 MG/100ML; MG/100ML; MG/100ML; MG/100ML
INJECTION, SOLUTION INTRAVENOUS CONTINUOUS
Status: DISCONTINUED | OUTPATIENT
Start: 2021-12-30 | End: 2021-12-30 | Stop reason: HOSPADM

## 2021-12-30 RX ADMIN — LIDOCAINE HYDROCHLORIDE 100 MG: 20 INJECTION, SOLUTION EPIDURAL; INFILTRATION; INTRACAUDAL at 10:07

## 2021-12-30 RX ADMIN — FENTANYL CITRATE 50 MCG: 50 INJECTION, SOLUTION INTRAMUSCULAR; INTRAVENOUS at 10:10

## 2021-12-30 RX ADMIN — CEFAZOLIN 2 G: 330 INJECTION, POWDER, FOR SOLUTION INTRAMUSCULAR; INTRAVENOUS at 10:14

## 2021-12-30 RX ADMIN — SODIUM CHLORIDE, POTASSIUM CHLORIDE, SODIUM LACTATE AND CALCIUM CHLORIDE: 600; 310; 30; 20 INJECTION, SOLUTION INTRAVENOUS at 08:59

## 2021-12-30 RX ADMIN — PROPOFOL 100 MG: 10 INJECTION, EMULSION INTRAVENOUS at 10:07

## 2021-12-30 RX ADMIN — Medication 100 MCG: at 10:31

## 2021-12-30 RX ADMIN — LIDOCAINE HYDROCHLORIDE 0.5 ML: 10 INJECTION, SOLUTION EPIDURAL; INFILTRATION; INTRACAUDAL; PERINEURAL at 08:59

## 2021-12-30 RX ADMIN — SODIUM CHLORIDE, POTASSIUM CHLORIDE, SODIUM LACTATE AND CALCIUM CHLORIDE: 600; 310; 30; 20 INJECTION, SOLUTION INTRAVENOUS at 10:03

## 2021-12-30 RX ADMIN — FENTANYL CITRATE 50 MCG: 50 INJECTION, SOLUTION INTRAMUSCULAR; INTRAVENOUS at 10:07

## 2021-12-30 RX ADMIN — SUCCINYLCHOLINE CHLORIDE 100 MG: 20 INJECTION, SOLUTION INTRAMUSCULAR; INTRAVENOUS; PARENTERAL at 10:10

## 2021-12-30 RX ADMIN — DEXAMETHASONE SODIUM PHOSPHATE 8 MG: 4 INJECTION, SOLUTION INTRA-ARTICULAR; INTRALESIONAL; INTRAMUSCULAR; INTRAVENOUS; SOFT TISSUE at 10:17

## 2021-12-30 RX ADMIN — ONDANSETRON 4 MG: 2 INJECTION INTRAMUSCULAR; INTRAVENOUS at 10:51

## 2021-12-30 RX ADMIN — KETOROLAC TROMETHAMINE 15 MG: 30 INJECTION, SOLUTION INTRAMUSCULAR at 10:51

## 2021-12-30 RX ADMIN — Medication 200 MCG: at 10:44

## 2021-12-30 RX ADMIN — Medication 100 MCG: at 10:28

## 2021-12-30 RX ADMIN — PROPOFOL 50 MG: 10 INJECTION, EMULSION INTRAVENOUS at 10:10

## 2021-12-30 ASSESSMENT — FIBROSIS 4 INDEX: FIB4 SCORE: 0.91

## 2021-12-30 ASSESSMENT — PAIN SCALES - GENERAL: PAIN_LEVEL: 0

## 2021-12-30 NOTE — ANESTHESIA POSTPROCEDURE EVALUATION
Patient: Nessa Dennis    Procedure Summary     Date: 12/30/21 Room / Location: [place] [address] / SURGERY [facility]    Anesthesia Start: 1003 Anesthesia Stop: 1101    Procedure: CYSTOSCOPY, WITH URETERAL STENT INSERTION - EXCHANGE (Left Ureter) Diagnosis: (CERVICAL CANCER, HYDRONEPHROSIS)    Surgeons: Adam Hays M.D. Responsible Provider: Roby Ortega M.D.    Anesthesia Type: general ASA Status: 2          Final Anesthesia Type: general  Last vitals  BP   Blood Pressure : 138/76    Temp   36.6 °C (97.9 °F)    Pulse   95   Resp   16    SpO2   94 %      Anesthesia Post Evaluation    Patient location during evaluation: PACU  Patient participation: complete - patient participated  Level of consciousness: awake and alert  Pain score: 0    Airway patency: patent  Anesthetic complications: no  Cardiovascular status: hemodynamically stable  Respiratory status: acceptable  Hydration status: euvolemic    PONV: none          No complications documented.     Nurse Pain Score: 0 (NPRS)

## 2021-12-30 NOTE — OP REPORT
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   History of cervical cancer status post radical hysterectomy in the remote past  History of recurrent cervical cancer status post concurrent chemoradiation therapy.      PostOp Diagnosis: Same as above  Calcified left ureteral stent      Procedure(s):  CYSTOSCOPY, WITH URETERAL STENT INSERTION - EXCHANGE - Wound Class: Clean Contaminated    Surgeon(s):  Adam Hays M.D.    Anesthesiologist/Type of Anesthesia:  Anesthesiologist: Roby Ortega M.D./General    Surgical Staff:  Circulator: Florence Junior R.N.  Scrub Person: Barbra River  Radiology Technologist: Sneha Trinidad    Specimens removed if any:  ID Type Source Tests Collected by Time Destination   1 : left ureteral stent Other Other AEROBIC/ANAEROBIC CULTURE (SURGERY) Adam Hays M.D. 12/30/2021 10:30 AM        Estimated Blood Loss: None    Findings: Examination of the bladder revealed trabeculated bladder.  The left ureteral stent was calcified.  It was difficult to cannulate through the ureteral stent to hold the position of the ureter.  There is still persistent Hydro on the left renal pelvis.  There is no evidence of tumor in the bladder.  Grade 1 radiation cystitis is noted that has not changed from the past.    Complications: None    Indication: Mrs. Dennis is a very pleasant 76-year-old female who is well-known to me.  Patient had history of cervical cancer diagnosed in May 2005.  Which she underwent a radical hysterectomy with bilateral salpingo-oophorectomy with bilateral pelvic lymphadenectomy.  She did not had a recurrence she unfortunately had a recurrence 03/2015.  She underwent a primary concurrent chemoradiation therapy for recurrent disease.  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  In addition also patient developed a ureteral stricture which required a left  ureteral neocystotomy.  Since then she has had a persistent stricture and has had ureteral stent placed.  Patient is now 7 years out from her recurrent disease.  She is due for her stent exchange.  Last stent exchange was approximately 3 months ago.  Thus patient is being brought back to the operating today to undergo ureteral stent exchange under direct fluoroscopy.    Risk benefits and rational procedures were reviewed with the patient in detail patient's understanding of these risk and wished to proceed with surgery as planned.    Procedure: After achieving adequate anesthesia patient was prepped and draped in place modified dorsolithotomy position.  30 degree cystoscope was inserted transurethrally.  We need to use a 0 degree scope because the left ureteroneocystostomy was high up in the dome of the left bladder.  The ureteral stent was located.  The tip of the stent was exceedingly calcified.  Using the alligator forceps the ureteral stent was then retrieved and brought out and exteriorized in the urethra orifice.  Under direct fluoroscopy I then tried to insert a guidewire through the ureteral stent to hold the position.  However I was unable to intubate the guidewire through the ureteral stent due to the calcification.  Thus we remove the ureteral stent and attempted to place a guidewire through the left ureteroneocystostomy orifice site.  I was not able to pass this thus an open-ended catheter was used to intubate the left ureteral neocystotomy site under direct fluoroscopy.  Once I was able to hold the position a retrograde pyelogram was performed to confirm the position of the left ureter and the left renal pelvis.  There was no evidence of a stricture but there is still persistent Hydro.  The strictures at the left ureteroneocystostomy site thus we elected to place a new left ureteral stent 7 x 22.  After the guidewire was placed the open-ended catheter was removed and a 7 x 22 double-J ureteral stent was  then threaded up to the left ureter it appears to be in good position.  The proximal end of the 7 x 22 double-J ureteral stent was in the left renal pelvis.  Once this was confirmed the bladder was subsequently empty.  The stent that was previously placed was sent for culture.    Patient tolerated procedure well without any difficulties was extubated and transferred to the PACU in stable condition.

## 2021-12-30 NOTE — DISCHARGE INSTRUCTIONS
ACTIVITY: Rest and take it easy for the first 24 hours.  A responsible adult is recommended to remain with you during that time.  It is normal to feel sleepy.  We encourage you to not do anything that requires balance, judgment or coordination.    MILD FLU-LIKE SYMPTOMS ARE NORMAL. YOU MAY EXPERIENCE GENERALIZED MUSCLE ACHES, THROAT IRRITATION, HEADACHE AND/OR SOME NAUSEA.    FOR 24 HOURS DO NOT:  Drive, operate machinery or run household appliances.  Drink beer or alcoholic beverages.   Make important decisions or sign legal documents.    SPECIAL INSTRUCTIONS:    Cystoscopy  Cystoscopy is a procedure that is used to help diagnose and sometimes treat conditions that affect the lower urinary tract. The lower urinary tract includes the bladder and the urethra. The urethra is the tube that drains urine from the bladder. Cystoscopy is done using a thin, tube-shaped instrument with a light and camera at the end (cystoscope). The cystoscope may be hard or flexible, depending on the goal of the procedure. The cystoscope is inserted through the urethra, into the bladder.  Cystoscopy may be recommended if you have:  · Urinary tract infections that keep coming back.  · Blood in the urine (hematuria).  · An inability to control when you urinate (urinary incontinence) or an overactive bladder.  · Unusual cells found in a urine sample.  · A blockage in the urethra, such as a urinary stone.  · Painful urination.  · An abnormality in the bladder found during an intravenous pyelogram (IVP) or CT scan.  Cystoscopy may also be done to remove a sample of tissue to be examined under a microscope (biopsy).  Tell a health care provider about:  · Any allergies you have.  · All medicines you are taking, including vitamins, herbs, eye drops, creams, and over-the-counter medicines.  · Any problems you or family members have had with anesthetic medicines.  · Any blood disorders you have.  · Any surgeries you have had.  · Any medical  conditions you have.  · Whether you are pregnant or may be pregnant.  What are the risks?  Generally, this is a safe procedure. However, problems may occur, including:  · Infection.  · Bleeding.  · Allergic reactions to medicines.  · Damage to other structures or organs.  What happens before the procedure?  · Ask your health care provider about:  ? Changing or stopping your regular medicines. This is especially important if you are taking diabetes medicines or blood thinners.  ? Taking medicines such as aspirin and ibuprofen. These medicines can thin your blood. Do not take these medicines unless your health care provider tells you to take them.  ? Taking over-the-counter medicines, vitamins, herbs, and supplements.  · Follow instructions from your health care provider about eating or drinking restrictions.  · Ask your health care provider what steps will be taken to help prevent infection. These may include:  ? Washing skin with a germ-killing soap.  ? Taking antibiotic medicine.  · You may have an exam or testing, such as:  ? X-rays of the bladder, urethra, or kidneys.  ? Urine tests to check for signs of infection.  · Plan to have someone take you home from the hospital or clinic.  What happens during the procedure?  · You will be given one or more of the following:  ? A medicine to help you relax (sedative).  ? A medicine to numb the area (local anesthetic).  · The area around the opening of your urethra will be cleaned.  · The cystoscope will be passed through your urethra into your bladder.  · Germ-free (sterile) fluid will flow through the cystoscope to fill your bladder. The fluid will stretch your bladder so that your health care provider can clearly examine your bladder walls.  · Your doctor will look at the urethra and bladder. Your doctor may take a biopsy or remove stones.  · The cystoscope will be removed, and your bladder will be emptied.  The procedure may vary among health care providers and  [facility].  What can I expect after the procedure?  After the procedure, it is common to have:  · Some soreness or pain in your abdomen and urethra.  · Urinary symptoms. These include:  ? Mild pain or burning when you urinate. Pain should stop within a few minutes after you urinate. This may last for up to 1 week.  ? A small amount of blood in your urine for several days.  ? Feeling like you need to urinate but producing only a small amount of urine.  Follow these instructions at home:  Medicines  · Take over-the-counter and prescription medicines only as told by your health care provider.  · If you were prescribed an antibiotic medicine, take it as told by your health care provider. Do not stop taking the antibiotic even if you start to feel better.  General instructions  · Return to your normal activities as told by your health care provider. Ask your health care provider what activities are safe for you.  · Do not drive for 24 hours if you were given a sedative during your procedure.  · Watch for any blood in your urine. If the amount of blood in your urine increases, call your health care provider.  · Follow instructions from your health care provider about eating or drinking restrictions.  · If a tissue sample was removed for testing (biopsy) during your procedure, it is up to you to get your test results. Ask your health care provider, or the department that is doing the test, when your results will be ready.  · Drink enough fluid to keep your urine pale yellow.  · Keep all follow-up visits as told by your health care provider. This is important.  Contact a health care provider if you:  · Have pain that gets worse or does not get better with medicine, especially pain when you urinate.  · Have trouble urinating.  · Have more blood in your urine.  Get help right away if you:  · Have blood clots in your urine.  · Have abdominal pain.  · Have a fever or chills.  · Are unable to urinate.  Summary  · Cystoscopy is a  procedure that is used to help diagnose and sometimes treat conditions that affect the lower urinary tract.  · Cystoscopy is done using a thin, tube-shaped instrument with a light and camera at the end.  · After the procedure, it is common to have some soreness or pain in your abdomen and urethra.  · Watch for any blood in your urine. If the amount of blood in your urine increases, call your health care provider.  · If you were prescribed an antibiotic medicine, take it as told by your health care provider. Do not stop taking the antibiotic even if you start to feel better.  This information is not intended to replace advice given to you by your health care provider. Make sure you discuss any questions you have with your health care provider.  Document Released: 12/15/2001 Document Revised: 12/10/2019 Document Reviewed: 12/10/2019  [employer] Patient Education © 2020 [employer]    DIET: To avoid nausea, slowly advance diet as tolerated, avoiding spicy or greasy foods for the first day.  Add more substantial food to your diet according to your physician's instructions.  Babies can be fed formula or breast milk as soon as they are hungry.  INCREASE FLUIDS AND FIBER TO AVOID CONSTIPATION.    SURGICAL DRESSING/BATHING: May shower     FOLLOW-UP APPOINTMENT:  A follow-up appointment should be arranged with your doctor in 1-2 weeks; call to schedule.    You should CALL YOUR PHYSICIAN if you develop:  Fever greater than 101 degrees F.  Pain not relieved by medication, or persistent nausea or vomiting.  Excessive bleeding (blood soaking through dressing) or unexpected drainage from the wound.  Extreme redness or swelling around the incision site, drainage of pus or foul smelling drainage.  Inability to urinate or empty your bladder within 8 hours.  Problems with breathing or chest pain.    You should call 911 if you develop problems with breathing or chest pain.  If you are unable to contact your doctor or surgical center,  you should go to the nearest emergency room or urgent care center.  Physician's telephone #: [phone]    If any questions arise, call your doctor.  If your doctor is not available, please feel free to call the Surgical Center at [phone].     A registered nurse may call you a few days after your surgery to see how you are doing after your procedure.    MEDICATIONS: Resume taking daily medication.  Take prescribed pain medication with food.  If no medication is prescribed, you may take non-aspirin pain medication if needed.  PAIN MEDICATION CAN BE VERY CONSTIPATING.  Take a stool softener or laxative such as senokot, pericolace, or milk of magnesia if needed.    Can take over the counter medications for pain    If your physician has prescribed pain medication that includes Acetaminophen (Tylenol), do not take additional Acetaminophen (Tylenol) while taking the prescribed medication.    Depression / Suicide Risk    As you are discharged from this [facility] facility, it is important to learn how to keep safe from harming yourself.    Recognize the warning signs:  · Abrupt changes in personality, positive or negative- including increase in energy   · Giving away possessions  · Change in eating patterns- significant weight changes-  positive or negative  · Change in sleeping patterns- unable to sleep or sleeping all the time   · Unwillingness or inability to communicate  · Depression  · Unusual sadness, discouragement and loneliness  · Talk of wanting to die  · Neglect of personal appearance   · Rebelliousness- reckless behavior  · Withdrawal from people/activities they love  · Confusion- inability to concentrate     If you or a loved one observes any of these behaviors or has concerns about self-harm, here's what you can do:  · Talk about it- your feelings and reasons for harming yourself  · Remove any means that you might use to hurt yourself (examples: pills, rope, extension cords, firearm)  · Get  professional help from the community (Mental Health, Substance Abuse, psychological counseling)  · Do not be alone:Call your Safe Contact- someone whom you trust who will be there for you.  · Call your local CRISIS HOTLINE 514-9905 or 407-467-3064  · Call your local Children's Mobile Crisis Response Team [place] (401) 179-1846 or www.[employer]  · Call the toll free National Suicide Prevention Hotlines   · National Suicide Prevention Lifeline 625-721-SCHZ (1924)  · National Hope Line Network 800-SUICIDE (163-4838)

## 2021-12-30 NOTE — ANESTHESIA PREPROCEDURE EVALUATION
Case: [id number] Date/Time: 12/30/21 0945    Procedure: CYSTOSCOPY, WITH URETERAL STENT INSERTION - EXCHANGE (Left )    Pre-op diagnosis: CERVICAL CANCER, HYDRONEPHROSIS    Location: [facility] OR 17 / SURGERY [facility]    Surgeons: Adam Hays M.D.          Relevant Problems   CARDIAC   (positive) HTN (hypertension)         (positive) Hydronephrosis due to obstruction of ureter stent       Physical Exam    Airway   Mallampati: II  TM distance: >3 FB  Neck ROM: full       Cardiovascular - normal exam  Rhythm: regular  Rate: normal  (-) murmur     Dental - normal exam           Pulmonary - normal exam  Breath sounds clear to auscultation     Abdominal    Neurological - normal exam                 Anesthesia Plan    ASA 2       Plan - general       Airway plan will be LMA          Induction: intravenous    Postoperative Plan: Postoperative administration of opioids is intended.    Pertinent diagnostic labs and testing reviewed    Informed Consent:    Anesthetic plan and risks discussed with patient.    Use of blood products discussed with: patient whom consented to blood products.

## 2021-12-30 NOTE — OR SURGEON
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   History of cervical cancer status post radical hysterectomy in the remote past  History of recurrent cervical cancer status post concurrent chemoradiation therapy.      PostOp Diagnosis: Same as above  Calcified left ureteral stent      Procedure(s):  CYSTOSCOPY, WITH URETERAL STENT INSERTION - EXCHANGE - Wound Class: Clean Contaminated    Surgeon(s):  Adam Hays M.D.    Anesthesiologist/Type of Anesthesia:  Anesthesiologist: Roby Ortega M.D./General    Surgical Staff:  Circulator: Florence Junior R.N.  Scrub Person: Barbra River  Radiology Technologist: Sneha Trinidad    Specimens removed if any:  ID Type Source Tests Collected by Time Destination   1 : left ureteral stent Other Other AEROBIC/ANAEROBIC CULTURE (SURGERY) Adam aHys M.D. 12/30/2021 10:30 AM        Estimated Blood Loss: None    Findings: Examination of the bladder revealed trabeculated bladder.  The left ureteral stent was calcified.  It was difficult to cannulate through the ureteral stent to hold the position of the ureter.  There is still persistent Hydro on the left renal pelvis.  There is no evidence of tumor in the bladder.  Grade 1 radiation cystitis is noted that has not changed from the past.    Complications: None        12/30/2021 11:07 AM Adam Hays M.D.

## 2021-12-30 NOTE — ANESTHESIA PROCEDURE NOTES
Airway    Date/Time: 12/30/2021 10:11 AM  Performed by: Roby Ortega M.D.  Authorized by: Roby Ortega M.D.     Location:  OR  Urgency:  Elective  Indications for Airway Management:  Anesthesia      Spontaneous Ventilation: absent    Sedation Level:  Deep  Preoxygenated: Yes    Patient Position:  Sniffing  Mask Difficulty Assessment:  1 - vent by mask  Final Airway Type:  Endotracheal airway  Final Endotracheal Airway:  ETT  Cuffed: Yes    Technique Used for Successful ETT Placement:  Direct laryngoscopy    Insertion Site:  Oral  Blade Type:  Mary Beth  Laryngoscope Blade/Videolaryngoscope Blade Size:  3  ETT Size (mm):  7.0  Measured from:  Teeth  ETT to Teeth (cm):  20  Placement Verified by: auscultation and capnometry    Cormack-Lehane Classification:  Grade I - full view of glottis  Number of Attempts at Approach:  1  Ventilation Between Attempts:  BVM  Number of Other Approaches Attempted:  1  Unsuccessful Airway(s) Attempted:  SGA

## 2022-01-13 ENCOUNTER — HOSPITAL ENCOUNTER (OUTPATIENT)
Dept: PULMONOLOGY | Facility: MEDICAL CENTER | Age: 77
End: 2022-01-13
Attending: SURGERY
Payer: MEDICARE

## 2022-01-13 PROCEDURE — 94726 PLETHYSMOGRAPHY LUNG VOLUMES: CPT | Mod: 26 | Performed by: INTERNAL MEDICINE

## 2022-01-13 PROCEDURE — 94729 DIFFUSING CAPACITY: CPT | Mod: 26 | Performed by: INTERNAL MEDICINE

## 2022-01-13 PROCEDURE — 94060 EVALUATION OF WHEEZING: CPT | Mod: 26 | Performed by: INTERNAL MEDICINE

## 2022-01-13 PROCEDURE — 94729 DIFFUSING CAPACITY: CPT

## 2022-01-13 PROCEDURE — 94060 EVALUATION OF WHEEZING: CPT

## 2022-01-13 PROCEDURE — 94726 PLETHYSMOGRAPHY LUNG VOLUMES: CPT

## 2022-01-13 ASSESSMENT — PULMONARY FUNCTION TESTS
FEV1/FVC_PERCENT_CHANGE: 67
FVC_PERCENT_PREDICTED: 107
FVC_LLN: 1.90
FEV1_PERCENT_PREDICTED: 104
FEV1/FVC: 74.96
FEV1/FVC_PERCENT_LLN: 65.01
FVC_LLN: 1.90
FEV1_PERCENT_PREDICTED: 108
FEV1/FVC_PERCENT_PREDICTED: 96
FVC: 2.44
FEV1/FVC_PERCENT_CHANGE: 0
FEV1: 1.9
FEV1_PERCENT_CHANGE: -3
FVC_PERCENT_PREDICTED: 111
FEV1_PREDICTED: 1.75
FEV1/FVC_PERCENT_PREDICTED: 96
FEV1/FVC: 75.41
FEV1/FVC: 75
FEV1/FVC_PERCENT_LLN: 65.01
FEV1/FVC_PREDICTED: 77.86
FEV1_LLN: 1.46
FEV1/FVC_PERCENT_PREDICTED: 97
FEV1: 1.84
FEV1/FVC: 75.30
FEV1_LLN: 1.46
FEV1/FVC_PERCENT_PREDICTED: 77
FEV1_PERCENT_CHANGE: -2
FVC: 2.53
FEV1/FVC_PERCENT_PREDICTED: 97
FVC_PREDICTED: 2.27

## 2022-01-14 NOTE — PROCEDURES
DATE OF SERVICE:  01/13/2022     The results of this test meet the ATS standard for acceptability and   repeatability.     SPIROMETRY:  There is no obstruction manifested in this test by an FEV1/FVC of   74.92%.  Noted that the FEV1 was 104% of predicted in the post-bronchodilator   arm, which correlates with 1.84 liters.     There was no response to bronchodilators in this test, although this does not   preclude the patient from receiving treatment with them.     LUNG VOLUMES:  The lung volumes show air trapping manifested by a TLC of 130%   (5.82 liters), there is also air trapping manifested by an RV of 151% (3.22).     DIFFUSION CAPACITY:  There is no diffusion impairment in this test manifested   by a DLCO of 84%.     The flow volume loop does not fully correlate with information above.     CONCLUSION: There is evidence of air trapping and hyperinflation without meeting criteria for obstruction.  It is recommended to perform an anatomical/imaging and clinical correlation   with probable emphysematous or cystic changes or similar entities that could correlate with the   above information above.        ______________________________  MD MIKE Calderón/MARLY    DD:  01/13/2022 18:28  DT:  01/13/2022 18:38    Job#:  [id number]

## 2022-01-14 NOTE — PROCEDURES
DATE OF SERVICE:  01/13/2022   PLEASE DELETE THIS ENCOUNTER IS A DUPLICATE DICTATION!    ______________________________  MD MIKE Calderón/MARLY    DD:  01/13/2022 18:26  DT:  01/13/2022 18:33    Job#:  [id number]

## 2022-01-27 ENCOUNTER — HOSPITAL ENCOUNTER (OUTPATIENT)
Dept: LAB | Facility: MEDICAL CENTER | Age: 77
End: 2022-01-27
Payer: MEDICARE

## 2022-01-27 LAB
APPEARANCE UR: ABNORMAL
BACTERIA #/AREA URNS HPF: ABNORMAL /HPF
BILIRUB UR QL STRIP.AUTO: NEGATIVE
COLOR UR: YELLOW
EPI CELLS #/AREA URNS HPF: ABNORMAL /HPF
GLUCOSE UR STRIP.AUTO-MCNC: NEGATIVE MG/DL
HYALINE CASTS #/AREA URNS LPF: ABNORMAL /LPF
KETONES UR STRIP.AUTO-MCNC: NEGATIVE MG/DL
LEUKOCYTE ESTERASE UR QL STRIP.AUTO: ABNORMAL
MICRO URNS: ABNORMAL
NITRITE UR QL STRIP.AUTO: POSITIVE
PH UR STRIP.AUTO: 6.5 [PH] (ref 5–8)
PROT UR QL STRIP: 30 MG/DL
RBC # URNS HPF: ABNORMAL /HPF
RBC UR QL AUTO: ABNORMAL
SP GR UR STRIP.AUTO: 1.02
UROBILINOGEN UR STRIP.AUTO-MCNC: 0.2 MG/DL
WBC #/AREA URNS HPF: ABNORMAL /HPF

## 2022-01-27 PROCEDURE — 87086 URINE CULTURE/COLONY COUNT: CPT

## 2022-01-27 PROCEDURE — 81001 URINALYSIS AUTO W/SCOPE: CPT

## 2022-02-07 ENCOUNTER — HOSPITAL ENCOUNTER (OUTPATIENT)
Dept: LAB | Facility: MEDICAL CENTER | Age: 77
End: 2022-02-07
Payer: MEDICARE

## 2022-02-07 LAB
APPEARANCE UR: ABNORMAL
BACTERIA #/AREA URNS HPF: ABNORMAL /HPF
BILIRUB UR QL STRIP.AUTO: NEGATIVE
COLOR UR: YELLOW
EPI CELLS #/AREA URNS HPF: ABNORMAL /HPF
GLUCOSE UR STRIP.AUTO-MCNC: NEGATIVE MG/DL
KETONES UR STRIP.AUTO-MCNC: NEGATIVE MG/DL
LEUKOCYTE ESTERASE UR QL STRIP.AUTO: ABNORMAL
MICRO URNS: ABNORMAL
NITRITE UR QL STRIP.AUTO: NEGATIVE
PH UR STRIP.AUTO: 6 [PH] (ref 5–8)
PROT UR QL STRIP: 100 MG/DL
RBC # URNS HPF: ABNORMAL /HPF
RBC UR QL AUTO: ABNORMAL
SP GR UR STRIP.AUTO: 1.02
UROBILINOGEN UR STRIP.AUTO-MCNC: 1 MG/DL
WBC #/AREA URNS HPF: ABNORMAL /HPF

## 2022-02-07 PROCEDURE — 87086 URINE CULTURE/COLONY COUNT: CPT

## 2022-02-07 PROCEDURE — 81001 URINALYSIS AUTO W/SCOPE: CPT

## 2022-02-09 ENCOUNTER — PRE-ADMISSION TESTING (OUTPATIENT)
Dept: ADMISSIONS | Facility: MEDICAL CENTER | Age: 77
DRG: 167 | End: 2022-02-09
Attending: SURGERY
Payer: MEDICARE

## 2022-02-09 DIAGNOSIS — Z01.812 PRE-OPERATIVE LABORATORY EXAMINATION: ICD-10-CM

## 2022-02-09 LAB
ABO GROUP BLD: NORMAL
ANION GAP SERPL CALC-SCNC: 14 MMOL/L (ref 7–16)
BACTERIA UR CULT: ABNORMAL
BACTERIA UR CULT: ABNORMAL
BASOPHILS # BLD AUTO: 0.4 % (ref 0–1.8)
BASOPHILS # BLD: 0.07 K/UL (ref 0–0.12)
BLD GP AB SCN SERPL QL: NORMAL
BUN SERPL-MCNC: 18 MG/DL (ref 8–22)
CALCIUM SERPL-MCNC: 10 MG/DL (ref 8.5–10.5)
CHLORIDE SERPL-SCNC: 98 MMOL/L (ref 96–112)
CO2 SERPL-SCNC: 20 MMOL/L (ref 20–33)
CREAT SERPL-MCNC: 0.73 MG/DL (ref 0.5–1.4)
EOSINOPHIL # BLD AUTO: 0.07 K/UL (ref 0–0.51)
EOSINOPHIL NFR BLD: 0.4 % (ref 0–6.9)
ERYTHROCYTE [DISTWIDTH] IN BLOOD BY AUTOMATED COUNT: 45.7 FL (ref 35.9–50)
GLUCOSE SERPL-MCNC: 101 MG/DL (ref 65–99)
HCT VFR BLD AUTO: 38.6 % (ref 37–47)
HGB BLD-MCNC: 12.1 G/DL (ref 12–16)
IMM GRANULOCYTES # BLD AUTO: 0.13 K/UL (ref 0–0.11)
IMM GRANULOCYTES NFR BLD AUTO: 0.7 % (ref 0–0.9)
LYMPHOCYTES # BLD AUTO: 1.27 K/UL (ref 1–4.8)
LYMPHOCYTES NFR BLD: 7 % (ref 22–41)
MCH RBC QN AUTO: 24.2 PG (ref 27–33)
MCHC RBC AUTO-ENTMCNC: 31.3 G/DL (ref 33.6–35)
MCV RBC AUTO: 77.2 FL (ref 81.4–97.8)
MONOCYTES # BLD AUTO: 1.41 K/UL (ref 0–0.85)
MONOCYTES NFR BLD AUTO: 7.8 % (ref 0–13.4)
NEUTROPHILS # BLD AUTO: 15.2 K/UL (ref 2–7.15)
NEUTROPHILS NFR BLD: 83.7 % (ref 44–72)
NRBC # BLD AUTO: 0 K/UL
NRBC BLD-RTO: 0 /100 WBC
PLATELET # BLD AUTO: 262 K/UL (ref 164–446)
PMV BLD AUTO: 9.8 FL (ref 9–12.9)
POTASSIUM SERPL-SCNC: 3.9 MMOL/L (ref 3.6–5.5)
RBC # BLD AUTO: 5 M/UL (ref 4.2–5.4)
RH BLD: NORMAL
SIGNIFICANT IND 70042: ABNORMAL
SITE SITE: ABNORMAL
SODIUM SERPL-SCNC: 132 MMOL/L (ref 135–145)
SOURCE SOURCE: ABNORMAL
WBC # BLD AUTO: 18.2 K/UL (ref 4.8–10.8)

## 2022-02-09 PROCEDURE — 80048 BASIC METABOLIC PNL TOTAL CA: CPT

## 2022-02-09 PROCEDURE — 86901 BLOOD TYPING SEROLOGIC RH(D): CPT

## 2022-02-09 PROCEDURE — 86850 RBC ANTIBODY SCREEN: CPT

## 2022-02-09 PROCEDURE — 85025 COMPLETE CBC W/AUTO DIFF WBC: CPT

## 2022-02-09 PROCEDURE — 86900 BLOOD TYPING SEROLOGIC ABO: CPT

## 2022-02-09 PROCEDURE — 36415 COLL VENOUS BLD VENIPUNCTURE: CPT

## 2022-02-09 ASSESSMENT — FIBROSIS 4 INDEX: FIB4 SCORE: 0.91

## 2022-02-18 ENCOUNTER — PRE-ADMISSION TESTING (OUTPATIENT)
Dept: ADMISSIONS | Facility: MEDICAL CENTER | Age: 77
DRG: 167 | End: 2022-02-18
Attending: SURGERY
Payer: MEDICARE

## 2022-02-18 DIAGNOSIS — Z01.812 PRE-OPERATIVE LABORATORY EXAMINATION: ICD-10-CM

## 2022-02-18 LAB — COVID ORDER STATUS COVID19: NORMAL

## 2022-02-18 PROCEDURE — U0005 INFEC AGEN DETEC AMPLI PROBE: HCPCS

## 2022-02-18 PROCEDURE — U0003 INFECTIOUS AGENT DETECTION BY NUCLEIC ACID (DNA OR RNA); SEVERE ACUTE RESPIRATORY SYNDROME CORONAVIRUS 2 (SARS-COV-2) (CORONAVIRUS DISEASE [COVID-19]), AMPLIFIED PROBE TECHNIQUE, MAKING USE OF HIGH THROUGHPUT TECHNOLOGIES AS DESCRIBED BY CMS-2020-01-R: HCPCS

## 2022-02-19 LAB
SARS-COV-2 RNA RESP QL NAA+PROBE: NOTDETECTED
SPECIMEN SOURCE: NORMAL

## 2022-02-22 ENCOUNTER — ANESTHESIA EVENT (OUTPATIENT)
Dept: SURGERY | Facility: MEDICAL CENTER | Age: 77
DRG: 167 | End: 2022-02-22
Payer: MEDICARE

## 2022-02-23 ENCOUNTER — HOSPITAL ENCOUNTER (INPATIENT)
Facility: MEDICAL CENTER | Age: 77
LOS: 2 days | DRG: 167 | End: 2022-02-25
Attending: SURGERY | Admitting: SURGERY
Payer: MEDICARE

## 2022-02-23 ENCOUNTER — ANESTHESIA (OUTPATIENT)
Dept: SURGERY | Facility: MEDICAL CENTER | Age: 77
DRG: 167 | End: 2022-02-23
Payer: MEDICARE

## 2022-02-23 ENCOUNTER — APPOINTMENT (OUTPATIENT)
Dept: RADIOLOGY | Facility: MEDICAL CENTER | Age: 77
DRG: 167 | End: 2022-02-23
Attending: SURGERY
Payer: MEDICARE

## 2022-02-23 DIAGNOSIS — G89.18 ACUTE POSTOPERATIVE PAIN: ICD-10-CM

## 2022-02-23 LAB
ANION GAP SERPL CALC-SCNC: 11 MMOL/L (ref 7–16)
BASOPHILS # BLD AUTO: 0.2 % (ref 0–1.8)
BASOPHILS # BLD: 0.02 K/UL (ref 0–0.12)
BUN SERPL-MCNC: 14 MG/DL (ref 8–22)
CALCIUM SERPL-MCNC: 9.2 MG/DL (ref 8.5–10.5)
CHLORIDE SERPL-SCNC: 107 MMOL/L (ref 96–112)
CO2 SERPL-SCNC: 20 MMOL/L (ref 20–33)
CREAT SERPL-MCNC: 0.99 MG/DL (ref 0.5–1.4)
CYTOLOGY REG CYTOL: NORMAL
EOSINOPHIL # BLD AUTO: 0.06 K/UL (ref 0–0.51)
EOSINOPHIL NFR BLD: 0.5 % (ref 0–6.9)
ERYTHROCYTE [DISTWIDTH] IN BLOOD BY AUTOMATED COUNT: 45.9 FL (ref 35.9–50)
GLUCOSE SERPL-MCNC: 180 MG/DL (ref 65–99)
HCT VFR BLD AUTO: 33.7 % (ref 37–47)
HGB BLD-MCNC: 10.9 G/DL (ref 12–16)
IMM GRANULOCYTES # BLD AUTO: 0.09 K/UL (ref 0–0.11)
IMM GRANULOCYTES NFR BLD AUTO: 0.7 % (ref 0–0.9)
LYMPHOCYTES # BLD AUTO: 1.4 K/UL (ref 1–4.8)
LYMPHOCYTES NFR BLD: 10.7 % (ref 22–41)
MCH RBC QN AUTO: 24.2 PG (ref 27–33)
MCHC RBC AUTO-ENTMCNC: 32.3 G/DL (ref 33.6–35)
MCV RBC AUTO: 74.9 FL (ref 81.4–97.8)
MONOCYTES # BLD AUTO: 0.55 K/UL (ref 0–0.85)
MONOCYTES NFR BLD AUTO: 4.2 % (ref 0–13.4)
NEUTROPHILS # BLD AUTO: 11.02 K/UL (ref 2–7.15)
NEUTROPHILS NFR BLD: 83.7 % (ref 44–72)
NRBC # BLD AUTO: 0 K/UL
NRBC BLD-RTO: 0 /100 WBC
PATHOLOGY CONSULT NOTE: NORMAL
PLATELET # BLD AUTO: 337 K/UL (ref 164–446)
PMV BLD AUTO: 9 FL (ref 9–12.9)
POTASSIUM SERPL-SCNC: 4.2 MMOL/L (ref 3.6–5.5)
RBC # BLD AUTO: 4.5 M/UL (ref 4.2–5.4)
SODIUM SERPL-SCNC: 138 MMOL/L (ref 135–145)
WBC # BLD AUTO: 13.1 K/UL (ref 4.8–10.8)

## 2022-02-23 PROCEDURE — 700105 HCHG RX REV CODE 258: Performed by: STUDENT IN AN ORGANIZED HEALTH CARE EDUCATION/TRAINING PROGRAM

## 2022-02-23 PROCEDURE — 302098 PASTE RING (FLAT): Performed by: STUDENT IN AN ORGANIZED HEALTH CARE EDUCATION/TRAINING PROGRAM

## 2022-02-23 PROCEDURE — A9270 NON-COVERED ITEM OR SERVICE: HCPCS | Performed by: SURGERY

## 2022-02-23 PROCEDURE — 88307 TISSUE EXAM BY PATHOLOGIST: CPT

## 2022-02-23 PROCEDURE — 501581 HCHG TROCAR: Performed by: SURGERY

## 2022-02-23 PROCEDURE — 160009 HCHG ANES TIME/MIN: Performed by: SURGERY

## 2022-02-23 PROCEDURE — 700102 HCHG RX REV CODE 250 W/ 637 OVERRIDE(OP): Performed by: SURGERY

## 2022-02-23 PROCEDURE — 700111 HCHG RX REV CODE 636 W/ 250 OVERRIDE (IP): Performed by: STUDENT IN AN ORGANIZED HEALTH CARE EDUCATION/TRAINING PROGRAM

## 2022-02-23 PROCEDURE — 85025 COMPLETE CBC W/AUTO DIFF WBC: CPT

## 2022-02-23 PROCEDURE — 160048 HCHG OR STATISTICAL LEVEL 1-5: Performed by: SURGERY

## 2022-02-23 PROCEDURE — C1729 CATH, DRAINAGE: HCPCS | Performed by: SURGERY

## 2022-02-23 PROCEDURE — 160041 HCHG SURGERY MINUTES - EA ADDL 1 MIN LEVEL 4: Performed by: SURGERY

## 2022-02-23 PROCEDURE — 302111 WAFER OST 2.25IN N IMG RD 2 PC (BARRIER): Performed by: STUDENT IN AN ORGANIZED HEALTH CARE EDUCATION/TRAINING PROGRAM

## 2022-02-23 PROCEDURE — 770001 HCHG ROOM/CARE - MED/SURG/GYN PRIV*

## 2022-02-23 PROCEDURE — 501574 HCHG TROCAR, SMTH CAN&SEAL 5: Performed by: SURGERY

## 2022-02-23 PROCEDURE — 07B74ZX EXCISION OF THORAX LYMPHATIC, PERCUTANEOUS ENDOSCOPIC APPROACH, DIAGNOSTIC: ICD-10-PCS | Performed by: SURGERY

## 2022-02-23 PROCEDURE — 700102 HCHG RX REV CODE 250 W/ 637 OVERRIDE(OP): Performed by: STUDENT IN AN ORGANIZED HEALTH CARE EDUCATION/TRAINING PROGRAM

## 2022-02-23 PROCEDURE — 88305 TISSUE EXAM BY PATHOLOGIST: CPT

## 2022-02-23 PROCEDURE — A9270 NON-COVERED ITEM OR SERVICE: HCPCS | Performed by: STUDENT IN AN ORGANIZED HEALTH CARE EDUCATION/TRAINING PROGRAM

## 2022-02-23 PROCEDURE — 700105 HCHG RX REV CODE 258: Performed by: SURGERY

## 2022-02-23 PROCEDURE — 500512 HCHG ENDO PEANUT: Performed by: SURGERY

## 2022-02-23 PROCEDURE — 0BBG4ZX EXCISION OF LEFT UPPER LUNG LOBE, PERCUTANEOUS ENDOSCOPIC APPROACH, DIAGNOSTIC: ICD-10-PCS | Performed by: SURGERY

## 2022-02-23 PROCEDURE — 700101 HCHG RX REV CODE 250: Performed by: STUDENT IN AN ORGANIZED HEALTH CARE EDUCATION/TRAINING PROGRAM

## 2022-02-23 PROCEDURE — 501838 HCHG SUTURE GENERAL: Performed by: SURGERY

## 2022-02-23 PROCEDURE — 36415 COLL VENOUS BLD VENIPUNCTURE: CPT

## 2022-02-23 PROCEDURE — 502704 HCHG DEVICE, LIGASURE IMPACT: Performed by: SURGERY

## 2022-02-23 PROCEDURE — 71045 X-RAY EXAM CHEST 1 VIEW: CPT

## 2022-02-23 PROCEDURE — 88112 CYTOPATH CELL ENHANCE TECH: CPT

## 2022-02-23 PROCEDURE — 700101 HCHG RX REV CODE 250: Performed by: SURGERY

## 2022-02-23 PROCEDURE — 80048 BASIC METABOLIC PNL TOTAL CA: CPT

## 2022-02-23 PROCEDURE — 160035 HCHG PACU - 1ST 60 MINS PHASE I: Performed by: SURGERY

## 2022-02-23 PROCEDURE — 501570 HCHG TROCAR, SEPARATOR: Performed by: SURGERY

## 2022-02-23 PROCEDURE — 500800 HCHG LAPAROSCOPIC J/L HOOK: Performed by: SURGERY

## 2022-02-23 PROCEDURE — 160002 HCHG RECOVERY MINUTES (STAT): Performed by: SURGERY

## 2022-02-23 PROCEDURE — 160029 HCHG SURGERY MINUTES - 1ST 30 MINS LEVEL 4: Performed by: SURGERY

## 2022-02-23 PROCEDURE — 88313 SPECIAL STAINS GROUP 2: CPT

## 2022-02-23 PROCEDURE — 302102 BAG OST N IMG 2.25IN 2PC (FECAL): Performed by: STUDENT IN AN ORGANIZED HEALTH CARE EDUCATION/TRAINING PROGRAM

## 2022-02-23 PROCEDURE — 160036 HCHG PACU - EA ADDL 30 MINS PHASE I: Performed by: SURGERY

## 2022-02-23 PROCEDURE — C1725 CATH, TRANSLUMIN NON-LASER: HCPCS | Performed by: SURGERY

## 2022-02-23 PROCEDURE — 500002 HCHG ADHESIVE, DERMABOND: Performed by: SURGERY

## 2022-02-23 RX ORDER — ONDANSETRON 2 MG/ML
4 INJECTION INTRAMUSCULAR; INTRAVENOUS
Status: DISCONTINUED | OUTPATIENT
Start: 2022-02-23 | End: 2022-02-23 | Stop reason: HOSPADM

## 2022-02-23 RX ORDER — SCOLOPAMINE TRANSDERMAL SYSTEM 1 MG/1
1 PATCH, EXTENDED RELEASE TRANSDERMAL
Status: DISCONTINUED | OUTPATIENT
Start: 2022-02-23 | End: 2022-02-25 | Stop reason: HOSPADM

## 2022-02-23 RX ORDER — ONDANSETRON 2 MG/ML
INJECTION INTRAMUSCULAR; INTRAVENOUS PRN
Status: DISCONTINUED | OUTPATIENT
Start: 2022-02-23 | End: 2022-02-23 | Stop reason: SURG

## 2022-02-23 RX ORDER — IBUPROFEN 200 MG
800 TABLET ORAL 3 TIMES DAILY
Status: DISCONTINUED | OUTPATIENT
Start: 2022-02-23 | End: 2022-02-25 | Stop reason: HOSPADM

## 2022-02-23 RX ORDER — LIDOCAINE HYDROCHLORIDE 20 MG/ML
INJECTION, SOLUTION EPIDURAL; INFILTRATION; INTRACAUDAL; PERINEURAL PRN
Status: DISCONTINUED | OUTPATIENT
Start: 2022-02-23 | End: 2022-02-23 | Stop reason: SURG

## 2022-02-23 RX ORDER — HALOPERIDOL 5 MG/ML
1 INJECTION INTRAMUSCULAR
Status: DISCONTINUED | OUTPATIENT
Start: 2022-02-23 | End: 2022-02-23 | Stop reason: HOSPADM

## 2022-02-23 RX ORDER — OXYCODONE HCL 5 MG/5 ML
5 SOLUTION, ORAL ORAL
Status: DISCONTINUED | OUTPATIENT
Start: 2022-02-23 | End: 2022-02-23 | Stop reason: HOSPADM

## 2022-02-23 RX ORDER — SODIUM CHLORIDE, SODIUM LACTATE, POTASSIUM CHLORIDE, CALCIUM CHLORIDE 600; 310; 30; 20 MG/100ML; MG/100ML; MG/100ML; MG/100ML
INJECTION, SOLUTION INTRAVENOUS CONTINUOUS
Status: ACTIVE | OUTPATIENT
Start: 2022-02-23 | End: 2022-02-23

## 2022-02-23 RX ORDER — ROCURONIUM BROMIDE 10 MG/ML
INJECTION, SOLUTION INTRAVENOUS PRN
Status: DISCONTINUED | OUTPATIENT
Start: 2022-02-23 | End: 2022-02-23 | Stop reason: SURG

## 2022-02-23 RX ORDER — HALOPERIDOL 5 MG/ML
1 INJECTION INTRAMUSCULAR EVERY 6 HOURS PRN
Status: DISCONTINUED | OUTPATIENT
Start: 2022-02-23 | End: 2022-02-25 | Stop reason: HOSPADM

## 2022-02-23 RX ORDER — HYDROMORPHONE HYDROCHLORIDE 1 MG/ML
0.4 INJECTION, SOLUTION INTRAMUSCULAR; INTRAVENOUS; SUBCUTANEOUS
Status: DISCONTINUED | OUTPATIENT
Start: 2022-02-23 | End: 2022-02-23 | Stop reason: HOSPADM

## 2022-02-23 RX ORDER — OXYCODONE HYDROCHLORIDE 5 MG/1
5 TABLET ORAL
Status: DISCONTINUED | OUTPATIENT
Start: 2022-02-23 | End: 2022-02-25 | Stop reason: HOSPADM

## 2022-02-23 RX ORDER — OXYCODONE HYDROCHLORIDE 5 MG/1
10 TABLET ORAL
Status: DISCONTINUED | OUTPATIENT
Start: 2022-02-23 | End: 2022-02-25 | Stop reason: HOSPADM

## 2022-02-23 RX ORDER — DEXAMETHASONE SODIUM PHOSPHATE 4 MG/ML
INJECTION, SOLUTION INTRA-ARTICULAR; INTRALESIONAL; INTRAMUSCULAR; INTRAVENOUS; SOFT TISSUE PRN
Status: DISCONTINUED | OUTPATIENT
Start: 2022-02-23 | End: 2022-02-23 | Stop reason: SURG

## 2022-02-23 RX ORDER — BUPIVACAINE HYDROCHLORIDE AND EPINEPHRINE 5; 5 MG/ML; UG/ML
INJECTION, SOLUTION EPIDURAL; INTRACAUDAL; PERINEURAL
Status: DISCONTINUED | OUTPATIENT
Start: 2022-02-23 | End: 2022-02-23 | Stop reason: HOSPADM

## 2022-02-23 RX ORDER — CEFAZOLIN SODIUM 1 G/3ML
INJECTION, POWDER, FOR SOLUTION INTRAMUSCULAR; INTRAVENOUS PRN
Status: DISCONTINUED | OUTPATIENT
Start: 2022-02-23 | End: 2022-02-23 | Stop reason: SURG

## 2022-02-23 RX ORDER — DEXAMETHASONE SODIUM PHOSPHATE 4 MG/ML
4 INJECTION, SOLUTION INTRA-ARTICULAR; INTRALESIONAL; INTRAMUSCULAR; INTRAVENOUS; SOFT TISSUE
Status: COMPLETED | OUTPATIENT
Start: 2022-02-23 | End: 2022-02-25

## 2022-02-23 RX ORDER — OXYCODONE HCL 5 MG/5 ML
10 SOLUTION, ORAL ORAL
Status: DISCONTINUED | OUTPATIENT
Start: 2022-02-23 | End: 2022-02-23 | Stop reason: HOSPADM

## 2022-02-23 RX ORDER — DEXTROSE MONOHYDRATE, SODIUM CHLORIDE, AND POTASSIUM CHLORIDE 50; 1.49; 4.5 G/1000ML; G/1000ML; G/1000ML
INJECTION, SOLUTION INTRAVENOUS CONTINUOUS
Status: DISCONTINUED | OUTPATIENT
Start: 2022-02-23 | End: 2022-02-24

## 2022-02-23 RX ORDER — ACETAMINOPHEN 500 MG
1000 TABLET ORAL EVERY 6 HOURS PRN
Status: DISCONTINUED | OUTPATIENT
Start: 2022-02-28 | End: 2022-02-25 | Stop reason: HOSPADM

## 2022-02-23 RX ORDER — HYDROMORPHONE HYDROCHLORIDE 1 MG/ML
0.2 INJECTION, SOLUTION INTRAMUSCULAR; INTRAVENOUS; SUBCUTANEOUS
Status: DISCONTINUED | OUTPATIENT
Start: 2022-02-23 | End: 2022-02-23 | Stop reason: HOSPADM

## 2022-02-23 RX ORDER — ACETAMINOPHEN 325 MG/1
TABLET ORAL PRN
Status: DISCONTINUED | OUTPATIENT
Start: 2022-02-23 | End: 2022-02-23 | Stop reason: SURG

## 2022-02-23 RX ORDER — PHENYLEPHRINE HCL IN 0.9% NACL 0.5 MG/5ML
SYRINGE (ML) INTRAVENOUS PRN
Status: DISCONTINUED | OUTPATIENT
Start: 2022-02-23 | End: 2022-02-23 | Stop reason: SURG

## 2022-02-23 RX ORDER — ACETAMINOPHEN 500 MG
TABLET ORAL
Status: COMPLETED
Start: 2022-02-23 | End: 2022-02-24

## 2022-02-23 RX ORDER — IPRATROPIUM BROMIDE AND ALBUTEROL SULFATE 2.5; .5 MG/3ML; MG/3ML
3 SOLUTION RESPIRATORY (INHALATION)
Status: DISCONTINUED | OUTPATIENT
Start: 2022-02-23 | End: 2022-02-25 | Stop reason: HOSPADM

## 2022-02-23 RX ORDER — ONDANSETRON 2 MG/ML
4 INJECTION INTRAMUSCULAR; INTRAVENOUS EVERY 4 HOURS PRN
Status: DISCONTINUED | OUTPATIENT
Start: 2022-02-23 | End: 2022-02-25 | Stop reason: HOSPADM

## 2022-02-23 RX ORDER — HYDROMORPHONE HYDROCHLORIDE 1 MG/ML
0.1 INJECTION, SOLUTION INTRAMUSCULAR; INTRAVENOUS; SUBCUTANEOUS
Status: DISCONTINUED | OUTPATIENT
Start: 2022-02-23 | End: 2022-02-23 | Stop reason: HOSPADM

## 2022-02-23 RX ORDER — IBUPROFEN 200 MG
800 TABLET ORAL 3 TIMES DAILY PRN
Status: DISCONTINUED | OUTPATIENT
Start: 2022-02-28 | End: 2022-02-25 | Stop reason: HOSPADM

## 2022-02-23 RX ORDER — ACETAMINOPHEN 500 MG
1000 TABLET ORAL EVERY 6 HOURS
Status: DISCONTINUED | OUTPATIENT
Start: 2022-02-23 | End: 2022-02-25 | Stop reason: HOSPADM

## 2022-02-23 RX ORDER — ESMOLOL HYDROCHLORIDE 10 MG/ML
INJECTION INTRAVENOUS PRN
Status: DISCONTINUED | OUTPATIENT
Start: 2022-02-23 | End: 2022-02-23 | Stop reason: SURG

## 2022-02-23 RX ORDER — HYDROMORPHONE HYDROCHLORIDE 1 MG/ML
0.5 INJECTION, SOLUTION INTRAMUSCULAR; INTRAVENOUS; SUBCUTANEOUS
Status: DISCONTINUED | OUTPATIENT
Start: 2022-02-23 | End: 2022-02-25 | Stop reason: HOSPADM

## 2022-02-23 RX ADMIN — IPRATROPIUM BROMIDE AND ALBUTEROL SULFATE 3 ML: .5; 2.5 SOLUTION RESPIRATORY (INHALATION) at 09:48

## 2022-02-23 RX ADMIN — IBUPROFEN 800 MG: 200 TABLET, FILM COATED ORAL at 18:33

## 2022-02-23 RX ADMIN — FENTANYL CITRATE 50 MCG: 50 INJECTION, SOLUTION INTRAMUSCULAR; INTRAVENOUS at 07:47

## 2022-02-23 RX ADMIN — OXYCODONE 10 MG: 5 TABLET ORAL at 18:31

## 2022-02-23 RX ADMIN — Medication 100 MCG: at 08:23

## 2022-02-23 RX ADMIN — PROPOFOL 50 MG: 10 INJECTION, EMULSION INTRAVENOUS at 07:57

## 2022-02-23 RX ADMIN — HYDROMORPHONE HYDROCHLORIDE 0.2 MG: 1 INJECTION, SOLUTION INTRAMUSCULAR; INTRAVENOUS; SUBCUTANEOUS at 09:32

## 2022-02-23 RX ADMIN — LIDOCAINE HYDROCHLORIDE 60 MG: 20 INJECTION, SOLUTION EPIDURAL; INFILTRATION; INTRACAUDAL at 07:40

## 2022-02-23 RX ADMIN — LIDOCAINE HYDROCHLORIDE 0.5 ML: 10 INJECTION, SOLUTION EPIDURAL; INFILTRATION; INTRACAUDAL; PERINEURAL at 07:03

## 2022-02-23 RX ADMIN — SUGAMMADEX 200 MG: 100 INJECTION, SOLUTION INTRAVENOUS at 09:14

## 2022-02-23 RX ADMIN — HYDROMORPHONE HYDROCHLORIDE 0.2 MG: 1 INJECTION, SOLUTION INTRAMUSCULAR; INTRAVENOUS; SUBCUTANEOUS at 09:38

## 2022-02-23 RX ADMIN — FENTANYL CITRATE 100 MCG: 50 INJECTION, SOLUTION INTRAMUSCULAR; INTRAVENOUS at 07:40

## 2022-02-23 RX ADMIN — HALOPERIDOL LACTATE 1 MG: 5 INJECTION, SOLUTION INTRAMUSCULAR at 09:33

## 2022-02-23 RX ADMIN — ROCURONIUM BROMIDE 60 MG: 10 INJECTION, SOLUTION INTRAVENOUS at 07:40

## 2022-02-23 RX ADMIN — Medication 100 MCG: at 08:30

## 2022-02-23 RX ADMIN — CEFAZOLIN 2 G: 330 INJECTION, POWDER, FOR SOLUTION INTRAMUSCULAR; INTRAVENOUS at 07:40

## 2022-02-23 RX ADMIN — PROPOFOL 50 MG: 10 INJECTION, EMULSION INTRAVENOUS at 08:13

## 2022-02-23 RX ADMIN — ONDANSETRON 4 MG: 2 INJECTION INTRAMUSCULAR; INTRAVENOUS at 08:42

## 2022-02-23 RX ADMIN — PROPOFOL 60 MG: 10 INJECTION, EMULSION INTRAVENOUS at 07:40

## 2022-02-23 RX ADMIN — ESMOLOL HYDROCHLORIDE 20 MG: 100 INJECTION, SOLUTION INTRAVENOUS at 08:35

## 2022-02-23 RX ADMIN — FENTANYL CITRATE 100 MCG: 50 INJECTION, SOLUTION INTRAMUSCULAR; INTRAVENOUS at 08:29

## 2022-02-23 RX ADMIN — ROCURONIUM BROMIDE 20 MG: 10 INJECTION, SOLUTION INTRAVENOUS at 08:57

## 2022-02-23 RX ADMIN — ACETAMINOPHEN 1000 MG: 500 TABLET ORAL at 18:31

## 2022-02-23 RX ADMIN — PHENYLEPHRINE HYDROCHLORIDE 50 MCG/MIN: 10 INJECTION INTRAVENOUS at 08:38

## 2022-02-23 RX ADMIN — DEXAMETHASONE SODIUM PHOSPHATE 4 MG: 4 INJECTION, SOLUTION INTRA-ARTICULAR; INTRALESIONAL; INTRAMUSCULAR; INTRAVENOUS; SOFT TISSUE at 08:42

## 2022-02-23 RX ADMIN — POTASSIUM CHLORIDE, DEXTROSE MONOHYDRATE AND SODIUM CHLORIDE: 150; 5; 450 INJECTION, SOLUTION INTRAVENOUS at 15:04

## 2022-02-23 RX ADMIN — OXYCODONE 10 MG: 5 TABLET ORAL at 15:03

## 2022-02-23 RX ADMIN — ACETAMINOPHEN 500 MG: 325 TABLET, FILM COATED ORAL at 07:15

## 2022-02-23 RX ADMIN — SODIUM CHLORIDE, POTASSIUM CHLORIDE, SODIUM LACTATE AND CALCIUM CHLORIDE: 600; 310; 30; 20 INJECTION, SOLUTION INTRAVENOUS at 07:03

## 2022-02-23 RX ADMIN — FENTANYL CITRATE 50 MCG: 50 INJECTION, SOLUTION INTRAMUSCULAR; INTRAVENOUS at 07:56

## 2022-02-23 RX ADMIN — Medication 100 MCG: at 08:04

## 2022-02-23 RX ADMIN — HYDROMORPHONE HYDROCHLORIDE 0.2 MG: 1 INJECTION, SOLUTION INTRAMUSCULAR; INTRAVENOUS; SUBCUTANEOUS at 10:48

## 2022-02-23 ASSESSMENT — COGNITIVE AND FUNCTIONAL STATUS - GENERAL
MOVING TO AND FROM BED TO CHAIR: A LOT
SUGGESTED CMS G CODE MODIFIER MOBILITY: CL
HELP NEEDED FOR BATHING: A LOT
CLIMB 3 TO 5 STEPS WITH RAILING: A LITTLE
TOILETING: A LITTLE
SUGGESTED CMS G CODE MODIFIER DAILY ACTIVITY: CK
TURNING FROM BACK TO SIDE WHILE IN FLAT BAD: A LOT
WALKING IN HOSPITAL ROOM: A LITTLE
DAILY ACTIVITIY SCORE: 17
MOBILITY SCORE: 14
STANDING UP FROM CHAIR USING ARMS: A LOT
DRESSING REGULAR UPPER BODY CLOTHING: A LOT
DRESSING REGULAR LOWER BODY CLOTHING: A LOT
MOVING FROM LYING ON BACK TO SITTING ON SIDE OF FLAT BED: A LOT

## 2022-02-23 ASSESSMENT — LIFESTYLE VARIABLES
HAVE PEOPLE ANNOYED YOU BY CRITICIZING YOUR DRINKING: NO
EVER HAD A DRINK FIRST THING IN THE MORNING TO STEADY YOUR NERVES TO GET RID OF A HANGOVER: NO
HAVE YOU EVER FELT YOU SHOULD CUT DOWN ON YOUR DRINKING: NO
TOTAL SCORE: 0
CONSUMPTION TOTAL: NEGATIVE
TOTAL SCORE: 0
HOW MANY TIMES IN THE PAST YEAR HAVE YOU HAD 5 OR MORE DRINKS IN A DAY: 0
EVER FELT BAD OR GUILTY ABOUT YOUR DRINKING: NO
ON A TYPICAL DAY WHEN YOU DRINK ALCOHOL HOW MANY DRINKS DO YOU HAVE: 0
ALCOHOL_USE: NO
TOTAL SCORE: 0
AVERAGE NUMBER OF DAYS PER WEEK YOU HAVE A DRINK CONTAINING ALCOHOL: 0

## 2022-02-23 ASSESSMENT — PAIN DESCRIPTION - PAIN TYPE
TYPE: SURGICAL PAIN

## 2022-02-23 ASSESSMENT — FIBROSIS 4 INDEX: FIB4 SCORE: 0.93

## 2022-02-23 NOTE — PROGRESS NOTES
Report received from PACU RN  Assessment complete.  A&O x 4. Patient calls appropriately.  Patient ambulates with x1 assist and FWW. Bed alarm on.   Patient has 7/10 pain. Pain managed with prescribed medications.  Denies N&V. Tolerating regular diet diet.  Left chest tube to -20 cm, patent, no air leak present.   + void via friend, - flatus, - BM.  Patient denies SOB.  SCD's on.    Review plan with of care with patient. Call light and personal belongings within reach. Hourly rounding in place. All needs met at this time.

## 2022-02-23 NOTE — OP REPORT
Operative Report    Date: 2/23/2022    PreOp Diagnosis:   1.  Lung mass, adenocarcinoma not otherwise specified  2.  History of cervical adenocarcinoma  3.  Hypertension  4.  Hyperlipidemia      PostOp Diagnosis: Same      Procedure(s):  1.  Left THORACOSCOPY - Wound Class: Clean with Drain  2.  SAMPLING, MEDIASTINAL LYMPH NODES - - Wound Class: Clean  3.  SUBLOBAR RESECTION UPPER LOBE - Wound Class: Clean Contaminated  4.  Posterior rib blocks, multiple    Surgeon(s):  Chriss Etienne M.D.    Assistant:   FERNANDO Tipton  (a surgical first assistant was required for the entire procedure for help with patient positioning, incision placement, management of the laparoscopic camera and retraction of critical structures, incision closure)    Anesthesiologist/Type of Anesthesia:  Anesthesiologist: Juan Fernandez M.D./General    Surgical Staff:  Circulator: Taylor Schafer R.N.  Scrub Person: Kate Barton    Specimens removed if any:  ID Type Source Tests Collected by Time Destination   1 :  Left Pleural luid Body Fluid Pleural Fluid MISCELLANEOUS TEST Chriss Etienne M.D. 2/23/2022  8:11 AM    A : Mediastinal Nodule Tissue Lung PATHOLOGY SPECIMEN Chriss Etienne M.D. 2/23/2022  8:29 AM    B : Left Upper Lobe Wedge Tissue Lung PATHOLOGY SPECIMEN Chriss Etienne M.D. 2/23/2022  8:37 AM    C : Station 7 -Left Tissue Lung PATHOLOGY SPECIMEN Chriss Etienne M.D. 2/23/2022  8:46 AM    D : Station 5 - Left #1 Tissue Lung PATHOLOGY SPECIMEN Chriss Etienne M.D. 2/23/2022  8:48 AM    E : Station 5 - Left #2 Tissue Lung PATHOLOGY SPECIMEN Chriss Etienne M.D. 2/23/2022  8:51 AM      Drain:   28 Iraqi straight chest tube    Estimated Blood Loss: 25 mL    Findings: Apical left upper lobe mass in keeping with findings on preoperative imaging which was invading the medial mediastinal parietal pleura.  There was a moderate left pleural effusion.  No definite signs of pleural metastasis, but there was one area  sampled as a possible parietal pleural metastasis.    Complications: None noted    Outcome: Transferred to PACU in stable condition    Indications:  76-year-old female with a history of cervical cancer treated many years ago with surgery and then chemoradiation who was found to have a an apical left upper lobe lung mass on surveillance imaging for which she was referred.  The lesion was PET avid, but histology was unable to differentiate between primary lung cancer and her cervical cancer (both adenocarcinoma) based on needle biopsy and immunohistochemistry alone.  Recommended proceeding with left thoracoscopy, sublobar resection, and mediastinal lymph node sampling as noted above.  This was discussed with her in detail including the risk, benefits, and alternatives, she wished to proceed.    Procedure in detail:   The patient was taken the operating room and placed on the operating table in supine position.  General endotracheal anesthesia was induced.  Appropriate monitoring lines were placed.  The patient was then placed in right lateral decubitus position exposing the left chest.  Care was taken to pad all pressure points.  A sterile prep and drape over the left chest was performed in the usual fashion.  A timeout was performed.    Following injection of local anesthetic, a 5 mm incision was made in the posterior axillary line approximately eighth intercostal space.  A 5 mm thoracoscopic port was introduced, followed by 5 mm 30 degree thoracoscope.  The left pleural space was examined.  There was a moderate pleural effusion, which was suctioned away.  A portion of this was sent for cytology.  A 12 mm port was then placed in the anterior axillary line approximately 6 intercostal space, a 5 mm port was placed in the anterior axillary approximate the fourth intercostal space, and a 5 mm port was placed posteriorly for help with retraction.  The left pleural space was examined.  There was no definite signs of  parietal pleural malignant dissemination but there was a small nodule in the medial aspect of the left pleural space along the mediastinum.  This was harvested and sent for permanent pathology.  The apex of the left lung was examined and the mass seen on preoperative imaging was noted.  It was moderately adherent to the mediastinal tissue along the proximal aortic arch.  This was able to be  using a combination of blunt dissection and the 5 mm laparoscopic LigaSure.  Metal clips were placed around the area of resection in the mediastinum for possible use later as radiation markers.  The apex of the left upper lobe was then resected using a generous wedge technique, with the 60 mm Endo AGNIESZKA stapler and blue loads.  Once the wedge was  from the left upper lobe, it was removed through the 12 mm port which was slightly enlarged, using an endoscopic retrieval bag.  This was passed off as permanent specimen.  Grossly the margins appeared adequate.  Station 7 lymph nodes were then identified and harvested for permanent specimen.  Station 5 lymph nodes were also harvested for permanent specimen, and were noted to be slightly enlarged.  Hemostasis was ensured.  The staple line and the mediastinal lymph node harvest sites were then coated using Vistaseal liquid pulmonary sealant.  A 28 Syriac straight chest tube was placed in an apicoposterior position and sutured in standard fashion with 0 silk.  The lung was allowed to reinflate under direct vision and came up well.  The incisions were then closed in layers with 2-0 and 4-0 Vicryl as well as Dermabond.  An appropriate chest tube dressing was applied.  Multiple posterior rib blocks were performed in usual fashion.  This concluded the procedure.  The patient was then allowed to emerge from general anesthesia was extubated taken the PACU in stable condition.    Sponge and needle counts were correct at the end of the case.       Chriss Etienne M.D.  [facility]  [phone]      2/23/2022 9:22 AM Chriss Etienne M.D.

## 2022-02-23 NOTE — OR NURSING
Pre op admission completed.  Pt educated on surgical plan of care, all questions answered.  Bed in low position and call light within reach.  Hourly rounding in place.  This RN performed excellent hand hygiene and wore a surgical mask at all times.  Pt wore a mask at all times except when doing oral and nasal triple aim care.     Pt self caths and orders them from [employer] (on line).  She stated she brought a few catheters with her.  Pt has an ostomy and did not bring any supplies.

## 2022-02-23 NOTE — ANESTHESIA POSTPROCEDURE EVALUATION
Patient: Nessa Dennis    Procedure Summary     Date: 02/23/22 Room / Location: [place] [address] / SURGERY [facility]    Anesthesia Start: 0735 Anesthesia Stop: 0925    Procedures:       THORACOSCOPY (Left Chest)      SAMPLING, MULTIPLE LYMPH NODES - MEDIASTINAL (Chest)      LOBECTOMY - SUBLOBAR RESECTION UPPER LOBE (Left Chest) Diagnosis: (LUNG MASS)    Surgeons: Chriss Etienne M.D. Responsible Provider: Juan Fernandez M.D.    Anesthesia Type: general ASA Status: 3          Final Anesthesia Type: general  Last vitals  BP   Blood Pressure : 122/68    Temp   37 °C (98.6 °F)    Pulse   100   Resp   19    SpO2   97 %      Anesthesia Post Evaluation    Patient location during evaluation: PACU  Patient participation: complete - patient participated  Level of consciousness: awake and alert    Airway patency: patent  Anesthetic complications: no  Cardiovascular status: hemodynamically stable  Respiratory status: acceptable  Hydration status: euvolemic    PONV: none          No complications documented.     Nurse Pain Score: 4 (NPRS)

## 2022-02-23 NOTE — OR NURSING
Patient is awake, alert, oriented, vital signs stable on 4L oxymask, denies nausea, states pain is tolerable, CT to -[id number] 100ml of serosanguinous output, no air leak. Report given to Gloria ZAMORAU RN    Spouse updated,

## 2022-02-23 NOTE — PROGRESS NOTES
4 Eyes Skin Assessment Completed by Gloria, RN and MAYCOL Lees.    Head WDL  Ears WDL  Nose WDL  Mouth WDL  Neck WDL  Breast/Chest x 3 lap sites with dermabond to left chest, approximated, KERLINE. Left chest tube with dressing, CDI.   Shoulder Blades WDL  Spine WDL  (R) Arm/Elbow/Hand WDL  (L) Arm/Elbow/Hand WDL  Abdomen WDL  Groin WDL  Scrotum/Coccyx/Buttocks Redness, Blanching and Discoloration, bruising/petechia to coccyx  (R) Leg WDL  (L) Leg WDL  (R) Heel/Foot/Toe Redness and Blanching  (L) Heel/Foot/Toe Redness and Blanching          Devices In Places Blood Pressure Cuff, Pulse Ox, Greene, SCD's and Oxy Mask      Interventions In Place Pillows and Pressure Redistribution Mattress    Possible Skin Injury No    Pictures Uploaded Into Epic N/A  Wound Consult Placed N/A  RN Wound Prevention Protocol Ordered No

## 2022-02-23 NOTE — OR NURSING
Patient is awake, alert, oriented, vital signs stable, requiring 4L oxymask, weaned from 8L simple mask after a duoneb, cough and deep breath encouragement and pain control. Chest tube to -[id number], no air leak observed.      Dr Fernandez has been to bedside to assess patient intermittently throughout the recovery process. Patient's spouse has been updated.     Pending room placement

## 2022-02-23 NOTE — ANESTHESIA PROCEDURE NOTES
Airway    Date/Time: 2/23/2022 7:43 AM  Performed by: Juan Fernandez M.D.  Authorized by: Juan Fernandez M.D.     Location:  OR  Urgency:  Elective  Indications for Airway Management:  Anesthesia      Spontaneous Ventilation: absent    Sedation Level:  Deep  Preoxygenated: Yes    Patient Position:  Sniffing  Mask Difficulty Assessment:  1 - vent by mask  Final Airway Type:  Endotracheal airway  Final Endotracheal Airway:  ETT - double lumen left with ONE LUNG VENTILATION  Cuffed: Yes    Technique Used for Successful ETT Placement:  Video laryngoscopy    Insertion Site:  Oral  Blade Type:  Glide  Laryngoscope Blade/Videolaryngoscope Blade Size:  3  ETT Double Lumen (fr):  35  Measured from:  Teeth  ETT to Teeth (cm):  29  Placement Verified by: bronchoscopy and capnometry    Cormack-Lehane Classification:  Grade I - full view of glottis  Number of Attempts at Approach:  1

## 2022-02-23 NOTE — ANESTHESIA PROCEDURE NOTES
Arterial Line  Performed by: Juan Fernandez M.D.  Authorized by: Juan Fernandez M.D.     Start Time:  2/23/2022 7:50 AM  End Time:  2/23/2022 7:53 AM  Localization: ultrasound guidance  Image captured, interpreted and electronically stored.  Patient Location:  OR  Indication: continuous blood pressure monitoring        Catheter Size:  20 G  Seldinger Technique?: Yes    Laterality:  Right  Site:  Radial artery  Line Secured:  Antimicrobial disc, tape and transparent dressing  Events: patient tolerated procedure well with no complications

## 2022-02-23 NOTE — ANESTHESIA TIME REPORT
Anesthesia Start and Stop Event Times     Date Time Event    2/23/2022 0727 Ready for Procedure     0735 Anesthesia Start     0925 Anesthesia Stop        Responsible Staff  02/23/22    Name Role Begin End    Min JIMMY Fernandez M.D. Anesth 0735 0925        Preop Diagnosis (Free Text):  Pre-op Diagnosis     LUNG MASS        Preop Diagnosis (Codes):    Premium Reason  Non-Premium    Comments:

## 2022-02-23 NOTE — ANESTHESIA PREPROCEDURE EVALUATION
Case: [id number] Date/Time: 02/23/22 0715    Procedures:       THORACOSCOPY (Left )      SAMPLING, MULTIPLE LYMPH NODES - MEDIASTINAL      LOBECTOMY - SUBLOBAR RESECTION UPPER LOBE, POSSIBLE COMPLETION UPPER      THORACOTOMY - POSSIBLE    Pre-op diagnosis: LUNG MASS    Location: [facility] OR 11 / SURGERY [facility]    Surgeons: Chriss Etienne M.D.      77 yo F w/ hx of HTN (last took lisinopril yest AM), cervical CA s/p chemorad and hysterectomy, hydronephrosis and ureteral strictures s/p multiple stents placements, AVERY lung nodules, s/p colostomy here for L VATS. NPO. METS >4.    HR in 100-110s in preop; past clinic visits HR about the same. Pt denies chest pain, SOB.    WBC was 18.2 on 2/9/22. Pt reports she was diagnosed w/ UTI and took 2 weeks of abx. Denies any current urinary symptoms. No fever today.    Relevant Problems   CARDIAC   (positive) HTN (hypertension)         (positive) Hydronephrosis due to obstruction of ureter stent       Physical Exam    Airway   Mallampati: II  TM distance: >3 FB  Neck ROM: full       Cardiovascular - normal exam  Rhythm: regular  Rate: normal  (-) murmur     Dental - normal exam           Pulmonary - normal exam  Breath sounds clear to auscultation     Abdominal    Neurological - normal exam                 Anesthesia Plan    ASA 3       Plan - general       Airway plan will be ETT          Induction: intravenous    Postoperative Plan: Postoperative administration of opioids is intended.    Pertinent diagnostic labs and testing reviewed    Informed Consent:    Anesthetic plan and risks discussed with patient.    Use of blood products discussed with: patient whom consented to blood products.

## 2022-02-24 PROBLEM — R91.8 MASS OF UPPER LOBE OF LEFT LUNG: Status: ACTIVE | Noted: 2022-02-24

## 2022-02-24 PROCEDURE — A9270 NON-COVERED ITEM OR SERVICE: HCPCS | Performed by: SURGERY

## 2022-02-24 PROCEDURE — 700102 HCHG RX REV CODE 250 W/ 637 OVERRIDE(OP): Performed by: SURGERY

## 2022-02-24 PROCEDURE — 700111 HCHG RX REV CODE 636 W/ 250 OVERRIDE (IP): Performed by: SURGERY

## 2022-02-24 PROCEDURE — 770001 HCHG ROOM/CARE - MED/SURG/GYN PRIV*

## 2022-02-24 PROCEDURE — A9270 NON-COVERED ITEM OR SERVICE: HCPCS

## 2022-02-24 PROCEDURE — 700102 HCHG RX REV CODE 250 W/ 637 OVERRIDE(OP)

## 2022-02-24 PROCEDURE — 700101 HCHG RX REV CODE 250: Performed by: SURGERY

## 2022-02-24 RX ADMIN — ACETAMINOPHEN 1000 MG: 500 TABLET ORAL at 04:17

## 2022-02-24 RX ADMIN — ACETAMINOPHEN 1000 MG: 500 TABLET ORAL at 00:04

## 2022-02-24 RX ADMIN — IBUPROFEN 800 MG: 200 TABLET, FILM COATED ORAL at 19:56

## 2022-02-24 RX ADMIN — ENOXAPARIN SODIUM 40 MG: 40 INJECTION SUBCUTANEOUS at 05:26

## 2022-02-24 RX ADMIN — IBUPROFEN 800 MG: 200 TABLET, FILM COATED ORAL at 04:17

## 2022-02-24 RX ADMIN — ACETAMINOPHEN 1000 MG: 500 TABLET ORAL at 18:18

## 2022-02-24 RX ADMIN — IBUPROFEN 800 MG: 200 TABLET, FILM COATED ORAL at 13:10

## 2022-02-24 RX ADMIN — POTASSIUM CHLORIDE, DEXTROSE MONOHYDRATE AND SODIUM CHLORIDE: 150; 5; 450 INJECTION, SOLUTION INTRAVENOUS at 04:20

## 2022-02-24 RX ADMIN — ACETAMINOPHEN 1000 MG: 500 TABLET ORAL at 13:10

## 2022-02-24 RX ADMIN — OXYCODONE 10 MG: 5 TABLET ORAL at 08:45

## 2022-02-24 ASSESSMENT — PAIN DESCRIPTION - PAIN TYPE: TYPE: ACUTE PAIN;SURGICAL PAIN

## 2022-02-24 NOTE — DIETARY
Nutrition services: Day 1 of admit.  Nessa Dennis is a 76 y.o. female with admitting DX of lung mass.     Consult received for unintentional wt loss of 2-13 lbs over the past month due to decreased appetite (MST 2). RD visited pt at bedside. Per pt she got a UTI about 2 weeks ago and had nausea and decreased appetite with the antibiotics. She reports her intake has been better recently and she has been able to talk to the nutrition rep for her preference of soft foods.       Assessment:  Height: 152.4 cm (5')  Weight: 57 kg (125 lb 10.6 oz)  Body mass index is 24.54 kg/m²., BMI classification: normal   Diet/Intake: Regular. <25% x 1 meal.     Evaluation:   1. Pt with reported decreased intake over the past 2 weeks. Wt stable in chart review. No overt signs of muscle or fat wasting noted.   2. Current clinical picture and MD progress notes reviewed.   3. Labs 2/23 Glu 180 (H)  4. Meds dexamethasone, D5 with NaCL and KCL @ 75 mL/hr, oxycodone,   5. Skin: no staged wounds or pressure injuries noted  6. +BM PTA, pt with colostomy     Malnutrition Risk: Does not meet criteria per ASPEN guidelines     Recommendations/Plan:  1. Regular diet as tolerated    2. Encourage intake of all meals   3. Document intake of all meals as % taken in ADL's to provide interdisciplinary communication across all shifts.   4. Monitor weight.  5. Nutrition rep will continue to see patient for ongoing meal and snack preferences.     RD following

## 2022-02-24 NOTE — DISCHARGE PLANNING
Care Transition Team Discharge Planning    Anticipates discharge disposition:  • Home    Action:  • Pt still has a Left chest tube to water seal. Pt has an established colostomy .      Barriers to Discharge:  • Pending medical clearance    Plan:  • CM to continue to assist Pt with discharge as needed

## 2022-02-24 NOTE — PROGRESS NOTES
Bedside report received.  Assessment complete.  A&O x 4. Patient calls appropriately.  Patient ambulates with x1 assist with FWW. Bed alarm on.   Patient has 7/10 pain. Pain managed with prescribed medications.  Denies N&V. Tolerating regular diet.  Left chest tube to water seal, CDI, patent, no air leak present. X 3 incisions with dermabond, approximated, KERLINE  + void via friend, - flatus, -+BM via colostomy  Patient denies SOB.  SCD's on.    Review plan with of care with patient. Call light and personal belongings within reach. Hourly rounding in place. All needs met at this time.

## 2022-02-24 NOTE — CARE PLAN
The patient is Stable - Low risk of patient condition declining or worsening    Shift Goals  Clinical Goals: pain management, maintain pt safety  Patient Goals: rest, pain management    Progress made toward(s) clinical / shift goals:  Bed alarm on, non-slip socks in place, yellow armband on, bed locked and in lowest position, call light and personal belongings within reach. Fall risk education provided to pt, pt verbalized understanding, and calls appropriately. Pt's 0-7/10 pain managed with PRN pain medications, splinting, and rest. Pain medications available discussed with pt. Bed locked and in lowest position, call light and belongings within reach, pt resting comfortably in bed and able to sleep throughout the night, all needs met at this time.   Problem: Pain - Standard  Goal: Alleviation of pain or a reduction in pain to the patient’s comfort goal  Outcome: Progressing     Problem: Knowledge Deficit - Standard  Goal: Patient and family/care givers will demonstrate understanding of plan of care, disease process/condition, diagnostic tests and medications  Outcome: Progressing     Problem: Fall Risk  Goal: Patient will remain free from falls  Outcome: Progressing     Problem: Chest Tube Management  Goal: Complications related to chest tube will be avoided or minimized  Outcome: Progressing     Problem: Nutrition  Goal: Patient's nutritional and fluid intake will be adequate or improve  Outcome: Progressing     Problem: Infection - Standard  Goal: Patient will remain free from infection  Outcome: Progressing       Patient is not progressing towards the following goals:

## 2022-02-24 NOTE — PROGRESS NOTES
Received report from previous shift RN.  Assessment complete.  Patient A&O x 4. Patient calls appropriately.  Patient ambulates with x1 assist and FWW. Bed alarm on.   Patient has 0/10 pain. Pt states pain with coughing/deep breathing at chest tube insertion site, declines interventions at this time. Pillow provided for splinting. Pain medications available discussed with pt.  Denies N&V. Tolerating regular diet.  D5 1/2NS with 20 mEq of KCl running at 75 mL/hr.  Ostomy appliance in place with brown soft output.  Lap sites x3 to L chest with dermabond, KERLINE, clean and dry.   L chest tube to -20 suction, patent, no leaks, serosanguinous output. DIP CDI.  Greene with yellow/clear output.  Patient on RA, denies SOB. Moist/productive cough. Pt using IS frequently, with max of 500.   SCD's on.    Reviewed plan of care with patient. Call light and personal belongings within reach. Hourly rounding in place. All needs met at this time.

## 2022-02-24 NOTE — PROGRESS NOTES
Progress Note:  2/24/2022, 8:29 AM    S: No acute events. Currently off O2. Pain ok. Good cough, but a little hoarse    O:  /64   Pulse 85   Temp 36.2 °C (97.1 °F) (Temporal)   Resp 18   Ht 1.524 m (5')   Wt 57 kg (125 lb 10.6 oz)   SpO2 95%     NAD, awake, alert  Breathing nonlabored  L chest tube to suction, tiny expiratory air leak      A:   Active Hospital Problems    Diagnosis    • Mass of upper lobe of left lung [R91.8]          P:   -chest tube to water seal  -d/c IVF  -continue friend until discharge  -diet as tolerated  -O2 off for sats greater than 91%  -Please ambulate with assist 3 times today  -Please help patient to chair for meals  -tentative plan for chest tube removal and d/c tomorrow as able      Chriss Etienne M.D.  [facility]  [phone]

## 2022-02-24 NOTE — WOUND TEAM
"Received wound consult regarding established colostomy. Per chart review pt uses 2.25\" 2 piece with paste ring. Pt and  are independent with care and declined any needs from wound RN. Supplies sent and orders placed. Wound consult completed.   "

## 2022-02-24 NOTE — CARE PLAN
The patient is Watcher - Medium risk of patient condition declining or worsening    Shift Goals  Clinical Goals: pain control, remain free from falls    Progress made toward(s) clinical / shift goals: assess pain Q2-4H, administer pain medication as indicated, encourage patient to voice pain to staff; bed alarm on, call light within reach, patient educated to call for assistance     Patient is not progressing towards the following goals:      Problem: Pain - Standard  Goal: Alleviation of pain or a reduction in pain to the patient’s comfort goal  Outcome: Progressing     Problem: Fall Risk  Goal: Patient will remain free from falls  Outcome: Progressing

## 2022-02-25 ENCOUNTER — PATIENT OUTREACH (OUTPATIENT)
Dept: HEALTH INFORMATION MANAGEMENT | Facility: OTHER | Age: 77
End: 2022-02-25
Payer: MEDICARE

## 2022-02-25 VITALS
SYSTOLIC BLOOD PRESSURE: 139 MMHG | HEIGHT: 60 IN | RESPIRATION RATE: 17 BRPM | WEIGHT: 125.66 LBS | BODY MASS INDEX: 24.67 KG/M2 | OXYGEN SATURATION: 93 % | DIASTOLIC BLOOD PRESSURE: 87 MMHG | HEART RATE: 98 BPM | TEMPERATURE: 97.9 F

## 2022-02-25 PROBLEM — R91.8 MASS OF UPPER LOBE OF LEFT LUNG: Status: RESOLVED | Noted: 2022-02-24 | Resolved: 2022-02-25

## 2022-02-25 PROCEDURE — A9270 NON-COVERED ITEM OR SERVICE: HCPCS | Performed by: SURGERY

## 2022-02-25 PROCEDURE — 700102 HCHG RX REV CODE 250 W/ 637 OVERRIDE(OP): Performed by: SURGERY

## 2022-02-25 PROCEDURE — 700111 HCHG RX REV CODE 636 W/ 250 OVERRIDE (IP): Performed by: SURGERY

## 2022-02-25 RX ORDER — ACETAMINOPHEN 500 MG
1000 TABLET ORAL EVERY 6 HOURS
Qty: 56 TABLET | Refills: 0 | Status: SHIPPED | OUTPATIENT
Start: 2022-02-25 | End: 2022-03-04

## 2022-02-25 RX ORDER — OXYCODONE HYDROCHLORIDE 5 MG/1
5 TABLET ORAL EVERY 6 HOURS PRN
Qty: 20 TABLET | Refills: 0 | Status: SHIPPED | OUTPATIENT
Start: 2022-02-25 | End: 2022-03-02

## 2022-02-25 RX ORDER — IBUPROFEN 800 MG/1
800 TABLET ORAL
Qty: 21 TABLET | Refills: 0 | Status: SHIPPED | OUTPATIENT
Start: 2022-02-25 | End: 2022-03-04

## 2022-02-25 RX ADMIN — ONDANSETRON 4 MG: 2 INJECTION INTRAMUSCULAR; INTRAVENOUS at 06:08

## 2022-02-25 RX ADMIN — ACETAMINOPHEN 1000 MG: 500 TABLET ORAL at 00:27

## 2022-02-25 RX ADMIN — ACETAMINOPHEN 1000 MG: 500 TABLET ORAL at 04:27

## 2022-02-25 RX ADMIN — ENOXAPARIN SODIUM 40 MG: 40 INJECTION SUBCUTANEOUS at 04:28

## 2022-02-25 RX ADMIN — DEXAMETHASONE SODIUM PHOSPHATE 4 MG: 4 INJECTION INTRA-ARTICULAR; INTRALESIONAL; INTRAMUSCULAR; INTRAVENOUS; SOFT TISSUE at 08:16

## 2022-02-25 RX ADMIN — IBUPROFEN 800 MG: 200 TABLET, FILM COATED ORAL at 04:27

## 2022-02-25 RX ADMIN — ACETAMINOPHEN 1000 MG: 500 TABLET ORAL at 13:57

## 2022-02-25 RX ADMIN — IBUPROFEN 800 MG: 200 TABLET, FILM COATED ORAL at 12:55

## 2022-02-25 ASSESSMENT — PAIN DESCRIPTION - PAIN TYPE
TYPE: SURGICAL PAIN
TYPE: ACUTE PAIN;SURGICAL PAIN

## 2022-02-25 NOTE — PROGRESS NOTES
Pt DC'd. IV removed, discharge instructions provided to patient, pt verbalizes understanding. Pt states all questions have been answered. Copy of discharge paperwork provided to pt, signed copy in chart. Pt states all belongings in possession. Pt escorted off unit by CNA without incident.

## 2022-02-25 NOTE — CARE PLAN
The patient is Stable - Low risk of patient condition declining or worsening    Shift Goals  Clinical Goals: pain management, mobilize  Patient Goals: rest, pain management    Progress made toward(s) clinical / shift goals: assess pain Q2-4H, administer PRNs as indicated, encourage patient to voice pain to staff; patient mobilized 200 feet in [place] with FWW    Patient is not progressing towards the following goals:      Problem: Pain - Standard  Goal: Alleviation of pain or a reduction in pain to the patient’s comfort goal  Outcome: Progressing     Problem: Fall Risk  Goal: Patient will remain free from falls  Outcome: Progressing

## 2022-02-25 NOTE — CARE PLAN
The patient is Stable - Low risk of patient condition declining or worsening    Shift Goals  Clinical Goals: Nausea control  Patient Goals: rest    Progress made toward(s) clinical / shift goals:  Pt nausea has improved with PRN medication. Pt will eat lunch, if tolerates okay to d/c home    Patient is not progressing towards the following goals:

## 2022-02-25 NOTE — PROGRESS NOTES
Received report from previous shift RN  Assessment complete.  A&O x 4. Patient calls appropriately.  Patient ambulates with x1 assist.   Patient has 0/10 pain. Pain managed with prescribed medications.  Nausea this morning, medicated per MAR   Surgical lap sites to L chest with dermabond. CT d/c this morning by provider. Gauze/ tegaderm in place. Pt denies increased SOB  + void via friend,  + BM via ostomy   Patient denies SOB.  Per MD pt okay to d/c today when nausea under control   Review plan with of care with patient. Call light and personal belongings with in reach. Hourly rounding in place. All needs met at this time.

## 2022-02-25 NOTE — PROGRESS NOTES
Nausea improved, pt tolerated afternoon meds and lunch. Ambulating in [place] with staff and walker, pt has walker at home. Pain well controlled. Greene removed, pt will continue to straight cath at home as baseline. Discharge lounge orders placed.

## 2022-02-25 NOTE — CARE PLAN
The patient is Stable - Low risk of patient condition declining or worsening    Shift Goals  Clinical Goals: pain management, self care  Patient Goals: rest    Progress made toward(s) clinical / shift goals:  Pt's 4/10 pain managed with PRN pain medications and rest. Pain medications available discussed with pt. Bed locked and in lowest position, call light and belongings within reach, pt resting comfortably in bed and able to sleep throughout the night, all needs met at this time. Non-slip socks in place, yellow armband on, bed locked and in lowest position, call light and personal belongings within reach. Fall risk education provided to pt, pt verbalized understanding, and calls appropriately.    Problem: Pain - Standard  Goal: Alleviation of pain or a reduction in pain to the patient’s comfort goal  Outcome: Progressing     Problem: Knowledge Deficit - Standard  Goal: Patient and family/care givers will demonstrate understanding of plan of care, disease process/condition, diagnostic tests and medications  Outcome: Progressing     Problem: Fall Risk  Goal: Patient will remain free from falls  Outcome: Progressing     Problem: Communication  Goal: The ability to communicate needs accurately and effectively will improve  Outcome: Progressing     Problem: Mobility  Goal: Patient's capacity to carry out activities will improve  Outcome: Progressing     Problem: Self Care  Goal: Patient will have the ability to perform ADLs independently or with assistance (bathe, groom, dress, toilet and feed)  Outcome: Progressing     Problem: Infection - Standard  Goal: Patient will remain free from infection  Outcome: Progressing     Problem: Wound/ / Incision Healing  Goal: Patient's wound/surgical incision will decrease in size and heals properly  Outcome: Progressing       Patient is not progressing towards the following goals:

## 2022-02-25 NOTE — DISCHARGE SUMMARY
Discharge Summary    CHIEF COMPLAINT ON ADMISSION  No chief complaint on file.      Reason for Admission  LUNG MASS     Admission Date  2/23/2022    CODE STATUS  Full Code    HPI & HOSPITAL COURSE  This is a 76 y.o. female s/p L VATS sublobar resection and mediastinal lymph node sampling, which they tolerated well.  Chest tube was removed on POD 2.  At time of discharge the patient was eating, voiding, ambulating, and pain was controlled on oral medication.     Exam at time of discharge:  NAD, awake, alert  Breathing nonlabored  L VATS incisions c/d/i       Therefore, she is discharged in good and stable condition to home with close outpatient follow-up.    The patient recovered much more quickly than anticipated on admission.    Discharge Date  2/25/2022      DISCHARGE DIAGNOSES  Active Problems:    * No active hospital problems. *  Resolved Problems:    Mass of upper lobe of left lung POA: Yes      FOLLOW UP  2 weeks  Chriss Etienne M.D.  [facility]  [phone]      MEDICATIONS ON DISCHARGE     Medication List      START taking these medications      Instructions   acetaminophen 500 MG Tabs  Commonly known as: TYLENOL   Take 2 Tablets by mouth every 6 hours for 7 days.  Dose: 1,000 mg     ibuprofen 800 MG Tabs  Commonly known as: MOTRIN   Take 1 Tablet by mouth 3 times a day with meals for 7 days.  Dose: 800 mg     oxyCODONE immediate-release 5 MG Tabs  Commonly known as: ROXICODONE   Take 1 Tablet by mouth every 6 hours as needed for up to 5 days.  Dose: 5 mg        CONTINUE taking these medications      Instructions   AMPICILLIN PO   Take 1 Tablet by mouth 4 times a day. FINISHED ON 2/20/2022  FOR UTI  Dose: 1 Tablet     ARTIFICIAL TEAR SOLUTION OP   Administer 1 Drop into both eyes 2 times a day.  Dose: 1 Drop     atorvastatin 20 MG Tabs  Commonly known as: LIPITOR   Take 20 mg by mouth every evening.  Dose: 20 mg     CO Q 10 PO   Take 1 tablet by mouth every evening.  Dose: 1 Tablet     * DAILY  "MULTIVITAMIN PO   Take 1 tablet by mouth every morning.  Dose: 1 Tablet     * EYE VITAMINS & MINERALS PO   Take 2 Tablets by mouth every morning.  Dose: 2 Tablet     lisinopril 40 MG tablet  Commonly known as: PRINIVIL   Take 40 mg by mouth every morning. Indications: High Blood Pressure Disorder  Dose: 40 mg     PROBIOTIC PO   Take 1 capsule by mouth every evening.  Dose: 1 Capsule     Vitamin C 1000 MG Tabs   Take 1,000 mg by mouth 2 times a day.  Dose: 1,000 mg     VITAMIN D PO   Take 2 Capsules by mouth every morning.  Dose: 2 Capsule         * This list has 2 medication(s) that are the same as other medications prescribed for you. Read the directions carefully, and ask your doctor or other care provider to review them with you.                Allergies  Allergies   Allergen Reactions   • Sulfa Drugs Unspecified     Pt states \" it really runs me down.\"       DIET  Orders Placed This Encounter   Procedures   • Diet Order Diet: Regular     Standing Status:   Standing     Number of Occurrences:   1     Order Specific Question:   ERAS     Answer:   Yes     Order Specific Question:   Diet:     Answer:   Regular [1]       ACTIVITY  As tolerated.  Weight bearing as tolerated    CONSULTATIONS  none    PROCEDURES  2/23/22  1.  Left THORACOSCOPY - Wound Class: Clean with Drain  2.  SAMPLING, MEDIASTINAL LYMPH NODES - Wound Class: Clean  3.  SUBLOBAR RESECTION UPPER LOBE - Wound Class: Clean Contaminated  4.  Posterior rib blocks, multiple    LABORATORY  Lab Results   Component Value Date    SODIUM 138 02/23/2022    POTASSIUM 4.2 02/23/2022    CHLORIDE 107 02/23/2022    CO2 20 02/23/2022    GLUCOSE 180 (H) 02/23/2022    BUN 14 02/23/2022    CREATININE 0.99 02/23/2022    CREATININE 0.7 03/27/2006        Lab Results   Component Value Date    WBC 13.1 (H) 02/23/2022    HEMOGLOBIN 10.9 (L) 02/23/2022    HEMATOCRIT 33.7 (L) 02/23/2022    PLATELETCT 337 02/23/2022        Total time of the discharge process exceeds 15 minutes.  "

## 2022-02-25 NOTE — PROGRESS NOTES
Received report from previous shift RN.  Assessment complete.  Patient A&O x 4. Patient calls appropriately.  Patient ambulates with x1 assist and FWW. Bed alarm on.   Patient has 3/10 pain. Pillow provided for splinting. Pain medications available discussed with pt.  Denies N&V. Tolerating regular diet.  Ostomy appliance in place with brown soft output.  Lap sites x3 to L chest with dermabond, KERLINE, clean and dry.   L chest tube to water seal, patent, no leaks, serosanguinous output. DIP CDI.  Greene with yellow/clear output.  Patient on RA, denies SOB. Moist/productive cough.     SCD's on.     Reviewed plan of care with patient. Call light and personal belongings within reach. Hourly rounding in place. All needs met at this time.

## 2022-02-25 NOTE — DISCHARGE INSTRUCTIONS
Discharge Instructions    Discharged to home by car with relative. Discharged via wheelchair, hospital escort: Yes.  Special equipment needed: Not Applicable    Be sure to schedule a follow-up appointment with your primary care doctor or any specialists as instructed.     Discharge Plan:   Diet Plan: Discussed  Activity Level: Discussed  Confirmed Follow up Appointment: Appointment Scheduled  Confirmed Symptoms Management: Discussed  Medication Reconciliation Updated: Yes  Influenza Vaccine Indication: Not indicated: Previously immunized this influenza season and > 8 years of age    I understand that a diet low in cholesterol, fat, and sodium is recommended for good health. Unless I have been given specific instructions below for another diet, I accept this instruction as my diet prescription.   Other diet: Regular    Special Instructions: None    · Is patient discharged on Warfarin / Coumadin?   No     Depression / Suicide Risk    As you are discharged from this [facility], it is important to learn how to keep safe from harming yourself.    Recognize the warning signs:  · Abrupt changes in personality, positive or negative- including increase in energy   · Giving away possessions  · Change in eating patterns- significant weight changes-  positive or negative  · Change in sleeping patterns- unable to sleep or sleeping all the time   · Unwillingness or inability to communicate  · Depression  · Unusual sadness, discouragement and loneliness  · Talk of wanting to die  · Neglect of personal appearance   · Rebelliousness- reckless behavior  · Withdrawal from people/activities they love  · Confusion- inability to concentrate     If you or a loved one observes any of these behaviors or has concerns about self-harm, here's what you can do:  · Talk about it- your feelings and reasons for harming yourself  · Remove any means that you might use to hurt yourself (examples: pills, rope, extension cords,  firearm)  · Get professional help from the community (Mental Health, Substance Abuse, psychological counseling)  · Do not be alone:Call your Safe Contact- someone whom you trust who will be there for you.  · Call your local CRISIS HOTLINE 215-9787 or 763-395-8606  · Call your local Children's Mobile Crisis Response Team [place] (604) 367-6766 or www."[employer]  · Call the toll free National Suicide Prevention Hotlines   · National Suicide Prevention Lifeline 860-614-XFPS (3590)  · [employer] Hope Line Network 800-SUICIDE (046-5292)      Lung Resection, Care After  This sheet gives you information about how to care for yourself after your procedure. Your health care provider may also give you more specific instructions. If you have problems or questions, contact your health care provider.  What can I expect after the procedure?  After the procedure, it is common to have:  · Pain in your throat and near your incisions.  · Pain when taking deep breaths.  · Nausea.  · Tiredness (fatigue).  Follow these instructions at home:    Medicines  · Take over-the-counter and prescription medicines only as told by your health care provider.  · If you were prescribed an antibiotic medicine, take it as told by your health care provider. Do not stop taking the antibiotic even if you start to feel better.  · If you are taking prescription pain medicine, take actions to prevent or treat constipation. Your health care provider may recommend that you:  ? Drink enough fluid to keep your urine pale yellow.  ? Eat foods that are high in fiber, such as fresh fruits and vegetables, whole grains, and beans.  ? Limit foods that are high in fat and processed sugars, such as fried or sweet foods.  ? Take an over-the-counter or prescription medicine for constipation.  Incision care  · Follow instructions from your health care provider about how to take care of your incisions. Make sure you:  ? Wash your hands with soap and water before  you change your bandage (dressing). If soap and water are not available, use hand .  ? Change your dressing as told by your health care provider.  ? Leave stitches (sutures), skin glue, or adhesive strips in place. These skin closures may need to stay in place for 2 weeks or longer. If adhesive strip edges start to loosen and curl up, you may trim the loose edges. Do not remove adhesive strips completely unless your health care provider tells you to do that.  · Check your incision area every day for signs of infection. Check for:  ? Redness, swelling, or pain.  ? Fluid or blood.  ? Pus or a bad smell.  ? Warmth.  · Do not take baths, swim, or use a hot tub until your health care provider approves. Ask your health care provider if you may take showers.  Preventing pneumonia    · Do breathing exercises as instructed by your health care provider. Doing this helps prevent lung infection (pneumonia).  · Try to breathe deeply and cough as told by your health care provider. Holding a pillow firmly over your ribs may help with discomfort.  · If you were given an incentive spirometer in the hospital, continue to use it as directed by your health care provider.  · Participate in pulmonary rehabilitation as directed by your health care provider. This is a program that combines education, exercise, and support from a team of specialists. The goal is to help you heal and get back to your normal activities as soon as possible.  Activity  · Rest as told by your health care provider.  · Avoid sitting for a long time without moving. Get up to take short walks every 1-2 hours. This is important to improve blood flow and breathing. Ask for help if you feel weak or unsteady.  · Ask your health care provider what activities are safe for you.  · Do not lift anything that is heavier than 10 lb (4.5 kg), or the limit that you are told, until your health care provider says that it is safe.  · Return to a normal diet and activities  as told by your health care provider.  General instructions  · Wear compression stockings as told by your health care provider. These stockings help to prevent blood clots and reduce swelling in your legs.  · If you have a chest tube, care for it as instructed by your health care provider. Do not travel by airplane during the 2 weeks after your chest tube is removed, or until your health care provider says that this is safe.  · Do not use any products that contain nicotine or tobacco, such as cigarettes and e-cigarettes. These can delay healing after surgery. If you need help quitting, ask your health care provider.  · Do not drive until your health care provider approves.  · Do not drive or use heavy machinery while taking prescription pain medicine.  · Keep all follow-up visits as told by your health care provider. This is important.  Contact a health care provider if you:  · Have redness, swelling, or pain around your incision.  · Have fluid or blood coming from your incision.  · Have pus or a bad smell coming from your incision or bandage.  · Have an incision that feels warm to the touch.  · Have a fever or chills.  · Notice that your incision is breaking open.  · Cough up blood or pus, or you develop a cough that produces bad-smelling sputum.  · Have pain or swelling in your legs.  · Have increasing pain that is not controlled with medicine.  · Have trouble managing any of the tubes that have been left in place after surgery.  Get help right away if you:  · Have chest pain or an irregular or rapid heartbeat.  · Feel weak, light-headed, or dizzy.  · Have shortness of breath or difficulty breathing.  · Have persistent nausea or vomiting.  · Have a rash.  These symptoms may represent a serious problem that is an emergency. Do not wait to see if the symptoms will go away. Get medical help right away. Call your local emergency services (911 in the [place] Do not drive yourself to the hospital.  Summary  · After lung  resection surgery, it is common to have pain around your incisions, pain when taking deep breaths, nausea, and fatigue.  · Follow instructions from your health care provider about how to take care of your incisions.  · Be sure to contact your health care provider if you have any redness or swelling around your incision area or if blood, pus, or other fluid drains from your incision.  This information is not intended to replace advice given to you by your health care provider. Make sure you discuss any questions you have with your health care provider.  Document Released: 07/07/2006 Document Revised: 02/10/2020 Document Reviewed: 12/31/2018  [employer] Patient Education © 2020 [employer]      D/C instructions:    1. DIET: Upon discharge from the hospital you may resume your normal pre-operative diet. Depending on how you are feeling and whether you have nausea or not, you may wish to stay with a bland diet for the first few days. However, you can advance this as quickly as you feel ready.    2. ACTIVITIES: After discharge from the hospital, you may resume full routine activities. However, there should be no heavy lifting (greater than 15 pounds) and no strenuous activities until after your follow-up visit. Otherwise, routine activities of daily living are acceptable.    3. DRIVING: You may drive whenever you are off pain medications and are able to perform the activities needed to drive, i.e. turning, bending, twisting, etc.    4. BATHING: You may get the wound wet at any time after leaving the hospital. You may shower, but do not submerge in a bath for at least a week. Dressings may come off after 48 hours.    5. BOWEL FUNCTION: A few patients, after this operation, will develop either frequent or loose stools after meals. This usually corrects itself after a few days, to a few weeks. If this occurs, do not worry; it is not unusual and will resolve. Much more common than loose stools, is constipation. The  combination of pain medication and decreased activity level can cause constipation in otherwise normal patients. If you feel this is occurring, take a laxative (Milk of Magnesia, Ex-Lax, Senokot, etc.) until the problem has resolved.    6. PAIN MEDICATION: You will be given a prescription for pain medication at discharge. Please take these as directed. It is important to remember not to take medications on an empty stomach as this may cause nausea.    7. CALL IF YOU HAVE: (1) Fevers to more than 101.5 degrees F, worsening shortness of breath (2) Unusual chest or leg pain, (3) Drainage or fluid from incision that may be foul smelling, increased tenderness or soreness at the wound or the wound edges are no longer together, redness or swelling at the incision site. Please do not hesitate to call with any other questions.     8. APPOINTMENT: Contact our office at [phone] for a follow-up appointment in 1 to 2 weeks following your procedure.    If you have any additional questions, please do not hesitate to call the office and speak to either myself or the physician on call.    Office address:   [address] [id number], [place] [address]      Chriss Etienne M.D.  [facility]  [phone]

## 2022-03-12 ENCOUNTER — HOSPITAL ENCOUNTER (INPATIENT)
Facility: MEDICAL CENTER | Age: 77
LOS: 7 days | DRG: 698 | End: 2022-03-19
Attending: EMERGENCY MEDICINE | Admitting: STUDENT IN AN ORGANIZED HEALTH CARE EDUCATION/TRAINING PROGRAM
Payer: MEDICARE

## 2022-03-12 ENCOUNTER — APPOINTMENT (OUTPATIENT)
Dept: RADIOLOGY | Facility: MEDICAL CENTER | Age: 77
DRG: 698 | End: 2022-03-12
Attending: EMERGENCY MEDICINE
Payer: MEDICARE

## 2022-03-12 DIAGNOSIS — T83.192A OCCLUSION OF URETERAL STENT, INITIAL ENCOUNTER (HCC): ICD-10-CM

## 2022-03-12 DIAGNOSIS — N13.39 OTHER HYDRONEPHROSIS: ICD-10-CM

## 2022-03-12 DIAGNOSIS — N12 PYELONEPHRITIS: ICD-10-CM

## 2022-03-12 DIAGNOSIS — R10.9 FLANK PAIN: ICD-10-CM

## 2022-03-12 DIAGNOSIS — N39.0 ACUTE UTI: ICD-10-CM

## 2022-03-12 PROBLEM — A41.9 SEPSIS (HCC): Status: ACTIVE | Noted: 2022-03-12

## 2022-03-12 LAB
ALBUMIN SERPL BCP-MCNC: 4.5 G/DL (ref 3.2–4.9)
ALBUMIN/GLOB SERPL: 1.5 G/DL
ALP SERPL-CCNC: 115 U/L (ref 30–99)
ALT SERPL-CCNC: 15 U/L (ref 2–50)
AMORPH CRY #/AREA URNS HPF: PRESENT /HPF
ANION GAP SERPL CALC-SCNC: 14 MMOL/L (ref 7–16)
APPEARANCE UR: ABNORMAL
AST SERPL-CCNC: 17 U/L (ref 12–45)
BACTERIA #/AREA URNS HPF: ABNORMAL /HPF
BASOPHILS # BLD AUTO: 0.2 % (ref 0–1.8)
BASOPHILS # BLD: 0.03 K/UL (ref 0–0.12)
BILIRUB SERPL-MCNC: 0.5 MG/DL (ref 0.1–1.5)
BILIRUB UR QL STRIP.AUTO: NEGATIVE
BUN SERPL-MCNC: 21 MG/DL (ref 8–22)
CALCIUM SERPL-MCNC: 10.6 MG/DL (ref 8.5–10.5)
CHLORIDE SERPL-SCNC: 103 MMOL/L (ref 96–112)
CO2 SERPL-SCNC: 20 MMOL/L (ref 20–33)
COLOR UR: YELLOW
CREAT SERPL-MCNC: 0.88 MG/DL (ref 0.5–1.4)
EOSINOPHIL # BLD AUTO: 0.04 K/UL (ref 0–0.51)
EOSINOPHIL NFR BLD: 0.3 % (ref 0–6.9)
EPI CELLS #/AREA URNS HPF: ABNORMAL /HPF
ERYTHROCYTE [DISTWIDTH] IN BLOOD BY AUTOMATED COUNT: 55.8 FL (ref 35.9–50)
GLOBULIN SER CALC-MCNC: 3.1 G/DL (ref 1.9–3.5)
GLUCOSE SERPL-MCNC: 148 MG/DL (ref 65–99)
GLUCOSE UR STRIP.AUTO-MCNC: NEGATIVE MG/DL
HCT VFR BLD AUTO: 39.3 % (ref 37–47)
HGB BLD-MCNC: 12.2 G/DL (ref 12–16)
IMM GRANULOCYTES # BLD AUTO: 0.09 K/UL (ref 0–0.11)
IMM GRANULOCYTES NFR BLD AUTO: 0.6 % (ref 0–0.9)
KETONES UR STRIP.AUTO-MCNC: ABNORMAL MG/DL
LACTATE BLD-SCNC: 0.8 MMOL/L (ref 0.5–2)
LACTATE BLD-SCNC: 1.5 MMOL/L (ref 0.5–2)
LEUKOCYTE ESTERASE UR QL STRIP.AUTO: ABNORMAL
LYMPHOCYTES # BLD AUTO: 1 K/UL (ref 1–4.8)
LYMPHOCYTES NFR BLD: 6.3 % (ref 22–41)
MAGNESIUM SERPL-MCNC: 1.6 MG/DL (ref 1.5–2.5)
MCH RBC QN AUTO: 24.2 PG (ref 27–33)
MCHC RBC AUTO-ENTMCNC: 31 G/DL (ref 33.6–35)
MCV RBC AUTO: 78 FL (ref 81.4–97.8)
MICRO URNS: ABNORMAL
MONOCYTES # BLD AUTO: 0.56 K/UL (ref 0–0.85)
MONOCYTES NFR BLD AUTO: 3.5 % (ref 0–13.4)
NEUTROPHILS # BLD AUTO: 14.1 K/UL (ref 2–7.15)
NEUTROPHILS NFR BLD: 89.1 % (ref 44–72)
NITRITE UR QL STRIP.AUTO: NEGATIVE
NRBC # BLD AUTO: 0 K/UL
NRBC BLD-RTO: 0 /100 WBC
PH UR STRIP.AUTO: 6 [PH] (ref 5–8)
PHOSPHATE SERPL-MCNC: 2.2 MG/DL (ref 2.5–4.5)
PLATELET # BLD AUTO: 305 K/UL (ref 164–446)
PMV BLD AUTO: 9.5 FL (ref 9–12.9)
POTASSIUM SERPL-SCNC: 4 MMOL/L (ref 3.6–5.5)
PROT SERPL-MCNC: 7.6 G/DL (ref 6–8.2)
PROT UR QL STRIP: 100 MG/DL
RBC # BLD AUTO: 5.04 M/UL (ref 4.2–5.4)
RBC # URNS HPF: ABNORMAL /HPF
RBC UR QL AUTO: ABNORMAL
SODIUM SERPL-SCNC: 137 MMOL/L (ref 135–145)
SP GR UR STRIP.AUTO: 1.02
UROBILINOGEN UR STRIP.AUTO-MCNC: 0.2 MG/DL
WBC # BLD AUTO: 15.8 K/UL (ref 4.8–10.8)
WBC #/AREA URNS HPF: ABNORMAL /HPF

## 2022-03-12 PROCEDURE — 83735 ASSAY OF MAGNESIUM: CPT

## 2022-03-12 PROCEDURE — 700111 HCHG RX REV CODE 636 W/ 250 OVERRIDE (IP): Performed by: STUDENT IN AN ORGANIZED HEALTH CARE EDUCATION/TRAINING PROGRAM

## 2022-03-12 PROCEDURE — 36415 COLL VENOUS BLD VENIPUNCTURE: CPT

## 2022-03-12 PROCEDURE — 96375 TX/PRO/DX INJ NEW DRUG ADDON: CPT

## 2022-03-12 PROCEDURE — 85025 COMPLETE CBC W/AUTO DIFF WBC: CPT

## 2022-03-12 PROCEDURE — 96374 THER/PROPH/DIAG INJ IV PUSH: CPT

## 2022-03-12 PROCEDURE — 84100 ASSAY OF PHOSPHORUS: CPT

## 2022-03-12 PROCEDURE — 87186 SC STD MICRODIL/AGAR DIL: CPT

## 2022-03-12 PROCEDURE — 81001 URINALYSIS AUTO W/SCOPE: CPT

## 2022-03-12 PROCEDURE — 87040 BLOOD CULTURE FOR BACTERIA: CPT

## 2022-03-12 PROCEDURE — 770006 HCHG ROOM/CARE - MED/SURG/GYN SEMI*

## 2022-03-12 PROCEDURE — 99223 1ST HOSP IP/OBS HIGH 75: CPT | Mod: AI | Performed by: STUDENT IN AN ORGANIZED HEALTH CARE EDUCATION/TRAINING PROGRAM

## 2022-03-12 PROCEDURE — 80053 COMPREHEN METABOLIC PANEL: CPT

## 2022-03-12 PROCEDURE — 700111 HCHG RX REV CODE 636 W/ 250 OVERRIDE (IP): Performed by: EMERGENCY MEDICINE

## 2022-03-12 PROCEDURE — 83605 ASSAY OF LACTIC ACID: CPT

## 2022-03-12 PROCEDURE — 85610 PROTHROMBIN TIME: CPT

## 2022-03-12 PROCEDURE — 94760 N-INVAS EAR/PLS OXIMETRY 1: CPT

## 2022-03-12 PROCEDURE — 99285 EMERGENCY DEPT VISIT HI MDM: CPT

## 2022-03-12 PROCEDURE — 76775 US EXAM ABDO BACK WALL LIM: CPT

## 2022-03-12 PROCEDURE — 700105 HCHG RX REV CODE 258: Performed by: STUDENT IN AN ORGANIZED HEALTH CARE EDUCATION/TRAINING PROGRAM

## 2022-03-12 PROCEDURE — 87086 URINE CULTURE/COLONY COUNT: CPT

## 2022-03-12 RX ORDER — LISINOPRIL 20 MG/1
40 TABLET ORAL EVERY MORNING
Status: DISCONTINUED | OUTPATIENT
Start: 2022-03-13 | End: 2022-03-19 | Stop reason: HOSPADM

## 2022-03-12 RX ORDER — LABETALOL HYDROCHLORIDE 5 MG/ML
10 INJECTION, SOLUTION INTRAVENOUS EVERY 4 HOURS PRN
Status: DISCONTINUED | OUTPATIENT
Start: 2022-03-12 | End: 2022-03-19 | Stop reason: HOSPADM

## 2022-03-12 RX ORDER — M-VIT,TX,IRON,MINS/CALC/FOLIC 27MG-0.4MG
1 TABLET ORAL EVERY MORNING
COMMUNITY
End: 2023-02-20

## 2022-03-12 RX ORDER — POLYVINYL ALCOHOL 14 MG/ML
1 SOLUTION/ DROPS OPHTHALMIC
COMMUNITY
End: 2022-10-15

## 2022-03-12 RX ORDER — CEFTRIAXONE 2 G/1
2 INJECTION, POWDER, FOR SOLUTION INTRAMUSCULAR; INTRAVENOUS ONCE
Status: COMPLETED | OUTPATIENT
Start: 2022-03-12 | End: 2022-03-12

## 2022-03-12 RX ORDER — AMOXICILLIN 250 MG
2 CAPSULE ORAL 2 TIMES DAILY
Status: DISCONTINUED | OUTPATIENT
Start: 2022-03-13 | End: 2022-03-19 | Stop reason: HOSPADM

## 2022-03-12 RX ORDER — SODIUM CHLORIDE, SODIUM LACTATE, POTASSIUM CHLORIDE, CALCIUM CHLORIDE 600; 310; 30; 20 MG/100ML; MG/100ML; MG/100ML; MG/100ML
1000 INJECTION, SOLUTION INTRAVENOUS ONCE
Status: COMPLETED | OUTPATIENT
Start: 2022-03-12 | End: 2022-03-12

## 2022-03-12 RX ORDER — CA/D3/MAG OX/ZINC/COP/MANG/BOR 600 MG-800
1 TABLET,CHEWABLE ORAL EVERY EVENING
COMMUNITY
End: 2022-10-15

## 2022-03-12 RX ORDER — ONDANSETRON 2 MG/ML
4 INJECTION INTRAMUSCULAR; INTRAVENOUS EVERY 4 HOURS PRN
Status: DISCONTINUED | OUTPATIENT
Start: 2022-03-12 | End: 2022-03-15

## 2022-03-12 RX ORDER — MORPHINE SULFATE 4 MG/ML
4 INJECTION INTRAVENOUS ONCE
Status: COMPLETED | OUTPATIENT
Start: 2022-03-12 | End: 2022-03-12

## 2022-03-12 RX ORDER — VITC/E/ZINC/COPPER/LUTEIN/ZEAX 250 MG-200
1 TABLET,CHEWABLE ORAL DAILY
COMMUNITY
End: 2022-10-15

## 2022-03-12 RX ORDER — POLYETHYLENE GLYCOL 3350 17 G/17G
1 POWDER, FOR SOLUTION ORAL
Status: DISCONTINUED | OUTPATIENT
Start: 2022-03-12 | End: 2022-03-19 | Stop reason: HOSPADM

## 2022-03-12 RX ORDER — SODIUM CHLORIDE, SODIUM LACTATE, POTASSIUM CHLORIDE, CALCIUM CHLORIDE 600; 310; 30; 20 MG/100ML; MG/100ML; MG/100ML; MG/100ML
INJECTION, SOLUTION INTRAVENOUS CONTINUOUS
Status: ACTIVE | OUTPATIENT
Start: 2022-03-12 | End: 2022-03-13

## 2022-03-12 RX ORDER — ATORVASTATIN CALCIUM 20 MG/1
20 TABLET, FILM COATED ORAL EVERY EVENING
Status: DISCONTINUED | OUTPATIENT
Start: 2022-03-13 | End: 2022-03-19 | Stop reason: HOSPADM

## 2022-03-12 RX ORDER — BISACODYL 10 MG
10 SUPPOSITORY, RECTAL RECTAL
Status: DISCONTINUED | OUTPATIENT
Start: 2022-03-12 | End: 2022-03-19 | Stop reason: HOSPADM

## 2022-03-12 RX ORDER — ACETAMINOPHEN 325 MG/1
650 TABLET ORAL EVERY 6 HOURS PRN
Status: DISCONTINUED | OUTPATIENT
Start: 2022-03-12 | End: 2022-03-19 | Stop reason: HOSPADM

## 2022-03-12 RX ORDER — CHOLECALCIFEROL (VITAMIN D3) 50 MCG
1000 TABLET ORAL DAILY
COMMUNITY
End: 2022-10-15

## 2022-03-12 RX ORDER — ONDANSETRON 4 MG/1
4 TABLET, ORALLY DISINTEGRATING ORAL EVERY 4 HOURS PRN
Status: DISCONTINUED | OUTPATIENT
Start: 2022-03-12 | End: 2022-03-19 | Stop reason: HOSPADM

## 2022-03-12 RX ORDER — ONDANSETRON 2 MG/ML
4 INJECTION INTRAMUSCULAR; INTRAVENOUS ONCE
Status: COMPLETED | OUTPATIENT
Start: 2022-03-12 | End: 2022-03-12

## 2022-03-12 RX ADMIN — CEFTRIAXONE SODIUM 2 G: 2 INJECTION, POWDER, FOR SOLUTION INTRAMUSCULAR; INTRAVENOUS at 22:25

## 2022-03-12 RX ADMIN — ONDANSETRON 4 MG: 2 INJECTION INTRAMUSCULAR; INTRAVENOUS at 21:07

## 2022-03-12 RX ADMIN — SODIUM CHLORIDE, POTASSIUM CHLORIDE, SODIUM LACTATE AND CALCIUM CHLORIDE 1000 ML: 600; 310; 30; 20 INJECTION, SOLUTION INTRAVENOUS at 21:14

## 2022-03-12 RX ADMIN — MORPHINE SULFATE 4 MG: 4 INJECTION INTRAVENOUS at 21:09

## 2022-03-12 ASSESSMENT — FIBROSIS 4 INDEX: FIB4 SCORE: 0.73

## 2022-03-12 ASSESSMENT — PAIN DESCRIPTION - PAIN TYPE: TYPE: ACUTE PAIN

## 2022-03-12 ASSESSMENT — PAIN DESCRIPTION - DESCRIPTORS: DESCRIPTORS: STABBING

## 2022-03-13 ENCOUNTER — ANESTHESIA EVENT (OUTPATIENT)
Dept: SURGERY | Facility: MEDICAL CENTER | Age: 77
DRG: 698 | End: 2022-03-13
Payer: MEDICARE

## 2022-03-13 ENCOUNTER — APPOINTMENT (OUTPATIENT)
Dept: RADIOLOGY | Facility: MEDICAL CENTER | Age: 77
DRG: 698 | End: 2022-03-13
Attending: SPECIALIST
Payer: MEDICARE

## 2022-03-13 ENCOUNTER — ANESTHESIA (OUTPATIENT)
Dept: SURGERY | Facility: MEDICAL CENTER | Age: 77
DRG: 698 | End: 2022-03-13
Payer: MEDICARE

## 2022-03-13 PROBLEM — E78.2 MIXED HYPERLIPIDEMIA: Status: ACTIVE | Noted: 2022-03-13

## 2022-03-13 LAB
ALBUMIN SERPL BCP-MCNC: 3.5 G/DL (ref 3.2–4.9)
ALBUMIN/GLOB SERPL: 1.3 G/DL
ALP SERPL-CCNC: 86 U/L (ref 30–99)
ALT SERPL-CCNC: 11 U/L (ref 2–50)
ANION GAP SERPL CALC-SCNC: 11 MMOL/L (ref 7–16)
AST SERPL-CCNC: 15 U/L (ref 12–45)
BASOPHILS # BLD AUTO: 0.2 % (ref 0–1.8)
BASOPHILS # BLD: 0.02 K/UL (ref 0–0.12)
BILIRUB SERPL-MCNC: 0.2 MG/DL (ref 0.1–1.5)
BUN SERPL-MCNC: 18 MG/DL (ref 8–22)
CALCIUM SERPL-MCNC: 9.4 MG/DL (ref 8.5–10.5)
CHLORIDE SERPL-SCNC: 106 MMOL/L (ref 96–112)
CO2 SERPL-SCNC: 21 MMOL/L (ref 20–33)
CREAT SERPL-MCNC: 0.79 MG/DL (ref 0.5–1.4)
EOSINOPHIL # BLD AUTO: 0.02 K/UL (ref 0–0.51)
EOSINOPHIL NFR BLD: 0.2 % (ref 0–6.9)
ERYTHROCYTE [DISTWIDTH] IN BLOOD BY AUTOMATED COUNT: 57.2 FL (ref 35.9–50)
EXTERNAL QUALITY CONTROL: NORMAL
GLOBULIN SER CALC-MCNC: 2.6 G/DL (ref 1.9–3.5)
GLUCOSE SERPL-MCNC: 130 MG/DL (ref 65–99)
HCT VFR BLD AUTO: 32.9 % (ref 37–47)
HGB BLD-MCNC: 10.1 G/DL (ref 12–16)
IMM GRANULOCYTES # BLD AUTO: 0.06 K/UL (ref 0–0.11)
IMM GRANULOCYTES NFR BLD AUTO: 0.5 % (ref 0–0.9)
INR PPP: 1.11 (ref 0.87–1.13)
LYMPHOCYTES # BLD AUTO: 1.4 K/UL (ref 1–4.8)
LYMPHOCYTES NFR BLD: 11.4 % (ref 22–41)
MCH RBC QN AUTO: 24.3 PG (ref 27–33)
MCHC RBC AUTO-ENTMCNC: 30.7 G/DL (ref 33.6–35)
MCV RBC AUTO: 79.3 FL (ref 81.4–97.8)
MONOCYTES # BLD AUTO: 0.85 K/UL (ref 0–0.85)
MONOCYTES NFR BLD AUTO: 7 % (ref 0–13.4)
NEUTROPHILS # BLD AUTO: 9.88 K/UL (ref 2–7.15)
NEUTROPHILS NFR BLD: 80.7 % (ref 44–72)
NRBC # BLD AUTO: 0 K/UL
NRBC BLD-RTO: 0 /100 WBC
PLATELET # BLD AUTO: 274 K/UL (ref 164–446)
PMV BLD AUTO: 10.2 FL (ref 9–12.9)
POTASSIUM SERPL-SCNC: 4.7 MMOL/L (ref 3.6–5.5)
PROT SERPL-MCNC: 6.1 G/DL (ref 6–8.2)
PROTHROMBIN TIME: 13.9 SEC (ref 12–14.6)
RBC # BLD AUTO: 4.15 M/UL (ref 4.2–5.4)
SARS-COV+SARS-COV-2 AG RESP QL IA.RAPID: NEGATIVE
SODIUM SERPL-SCNC: 138 MMOL/L (ref 135–145)
WBC # BLD AUTO: 12.2 K/UL (ref 4.8–10.8)

## 2022-03-13 PROCEDURE — 700101 HCHG RX REV CODE 250: Performed by: STUDENT IN AN ORGANIZED HEALTH CARE EDUCATION/TRAINING PROGRAM

## 2022-03-13 PROCEDURE — 87426 SARSCOV CORONAVIRUS AG IA: CPT | Performed by: SPECIALIST

## 2022-03-13 PROCEDURE — 0TP98DZ REMOVAL OF INTRALUMINAL DEVICE FROM URETER, VIA NATURAL OR ARTIFICIAL OPENING ENDOSCOPIC: ICD-10-PCS | Performed by: SPECIALIST

## 2022-03-13 PROCEDURE — 700105 HCHG RX REV CODE 258: Performed by: STUDENT IN AN ORGANIZED HEALTH CARE EDUCATION/TRAINING PROGRAM

## 2022-03-13 PROCEDURE — 87076 CULTURE ANAEROBE IDENT EACH: CPT

## 2022-03-13 PROCEDURE — 87075 CULTR BACTERIA EXCEPT BLOOD: CPT

## 2022-03-13 PROCEDURE — 87015 SPECIMEN INFECT AGNT CONCNTJ: CPT

## 2022-03-13 PROCEDURE — 160048 HCHG OR STATISTICAL LEVEL 1-5: Performed by: SPECIALIST

## 2022-03-13 PROCEDURE — 700102 HCHG RX REV CODE 250 W/ 637 OVERRIDE(OP): Performed by: STUDENT IN AN ORGANIZED HEALTH CARE EDUCATION/TRAINING PROGRAM

## 2022-03-13 PROCEDURE — 700111 HCHG RX REV CODE 636 W/ 250 OVERRIDE (IP): Performed by: NURSE PRACTITIONER

## 2022-03-13 PROCEDURE — 700111 HCHG RX REV CODE 636 W/ 250 OVERRIDE (IP): Performed by: STUDENT IN AN ORGANIZED HEALTH CARE EDUCATION/TRAINING PROGRAM

## 2022-03-13 PROCEDURE — 80053 COMPREHEN METABOLIC PANEL: CPT

## 2022-03-13 PROCEDURE — 770006 HCHG ROOM/CARE - MED/SURG/GYN SEMI*

## 2022-03-13 PROCEDURE — 87185 SC STD ENZYME DETCJ PER NZM: CPT

## 2022-03-13 PROCEDURE — C1769 GUIDE WIRE: HCPCS | Performed by: SPECIALIST

## 2022-03-13 PROCEDURE — 85025 COMPLETE CBC W/AUTO DIFF WBC: CPT

## 2022-03-13 PROCEDURE — 160035 HCHG PACU - 1ST 60 MINS PHASE I: Performed by: SPECIALIST

## 2022-03-13 PROCEDURE — 87205 SMEAR GRAM STAIN: CPT

## 2022-03-13 PROCEDURE — A9270 NON-COVERED ITEM OR SERVICE: HCPCS | Performed by: STUDENT IN AN ORGANIZED HEALTH CARE EDUCATION/TRAINING PROGRAM

## 2022-03-13 PROCEDURE — 160009 HCHG ANES TIME/MIN: Performed by: SPECIALIST

## 2022-03-13 PROCEDURE — 87077 CULTURE AEROBIC IDENTIFY: CPT

## 2022-03-13 PROCEDURE — 160002 HCHG RECOVERY MINUTES (STAT): Performed by: SPECIALIST

## 2022-03-13 PROCEDURE — 99233 SBSQ HOSP IP/OBS HIGH 50: CPT | Performed by: GENERAL PRACTICE

## 2022-03-13 PROCEDURE — 87070 CULTURE OTHR SPECIMN AEROBIC: CPT

## 2022-03-13 PROCEDURE — 87186 SC STD MICRODIL/AGAR DIL: CPT

## 2022-03-13 PROCEDURE — 36415 COLL VENOUS BLD VENIPUNCTURE: CPT

## 2022-03-13 PROCEDURE — 160028 HCHG SURGERY MINUTES - 1ST 30 MINS LEVEL 3: Performed by: SPECIALIST

## 2022-03-13 RX ORDER — HYDROMORPHONE HYDROCHLORIDE 1 MG/ML
0.2 INJECTION, SOLUTION INTRAMUSCULAR; INTRAVENOUS; SUBCUTANEOUS
Status: DISCONTINUED | OUTPATIENT
Start: 2022-03-13 | End: 2022-03-13 | Stop reason: HOSPADM

## 2022-03-13 RX ORDER — DIPHENHYDRAMINE HYDROCHLORIDE 50 MG/ML
12.5 INJECTION INTRAMUSCULAR; INTRAVENOUS
Status: DISCONTINUED | OUTPATIENT
Start: 2022-03-13 | End: 2022-03-13 | Stop reason: HOSPADM

## 2022-03-13 RX ORDER — OXYCODONE HYDROCHLORIDE 5 MG/1
5 TABLET ORAL EVERY 6 HOURS PRN
Status: DISCONTINUED | OUTPATIENT
Start: 2022-03-13 | End: 2022-03-15

## 2022-03-13 RX ORDER — HYDROMORPHONE HYDROCHLORIDE 1 MG/ML
0.4 INJECTION, SOLUTION INTRAMUSCULAR; INTRAVENOUS; SUBCUTANEOUS
Status: DISCONTINUED | OUTPATIENT
Start: 2022-03-13 | End: 2022-03-13 | Stop reason: HOSPADM

## 2022-03-13 RX ORDER — OXYCODONE HCL 5 MG/5 ML
10 SOLUTION, ORAL ORAL
Status: DISCONTINUED | OUTPATIENT
Start: 2022-03-13 | End: 2022-03-13 | Stop reason: HOSPADM

## 2022-03-13 RX ORDER — OXYCODONE HCL 5 MG/5 ML
5 SOLUTION, ORAL ORAL
Status: DISCONTINUED | OUTPATIENT
Start: 2022-03-13 | End: 2022-03-13 | Stop reason: HOSPADM

## 2022-03-13 RX ORDER — HYDROMORPHONE HYDROCHLORIDE 1 MG/ML
0.1 INJECTION, SOLUTION INTRAMUSCULAR; INTRAVENOUS; SUBCUTANEOUS
Status: DISCONTINUED | OUTPATIENT
Start: 2022-03-13 | End: 2022-03-13 | Stop reason: HOSPADM

## 2022-03-13 RX ORDER — MORPHINE SULFATE 4 MG/ML
4 INJECTION INTRAVENOUS EVERY 4 HOURS PRN
Status: DISCONTINUED | OUTPATIENT
Start: 2022-03-13 | End: 2022-03-19 | Stop reason: HOSPADM

## 2022-03-13 RX ORDER — ONDANSETRON 2 MG/ML
4 INJECTION INTRAMUSCULAR; INTRAVENOUS
Status: DISCONTINUED | OUTPATIENT
Start: 2022-03-13 | End: 2022-03-13 | Stop reason: HOSPADM

## 2022-03-13 RX ORDER — KETAMINE HYDROCHLORIDE 50 MG/ML
INJECTION, SOLUTION INTRAMUSCULAR; INTRAVENOUS PRN
Status: DISCONTINUED | OUTPATIENT
Start: 2022-03-13 | End: 2022-03-13 | Stop reason: SURG

## 2022-03-13 RX ORDER — HALOPERIDOL 5 MG/ML
1 INJECTION INTRAMUSCULAR
Status: DISCONTINUED | OUTPATIENT
Start: 2022-03-13 | End: 2022-03-13 | Stop reason: HOSPADM

## 2022-03-13 RX ORDER — PROCHLORPERAZINE EDISYLATE 5 MG/ML
10 INJECTION INTRAMUSCULAR; INTRAVENOUS EVERY 6 HOURS PRN
Status: DISCONTINUED | OUTPATIENT
Start: 2022-03-13 | End: 2022-03-19 | Stop reason: HOSPADM

## 2022-03-13 RX ORDER — SODIUM CHLORIDE, SODIUM LACTATE, POTASSIUM CHLORIDE, CALCIUM CHLORIDE 600; 310; 30; 20 MG/100ML; MG/100ML; MG/100ML; MG/100ML
INJECTION, SOLUTION INTRAVENOUS
Status: DISCONTINUED | OUTPATIENT
Start: 2022-03-13 | End: 2022-03-13 | Stop reason: SURG

## 2022-03-13 RX ORDER — MEPERIDINE HYDROCHLORIDE 25 MG/ML
12.5 INJECTION INTRAMUSCULAR; INTRAVENOUS; SUBCUTANEOUS
Status: DISCONTINUED | OUTPATIENT
Start: 2022-03-13 | End: 2022-03-13 | Stop reason: HOSPADM

## 2022-03-13 RX ADMIN — ALFENTANIL HYDROCHLORIDE 1000 MCG: 500 INJECTION INTRAVENOUS at 17:02

## 2022-03-13 RX ADMIN — SODIUM CHLORIDE, POTASSIUM CHLORIDE, SODIUM LACTATE AND CALCIUM CHLORIDE: 600; 310; 30; 20 INJECTION, SOLUTION INTRAVENOUS at 01:11

## 2022-03-13 RX ADMIN — PROCHLORPERAZINE EDISYLATE 10 MG: 5 INJECTION INTRAMUSCULAR; INTRAVENOUS at 15:31

## 2022-03-13 RX ADMIN — PROPOFOL 100 MG: 10 INJECTION, EMULSION INTRAVENOUS at 17:02

## 2022-03-13 RX ADMIN — ONDANSETRON 4 MG: 2 INJECTION INTRAMUSCULAR; INTRAVENOUS at 13:00

## 2022-03-13 RX ADMIN — LISINOPRIL 40 MG: 20 TABLET ORAL at 05:53

## 2022-03-13 RX ADMIN — SODIUM CHLORIDE, POTASSIUM CHLORIDE, SODIUM LACTATE AND CALCIUM CHLORIDE: 600; 310; 30; 20 INJECTION, SOLUTION INTRAVENOUS at 16:59

## 2022-03-13 RX ADMIN — KETAMINE HYDROCHLORIDE 50 MG: 50 INJECTION INTRAMUSCULAR; INTRAVENOUS at 17:02

## 2022-03-13 RX ADMIN — ONDANSETRON 4 MG: 2 INJECTION INTRAMUSCULAR; INTRAVENOUS at 06:24

## 2022-03-13 ASSESSMENT — COGNITIVE AND FUNCTIONAL STATUS - GENERAL
HELP NEEDED FOR BATHING: A LITTLE
EATING MEALS: A LITTLE
TOILETING: A LITTLE
DRESSING REGULAR UPPER BODY CLOTHING: A LITTLE
CLIMB 3 TO 5 STEPS WITH RAILING: A LITTLE
DAILY ACTIVITIY SCORE: 18
STANDING UP FROM CHAIR USING ARMS: A LITTLE
DRESSING REGULAR LOWER BODY CLOTHING: A LITTLE
WALKING IN HOSPITAL ROOM: A LITTLE
SUGGESTED CMS G CODE MODIFIER DAILY ACTIVITY: CK
MOBILITY SCORE: 21
SUGGESTED CMS G CODE MODIFIER MOBILITY: CJ
PERSONAL GROOMING: A LITTLE

## 2022-03-13 ASSESSMENT — LIFESTYLE VARIABLES
EVER FELT BAD OR GUILTY ABOUT YOUR DRINKING: NO
SUBSTANCE_ABUSE: 0
TOTAL SCORE: 0
HOW MANY TIMES IN THE PAST YEAR HAVE YOU HAD 5 OR MORE DRINKS IN A DAY: 0
HAVE PEOPLE ANNOYED YOU BY CRITICIZING YOUR DRINKING: NO
HAVE YOU EVER FELT YOU SHOULD CUT DOWN ON YOUR DRINKING: NO
CONSUMPTION TOTAL: NEGATIVE
TOTAL SCORE: 0
EVER HAD A DRINK FIRST THING IN THE MORNING TO STEADY YOUR NERVES TO GET RID OF A HANGOVER: NO
TOTAL SCORE: 0
AVERAGE NUMBER OF DAYS PER WEEK YOU HAVE A DRINK CONTAINING ALCOHOL: 0
ON A TYPICAL DAY WHEN YOU DRINK ALCOHOL HOW MANY DRINKS DO YOU HAVE: 0
ALCOHOL_USE: NO

## 2022-03-13 ASSESSMENT — ENCOUNTER SYMPTOMS
VOMITING: 1
ABDOMINAL PAIN: 1
SINUS PAIN: 0
NERVOUS/ANXIOUS: 0
FLANK PAIN: 1
PALPITATIONS: 0
FEVER: 1
SPEECH CHANGE: 0
FOCAL WEAKNESS: 0
PHOTOPHOBIA: 0
NAUSEA: 1
EYE PAIN: 0
NECK PAIN: 0
SHORTNESS OF BREATH: 0
FALLS: 0
SENSORY CHANGE: 0
COUGH: 0

## 2022-03-13 ASSESSMENT — PAIN DESCRIPTION - PAIN TYPE
TYPE: SURGICAL PAIN
TYPE: ACUTE PAIN
TYPE: SURGICAL PAIN

## 2022-03-13 NOTE — ED PROVIDER NOTES
ED Provider Note    Scribed for Jarod Lynch by Mirian Salazar. 3/12/2022  8:17 PM    Primary care provider: Julio Ross M.D.  Means of arrival: Wheel Chair   History obtained from: Patient  History limited by: None    CHIEF COMPLAINT  Chief Complaint   Patient presents with   • Flank Pain     Pt having left flank pain, pt has stent from bladder to kidney, pt self cath, unable to drain urine effectively today, pain 9/10     HPI  Nessa Dennis is a 76 y.o. female who presents to the Emergency Department for evaluation of left sided flank pain onset earlier today. The patient had a history of cervical cancer and has gotten a hysterectomy. The radiation that she received for her cancer has caused strictures of her left ureter that Dr. Hays stents every three months for the last year. She also has a history of chronic kidney infections and states that the pain that she is experiencing today feels similar to the pain she has when she has a kidney infection. She also has a history of urinary incontinence and she usually self catheterizes herself. She self catheterized herself two hours ago and states that she had foul smelling urine. She experiences associated nausea, vomiting, and chills. She has had 5 episodes of vomiting today. She denies associated hematemesis or hematuria.     Quality: Sharp  Duration: One day  Severity: Moderate to severe  Associated sx: chills, nausea, and vomiting    REVIEW OF SYSTEMS  As above, all other systems reviewed and are negative.   See HPI for further details.     PAST MEDICAL HISTORY   has a past medical history of Cancer (HCC) (2005, 2015), Colostomy in place ([facility]), Environmental and seasonal allergies, Environmental and seasonal allergies, High cholesterol, Hydronephrosis with ureteral stricture, Hypertension (02/09/2022), Lung nodules, Unspecified urinary incontinence, and Urinary bladder disorder.     SURGICAL HISTORY   has a past surgical history that includes  recovery (4/6/2015); colostomy creation laparoscopic (4/14/2015); cystoscopy (7/31/2015); cysto stent placemnt pre surg (Left, 8/29/2019); gyn surgery (5/2005); pelvic exam under anesthesia (7/31/2015); cervical conization (7/31/2015); cystoscopy,insert ureteral stent (Left, 5/21/2020); cystoscopy,insert ureteral stent (Left, 9/3/2020); cystourethroscopy,ureter catheter (Left, 9/3/2020); cystourethroscopy,ureter catheter (Left, 12/31/2020); cysto stent placemnt pre surg (Left, 12/31/2020); cystoscopy,insert ureteral stent (Left, 3/25/2021); other abdominal surgery (2015); cysto stent placemnt pre surg (Left, 8/26/2021); other (11/2021); cystoscopy,insert ureteral stent (Left, 12/30/2021); thoracoscopy,dx no bx (Left, 2/23/2022); lymph node sampling (2/23/2022); and lobectomy (Left, 2/23/2022).     SOCIAL HISTORY  Social History     Tobacco Use   • Smoking status: Never Smoker   • Smokeless tobacco: Never Used   Vaping Use   • Vaping Use: Never used   Substance Use Topics   • Alcohol use: Not Currently   • Drug use: Not Currently      Social History     Substance and Sexual Activity   Drug Use Not Currently     FAMILY HISTORY  Family History   Problem Relation Age of Onset   • Cancer Father    • Hypertension Father    • Cancer Sister    • Stroke Brother      CURRENT MEDICATIONS  Home Medications     Reviewed by Liz Mcwilliams (Pharmacy Tech) on 03/12/22 at 2152  Med List Status: Complete   Medication Last Dose Status   artificial tears 1.4 % Solution 3/12/2022 Active   Ascorbic Acid (VITAMIN C) 1000 MG Tab 3/12/2022 Active   atorvastatin (LIPITOR) 20 MG Tab 3/11/2022 Active   coenzyme Q-10 30 MG capsule 3/12/2022 Active   lisinopril (PRINIVIL, ZESTRIL) 40 MG tablet 3/12/2022 Active   Multiple Vitamins-Minerals (SYSTANE ICAPS AREDS2) Tab 3/12/2022 Active   Probiotic Product (PROBIOTIC ADVANCED) Cap 3/12/2022 Active   therapeutic multivitamin-minerals (THERAGRAN-M) Tab 3/12/2022 Active   vitamin D3 (CHOLECALCIFEROL)  "1000 Unit (25 mcg) Tab 3/12/2022 Active              ALLERGIES  Allergies   Allergen Reactions   • Sulfa Drugs Unspecified     Pt states \" it really runs me down.\"       PHYSICAL EXAM    VITAL SIGNS:   Vitals:    03/12/22 1939 03/12/22 2115 03/12/22 2120 03/12/22 2159   BP:  (!) 170/81  157/85   Pulse:  (!) 109 (!) 108 (!) 108   Resp:    (!) 27   Temp:       TempSrc:       SpO2:  90% 95% 95%   Weight: 57.9 kg (127 lb 10.3 oz)      Height: 1.524 m (5')          Vitals: My interpretation: hypertensive, tachycardic, afebrile, not hypoxic    Reinterpretation of vitals: Hypertensive, tachycardic, tachypneic, not hypoxic    PE:   Constitutional: Well developed, Well nourished, Mild distress, Non-toxic appearance.   HENT: Normocephalic, Atraumatic, Bilateral external ears normal, Oropharynx is clear mucous membranes are moist. No oral exudates or nasal discharge.   Eyes: Pupils are equal round and reactive, EOMI, Conjunctiva normal, No discharge.   Neck: Normal range of motion, No tenderness, Supple, No stridor. No meningismus.  Lymphatic: No lymphadenopathy noted.   Cardiovascular: Regular rate and rhythm without murmur rub or gallop.  Thorax & Lungs: Clear breath sounds bilaterally without wheezes, rhonchi or rales. There is no chest wall tenderness.   Abdomen: Soft moderate tenderness palpation of the left flank.  There is no rebound or guarding. No organomegaly is appreciated. Bowel sounds are normal.  Skin: Normal without rash.   Back: Left sided CVA tenderness  Extremities: Intact distal pulses, No edema, No tenderness, No cyanosis, No clubbing. Capillary refill is less than 2 seconds.  Musculoskeletal: Good range of motion in all major joints. No tenderness to palpation or major deformities noted.   Neurologic: Alert & oriented x 3, Normal motor function, Normal sensory function, No focal deficits noted. Reflexes are normal.  Psychiatric: Affect normal, Judgment normal, Mood normal. There is no suicidal ideation or " patient reported hallucinations.     DIAGNOSTIC STUDIES / PROCEDURES    LABS  Results for orders placed or performed during the hospital encounter of 03/12/22   URINALYSIS CULTURE, IF INDICATED    Specimen: Urine   Result Value Ref Range    Color Yellow     Character Turbid (A)     Specific Gravity 1.016 <1.035    Ph 6.0 5.0 - 8.0    Glucose Negative Negative mg/dL    Ketones Trace (A) Negative mg/dL    Protein 100 (A) Negative mg/dL    Bilirubin Negative Negative    Urobilinogen, Urine 0.2 Negative    Nitrite Negative Negative    Leukocyte Esterase Large (A) Negative    Occult Blood Large (A) Negative    Micro Urine Req Microscopic    CBC WITH DIFFERENTIAL   Result Value Ref Range    WBC 15.8 (H) 4.8 - 10.8 K/uL    RBC 5.04 4.20 - 5.40 M/uL    Hemoglobin 12.2 12.0 - 16.0 g/dL    Hematocrit 39.3 37.0 - 47.0 %    MCV 78.0 (L) 81.4 - 97.8 fL    MCH 24.2 (L) 27.0 - 33.0 pg    MCHC 31.0 (L) 33.6 - 35.0 g/dL    RDW 55.8 (H) 35.9 - 50.0 fL    Platelet Count 305 164 - 446 K/uL    MPV 9.5 9.0 - 12.9 fL    Neutrophils-Polys 89.10 (H) 44.00 - 72.00 %    Lymphocytes 6.30 (L) 22.00 - 41.00 %    Monocytes 3.50 0.00 - 13.40 %    Eosinophils 0.30 0.00 - 6.90 %    Basophils 0.20 0.00 - 1.80 %    Immature Granulocytes 0.60 0.00 - 0.90 %    Nucleated RBC 0.00 /100 WBC    Neutrophils (Absolute) 14.10 (H) 2.00 - 7.15 K/uL    Lymphs (Absolute) 1.00 1.00 - 4.80 K/uL    Monos (Absolute) 0.56 0.00 - 0.85 K/uL    Eos (Absolute) 0.04 0.00 - 0.51 K/uL    Baso (Absolute) 0.03 0.00 - 0.12 K/uL    Immature Granulocytes (abs) 0.09 0.00 - 0.11 K/uL    NRBC (Absolute) 0.00 K/uL   COMP METABOLIC PANEL   Result Value Ref Range    Sodium 137 135 - 145 mmol/L    Potassium 4.0 3.6 - 5.5 mmol/L    Chloride 103 96 - 112 mmol/L    Co2 20 20 - 33 mmol/L    Anion Gap 14.0 7.0 - 16.0    Glucose 148 (H) 65 - 99 mg/dL    Bun 21 8 - 22 mg/dL    Creatinine 0.88 0.50 - 1.40 mg/dL    Calcium 10.6 (H) 8.5 - 10.5 mg/dL    AST(SGOT) 17 12 - 45 U/L    ALT(SGPT) 15 2 -  50 U/L    Alkaline Phosphatase 115 (H) 30 - 99 U/L    Total Bilirubin 0.5 0.1 - 1.5 mg/dL    Albumin 4.5 3.2 - 4.9 g/dL    Total Protein 7.6 6.0 - 8.2 g/dL    Globulin 3.1 1.9 - 3.5 g/dL    A-G Ratio 1.5 g/dL   Lactic Acid   Result Value Ref Range    Lactic Acid 1.5 0.5 - 2.0 mmol/L   URINE MICROSCOPIC (W/UA)   Result Value Ref Range    WBC Packed (A) /hpf    RBC 20-50 (A) /hpf    Bacteria Moderate (A) None /hpf    Epithelial Cells Few /hpf    Amorphous Crystal Present /hpf   URINE CULTURE(NEW)    Specimen: Urine   Result Value Ref Range    Significant Indicator NEG     Source UR     Site -     Culture Result -    ESTIMATED GFR   Result Value Ref Range    GFR If African American >60 >60 mL/min/1.73 m 2    GFR If Non African American >60 >60 mL/min/1.73 m 2      All labs reviewed by me. Significant for urinalysis shows infection, leukocytosis of 15, no significant anemia, normal electrolytes, normal renal function, normal liver enzymes, normal bilirubin, lactic acid normal    RADIOLOGY  US-RENAL   Final Result         1.  Severe left-sided hydronephrosis. Left renal parenchyma is not well visualized.      2.  Mild right renal pelvic dilatation.        The radiologist's interpretation of all radiological studies have been reviewed by me.    COURSE & MEDICAL DECISION MAKING  Nursing notes, VS, PMSFHx, labs, imaging, EKG reviewed in chart.    9:57 PM - Ultrasound tech informed me that the patient has severe hydronephrosis. Patient will be admitted at this time.      MDM: 8:17 PM Nessa Dennis is a 76 y.o. female who presented with acute left flank pain.  Complicated medical history including recurrent left hydronephrosis, multiple ureteral stents on the left abdomen placed and had to be replaced every 3 to 4 months.  Patient is concerned that she may be due for restenting and is concerned of possible occlusion.  Bedside ultrasound demonstrates severe hydronephrosis and formal ultrasound agrees with this finding.   Labs indicating a urinary tract infection as well and ceftriaxone was started as well as IV fluids.  Lactic acid was normal however, she does have a leukocytosis of 15.  Dr. Porter her gynecologist oncologist was consulted as he has been the physician placing stents in the past and he agrees with work-up and plan to admit for restenting in the morning.  Hospitalist was gracious enough to admit the patient for further evaluation and treatment.  Patient resting comfortably, pain controlled with morphine, Zofran for nausea, IV fluids for tachycardia and dehydration.    HYDRATION: Based on the patient's presentation of Acute Vomiting, Dehydration and Inability to take oral fluids the patient was given IV fluids. IV Hydration was used because oral hydration was not adequate alone. Upon recheck following hydration, the patient was imroved.    CRITICAL CARE TIME 32 minutes  There was a very real possibility of deterioration of the patient's condition.  This patient required the highest level of care.  I provided critical care services which included: review of the medical record, treatment orders, ordering and reviewing test results, frequent reevaluation of the patient's condition and response to treatment, as well as discussing the case with appropriate personnel and various consultants. The critical care time associated with the care of this patient is exclusive of any procedures or specific interventions.    FINAL IMPRESSION  1. Flank pain Acute   2. Other hydronephrosis Acute   3. Occlusion of ureteral stent, initial encounter ([facility]) Acute   4. Acute UTI Acute       Mirian BRADY ([employer]), am scribing for, and in the presence of, Jarod Lynch.    Electronically signed by: Mirian Salazar ([employer]), 3/12/2022    IJarod personally performed the services described in this documentation, as scribed by Mirian Salazar in my presence, and it is both accurate and complete. C.    The note accurately  reflects work and decisions made by me.  Jarod Lynch  3/12/2022  9:09 PM

## 2022-03-13 NOTE — ASSESSMENT & PLAN NOTE
History of cervical cancer status post hysterectomy and bilateral salpingo-oophorectomy in 2005, recurrence in 2015 treated with chemoradiation complicated by ureteral strictures requiring ureteral stenting complicated by stent obstruction requiring exchange almost every 3 months.  Dr. Hays following

## 2022-03-13 NOTE — ED NOTES
US PIV est, labs collected and sent  Pt on the monitor and in a gown w/  at BS  Pt medicated per MAR and now resting comfortably  Denies any further needs at this time, call light within reach  Pt placed on 2L NC for desat after morphine admin

## 2022-03-13 NOTE — H&P
"Hospital Medicine History & Physical Note    Date of Service  3/12/2022    Primary Care Physician  Julio Ross M.D.    Consultants  Gynecology/Oncology - Dr. Adam Bender    Code Status  Full Code    Chief Complaint  Chief Complaint   Patient presents with   • Flank Pain     Pt having left flank pain, pt has stent from bladder to kidney, pt self cath, unable to drain urine effectively today, pain 9/10       History of Presenting Illness  Nessa Dennis is a 76 y.o. female history of cervical cancer status post hysterectomy and bilateral salpingo-oophorectomy in 2005 with recurrence in 2015 treated with chemoradiation complicated by ureteral strictures requiring ureteral stenting complicated by repeated stent obstruction causing hydronephrosis requiring ureteral stent exchange almost every 3 months, urinary incontinence requiring self-catheterization at home, history of multiple episodes of pyelonephritis, history of MDR organisms although none recently, presence of colostomy, hypertension, hyperlipidemia, who presented 3/12/2022 with flank pain left-sided.  Patient reports severe left-sided abdominal and flank pain going from her \"bladder to her kidney\" to the ERP, later when we spoke she mentioned just some moderate discomfort.  Patient is concerned that she has reobstructing of her ureteral stent which has happened multiple times as her presenting situation is similar.  Additionally when she self cathed 2 hours prior to presentation she noted foul-smelling urine. She has associated nausea vomiting and chills reporting 5 episodes of nonbloody emesis on the day of admission.  She denies any chest pain or dyspnea, cough, hematuria, diarrhea.    On arrival patient was afebrile, she was tachycardic, tachypneic, hypertensive, low normal room air oxygen saturation.  Initial work-up showed leukocytosis 15.8, CHEM panel with no significant electrolyte abnormalities, normal renal function and liver function, " lactic acid 1.5, UA clean catch and apparently infectious with large leukocyte esterase, packed WBCs, moderate bacteria.  ERP obtain renal ultrasound that showed severe left-sided hydronephrosis in the left renal parenchyma was not well visualized, mild right renal pelvic dilatation.  ERP discussed case with Dr. Bender who will come and exchange stent in the morning.  She was given IV fluids and started on antibiotics, subsequently for the hospitalist for admission.    I discussed the plan of care with patient, bedside RN and pharmacy.    Review of Systems  Review of Systems   Constitutional: Positive for fever and malaise/fatigue.   HENT: Negative for congestion and sinus pain.    Eyes: Negative for photophobia and pain.   Respiratory: Negative for cough and shortness of breath.    Cardiovascular: Negative for chest pain and palpitations.   Gastrointestinal: Positive for abdominal pain, nausea and vomiting.   Genitourinary: Positive for flank pain. Negative for hematuria.   Musculoskeletal: Negative for falls and neck pain.   Neurological: Negative for sensory change, speech change and focal weakness.   Psychiatric/Behavioral: Negative for substance abuse. The patient is not nervous/anxious.        Past Medical History   has a past medical history of Cancer ([facility]) (2005, 2015), Colostomy in place ([facility]), Environmental and seasonal allergies, Environmental and seasonal allergies, High cholesterol, Hydronephrosis with ureteral stricture, Hypertension (02/09/2022), Lung nodules, Unspecified urinary incontinence, and Urinary bladder disorder.    Surgical History   has a past surgical history that includes recovery (4/6/2015); colostomy creation laparoscopic (4/14/2015); cystoscopy (7/31/2015); cysto stent placemnt pre surg (Left, 8/29/2019); gyn surgery (5/2005); pelvic exam under anesthesia (7/31/2015); cervical conization (7/31/2015); pr cystoscopy,insert ureteral stent (Left, 5/21/2020); pr cystoscopy,insert ureteral  "stent (Left, 9/3/2020); pr cystourethroscopy,ureter catheter (Left, 9/3/2020); pr cystourethroscopy,ureter catheter (Left, 12/31/2020); cysto stent placemnt pre surg (Left, 12/31/2020); pr cystoscopy,insert ureteral stent (Left, 3/25/2021); other abdominal surgery (2015); cysto stent placemnt pre surg (Left, 8/26/2021); other (11/2021); pr cystoscopy,insert ureteral stent (Left, 12/30/2021); pr thoracoscopy,dx no bx (Left, 2/23/2022); lymph node sampling (2/23/2022); and lobectomy (Left, 2/23/2022).     Family History  family history includes Cancer in her father and sister; Hypertension in her father; Stroke in her brother.       Social History   reports that she has never smoked. She has never used smokeless tobacco. She reports previous alcohol use. She reports previous drug use.    Allergies  Allergies   Allergen Reactions   • Sulfa Drugs Unspecified     Pt states \" it really runs me down.\"       Medications  Prior to Admission Medications   Prescriptions Last Dose Informant Patient Reported? Taking?   Ascorbic Acid (VITAMIN C) 1000 MG Tab 3/12/2022 at 1100 Patient Yes No   Sig: Take 1,000 mg by mouth 2 times a day.   Multiple Vitamins-Minerals (SYSTANE ICAPS AREDS2) Tab 3/12/2022 at 0900 Patient Yes Yes   Sig: Take 1 Tablet by mouth every day.   Probiotic Product (PROBIOTIC ADVANCED) Cap 3/12/2022 at 0900 Patient Yes Yes   Sig: Take 1 Capsule by mouth every day.   artificial tears 1.4 % Solution 3/12/2022 at 1100 Patient Yes Yes   Sig: Administer 1 Drop into both eyes 4 times a day as needed (dry eyes).   atorvastatin (LIPITOR) 20 MG Tab 3/11/2022 at 1930 Patient Yes No   Sig: Take 20 mg by mouth every evening.   coenzyme Q-10 30 MG capsule 3/12/2022 at 0900 Patient Yes Yes   Sig: Take 60 mg by mouth every day.   lisinopril (PRINIVIL, ZESTRIL) 40 MG tablet 3/12/2022 at 0900 Patient Yes No   Sig: Take 40 mg by mouth every morning. Indications: High Blood Pressure Disorder   therapeutic multivitamin-minerals " (THERAGRAN-M) Tab 3/12/2022 at 0900 Patient Yes Yes   Sig: Take 1 Tablet by mouth every day.   vitamin D3 (CHOLECALCIFEROL) 1000 Unit (25 mcg) Tab 3/12/2022 at 0900 Patient Yes Yes   Sig: Take 1,000 Units by mouth every day.      Facility-Administered Medications: None       Physical Exam  Temp:  [37.4 °C (99.3 °F)] 37.4 °C (99.3 °F)  Pulse:  [103-118] 111  Resp:  [22-29] 29  BP: (147-182)/() 147/85  SpO2:  [90 %-95 %] 94 %  Blood Pressure : 147/85   Temperature: 37.4 °C (99.3 °F)   Pulse: (!) 111   Respiration: (!) 29   Pulse Oximetry: 94 %       Physical Exam  Vitals and nursing note reviewed.   Constitutional:       General: She is not in acute distress.     Appearance: She is normal weight. She is not toxic-appearing.   HENT:      Head: Normocephalic and atraumatic.      Nose: Nose normal. No rhinorrhea.      Mouth/Throat:      Mouth: Mucous membranes are moist.      Pharynx: Oropharynx is clear.   Eyes:      Extraocular Movements: Extraocular movements intact.      Conjunctiva/sclera: Conjunctivae normal.      Pupils: Pupils are equal, round, and reactive to light.   Cardiovascular:      Rate and Rhythm: Regular rhythm. Tachycardia present.      Pulses: Normal pulses.      Heart sounds: No murmur heard.  Pulmonary:      Effort: Pulmonary effort is normal.      Breath sounds: Normal breath sounds.   Abdominal:      General: Bowel sounds are normal.      Palpations: Abdomen is soft.      Tenderness: There is no abdominal tenderness. There is no guarding or rebound.      Comments: Left-sided colostomy in place   Musculoskeletal:         General: Normal range of motion.      Cervical back: Normal range of motion and neck supple.      Right lower leg: No edema.      Left lower leg: No edema.   Skin:     General: Skin is warm and dry.      Capillary Refill: Capillary refill takes less than 2 seconds.   Neurological:      General: No focal deficit present.      Mental Status: She is alert and oriented to person,  place, and time. Mental status is at baseline.      Cranial Nerves: No cranial nerve deficit.      Sensory: No sensory deficit.      Motor: No weakness.      Coordination: Coordination normal.   Psychiatric:         Mood and Affect: Mood normal.         Behavior: Behavior normal.         Thought Content: Thought content normal.         Judgment: Judgment normal.         Laboratory:  Recent Labs     03/12/22 2102   WBC 15.8*   RBC 5.04   HEMOGLOBIN 12.2   HEMATOCRIT 39.3   MCV 78.0*   MCH 24.2*   MCHC 31.0*   RDW 55.8*   PLATELETCT 305   MPV 9.5     Recent Labs     03/12/22 2102   SODIUM 137   POTASSIUM 4.0   CHLORIDE 103   CO2 20   GLUCOSE 148*   BUN 21   CREATININE 0.88   CALCIUM 10.6*     Recent Labs     03/12/22 2102   ALTSGPT 15   ASTSGOT 17   ALKPHOSPHAT 115*   TBILIRUBIN 0.5   GLUCOSE 148*     Recent Labs     03/12/22  2326   INR 1.11     No results for input(s): NTPROBNP in the last 72 hours.      No results for input(s): TROPONINT in the last 72 hours.    Imaging:  US-RENAL   Final Result         1.  Severe left-sided hydronephrosis. Left renal parenchyma is not well visualized.      2.  Mild right renal pelvic dilatation.          no X-Ray or EKG requiring interpretation    Assessment/Plan:  I anticipate this patient will require at least two midnights for appropriate medical management, necessitating inpatient admission.    * Sepsis (HCC)- (present on admission)  Assessment & Plan  This is Sepsis Present on admission  SIRS criteria identified on my evaluation include: Tachycardia, with heart rate greater than 90 BPM, Tachypnea, with respirations greater than 20 per minute and Leukocytosis, with WBC greater than 12,000  Source is Urinary  Sepsis protocol initiated  Fluid resuscitation ordered per protocol  Crystalloid Fluid Administration: Fluid resuscitation ordered per standard protocol - receiving sepsis fluid bolus currently, never hypotensive  IV antibiotics as appropriate for source of  sepsis  Reassessment: I have reassessed the patient's hemodynamic status    Follow-up blood and urine cultures, patient going for urinary stent exchange with Dr. Bender in AM. Ceftriaxone for now.       HTN (hypertension)- (present on admission)  Assessment & Plan  Lisinopril 40 mg daily  IV antihypertensives with parameters    Hydronephrosis due to obstruction of ureter stent- (present on admission)  Assessment & Plan  History of cervical cancer status post hysterectomy and bilateral salpingo-oophorectomy in 2005, recurrence in 2015 treated with chemoradiation complicated by ureteral strictures requiring ureteral stenting complicated by stent obstruction requiring exchange almost every 3 months.  Dr. Hays consulted in the ED, will see patient.  Follow intake and output, monitor renal function.  On antibiotics for UTI.    History of Cervical cancer (HCC)- (present on admission)  Assessment & Plan  History of cervical cancer status post hysterectomy and bilateral salpingo-oophorectomy in 2005, recurrence in 2015 treated with chemoradiation complicated by ureteral strictures requiring ureteral stenting complicated by stent obstruction requiring exchange almost every 3 months.  Dr. Hays consulted in the ED, will see patient.        VTE prophylaxis: SCDs/TEDs

## 2022-03-13 NOTE — DIETARY
Nutrition Services:  Consult for unsure wt loss and poor PO intake.  Recently seen by RD(s), wt has been stable and current admit wt stable with wt last month.  Currently NPO - RD(s) to monitor per department policy.

## 2022-03-13 NOTE — ANESTHESIA PREPROCEDURE EVALUATION
Case: [id number] Date/Time: 03/13/22 1615    Procedure: CYSTOSCOPY, WITH URETERAL STENT INSERTION    Location: [facility] OR 18 / SURGERY [facility]    Surgeons: Adam Hays M.D.          Relevant Problems   CARDIAC   (positive) HTN (hypertension)         (positive) Hydronephrosis due to obstruction of ureter stent       Physical Exam    Airway   Mallampati: II  TM distance: >3 FB  Neck ROM: full       Cardiovascular - normal exam  Rhythm: regular  Rate: normal  (-) murmur     Dental - normal exam           Pulmonary - normal exam  Breath sounds clear to auscultation     Abdominal    Neurological - normal exam                 Anesthesia Plan    ASA 4- EMERGENT   ASA physical status 4 criteria: sepsisASA physical status emergent criteria: sepsis    Plan - general               Induction: intravenous    Postoperative Plan: Postoperative administration of opioids is intended.    Pertinent diagnostic labs and testing reviewed    Informed Consent:    Anesthetic plan and risks discussed with patient.    Use of blood products discussed with: patient whom consented to blood products.

## 2022-03-13 NOTE — PROGRESS NOTES
[facility] Medicine Daily Progress Note    Date of Service  3/13/2022    Chief Complaint  Nessa Dennis is a 76 y.o. female admitted 3/12/2022 with left-sided flank pain    Hospital Course  This is a 76 year old female with PMHx of hypertension, hyperlipidemia, colostomy presence, history of cervical cancer status post hysterectomy and bilateral salpingo-oophorectomy in 2005 with recurrence in 2015 treated with chemoradiation (Dr. Hays) who presented to the ED on 3/12/2022 with abdominal and left flank pain.     Patient has complicated history including ureteral strictures requiring ureteral stenting complicated by repeated stent obstruction causing hydronephrosis requiring ureteral stent exchange almost every 3 months, urinary incontinence requiring self-catheterization at home, history of multiple episodes of pyelonephritis, history of MDR organisms although none recently.     Patient reports flank pain, foul smelling urine, nausea, vomiting and chills. Renal US noted severe left-sided hydronephrosis. Dr. Hays to exchange stent.    Interval Problem Update  Patient reports her pain is resolving.  She is no longer experiencing nausea or vomiting.    Spoke with Dr. Hays, he will exchange the stents, unclear if that will happen today or tomorrow.    I have personally seen and examined the patient at bedside. I discussed the plan of care with patient and bedside RN.  Gynecology oncology, Dr. Hays    Consultants/Specialty  Gynecology oncology    Code Status  Full Code    Disposition  Patient is not medically cleared for discharge.   Anticipate discharge to to home with close outpatient follow-up.  I have placed the appropriate orders for post-discharge needs.    Review of Systems  Review of Systems   All other systems reviewed and are negative.       Physical Exam  Temp:  [36.4 °C (97.5 °F)-37.4 °C (99.3 °F)] 36.5 °C (97.7 °F)  Pulse:  [103-121] 106  Resp:  [20-29] 20  BP: (129-182)/() 130/70  SpO2:  [90 %-97 %]  97 %    Physical Exam  Vitals and nursing note reviewed.   Constitutional:       General: She is not in acute distress.     Appearance: Normal appearance.   HENT:      Head: Normocephalic and atraumatic.      Mouth/Throat:      Mouth: Mucous membranes are moist.      Pharynx: No oropharyngeal exudate.   Eyes:      Extraocular Movements: Extraocular movements intact.      Pupils: Pupils are equal, round, and reactive to light.   Cardiovascular:      Rate and Rhythm: Normal rate and regular rhythm.      Pulses: Normal pulses.      Heart sounds: No murmur heard.    No friction rub. No gallop.   Pulmonary:      Effort: Pulmonary effort is normal. No respiratory distress.      Breath sounds: No wheezing, rhonchi or rales.   Abdominal:      General: Bowel sounds are normal. There is no distension.      Palpations: Abdomen is soft. There is no mass.      Tenderness: There is no abdominal tenderness.   Musculoskeletal:         General: No swelling or tenderness. Normal range of motion.      Cervical back: Normal range of motion. No rigidity. No muscular tenderness.      Right lower leg: No edema.      Left lower leg: No edema.   Skin:     General: Skin is warm and dry.      Capillary Refill: Capillary refill takes less than 2 seconds.      Findings: No erythema or rash.      Comments: Noted suture along midline axilla across from the left breast, no evidence of erythema or cellulitis or drainage   Neurological:      General: No focal deficit present.      Mental Status: She is alert and oriented to person, place, and time.      Motor: No weakness.      Gait: Gait normal.         Fluids    Intake/Output Summary (Last 24 hours) at 3/13/2022 1021  Last data filed at 3/13/2022 0700  Gross per 24 hour   Intake 0 ml   Output 1000 ml   Net -1000 ml       Laboratory  Recent Labs     03/12/22  2102 03/13/22  0408   WBC 15.8* 12.2*   RBC 5.04 4.15*   HEMOGLOBIN 12.2 10.1*   HEMATOCRIT 39.3 32.9*   MCV 78.0* 79.3*   MCH 24.2* 24.3*    MCHC 31.0* 30.7*   RDW 55.8* 57.2*   PLATELETCT 305 274   MPV 9.5 10.2     Recent Labs     03/12/22  2102 03/13/22  0408   SODIUM 137 138   POTASSIUM 4.0 4.7   CHLORIDE 103 106   CO2 20 21   GLUCOSE 148* 130*   BUN 21 18   CREATININE 0.88 0.79   CALCIUM 10.6* 9.4     Recent Labs     03/12/22  2326   INR 1.11               Imaging  US-RENAL   Final Result         1.  Severe left-sided hydronephrosis. Left renal parenchyma is not well visualized.      2.  Mild right renal pelvic dilatation.           Assessment/Plan  * Sepsis (HCC)- (present on admission)  Assessment & Plan  This is Sepsis Present on admission  SIRS criteria identified on my evaluation include: Tachycardia, with heart rate greater than 90 BPM, Tachypnea, with respirations greater than 20 per minute and Leukocytosis, with WBC greater than 12,000  Source is Urinary  Sepsis protocol initiated  Fluid resuscitation ordered per protocol  Crystalloid Fluid Administration: Fluid resuscitation ordered per standard protocol - receiving sepsis fluid bolus currently, never hypotensive  IV antibiotics as appropriate for source of sepsis  Reassessment: I have reassessed the patient's hemodynamic status    Follow-up blood and urine cultures, patient going for urinary stent exchange with Dr. Hays. Ceftriaxone for now.       Hydronephrosis due to obstruction of ureter stent- (present on admission)  Assessment & Plan  History of cervical cancer status post hysterectomy and bilateral salpingo-oophorectomy in 2005, recurrence in 2015 treated with chemoradiation complicated by ureteral strictures requiring ureteral stenting complicated by stent obstruction requiring exchange almost every 3 months.  Dr. Hays consulted in the ED, plan for stent exchange 3/13  Follow intake and output, monitor renal function.  Hx of E. Faecalis 12/2021  UC x 2 after A. Urinae   Keep on Rocephin   UC pending     History of Cervical cancer (HCC)- (present on admission)  Assessment &  Plan  History of cervical cancer status post hysterectomy and bilateral salpingo-oophorectomy in 2005, recurrence in 2015 treated with chemoradiation complicated by ureteral strictures requiring ureteral stenting complicated by stent obstruction requiring exchange almost every 3 months.  Dr. Hays consulted in the ED, will see patient.    Mixed hyperlipidemia  Assessment & Plan  Resume statin therapy    HTN (hypertension)- (present on admission)  Assessment & Plan  Lisinopril 40 mg daily  IV antihypertensives with parameters       VTE prophylaxis: SCDs/TEDs    I have performed a physical exam and reviewed and updated ROS and Plan today (3/13/2022). In review of yesterday's note (3/12/2022), there are no changes except as documented above.

## 2022-03-13 NOTE — ED NOTES
Med rec updated and complete. Allergies reviewed.  Confirmed name and dated of birth. Pt reports that she finished antibiotics about 30 days ago. Pt is unable to recall the name.      [place] [facility] \[facility] [place] [facility]\"" [phone]

## 2022-03-13 NOTE — ED TRIAGE NOTES
Chief Complaint   Patient presents with   • Flank Pain     Pt having left flank pain, pt has stent from bladder to kidney, pt self cath, unable to drain urine effectively today, pain 9/10     Pt ambulatory to triage for above complaint. Pt states she self cath and today she feels like she was not able to completley empty her bladder, she has been having left flank pain all day.  Pt also had a surgical procedure 2 weeks ago to remove a nodule from her left upper lung.        Pt is alert/oriented and follows commands. Pt speaking in full sentences and responds appropriately to questions. No acute distress noted in triage and respirations are even and unlabored.     Pt placed in lobby and educated on triage process. Pt encouraged to alert staff for any changes in condition.

## 2022-03-13 NOTE — ASSESSMENT & PLAN NOTE
History of cervical cancer status post hysterectomy and bilateral salpingo-oophorectomy in 2005, recurrence in 2015 treated with chemoradiation complicated by ureteral strictures requiring ureteral stenting complicated by stent obstruction requiring exchange almost every 3 months.  Dr. Hays removed stent 3/13. Patient is for renal scan and then left nephrostomy tube placement by IR.  Follow intake and output, monitor renal function.  Hx of E. Faecalis 12/2021  UC x 2 after ELISA [place]   ID consulted, changed abx to unasyn  -culture sensitivities pending

## 2022-03-13 NOTE — CONSULTS
Gynecologic Oncology Consultation    Date: 3/13/2022    Requesting Physician: Dr. Maradiaga    Consulting Physician: DANIEL Traylor     Reason for consultation: Left hydronephrosis    CC: left flank pain    HPI: This is a 76 y.o. female who is presenting with left flank pain with associated nausea/vomiting that began yesterday. She has significant past medical history of Stage 1B1 cervical cancer diagnosed in 2005 with recurrence in 2015. She has left ureteral stricture requiring ureteral stent. She has history of recurrent UTI as well. She presented to the ER yesterday with acute onset of left flank pain and N/V. She underwent a renal ultrasound that showed left mod/severe hydronephrosis. She started on ceftriaxone for possible UTI, culture pending. She was admitted to hosptialists service and we were asked to consult for management of left hydronephrosis.     The patient was seen and examined at bedside. She has ongoing nausea but no emesis. Her previous flank pain has improved. She denies any lightheaded or dizziness, shortness of breath, chest pain, vaginal bleeding or discharge, changes in bowel. She continues to self cath. She denies any fever or chills.     Review of Systems:  14 point review of systems was performed and was unremarkable with the exception of what has been listed in the HPI.     Past Medical History:   Diagnosis Date   • Cancer ([facility]) 2005, 2015    cervical   • Colostomy in place ([facility])    • Environmental and seasonal allergies    • Environmental and seasonal allergies    • High cholesterol    • Hydronephrosis with ureteral stricture    • Hypertension 02/09/2022    states well controlled on meds   • Lung nodules    • Unspecified urinary incontinence     wears pads, does self caths   • Urinary bladder disorder     pt self caths 4x per day        Past Surgical History:   Procedure Laterality Date   • DE THORACOSCOPY,DX NO BX Left 2/23/2022    Procedure: THORACOSCOPY;   Surgeon: Chriss Etienne M.D.;  Location: [facility];  Service: General   • LYMPH NODE SAMPLING  2/23/2022    Procedure: SAMPLING, MULTIPLE LYMPH NODES - MEDIASTINAL;  Surgeon: Chriss Etienne M.D.;  Location: [facility];  Service: General   • LOBECTOMY Left 2/23/2022    Procedure: LOBECTOMY - SUBLOBAR RESECTION UPPER LOBE;  Surgeon: Chriss Etienne M.D.;  Location: [facility];  Service: General   • NH CYSTOSCOPY,INSERT URETERAL STENT Left 12/30/2021    Procedure: CYSTOSCOPY, WITH URETERAL STENT INSERTION - EXCHANGE;  Surgeon: Adam Hays M.D.;  Location: [facility];  Service: Gynecology Oncology   • OTHER  11/2021    left lung biopsy   • CYSTO STENT PLACEMNT PRE SURG Left 8/26/2021    Procedure: CYSTOSCOPY, WITH URETERAL STENT INSERTION OR REMOVAL - RIGHT AND/OR LEFT STENT EXCHANGE.;  Surgeon: Adam Hays M.D.;  Location: [facility];  Service: Gynecology Oncology   • NH CYSTOSCOPY,INSERT URETERAL STENT Left 3/25/2021    Procedure: CYSTOSCOPY, WITH URETERAL STENT REMOVAL LEFT;  Surgeon: Adam Hays M.D.;  Location: [facility];  Service: Gynecology Oncology   • NH CYSTOURETHROSCOPY,URETER CATHETER Left 12/31/2020    Procedure: CYSTOSCOPY, WITH left RETROGRADE PYELOGRAM;  Surgeon: Adam Hays M.D.;  Location: [facility];  Service: Gynecology Oncology   • CYSTO STENT PLACEMNT PRE SURG Left 12/31/2020    Procedure: CYSTOSCOPY, WITH Left URETERAL STENT  removal and replacement;  Surgeon: Adam Hays M.D.;  Location: [facility];  Service: Gynecology Oncology   • NH CYSTOSCOPY,INSERT URETERAL STENT Left 9/3/2020    Procedure: CYSTOSCOPY, WITH URETERAL STENT INSERTION- RIGHT AND OR LEFT EXCHANGE;  Surgeon: Adam Hays M.D.;  Location: [facility];  Service: Gynecology Oncology   • NH CYSTOURETHROSCOPY,URETER CATHETER Left 9/3/2020    Procedure: RETROGRADE PYELOGRAM;  Surgeon: Adam Hays M.D.;  Location: [facility];  Service:  Gynecology Oncology   • AL CYSTOSCOPY,INSERT URETERAL STENT Left 5/21/2020    Procedure: CYSTOSCOPY, WITH URETERAL STENT INSERTION;  Surgeon: Adam Hays M.D.;  Location: SURGERY [facility];  Service: Gynecology Oncology   • CYSTO STENT PLACEMNT PRE SURG Left 8/29/2019    Procedure: CYSTOSCOPY, WITH URETERAL STENT REMOVAL;  Surgeon: Adam Hays M.D.;  Location: SURGERY [facility];  Service: Gynecology   • CYSTOSCOPY  7/31/2015    Procedure: CYSTOSCOPY;  Surgeon: Adam Hays M.D.;  Location: SURGERY [facility];  Service:    • PELVIC EXAM UNDER ANESTHESIA  7/31/2015    Procedure: PELVIC EXAM UNDER ANESTHESIA;  Surgeon: Adam Hays M.D.;  Location: SURGERY [facility];  Service:    • CERVICAL CONIZATION  7/31/2015    Procedure: CERVICAL CONIZATION FOR: CONE BIOPSY;  Surgeon: Adam Hays M.D.;  Location: SURGERY [facility];  Service:    • COLOSTOMY CREATION LAPAROSCOPIC  4/14/2015    Performed by Mariusz Barajas M.D. at SURGERY [facility]   • RECOVERY  4/6/2015    Performed by [facility] at SURGERY PRE-POST PROC UNIT [facility]   • OTHER ABDOMINAL SURGERY  2015    bowel resection- has colostomy   • GYN SURGERY  5/2005    hysterectomy         Current Facility-Administered Medications   Medication Dose Route Frequency Provider Last Rate Last Admin   • morphine 4 MG/ML injection 4 mg  4 mg Intravenous Q4HRS PRN Julio Connolly M.D.       • oxyCODONE immediate-release (ROXICODONE) tablet 5 mg  5 mg Oral Q6HRS PRN Julio Connolly M.D.       • atorvastatin (LIPITOR) tablet 20 mg  20 mg Oral Q EVENING Julio Connolly M.D.       • lisinopril (PRINIVIL) tablet 40 mg  40 mg Oral QAM Julio Connolly M.D.   40 mg at 03/13/22 0553   • senna-docusate (PERICOLACE or SENOKOT S) 8.6-50 MG per tablet 2 Tablet  2 Tablet Oral BID Julio Connolly M.D.        And   • polyethylene glycol/lytes (MIRALAX) PACKET 1 Packet  1 Packet Oral QDAY PRN Julio Connolly M.D.        And   • magnesium  hydroxide (MILK OF MAGNESIA) suspension 30 mL  30 mL Oral QDAY PRN Julio Connolly M.D.        And   • bisacodyl (DULCOLAX) suppository 10 mg  10 mg Rectal QDAY PRN Julio Connolly M.D.       • acetaminophen (Tylenol) tablet 650 mg  650 mg Oral Q6HRS PRN Julio Connolly M.D.       • labetalol (NORMODYNE/TRANDATE) injection 10 mg  10 mg Intravenous Q4HRS PRN Julio Connolly M.D.       • cefTRIAXone (Rocephin) syringe 1 g  1 g Intravenous Q24HRS Julio Connolly M.D.       • ondansetron (ZOFRAN) syringe/vial injection 4 mg  4 mg Intravenous Q4HRS PRN Julio Connolly M.D.   4 mg at 03/13/22 1300   • ondansetron (ZOFRAN ODT) dispertab 4 mg  4 mg Oral Q4HRS PRN Julio Connolly M.D.           Allergies:  Sulfa drugs    Social History     Socioeconomic History   • Marital status:      Spouse name: Heron Dennis   • Number of children: Not on file   • Years of education: Not on file   • Highest education level: Not on file   Occupational History   • Occupation: Retired    Tobacco Use   • Smoking status: Never Smoker   • Smokeless tobacco: Never Used   Vaping Use   • Vaping Use: Never used   Substance and Sexual Activity   • Alcohol use: Not Currently   • Drug use: Not Currently   • Sexual activity: Not on file   Other Topics Concern   • Not on file   Social History Narrative   • Not on file     Social Determinants of Health     Financial Resource Strain: Not on file   Food Insecurity: Not on file   Transportation Needs: Not on file   Physical Activity: Not on file   Stress: Not on file   Social Connections: Not on file   Intimate Partner Violence: Not on file   Housing Stability: Not on file       Family History   Problem Relation Age of Onset   • Cancer Father    • Hypertension Father    • Cancer Sister    • Stroke Brother          Physical Exam:  /80   Pulse 94   Temp 36.1 °C (96.9 °F) (Temporal)   Resp 20   Ht 1.524 m (5')   Wt 57.9 kg (127 lb 10.3 oz)   SpO2 96%       Physical  Exam  Constitutional:       Appearance: Normal appearance. She is not ill-appearing.   HENT:      Head: Normocephalic and atraumatic.      Mouth/Throat:      Mouth: Mucous membranes are moist.      Pharynx: Oropharynx is clear.   Cardiovascular:      Pulses: Normal pulses.   Pulmonary:      Effort: Pulmonary effort is normal.      Breath sounds: Normal breath sounds.   Abdominal:      General: Bowel sounds are normal.      Palpations: Abdomen is soft.      Comments: No CVA tenderness   Musculoskeletal:         General: No swelling.   Skin:     General: Skin is warm and dry.      Capillary Refill: Capillary refill takes 2 to 3 seconds.   Neurological:      Mental Status: She is alert and oriented to person, place, and time.          Labs:  Recent Labs     03/12/22 2102 03/13/22  0408   WBC 15.8* 12.2*   RBC 5.04 4.15*   HEMOGLOBIN 12.2 10.1*   HEMATOCRIT 39.3 32.9*   MCV 78.0* 79.3*   MCH 24.2* 24.3*   MCHC 31.0* 30.7*   RDW 55.8* 57.2*   PLATELETCT 305 274   MPV 9.5 10.2     Recent Labs     03/12/22 2102 03/13/22  0408   SODIUM 137 138   POTASSIUM 4.0 4.7   CHLORIDE 103 106   CO2 20 21   GLUCOSE 148* 130*   BUN 21 18   CREATININE 0.88 0.79   CALCIUM 10.6* 9.4     Recent Labs     03/12/22  2326   INR 1.11     Recent Labs     03/12/22 2102 03/12/22 2326 03/13/22  0408   ASTSGOT 17  --  15   ALTSGPT 15  --  11   TBILIRUBIN 0.5  --  0.2   ALKPHOSPHAT 115*  --  86   GLOBULIN 3.1  --  2.6   INR  --  1.11  --        Radiology:  3/12/2022 9:21 PM     HISTORY/REASON FOR EXAM:  Flank pain.        TECHNIQUE/EXAM DESCRIPTION:  Renal ultrasound.     COMPARISON:  None     FINDINGS:     The right kidney measures 11.07 cm.  There is a small left renal cyst which is simple in nature. There is mild right renal pelvic dilatation. There are no renal calculi.     The left kidney cannot be accurately measured due to severe hydronephrosis and limited is luxation of the thinned renal cortex. The left kidney appears normal in contour  and parenchymal echotexture. The corticomedullary differentiation is preserved.   There is severe left-sided hydronephrosis. There are no renal calculi.     The bladder demonstrates no focal wall abnormality.           IMPRESSION:        1.  Severe left-sided hydronephrosis. Left renal parenchyma is not well visualized.     2.  Mild right renal pelvic dilatation.    Assessment: This is a 76 y.o. female who presents with left flank pain, she is found to have a moderate to severe left hydronephrosis which has worsened since 04/06/21.  Her pain is well controlled at this time. Her kidney function is normal. She most likely also has a UTI based on urinalysis, cultures pending. No signs or symptoms of sepsis, she is clinically stable.   2. 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 She has had recurrent UTI the last 3 months with stent in place.  Will need to have stent remove and place left NT to alleviate the obstruction and treat the infection before replacing with internal ureteral stent.   3.  Left renal kidney disease. Last nuclear scan in 05/03/21 left kidney function 35%. Will repeat left renal scan to assess the status of left kidney.     Plan: Patient to go to OR today for cystoscopy with left ureteral stent removal. Discussed procedure with patient who is agreeable to proceed. Follow urine cultures.      2.  Left renal scan first to assess the left kidney function.   3.  After completion of left renal scan place left NT.      I have seen this pt with SHILOH Boo.     Thank you for for allowing us to participate in this patient's care. We will continue to follow. Please feel free to contact us for any questions or if we can be of any further assistance.      Gynecologic Oncology  [facility]

## 2022-03-13 NOTE — HOSPITAL COURSE
This is a 76 year old female with PMHx of hypertension, hyperlipidemia, colostomy presence, history of cervical cancer status post hysterectomy and bilateral salpingo-oophorectomy in 2005 with recurrence in 2015 treated with chemoradiation (Dr. Hays) who presented to the ED on 3/12/2022 with abdominal and left flank pain.     Patient has complicated history including ureteral strictures requiring ureteral stenting complicated by repeated stent obstruction causing hydronephrosis requiring ureteral stent exchange almost every 3 months, urinary incontinence requiring self-catheterization at home, history of multiple episodes of pyelonephritis, history of MDR organisms although none recently.     Patient reports flank pain, foul smelling urine, nausea, vomiting and chills. Renal US noted severe left-sided hydronephrosis. Dr. Hays removed stent 3/13. Patient is for renal scan and then left nephrostomy tube placement by IR.

## 2022-03-13 NOTE — ED NOTES
Tech at BS to transport pt upstairs  Rocephin still infusing, LR paused  INR and lactic redrawn    Pt transported upstairs A&O x4 in stable condition accompanied by

## 2022-03-13 NOTE — ASSESSMENT & PLAN NOTE
This is Sepsis Present on admission  SIRS criteria identified on my evaluation include: Tachycardia, with heart rate greater than 90 BPM, Tachypnea, with respirations greater than 20 per minute and Leukocytosis, with WBC greater than 12,000  Source is Urinary  Sepsis protocol initiated  Fluid resuscitation ordered per protocol  Crystalloid Fluid Administration: Fluid resuscitation ordered per standard protocol - receiving sepsis fluid bolus currently, never hypotensive  IV antibiotics as appropriate for source of sepsis  Reassessment: I have reassessed the patient's hemodynamic status    Follow-up blood and urine cultures, patient is s/p stent removal with Dr. Hays 3/13.   ID consulted

## 2022-03-13 NOTE — PROGRESS NOTES
VSS, denies pain, straight cath around 6 am with 1000 ml clear yellow urine. Per Dr Connolly, patient doesn't need another lactic acid, last one was 0.8.

## 2022-03-13 NOTE — PROGRESS NOTES
4 Eyes Skin Assessment Completed by MAYCOL Garcia and MAYCOL Pacheco.    Head WDL  Ears WDL  Nose WDL  Mouth WDL  Neck WDL  Breast/Chest: left upper (mid-axillary) chest with stitches CDI  Shoulder Blades WDL  Spine WDL  (R) Arm/Elbow/Hand WDL  (L) Arm/Elbow/Hand WDL  Abdomen WDL  Groin WDL  Scrotum/Coccyx/Buttocks WDL  (R) Leg WDL  (L) Leg WDL  (R) Heel/Foot/Toe Redness and Blanching  (L) Heel/Foot/Toe Redness and Blanching          Devices In Places Nasal Cannula      Interventions In Place NC W/Ear Foams    Possible Skin Injury No    Pictures Uploaded Into Epic N/A  Wound Consult Placed N/A  RN Wound Prevention Protocol Ordered No

## 2022-03-14 ENCOUNTER — APPOINTMENT (OUTPATIENT)
Dept: RADIOLOGY | Facility: MEDICAL CENTER | Age: 77
DRG: 698 | End: 2022-03-14
Attending: GENERAL PRACTICE
Payer: MEDICARE

## 2022-03-14 LAB
ANION GAP SERPL CALC-SCNC: 11 MMOL/L (ref 7–16)
BUN SERPL-MCNC: 16 MG/DL (ref 8–22)
CALCIUM SERPL-MCNC: 10 MG/DL (ref 8.5–10.5)
CHLORIDE SERPL-SCNC: 102 MMOL/L (ref 96–112)
CO2 SERPL-SCNC: 21 MMOL/L (ref 20–33)
CREAT SERPL-MCNC: 0.85 MG/DL (ref 0.5–1.4)
ERYTHROCYTE [DISTWIDTH] IN BLOOD BY AUTOMATED COUNT: 56.1 FL (ref 35.9–50)
GLUCOSE SERPL-MCNC: 130 MG/DL (ref 65–99)
HCT VFR BLD AUTO: 33.9 % (ref 37–47)
HGB BLD-MCNC: 10.5 G/DL (ref 12–16)
MAGNESIUM SERPL-MCNC: 1.7 MG/DL (ref 1.5–2.5)
MCH RBC QN AUTO: 24.4 PG (ref 27–33)
MCHC RBC AUTO-ENTMCNC: 31 G/DL (ref 33.6–35)
MCV RBC AUTO: 78.7 FL (ref 81.4–97.8)
PATHOLOGY CONSULT NOTE: NORMAL
PHOSPHATE SERPL-MCNC: 3.1 MG/DL (ref 2.5–4.5)
PLATELET # BLD AUTO: 276 K/UL (ref 164–446)
PMV BLD AUTO: 9.5 FL (ref 9–12.9)
POTASSIUM SERPL-SCNC: 4.4 MMOL/L (ref 3.6–5.5)
RBC # BLD AUTO: 4.31 M/UL (ref 4.2–5.4)
SODIUM SERPL-SCNC: 134 MMOL/L (ref 135–145)
WBC # BLD AUTO: 8.8 K/UL (ref 4.8–10.8)

## 2022-03-14 PROCEDURE — 770006 HCHG ROOM/CARE - MED/SURG/GYN SEMI*

## 2022-03-14 PROCEDURE — A9562 TC99M MERTIATIDE: HCPCS

## 2022-03-14 PROCEDURE — 36415 COLL VENOUS BLD VENIPUNCTURE: CPT

## 2022-03-14 PROCEDURE — A9270 NON-COVERED ITEM OR SERVICE: HCPCS | Performed by: STUDENT IN AN ORGANIZED HEALTH CARE EDUCATION/TRAINING PROGRAM

## 2022-03-14 PROCEDURE — 80048 BASIC METABOLIC PNL TOTAL CA: CPT

## 2022-03-14 PROCEDURE — 700111 HCHG RX REV CODE 636 W/ 250 OVERRIDE (IP): Performed by: STUDENT IN AN ORGANIZED HEALTH CARE EDUCATION/TRAINING PROGRAM

## 2022-03-14 PROCEDURE — 84100 ASSAY OF PHOSPHORUS: CPT

## 2022-03-14 PROCEDURE — 700111 HCHG RX REV CODE 636 W/ 250 OVERRIDE (IP)

## 2022-03-14 PROCEDURE — 700102 HCHG RX REV CODE 250 W/ 637 OVERRIDE(OP): Performed by: STUDENT IN AN ORGANIZED HEALTH CARE EDUCATION/TRAINING PROGRAM

## 2022-03-14 PROCEDURE — 99233 SBSQ HOSP IP/OBS HIGH 50: CPT | Performed by: GENERAL PRACTICE

## 2022-03-14 PROCEDURE — 85027 COMPLETE CBC AUTOMATED: CPT

## 2022-03-14 PROCEDURE — 83735 ASSAY OF MAGNESIUM: CPT

## 2022-03-14 RX ORDER — FUROSEMIDE 10 MG/ML
INJECTION INTRAMUSCULAR; INTRAVENOUS
Status: COMPLETED
Start: 2022-03-14 | End: 2022-03-14

## 2022-03-14 RX ADMIN — CEFTRIAXONE SODIUM 1 G: 10 INJECTION, POWDER, FOR SOLUTION INTRAVENOUS at 17:47

## 2022-03-14 RX ADMIN — LISINOPRIL 40 MG: 20 TABLET ORAL at 05:54

## 2022-03-14 RX ADMIN — FUROSEMIDE 20 MG: 10 INJECTION, SOLUTION INTRAMUSCULAR; INTRAVENOUS at 11:45

## 2022-03-14 RX ADMIN — SENNOSIDES AND DOCUSATE SODIUM 2 TABLET: 50; 8.6 TABLET ORAL at 17:35

## 2022-03-14 RX ADMIN — ATORVASTATIN CALCIUM 20 MG: 20 TABLET, FILM COATED ORAL at 17:35

## 2022-03-14 RX ADMIN — SENNOSIDES AND DOCUSATE SODIUM 2 TABLET: 50; 8.6 TABLET ORAL at 05:54

## 2022-03-14 NOTE — ANESTHESIA TIME REPORT
Anesthesia Start and Stop Event Times     Date Time Event    3/13/2022 1657 Ready for Procedure     1659 Anesthesia Start     1726 Anesthesia Stop        Responsible Staff  03/13/22    Name Role Begin End    Misha Velásquez M.D. Anesth 1659 1726        Preop Diagnosis (Free Text):  Pre-op Diagnosis             Preop Diagnosis (Codes):    Premium Reason  B. 1st Call    Comments:

## 2022-03-14 NOTE — OP REPORT
PreOp Diagnosis: left ureteral stent infection  Left ureteral stent obstruction  Left hydronephrosis        PostOp Diagnosis: same        Procedure(s):  CYSTOSCOPY, WITH URETERAL STENT INSERTION     Surgeon(s):  Adam Hays M.D.     Anesthesiologist/Type of Anesthesia:  Anesthesiologist: Misha Velásquez M.D./* No anesthesia type entered *     Surgical Staff:  Circulator: Shannan Ventura R.N.  Scrub Person: Kate Juarezcas     Specimens removed if any:  ID Type Source Tests Collected by Time Destination   1 : Ureteral Stent Other Other AEROBIC/ANAEROBIC CULTURE (SURGERY) Adam Hays M.D. 3/13/2022  5:14 PM           Estimated Blood Loss: none     Findings: No evidence of cystitis. Stent appears calcified and infected.      Complications: none     Indication: Mrs. Dennis is a pleasant 76-year-old female who has history of cervical cancer.  In addition she has history of radiation colitis and left ureteral stricture secondary to radiation for treatment of her cervical cancer.  Patient has require a left ureteral stent placement secondary to her stricture since April 2019.  She has had every 3 month left ureteral stent exchange and in December 2021 she had her left ureteral stent placement.  She was due for her left ureteral stent exchange in March and was supposed to see me in my office to schedule for this.  Patient however came in through the ER when she developed an acute onset of left flank pain.  She has been diagnosed with a suspected left pyelonephritis secondary to her ureteral stent.  In addition repeat left renal ultrasound show worsening hydronephrosis thus suspect that her left ureteral stent is clogged.  Thus patient is advised to have her left ureteral stent removed and we will place a left nephrostomy tube and treat her left kidney infection prior to replacement of the left internal ureteral stent.    Risk benefits and rational procedures were reviewed with the patient in detail patient's  understanding of these risks and wished to proceed with the surgery as planned.    Procedure: After achieving adequate anesthesia patient was prepped and draped in place modified dorsolithotomy position.  30 degree cystoscope was inserted transurethrally.  Examination of the bladder revealed no obvious cystitis.  However the left ureteral stent appears calcified and infected this was easily retrieved and removed in its entirety.  Left ureteral stent was sent for culture for sensitivity.    Patient tolerated procedure well without any difficulties was extubated and transferred to the PACU in stable condition.

## 2022-03-14 NOTE — DISCHARGE PLANNING
Anticipated Discharge Disposition: Home independently     Action: pt currently on 3 liters O2, 6 clicks are 18/21. Orientation: A&Ox4. Patient pending renal scan today, plan for nephrosotomy tube pending result. CM spoke with patient at bedside, appears to have good support at home and states does not have any home needs.     Barriers to Discharge: Medical readiness     Plan: f/u with pt and medical team to discuss dc needs and barriers.     CM will continue to follow for any dc planning needs.

## 2022-03-14 NOTE — CARE PLAN
The patient is Stable - Low risk of patient condition declining or worsening    Shift Goals  Clinical Goals: remain free of falls  Patient Goals: surgery today    Progress made toward(s) clinical / shift goals:  no falls, surgery performed today    Patient is not progressing towards the following goals:      Problem: Pain - Standard  Goal: Alleviation of pain or a reduction in pain to the patient’s comfort goal  Outcome: Progressing     Problem: Knowledge Deficit - Standard  Goal: Patient and family/care givers will demonstrate understanding of plan of care, disease process/condition, diagnostic tests and medications  Outcome: Progressing     Problem: Respiratory  Goal: Patient will achieve/maintain optimum respiratory ventilation and gas exchange  Outcome: Progressing

## 2022-03-14 NOTE — PROGRESS NOTES
Received bedside report from night shift RN.     Assumed care of patient at change of shift.     Assessment complete and POC discussed with patient and pertinent medical team.     Patient is A&Ox4, VS stable, on 1 L NC.     Patient is complaining of pain, and does show signs of physical distress.     Patient's affect is WNL. She seem interested in much discussion regarding transition to next level of care and goals for the day.  She follow commands appropriately.      Patient NPO majority of day to prepare for IR    Bed is in lowest/locked position.     Call light and belongings are within reach.     All needs addressed and patient has no complaints.

## 2022-03-14 NOTE — ANESTHESIA PROCEDURE NOTES
Airway    Date/Time: 3/13/2022 5:03 PM  Performed by: Misha Velásquez M.D.  Authorized by: Misha Velásquez M.D.     Location:  OR  Urgency:  Elective  Indications for Airway Management:  Anesthesia      Spontaneous Ventilation: absent    Sedation Level:  Deep  Preoxygenated: Yes    Final Airway Type:  Supraglottic airway  Final Supraglottic Airway:  Standard LMA    SGA Size:  4  Number of Attempts at Approach:  1  Ventilation Between Attempts:  None  Number of Other Approaches Attempted:  0

## 2022-03-14 NOTE — PROGRESS NOTES
Hospital Medicine Daily Progress Note    Date of Service  3/14/2022    Chief Complaint  Nessa Dennis is a 76 y.o. female admitted 3/12/2022 with left-sided flank pain    Hospital Course  This is a 76 year old female with PMHx of hypertension, hyperlipidemia, colostomy presence, history of cervical cancer status post hysterectomy and bilateral salpingo-oophorectomy in 2005 with recurrence in 2015 treated with chemoradiation (Dr. Hays) who presented to the ED on 3/12/2022 with abdominal and left flank pain.     Patient has complicated history including ureteral strictures requiring ureteral stenting complicated by repeated stent obstruction causing hydronephrosis requiring ureteral stent exchange almost every 3 months, urinary incontinence requiring self-catheterization at home, history of multiple episodes of pyelonephritis, history of MDR organisms although none recently.     Patient reports flank pain, foul smelling urine, nausea, vomiting and chills. Renal US noted severe left-sided hydronephrosis. Dr. Hays removed stent 3/13. Patient is for renal scan and then left nephrostomy tube placement by IR.    Interval Problem Update  Dr. Hays removed stent 3/13. Patient is for renal scan first and then left nephrostomy tube placement by IR.    UC pending to determine abx regimen.    I have personally seen and examined the patient at bedside. I discussed the plan of care with patient and bedside RN.  Gynecology oncology, Dr. Hays    Consultants/Specialty  Gynecology oncology    Code Status  Full Code    Disposition  Patient is not medically cleared for discharge.   Anticipate discharge to to home with close outpatient follow-up.  I have placed the appropriate orders for post-discharge needs.    Review of Systems  Review of Systems   All other systems reviewed and are negative.       Physical Exam  Temp:  [35.9 °C (96.7 °F)-37.5 °C (99.5 °F)] 36.5 °C (97.7 °F)  Pulse:  [] 83  Resp:  [17-21] 17  BP:  (115-162)/(61-89) 123/82  SpO2:  [89 %-97 %] 94 %    Physical Exam  Vitals and nursing note reviewed.   Constitutional:       General: She is not in acute distress.     Appearance: Normal appearance.   HENT:      Head: Normocephalic and atraumatic.      Mouth/Throat:      Mouth: Mucous membranes are moist.      Pharynx: No oropharyngeal exudate.   Eyes:      Extraocular Movements: Extraocular movements intact.      Pupils: Pupils are equal, round, and reactive to light.   Cardiovascular:      Rate and Rhythm: Normal rate and regular rhythm.      Pulses: Normal pulses.      Heart sounds: No murmur heard.    No friction rub. No gallop.   Pulmonary:      Effort: Pulmonary effort is normal. No respiratory distress.      Breath sounds: No wheezing, rhonchi or rales.   Abdominal:      General: Bowel sounds are normal. There is no distension.      Palpations: Abdomen is soft. There is no mass.      Tenderness: There is no abdominal tenderness.   Musculoskeletal:         General: No swelling or tenderness. Normal range of motion.      Cervical back: Normal range of motion. No rigidity. No muscular tenderness.      Right lower leg: No edema.      Left lower leg: No edema.   Skin:     General: Skin is warm and dry.      Capillary Refill: Capillary refill takes less than 2 seconds.      Findings: No erythema or rash.      Comments: Noted suture along midline axilla across from the left breast, no evidence of erythema or cellulitis or drainage   Neurological:      General: No focal deficit present.      Mental Status: She is alert and oriented to person, place, and time.      Motor: No weakness.      Gait: Gait normal.         Fluids    Intake/Output Summary (Last 24 hours) at 3/14/2022 1016  Last data filed at 3/13/2022 2000  Gross per 24 hour   Intake 618 ml   Output 600 ml   Net 18 ml       Laboratory  Recent Labs     03/12/22  2102 03/13/22  0408 03/14/22  0446   WBC 15.8* 12.2* 8.8   RBC 5.04 4.15* 4.31   HEMOGLOBIN 12.2  10.1* 10.5*   HEMATOCRIT 39.3 32.9* 33.9*   MCV 78.0* 79.3* 78.7*   MCH 24.2* 24.3* 24.4*   MCHC 31.0* 30.7* 31.0*   RDW 55.8* 57.2* 56.1*   PLATELETCT 305 274 276   MPV 9.5 10.2 9.5     Recent Labs     03/12/22  2102 03/13/22  0408 03/14/22  0446   SODIUM 137 138 134*   POTASSIUM 4.0 4.7 4.4   CHLORIDE 103 106 102   CO2 20 21 21   GLUCOSE 148* 130* 130*   BUN 21 18 16   CREATININE 0.88 0.79 0.85   CALCIUM 10.6* 9.4 10.0     Recent Labs     03/12/22  2326   INR 1.11               Imaging  US-RENAL   Final Result         1.  Severe left-sided hydronephrosis. Left renal parenchyma is not well visualized.      2.  Mild right renal pelvic dilatation.      NM-RENAL WITH DIURETIC WASHOUT    (Results Pending)        Assessment/Plan  * Sepsis (HCC)- (present on admission)  Assessment & Plan  This is Sepsis Present on admission  SIRS criteria identified on my evaluation include: Tachycardia, with heart rate greater than 90 BPM, Tachypnea, with respirations greater than 20 per minute and Leukocytosis, with WBC greater than 12,000  Source is Urinary  Sepsis protocol initiated  Fluid resuscitation ordered per protocol  Crystalloid Fluid Administration: Fluid resuscitation ordered per standard protocol - receiving sepsis fluid bolus currently, never hypotensive  IV antibiotics as appropriate for source of sepsis  Reassessment: I have reassessed the patient's hemodynamic status    Follow-up blood and urine cultures, patient is s/p stent removal with Dr. Hays 3/13. Ceftriaxone for now.       Hydronephrosis due to obstruction of ureter stent- (present on admission)  Assessment & Plan  History of cervical cancer status post hysterectomy and bilateral salpingo-oophorectomy in 2005, recurrence in 2015 treated with chemoradiation complicated by ureteral strictures requiring ureteral stenting complicated by stent obstruction requiring exchange almost every 3 months.  Dr. Hays removed stent 3/13. Patient is for renal scan and then left  nephrostomy tube placement by IR.  Follow intake and output, monitor renal function.  Hx of E. Faecalis 12/2021  UC x 2 after A. Urinae   Keep on Rocephin   UC pending     History of Cervical cancer (HCC)- (present on admission)  Assessment & Plan  History of cervical cancer status post hysterectomy and bilateral salpingo-oophorectomy in 2005, recurrence in 2015 treated with chemoradiation complicated by ureteral strictures requiring ureteral stenting complicated by stent obstruction requiring exchange almost every 3 months.  Dr. Hays following    Mixed hyperlipidemia  Assessment & Plan  Resume statin therapy    HTN (hypertension)- (present on admission)  Assessment & Plan  Lisinopril 40 mg daily  IV antihypertensives with parameters       VTE prophylaxis: SCDs/TEDs    I have performed a physical exam and reviewed and updated ROS and Plan today (3/14/2022). In review of yesterday's note (3/13/2022), there are no changes except as documented above.

## 2022-03-14 NOTE — OR SURGEON
Immediate Post OP Note    PreOp Diagnosis: left ureteral stent infection  Left ureteral stent obstruction  Left hydronephrosis      PostOp Diagnosis: same      Procedure(s):  CYSTOSCOPY, WITH URETERAL STENT INSERTION    Surgeon(s):  Adam Hays M.D.    Anesthesiologist/Type of Anesthesia:  Anesthesiologist: Misha Velásquez M.D./* No anesthesia type entered *    Surgical Staff:  Circulator: Shannan Ventura R.N.  Scrub Person: Kate Barton    Specimens removed if any:  ID Type Source Tests Collected by Time Destination   1 : Ureteral Stent Other Other AEROBIC/ANAEROBIC CULTURE (SURGERY) Adam Hays M.D. 3/13/2022  5:14 PM        Estimated Blood Loss: none    Findings: No evidence of cystitis. Stent appears calcified and infected.     Complications: none        3/13/2022 5:20 PM Adam Hays M.D.

## 2022-03-14 NOTE — CARE PLAN
The patient is Watcher - Medium risk of patient condition declining or worsening    Shift Goals  Clinical Goals: Pain management  Patient Goals: surgery today    Progress made toward(s) clinical / shift goals:  yes  Problem: Pain - Standard  Goal: Alleviation of pain or a reduction in pain to the patient’s comfort goal  Outcome: Progressing     Problem: Respiratory  Goal: Patient will achieve/maintain optimum respiratory ventilation and gas exchange  Outcome: Progressing     Problem: Fall Risk  Goal: Patient will remain free from falls  Outcome: Progressing       Patient is not progressing towards the following goals:

## 2022-03-14 NOTE — PROGRESS NOTES
Patient to floor with transport in stable condition. VSS. Aox4 and on 3 L o2 attached to full tank. No complaints of pain or nausea. No belongings. Family updated. No further needs.

## 2022-03-14 NOTE — CARE PLAN
The patient is Watcher - Medium risk of patient condition declining or worsening    Shift Goals  Clinical Goals: pain management  Patient Goals: self cath q3hrs  Family Goals: HUNG    Progress made toward(s) clinical / shift goals:  patient has been cathed [id number]    Patient is not progressing towards the following goals:      Problem: Pain - Standard  Goal: Alleviation of pain or a reduction in pain to the patient’s comfort goal  Outcome: Progressing     Problem: Knowledge Deficit - Standard  Goal: Patient and family/care givers will demonstrate understanding of plan of care, disease process/condition, diagnostic tests and medications  Outcome: Progressing     Problem: Fall Risk  Goal: Patient will remain free from falls  Outcome: Progressing

## 2022-03-14 NOTE — PROGRESS NOTES
Received bedside report from night shift RN.   Assumed care of patient at change of shift.   Assessment complete and POC discussed with patient and pertinent medical team.   Patient is A&Ox4, VS stable, on 2L NC.   Patient is not complaining of pain, and does not show signs of physical distress.   Patient is independently eating meals, and ate them in bed.   Bed is in lowest/locked position.   Call light and belongings are within reach.   All needs addressed and patient has not complaints.

## 2022-03-15 ENCOUNTER — APPOINTMENT (OUTPATIENT)
Dept: RADIOLOGY | Facility: MEDICAL CENTER | Age: 77
DRG: 698 | End: 2022-03-15
Attending: OBSTETRICS & GYNECOLOGY
Payer: MEDICARE

## 2022-03-15 LAB
APPEARANCE UR: ABNORMAL
BILIRUB UR QL STRIP.AUTO: NEGATIVE
COLOR UR: YELLOW
EPI CELLS #/AREA URNS HPF: NEGATIVE /HPF
GLUCOSE UR STRIP.AUTO-MCNC: NEGATIVE MG/DL
GRAM STN SPEC: NORMAL
KETONES UR STRIP.AUTO-MCNC: NEGATIVE MG/DL
LEUKOCYTE ESTERASE UR QL STRIP.AUTO: ABNORMAL
MICRO URNS: ABNORMAL
NITRITE UR QL STRIP.AUTO: NEGATIVE
PH UR STRIP.AUTO: 7 [PH] (ref 5–8)
PROT UR QL STRIP: 100 MG/DL
RBC # URNS HPF: ABNORMAL /HPF
RBC UR QL AUTO: ABNORMAL
SIGNIFICANT IND 70042: NORMAL
SITE SITE: NORMAL
SOURCE SOURCE: NORMAL
SP GR UR STRIP.AUTO: 1.02
UROBILINOGEN UR STRIP.AUTO-MCNC: 0.2 MG/DL
WBC #/AREA URNS HPF: ABNORMAL /HPF

## 2022-03-15 PROCEDURE — 700111 HCHG RX REV CODE 636 W/ 250 OVERRIDE (IP)

## 2022-03-15 PROCEDURE — 99232 SBSQ HOSP IP/OBS MODERATE 35: CPT | Performed by: INTERNAL MEDICINE

## 2022-03-15 PROCEDURE — 87205 SMEAR GRAM STAIN: CPT

## 2022-03-15 PROCEDURE — 87086 URINE CULTURE/COLONY COUNT: CPT

## 2022-03-15 PROCEDURE — 0T9430Z DRAINAGE OF LEFT KIDNEY PELVIS WITH DRAINAGE DEVICE, PERCUTANEOUS APPROACH: ICD-10-PCS | Performed by: RADIOLOGY

## 2022-03-15 PROCEDURE — 700102 HCHG RX REV CODE 250 W/ 637 OVERRIDE(OP): Performed by: STUDENT IN AN ORGANIZED HEALTH CARE EDUCATION/TRAINING PROGRAM

## 2022-03-15 PROCEDURE — 770006 HCHG ROOM/CARE - MED/SURG/GYN SEMI*

## 2022-03-15 PROCEDURE — 700111 HCHG RX REV CODE 636 W/ 250 OVERRIDE (IP): Performed by: RADIOLOGY

## 2022-03-15 PROCEDURE — A9270 NON-COVERED ITEM OR SERVICE: HCPCS | Performed by: STUDENT IN AN ORGANIZED HEALTH CARE EDUCATION/TRAINING PROGRAM

## 2022-03-15 PROCEDURE — 700101 HCHG RX REV CODE 250: Performed by: NURSE PRACTITIONER

## 2022-03-15 PROCEDURE — 700111 HCHG RX REV CODE 636 W/ 250 OVERRIDE (IP): Performed by: NURSE PRACTITIONER

## 2022-03-15 PROCEDURE — 700117 HCHG RX CONTRAST REV CODE 255: Performed by: OBSTETRICS & GYNECOLOGY

## 2022-03-15 PROCEDURE — 81001 URINALYSIS AUTO W/SCOPE: CPT

## 2022-03-15 PROCEDURE — 99152 MOD SED SAME PHYS/QHP 5/>YRS: CPT

## 2022-03-15 PROCEDURE — 87186 SC STD MICRODIL/AGAR DIL: CPT

## 2022-03-15 RX ORDER — OXYCODONE HYDROCHLORIDE 5 MG/1
5 TABLET ORAL
Status: ACTIVE | OUTPATIENT
Start: 2022-03-15 | End: 2022-03-16

## 2022-03-15 RX ORDER — ONDANSETRON 2 MG/ML
4 INJECTION INTRAMUSCULAR; INTRAVENOUS EVERY 8 HOURS PRN
Status: ACTIVE | OUTPATIENT
Start: 2022-03-15 | End: 2022-03-16

## 2022-03-15 RX ORDER — OXYCODONE HYDROCHLORIDE 10 MG/1
10 TABLET ORAL
Status: ACTIVE | OUTPATIENT
Start: 2022-03-15 | End: 2022-03-16

## 2022-03-15 RX ORDER — ONDANSETRON 2 MG/ML
4 INJECTION INTRAMUSCULAR; INTRAVENOUS PRN
Status: DISCONTINUED | OUTPATIENT
Start: 2022-03-15 | End: 2022-03-15

## 2022-03-15 RX ORDER — MIDAZOLAM HYDROCHLORIDE 1 MG/ML
INJECTION INTRAMUSCULAR; INTRAVENOUS
Status: COMPLETED
Start: 2022-03-15 | End: 2022-03-15

## 2022-03-15 RX ORDER — MIDAZOLAM HYDROCHLORIDE 1 MG/ML
.5-2 INJECTION INTRAMUSCULAR; INTRAVENOUS PRN
Status: ACTIVE | OUTPATIENT
Start: 2022-03-15 | End: 2022-03-15

## 2022-03-15 RX ORDER — SODIUM CHLORIDE 9 MG/ML
500 INJECTION, SOLUTION INTRAVENOUS
Status: ACTIVE | OUTPATIENT
Start: 2022-03-15 | End: 2022-03-15

## 2022-03-15 RX ADMIN — MIDAZOLAM HYDROCHLORIDE 0.5 MG: 1 INJECTION, SOLUTION INTRAMUSCULAR; INTRAVENOUS at 16:47

## 2022-03-15 RX ADMIN — WATER 2 G: 100 INJECTION, SOLUTION INTRAVENOUS at 15:28

## 2022-03-15 RX ADMIN — FENTANYL CITRATE 25 MCG: 50 INJECTION, SOLUTION INTRAMUSCULAR; INTRAVENOUS at 16:47

## 2022-03-15 RX ADMIN — SENNOSIDES AND DOCUSATE SODIUM 2 TABLET: 50; 8.6 TABLET ORAL at 06:19

## 2022-03-15 RX ADMIN — FENTANYL CITRATE 25 MCG: 50 INJECTION INTRAMUSCULAR; INTRAVENOUS at 16:40

## 2022-03-15 RX ADMIN — LISINOPRIL 40 MG: 20 TABLET ORAL at 06:19

## 2022-03-15 RX ADMIN — MIDAZOLAM HYDROCHLORIDE 0.5 MG: 1 INJECTION, SOLUTION INTRAMUSCULAR; INTRAVENOUS at 16:51

## 2022-03-15 RX ADMIN — MIDAZOLAM HYDROCHLORIDE 0.5 MG: 1 INJECTION, SOLUTION INTRAMUSCULAR; INTRAVENOUS at 16:40

## 2022-03-15 RX ADMIN — FENTANYL CITRATE 25 MCG: 50 INJECTION, SOLUTION INTRAMUSCULAR; INTRAVENOUS at 16:40

## 2022-03-15 RX ADMIN — ATORVASTATIN CALCIUM 20 MG: 20 TABLET, FILM COATED ORAL at 18:19

## 2022-03-15 RX ADMIN — WATER 2 G: 100 INJECTION, SOLUTION INTRAVENOUS at 22:51

## 2022-03-15 RX ADMIN — IOHEXOL 10 ML: 300 INJECTION, SOLUTION INTRAVENOUS at 17:30

## 2022-03-15 ASSESSMENT — ENCOUNTER SYMPTOMS
FLANK PAIN: 0
MYALGIAS: 0
CHILLS: 0
HEADACHES: 0
NAUSEA: 0
VOMITING: 0
FEVER: 0
COUGH: 0
SHORTNESS OF BREATH: 0
ABDOMINAL PAIN: 0
DIZZINESS: 0

## 2022-03-15 ASSESSMENT — PAIN DESCRIPTION - PAIN TYPE: TYPE: SURGICAL PAIN

## 2022-03-15 NOTE — PROGRESS NOTES
Surgical Progress Note    Author: Iris Escalera M.D. Date & Time created: 3/14/2022   8:12 PM     Interval Events:  Feeling better. Notes flank pain and n/v have resolved. S/p cysto w/ stent removal yesterday. Remains afebrile on abx.     Hemodynamics:  Temp (24hrs), Av.3 °C (97.4 °F), Min:36.2 °C (97.2 °F), Max:36.5 °C (97.7 °F)  Temperature: 36.2 °C (97.2 °F)  Pulse  Av.9  Min: 66  Max: 122   Blood Pressure : 108/59     Respiratory:    Respiration: 18, Pulse Oximetry: 98 %     Work Of Breathing / Effort: Mild  RUL Breath Sounds: Clear, RML Breath Sounds: Clear, RLL Breath Sounds: Clear, AVERY Breath Sounds: Clear, LLL Breath Sounds: Clear  Neuro:  GCS       Fluids:    Intake/Output Summary (Last 24 hours) at 3/14/2022 2012  Last data filed at 3/14/2022 1045  Gross per 24 hour   Intake --   Output 700 ml   Net -700 ml        Current Diet Order   Procedures   • Diet Order Diet: Regular   • Diet NPO Restrict to: Sips with Medications     Physical Exam  Cardiovascular:      Rate and Rhythm: Normal rate.   Pulmonary:      Effort: Pulmonary effort is normal.   Abdominal:      General: There is no distension.      Palpations: Abdomen is soft.   Musculoskeletal:      Comments: No CVAT   Skin:     General: Skin is warm and dry.   Neurological:      Mental Status: She is alert.       Labs:  Recent Results (from the past 24 hour(s))   CBC WITHOUT DIFFERENTIAL    Collection Time: 22  4:46 AM   Result Value Ref Range    WBC 8.8 4.8 - 10.8 K/uL    RBC 4.31 4.20 - 5.40 M/uL    Hemoglobin 10.5 (L) 12.0 - 16.0 g/dL    Hematocrit 33.9 (L) 37.0 - 47.0 %    MCV 78.7 (L) 81.4 - 97.8 fL    MCH 24.4 (L) 27.0 - 33.0 pg    MCHC 31.0 (L) 33.6 - 35.0 g/dL    RDW 56.1 (H) 35.9 - 50.0 fL    Platelet Count 276 164 - 446 K/uL    MPV 9.5 9.0 - 12.9 fL   Basic Metabolic Panel    Collection Time: 22  4:46 AM   Result Value Ref Range    Sodium 134 (L) 135 - 145 mmol/L    Potassium 4.4 3.6 - 5.5 mmol/L    Chloride 102 96 -  112 mmol/L    Co2 21 20 - 33 mmol/L    Glucose 130 (H) 65 - 99 mg/dL    Bun 16 8 - 22 mg/dL    Creatinine 0.85 0.50 - 1.40 mg/dL    Calcium 10.0 8.5 - 10.5 mg/dL    Anion Gap 11.0 7.0 - 16.0   MAGNESIUM    Collection Time: 03/14/22  4:46 AM   Result Value Ref Range    Magnesium 1.7 1.5 - 2.5 mg/dL   PHOSPHORUS    Collection Time: 03/14/22  4:46 AM   Result Value Ref Range    Phosphorus 3.1 2.5 - 4.5 mg/dL   ESTIMATED GFR    Collection Time: 03/14/22  4:46 AM   Result Value Ref Range    GFR If African American >60 >60 mL/min/1.73 m 2    GFR If Non African American >60 >60 mL/min/1.73 m 2   Histology Request    Collection Time: 03/14/22 11:56 AM   Result Value Ref Range    Pathology Request Sent to Histo      Medical Decision Making, by Problem:  Active Hospital Problems    Diagnosis    • Mixed hyperlipidemia [E78.2]    • Sepsis (HCC) [A41.9]    • Hydronephrosis due to obstruction of ureter stent [N13.1]    • HTN (hypertension) [I10]    • History of Cervical cancer (HCC) [C53.9]      Plan:  This is a 76 y.o. female with recurrent cervical cancer and chronic left hydronephrosis with indwelling stent, admitted for left flank pain:     1. Left hydronephrosis: secondary to radiation stricture requiring left ureteral stent exchange since 09/2019 q 3 months, worsening hydro, normal creatinine, s/p cysto w/ stent removal 3/13  2. UTI: multiple recurrent x 3 months, suspected based on UA, UCx pending, on CTX  3.  Left renal disease. Last nuclear scan in 05/03/21 left kidney function 35%. Today 22%. Plan for L NT placement tomorrow, NPO after MN.

## 2022-03-15 NOTE — PROGRESS NOTES
IR NOTE: left nephrostomy tube placed by Dr. Gonzalez under moderate sedation. 20mL urine specimen sent to lab. Pt tolerated well, VSS, drsg CDI.  Post-procedure recovery orders to be placed by Dr. Gonzalez and released by bedside RN.    Flexima Regular Nephrostomy Catheter System  10 fr x 25 cm  IN [id number]  REF [id number]  LOT [id number]  EXP 01/19/2025

## 2022-03-15 NOTE — CARE PLAN
The patient is Watcher - Medium risk of patient condition declining or worsening    Shift Goals  Clinical Goals: NPO @ midnight  Patient Goals: self cath q3hrs  Family Goals: HUNG    Progress made toward(s) clinical / shift goals:  yes  Problem: Pain - Standard  Goal: Alleviation of pain or a reduction in pain to the patient’s comfort goal  Outcome: Progressing     Problem: Knowledge Deficit - Standard  Goal: Patient and family/care givers will demonstrate understanding of plan of care, disease process/condition, diagnostic tests and medications  Outcome: Progressing     Problem: Fluid Volume  Goal: Fluid volume balance will be maintained  Outcome: Progressing       Patient is not progressing towards the following goals:

## 2022-03-15 NOTE — PROGRESS NOTES
GYN/Oncology Progress Note               Author: DANIEL Barakat Date & Time created: 3/15/2022  9:59 AM     Interval History:  Patient doing well, feeling hungry while NPO, plan for NT today.     Review of Systems:  Review of Systems   Constitutional: Negative for chills and fever.   Respiratory: Negative for cough and shortness of breath.    Cardiovascular: Negative for chest pain and leg swelling.   Gastrointestinal: Negative for abdominal pain, nausea and vomiting.   Genitourinary: Negative for flank pain.        Self caths   Neurological: Negative for dizziness and headaches.       Physical Exam:  Physical Exam  Constitutional:       Appearance: Normal appearance.   Cardiovascular:      Pulses: Normal pulses.   Pulmonary:      Effort: Pulmonary effort is normal.   Abdominal:      General: Abdomen is flat.      Palpations: Abdomen is soft.   Musculoskeletal:         General: No swelling.   Skin:     General: Skin is warm and dry.      Capillary Refill: Capillary refill takes 2 to 3 seconds.   Neurological:      Mental Status: She is alert and oriented to person, place, and time.         Labs:          Recent Labs     03/12/22 2102 03/13/22 0408 03/14/22 0446   SODIUM 137 138 134*   POTASSIUM 4.0 4.7 4.4   CHLORIDE 103 106 102   CO2 20 21 21   BUN 21 18 16   CREATININE 0.88 0.79 0.85   MAGNESIUM 1.6  --  1.7   PHOSPHORUS 2.2*  --  3.1   CALCIUM 10.6* 9.4 10.0     Recent Labs     03/12/22 2102 03/13/22 0408 03/14/22 0446   ALTSGPT 15 11  --    ASTSGOT 17 15  --    ALKPHOSPHAT 115* 86  --    TBILIRUBIN 0.5 0.2  --    GLUCOSE 148* 130* 130*     Recent Labs     03/12/22 2102 03/12/22 2326 03/13/22 0408 03/14/22 0446   RBC 5.04  --  4.15* 4.31   HEMOGLOBIN 12.2  --  10.1* 10.5*   HEMATOCRIT 39.3  --  32.9* 33.9*   PLATELETCT 305  --  274 276   PROTHROMBTM  --  13.9  --   --    INR  --  1.11  --   --      Recent Labs     03/12/22 2102 03/13/22 0408 03/14/22 0446   WBC 15.8* 12.2* 8.8   NEUTSPOLYS  89.10* 80.70*  --    LYMPHOCYTES 6.30* 11.40*  --    MONOCYTES 3.50 7.00  --    EOSINOPHILS 0.30 0.20  --    BASOPHILS 0.20 0.20  --    ASTSGOT 17 15  --    ALTSGPT 15 11  --    ALKPHOSPHAT 115* 86  --    TBILIRUBIN 0.5 0.2  --      Recent Labs     22  2102 22  0408 22  0446   SODIUM 137 138 134*   POTASSIUM 4.0 4.7 4.4   CHLORIDE 103 106 102   CO2 20 21 21   GLUCOSE 148* 130* 130*   BUN 21 18 16   CREATININE 0.88 0.79 0.85   CALCIUM 10.6* 9.4 10.0     Hemodynamics:  Temp (24hrs), Av.1 °C (97 °F), Min:36.1 °C (96.9 °F), Max:36.2 °C (97.2 °F)  Temperature: 36.1 °C (96.9 °F)  Pulse  Av  Min: 66  Max: 122   Blood Pressure : 131/67     Respiratory:    Respiration: 18, Pulse Oximetry: 95 %     Work Of Breathing / Effort: Mild  RUL Breath Sounds: Clear, RML Breath Sounds: Clear, RLL Breath Sounds: Clear, AVERY Breath Sounds: Clear, LLL Breath Sounds: Clear  Fluids:    Intake/Output Summary (Last 24 hours) at 3/15/2022 0959  Last data filed at 3/15/2022 0900  Gross per 24 hour   Intake --   Output 1000 ml   Net -1000 ml        GI/Nutrition:  Orders Placed This Encounter   Procedures   • Diet NPO Restrict to: Sips with Medications     Standing Status:   Standing     Number of Occurrences:   8     Order Specific Question:   Diet NPO Restrict to:     Answer:   Sips with Medications [3]     Medical Decision Making, by Problem:  Active Hospital Problems    Diagnosis    • *Sepsis (HCC) [A41.9]    • Mixed hyperlipidemia [E78.2]    • Hydronephrosis due to obstruction of ureter stent [N13.1]    • HTN (hypertension) [I10]    • History of Cervical cancer (HCC) [C53.9]        Plan:  This is a 76 y.o. female with recurrent cervical cancer and chronic left hydronephrosis with indwelling stent, admitted for left flank pain:      1. Left hydronephrosis: secondary to radiation stricture requiring left ureteral stent exchange since 2019 q 3 months, worsening hydro, normal creatinine, s/p cysto w/ stent removal  3/13, plan for NT placement today  2. UTI: multiple recurrent x 3 months, suspected based on UA, UCx showed aerococcus urinae, on CTX, I would recommend to change to ancef 1gm q6h, plan to treat UTI for 7 days then repeat UCx, if negative then plan to internalize ureteral stent at that time.   3.  Left renal disease. Last nuclear scan in 05/03/21 left kidney function 35%. Today 22%. Plan for L NT placement today     Quality-Core Measures

## 2022-03-15 NOTE — PROGRESS NOTES
Hospital Medicine Daily Progress Note    Date of Service  3/15/2022    Chief Complaint  Nessa Dennis is a 76 y.o. female admitted 3/12/2022 with left-sided flank pain    Hospital Course  This is a 76 year old female with PMHx of hypertension, hyperlipidemia, colostomy presence, history of cervical cancer status post hysterectomy and bilateral salpingo-oophorectomy in 2005 with recurrence in 2015 treated with chemoradiation (Dr. Hays) who presented to the ED on 3/12/2022 with abdominal and left flank pain.     Patient has complicated history including ureteral strictures requiring ureteral stenting complicated by repeated stent obstruction causing hydronephrosis requiring ureteral stent exchange almost every 3 months, urinary incontinence requiring self-catheterization at home, history of multiple episodes of pyelonephritis, history of MDR organisms although none recently.     Patient reports flank pain, foul smelling urine, nausea, vomiting and chills. Renal US noted severe left-sided hydronephrosis. Dr. Hays removed stent 3/13. Patient is for renal scan and then left nephrostomy tube placement by IR.    Interval Problem Update  Plan for nephrostomy placement by IR today.  Renal scan showed interval progression of marked decreased perfusion on less blood function of the left kidney with associated hydronephrosis and no Lasix effect consistent with obstruction.  Vitals stable. Urine from 3/12 growing aerococcus urinae.  Patient has no acute complaints, denies abdominal pain.    I have personally seen and examined the patient at bedside. I discussed the plan of care with patient, bedside RN, charge RN and .      Consultants/Specialty  Gynecology oncology    Code Status  Full Code    Disposition  Patient is not medically cleared for discharge.   Anticipate discharge to to home with close outpatient follow-up.  I have placed the appropriate orders for post-discharge needs.    Review of Systems  Review  of Systems   Constitutional: Negative for chills and fever.   Respiratory: Negative for shortness of breath.    Cardiovascular: Negative for chest pain.   Gastrointestinal: Negative for abdominal pain, nausea and vomiting.   Musculoskeletal: Negative for myalgias.   Neurological: Negative for dizziness.   All other systems reviewed and are negative.       Physical Exam  Temp:  [36.1 °C (96.9 °F)-36.2 °C (97.2 °F)] 36.1 °C (96.9 °F)  Pulse:  [] 87  Resp:  [18] 18  BP: (108-131)/(57-73) 131/67  SpO2:  [92 %-98 %] 95 %    Physical Exam  Vitals and nursing note reviewed.   Constitutional:       General: She is not in acute distress.     Appearance: Normal appearance. She is not ill-appearing.   HENT:      Head: Normocephalic and atraumatic.   Eyes:      General:         Right eye: No discharge.         Left eye: No discharge.      Conjunctiva/sclera: Conjunctivae normal.   Cardiovascular:      Rate and Rhythm: Normal rate and regular rhythm.      Pulses: Normal pulses.      Heart sounds: Normal heart sounds.   Pulmonary:      Effort: Pulmonary effort is normal. No respiratory distress.      Breath sounds: No wheezing or rales.   Abdominal:      General: Bowel sounds are normal. There is no distension.      Palpations: Abdomen is soft.      Tenderness: There is no abdominal tenderness.   Musculoskeletal:         General: No swelling or tenderness. Normal range of motion.      Cervical back: Normal range of motion and neck supple. No muscular tenderness.      Right lower leg: No edema.      Left lower leg: No edema.   Skin:     General: Skin is warm and dry.      Findings: No erythema or rash.      Comments: Noted suture along midline axilla across from the left breast, no evidence of erythema or cellulitis or drainage   Neurological:      General: No focal deficit present.      Mental Status: She is alert and oriented to person, place, and time.      Motor: No weakness.         Fluids    Intake/Output Summary (Last  24 hours) at 3/15/2022 1207  Last data filed at 3/15/2022 0900  Gross per 24 hour   Intake --   Output 300 ml   Net -300 ml       Laboratory  Recent Labs     03/12/22 2102 03/13/22 0408 03/14/22  0446   WBC 15.8* 12.2* 8.8   RBC 5.04 4.15* 4.31   HEMOGLOBIN 12.2 10.1* 10.5*   HEMATOCRIT 39.3 32.9* 33.9*   MCV 78.0* 79.3* 78.7*   MCH 24.2* 24.3* 24.4*   MCHC 31.0* 30.7* 31.0*   RDW 55.8* 57.2* 56.1*   PLATELETCT 305 274 276   MPV 9.5 10.2 9.5     Recent Labs     03/12/22 2102 03/13/22 0408 03/14/22  0446   SODIUM 137 138 134*   POTASSIUM 4.0 4.7 4.4   CHLORIDE 103 106 102   CO2 20 21 21   GLUCOSE 148* 130* 130*   BUN 21 18 16   CREATININE 0.88 0.79 0.85   CALCIUM 10.6* 9.4 10.0     Recent Labs     03/12/22  2326   INR 1.11               Imaging  NM-RENAL WITH DIURETIC WASHOUT   Final Result      1.  Interval progression of marked decreased perfusion and low split function of the left kidney with associated hydronephrosis and no Lasix effect consistent with obstruction.   2.  Normal right renal perfusion and function.      US-RENAL   Final Result         1.  Severe left-sided hydronephrosis. Left renal parenchyma is not well visualized.      2.  Mild right renal pelvic dilatation.      IR-NEPHROSTOGRAM W/ NEW TUBE PLACEMENT (ALL RADIOLOGY) LEFT    (Results Pending)        Assessment/Plan  * Sepsis (HCC)- (present on admission)  Assessment & Plan  This is Sepsis Present on admission  SIRS criteria identified on my evaluation include: Tachycardia, with heart rate greater than 90 BPM, Tachypnea, with respirations greater than 20 per minute and Leukocytosis, with WBC greater than 12,000  Source is Urinary  Sepsis protocol initiated  Fluid resuscitation ordered per protocol  Crystalloid Fluid Administration: Fluid resuscitation ordered per standard protocol - receiving sepsis fluid bolus currently, never hypotensive  IV antibiotics as appropriate for source of sepsis  Reassessment: I have reassessed the patient's  hemodynamic status    Follow-up blood and urine cultures, patient is s/p stent removal with Dr. Hays 3/13. Ceftriaxone for now.       Mixed hyperlipidemia  Assessment & Plan  Resume statin therapy    HTN (hypertension)- (present on admission)  Assessment & Plan  Lisinopril 40 mg daily  IV antihypertensives with parameters    Hydronephrosis due to obstruction of ureter stent- (present on admission)  Assessment & Plan  History of cervical cancer status post hysterectomy and bilateral salpingo-oophorectomy in 2005, recurrence in 2015 treated with chemoradiation complicated by ureteral strictures requiring ureteral stenting complicated by stent obstruction requiring exchange almost every 3 months.  Dr. Hays removed stent 3/13. Patient is for renal scan and then left nephrostomy tube placement by IR.  Follow intake and output, monitor renal function.  Hx of E. Faecalis 12/2021  UC x 2 after A. Urinae   Keep on Rocephin   UC pending     History of Cervical cancer (HCC)- (present on admission)  Assessment & Plan  History of cervical cancer status post hysterectomy and bilateral salpingo-oophorectomy in 2005, recurrence in 2015 treated with chemoradiation complicated by ureteral strictures requiring ureteral stenting complicated by stent obstruction requiring exchange almost every 3 months.  Dr. Hays following       VTE prophylaxis: SCDs/TEDs    I have performed a physical exam and reviewed and updated ROS and Plan today (3/15/2022). In review of yesterday's note (3/14/2022), there are no changes except as documented above.

## 2022-03-15 NOTE — OR SURGEON
Immediate Post- Operative Note        Findings: Massive Left Hydronephrosis    Procedure(s): Left Neph Tube Placement    Estimated Blood Loss: Less than 5 ml    Complications: None      3/15/2022     4:59 PM     Wade Gonzalez M.D.

## 2022-03-15 NOTE — CARE PLAN
The patient is Stable - Low risk of patient condition declining or worsening    Shift Goals  Clinical Goals: nephrostomy placed today  Patient Goals: walks in the hallway  Family Goals: HUNG    Progress made toward(s) clinical / shift goals:      Problem: Pain - Standard  Goal: Alleviation of pain or a reduction in pain to the patient’s comfort goal  Outcome: Progressing     Problem: Knowledge Deficit - Standard  Goal: Patient and family/care givers will demonstrate understanding of plan of care, disease process/condition, diagnostic tests and medications  Outcome: Progressing     Problem: Hemodynamics  Goal: Patient's hemodynamics, fluid balance and neurologic status will be stable or improve  Outcome: Progressing     Problem: Fluid Volume  Goal: Fluid volume balance will be maintained  Outcome: Progressing       Patient is not progressing towards the following goals:

## 2022-03-15 NOTE — PROGRESS NOTES
Received bedside report from night shift RN.     Assumed care of patient at change of shift.     Assessment complete and POC discussed with patient and pertinent medical team.     Patient is A&Ox4, VS stable, on RA.     Patient is not complaining of pain, and does not show signs of physical distress.     Patient's affect is WNL. She does seem interested in much discussion regarding transition to next level of care and goals for the day.  She does follow commands appropriately.      Patient is NPO in preporation for nephrostomy.    Bed is in lowest/locked position.     Call light and belongings are within reach.     All needs addressed and patient has no complaints.

## 2022-03-16 LAB
ANION GAP SERPL CALC-SCNC: 11 MMOL/L (ref 7–16)
BUN SERPL-MCNC: 23 MG/DL (ref 8–22)
CALCIUM SERPL-MCNC: 9.9 MG/DL (ref 8.5–10.5)
CHLORIDE SERPL-SCNC: 103 MMOL/L (ref 96–112)
CO2 SERPL-SCNC: 23 MMOL/L (ref 20–33)
CREAT SERPL-MCNC: 0.86 MG/DL (ref 0.5–1.4)
ERYTHROCYTE [DISTWIDTH] IN BLOOD BY AUTOMATED COUNT: 56.7 FL (ref 35.9–50)
GFR SERPLBLD CREATININE-BSD FMLA CKD-EPI: 69 ML/MIN/1.73 M 2
GLUCOSE SERPL-MCNC: 95 MG/DL (ref 65–99)
GRAM STN SPEC: NORMAL
HCT VFR BLD AUTO: 33.5 % (ref 37–47)
HGB BLD-MCNC: 10.5 G/DL (ref 12–16)
MCH RBC QN AUTO: 24.5 PG (ref 27–33)
MCHC RBC AUTO-ENTMCNC: 31.3 G/DL (ref 33.6–35)
MCV RBC AUTO: 78.3 FL (ref 81.4–97.8)
PLATELET # BLD AUTO: 293 K/UL (ref 164–446)
PMV BLD AUTO: 9.7 FL (ref 9–12.9)
POTASSIUM SERPL-SCNC: 3.8 MMOL/L (ref 3.6–5.5)
RBC # BLD AUTO: 4.28 M/UL (ref 4.2–5.4)
SIGNIFICANT IND 70042: NORMAL
SITE SITE: NORMAL
SODIUM SERPL-SCNC: 137 MMOL/L (ref 135–145)
SOURCE SOURCE: NORMAL
WBC # BLD AUTO: 8.6 K/UL (ref 4.8–10.8)

## 2022-03-16 PROCEDURE — 700111 HCHG RX REV CODE 636 W/ 250 OVERRIDE (IP): Performed by: STUDENT IN AN ORGANIZED HEALTH CARE EDUCATION/TRAINING PROGRAM

## 2022-03-16 PROCEDURE — 85027 COMPLETE CBC AUTOMATED: CPT

## 2022-03-16 PROCEDURE — 700105 HCHG RX REV CODE 258: Performed by: INTERNAL MEDICINE

## 2022-03-16 PROCEDURE — A9270 NON-COVERED ITEM OR SERVICE: HCPCS | Performed by: STUDENT IN AN ORGANIZED HEALTH CARE EDUCATION/TRAINING PROGRAM

## 2022-03-16 PROCEDURE — 700102 HCHG RX REV CODE 250 W/ 637 OVERRIDE(OP): Performed by: STUDENT IN AN ORGANIZED HEALTH CARE EDUCATION/TRAINING PROGRAM

## 2022-03-16 PROCEDURE — 700111 HCHG RX REV CODE 636 W/ 250 OVERRIDE (IP): Performed by: NURSE PRACTITIONER

## 2022-03-16 PROCEDURE — 36415 COLL VENOUS BLD VENIPUNCTURE: CPT

## 2022-03-16 PROCEDURE — 770006 HCHG ROOM/CARE - MED/SURG/GYN SEMI*

## 2022-03-16 PROCEDURE — 80048 BASIC METABOLIC PNL TOTAL CA: CPT

## 2022-03-16 PROCEDURE — 99232 SBSQ HOSP IP/OBS MODERATE 35: CPT | Performed by: INTERNAL MEDICINE

## 2022-03-16 PROCEDURE — 700111 HCHG RX REV CODE 636 W/ 250 OVERRIDE (IP): Performed by: INTERNAL MEDICINE

## 2022-03-16 RX ADMIN — SODIUM CHLORIDE 3 G: 900 INJECTION INTRAVENOUS at 18:00

## 2022-03-16 RX ADMIN — WATER 2 G: 100 INJECTION, SOLUTION INTRAVENOUS at 05:29

## 2022-03-16 RX ADMIN — ONDANSETRON 4 MG: 4 TABLET, ORALLY DISINTEGRATING ORAL at 08:42

## 2022-03-16 RX ADMIN — SODIUM CHLORIDE 3 G: 900 INJECTION INTRAVENOUS at 13:00

## 2022-03-16 RX ADMIN — SENNOSIDES AND DOCUSATE SODIUM 2 TABLET: 50; 8.6 TABLET ORAL at 05:28

## 2022-03-16 RX ADMIN — LISINOPRIL 40 MG: 20 TABLET ORAL at 05:28

## 2022-03-16 RX ADMIN — ATORVASTATIN CALCIUM 20 MG: 20 TABLET, FILM COATED ORAL at 17:59

## 2022-03-16 ASSESSMENT — ENCOUNTER SYMPTOMS
FLANK PAIN: 1
BRUISES/BLEEDS EASILY: 0
DIZZINESS: 0
SHORTNESS OF BREATH: 0
NAUSEA: 0
VOMITING: 0
ABDOMINAL PAIN: 0
BLURRED VISION: 0
FEVER: 0
WEAKNESS: 0
FLANK PAIN: 0
HEMOPTYSIS: 0
MYALGIAS: 0
HEADACHES: 0
CHILLS: 0
PALPITATIONS: 0
COUGH: 0

## 2022-03-16 ASSESSMENT — PAIN DESCRIPTION - PAIN TYPE: TYPE: ACUTE PAIN

## 2022-03-16 ASSESSMENT — PAIN SCALES - GENERAL: PAIN_LEVEL: 0

## 2022-03-16 NOTE — PROGRESS NOTES
[facility] Medicine Daily Progress Note    Date of Service  3/16/2022    Chief Complaint  Nessa Dennis is a 76 y.o. female admitted 3/12/2022 with left-sided flank pain    Hospital Course  This is a 76 year old female with PMHx of hypertension, hyperlipidemia, colostomy presence, history of cervical cancer status post hysterectomy and bilateral salpingo-oophorectomy in 2005 with recurrence in 2015 treated with chemoradiation (Dr. Hays) who presented to the ED on 3/12/2022 with abdominal and left flank pain.     Patient has complicated history including ureteral strictures requiring ureteral stenting complicated by repeated stent obstruction causing hydronephrosis requiring ureteral stent exchange almost every 3 months, urinary incontinence requiring self-catheterization at home, history of multiple episodes of pyelonephritis, history of MDR organisms although none recently.     Patient reports flank pain, foul smelling urine, nausea, vomiting and chills. Renal US noted severe left-sided hydronephrosis. Dr. Hays removed stent 3/13. Patient is for renal scan and then left nephrostomy tube placement by IR.    Interval Problem Update  3/15 Plan for nephrostomy placement by IR today.  Renal scan showed interval progression of marked decreased perfusion on less blood function of the left kidney with associated hydronephrosis and no Lasix effect consistent with obstruction.  Vitals stable. Urine from 3/12 growing aerococcus urinae.  Patient has no acute complaints, denies abdominal pain.  3/16 Changed antibiotics to Ancef. S/p left neph tube placement by IR, noted to have massive left hydronephrosis. Per onc plan to treat UTI for 7 days and if repeat negative plan to internalize ureteral stent at that time.   3/17 patient with intermittent tachycardia otherwise vital stable.  Patient denies any pain, just feels tired this morning.  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  Infectious disease recommending changing antibiotics to Unasyn.    I have personally seen and examined the patient at bedside. I discussed the plan of care with patient, bedside RN, charge RN and .      Consultants/Specialty  infectious disease and Gynecology oncology    Code Status  Full Code    Disposition  Patient is not medically cleared for discharge.   Anticipate discharge to to home with close outpatient follow-up.  I have placed the appropriate orders for post-discharge needs.    Review of Systems  Review of Systems   Constitutional: Negative for chills and fever.   Respiratory: Negative for shortness of breath.    Cardiovascular: Negative for chest pain.   Gastrointestinal: Negative for abdominal pain, nausea and vomiting.   Musculoskeletal: Negative for myalgias.   Neurological: Negative for dizziness.   All other systems reviewed and are negative.       Physical Exam  Temp:  [36.1 °C (96.9 °F)-37.4 °C (99.3 °F)] 36.1 °C (96.9 °F)  Pulse:  [] 90  Resp:  [14-21] 17  BP: (124-165)/() 153/89  SpO2:  [91 %-100 %] 91 %    Physical Exam  Vitals and nursing note reviewed.   Constitutional:       General: She is not in acute distress.     Appearance: Normal appearance. She is not ill-appearing.   HENT:      Head: Normocephalic and atraumatic.   Eyes:      General:         Right eye: No discharge.         Left eye: No discharge.      Conjunctiva/sclera: Conjunctivae normal.   Cardiovascular:      Rate and Rhythm: Normal rate and regular rhythm.      Pulses: Normal pulses.      Heart sounds: Normal heart sounds.   Pulmonary:      Effort: Pulmonary effort is normal. No respiratory distress.      Breath sounds: No wheezing or rales.   Abdominal:      General: Bowel sounds are normal. There is no distension.      Palpations: Abdomen is soft.      Tenderness: There is  no abdominal tenderness.   Genitourinary:     Comments: Left nephrostomy in place   Musculoskeletal:         General: No swelling or tenderness. Normal range of motion.      Cervical back: Normal range of motion and neck supple. No muscular tenderness.      Right lower leg: No edema.      Left lower leg: No edema.   Skin:     General: Skin is warm and dry.      Findings: No erythema or rash.      Comments: Noted suture along midline axilla across from the left breast   Neurological:      General: No focal deficit present.      Mental Status: She is alert and oriented to person, place, and time.      Motor: No weakness.   Psychiatric:         Mood and Affect: Mood normal.         Behavior: Behavior normal.         Fluids    Intake/Output Summary (Last 24 hours) at 3/16/2022 1327  Last data filed at 3/16/2022 0900  Gross per 24 hour   Intake 622 ml   Output 1005 ml   Net -383 ml       Laboratory  Recent Labs     03/14/22  0446 03/16/22  0145   WBC 8.8 8.6   RBC 4.31 4.28   HEMOGLOBIN 10.5* 10.5*   HEMATOCRIT 33.9* 33.5*   MCV 78.7* 78.3*   MCH 24.4* 24.5*   MCHC 31.0* 31.3*   RDW 56.1* 56.7*   PLATELETCT 276 293   MPV 9.5 9.7     Recent Labs     03/14/22  0446 03/16/22  0145   SODIUM 134* 137   POTASSIUM 4.4 3.8   CHLORIDE 102 103   CO2 21 23   GLUCOSE 130* 95   BUN 16 23*   CREATININE 0.85 0.86   CALCIUM 10.0 9.9                   Imaging  IR-NEPHROSTOGRAM W/ NEW TUBE PLACEMENT (ALL RADIOLOGY) LEFT   Final Result            1.  Successful percutaneous placement of nephrostomy tube utilizing ultrasonic and fluoroscopic guidance.      2.  Left-sided nephrostogram demonstrates good positioning of the left-sided nephrostomy tube. In addition there is severe left-sided hydronephrosis.      NM-RENAL WITH DIURETIC WASHOUT   Final Result      1.  Interval progression of marked decreased perfusion and low split function of the left kidney with associated hydronephrosis and no Lasix effect consistent with obstruction.   2.   Normal right renal perfusion and function.      US-RENAL   Final Result         1.  Severe left-sided hydronephrosis. Left renal parenchyma is not well visualized.      2.  Mild right renal pelvic dilatation.           Assessment/Plan  * Sepsis (HCC)- (present on admission)  Assessment & Plan  This is Sepsis Present on admission  SIRS criteria identified on my evaluation include: Tachycardia, with heart rate greater than 90 BPM, Tachypnea, with respirations greater than 20 per minute and Leukocytosis, with WBC greater than 12,000  Source is Urinary  Sepsis protocol initiated  Fluid resuscitation ordered per protocol  Crystalloid Fluid Administration: Fluid resuscitation ordered per standard protocol - receiving sepsis fluid bolus currently, never hypotensive  IV antibiotics as appropriate for source of sepsis  Reassessment: I have reassessed the patient's hemodynamic status    Follow-up blood and urine cultures, patient is s/p stent removal with Dr. Hays 3/13.   ID consulted       Mixed hyperlipidemia  Assessment & Plan  Resume statin therapy    HTN (hypertension)- (present on admission)  Assessment & Plan  Lisinopril 40 mg daily  IV antihypertensives with parameters    Hydronephrosis due to obstruction of ureter stent- (present on admission)  Assessment & Plan  History of cervical cancer status post hysterectomy and bilateral salpingo-oophorectomy in 2005, recurrence in 2015 treated with chemoradiation complicated by ureteral strictures requiring ureteral stenting complicated by stent obstruction requiring exchange almost every 3 months.  Dr. Hays removed stent 3/13. Patient is for renal scan and then left nephrostomy tube placement by IR.  Follow intake and output, monitor renal function.  Hx of E. Faecalis 12/2021  UC x 2 after A. Urinae   ID consulted, changed abx to unasyn  -culture sensitivities pending     History of Cervical cancer (HCC)- (present on admission)  Assessment & Plan  History of cervical cancer  status post hysterectomy and bilateral salpingo-oophorectomy in 2005, recurrence in 2015 treated with chemoradiation complicated by ureteral strictures requiring ureteral stenting complicated by stent obstruction requiring exchange almost every 3 months.  Dr. Hays following       VTE prophylaxis: SCDs/TEDs    I have performed a physical exam and reviewed and updated ROS and Plan today (3/16/2022). In review of yesterday's note (3/15/2022), there are no changes except as documented above.

## 2022-03-16 NOTE — PROGRESS NOTES
Patient A/Ox4  Tachycardic, B/P normal, RA  No c/o pain  Pt had L nephrostomy tube placed 3/15, draining w/ good output  Pt straight caths self and cleans colostomy as needed  Pt is safe with needs met, bed low/locked, alarm active/audible, precautions in place, call light within reach, hourly rounding  No s/sx of acute distress  Once medically cleared, pt will discharge home

## 2022-03-16 NOTE — ANESTHESIA POSTPROCEDURE EVALUATION
Patient: Nessa Dennis    Procedure Summary     Date: 03/13/22 Room / Location: [place] [id number] / SURGERY [facility]    Anesthesia Start: 1659 Anesthesia Stop: 1726    Procedure: CYSTOSCOPY, WITH URETERAL STENT REMOVAL  (Left Ureter) Diagnosis: (left ureteral stent infection)    Surgeons: Adam Hays M.D. Responsible Provider: Misha Velásquez M.D.    Anesthesia Type: general ASA Status: 4 - Emergent          Final Anesthesia Type: general  Last vitals  BP   Blood Pressure : 153/89    Temp   36.1 °C (96.9 °F)    Pulse   90   Resp   17    SpO2   91 %      Anesthesia Post Evaluation    Patient location during evaluation: PACU  Patient participation: complete - patient participated  Level of consciousness: awake and alert  Pain score: 0    Airway patency: patent  Anesthetic complications: no  Cardiovascular status: hemodynamically stable  Respiratory status: acceptable  Hydration status: euvolemic    PONV: none          No complications documented.     Nurse Pain Score: 0 (NPRS)

## 2022-03-16 NOTE — PROGRESS NOTES
GYN/Oncology Progress Note               Author: DANIEL Barakat Date & Time created: 3/16/2022  9:36 AM     Interval History:  Patient doing well, slightly nauseated this am after pills    Review of Systems:  Review of Systems   Constitutional: Negative for chills and fever.   Respiratory: Negative for cough and shortness of breath.    Cardiovascular: Negative for chest pain and leg swelling.   Gastrointestinal: Negative for abdominal pain, nausea and vomiting.   Genitourinary: Negative for flank pain.        Self caths   Neurological: Negative for dizziness and headaches.       Physical Exam:  Physical Exam  Constitutional:       Appearance: Normal appearance.   Cardiovascular:      Pulses: Normal pulses.   Pulmonary:      Effort: Pulmonary effort is normal.   Abdominal:      General: Abdomen is flat.      Palpations: Abdomen is soft.   Genitourinary:     Comments: Left NT  Musculoskeletal:         General: No swelling.   Skin:     General: Skin is warm and dry.      Capillary Refill: Capillary refill takes 2 to 3 seconds.   Neurological:      Mental Status: She is alert and oriented to person, place, and time.         Labs:          Recent Labs     22  0145   SODIUM 134* 137   POTASSIUM 4.4 3.8   CHLORIDE 102 103   CO2 21 23   BUN 16 23*   CREATININE 0.85 0.86   MAGNESIUM 1.7  --    PHOSPHORUS 3.1  --    CALCIUM 10.0 9.9     Recent Labs     22  0145   GLUCOSE 130* 95     Recent Labs     22  0145   RBC 4.31 4.28   HEMOGLOBIN 10.5* 10.5*   HEMATOCRIT 33.9* 33.5*   PLATELETCT 276 293     Recent Labs     22  0145   WBC 8.8 8.6     Recent Labs     22  0145   SODIUM 134* 137   POTASSIUM 4.4 3.8   CHLORIDE 102 103   CO2 21 23   GLUCOSE 130* 95   BUN 16 23*   CREATININE 0.85 0.86   CALCIUM 10.0 9.9     Hemodynamics:  Temp (24hrs), Av.5 °C (97.7 °F), Min:36.1 °C (96.9 °F), Max:37.4 °C (99.3  °F)  Temperature: 36.1 °C (96.9 °F)  Pulse  Av.2  Min: 66  Max: 122   Blood Pressure : 153/89     Respiratory:    Respiration: 17, Pulse Oximetry: 91 %     Work Of Breathing / Effort: Mild     Fluids:    Intake/Output Summary (Last 24 hours) at 3/15/2022 0959  Last data filed at 3/15/2022 0900  Gross per 24 hour   Intake --   Output 1000 ml   Net -1000 ml        GI/Nutrition:  Orders Placed This Encounter   Procedures   • Diet Order Diet: Regular     Standing Status:   Standing     Number of Occurrences:   1     Order Specific Question:   Diet:     Answer:   Regular [1]     Medical Decision Making, by Problem:  Active Hospital Problems    Diagnosis    • *Sepsis (HCC) [A41.9]    • Mixed hyperlipidemia [E78.2]    • Hydronephrosis due to obstruction of ureter stent [N13.1]    • HTN (hypertension) [I10]    • History of Cervical cancer (HCC) [C53.9]        Plan:  This is a 76 y.o. female with recurrent cervical cancer and chronic left hydronephrosis with indwelling stent, admitted for left flank pain:      1. Left hydronephrosis: secondary to radiation stricture requiring left ureteral stent exchange since 2019 q 3 months, worsening hydro, normal creatinine, s/p cysto w/ stent removal 3/13, s/p left NT placement 3/15/22.   2. UTI: multiple recurrent x 3 months, suspected based on UA, UCx showed aerococcus urinae, on CTX, I would recommend to change to ancef 1gm q6h, sensitivities requested through micro (this will have to be sent out), ID consulted, plan to treat UTI for 14 days then repeat UCx, if negative then plan to internalize ureteral stent at that time.   3.  Left renal disease. Last nuclear scan in 21 left kidney function 35%. Today 22%. S/p left NT 3/15/22    Case discussed with MD    Quality-Core Measures

## 2022-03-16 NOTE — CONSULTS
ID Brief Note     Reviewed chart including vitals and labs.  Noted ureteral stent culture with Aerococcus urinae as well as Enterococcus faecium.     --- Stopped cefazolin and transition to Unasyn  --- Follow-up cultures and send-out    ID will see the patient tomorrow with formal H&P.    Rashida Solorio MD

## 2022-03-16 NOTE — PROGRESS NOTES
"Radiology Progress Note   Author: DANIEL Ellis Date & Time created: 3/16/2022  2:43 PM   Date of admission  3/12/2022  Note to reader: this note follows the APSO format rather than the historical SOAP format. Assessment and plan located at the top of the note for ease of use.    Chief Complaint  76 y.o. female admitted 3/12/2022 with   Chief Complaint   Patient presents with   • Flank Pain     Pt having left flank pain, pt has stent from bladder to kidney, pt self cath, unable to drain urine effectively today, pain 9/10       HPI  \" This is a 76 year old female with PMHx of hypertension, hyperlipidemia, colostomy presence, history of cervical cancer status post hysterectomy and bilateral salpingo-oophorectomy in 2005 with recurrence in 2015 treated with chemoradiation (Dr. Hays) who presented to the ED on 3/12/2022 with abdominal and left flank pain. Patient has complicated history including ureteral strictures requiring ureteral stenting complicated by repeated stent obstruction causing hydronephrosis requiring ureteral stent exchange almost every 3 months, urinary incontinence requiring self-catheterization at home, history of multiple episodes of pyelonephritis, history of MDR organisms although none recently. Patient reports flank pain, foul smelling urine, nausea, vomiting and chills. Renal US noted severe left-sided hydronephrosis. Dr. Hays removed stent 3/13. Patient is for renal scan and then left nephrostomy tube placement by IR. \"     Assessment/Plan  Interval History   Principal Problem:    Sepsis (HCC)  Active Problems:    History of Cervical cancer (HCC)    Hydronephrosis due to obstruction of ureter stent    HTN (hypertension)    Mixed hyperlipidemia      Plan IR  - Fluid cultures pending   - GYN/Oncology following and managing nephrostomy tube  - ID consulted for antibiotics   - Wound education provided to patient  - Education provided on S/S of infection   - Ok to shower with catheter as " long as site is covered with waterproof dressing   - No baths or submerging site under water until catheter removed   - internalize ureteral stent in future per GYN/Oncology note         -Thank you for allowing Interventional Radiology team to participate in the patients care, if any additional care or requests are needed in the future please do not hesitate call or place IR order. IR signing off    [phone]        IR:   3/15- Left nephrostomy tube placed   3/16- Pink tinged urine in left nephrostomy tube. 175 ml this AM          Review of Systems  Physical Exam   Review of Systems   Constitutional: Negative for chills and fever.   HENT: Negative for hearing loss.    Eyes: Negative for blurred vision.   Respiratory: Negative for cough, hemoptysis and shortness of breath.    Cardiovascular: Negative for chest pain and palpitations.   Gastrointestinal: Negative for abdominal pain and vomiting.   Genitourinary: Positive for flank pain. Negative for dysuria and hematuria.   Musculoskeletal: Negative for myalgias.   Skin: Negative for rash.   Neurological: Negative for dizziness, weakness and headaches.   Endo/Heme/Allergies: Does not bruise/bleed easily.   Psychiatric/Behavioral: Negative for suicidal ideas.      Vitals:    03/16/22 0736   BP: 153/89   Pulse: 90   Resp: 17   Temp: 36.1 °C (96.9 °F)   SpO2: 91%        Physical Exam  Constitutional:       Appearance: Normal appearance.   HENT:      Head: Normocephalic.      Nose: Nose normal.      Mouth/Throat:      Mouth: Mucous membranes are moist.   Eyes:      Pupils: Pupils are equal, round, and reactive to light.   Cardiovascular:      Rate and Rhythm: Normal rate.   Pulmonary:      Effort: Pulmonary effort is normal. No respiratory distress.   Abdominal:      General: Abdomen is flat.      Tenderness: There is no abdominal tenderness.   Musculoskeletal:         General: No tenderness or deformity.      Cervical back: Normal range of motion.        Back:    Skin:      General: Skin is warm and dry.      Capillary Refill: Capillary refill takes less than 2 seconds.      Coloration: Skin is not jaundiced or pale.   Neurological:      General: No focal deficit present.      Mental Status: She is alert.      Motor: No weakness.   Psychiatric:         Mood and Affect: Mood normal.         Behavior: Behavior normal.             Labs    Recent Labs     03/14/22 0446 03/16/22  0145   WBC 8.8 8.6   RBC 4.31 4.28   HEMOGLOBIN 10.5* 10.5*   HEMATOCRIT 33.9* 33.5*   MCV 78.7* 78.3*   MCH 24.4* 24.5*   MCHC 31.0* 31.3*   RDW 56.1* 56.7*   PLATELETCT 276 293   MPV 9.5 9.7     Recent Labs     03/14/22 0446 03/16/22  0145   SODIUM 134* 137   POTASSIUM 4.4 3.8   CHLORIDE 102 103   CO2 21 23   GLUCOSE 130* 95   BUN 16 23*   CREATININE 0.85 0.86   CALCIUM 10.0 9.9     Recent Labs     03/14/22 0446 03/16/22  0145   CREATININE 0.85 0.86     IR-NEPHROSTOGRAM W/ NEW TUBE PLACEMENT (ALL RADIOLOGY) LEFT   Final Result            1.  Successful percutaneous placement of nephrostomy tube utilizing ultrasonic and fluoroscopic guidance.      2.  Left-sided nephrostogram demonstrates good positioning of the left-sided nephrostomy tube. In addition there is severe left-sided hydronephrosis.      NM-RENAL WITH DIURETIC WASHOUT   Final Result      1.  Interval progression of marked decreased perfusion and low split function of the left kidney with associated hydronephrosis and no Lasix effect consistent with obstruction.   2.  Normal right renal perfusion and function.      US-RENAL   Final Result         1.  Severe left-sided hydronephrosis. Left renal parenchyma is not well visualized.      2.  Mild right renal pelvic dilatation.          INR   Date Value Ref Range Status   03/12/2022 1.11 0.87 - 1.13 Final     Comment:     INR - Non-therapeutic Reference Range: 0.87-1.13  INR - Therapeutic Reference Range: 2.0-4.0       No results found for: POCINR     Intake/Output Summary (Last 24 hours) at 3/16/2022  1443  Last data filed at 3/16/2022 0900  Gross per 24 hour   Intake 622 ml   Output 1005 ml   Net -383 ml      Labs not explicitly included in this progress note were reviewed by the author. Radiology/imaging not explicitly included in this progress note was reviewed by the author.     I have performed a physical exam and reviewed and updated ROS and Plan today (3/16/2022).     15 minutes in directly providing and coordinating care and extensive data review.  No time overlap and excludes procedures.

## 2022-03-16 NOTE — CARE PLAN
The patient is Watcher - Medium risk of patient condition declining or worsening    Shift Goals  Clinical Goals: IV abx  Patient Goals: walks in the hallway  Family Goals: HUNG    Progress made toward(s) clinical / shift goals:  yes  Problem: Pain - Standard  Goal: Alleviation of pain or a reduction in pain to the patient’s comfort goal  Outcome: Progressing     Problem: Knowledge Deficit - Standard  Goal: Patient and family/care givers will demonstrate understanding of plan of care, disease process/condition, diagnostic tests and medications  Outcome: Progressing     Problem: Fall Risk  Goal: Patient will remain free from falls  Outcome: Progressing       Patient is not progressing towards the following goals:

## 2022-03-17 ENCOUNTER — PATIENT OUTREACH (OUTPATIENT)
Dept: HEALTH INFORMATION MANAGEMENT | Facility: OTHER | Age: 77
End: 2022-03-17
Payer: MEDICARE

## 2022-03-17 PROCEDURE — 700102 HCHG RX REV CODE 250 W/ 637 OVERRIDE(OP): Performed by: STUDENT IN AN ORGANIZED HEALTH CARE EDUCATION/TRAINING PROGRAM

## 2022-03-17 PROCEDURE — 99223 1ST HOSP IP/OBS HIGH 75: CPT | Mod: GC | Performed by: INTERNAL MEDICINE

## 2022-03-17 PROCEDURE — 700105 HCHG RX REV CODE 258: Performed by: INTERNAL MEDICINE

## 2022-03-17 PROCEDURE — 770006 HCHG ROOM/CARE - MED/SURG/GYN SEMI*

## 2022-03-17 PROCEDURE — 700111 HCHG RX REV CODE 636 W/ 250 OVERRIDE (IP): Performed by: INTERNAL MEDICINE

## 2022-03-17 PROCEDURE — A9270 NON-COVERED ITEM OR SERVICE: HCPCS | Performed by: STUDENT IN AN ORGANIZED HEALTH CARE EDUCATION/TRAINING PROGRAM

## 2022-03-17 PROCEDURE — 99232 SBSQ HOSP IP/OBS MODERATE 35: CPT | Performed by: INTERNAL MEDICINE

## 2022-03-17 RX ADMIN — SODIUM CHLORIDE 3 G: 900 INJECTION INTRAVENOUS at 11:57

## 2022-03-17 RX ADMIN — SODIUM CHLORIDE 3 G: 900 INJECTION INTRAVENOUS at 17:19

## 2022-03-17 RX ADMIN — SODIUM CHLORIDE 3 G: 900 INJECTION INTRAVENOUS at 00:02

## 2022-03-17 RX ADMIN — LISINOPRIL 40 MG: 20 TABLET ORAL at 06:14

## 2022-03-17 RX ADMIN — SENNOSIDES AND DOCUSATE SODIUM 2 TABLET: 50; 8.6 TABLET ORAL at 17:19

## 2022-03-17 RX ADMIN — SODIUM CHLORIDE 3 G: 900 INJECTION INTRAVENOUS at 06:14

## 2022-03-17 RX ADMIN — ATORVASTATIN CALCIUM 20 MG: 20 TABLET, FILM COATED ORAL at 17:19

## 2022-03-17 SDOH — ECONOMIC STABILITY: FOOD INSECURITY: WITHIN THE PAST 12 MONTHS, YOU WORRIED THAT YOUR FOOD WOULD RUN OUT BEFORE YOU GOT MONEY TO BUY MORE.: NEVER TRUE

## 2022-03-17 SDOH — ECONOMIC STABILITY: FOOD INSECURITY: WITHIN THE PAST 12 MONTHS, THE FOOD YOU BOUGHT JUST DIDN'T LAST AND YOU DIDN'T HAVE MONEY TO GET MORE.: NEVER TRUE

## 2022-03-17 ASSESSMENT — ENCOUNTER SYMPTOMS
CHILLS: 0
ABDOMINAL PAIN: 0
MYALGIAS: 0
NAUSEA: 0
DIZZINESS: 0
SHORTNESS OF BREATH: 0
FEVER: 0
VOMITING: 0

## 2022-03-17 ASSESSMENT — PAIN DESCRIPTION - PAIN TYPE: TYPE: ACUTE PAIN

## 2022-03-17 ASSESSMENT — SOCIAL DETERMINANTS OF HEALTH (SDOH): HOW HARD IS IT FOR YOU TO PAY FOR THE VERY BASICS LIKE FOOD, HOUSING, MEDICAL CARE, AND HEATING?: NOT HARD AT ALL

## 2022-03-17 NOTE — CARE PLAN
The patient is Stable - Low risk of patient condition declining or worsening    Shift Goals  Clinical Goals: IV abx  Patient Goals: walks in the hallway  Family Goals: HUNG    Progress made toward(s) clinical / shift goals: UNASYN IVPB given Q6 -Pt is tolerating well without any complications.     Patient is not progressing towards the following goals:

## 2022-03-17 NOTE — PROGRESS NOTES
Suture removed on left side from previous chest tube location. Suture examined for full removal. Patient tolerated well.

## 2022-03-17 NOTE — PROGRESS NOTES
[facility] Medicine Daily Progress Note    Date of Service  3/17/2022    Chief Complaint  Nessa Dennis is a 76 y.o. female admitted 3/12/2022 with left-sided flank pain    Hospital Course  This is a 76 year old female with PMHx of hypertension, hyperlipidemia, colostomy presence, history of cervical cancer status post hysterectomy and bilateral salpingo-oophorectomy in 2005 with recurrence in 2015 treated with chemoradiation (Dr. Hays) who presented to the ED on 3/12/2022 with abdominal and left flank pain.     Patient has complicated history including ureteral strictures requiring ureteral stenting complicated by repeated stent obstruction causing hydronephrosis requiring ureteral stent exchange almost every 3 months, urinary incontinence requiring self-catheterization at home, history of multiple episodes of pyelonephritis, history of MDR organisms although none recently.     Patient reports flank pain, foul smelling urine, nausea, vomiting and chills. Renal US noted severe left-sided hydronephrosis. Dr. Hays removed stent 3/13. Patient is for renal scan and then left nephrostomy tube placement by IR.    Interval Problem Update  3/15 Plan for nephrostomy placement by IR today.  Renal scan showed interval progression of marked decreased perfusion on less blood function of the left kidney with associated hydronephrosis and no Lasix effect consistent with obstruction.  Vitals stable. Urine from 3/12 growing aerococcus urinae.  Patient has no acute complaints, denies abdominal pain.  3/16 Changed antibiotics to Ancef. S/p left neph tube placement by IR, noted to have massive left hydronephrosis. Per onc plan to treat UTI for 7 days and if repeat negative plan to internalize ureteral stent at that time.   3/17 patient with intermittent tachycardia otherwise vital stable.  Patient denies any pain, just feels tired this morning.  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  Infectious disease recommending changing antibiotics to Unasyn.  3/18 Vitals stable, tachy resolved.  Sensitivities still pending, discussed plan of care with the patient at length.  Patient has no acute complaints, denies shortness of breath and chest pain.    I have personally seen and examined the patient at bedside. I discussed the plan of care with patient, bedside RN, charge RN and .      Consultants/Specialty  infectious disease and Gynecology oncology    Code Status  Full Code    Disposition  Patient is not medically cleared for discharge.   Anticipate discharge to to home with close outpatient follow-up.  I have placed the appropriate orders for post-discharge needs.    Review of Systems  Review of Systems   Constitutional: Negative for chills and fever.   Respiratory: Negative for shortness of breath.    Cardiovascular: Negative for chest pain.   Gastrointestinal: Negative for abdominal pain, nausea and vomiting.   Musculoskeletal: Negative for myalgias.   Neurological: Negative for dizziness.   All other systems reviewed and are negative.       Physical Exam  Temp:  [36.3 °C (97.4 °F)-36.8 °C (98.3 °F)] 36.3 °C (97.4 °F)  Pulse:  [82-98] 82  Resp:  [16-19] 16  BP: (116-136)/(69-90) 136/90  SpO2:  [90 %-95 %] 90 %    Physical Exam  Vitals and nursing note reviewed.   Constitutional:       General: She is not in acute distress.     Appearance: Normal appearance. She is not ill-appearing.   HENT:      Head: Normocephalic and atraumatic.   Eyes:      General:         Right eye: No discharge.         Left eye: No discharge.      Conjunctiva/sclera: Conjunctivae normal.   Cardiovascular:      Rate and Rhythm: Normal rate and regular rhythm.      Pulses: Normal pulses.      Heart sounds: Normal heart sounds.   Pulmonary:      Effort: Pulmonary effort is normal. No  respiratory distress.      Breath sounds: No wheezing or rales.   Abdominal:      General: Bowel sounds are normal. There is no distension.      Palpations: Abdomen is soft.      Tenderness: There is no abdominal tenderness.   Genitourinary:     Comments: Left nephrostomy in place   Musculoskeletal:         General: No swelling or tenderness. Normal range of motion.      Cervical back: Normal range of motion and neck supple. No muscular tenderness.      Right lower leg: No edema.      Left lower leg: No edema.   Skin:     General: Skin is warm and dry.      Findings: No erythema or rash.      Comments: Noted suture along midline axilla across from the left breast   Neurological:      General: No focal deficit present.      Mental Status: She is alert and oriented to person, place, and time.      Motor: No weakness.   Psychiatric:         Mood and Affect: Mood normal.         Behavior: Behavior normal.         Fluids    Intake/Output Summary (Last 24 hours) at 3/17/2022 1214  Last data filed at 3/17/2022 0931  Gross per 24 hour   Intake 420 ml   Output 325 ml   Net 95 ml       Laboratory  Recent Labs     03/16/22  0145   WBC 8.6   RBC 4.28   HEMOGLOBIN 10.5*   HEMATOCRIT 33.5*   MCV 78.3*   MCH 24.5*   MCHC 31.3*   RDW 56.7*   PLATELETCT 293   MPV 9.7     Recent Labs     03/16/22  0145   SODIUM 137   POTASSIUM 3.8   CHLORIDE 103   CO2 23   GLUCOSE 95   BUN 23*   CREATININE 0.86   CALCIUM 9.9                   Imaging  IR-NEPHROSTOGRAM W/ NEW TUBE PLACEMENT (ALL RADIOLOGY) LEFT   Final Result            1.  Successful percutaneous placement of nephrostomy tube utilizing ultrasonic and fluoroscopic guidance.      2.  Left-sided nephrostogram demonstrates good positioning of the left-sided nephrostomy tube. In addition there is severe left-sided hydronephrosis.      NM-RENAL WITH DIURETIC WASHOUT   Final Result      1.  Interval progression of marked decreased perfusion and low split function of the left kidney with  associated hydronephrosis and no Lasix effect consistent with obstruction.   2.  Normal right renal perfusion and function.      US-RENAL   Final Result         1.  Severe left-sided hydronephrosis. Left renal parenchyma is not well visualized.      2.  Mild right renal pelvic dilatation.           Assessment/Plan  * Sepsis (HCC)- (present on admission)  Assessment & Plan  This is Sepsis Present on admission  SIRS criteria identified on my evaluation include: Tachycardia, with heart rate greater than 90 BPM, Tachypnea, with respirations greater than 20 per minute and Leukocytosis, with WBC greater than 12,000  Source is Urinary  Sepsis protocol initiated  Fluid resuscitation ordered per protocol  Crystalloid Fluid Administration: Fluid resuscitation ordered per standard protocol - receiving sepsis fluid bolus currently, never hypotensive  IV antibiotics as appropriate for source of sepsis  Reassessment: I have reassessed the patient's hemodynamic status    Follow-up blood and urine cultures, patient is s/p stent removal with Dr. Hays 3/13.   ID consulted       Mixed hyperlipidemia  Assessment & Plan  Resume statin therapy    HTN (hypertension)- (present on admission)  Assessment & Plan  Lisinopril 40 mg daily  IV antihypertensives with parameters    Hydronephrosis due to obstruction of ureter stent- (present on admission)  Assessment & Plan  History of cervical cancer status post hysterectomy and bilateral salpingo-oophorectomy in 2005, recurrence in 2015 treated with chemoradiation complicated by ureteral strictures requiring ureteral stenting complicated by stent obstruction requiring exchange almost every 3 months.  Dr. Hays removed stent 3/13. Patient is for renal scan and then left nephrostomy tube placement by IR.  Follow intake and output, monitor renal function.  Hx of E. Faecalis 12/2021  UC x 2 after ELISA Urclarence   ID consulted, changed abx to unasyn  -culture sensitivities pending     History of Cervical  cancer (HCC)- (present on admission)  Assessment & Plan  History of cervical cancer status post hysterectomy and bilateral salpingo-oophorectomy in 2005, recurrence in 2015 treated with chemoradiation complicated by ureteral strictures requiring ureteral stenting complicated by stent obstruction requiring exchange almost every 3 months.  Dr. Hays following       VTE prophylaxis: SCDs/TEDs    I have performed a physical exam and reviewed and updated ROS and Plan today (3/17/2022). In review of yesterday's note (3/16/2022), there are no changes except as documented above.

## 2022-03-17 NOTE — PROGRESS NOTES
CHW germain introduced community care management to the patient and her spouse. Patient declines any barriers to housing, transportation, or food assistance. Patient states she will call and schedule he own follow up care. Patient has no needs.     Community Health Worker Intake  • Social determinates of health intake complete.   • Identified no barriers   • Contact information provided to Nessa Dennis  • /Declined Meds-To-Beds.   • Inpatient assessment completed.    Plan: CHW will remove the patient from CCM caseload due to a decline in services.

## 2022-03-17 NOTE — PROGRESS NOTES
Morning assessment of patient complete. Patient is A&Ox4 on room air. PIV in place. Patient denies pain or nausea at this time. Patient denies any needs at this time.

## 2022-03-17 NOTE — CONSULTS
INFECTIOUS DISEASES INPATIENT CONSULT NOTE     Date of Service: 3/17/2022    Consult Requested By: Gayatri Tijerina D.O.    Reason for Consultation: Ureteral Stent cultures growing E. Faecium and aerococcus urinae    History of Present Illness:   Nessa Dennis is a 76 y.o. woman with PMH that includes cervical cancer s/p hysterectomy, radiation therapy with residual ureteral stricture, status post left ureteral stent placement with replacement of stent around every 3 months since 2019, self caths 4 times daily, history of multiple prior hospitalizations for pyelonephritis who was admitted 3/12/2022 following complaints of left sided flank pain, nausea, chills, and foul smelling urine. On 3/12 she had blood and urine cultures obtained, with blood cultures showing no growth to date and urine growing aerococcus urinae with send-out susceptibility still pending. She was taken for ureteral stent removal on 3/13, with stent cultures growing E. Faecium and aerococcus urinae. She was then taken for placement of nephrostomy tube on 3/15, with cultures drawn from the site showing no growth to date however gram stain is showing gram positive cocci. Of note, patient has recent previous urine cultures from 1/27/2022 and 2/7/2022, both of which also grew aerococcus urinae.  Patient was initially on Ceftriaxone on presentation from 3/12 to 3/14, changed to cefazolin on 3/15, and most recently on Unasyn from 3/16 to present.     Patient shares she is doing well. She notes that the flank pain that she initially presented with has now greatly improved. She denies any recent fevers or chills. Is tolerating her nephrostomy tube well.     All other review of systems reviewed and negative except those documented above in the HPI.     PMH:   Past Medical History:   Diagnosis Date   • Cancer ([facility]) 2005, 2015    cervical   • Colostomy in place ([facility])    • Environmental and seasonal allergies    • Environmental and seasonal allergies     • High cholesterol    • Hydronephrosis with ureteral stricture    • Hypertension 02/09/2022    states well controlled on meds   • Lung nodules    • Unspecified urinary incontinence     wears pads, does self caths   • Urinary bladder disorder     pt self caths 4x per day        PSH:  Past Surgical History:   Procedure Laterality Date   • OH CYSTOSCOPY,INSERT URETERAL STENT Left 3/13/2022    Procedure: CYSTOSCOPY, WITH URETERAL STENT REMOVAL ;  Surgeon: Adam Hays M.D.;  Location: [facility];  Service: Gynecology Oncology   • OH THORACOSCOPY,DX NO BX Left 2/23/2022    Procedure: THORACOSCOPY;  Surgeon: Chriss Etienne M.D.;  Location: [facility];  Service: General   • LYMPH NODE SAMPLING  2/23/2022    Procedure: SAMPLING, MULTIPLE LYMPH NODES - MEDIASTINAL;  Surgeon: Chriss Etienne M.D.;  Location: [facility];  Service: General   • LOBECTOMY Left 2/23/2022    Procedure: LOBECTOMY - SUBLOBAR RESECTION UPPER LOBE;  Surgeon: Chriss Etienne M.D.;  Location: [facility];  Service: General   • OH CYSTOSCOPY,INSERT URETERAL STENT Left 12/30/2021    Procedure: CYSTOSCOPY, WITH URETERAL STENT INSERTION - EXCHANGE;  Surgeon: Adam Hays M.D.;  Location: [facility];  Service: Gynecology Oncology   • OTHER  11/2021    left lung biopsy   • CYSTO STENT PLACEMNT PRE SURG Left 8/26/2021    Procedure: CYSTOSCOPY, WITH URETERAL STENT INSERTION OR REMOVAL - RIGHT AND/OR LEFT STENT EXCHANGE.;  Surgeon: Adam Hays M.D.;  Location: [facility];  Service: Gynecology Oncology   • OH CYSTOSCOPY,INSERT URETERAL STENT Left 3/25/2021    Procedure: CYSTOSCOPY, WITH URETERAL STENT REMOVAL LEFT;  Surgeon: Adam Hays M.D.;  Location: [facility];  Service: Gynecology Oncology   • OH CYSTOURETHROSCOPY,URETER CATHETER Left 12/31/2020    Procedure: CYSTOSCOPY, WITH left RETROGRADE PYELOGRAM;  Surgeon: Adam Hays M.D.;  Location: [facility];  Service: Gynecology Oncology    • CYSTO STENT PLACEMNT PRE SURG Left 12/31/2020    Procedure: CYSTOSCOPY, WITH Left URETERAL STENT  removal and replacement;  Surgeon: Adam Hays M.D.;  Location: SURGERY [facility];  Service: Gynecology Oncology   • UT CYSTOSCOPY,INSERT URETERAL STENT Left 9/3/2020    Procedure: CYSTOSCOPY, WITH URETERAL STENT INSERTION- RIGHT AND OR LEFT EXCHANGE;  Surgeon: Adam Hays M.D.;  Location: SURGERY [facility];  Service: Gynecology Oncology   • UT CYSTOURETHROSCOPY,URETER CATHETER Left 9/3/2020    Procedure: RETROGRADE PYELOGRAM;  Surgeon: Adam Hays M.D.;  Location: SURGERY [facility];  Service: Gynecology Oncology   • UT CYSTOSCOPY,INSERT URETERAL STENT Left 5/21/2020    Procedure: CYSTOSCOPY, WITH URETERAL STENT INSERTION;  Surgeon: Adam Hays M.D.;  Location: SURGERY [facility];  Service: Gynecology Oncology   • CYSTO STENT PLACEMNT PRE SURG Left 8/29/2019    Procedure: CYSTOSCOPY, WITH URETERAL STENT REMOVAL;  Surgeon: Adam Hays M.D.;  Location: SURGERY [facility];  Service: Gynecology   • CYSTOSCOPY  7/31/2015    Procedure: CYSTOSCOPY;  Surgeon: Adam Hays M.D.;  Location: SURGERY [facility];  Service:    • PELVIC EXAM UNDER ANESTHESIA  7/31/2015    Procedure: PELVIC EXAM UNDER ANESTHESIA;  Surgeon: Adam Hays M.D.;  Location: SURGERY [facility];  Service:    • CERVICAL CONIZATION  7/31/2015    Procedure: CERVICAL CONIZATION FOR: CONE BIOPSY;  Surgeon: Adam Hays M.D.;  Location: SURGERY [facility];  Service:    • COLOSTOMY CREATION LAPAROSCOPIC  4/14/2015    Performed by Mariusz Barajas M.D. at SURGERY [facility]   • RECOVERY  4/6/2015    Performed by [facility] at SURGERY PRE-POST PROC UNIT [facility]   • OTHER ABDOMINAL SURGERY  2015    bowel resection- has colostomy   • GYN SURGERY  5/2005    hysterectomy       FAMILY HX:  Family History   Problem Relation Age of Onset   • Cancer Father    • Hypertension Father    • Cancer Sister    • Stroke Brother   "      SOCIAL HX:  Social History     Socioeconomic History   • Marital status:      Spouse name: Heron Dennis   • Number of children: Not on file   • Years of education: Not on file   • Highest education level: Not on file   Occupational History   • Occupation: Retired    Tobacco Use   • Smoking status: Never Smoker   • Smokeless tobacco: Never Used   Vaping Use   • Vaping Use: Never used   Substance and Sexual Activity   • Alcohol use: Not Currently   • Drug use: Not Currently   • Sexual activity: Not on file   Other Topics Concern   • Not on file   Social History Narrative   • Not on file     Social Determinants of Health     Financial Resource Strain: Not on file   Food Insecurity: Not on file   Transportation Needs: Not on file   Physical Activity: Not on file   Stress: Not on file   Social Connections: Not on file   Intimate Partner Violence: Not on file   Housing Stability: Not on file     Social History     Tobacco Use   Smoking Status Never Smoker   Smokeless Tobacco Never Used     Social History     Substance and Sexual Activity   Alcohol Use Not Currently       Allergies/Intolerances:  Allergies   Allergen Reactions   • Sulfa Drugs Unspecified     Pt states \" it really runs me down.\"       History reviewed with the patient Yes    Other Current Medications:    Current Facility-Administered Medications:   •  ampicillin/sulbactam (UNASYN) 3 g in  mL IVPB, 3 g, Intravenous, Q6HRS, Rashida Solorio M.D., Stopped at 03/17/22 0644  •  morphine 4 MG/ML injection 4 mg, 4 mg, Intravenous, Q4HRS PRN, Julio Connolly M.D.  •  prochlorperazine (COMPAZINE) injection 10 mg, 10 mg, Intravenous, Q6HRS PRN, Mercedes Llamas, A.P.R.NPolina, 10 mg at 03/13/22 1531  •  atorvastatin (LIPITOR) tablet 20 mg, 20 mg, Oral, Q EVENING, Julio Connolly M.D., 20 mg at 03/16/22 1759  •  lisinopril (PRINIVIL) tablet 40 mg, 40 mg, Oral, QAM, Julio Connolly M.D., 40 mg at 03/17/22 0614  •  senna-docusate (PERICOLACE or " SENOKOT S) 8.6-50 MG per tablet 2 Tablet, 2 Tablet, Oral, BID, 2 Tablet at 22 0528 **AND** polyethylene glycol/lytes (MIRALAX) PACKET 1 Packet, 1 Packet, Oral, QDAY PRN **AND** magnesium hydroxide (MILK OF MAGNESIA) suspension 30 mL, 30 mL, Oral, QDAY PRN **AND** bisacodyl (DULCOLAX) suppository 10 mg, 10 mg, Rectal, QDAY PRN, Julio Connolly M.D.  •  acetaminophen (Tylenol) tablet 650 mg, 650 mg, Oral, Q6HRS PRN, Julio Connolly M.D.  •  labetalol (NORMODYNE/TRANDATE) injection 10 mg, 10 mg, Intravenous, Q4HRS PRN, Julio Connolly M.D.  •  ondansetron (ZOFRAN ODT) dispertab 4 mg, 4 mg, Oral, Q4HRS PRN, Julio Connolly M.D., 4 mg at 22 0842  [unfilled]    Most Recent Vital Signs:  /90   Pulse 82   Temp 36.3 °C (97.4 °F) (Temporal)   Resp 16   Ht 1.524 m (5')   Wt 57.9 kg (127 lb 10.3 oz)   LMP  (LMP Unknown) Comment: 3/13/22 HCG negative  SpO2 90%   BMI 24.93 kg/m²   Temp  Av.4 °C (97.6 °F)  Min: 35.8 °C (96.5 °F)  Max: 37.5 °C (99.5 °F)    Physical Exam:  General: well nourished, no diaphoresis, well-appearing, no acute distress  HEENT: sclera anicteric, PERRL, extraocular muscles intact, mucous membranes moist, oropharynx clear and moist, no oral lesions or exudate  Neck: supple, no lymphadenopathy  Chest: CTAB, no rales, rhonchi or wheezes, normal work of breathing.  Cardiac: regular rate and rhythm, normal S1 S2, no murmurs, rubs or gallops  Abdomen: + bowel sounds, soft, non-tender, non-distended, no hepatosplenomegaly, colostomy bag in place, stoma is pink, left sided nephrostomy tube is in place draining yellow urine  Extremities: WWP, no edema, 2+ pedal pulses  Skin: warm and dry, no rashes or worrisome lesions  Neuro: Alert and oriented times 3, non-focal exam, speech fluent, full range of motion to bilateral upper and lower extremities  Psych: normal mood and behavior, pleasant; memory intact, normal judgement    Pertinent Lab Results:  Recent Labs      03/16/22  0145   WBC 8.6      Recent Labs     03/16/22  0145   HEMOGLOBIN 10.5*   HEMATOCRIT 33.5*   MCV 78.3*   MCH 24.5*   PLATELETCT 293         Recent Labs     03/16/22  0145   SODIUM 137   POTASSIUM 3.8   CHLORIDE 103   CO2 23   CREATININE 0.86        No results for input(s): ALBUMIN in the last 72 hours.    Invalid input(s): AST, ALT, ALKPHOS, BILITOT, TOTALBILIRUB, BILIRUBINTOT, BILIRUBINDIR, BILIRUBININD, ALKALINEPHOS     Pertinent Micro:  Results     Procedure Component Value Units Date/Time    GRAM STAIN [[id number]] Collected: 03/15/22 1650    Order Status: Completed Specimen: Other Updated: 03/16/22 1407     Significant Indicator .     Source URSP     Site neph tube     Gram Stain Result Moderate WBCs.  Rare Gram positive cocci.      Narrative:      Collected By: [id number] RINA CHANG  Urine - left nephrostomy tube  Collected By: [id number] RINA CHANG    URINE CULTURE(NEW) [[id number]] Collected: 03/15/22 1650    Order Status: No result Specimen: Other Updated: 03/16/22 1406     Significant Indicator NEG     Source URSP     Site neph tube     Culture Result -     Gram Stain Result Moderate WBCs.  Rare Gram positive cocci.      Narrative:      Collected By: [id number] RINA CHANG  Urine - left nephrostomy tube  Collected By: [id number] RINA CHANG    URINE CULTURE(NEW) [[id number]]  (Abnormal) Collected: 03/12/22 2053    Order Status: Completed Specimen: Urine Updated: 03/16/22 1213     Significant Indicator POS     Source UR     Site -     Culture Result -      Aerococcus urinae  >100,000 cfu/mL  Sent to [facility] for Susceptibility testing      Narrative:      Indication for culture:->Patient WITHOUT an indwelling Greene  catheter in place with new onset of Dysuria, Frequency,  Urgency, and/or Suprapubic pain    CULTURE WOUND W/ GRAM STAIN [[id number]]  (Abnormal) Collected: 03/13/22 1714    Order Status: Completed Specimen: Wound Updated: 03/16/22 1120     Significant Indicator POS     Source WND      Site Ureteral Stent     Culture Result -     Gram Stain Result Moderate Gram positive cocci.  Moderate Gram negative rods.  Few Gram positive rods.       Culture Result Aerococcus urinae  Heavy growth        Enterococcus faecium  Heavy growth  Further incubation required      Narrative:      Surgery Specimen    Anaerobic Culture [[id number]] Collected: 03/13/22 1714    Order Status: Completed Specimen: Wound Updated: 03/16/22 1120     Significant Indicator NEG     Source WND     Site Ureteral Stent     Culture Result Culture in progress.    Narrative:      Surgery Specimen    URINALYSIS [[id number]]  (Abnormal) Collected: 03/15/22 1650    Order Status: Completed Updated: 03/15/22 2104     Color Yellow     Character Turbid     Specific Gravity 1.019     Ph 7.0     Glucose Negative mg/dL      Ketones Negative mg/dL      Protein 100 mg/dL      Bilirubin Negative     Urobilinogen, Urine 0.2     Nitrite Negative     Leukocyte Esterase Large     Occult Blood Moderate     Micro Urine Req Microscopic    FLUID CULTURE W/GRAM STAIN [[id number]] Collected: 03/15/22 1650    Order Status: Canceled Specimen: Other Body Fluid     GRAM STAIN [[id number]] Collected: 03/13/22 1714    Order Status: Completed Specimen: Wound Updated: 03/15/22 1514     Significant Indicator .     Source WND     Site Ureteral Stent     Gram Stain Result Moderate Gram positive cocci.  Moderate Gram negative rods.  Few Gram positive rods.      Narrative:      Surgery Specimen    Blood Culture [[id number]] Collected: 03/12/22 2106    Order Status: Completed Specimen: Blood from Peripheral Updated: 03/13/22 0950     Significant Indicator NEG     Source BLD     Site PERIPHERAL     Culture Result No Growth  Note: Blood cultures are incubated for 5 days and  are monitored continuously.Positive blood cultures  are called to the RN and reported as soon as  they are identified.      Narrative:      1 of 2 for Blood Culture x 2 sites order. Per Hospital  Policy:  "Only change Specimen Src: to \"Line\" if specified by  physician order.  Right AC    Blood Culture [[id number]] Collected: 03/12/22 2050    Order Status: Completed Specimen: Blood from Peripheral Updated: 03/13/22 0950     Significant Indicator NEG     Source BLD     Site PERIPHERAL     Culture Result No Growth  Note: Blood cultures are incubated for 5 days and  are monitored continuously.Positive blood cultures  are called to the RN and reported as soon as  they are identified.      Narrative:      2 of 2 blood culture x2  Sites order. Per Hospital Policy:  Only change Specimen Src: to \"Line\" if specified by physician  order.  Left AC    URINALYSIS CULTURE, IF INDICATED [[id number]]  (Abnormal) Collected: 03/12/22 2053    Order Status: Completed Specimen: Urine Updated: 03/12/22 2130     Color Yellow     Character Turbid     Specific Gravity 1.016     Ph 6.0     Glucose Negative mg/dL      Ketones Trace mg/dL      Protein 100 mg/dL      Bilirubin Negative     Urobilinogen, Urine 0.2     Nitrite Negative     Leukocyte Esterase Large     Occult Blood Large     Micro Urine Req Microscopic    Narrative:      Indication for culture:->Patient WITHOUT an indwelling Greene  catheter in place with new onset of Dysuria, Frequency,  Urgency, and/or Suprapubic pain        Blood Culture   Date Value Ref Range Status   03/15/2019 No growth after 5 days of incubation.  Final   03/15/2019 No growth after 5 days of incubation.  Final        Studies:  DX-CHEST-LIMITED (1 VIEW)    Result Date: 2/23/2022 2/23/2022 9:51 AM HISTORY/REASON FOR EXAM:  s/p L VATS sublobar resection TECHNIQUE/EXAM DESCRIPTION AND NUMBER OF VIEWS: Single portable view of the chest. COMPARISON: 12/28/2021 FINDINGS: Cardiac contour is within normal limits.  Atherosclerotic calcification of thoracic aorta. Focal opacity in the medial LEFT upper lung. Surgical clips in the medial LEFT lung apex. LEFT chest tube in place with tip at the apex. Very small pneumothorax " at LEFT lung apex.     1.  Very small LEFT apical pneumothorax with chest tube in place. 2.  Postoperative change of LEFT upper lung with ill-defined parenchymal consolidation.     NM-RENAL WITH DIURETIC WASHOUT    Result Date: 3/14/2022  3/14/2022 12:03 PM HISTORY/REASON FOR EXAM:  Hydronephrosis, left. History of ureteral stricture. TECHNIQUE/EXAM DESCRIPTION AND NUMBER OF VIEWS: Lasix renal scan with diuretic washout. COMPARISON: Previous exam 5/3/2021 PROCEDURE: 9.6 mCi technetium 99m MAG3 was injected. Imaging of the kidneys was performed for 20 minutes. The patient then received 20 mg Lasix intravenously, and imaging was continued for a total of 60 minutes. FINDINGS: Again there is marked decreased perfusion to the left kidney relative to the right kidney. There is delayed and very minimal uptake in the left kidney during excretion phase. There is no effect after Lasix administration on the left side with a very flat curve. There is normal right renal function and Lasix effect on the right. The split function is 22% on the left and 78% on the right. The Lasix half-time peak is 215 minutes. There is left hydronephrosis again noted.     1.  Interval progression of marked decreased perfusion and low split function of the left kidney with associated hydronephrosis and no Lasix effect consistent with obstruction. 2.  Normal right renal perfusion and function.    US-RENAL    Result Date: 3/12/2022  3/12/2022 9:21 PM HISTORY/REASON FOR EXAM:  Flank pain. TECHNIQUE/EXAM DESCRIPTION: Renal ultrasound. COMPARISON:  None FINDINGS: The right kidney measures 11.07 cm.  There is a small left renal cyst which is simple in nature. There is mild right renal pelvic dilatation. There are no renal calculi. The left kidney cannot be accurately measured due to severe hydronephrosis and limited is luxation of the thinned renal cortex. The left kidney appears normal in contour and parenchymal echotexture. The corticomedullary  differentiation is preserved. There is severe left-sided hydronephrosis. There are no renal calculi. The bladder demonstrates no focal wall abnormality.     1.  Severe left-sided hydronephrosis. Left renal parenchyma is not well visualized. 2.  Mild right renal pelvic dilatation.    IR-NEPHROSTOGRAM W/ NEW TUBE PLACEMENT (ALL RADIOLOGY) LEFT    Result Date: 3/15/2022  3/15/2022 4:30 PM HISTORY/REASON FOR EXAM: Left Renal obstruction. Failed left double-J ureteral stent TECHNIQUE/EXAM DESCRIPTION: Left-sided nephrostogram and left-sided nephrostomy tube placement utilizing fluoroscopic guidance. Following informed consent the patient was prepped and draped in the usual fashion 10 cc of 1% lidocaine was utilized for local and subcutaneous anesthesia. SEDATION: Moderate sedation was provided. Pulse oximetry and continuous cardiac monitoring by the nurse was performed throughout the exam. Intraservice time was 30 minutes. Fluoroscopy images: 2 fluoroscopic images obtained. Fluoroscopy time: 0.3 minutes CONTRAST: 20 mL of Omnipaque 300 were utilized for the procedure. The procedure was performed utilizing MAXIMUM STERILE BARRIER technique including sterile gown, mass, cap, and under sterile gloves following appropriate hand hygiene and/or sterile scrub. The patient's skin site was prepped with 2% chlorhexidine Utilizing fluoroscopic and ultrasonic guidance the left kidney was accessed utilizing an 18-gauge introducer needle. Nonionic contrast was injected and digital imaging was obtained over the kidney and ureters. Subsequently following sequential dilatation a 10 Indonesian nephrostomy tube was placed in the collecting system and the affixed in place.  The patient tolerated procedure well and was sent to the floor in good condition. FINDINGS: There is good positioning of the nephrostomy tube in the renal collecting system.     1.  Successful percutaneous placement of nephrostomy tube utilizing ultrasonic and fluoroscopic  guidance. 2.  Left-sided nephrostogram demonstrates good positioning of the left-sided nephrostomy tube. In addition there is severe left-sided hydronephrosis.      IMPRESSION:   1. Left Hydronephrosis    2. Urinary tract infection  3. History of ureteral stricture with chronic stent with periodic exchanges      PLAN:   Nessa Dennis is a 76 y.o. woman with a history of ureteral stricture secondary to radiation, with chronic stent in place with [id number] stent exchanges, history of self cathing QID, history of prior episodes of pyelonephritis, who presented with urinary tract infection with evidence of infection to her stent and possible infection of left kidney.     RECOMMENDATIONS:  -Continue with Unasyn   -Plan for a total of 14 days of antibiotic therapy starting from initiation of Unasyn therapy, end date 3/30/2022  -continue to follow culture sensitivities, results will help inform potential oral antibiotic options     Will continue to follow    Manny Wiseman M.D.

## 2022-03-17 NOTE — CARE PLAN
Problem: Pain - Standard  Goal: Alleviation of pain or a reduction in pain to the patient’s comfort goal  Outcome: Progressing  Flowsheets (Taken 3/16/2022 2046 by Flex Stone R.N.)  Pain Rating Scale (NPRS): 0  Note: Patient denies pain.      Problem: Knowledge Deficit - Standard  Goal: Patient and family/care givers will demonstrate understanding of plan of care, disease process/condition, diagnostic tests and medications  Outcome: Progressing  Note: Patient updated on today's plan of care.    The patient is Stable - Low risk of patient condition declining or worsening    Shift Goals  Clinical Goals: IV abx  Patient Goals: walks in the hallway  Family Goals: HUNG    Progress made toward(s) clinical / shift goals:  Patient reports no questions regarding plan of care.     Patient is not progressing towards the following goals:

## 2022-03-18 PROCEDURE — 700102 HCHG RX REV CODE 250 W/ 637 OVERRIDE(OP): Performed by: STUDENT IN AN ORGANIZED HEALTH CARE EDUCATION/TRAINING PROGRAM

## 2022-03-18 PROCEDURE — A9270 NON-COVERED ITEM OR SERVICE: HCPCS | Performed by: STUDENT IN AN ORGANIZED HEALTH CARE EDUCATION/TRAINING PROGRAM

## 2022-03-18 PROCEDURE — 99232 SBSQ HOSP IP/OBS MODERATE 35: CPT | Mod: GC | Performed by: INTERNAL MEDICINE

## 2022-03-18 PROCEDURE — 770006 HCHG ROOM/CARE - MED/SURG/GYN SEMI*

## 2022-03-18 PROCEDURE — 700105 HCHG RX REV CODE 258: Performed by: INTERNAL MEDICINE

## 2022-03-18 PROCEDURE — 99232 SBSQ HOSP IP/OBS MODERATE 35: CPT | Performed by: INTERNAL MEDICINE

## 2022-03-18 PROCEDURE — 700111 HCHG RX REV CODE 636 W/ 250 OVERRIDE (IP): Performed by: INTERNAL MEDICINE

## 2022-03-18 RX ADMIN — SODIUM CHLORIDE 3 G: 900 INJECTION INTRAVENOUS at 06:15

## 2022-03-18 RX ADMIN — ATORVASTATIN CALCIUM 20 MG: 20 TABLET, FILM COATED ORAL at 18:14

## 2022-03-18 RX ADMIN — SODIUM CHLORIDE 3 G: 900 INJECTION INTRAVENOUS at 18:13

## 2022-03-18 RX ADMIN — SODIUM CHLORIDE 3 G: 900 INJECTION INTRAVENOUS at 12:46

## 2022-03-18 RX ADMIN — SODIUM CHLORIDE 3 G: 900 INJECTION INTRAVENOUS at 00:10

## 2022-03-18 RX ADMIN — LISINOPRIL 40 MG: 20 TABLET ORAL at 06:15

## 2022-03-18 ASSESSMENT — PAIN DESCRIPTION - PAIN TYPE: TYPE: ACUTE PAIN

## 2022-03-18 ASSESSMENT — ENCOUNTER SYMPTOMS
NAUSEA: 0
HEADACHES: 0
SHORTNESS OF BREATH: 0
CHILLS: 0
WEAKNESS: 0
DIZZINESS: 0
MYALGIAS: 0
BACK PAIN: 0
WHEEZING: 0
VOMITING: 0
PALPITATIONS: 0
FEVER: 0
ABDOMINAL PAIN: 0

## 2022-03-18 NOTE — PROGRESS NOTES
INFECTIOUS DISEASE PROGRESS NOTE      Date of Service: 3/18/2022    Primary Team  Attending: Gayatri Tijerina D.O.     Subjective:  3/17: Consult Completed  3/18: No acute events overnight, no fevers or chills, reports she is feeling okay today    Review of Systems:    Review of Systems   Constitutional: Negative for chills and fever.   Respiratory: Negative for shortness of breath and wheezing.    Cardiovascular: Negative for chest pain and palpitations.   Gastrointestinal: Negative for abdominal pain.   Genitourinary: Negative for dysuria.   Musculoskeletal: Negative for back pain.   Neurological: Negative for weakness and headaches.   All other systems reviewed and are negative.      Objective Data:   Physical Exam:   Vitals:   Temp:  [36.2 °C (97.2 °F)-36.9 °C (98.4 °F)] 36.2 °C (97.2 °F)  Pulse:  [86-93] 86  Resp:  [16-18] 16  BP: (130-161)/() 157/95  SpO2:  [92 %-95 %] 95 %     Physical Exam  Constitutional:       General: She is not in acute distress.  HENT:      Head: Normocephalic and atraumatic.      Mouth/Throat:      Mouth: Mucous membranes are moist.   Eyes:      General: No scleral icterus.        Right eye: No discharge.         Left eye: No discharge.      Conjunctiva/sclera: Conjunctivae normal.   Cardiovascular:      Rate and Rhythm: Normal rate.   Pulmonary:      Effort: No respiratory distress.      Breath sounds: No wheezing.   Abdominal:      Comments: Nephrostomy tubes in place   Musculoskeletal:         General: No swelling.   Skin:     General: Skin is warm and dry.   Neurological:      Mental Status: She is alert and oriented to person, place, and time.         Labs:   Lab Results   Component Value Date/Time    WBC 8.6 03/16/2022 01:45 AM    RBC 4.28 03/16/2022 01:45 AM    HEMOGLOBIN 10.5 (L) 03/16/2022 01:45 AM    HEMATOCRIT 33.5 (L) 03/16/2022 01:45 AM    MCV 78.3 (L) 03/16/2022 01:45 AM    MCH 24.5 (L) 03/16/2022 01:45 AM    MCHC 31.3 (L) 03/16/2022 01:45 AM    MPV 9.7  "03/16/2022 01:45 AM    NEUTSPOLYS 80.70 (H) 03/13/2022 04:08 AM    LYMPHOCYTES 11.40 (L) 03/13/2022 04:08 AM    MONOCYTES 7.00 03/13/2022 04:08 AM    EOSINOPHILS 0.20 03/13/2022 04:08 AM    BASOPHILS 0.20 03/13/2022 04:08 AM      Lab Results   Component Value Date/Time    SODIUM 137 03/16/2022 01:45 AM    POTASSIUM 3.8 03/16/2022 01:45 AM    CHLORIDE 103 03/16/2022 01:45 AM    CO2 23 03/16/2022 01:45 AM    GLUCOSE 95 03/16/2022 01:45 AM    BUN 23 (H) 03/16/2022 01:45 AM    CREATININE 0.86 03/16/2022 01:45 AM    CREATININE 0.7 03/27/2006 02:58 PM          Lab Results   Component Value Date/Time    PROTHROMBTM 13.9 03/12/2022 11:26 PM    INR 1.11 03/12/2022 11:26 PM       Results     Procedure Component Value Units Date/Time    CULTURE WOUND W/ GRAM STAIN [[id number]]  (Abnormal) Collected: 03/13/22 1714    Order Status: Completed Specimen: Wound Updated: 03/18/22 0938     Significant Indicator POS     Source WND     Site Ureteral Stent     Culture Result -     Gram Stain Result Moderate Gram positive cocci.  Moderate Gram negative rods.  Few Gram positive rods.       Culture Result Aerococcus urinae  Heavy growth        Enterococcus faecium  Heavy growth  Further incubation required      Narrative:      Surgery Specimen    Anaerobic Culture [[id number]]  (Abnormal) Collected: 03/13/22 1714    Order Status: Completed Specimen: Wound Updated: 03/18/22 0938     Significant Indicator POS     Source WND     Site Ureteral Stent     Culture Result -      Bacteroides uniformis  Moderate growth      Narrative:      Surgery Specimen    Blood Culture [[id number]] Collected: 03/12/22 2050    Order Status: Completed Specimen: Blood from Peripheral Updated: 03/17/22 2300     Significant Indicator NEG     Source BLD     Site PERIPHERAL     Culture Result No growth after 5 days of incubation.    Narrative:      2 of 2 blood culture x2  Sites order. Per Hospital Policy:  Only change Specimen Src: to \"Line\" if specified by " "physician  order.  Left AC    Blood Culture [[id number]] Collected: 03/12/22 2106    Order Status: Completed Specimen: Blood from Peripheral Updated: 03/17/22 2300     Significant Indicator NEG     Source BLD     Site PERIPHERAL     Culture Result No growth after 5 days of incubation.    Narrative:      1 of 2 for Blood Culture x 2 sites order. Per Hospital  Policy: Only change Specimen Src: to \"Line\" if specified by  physician order.  Right AC    URINE CULTURE(NEW) [[id number]] Collected: 03/15/22 1650    Order Status: Completed Specimen: Other Updated: 03/17/22 1516     Significant Indicator NEG     Source URSP     Site neph tube     Culture Result No growth at 24 hours.     Gram Stain Result Moderate WBCs.  Rare Gram positive cocci.      Narrative:      Collected By: [id number] RINA CHANG  Urine - left nephrostomy tube  Collected By: [id number] RINA CHANG    GRAM STAIN [[id number]] Collected: 03/15/22 1650    Order Status: Completed Specimen: Other Updated: 03/16/22 1407     Significant Indicator .     Source URSP     Site neph tube     Gram Stain Result Moderate WBCs.  Rare Gram positive cocci.      Narrative:      Collected By: [id number] RINA CHANG  Urine - left nephrostomy tube  Collected By: [id number] RINA CHANG    URINE CULTURE(NEW) [[id number]]  (Abnormal) Collected: 03/12/22 2053    Order Status: Completed Specimen: Urine Updated: 03/16/22 1213     Significant Indicator POS     Source UR     Site -     Culture Result -      Aerococcus urinae  >100,000 cfu/mL  Sent to [facility] for Susceptibility testing      Narrative:      Indication for culture:->Patient WITHOUT an indwelling Greene  catheter in place with new onset of Dysuria, Frequency,  Urgency, and/or Suprapubic pain    URINALYSIS [[id number]]  (Abnormal) Collected: 03/15/22 1650    Order Status: Completed Updated: 03/15/22 2104     Color Yellow     Character Turbid     Specific Gravity 1.019     Ph 7.0     Glucose Negative mg/dL      Ketones " Negative mg/dL      Protein 100 mg/dL      Bilirubin Negative     Urobilinogen, Urine 0.2     Nitrite Negative     Leukocyte Esterase Large     Occult Blood Moderate     Micro Urine Req Microscopic    FLUID CULTURE W/GRAM STAIN [[id number]] Collected: 03/15/22 1650    Order Status: Canceled Specimen: Other Body Fluid     GRAM STAIN [[id number]] Collected: 03/13/22 1714    Order Status: Completed Specimen: Wound Updated: 03/15/22 1514     Significant Indicator .     Source WND     Site Ureteral Stent     Gram Stain Result Moderate Gram positive cocci.  Moderate Gram negative rods.  Few Gram positive rods.      Narrative:      Surgery Specimen    URINALYSIS CULTURE, IF INDICATED [[id number]]  (Abnormal) Collected: 03/12/22 2053    Order Status: Completed Specimen: Urine Updated: 03/12/22 2130     Color Yellow     Character Turbid     Specific Gravity 1.016     Ph 6.0     Glucose Negative mg/dL      Ketones Trace mg/dL      Protein 100 mg/dL      Bilirubin Negative     Urobilinogen, Urine 0.2     Nitrite Negative     Leukocyte Esterase Large     Occult Blood Large     Micro Urine Req Microscopic    Narrative:      Indication for culture:->Patient WITHOUT an indwelling Greene  catheter in place with new onset of Dysuria, Frequency,  Urgency, and/or Suprapubic pain            ASSESSMENT AND RECOMMENDATIONS    Nessa Dennis is a 76 y.o. 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      RECOMMENDATIONS:  -Continue with Unasyn   -Plan for a total of 14 days of antibiotic therapy starting from initiation of Unasyn therapy, end date 3/30/2022  -continue to follow culture sensitivities, E. Faecalis sensitivities will help inform potential oral  antibiotic options. Presuming vulnerability to it, Augmentin may be a PO option

## 2022-03-18 NOTE — PROGRESS NOTES
Pt is Awake/Alert x1 with flat affect; however no longer subdued.     Vital Sign rechecked: 62, 17, 131/77, 95% RA.  No signs/symptoms of distress noted. Pt is cooperative with care.    Security sitter remains at bedside; pt left with bed rails up, bed low and locked and call light within reach; will continue to monitor intermittently.    CECILY Stone RN

## 2022-03-18 NOTE — PROGRESS NOTES
[facility] Medicine Daily Progress Note    Date of Service  3/18/2022    Chief Complaint  Nsesa Dennis is a 76 y.o. female admitted 3/12/2022 with left-sided flank pain    Hospital Course  This is a 76 year old female with PMHx of hypertension, hyperlipidemia, colostomy presence, history of cervical cancer status post hysterectomy and bilateral salpingo-oophorectomy in 2005 with recurrence in 2015 treated with chemoradiation (Dr. Hays) who presented to the ED on 3/12/2022 with abdominal and left flank pain.     Patient has complicated history including ureteral strictures requiring ureteral stenting complicated by repeated stent obstruction causing hydronephrosis requiring ureteral stent exchange almost every 3 months, urinary incontinence requiring self-catheterization at home, history of multiple episodes of pyelonephritis, history of MDR organisms although none recently.     Patient reports flank pain, foul smelling urine, nausea, vomiting and chills. Renal US noted severe left-sided hydronephrosis. Dr. Hays removed stent 3/13. Patient is for renal scan and then left nephrostomy tube placement by IR.    Interval Problem Update  3/15 Plan for nephrostomy placement by IR today.  Renal scan showed interval progression of marked decreased perfusion on less blood function of the left kidney with associated hydronephrosis and no Lasix effect consistent with obstruction.  Vitals stable. Urine from 3/12 growing aerococcus urinae.  Patient has no acute complaints, denies abdominal pain.  3/16 Changed antibiotics to Ancef. S/p left neph tube placement by IR, noted to have massive left hydronephrosis. Per onc plan to treat UTI for 7 days and if repeat negative plan to internalize ureteral stent at that time.   3/17 patient with intermittent tachycardia otherwise vital stable.  Patient denies any pain, just feels tired this morning.  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  Infectious disease recommending changing antibiotics to Unasyn.  3/18 Vitals stable, tachy resolved.  Sensitivities still pending, discussed plan of care with the patient at length.  Patient has no acute complaints, denies shortness of breath and chest pain.  3/19 Also growing bacteroides from ureteral stent, sensitivities pending for final abx recs. Current plan for a total of 14 days of antibiotic therapy starting from initiation of Unasyn therapy, end date 3/30/2022. No acute events overnight.    I have personally seen and examined the patient at bedside. I discussed the plan of care with patient, bedside RN, charge RN and .      Consultants/Specialty  infectious disease and Gynecology oncology    Code Status  Full Code    Disposition  Patient is not medically cleared for discharge.   Anticipate discharge to to home with close outpatient follow-up.  I have placed the appropriate orders for post-discharge needs.    Review of Systems  Review of Systems   Constitutional: Negative for chills and fever.   Respiratory: Negative for shortness of breath.    Cardiovascular: Negative for chest pain.   Gastrointestinal: Negative for abdominal pain, nausea and vomiting.   Musculoskeletal: Negative for myalgias.   Neurological: Negative for dizziness.   All other systems reviewed and are negative.       Physical Exam  Temp:  [36.2 °C (97.2 °F)-36.9 °C (98.4 °F)] 36.2 °C (97.2 °F)  Pulse:  [86-93] 86  Resp:  [16-18] 16  BP: (130-161)/() 157/95  SpO2:  [92 %-95 %] 95 %    Physical Exam  Vitals and nursing note reviewed.   Constitutional:       General: She is not in acute distress.     Appearance: Normal appearance. She is not ill-appearing.      Comments: Resting comfortably   HENT:      Head: Normocephalic and atraumatic.   Eyes:      General:         Right eye: No discharge.          Left eye: No discharge.      Conjunctiva/sclera: Conjunctivae normal.   Cardiovascular:      Rate and Rhythm: Normal rate and regular rhythm.      Pulses: Normal pulses.      Heart sounds: Normal heart sounds.   Pulmonary:      Effort: Pulmonary effort is normal. No respiratory distress.      Breath sounds: No wheezing or rales.   Abdominal:      General: Bowel sounds are normal. There is no distension.      Palpations: Abdomen is soft.      Tenderness: There is no abdominal tenderness.   Genitourinary:     Comments: Left nephrostomy in place   Musculoskeletal:         General: No swelling or tenderness. Normal range of motion.      Cervical back: Normal range of motion and neck supple. No muscular tenderness.      Right lower leg: No edema.      Left lower leg: No edema.   Skin:     General: Skin is warm and dry.      Findings: No erythema or rash.      Comments: Noted suture along midline axilla across from the left breast   Neurological:      General: No focal deficit present.      Mental Status: She is alert and oriented to person, place, and time.      Motor: No weakness.   Psychiatric:         Mood and Affect: Mood normal.         Behavior: Behavior normal.         Fluids    Intake/Output Summary (Last 24 hours) at 3/18/2022 1256  Last data filed at 3/18/2022 0900  Gross per 24 hour   Intake 840 ml   Output 500 ml   Net 340 ml       Laboratory  Recent Labs     03/16/22  0145   WBC 8.6   RBC 4.28   HEMOGLOBIN 10.5*   HEMATOCRIT 33.5*   MCV 78.3*   MCH 24.5*   MCHC 31.3*   RDW 56.7*   PLATELETCT 293   MPV 9.7     Recent Labs     03/16/22  0145   SODIUM 137   POTASSIUM 3.8   CHLORIDE 103   CO2 23   GLUCOSE 95   BUN 23*   CREATININE 0.86   CALCIUM 9.9                   Imaging  IR-NEPHROSTOGRAM W/ NEW TUBE PLACEMENT (ALL RADIOLOGY) LEFT   Final Result            1.  Successful percutaneous placement of nephrostomy tube utilizing ultrasonic and fluoroscopic guidance.      2.  Left-sided nephrostogram  demonstrates good positioning of the left-sided nephrostomy tube. In addition there is severe left-sided hydronephrosis.      NM-RENAL WITH DIURETIC WASHOUT   Final Result      1.  Interval progression of marked decreased perfusion and low split function of the left kidney with associated hydronephrosis and no Lasix effect consistent with obstruction.   2.  Normal right renal perfusion and function.      US-RENAL   Final Result         1.  Severe left-sided hydronephrosis. Left renal parenchyma is not well visualized.      2.  Mild right renal pelvic dilatation.           Assessment/Plan  * Sepsis (HCC)- (present on admission)  Assessment & Plan  This is Sepsis Present on admission  SIRS criteria identified on my evaluation include: Tachycardia, with heart rate greater than 90 BPM, Tachypnea, with respirations greater than 20 per minute and Leukocytosis, with WBC greater than 12,000  Source is Urinary  Sepsis protocol initiated  Fluid resuscitation ordered per protocol  Crystalloid Fluid Administration: Fluid resuscitation ordered per standard protocol - receiving sepsis fluid bolus currently, never hypotensive  IV antibiotics as appropriate for source of sepsis  Reassessment: I have reassessed the patient's hemodynamic status    Follow-up blood and urine cultures, patient is s/p stent removal with Dr. Hays 3/13.   ID consulted       Mixed hyperlipidemia  Assessment & Plan  Resume statin therapy    HTN (hypertension)- (present on admission)  Assessment & Plan  Lisinopril 40 mg daily  IV antihypertensives with parameters    Hydronephrosis due to obstruction of ureter stent- (present on admission)  Assessment & Plan  History of cervical cancer status post hysterectomy and bilateral salpingo-oophorectomy in 2005, recurrence in 2015 treated with chemoradiation complicated by ureteral strictures requiring ureteral stenting complicated by stent obstruction requiring exchange almost every 3 months.  Dr. Hays removed stent  3/13. Patient is for renal scan and then left nephrostomy tube placement by IR.  Follow intake and output, monitor renal function.  Hx of E. Faecalis 12/2021  UC x 2 after ELISA [place]   ID consulted, changed abx to unasyn  -culture sensitivities pending     History of Cervical cancer (HCC)- (present on admission)  Assessment & Plan  History of cervical cancer status post hysterectomy and bilateral salpingo-oophorectomy in 2005, recurrence in 2015 treated with chemoradiation complicated by ureteral strictures requiring ureteral stenting complicated by stent obstruction requiring exchange almost every 3 months.  Dr. Hays following       VTE prophylaxis: SCDs/TEDs    I have performed a physical exam and reviewed and updated ROS and Plan today (3/18/2022). In review of yesterday's note (3/17/2022), there are no changes except as documented above.

## 2022-03-18 NOTE — PROGRESS NOTES
Assumed care of pt and received bedside report. Pt is A&O x 4, denies pain, bedside table and personal items within reach, bed locked in low position, no additional needs at this time.

## 2022-03-18 NOTE — CARE PLAN
The patient is Stable - Low risk of patient condition declining or worsening    Shift Goals  Clinical Goals: pt will remain free from falls  Patient Goals: rest  Family Goals: HUNG    Progress made toward(s) clinical / shift goals:  Pt has remained free from falls this shift. Pt currently resting in bed. Denies pain.    Problem: Pain - Standard  Goal: Alleviation of pain or a reduction in pain to the patient’s comfort goal  Outcome: Progressing     Problem: Knowledge Deficit - Standard  Goal: Patient and family/care givers will demonstrate understanding of plan of care, disease process/condition, diagnostic tests and medications  Outcome: Progressing     Problem: Hemodynamics  Goal: Patient's hemodynamics, fluid balance and neurologic status will be stable or improve  Outcome: Progressing     Problem: Fluid Volume  Goal: Fluid volume balance will be maintained  Outcome: Progressing     Problem: Urinary - Renal Perfusion  Goal: Ability to achieve and maintain adequate renal perfusion and functioning will improve  Outcome: Progressing     Problem: Respiratory  Goal: Patient will achieve/maintain optimum respiratory ventilation and gas exchange  Outcome: Progressing     Problem: Mechanical Ventilation  Goal: Safe management of artificial airway and ventilation  Outcome: Progressing  Goal: Successful weaning off mechanical ventilator, spontaneously maintains adequate gas exchange  Outcome: Progressing  Goal: Patient will be able to express needs and understand communication  Outcome: Progressing     Problem: Physical Regulation  Goal: Diagnostic test results will improve  Outcome: Progressing  Goal: Signs and symptoms of infection will decrease  Outcome: Progressing     Problem: Fall Risk  Goal: Patient will remain free from falls  Outcome: Progressing

## 2022-03-18 NOTE — CARE PLAN
The patient is Stable - Low risk of patient condition declining or worsening    Shift Goals  Clinical Goals: abx therapy  Patient Goals: rest  Family Goals: HUNG    Progress made toward(s) clinical / shift goals:  Pt tolerating scheduled IV Piggy back UNASYN without complications/adverse side effects.    Patient is not progressing towards the following goals:

## 2022-03-18 NOTE — PROGRESS NOTES
Patient seen at bedside.  She is doing well.  Anxious to d/c home. Denies any concerns at this time and is sitting up at eating lunch and visiting with her .  Denies nausea, vomiting, fevers, chills or flank pain.  Afebrile on antibiotics.  On Unasyn per ID.  Left nephrostomy tube functioning well with normal-appearing urine output.  Examination is otherwise unremarkable.  Agree with plan to discharge home on Augmentin per ID.  Has follow-up with [facility] on 4/25/2022 at 230.

## 2022-03-19 ENCOUNTER — PHARMACY VISIT (OUTPATIENT)
Dept: PHARMACY | Facility: MEDICAL CENTER | Age: 77
End: 2022-03-19
Payer: MEDICARE

## 2022-03-19 VITALS
HEART RATE: 84 BPM | OXYGEN SATURATION: 94 % | HEIGHT: 60 IN | SYSTOLIC BLOOD PRESSURE: 152 MMHG | RESPIRATION RATE: 19 BRPM | TEMPERATURE: 97.7 F | DIASTOLIC BLOOD PRESSURE: 90 MMHG | BODY MASS INDEX: 25.06 KG/M2 | WEIGHT: 127.65 LBS

## 2022-03-19 PROBLEM — N13.1 HYDRONEPHROSIS DUE TO OBSTRUCTION OF URETER: Status: RESOLVED | Noted: 2019-09-17 | Resolved: 2022-03-19

## 2022-03-19 PROBLEM — A41.9 SEPSIS (HCC): Status: RESOLVED | Noted: 2022-03-12 | Resolved: 2022-03-19

## 2022-03-19 LAB
ANION GAP SERPL CALC-SCNC: 12 MMOL/L (ref 7–16)
BACTERIA SPEC ANAEROBE CULT: ABNORMAL
BACTERIA SPEC ANAEROBE CULT: ABNORMAL
BUN SERPL-MCNC: 17 MG/DL (ref 8–22)
CALCIUM SERPL-MCNC: 9.8 MG/DL (ref 8.5–10.5)
CHLORIDE SERPL-SCNC: 106 MMOL/L (ref 96–112)
CO2 SERPL-SCNC: 21 MMOL/L (ref 20–33)
CREAT SERPL-MCNC: 0.84 MG/DL (ref 0.5–1.4)
ERYTHROCYTE [DISTWIDTH] IN BLOOD BY AUTOMATED COUNT: 56.3 FL (ref 35.9–50)
GFR SERPLBLD CREATININE-BSD FMLA CKD-EPI: 71 ML/MIN/1.73 M 2
GLUCOSE SERPL-MCNC: 110 MG/DL (ref 65–99)
HCT VFR BLD AUTO: 32.5 % (ref 37–47)
HGB BLD-MCNC: 10.3 G/DL (ref 12–16)
MCH RBC QN AUTO: 24.8 PG (ref 27–33)
MCHC RBC AUTO-ENTMCNC: 31.7 G/DL (ref 33.6–35)
MCV RBC AUTO: 78.1 FL (ref 81.4–97.8)
PLATELET # BLD AUTO: 268 K/UL (ref 164–446)
PMV BLD AUTO: 9.6 FL (ref 9–12.9)
POTASSIUM SERPL-SCNC: 4 MMOL/L (ref 3.6–5.5)
RBC # BLD AUTO: 4.16 M/UL (ref 4.2–5.4)
SIGNIFICANT IND 70042: ABNORMAL
SITE SITE: ABNORMAL
SODIUM SERPL-SCNC: 139 MMOL/L (ref 135–145)
SOURCE SOURCE: ABNORMAL
WBC # BLD AUTO: 8.1 K/UL (ref 4.8–10.8)

## 2022-03-19 PROCEDURE — 700105 HCHG RX REV CODE 258: Performed by: INTERNAL MEDICINE

## 2022-03-19 PROCEDURE — 36415 COLL VENOUS BLD VENIPUNCTURE: CPT

## 2022-03-19 PROCEDURE — 80048 BASIC METABOLIC PNL TOTAL CA: CPT

## 2022-03-19 PROCEDURE — 85027 COMPLETE CBC AUTOMATED: CPT

## 2022-03-19 PROCEDURE — 700111 HCHG RX REV CODE 636 W/ 250 OVERRIDE (IP): Performed by: INTERNAL MEDICINE

## 2022-03-19 PROCEDURE — RXMED WILLOW AMBULATORY MEDICATION CHARGE: Performed by: INTERNAL MEDICINE

## 2022-03-19 PROCEDURE — A9270 NON-COVERED ITEM OR SERVICE: HCPCS | Performed by: STUDENT IN AN ORGANIZED HEALTH CARE EDUCATION/TRAINING PROGRAM

## 2022-03-19 PROCEDURE — 700102 HCHG RX REV CODE 250 W/ 637 OVERRIDE(OP): Performed by: STUDENT IN AN ORGANIZED HEALTH CARE EDUCATION/TRAINING PROGRAM

## 2022-03-19 PROCEDURE — 99239 HOSP IP/OBS DSCHRG MGMT >30: CPT | Performed by: INTERNAL MEDICINE

## 2022-03-19 RX ORDER — AMOXICILLIN AND CLAVULANATE POTASSIUM 875; 125 MG/1; MG/1
1 TABLET, FILM COATED ORAL 2 TIMES DAILY
Qty: 22 TABLET | Refills: 0 | Status: SHIPPED | OUTPATIENT
Start: 2022-03-19 | End: 2022-03-30

## 2022-03-19 RX ORDER — AMOXICILLIN AND CLAVULANATE POTASSIUM 875; 125 MG/1; MG/1
1 TABLET, FILM COATED ORAL 2 TIMES DAILY
Qty: 22 TABLET | Refills: 0 | Status: SHIPPED | OUTPATIENT
Start: 2022-03-19 | End: 2022-03-19 | Stop reason: SDUPTHER

## 2022-03-19 RX ADMIN — LISINOPRIL 40 MG: 20 TABLET ORAL at 05:16

## 2022-03-19 RX ADMIN — SODIUM CHLORIDE 3 G: 900 INJECTION INTRAVENOUS at 05:20

## 2022-03-19 RX ADMIN — SODIUM CHLORIDE 3 G: 900 INJECTION INTRAVENOUS at 00:10

## 2022-03-19 RX ADMIN — SODIUM CHLORIDE 3 G: 900 INJECTION INTRAVENOUS at 12:48

## 2022-03-19 NOTE — DISCHARGE SUMMARY
Discharge Summary    CHIEF COMPLAINT ON ADMISSION  Chief Complaint   Patient presents with   • Flank Pain     Pt having left flank pain, pt has stent from bladder to kidney, pt self cath, unable to drain urine effectively today, pain 9/10       Reason for Admission  Flank Pain; Vomiting     Admission Date  3/12/2022    CODE STATUS  Full Code    HPI & HOSPITAL COURSE  This is a 76 y.o. female here with flank pain.    This is a 76 year old female with PMHx of hypertension, hyperlipidemia, colostomy presence, history of cervical cancer status post hysterectomy and bilateral salpingo-oophorectomy in 2005 with recurrence in 2015 treated with chemoradiation (Dr. Hays) who presented to the ED on 3/12/2022 with abdominal and left flank pain.     Patient has complicated history including ureteral strictures requiring ureteral stenting complicated by repeated stent obstruction causing hydronephrosis requiring ureteral stent exchange almost every 3 months, urinary incontinence requiring self-catheterization at home, history of multiple episodes of pyelonephritis, history of MDR organisms although none recently.     Patient reports flank pain, foul smelling urine, nausea, vomiting and chills. Renal US noted severe left-sided hydronephrosis. Dr. Hays removed stent 3/13. Patient is for renal scan and then left nephrostomy tube placed by IR on 3/15. Urine culture growing aerococcus, enterococcus and bacteroides. ID consulted recommended Augmentin until end date 3/30/22. Patient has been cleared by gyn onc for outpatient follow up in their office. Patient's vital signs are stable and she is ready for discharge home with oral abx. She is to return to the ER if her condition worsens.      Therefore, she is discharged in good and stable condition to home with close outpatient follow-up.    The patient met 2-midnight criteria for an inpatient stay at the time of discharge.    Discharge Date  3/19/2022    FOLLOW UP ITEMS POST  DISCHARGE  Follow up with primary care  Follow up with gyn onc at [facility]     DISCHARGE DIAGNOSES  Principal Problem (Resolved):    Sepsis (HCC) POA: Yes  Active Problems:    History of Cervical cancer (HCC) POA: Yes    HTN (hypertension) POA: Yes    Mixed hyperlipidemia POA: Unknown  Resolved Problems:    Hydronephrosis due to obstruction of ureter stent POA: Yes      FOLLOW UP  No future appointments.  Julio Ross M.D.  [address] [facility] [place] [address] [phone]      Follow up with primary care in 1-2 weeks.     [facility]  [address]  [place] [address]      Follow up with oncology.      MEDICATIONS ON DISCHARGE     Medication List      START taking these medications      Instructions   amoxicillin-clavulanate 875-125 MG Tabs  Commonly known as: AUGMENTIN   Take 1 Tablet by mouth 2 times a day for 11 days.  Dose: 1 Tablet        CONTINUE taking these medications      Instructions   artificial tears 1.4 % Soln   Administer 1 Drop into both eyes 4 times a day as needed (dry eyes).  Dose: 1 Drop     atorvastatin 20 MG Tabs  Commonly known as: LIPITOR   Take 20 mg by mouth every evening.  Dose: 20 mg     coenzyme Q-10 30 MG capsule   Take 60 mg by mouth every day.  Dose: 60 mg     lisinopril 40 MG tablet  Commonly known as: PRINIVIL   Take 40 mg by mouth every morning. Indications: High Blood Pressure Disorder  Dose: 40 mg     Probiotic Advanced Caps   Take 1 Capsule by mouth every day.  Dose: 1 Capsule     * therapeutic multivitamin-minerals Tabs   Take 1 Tablet by mouth every day.  Dose: 1 Tablet     * Systane ICaps AREDS2 Tabs   Take 1 Tablet by mouth every day.  Dose: 1 Tablet     Vitamin C 1000 MG Tabs   Take 1,000 mg by mouth 2 times a day.  Dose: 1,000 mg     vitamin D3 1000 Unit (25 mcg) Tabs  Commonly known as: cholecalciferol   Take 1,000 Units by mouth every day.  Dose: 1,000 Units         * This list has 2 medication(s) that are the same as other  "medications prescribed for you. Read the directions carefully, and ask your doctor or other care provider to review them with you.                Allergies  Allergies   Allergen Reactions   • Sulfa Drugs Unspecified     Pt states \" it really runs me down.\"       DIET  Orders Placed This Encounter   Procedures   • Diet Order Diet: Regular     Standing Status:   Standing     Number of Occurrences:   1     Order Specific Question:   Diet:     Answer:   Regular [1]       ACTIVITY  As tolerated.  Weight bearing as tolerated    CONSULTATIONS  Gynecology oncology   Infectious Disease    PROCEDURES  3/13   CYSTOSCOPY, WITH URETERAL STENT INSERTION    3/15  Left Neph Tube Placement    LABORATORY  Lab Results   Component Value Date    SODIUM 139 03/19/2022    POTASSIUM 4.0 03/19/2022    CHLORIDE 106 03/19/2022    CO2 21 03/19/2022    GLUCOSE 110 (H) 03/19/2022    BUN 17 03/19/2022    CREATININE 0.84 03/19/2022    CREATININE 0.7 03/27/2006        Lab Results   Component Value Date    WBC 8.1 03/19/2022    HEMOGLOBIN 10.3 (L) 03/19/2022    HEMATOCRIT 32.5 (L) 03/19/2022    PLATELETCT 268 03/19/2022        Total time of the discharge process exceeds 38 minutes.  "

## 2022-03-19 NOTE — PROGRESS NOTES
Bedside report received from MAYCOL Lees. Patient is alert and oriented. No c/o pain or signs of distress at this time. Call light within reach and bed alarm active. She is resting comfortably reading her book

## 2022-03-19 NOTE — PROGRESS NOTES
IV removed, discussed discharge instructions with patient, gave patient copy of paperwork. Patient discharged home with . [id number] and belongings with patient at time of discharge.

## 2022-03-19 NOTE — DISCHARGE INSTRUCTIONS
Hydronephrosis    Hydronephrosis is the swelling of one or both kidneys due to a blockage that stops urine from flowing out of the body. Kidneys filter waste from the blood and produce urine. This condition can lead to kidney failure and may become life threatening if not treated promptly.  What are the causes?  Common causes of this condition include:  · Problems that occur when a baby is developing in the womb (congenital defect). These can include problems:  ? In the kidneys.  ? In the tubes that drain urine from the kidneys into the bladder (ureters).  · Kidney stones.  · Bladder infection.  · An enlarged prostate gland.  · Scar tissue from a previous surgery or injury.  · A blood clot.  · A tumor or cyst in the abdomen or pelvis.  · Cancer of the prostate, bladder, uterus, ovary, or colon.  What are the signs or symptoms?  Symptoms of this condition include:  · Pain or discomfort in your side (flank).  · Pain and swelling in your abdomen.  · Nausea and vomiting.  · Fever.  · Pain when passing urine.  · Feelings of urgency when you need to urinate.  · Urinating more often than normal.  In some cases, you may not have any symptoms.  How is this diagnosed?  This condition may be diagnosed based on:  · Your symptoms and medical history.  · A physical exam.  · Blood and urine tests.  · Imaging tests, such as an ultrasound, CT scan, or MRI.  · A procedure in which a scope is inserted into the urethra and used to view parts of the urinary tract and bladder (cystoscopy).  How is this treated?  Treatment for this condition depends on where the blockage is, how long it has been there, and what caused it. The goal of treatment is to remove the blockage. Treatment may include:  · Antibiotic medicines to treat or prevent infection.  · A procedure to place a small, thin tube (stent) into a blocked ureter. The stent will keep the ureter open so that urine can drain through it.  · A nonsurgical procedure that crushes kidney  stones with shock waves (extracorporeal shock wave lithotripsy).  · If kidney failure occurs, treatment may include dialysis or a kidney transplant.  Follow these instructions at home:    · Take over-the-counter and prescription medicines only as told by your health care provider.  · Rest and return to your normal activities as told by your health care provider. Ask your health care provider what activities are safe for you.  · Drink enough fluid to keep your urine pale yellow.  · If you were prescribed an antibiotic medicine, take it exactly as told by your health care provider. Do not stop taking the antibiotic even if you start to feel better.  · Keep all follow-up visits as told by your health care provider. This is important.  Contact a health care provider if:  · You continue to have symptoms after treatment.  · You develop new symptoms.  · Your urine becomes cloudy or bloody.  · You have a fever.  Get help right away if:  · You have severe flank or abdominal pain.  · You cannot drink fluids without vomiting.  Summary  · Hydronephrosis is the swelling of one or both kidneys due to a blockage that stops urine from flowing out of the body.  · Hydronephrosis can lead to kidney failure and may become life threatening if not treated promptly.  · The goal of treatment is to treat the cause of the blockage. It may include insertion of stent into a blocked ureter, a procedure to treat kidney stones, and antibiotic medicines.  · Follow your health care provider's instructions for taking care of yourself at home, including instructions about drinking fluids, taking medicines, and limiting activities.  This information is not intended to replace advice given to you by your health care provider. Make sure you discuss any questions you have with your health care provider.  Document Released: 10/14/2008 Document Revised: 12/29/2018 Document Reviewed: 12/29/2018  [employer] Patient Education © 2020 [employer]      Percutaneous Nephrostomy Home Guide  Percutaneous nephrostomy is a procedure to insert a flexible tube into your kidney so that urine can leave your body. This procedure may be done if a medical condition prevents urine from leaving your kidney in the usual way. During the procedure, the nephrostomy tube is inserted in the right or left side of your lower back and is connected to an external drainage bag.  After you have a nephrostomy tube placed, urine will collect in the drainage bag outside of your body. You will need to empty and change the drainage bag as needed. You will also need to take steps to care for the area where the nephrostomy tube was inserted (tube insertion site).  How do I care for my nephrostomy tube?  · Always keep your tubing, the leg bag, or the bedside drainage bag below the level of your kidney so that your urine drains freely.  · Avoid activities that would cause bending or pulling of your tubing. Ask your health care provider what activities are safe for you.  · When connecting your nephrostomy tube to a drainage bag, make sure that there are no kinks in the tubing and that your urine is draining freely. You may want to gently wrap an elastic bandage over the tubing. This will help keep the tubing in place and prevent it from kinking. Make sure there is no tension on the tubing so it does not become dislodged.  · At night, you may want to connect your nephrostomy tube or the leg bag to a larger bedside drainage bag.  How do I empty the drainage bag?  Empty the leg bag or bedside drainage bag whenever it becomes ? full. Also empty it before you go to sleep. Most drainage bags have a drain at the bottom that allows urine to be emptied. Follow these basic steps:  1. Hold the drainage bag over a toilet or collection container. Use a measuring container if your health care provider told you to measure your urine.  2. Open the drain of the bag and allow the urine to drain  out.  3. After all the urine has drained from the drainage bag, close the drain fully.  4. Flush the urine down the toilet. If a collection container was used, rinse the container.  How do I change the dressing around the nephrostomy tube?  Change your dressing and clean your tube exit site as told by your health care provider. You may need to change the dressing every day for the first 2 weeks after having a nephrostomy tube inserted. After the first 2 weeks, you may be told to change the dressing two times a week.  Supplies needed:  · Mild soap and water.  · Split gauze pads, 4 × 4 inches (10 x 10 cm).  · Gauze pads, 4 × 4 inches (10 x 10 cm).  · Paper tape.  How to change the dressing:  Because of the location of your nephrostomy tube, you may need help from another person to complete dressing changes. Follow these basic steps:  1. Wash hands with soap and water.  2. Gently remove the tape and dressing from around the nephrostomy tube. Be careful not to pull on the tube while removing the dressing. Avoid using scissors because they may damage the tube.  3. Wash the skin around the tube with mild soap and water, rinse well, and pat the skin dry with a clean cloth.  4. Check the skin around the drain for redness, swelling, pus, warmth, or a bad smell.  5. If the drain was sutured to the skin, check the suture to verify that it is still anchored in the skin.  6. Place two split gauze pads in and around the tube exit site. Do not apply ointments or alcohol to the site.  7. Place a gauze pad on top of the split gauze pad.  8. Coil the tube on top of the gauze. The tubing should rest on the gauze, not on the skin.  9. Place tape around each edge of the gauze pad.  10. Secure the nephrostomy tubing. Make sure that the tube does not kink or become pinched. The tubing should rest on the gauze pad, not on the skin.  11. Dispose of used supplies properly.    How do I flush my nephrostomy tube?  Use a saline syringe to rinse  out (flush) your nephrostomy tube as told by your health care provider. Flushing is easier if a three-way stopcock is placed between the tube and the drainage bag. One connection of the stopcock connects to your tube, the second connects to the drainage bag, and the third is usually covered with a cap. The lever on the stopcock points to the direction on the stopcock that is closed to flow. Normally, the lever points in the direction of the cap to allow urine to drain from the tube to the drainage bag.  Supplies needed:  · Rubbing alcohol wipe.  · 10 mL 0.9% saline syringe.  How to flush the tube:  1. Move the lever of the three-way stopcock so it points toward the drainage bag.  2. Clean the cap with a rubbing alcohol wipe.  3. Screw the tip of a 10 mL 0.9% saline syringe onto the cap.  4. Using the syringe plunger, slowly push the 10 mL 0.9% saline in the syringe over 5-10 seconds. If resistance is met or pain occurs while pushing, stop pushing the saline.  5. Remove the syringe from the cap.  6. Return the stopcock lever to the usual position, pointing in the direction of the cap.  7. Dispose of used supplies properly.  How do I replace the drainage bag?  Replace the drainage bag, three-way stopcock, and any extension tubing as told by your health care provider. Make sure you always have an extra drainage bag and connecting tubing available.  1. Empty urine from your drainage bag.  2. Gather a new drainage bag, three-way stopcock, and any extension tubing.  3. Remove the drainage bag, three-way stopcock, and any extension tubing from the nephrostomy tube.  4. Attach the new leg bag or bedside drainage bag, three-way stopcock, and any extension tubing to the nephrostomy tube.  5. Dispose of the used drainage bag, three-way stopcock, and any extension.  Contact a health care provider if:  · You have problems with any of the valves or tubing.  · You have persistent pain or soreness in your back.  · You have more  redness, swelling, or pain around your tube insertion site.  · You have more fluid or blood coming from your tube insertion site.  · Your tube insertion site feels warm to the touch.  · You have pus or a bad smell coming from your tube insertion site.  · You have increased urine output or you feel burning when urinating.  Get help right away if:  · You have pain in your abdomen during the first week.  · You have chest pain or have trouble breathing.  · You have a new appearance of blood in your urine.  · You have a fever or chills.  · You have back pain that is not relieved by your medicine.  · You have decreased urine output.  · Your nephrostomy tube comes out.  This information is not intended to replace advice given to you by your health care provider. Make sure you discuss any questions you have with your health care provider.  Document Released: 10/08/2014 Document Revised: 04/09/2020 Document Reviewed: 09/29/2017  [employer] Patient Education © 2020 [employer]        Urinary Tract Infection, Adult  A urinary tract infection (UTI) is an infection of any part of the urinary tract. The urinary tract includes:  · The kidneys.  · The ureters.  · The bladder.  · The urethra.  These organs make, store, and get rid of pee (urine) in the body.  What are the causes?  This is caused by germs (bacteria) in your genital area. These germs grow and cause swelling (inflammation) of your urinary tract.  What increases the risk?  You are more likely to develop this condition if:  · You have a small, thin tube (catheter) to drain pee.  · You cannot control when you pee or poop (incontinence).  · You are female, and:  ? You use these methods to prevent pregnancy:  ? A medicine that kills sperm (spermicide).  ? A device that blocks sperm (diaphragm).  ? You have low levels of a female hormone (estrogen).  ? You are pregnant.  · You have genes that add to your risk.  · You are sexually active.  · You take antibiotic  medicines.  · You have trouble peeing because of:  ? A prostate that is bigger than normal, if you are male.  ? A blockage in the part of your body that drains pee from the bladder (urethra).  ? A kidney stone.  ? A nerve condition that affects your bladder (neurogenic bladder).  ? Not getting enough to drink.  ? Not peeing often enough.  · You have other conditions, such as:  ? Diabetes.  ? A weak disease-fighting system (immune system).  ? Sickle cell disease.  ? Gout.  ? Injury of the spine.  What are the signs or symptoms?  Symptoms of this condition include:  · Needing to pee right away (urgently).  · Peeing often.  · Peeing small amounts often.  · Pain or burning when peeing.  · Blood in the pee.  · Pee that smells bad or not like normal.  · Trouble peeing.  · Pee that is cloudy.  · Fluid coming from the vagina, if you are female.  · Pain in the belly or lower back.  Other symptoms include:  · Throwing up (vomiting).  · No urge to eat.  · Feeling mixed up (confused).  · Being tired and grouchy (irritable).  · A fever.  · Watery poop (diarrhea).  How is this treated?  This condition may be treated with:  · Antibiotic medicine.  · Other medicines.  · Drinking enough water.  Follow these instructions at home:    Medicines  · Take over-the-counter and prescription medicines only as told by your doctor.  · If you were prescribed an antibiotic medicine, take it as told by your doctor. Do not stop taking it even if you start to feel better.  General instructions  · Make sure you:  ? Pee until your bladder is empty.  ? Do not hold pee for a long time.  ? Empty your bladder after sex.  ? Wipe from front to back after pooping if you are a female. Use each tissue one time when you wipe.  · Drink enough fluid to keep your pee pale yellow.  · Keep all follow-up visits as told by your doctor. This is important.  Contact a doctor if:  · You do not get better after 1-2 days.  · Your symptoms go away and then come back.  Get  help right away if:  · You have very bad back pain.  · You have very bad pain in your lower belly.  · You have a fever.  · You are sick to your stomach (nauseous).  · You are throwing up.  Summary  · A urinary tract infection (UTI) is an infection of any part of the urinary tract.  · This condition is caused by germs in your genital area.  · There are many risk factors for a UTI. These include having a small, thin tube to drain pee and not being able to control when you pee or poop.  · Treatment includes antibiotic medicines for germs.  · Drink enough fluid to keep your pee pale yellow.  This information is not intended to replace advice given to you by your health care provider. Make sure you discuss any questions you have with your health care provider.  Document Released: 06/05/2009 Document Revised: 12/05/2019 Document Reviewed: 06/27/2019  [employer] Patient Education © 2020 [employer]      Discharge Instructions    Discharged to home by car with relative. Discharged via wheelchair, hospital escort: Yes.  Special equipment needed: Not Applicable    Be sure to schedule a follow-up appointment with your primary care doctor or any specialists as instructed.     Discharge Plan:   Diet Plan: Discussed  Activity Level: Discussed  Confirmed Follow up Appointment: Patient to Call and Schedule Appointment  Confirmed Symptoms Management: Discussed  Medication Reconciliation Updated: Yes  Influenza Vaccine Indication: Not indicated: Previously immunized this influenza season and > 8 years of age    I understand that a diet low in cholesterol, fat, and sodium is recommended for good health. Unless I have been given specific instructions below for another diet, I accept this instruction as my diet prescription.   Other diet: Regular Diet    Special Instructions: None    · Is patient discharged on Warfarin / Coumadin?   No     Depression / Suicide Risk    As you are discharged from this [facility] facility, it is  important to learn how to keep safe from harming yourself.    Recognize the warning signs:  · Abrupt changes in personality, positive or negative- including increase in energy   · Giving away possessions  · Change in eating patterns- significant weight changes-  positive or negative  · Change in sleeping patterns- unable to sleep or sleeping all the time   · Unwillingness or inability to communicate  · Depression  · Unusual sadness, discouragement and loneliness  · Talk of wanting to die  · Neglect of personal appearance   · Rebelliousness- reckless behavior  · Withdrawal from people/activities they love  · Confusion- inability to concentrate     If you or a loved one observes any of these behaviors or has concerns about self-harm, here's what you can do:  · Talk about it- your feelings and reasons for harming yourself  · Remove any means that you might use to hurt yourself (examples: pills, rope, extension cords, firearm)  · Get professional help from the community (Mental Health, Substance Abuse, psychological counseling)  · Do not be alone:Call your Safe Contact- someone whom you trust who will be there for you.  · Call your local CRISIS HOTLINE 012-7227 or 135-242-7496  · Call your local Children's Mobile Crisis Response Team [place] (475) 944-2206 or www.[employer]  · Call the toll free National Suicide Prevention Hotlines   · National Suicide Prevention Lifeline 976-006-SYEA (0166)  · National Hope Line Network 800-SUICIDE (639-0843)

## 2022-03-19 NOTE — CARE PLAN
The patient is Stable - Low risk of patient condition declining or worsening    Shift Goals  Clinical Goals: Patient will demonstrate signs of improving infection  Patient Goals: go home  Family Goals: HUNG    Progress made toward(s) clinical / shift goals:  Pt lab values improving, not showing signs of infection, discharge orders received.     Problem: Pain - Standard  Goal: Alleviation of pain or a reduction in pain to the patient’s comfort goal  Outcome: Progressing     Problem: Knowledge Deficit - Standard  Goal: Patient and family/care givers will demonstrate understanding of plan of care, disease process/condition, diagnostic tests and medications  Outcome: Progressing     Problem: Hemodynamics  Goal: Patient's hemodynamics, fluid balance and neurologic status will be stable or improve  Outcome: Progressing     Problem: Fluid Volume  Goal: Fluid volume balance will be maintained  Outcome: Progressing     Problem: Urinary - Renal Perfusion  Goal: Ability to achieve and maintain adequate renal perfusion and functioning will improve  Outcome: Progressing     Problem: Respiratory  Goal: Patient will achieve/maintain optimum respiratory ventilation and gas exchange  Outcome: Progressing     Problem: Mechanical Ventilation  Goal: Safe management of artificial airway and ventilation  Outcome: Progressing  Goal: Successful weaning off mechanical ventilator, spontaneously maintains adequate gas exchange  Outcome: Progressing  Goal: Patient will be able to express needs and understand communication  Outcome: Progressing     Problem: Physical Regulation  Goal: Diagnostic test results will improve  Outcome: Progressing  Goal: Signs and symptoms of infection will decrease  Outcome: Progressing     Problem: Fall Risk  Goal: Patient will remain free from falls  Outcome: Progressing

## 2022-03-19 NOTE — CARE PLAN
The patient is Stable - Low risk of patient condition declining or worsening    Shift Goals  Clinical Goals: patient will remain free from falls  Patient Goals: walk before bed  Family Goals: HUNG    Progress made toward(s) clinical / shift goals:  patient walked 1 and half laps prior to bed independently. She remained free from falls. Left neph tube dressing CDI with a total 350ml out. Her stoma is rosebud and moist, she does self care with colostomy.  No c/o pain. She is A&O and able to make needs known.    Patient is not progressing towards the following goals:

## 2022-03-19 NOTE — PROGRESS NOTES
"Assumed care of pt and received bedside report. Pt is A&O x 4, denies pain, bedside table and personal items within reach, bed locked in low position, states she is \"feeling good this AM.\" No additional needs at this time.   "

## 2022-03-20 LAB
BACTERIA UR CULT: ABNORMAL
BACTERIA UR CULT: ABNORMAL
BACTERIA WND AEROBE CULT: ABNORMAL
GRAM STN SPEC: ABNORMAL
GRAM STN SPEC: ABNORMAL
SIGNIFICANT IND 70042: ABNORMAL
SIGNIFICANT IND 70042: ABNORMAL
SITE SITE: ABNORMAL
SITE SITE: ABNORMAL
SOURCE SOURCE: ABNORMAL
SOURCE SOURCE: ABNORMAL

## 2022-03-21 ENCOUNTER — PATIENT OUTREACH (OUTPATIENT)
Dept: HEALTH INFORMATION MANAGEMENT | Facility: OTHER | Age: 77
End: 2022-03-21
Payer: MEDICARE

## 2022-03-21 LAB
BACTERIA UR CULT: ABNORMAL
BACTERIA UR CULT: ABNORMAL
SIGNIFICANT IND 70042: ABNORMAL
SITE SITE: ABNORMAL
SOURCE SOURCE: ABNORMAL
TEST NAME 95000: NORMAL

## 2022-03-21 NOTE — PROGRESS NOTES
ROSE Gonzalez followed up with the patient's spouse after DC from the hospital. Castro reports that the patient experienced diarrhea from the AUGMENTIN medication last night. Patient did not take the medication with food as prescribed. Patient's spouse declines speaking to a RN or Pharmacist at this time. CHW brought up scheduling a PCP appointment. Patient spouse reports they will schedule oncology follow up. Patient has no reported needs at this time.      Community Health Worker Intake  • Social determinates of health intake complete.   • Identified no barriers  • Contact information provided to Nessa Dennis   • Outpatient assessment completed.  • Did the patient receive medications post discharge: Yes    Plan: CHW will remove the patient from CCM caseload.

## 2022-03-28 ENCOUNTER — DOCUMENTATION (OUTPATIENT)
Dept: INFECTIOUS DISEASES | Facility: MEDICAL CENTER | Age: 77
End: 2022-03-28
Payer: MEDICARE

## 2022-03-28 NOTE — PROGRESS NOTES
Good culture results and the Aerococcus was unable to be grown by outside lab so no results.  The Enterococcus face centimeters was resistant to ampicillin.  I spoke with the patient and she did complete the course of Augmentin.  She is having no fevers, no abdominal pain or no urinary symptoms.  She had some diarrhea but she states this is improved since she stopped taking the antibiotic.    --- As she is doing well after antibiotic course will presume that her symptoms were associated with the Aerococcus or the Bacteroides which would have been treated by the Augmentin.  Enterococcus can certainly cause UTIs but is often contaminant so no further antibiotics needed unless new symptoms.    Rashida Solorio MD

## 2022-04-25 DIAGNOSIS — N10 ACUTE PYELONEPHRITIS: ICD-10-CM

## 2022-04-27 ENCOUNTER — HOSPITAL ENCOUNTER (OUTPATIENT)
Dept: LAB | Facility: MEDICAL CENTER | Age: 77
End: 2022-04-27
Attending: FAMILY MEDICINE
Payer: MEDICARE

## 2022-04-27 LAB
25(OH)D3 SERPL-MCNC: 69 NG/ML (ref 30–100)
ALBUMIN SERPL BCP-MCNC: 4.8 G/DL (ref 3.2–4.9)
ALBUMIN/GLOB SERPL: 2.1 G/DL
ALP SERPL-CCNC: 101 U/L (ref 30–99)
ALT SERPL-CCNC: 10 U/L (ref 2–50)
ANION GAP SERPL CALC-SCNC: 14 MMOL/L (ref 7–16)
AST SERPL-CCNC: 18 U/L (ref 12–45)
BASOPHILS # BLD AUTO: 0.4 % (ref 0–1.8)
BASOPHILS # BLD: 0.03 K/UL (ref 0–0.12)
BILIRUB SERPL-MCNC: 0.4 MG/DL (ref 0.1–1.5)
BUN SERPL-MCNC: 20 MG/DL (ref 8–22)
CALCIUM SERPL-MCNC: 10.4 MG/DL (ref 8.5–10.5)
CHLORIDE SERPL-SCNC: 106 MMOL/L (ref 96–112)
CHOLEST SERPL-MCNC: 177 MG/DL (ref 100–199)
CO2 SERPL-SCNC: 21 MMOL/L (ref 20–33)
CREAT SERPL-MCNC: 0.72 MG/DL (ref 0.5–1.4)
EOSINOPHIL # BLD AUTO: 0.15 K/UL (ref 0–0.51)
EOSINOPHIL NFR BLD: 2.2 % (ref 0–6.9)
ERYTHROCYTE [DISTWIDTH] IN BLOOD BY AUTOMATED COUNT: 56 FL (ref 35.9–50)
FASTING STATUS PATIENT QL REPORTED: NORMAL
GFR SERPLBLD CREATININE-BSD FMLA CKD-EPI: 86 ML/MIN/1.73 M 2
GLOBULIN SER CALC-MCNC: 2.3 G/DL (ref 1.9–3.5)
GLUCOSE SERPL-MCNC: 90 MG/DL (ref 65–99)
HCT VFR BLD AUTO: 40.8 % (ref 37–47)
HDLC SERPL-MCNC: 53 MG/DL
HGB BLD-MCNC: 12.7 G/DL (ref 12–16)
IMM GRANULOCYTES # BLD AUTO: 0.02 K/UL (ref 0–0.11)
IMM GRANULOCYTES NFR BLD AUTO: 0.3 % (ref 0–0.9)
LDLC SERPL CALC-MCNC: 103 MG/DL
LYMPHOCYTES # BLD AUTO: 1.54 K/UL (ref 1–4.8)
LYMPHOCYTES NFR BLD: 22.6 % (ref 22–41)
MCH RBC QN AUTO: 25.2 PG (ref 27–33)
MCHC RBC AUTO-ENTMCNC: 31.1 G/DL (ref 33.6–35)
MCV RBC AUTO: 81 FL (ref 81.4–97.8)
MONOCYTES # BLD AUTO: 0.58 K/UL (ref 0–0.85)
MONOCYTES NFR BLD AUTO: 8.5 % (ref 0–13.4)
NEUTROPHILS # BLD AUTO: 4.5 K/UL (ref 2–7.15)
NEUTROPHILS NFR BLD: 66 % (ref 44–72)
NRBC # BLD AUTO: 0 K/UL
NRBC BLD-RTO: 0 /100 WBC
PLATELET # BLD AUTO: 269 K/UL (ref 164–446)
PMV BLD AUTO: 10.8 FL (ref 9–12.9)
POTASSIUM SERPL-SCNC: 4.7 MMOL/L (ref 3.6–5.5)
PROT SERPL-MCNC: 7.1 G/DL (ref 6–8.2)
RBC # BLD AUTO: 5.04 M/UL (ref 4.2–5.4)
SODIUM SERPL-SCNC: 141 MMOL/L (ref 135–145)
TRIGL SERPL-MCNC: 106 MG/DL (ref 0–149)
WBC # BLD AUTO: 6.8 K/UL (ref 4.8–10.8)

## 2022-04-27 PROCEDURE — 85025 COMPLETE CBC W/AUTO DIFF WBC: CPT

## 2022-04-27 PROCEDURE — 80061 LIPID PANEL: CPT

## 2022-04-27 PROCEDURE — 82306 VITAMIN D 25 HYDROXY: CPT

## 2022-04-27 PROCEDURE — 80053 COMPREHEN METABOLIC PANEL: CPT

## 2022-04-27 PROCEDURE — 36415 COLL VENOUS BLD VENIPUNCTURE: CPT

## 2022-05-06 ENCOUNTER — HOSPITAL ENCOUNTER (OUTPATIENT)
Dept: LAB | Facility: MEDICAL CENTER | Age: 77
End: 2022-05-06
Attending: SPECIALIST
Payer: MEDICARE

## 2022-05-06 LAB
APPEARANCE UR: ABNORMAL
BACTERIA #/AREA URNS HPF: ABNORMAL /HPF
BILIRUB UR QL STRIP.AUTO: NEGATIVE
COLOR UR: YELLOW
EPI CELLS #/AREA URNS HPF: NEGATIVE /HPF
GLUCOSE UR STRIP.AUTO-MCNC: NEGATIVE MG/DL
HYALINE CASTS #/AREA URNS LPF: ABNORMAL /LPF
KETONES UR STRIP.AUTO-MCNC: NEGATIVE MG/DL
LEUKOCYTE ESTERASE UR QL STRIP.AUTO: ABNORMAL
MICRO URNS: ABNORMAL
NITRITE UR QL STRIP.AUTO: NEGATIVE
PH UR STRIP.AUTO: 6.5 [PH] (ref 5–8)
PROT UR QL STRIP: 100 MG/DL
RBC # URNS HPF: ABNORMAL /HPF
RBC UR QL AUTO: ABNORMAL
RENAL EPI CELLS #/AREA URNS HPF: ABNORMAL /HPF
SP GR UR STRIP.AUTO: 1.01
UROBILINOGEN UR STRIP.AUTO-MCNC: 0.2 MG/DL
WBC #/AREA URNS HPF: ABNORMAL /HPF

## 2022-05-06 PROCEDURE — 81001 URINALYSIS AUTO W/SCOPE: CPT

## 2022-05-06 PROCEDURE — 87086 URINE CULTURE/COLONY COUNT: CPT

## 2022-05-12 ENCOUNTER — PRE-ADMISSION TESTING (OUTPATIENT)
Dept: ADMISSIONS | Facility: MEDICAL CENTER | Age: 77
End: 2022-05-12
Attending: STUDENT IN AN ORGANIZED HEALTH CARE EDUCATION/TRAINING PROGRAM
Payer: MEDICARE

## 2022-05-12 DIAGNOSIS — Z01.812 PRE-OPERATIVE LABORATORY EXAMINATION: ICD-10-CM

## 2022-05-12 DIAGNOSIS — Z01.810 PRE-OPERATIVE CARDIOVASCULAR EXAMINATION: ICD-10-CM

## 2022-05-12 LAB
ANION GAP SERPL CALC-SCNC: 13 MMOL/L (ref 7–16)
BUN SERPL-MCNC: 20 MG/DL (ref 8–22)
CALCIUM SERPL-MCNC: 10.4 MG/DL (ref 8.5–10.5)
CHLORIDE SERPL-SCNC: 105 MMOL/L (ref 96–112)
CO2 SERPL-SCNC: 22 MMOL/L (ref 20–33)
CREAT SERPL-MCNC: 0.91 MG/DL (ref 0.5–1.4)
EKG IMPRESSION: NORMAL
GFR SERPLBLD CREATININE-BSD FMLA CKD-EPI: 65 ML/MIN/1.73 M 2
GLUCOSE SERPL-MCNC: 96 MG/DL (ref 65–99)
POTASSIUM SERPL-SCNC: 4.4 MMOL/L (ref 3.6–5.5)
SODIUM SERPL-SCNC: 140 MMOL/L (ref 135–145)

## 2022-05-12 PROCEDURE — 93010 ELECTROCARDIOGRAM REPORT: CPT | Performed by: INTERNAL MEDICINE

## 2022-05-12 PROCEDURE — 80048 BASIC METABOLIC PNL TOTAL CA: CPT

## 2022-05-12 PROCEDURE — 36415 COLL VENOUS BLD VENIPUNCTURE: CPT

## 2022-05-12 PROCEDURE — 93005 ELECTROCARDIOGRAM TRACING: CPT

## 2022-05-12 ASSESSMENT — FIBROSIS 4 INDEX: FIB4 SCORE: 1.63

## 2022-05-26 ENCOUNTER — ANESTHESIA EVENT (OUTPATIENT)
Dept: SURGERY | Facility: MEDICAL CENTER | Age: 77
End: 2022-05-26
Payer: MEDICARE

## 2022-05-26 ENCOUNTER — HOSPITAL ENCOUNTER (OUTPATIENT)
Facility: MEDICAL CENTER | Age: 77
End: 2022-05-26
Attending: STUDENT IN AN ORGANIZED HEALTH CARE EDUCATION/TRAINING PROGRAM | Admitting: STUDENT IN AN ORGANIZED HEALTH CARE EDUCATION/TRAINING PROGRAM
Payer: MEDICARE

## 2022-05-26 ENCOUNTER — ANESTHESIA (OUTPATIENT)
Dept: SURGERY | Facility: MEDICAL CENTER | Age: 77
End: 2022-05-26
Payer: MEDICARE

## 2022-05-26 VITALS
SYSTOLIC BLOOD PRESSURE: 164 MMHG | DIASTOLIC BLOOD PRESSURE: 90 MMHG | RESPIRATION RATE: 17 BRPM | TEMPERATURE: 97.6 F | HEIGHT: 60 IN | WEIGHT: 125.66 LBS | HEART RATE: 83 BPM | OXYGEN SATURATION: 94 % | BODY MASS INDEX: 24.67 KG/M2

## 2022-05-26 PROBLEM — R49.0 DYSPHONIA: Status: ACTIVE | Noted: 2022-05-26

## 2022-05-26 PROCEDURE — 700101 HCHG RX REV CODE 250: Performed by: ANESTHESIOLOGY

## 2022-05-26 PROCEDURE — 160046 HCHG PACU - 1ST 60 MINS PHASE II: Performed by: STUDENT IN AN ORGANIZED HEALTH CARE EDUCATION/TRAINING PROGRAM

## 2022-05-26 PROCEDURE — 700111 HCHG RX REV CODE 636 W/ 250 OVERRIDE (IP): Performed by: ANESTHESIOLOGY

## 2022-05-26 PROCEDURE — 160029 HCHG SURGERY MINUTES - 1ST 30 MINS LEVEL 4: Performed by: STUDENT IN AN ORGANIZED HEALTH CARE EDUCATION/TRAINING PROGRAM

## 2022-05-26 PROCEDURE — 160002 HCHG RECOVERY MINUTES (STAT): Performed by: STUDENT IN AN ORGANIZED HEALTH CARE EDUCATION/TRAINING PROGRAM

## 2022-05-26 PROCEDURE — C1878 MATRL FOR VOCAL CORD: HCPCS | Performed by: STUDENT IN AN ORGANIZED HEALTH CARE EDUCATION/TRAINING PROGRAM

## 2022-05-26 PROCEDURE — 99100 ANES PT EXTEME AGE<1 YR&>70: CPT | Performed by: ANESTHESIOLOGY

## 2022-05-26 PROCEDURE — 501838 HCHG SUTURE GENERAL: Performed by: STUDENT IN AN ORGANIZED HEALTH CARE EDUCATION/TRAINING PROGRAM

## 2022-05-26 PROCEDURE — 160009 HCHG ANES TIME/MIN: Performed by: STUDENT IN AN ORGANIZED HEALTH CARE EDUCATION/TRAINING PROGRAM

## 2022-05-26 PROCEDURE — 160041 HCHG SURGERY MINUTES - EA ADDL 1 MIN LEVEL 4: Performed by: STUDENT IN AN ORGANIZED HEALTH CARE EDUCATION/TRAINING PROGRAM

## 2022-05-26 PROCEDURE — 00320 ANES ALL PX NECK NOS 1YR/>: CPT | Performed by: ANESTHESIOLOGY

## 2022-05-26 PROCEDURE — 700105 HCHG RX REV CODE 258: Performed by: STUDENT IN AN ORGANIZED HEALTH CARE EDUCATION/TRAINING PROGRAM

## 2022-05-26 PROCEDURE — 500331 HCHG COTTONOID, SURG PATTIE: Performed by: STUDENT IN AN ORGANIZED HEALTH CARE EDUCATION/TRAINING PROGRAM

## 2022-05-26 PROCEDURE — 160035 HCHG PACU - 1ST 60 MINS PHASE I: Performed by: STUDENT IN AN ORGANIZED HEALTH CARE EDUCATION/TRAINING PROGRAM

## 2022-05-26 PROCEDURE — 160025 RECOVERY II MINUTES (STATS): Performed by: STUDENT IN AN ORGANIZED HEALTH CARE EDUCATION/TRAINING PROGRAM

## 2022-05-26 PROCEDURE — 160048 HCHG OR STATISTICAL LEVEL 1-5: Performed by: STUDENT IN AN ORGANIZED HEALTH CARE EDUCATION/TRAINING PROGRAM

## 2022-05-26 DEVICE — KIT IMPLANT W/2 NEEDLES PROLARYN GEL 1CC: Type: IMPLANTABLE DEVICE | Site: THROAT | Status: FUNCTIONAL

## 2022-05-26 RX ORDER — ONDANSETRON 2 MG/ML
4 INJECTION INTRAMUSCULAR; INTRAVENOUS
Status: DISCONTINUED | OUTPATIENT
Start: 2022-05-26 | End: 2022-05-26 | Stop reason: HOSPADM

## 2022-05-26 RX ORDER — SODIUM CHLORIDE, SODIUM LACTATE, POTASSIUM CHLORIDE, CALCIUM CHLORIDE 600; 310; 30; 20 MG/100ML; MG/100ML; MG/100ML; MG/100ML
INJECTION, SOLUTION INTRAVENOUS CONTINUOUS
Status: DISCONTINUED | OUTPATIENT
Start: 2022-05-26 | End: 2022-05-26 | Stop reason: HOSPADM

## 2022-05-26 RX ORDER — LIDOCAINE HYDROCHLORIDE 40 MG/ML
SOLUTION TOPICAL PRN
Status: DISCONTINUED | OUTPATIENT
Start: 2022-05-26 | End: 2022-05-26 | Stop reason: SURG

## 2022-05-26 RX ORDER — OXYCODONE HCL 5 MG/5 ML
10 SOLUTION, ORAL ORAL
Status: DISCONTINUED | OUTPATIENT
Start: 2022-05-26 | End: 2022-05-26 | Stop reason: HOSPADM

## 2022-05-26 RX ORDER — HALOPERIDOL 5 MG/ML
1 INJECTION INTRAMUSCULAR
Status: DISCONTINUED | OUTPATIENT
Start: 2022-05-26 | End: 2022-05-26 | Stop reason: HOSPADM

## 2022-05-26 RX ORDER — OXYCODONE HCL 5 MG/5 ML
5 SOLUTION, ORAL ORAL
Status: DISCONTINUED | OUTPATIENT
Start: 2022-05-26 | End: 2022-05-26 | Stop reason: HOSPADM

## 2022-05-26 RX ADMIN — PROPOFOL 100 MG: 10 INJECTION, EMULSION INTRAVENOUS at 14:04

## 2022-05-26 RX ADMIN — FENTANYL CITRATE 25 MCG: 50 INJECTION, SOLUTION INTRAMUSCULAR; INTRAVENOUS at 14:20

## 2022-05-26 RX ADMIN — PROPOFOL 50 MG: 10 INJECTION, EMULSION INTRAVENOUS at 14:20

## 2022-05-26 RX ADMIN — LIDOCAINE HYDROCHLORIDE 4 ML: 40 SOLUTION TOPICAL at 14:06

## 2022-05-26 RX ADMIN — PROPOFOL 30 MG: 10 INJECTION, EMULSION INTRAVENOUS at 14:07

## 2022-05-26 RX ADMIN — FENTANYL CITRATE 50 MCG: 50 INJECTION, SOLUTION INTRAMUSCULAR; INTRAVENOUS at 14:04

## 2022-05-26 RX ADMIN — SODIUM CHLORIDE, POTASSIUM CHLORIDE, SODIUM LACTATE AND CALCIUM CHLORIDE: 600; 310; 30; 20 INJECTION, SOLUTION INTRAVENOUS at 12:05

## 2022-05-26 ASSESSMENT — FIBROSIS 4 INDEX
FIB4 SCORE: 1.63
FIB4 SCORE: 1.63

## 2022-05-26 ASSESSMENT — PAIN DESCRIPTION - PAIN TYPE
TYPE: SURGICAL PAIN

## 2022-05-26 ASSESSMENT — PAIN SCALES - GENERAL: PAIN_LEVEL: 5

## 2022-05-26 NOTE — DISCHARGE INSTR - OTHER INFO
You underwent a microdirect laryngoscopy with for vocal fold injection.     Voice Care: For the next 3 days, do not speak to rest your voice. After that you may start speaking again. Avoid raising your voice and avoid whispering.     Diet: You can eat your regular diet. Please avoid crunchy hard foods.     Activity: You should walk daily at least 3 times per day. Avoid heavy lifting for 2 weeks (<15lbs). Avoid strenuous activity (weight lifting, running, tennis, etc) for 1 week while you are healing.     Showering: You may shower.     Driving: You may drive when you can turn your head to look over your shoulder     Medications:   Pain:  You can take tylenol 1000mg every 6 hours.   Bowel Regimen: You should have a bowel movement within 2 days of surgery. You can take over the counter stool softeners such as docusate, senna, or miralax.   Antibiotics: You do not need antibiotics after surgery.     Please call our office or go to your nearest emergency room if you have the following symptoms:   - profuse bleeding from the nose or coughing up blood   - fever over 38.5C   - pain not controlled by medications  - confusion  - intractable nausea and vomiting  - chest pain  - shortness of breath  - weakness     Follow-up  Please call [phone] to schedule a follow-up appointment with Dr. Marie Salas for 1 week postop.  For prescription refills or routine concerns, please call during office hours: Monday-Friday: 8AM - 5 PM     Marie Salas MD  [facility]  [address] [place] [address]  Phone number: [phone]

## 2022-05-26 NOTE — ANESTHESIA POSTPROCEDURE EVALUATION
Patient: Nessa Dennis    Procedure Summary     Date: 05/26/22 Room / Location: [facility] ROOM 23 / SURGERY SAME DAY [facility]    Anesthesia Start: 1357 Anesthesia Stop: 1436    Procedures:       LARYNGOSCOPY - DIRECT (Left Throat)      INJECTION, VOCAL CORD, LARYNGOSCOPIC - THERAPEUTIC, WITH OPERATING MICROSCOPE OR TELESCOPE (Throat) Diagnosis: (DYSPHONIA LEFT VOCAL CORDS DARESIS)    Surgeons: Marie Salas M.D. Responsible Provider: Harjeet Rodriguez M.D.    Anesthesia Type: general ASA Status: 3          Final Anesthesia Type: general  Last vitals  BP   Blood Pressure : (!) 173/87    Temp   36.4 °C (97.6 °F)    Pulse   86   Resp   18    SpO2   93 %      Anesthesia Post Evaluation    Patient location during evaluation: PACU  Patient participation: complete - patient participated  Level of consciousness: awake and alert  Pain score: 5    Airway patency: patent  Anesthetic complications: no  Cardiovascular status: hemodynamically stable  Respiratory status: acceptable  Hydration status: euvolemic    PONV: none          No complications documented.     Nurse Pain Score: 5 (NPRS)

## 2022-05-26 NOTE — OR SURGEON
Immediate Post OP Note    PreOp Diagnosis: dysphonia, left vocal fold paresis      PostOp Diagnosis: dysphonia, left vocal fold paresis      Procedure(s):  LARYNGOSCOPY - DIRECT - Wound Class: Clean Contaminated  INJECTION, VOCAL CORD, LARYNGOSCOPIC - THERAPEUTIC, WITH OPERATING MICROSCOPE OR TELESCOPE - Wound Class: Clean Contaminated    Surgeon(s):  Marie Salas M.D.    Anesthesiologist/Type of Anesthesia:  Anesthesiologist: Harjeet Rodriguez M.D./General    Surgical Staff:  Circulator: Danyell Quarles R.N.  Scrub Person: Rebecca Flynn    Specimens removed if any:  * No specimens in log *    Estimated Blood Loss: 0mL    Findings: left vocal fold with mild bowing, 0.5mL of prolaryn gel injected into the left TVF    Complications: none        5/26/2022 2:27 PM Marie Salas M.D.

## 2022-05-26 NOTE — DISCHARGE INSTRUCTIONS
ACTIVITY: Rest and take it easy for the first 24 hours.  A responsible adult is recommended to remain with you during that time.  It is normal to feel sleepy.  We encourage you to not do anything that requires balance, judgment or coordination.    MILD FLU-LIKE SYMPTOMS ARE NORMAL. YOU MAY EXPERIENCE GENERALIZED MUSCLE ACHES, THROAT IRRITATION, HEADACHE AND/OR SOME NAUSEA.    FOR 24 HOURS DO NOT:  Drive, operate machinery or run household appliances.  Drink beer or alcoholic beverages.   Make important decisions or sign legal documents.    SPECIAL INSTRUCTIONS: NO TALKING X 3 DAYS, see additional handout    DIET: To avoid nausea, slowly advance diet as tolerated, avoiding spicy or greasy foods for the first day.  Add more substantial food to your diet according to your physician's instructions.  Babies can be fed formula or breast milk as soon as they are hungry.  INCREASE FLUIDS AND FIBER TO AVOID CONSTIPATION.    SURGICAL DRESSING/BATHING: May shower tomorrow    FOLLOW-UP APPOINTMENT:  A follow-up appointment should be arranged with your doctor in 1 week; call to schedule.    You should CALL YOUR PHYSICIAN if you develop:  Fever greater than 101 degrees F.  Pain not relieved by medication, or persistent nausea or vomiting.  Excessive bleeding (blood soaking through dressing) or unexpected drainage from the wound.  Extreme redness or swelling around the incision site, drainage of pus or foul smelling drainage.  Inability to urinate or empty your bladder within 8 hours.  Problems with breathing or chest pain.    You should call 911 if you develop problems with breathing or chest pain.  If you are unable to contact your doctor or surgical center, you should go to the nearest emergency room or urgent care center.  Physician's telephone #: Dr Salas [phone]     If any questions arise, call your doctor.  If your doctor is not available, please feel free to call the Surgical Center at [phone].     A  registered nurse may call you a few days after your surgery to see how you are doing after your procedure.    MEDICATIONS: Resume taking daily medication.  Take prescribed pain medication with food.  If no medication is prescribed, you may take non-aspirin pain medication if needed.  PAIN MEDICATION CAN BE VERY CONSTIPATING.  Take a stool softener or laxative such as senokot, pericolace, or milk of magnesia if needed.        If your physician has prescribed pain medication that includes Acetaminophen (Tylenol), do not take additional Acetaminophen (Tylenol) while taking the prescribed medication.    Depression / Suicide Risk    As you are discharged from this [facility] facility, it is important to learn how to keep safe from harming yourself.    Recognize the warning signs:  Abrupt changes in personality, positive or negative- including increase in energy   Giving away possessions  Change in eating patterns- significant weight changes-  positive or negative  Change in sleeping patterns- unable to sleep or sleeping all the time   Unwillingness or inability to communicate  Depression  Unusual sadness, discouragement and loneliness  Talk of wanting to die  Neglect of personal appearance   Rebelliousness- reckless behavior  Withdrawal from people/activities they love  Confusion- inability to concentrate     If you or a loved one observes any of these behaviors or has concerns about self-harm, here's what you can do:  Talk about it- your feelings and reasons for harming yourself  Remove any means that you might use to hurt yourself (examples: pills, rope, extension cords, firearm)  Get professional help from the community (Mental Health, Substance Abuse, psychological counseling)  Do not be alone:Call your Safe Contact- someone whom you trust who will be there for you.  Call your local CRISIS HOTLINE 163-5825 or 492-264-9896  Call your local Children's Mobile Crisis Response Team [place] (815) 498-4212 or  www.[employer].Ipselex  Call the toll free National Suicide Prevention Hotlines   National Suicide Prevention Lifeline 799-072-DRDX (6677)  National [employer] Line Network 800-SUICIDE (973-4640)

## 2022-05-26 NOTE — OP REPORT
Otolaryngology Operative Report    Patient Name: Nessa Dennis  MRN: [id number]  : 1945    Procedure: microdirect laryngoscopy with injection laryngoplasty     Pre-operative Diagnosis: dysphonia, left vocal fold paresis    Post-operative Diagnosis: dysphonia, left vocal fold paresis    Indication: 76 y.o. female with a history of thoracoscopy and sublobar resection. She now has dysphonia with left vocal fold paresis. We discussed the risks, benefits, and alternatives to surgery including the risk of bleeding, infection, damage to nearby structures, worsened voice, need for repeat procedures    Anesthesia: General    Findings: Normal appearing vocal folds. 0.5mL of Prolaryn gel injected into the left true vocal fold for augmentation. Small anterior web in the subglottis.     Procedure in Detail:   Patient was brought to the operating room and transferred to the operating room table. General anesthesia was induced and patient was bag mask ventilated. All pressure points were padded and protected. The table was turned 90. Teeth were protected with a tooth guard.    View was obtained with a Dedo laryngoscope and suspended. Vocal folds were sprayed with lidocaine 4%. The vocal folds were visualized with the 0 degree endoscope.   Prolaryn gel needle was primed then the false vocal fold was pushed laterally and injection was performed mid vocal fold area. Good augmentation and medialization was observed.     The patient was awakened and transported to PACU in stable condition.     Estimated Blood Loss: 1mL    Specimens: none    Complications: none    Counts: correct      Marie Salas MD  [facility]  Office Number [phone]

## 2022-05-26 NOTE — ANESTHESIA PREPROCEDURE EVALUATION
Case: [id number] Date/Time: 05/26/22 1315    Procedures:       LARYNGOSCOPY - DIRECT (Left )      INJECTION, VOCAL CORD, LARYNGOSCOPIC - THERAPEUTIC, WITH OPERATING MICROSCOPE OR TELESCOPE    Pre-op diagnosis: R49.0    Location: [facility] ROOM 23 / SURGERY SAME DAY [facility]    Surgeons: Marie Salas M.D.          Relevant Problems   CARDIAC   (positive) HTN (hypertension)   Lung cancer    Physical Exam    Airway   Mallampati: II  TM distance: >3 FB  Neck ROM: full       Cardiovascular - normal exam  Rhythm: regular  Rate: normal  (-) murmur     Dental - normal exam           Pulmonary - normal exam  Breath sounds clear to auscultation     Abdominal    Neurological - normal exam                 Anesthesia Plan    ASA 3 (lung cancer)       Plan - general               Induction: intravenous    Postoperative Plan: Postoperative administration of opioids is intended.    Pertinent diagnostic labs and testing reviewed    Informed Consent:    Anesthetic plan and risks discussed with patient.

## 2022-05-26 NOTE — OR NURSING
1433- pt arrives from OR to PACU 9, report received from RN and anesthesia. Pt place on monitor. VSS,  NAD noted. Oral airway in place and d/c'd by anesthesia on arrival.   O2 4L via mask.      1440- Dr Salas at bedside    1450- pt awake, denies nausea, c/o minimal pain  Tolerating sips of water.   Denies needs at this time    1510- message left with  with update  Pt resting quietly with eyes closed.     1520- otter pop given- tolerating well.   Denies other needs at this time.     1550- discharge instructions given to - verbalizes understanding, all questions answered.     1600- pt minimally assisted with dressing, PIV d/c'd intact.     1605- pt assisted to lobby in w/c by AYDEE Ochoa to awaiting .   All belongings accounted for.

## 2022-05-26 NOTE — ANESTHESIA TIME REPORT
Anesthesia Start and Stop Event Times     Date Time Event    5/26/2022 1344 Ready for Procedure     1357 Anesthesia Start     1436 Anesthesia Stop        Responsible Staff  05/26/22    Name Role Begin End    Harjeet Rodriguez M.D. Anesth 1357 1436        Overtime Reason:  no overtime (within assigned shift)    Comments:

## 2022-06-08 ENCOUNTER — HOSPITAL ENCOUNTER (OUTPATIENT)
Dept: RADIOLOGY | Facility: MEDICAL CENTER | Age: 77
End: 2022-06-08
Payer: MEDICARE

## 2022-06-14 ENCOUNTER — HOSPITAL ENCOUNTER (OUTPATIENT)
Dept: RADIOLOGY | Facility: MEDICAL CENTER | Age: 77
End: 2022-06-14
Attending: SPECIALIST
Payer: MEDICARE

## 2022-06-24 ENCOUNTER — PRE-ADMISSION TESTING (OUTPATIENT)
Dept: ADMISSIONS | Facility: MEDICAL CENTER | Age: 77
End: 2022-06-24
Attending: SPECIALIST
Payer: MEDICARE

## 2022-06-24 RX ORDER — ALENDRONATE SODIUM 70 MG/1
70 TABLET ORAL
COMMUNITY
Start: 2022-05-09

## 2022-06-27 ENCOUNTER — APPOINTMENT (OUTPATIENT)
Dept: ADMISSIONS | Facility: MEDICAL CENTER | Age: 77
End: 2022-06-27
Attending: SPECIALIST
Payer: MEDICARE

## 2022-06-27 DIAGNOSIS — Z01.812 PRE-OPERATIVE LABORATORY EXAMINATION: ICD-10-CM

## 2022-06-27 LAB
ERYTHROCYTE [DISTWIDTH] IN BLOOD BY AUTOMATED COUNT: 44.1 FL (ref 35.9–50)
HCT VFR BLD AUTO: 43.3 % (ref 37–47)
HGB BLD-MCNC: 13.8 G/DL (ref 12–16)
INR PPP: 1.07 (ref 0.87–1.13)
MCH RBC QN AUTO: 25.6 PG (ref 27–33)
MCHC RBC AUTO-ENTMCNC: 31.9 G/DL (ref 33.6–35)
MCV RBC AUTO: 80.3 FL (ref 81.4–97.8)
PLATELET # BLD AUTO: 260 K/UL (ref 164–446)
PMV BLD AUTO: 10.9 FL (ref 9–12.9)
PROTHROMBIN TIME: 13.6 SEC (ref 12–14.6)
RBC # BLD AUTO: 5.39 M/UL (ref 4.2–5.4)
WBC # BLD AUTO: 7.9 K/UL (ref 4.8–10.8)

## 2022-06-27 PROCEDURE — 85610 PROTHROMBIN TIME: CPT

## 2022-06-27 PROCEDURE — 85027 COMPLETE CBC AUTOMATED: CPT

## 2022-06-27 PROCEDURE — 36415 COLL VENOUS BLD VENIPUNCTURE: CPT

## 2022-06-29 ENCOUNTER — APPOINTMENT (OUTPATIENT)
Dept: RADIOLOGY | Facility: MEDICAL CENTER | Age: 77
End: 2022-06-29
Attending: SPECIALIST
Payer: MEDICARE

## 2022-06-29 ENCOUNTER — HOSPITAL ENCOUNTER (OUTPATIENT)
Facility: MEDICAL CENTER | Age: 77
End: 2022-06-29
Attending: SPECIALIST | Admitting: SPECIALIST
Payer: MEDICARE

## 2022-06-29 VITALS
SYSTOLIC BLOOD PRESSURE: 145 MMHG | WEIGHT: 126.54 LBS | RESPIRATION RATE: 18 BRPM | OXYGEN SATURATION: 97 % | TEMPERATURE: 97.1 F | BODY MASS INDEX: 24.84 KG/M2 | HEART RATE: 84 BPM | HEIGHT: 60 IN | DIASTOLIC BLOOD PRESSURE: 75 MMHG

## 2022-06-29 DIAGNOSIS — C53.8 MALIGNANT NEOPLASM OF OVERLAPPING SITES OF CERVIX UTERI (HCC): ICD-10-CM

## 2022-06-29 DIAGNOSIS — N13.1 HYDRONEPHROSIS WITH URETERAL STRICTURE: ICD-10-CM

## 2022-06-29 LAB
AMORPH CRY #/AREA URNS HPF: PRESENT /HPF
APPEARANCE UR: ABNORMAL
BACTERIA #/AREA URNS HPF: ABNORMAL /HPF
BILIRUB UR QL STRIP.AUTO: NEGATIVE
COLOR UR: ABNORMAL
EPI CELLS #/AREA URNS HPF: ABNORMAL /HPF
GLUCOSE UR STRIP.AUTO-MCNC: NEGATIVE MG/DL
KETONES UR STRIP.AUTO-MCNC: NEGATIVE MG/DL
LEUKOCYTE ESTERASE UR QL STRIP.AUTO: ABNORMAL
MICRO URNS: ABNORMAL
MUCOUS THREADS #/AREA URNS HPF: ABNORMAL /HPF
NITRITE UR QL STRIP.AUTO: POSITIVE
PATHOLOGY CONSULT NOTE: NORMAL
PH UR STRIP.AUTO: 7 [PH] (ref 5–8)
PROT UR QL STRIP: 300 MG/DL
RBC # URNS HPF: >150 /HPF
RBC UR QL AUTO: ABNORMAL
SP GR UR STRIP.AUTO: 1.01
UROBILINOGEN UR STRIP.AUTO-MCNC: NORMAL MG/DL
WBC #/AREA URNS HPF: ABNORMAL /HPF

## 2022-06-29 PROCEDURE — 87086 URINE CULTURE/COLONY COUNT: CPT

## 2022-06-29 PROCEDURE — 700111 HCHG RX REV CODE 636 W/ 250 OVERRIDE (IP)

## 2022-06-29 PROCEDURE — 160035 HCHG PACU - 1ST 60 MINS PHASE I

## 2022-06-29 PROCEDURE — 160036 HCHG PACU - EA ADDL 30 MINS PHASE I

## 2022-06-29 PROCEDURE — 81001 URINALYSIS AUTO W/SCOPE: CPT

## 2022-06-29 PROCEDURE — 88305 TISSUE EXAM BY PATHOLOGIST: CPT

## 2022-06-29 PROCEDURE — 700105 HCHG RX REV CODE 258: Performed by: RADIOLOGY

## 2022-06-29 PROCEDURE — C1729 CATH, DRAINAGE: HCPCS

## 2022-06-29 PROCEDURE — 77012 CT SCAN FOR NEEDLE BIOPSY: CPT

## 2022-06-29 PROCEDURE — 700117 HCHG RX CONTRAST REV CODE 255: Performed by: SPECIALIST

## 2022-06-29 PROCEDURE — 160046 HCHG PACU - 1ST 60 MINS PHASE II

## 2022-06-29 PROCEDURE — 160002 HCHG RECOVERY MINUTES (STAT)

## 2022-06-29 RX ORDER — CEFAZOLIN SODIUM 1 G/3ML
INJECTION, POWDER, FOR SOLUTION INTRAMUSCULAR; INTRAVENOUS
Status: COMPLETED
Start: 2022-06-29 | End: 2022-06-29

## 2022-06-29 RX ORDER — OXYCODONE HYDROCHLORIDE 5 MG/1
2.5 TABLET ORAL
Status: DISCONTINUED | OUTPATIENT
Start: 2022-06-29 | End: 2022-06-29 | Stop reason: HOSPADM

## 2022-06-29 RX ORDER — MIDAZOLAM HYDROCHLORIDE 1 MG/ML
INJECTION INTRAMUSCULAR; INTRAVENOUS
Status: COMPLETED
Start: 2022-06-29 | End: 2022-06-29

## 2022-06-29 RX ORDER — ONDANSETRON 2 MG/ML
4 INJECTION INTRAMUSCULAR; INTRAVENOUS EVERY 8 HOURS PRN
Status: DISCONTINUED | OUTPATIENT
Start: 2022-06-29 | End: 2022-06-29 | Stop reason: HOSPADM

## 2022-06-29 RX ORDER — SODIUM CHLORIDE 9 MG/ML
1000 INJECTION, SOLUTION INTRAVENOUS
Status: COMPLETED | OUTPATIENT
Start: 2022-06-29 | End: 2022-06-29

## 2022-06-29 RX ORDER — IODIXANOL 270 MG/ML
18 INJECTION, SOLUTION INTRAVASCULAR ONCE
Status: COMPLETED | OUTPATIENT
Start: 2022-06-29 | End: 2022-06-29

## 2022-06-29 RX ORDER — ONDANSETRON 2 MG/ML
4 INJECTION INTRAMUSCULAR; INTRAVENOUS PRN
Status: DISCONTINUED | OUTPATIENT
Start: 2022-06-29 | End: 2022-06-29 | Stop reason: HOSPADM

## 2022-06-29 RX ORDER — SODIUM CHLORIDE 9 MG/ML
500 INJECTION, SOLUTION INTRAVENOUS
Status: DISCONTINUED | OUTPATIENT
Start: 2022-06-29 | End: 2022-06-29 | Stop reason: HOSPADM

## 2022-06-29 RX ORDER — HYDROMORPHONE HYDROCHLORIDE 1 MG/ML
0.25 INJECTION, SOLUTION INTRAMUSCULAR; INTRAVENOUS; SUBCUTANEOUS
Status: DISCONTINUED | OUTPATIENT
Start: 2022-06-29 | End: 2022-06-29 | Stop reason: HOSPADM

## 2022-06-29 RX ORDER — MIDAZOLAM HYDROCHLORIDE 1 MG/ML
.5-2 INJECTION INTRAMUSCULAR; INTRAVENOUS PRN
Status: DISCONTINUED | OUTPATIENT
Start: 2022-06-29 | End: 2022-06-29 | Stop reason: HOSPADM

## 2022-06-29 RX ORDER — CEFAZOLIN SODIUM 2 G/100ML
2 INJECTION, SOLUTION INTRAVENOUS ONCE
Status: COMPLETED | OUTPATIENT
Start: 2022-06-29 | End: 2022-06-29

## 2022-06-29 RX ADMIN — CEFAZOLIN 2000 MG: 330 INJECTION, POWDER, FOR SOLUTION INTRAMUSCULAR; INTRAVENOUS at 08:15

## 2022-06-29 RX ADMIN — FENTANYL CITRATE 50 MCG: 50 INJECTION, SOLUTION INTRAMUSCULAR; INTRAVENOUS at 09:08

## 2022-06-29 RX ADMIN — MIDAZOLAM HYDROCHLORIDE 1 MG: 1 INJECTION, SOLUTION INTRAMUSCULAR; INTRAVENOUS at 09:08

## 2022-06-29 RX ADMIN — MIDAZOLAM HYDROCHLORIDE 1 MG: 1 INJECTION, SOLUTION INTRAMUSCULAR; INTRAVENOUS at 08:24

## 2022-06-29 RX ADMIN — IODIXANOL 18 ML: 270 INJECTION, SOLUTION INTRAVASCULAR at 09:15

## 2022-06-29 RX ADMIN — SODIUM CHLORIDE 1000 ML: 9 INJECTION, SOLUTION INTRAVENOUS at 07:36

## 2022-06-29 RX ADMIN — FENTANYL CITRATE 50 MCG: 50 INJECTION, SOLUTION INTRAMUSCULAR; INTRAVENOUS at 08:24

## 2022-06-29 RX ADMIN — MIDAZOLAM HYDROCHLORIDE 1 MG: 1 INJECTION INTRAMUSCULAR; INTRAVENOUS at 08:24

## 2022-06-29 RX ADMIN — MIDAZOLAM HYDROCHLORIDE 1 MG: 1 INJECTION INTRAMUSCULAR; INTRAVENOUS at 09:08

## 2022-06-29 ASSESSMENT — PAIN DESCRIPTION - PAIN TYPE
TYPE: ACUTE PAIN
TYPE: SURGICAL PAIN
TYPE: SURGICAL PAIN
TYPE: ACUTE PAIN
TYPE: ACUTE PAIN;SURGICAL PAIN
TYPE: ACUTE PAIN
TYPE: ACUTE PAIN
TYPE: SURGICAL PAIN

## 2022-06-29 ASSESSMENT — FIBROSIS 4 INDEX: FIB4 SCORE: 1.69

## 2022-06-29 NOTE — PROGRESS NOTES
Pt presents to IR for left lower quadrant mass bx. Pt consented in preop area by Dr. Block. First procedure completed in IR2. 1 mg versed 50 mcg fentanyl and 2g ancef reported as given in previous procedure. Report received from Francisco SEVERINO. PT arrives AAOx4. Pt assisted to CT table in prone position. Pt placed on cardiac monitor, SPO2, and NIBP with limits set and alarms audible. Pt placed on 2L NC with end tidal monitoring. Pt prepped and draped in sterile fashion.     0908 1mg versed 50 mcg fentanyl given IVP     0912 Time out procedure start    0924 6 core bx from Left retroperitoneal labeled and sent to lab. Dressing placed.     0927 report called to PACU RN. Pt transported with RN to [facility] PACU.

## 2022-06-29 NOTE — OR NURSING
Pt arrived to PACU II at 1147am. Pt aa/ox4, VSS. Discharge criteria met. Pain is 3/10 which patient states is tolerable. Posterior left back neph tube with drsg and biopsy site clean, dry, and intact. Pt changed into clothing with assistance. Discharge instructions given; pt and family verbalized understanding and questions answered.  IV removed, tip intact. Will follow-up with Dr. Hays. No new prescriptions given. Pt discharged home and escorted via w/c with RN in stable condition.

## 2022-06-29 NOTE — OR SURGEON
Immediate Post- Operative Note    PostOp Diagnosis: LEFT URETERAL OBSTRUCTION, RECTO-VAGINAL FISTULA, CERVIX CA, HYDROHEPHROSIS      Procedure(s): LEFT PERCUTANEOUS NEPHROSTOMY EXCHANGE NEPHROSTOGRAM WITH  FLUOROSCOPIC GUIDANCE    CURRENT PERC NEPH WAS COMPLETELY OCCLUDED, UNABLE TO PASS WIRE. REQUIRED TANDEM CLIFTON WIRE TECHNIQUE    NEW 10F LOCKING LOOP PERC NEPH      SPECIMEN: 8 ML CLOUDY TURBID NEARLY PURULENT URINE. SENT FOR C+S, GRAM, CELL COUNT      Estimated Blood Loss: <1 CC      FINDINGS: CURRENT PERC NEPH WAS COMPLETELY OCCLUDED    MARKED LEFT HYDRONEPHROSIS    CATHETER LOOPED IN RENAL PELVIS ON FINAL FILMS        Complications: NONE    PLAN: ROUTINE EXCHANGE IN 2 MONTHS AS THERE WAS EARLY CATHETER OCCLUSION          6/29/2022     8:43 AM     Michael Block M.D.

## 2022-06-29 NOTE — OR SURGEON
Immediate Post- Operative Note    PostOp Diagnosis: RETROPERITONEAL (LEFT INTERCOSTAL) MASS INFERO-LATERAL TO THE SPLEEN. HX CERVIX CA. SUSPICIOUS FOR MET      Procedure(s): CT GUIDED RETROPERITONEAL BIOPSY    18G CORES X 6 IN FORMALIN      Estimated Blood Loss: <2 CC      FINDINGS: NEEDLE CONFIRMED IN TARGETED NODULAR LESION. FIRM WHITISH TISSUE OBTAINED.         Complications: NONE            6/29/2022     9:44 AM     Michael Block M.D.

## 2022-06-29 NOTE — OR NURSING
Arrived to PACU in stable condition. Site to Left back with dry guaze and tape to biopsy site. New nephrostomy tube in place, 10FR Locking cap with brownish red urine in bag. Patient denies pain or nausea and is resting at this time.     1135 Taking po well and denies pain.  updated and patient is toleration po well. No scripts for discharge. Ready for Stage 2

## 2022-06-29 NOTE — DISCHARGE INSTRUCTIONS
If any questions arise, call your provider.  If your provider is not available, please feel free to call the Surgical Center at [phone].    MEDICATIONS: Resume taking daily medication.  Take prescribed pain medication with food.  If no medication is prescribed, you may take non-aspirin pain medication if needed.  PAIN MEDICATION CAN BE VERY CONSTIPATING.  Take a stool softener or laxative such as senokot, pericolace, or milk of magnesia if needed.      What to Expect Post Anesthesia    Rest and take it easy for the first 24 hours.  A responsible adult is recommended to remain with you during that time.  It is normal to feel sleepy.  We encourage you to not do anything that requires balance, judgment or coordination.    FOR 24 HOURS DO NOT:  Drive, operate machinery or run household appliances.  Drink beer or alcoholic beverages.  Make important decisions or sign legal documents.    To avoid nausea, slowly advance diet as tolerated, avoiding spicy or greasy foods for the first day.  Add more substantial food to your diet according to your provider's instructions. INCREASE FLUIDS AND FIBER TO AVOID CONSTIPATION.    MILD FLU-LIKE SYMPTOMS ARE NORMAL.  YOU MAY EXPERIENCE GENERALIZED MUSCLE ACHES, THROAT IRRITATION, HEADACHE AND/OR SOME NAUSEA.    Discharge Instructions:    ACTIVITIES:   Resume activities.    DRIVING:   You may drive whenever you are off pain medications and are able to perform the activities needed to drive, i.e. turning, bending, twisting, wearing a seat belt, etc.    BATHING:   You may get the wound wet at any time after leaving the hospital. You may shower, but do not submerge in a bath or a pool until you after your first postoperative visit.    WOUND CARE:   Keep dressing of nephrostomy tube clean and dry and the dressing from the biopsy clean and dry for 24 hours then ok to remove.    BOWEL FUNCTION:  Prescription pain medication may cause constipation. If you are having problems, use what  you normally would or call your provider for suggestions. It also helps to stay regular by including fiber in your diet (for example: bran or fruits and vegetables) and drink plenty of liquids (water, juice, etc.).

## 2022-06-29 NOTE — PROGRESS NOTES
0817: Dr. Block called   0822: Umair arrived     Patient to IR from [facility] Pre-op. Patient consented in Pre-op.   Patient underwent a left nephrostomy tube exchange with moderate sedation  by Dr. Block. Procedure site was marked by MD and verified using imaging guidance. Patient was placed in a supine position. Vitals were taken every 5 minutes and remained stable during procedure (see doc flow sheet for results). CO2 waveform capnography was monitored and remained stable throughout procedure.   Left nephrostomy tube exchanged to a a 10 Fr. (Ref# [id number] Lot# [id number]) A gauze and sensitive skin tegaderm dressing was placed over surgical site. Report called to Cinthia LUX. Pt transported by shola with RN to CT 4 . Lab specimen (8ml syringe) from left nephrostomy tube taken to lab by Francisco Lux

## 2022-06-30 LAB
BACTERIA UR CULT: NORMAL
SIGNIFICANT IND 70042: NORMAL
SITE SITE: NORMAL
SOURCE SOURCE: NORMAL

## 2022-08-16 ENCOUNTER — APPOINTMENT (OUTPATIENT)
Dept: RADIOLOGY | Facility: MEDICAL CENTER | Age: 77
DRG: 699 | End: 2022-08-16
Attending: EMERGENCY MEDICINE
Payer: MEDICARE

## 2022-08-16 ENCOUNTER — HOSPITAL ENCOUNTER (INPATIENT)
Facility: MEDICAL CENTER | Age: 77
LOS: 2 days | DRG: 699 | End: 2022-08-18
Attending: EMERGENCY MEDICINE | Admitting: OBSTETRICS & GYNECOLOGY
Payer: MEDICARE

## 2022-08-16 PROBLEM — N39.0 URINARY TRACT INFECTION: Status: ACTIVE | Noted: 2022-08-16

## 2022-08-16 LAB
ALBUMIN SERPL BCP-MCNC: 4.8 G/DL (ref 3.2–4.9)
ALBUMIN/GLOB SERPL: 1.8 G/DL
ALP SERPL-CCNC: 119 U/L (ref 30–99)
ALT SERPL-CCNC: 12 U/L (ref 2–50)
ANION GAP SERPL CALC-SCNC: 11 MMOL/L (ref 7–16)
APPEARANCE UR: ABNORMAL
AST SERPL-CCNC: 15 U/L (ref 12–45)
BACTERIA #/AREA URNS HPF: ABNORMAL /HPF
BASOPHILS # BLD AUTO: 0.3 % (ref 0–1.8)
BASOPHILS # BLD: 0.03 K/UL (ref 0–0.12)
BILIRUB SERPL-MCNC: 0.5 MG/DL (ref 0.1–1.5)
BILIRUB UR QL STRIP.AUTO: NEGATIVE
BUN SERPL-MCNC: 26 MG/DL (ref 8–22)
CALCIUM SERPL-MCNC: 10.3 MG/DL (ref 8.5–10.5)
CHLORIDE SERPL-SCNC: 105 MMOL/L (ref 96–112)
CO2 SERPL-SCNC: 22 MMOL/L (ref 20–33)
COLOR UR: YELLOW
CREAT SERPL-MCNC: 0.9 MG/DL (ref 0.5–1.4)
EOSINOPHIL # BLD AUTO: 0.06 K/UL (ref 0–0.51)
EOSINOPHIL NFR BLD: 0.6 % (ref 0–6.9)
EPI CELLS #/AREA URNS HPF: NEGATIVE /HPF
ERYTHROCYTE [DISTWIDTH] IN BLOOD BY AUTOMATED COUNT: 44 FL (ref 35.9–50)
GFR SERPLBLD CREATININE-BSD FMLA CKD-EPI: 65 ML/MIN/1.73 M 2
GLOBULIN SER CALC-MCNC: 2.7 G/DL (ref 1.9–3.5)
GLUCOSE SERPL-MCNC: 111 MG/DL (ref 65–99)
GLUCOSE UR STRIP.AUTO-MCNC: NEGATIVE MG/DL
HCT VFR BLD AUTO: 45.9 % (ref 37–47)
HGB BLD-MCNC: 15.1 G/DL (ref 12–16)
HYALINE CASTS #/AREA URNS LPF: ABNORMAL /LPF
IMM GRANULOCYTES # BLD AUTO: 0.04 K/UL (ref 0–0.11)
IMM GRANULOCYTES NFR BLD AUTO: 0.4 % (ref 0–0.9)
KETONES UR STRIP.AUTO-MCNC: NEGATIVE MG/DL
LACTATE SERPL-SCNC: 1 MMOL/L (ref 0.5–2)
LEUKOCYTE ESTERASE UR QL STRIP.AUTO: ABNORMAL
LIPASE SERPL-CCNC: 38 U/L (ref 11–82)
LYMPHOCYTES # BLD AUTO: 1.64 K/UL (ref 1–4.8)
LYMPHOCYTES NFR BLD: 16.5 % (ref 22–41)
MCH RBC QN AUTO: 25.8 PG (ref 27–33)
MCHC RBC AUTO-ENTMCNC: 32.9 G/DL (ref 33.6–35)
MCV RBC AUTO: 78.5 FL (ref 81.4–97.8)
MICRO URNS: ABNORMAL
MONOCYTES # BLD AUTO: 0.6 K/UL (ref 0–0.85)
MONOCYTES NFR BLD AUTO: 6 % (ref 0–13.4)
NEUTROPHILS # BLD AUTO: 7.58 K/UL (ref 2–7.15)
NEUTROPHILS NFR BLD: 76.2 % (ref 44–72)
NITRITE UR QL STRIP.AUTO: NEGATIVE
NRBC # BLD AUTO: 0 K/UL
NRBC BLD-RTO: 0 /100 WBC
PH UR STRIP.AUTO: 6.5 [PH] (ref 5–8)
PLATELET # BLD AUTO: 247 K/UL (ref 164–446)
PMV BLD AUTO: 10 FL (ref 9–12.9)
POTASSIUM SERPL-SCNC: 4.5 MMOL/L (ref 3.6–5.5)
PROT SERPL-MCNC: 7.5 G/DL (ref 6–8.2)
PROT UR QL STRIP: 300 MG/DL
RBC # BLD AUTO: 5.85 M/UL (ref 4.2–5.4)
RBC # URNS HPF: ABNORMAL /HPF
RBC UR QL AUTO: ABNORMAL
SODIUM SERPL-SCNC: 138 MMOL/L (ref 135–145)
SP GR UR STRIP.AUTO: 1.04
UROBILINOGEN UR STRIP.AUTO-MCNC: 0.2 MG/DL
WBC # BLD AUTO: 10 K/UL (ref 4.8–10.8)
WBC #/AREA URNS HPF: ABNORMAL /HPF

## 2022-08-16 PROCEDURE — 87077 CULTURE AEROBIC IDENTIFY: CPT

## 2022-08-16 PROCEDURE — 96375 TX/PRO/DX INJ NEW DRUG ADDON: CPT

## 2022-08-16 PROCEDURE — 36415 COLL VENOUS BLD VENIPUNCTURE: CPT

## 2022-08-16 PROCEDURE — 80053 COMPREHEN METABOLIC PANEL: CPT

## 2022-08-16 PROCEDURE — A9270 NON-COVERED ITEM OR SERVICE: HCPCS | Performed by: STUDENT IN AN ORGANIZED HEALTH CARE EDUCATION/TRAINING PROGRAM

## 2022-08-16 PROCEDURE — 700111 HCHG RX REV CODE 636 W/ 250 OVERRIDE (IP)

## 2022-08-16 PROCEDURE — 85025 COMPLETE CBC W/AUTO DIFF WBC: CPT

## 2022-08-16 PROCEDURE — 81001 URINALYSIS AUTO W/SCOPE: CPT

## 2022-08-16 PROCEDURE — 99285 EMERGENCY DEPT VISIT HI MDM: CPT

## 2022-08-16 PROCEDURE — 74177 CT ABD & PELVIS W/CONTRAST: CPT | Mod: ME

## 2022-08-16 PROCEDURE — 83690 ASSAY OF LIPASE: CPT

## 2022-08-16 PROCEDURE — 83605 ASSAY OF LACTIC ACID: CPT

## 2022-08-16 PROCEDURE — 700111 HCHG RX REV CODE 636 W/ 250 OVERRIDE (IP): Performed by: EMERGENCY MEDICINE

## 2022-08-16 PROCEDURE — 700105 HCHG RX REV CODE 258: Performed by: STUDENT IN AN ORGANIZED HEALTH CARE EDUCATION/TRAINING PROGRAM

## 2022-08-16 PROCEDURE — 700117 HCHG RX CONTRAST REV CODE 255: Performed by: EMERGENCY MEDICINE

## 2022-08-16 PROCEDURE — 700105 HCHG RX REV CODE 258: Performed by: EMERGENCY MEDICINE

## 2022-08-16 PROCEDURE — 87086 URINE CULTURE/COLONY COUNT: CPT

## 2022-08-16 PROCEDURE — 87040 BLOOD CULTURE FOR BACTERIA: CPT | Mod: 91

## 2022-08-16 PROCEDURE — 87186 SC STD MICRODIL/AGAR DIL: CPT

## 2022-08-16 PROCEDURE — 700102 HCHG RX REV CODE 250 W/ 637 OVERRIDE(OP): Performed by: STUDENT IN AN ORGANIZED HEALTH CARE EDUCATION/TRAINING PROGRAM

## 2022-08-16 PROCEDURE — 96374 THER/PROPH/DIAG INJ IV PUSH: CPT

## 2022-08-16 PROCEDURE — 770004 HCHG ROOM/CARE - ONCOLOGY PRIVATE *

## 2022-08-16 RX ORDER — POLYETHYLENE GLYCOL 3350 17 G/17G
1 POWDER, FOR SOLUTION ORAL
Status: DISCONTINUED | OUTPATIENT
Start: 2022-08-16 | End: 2022-08-18 | Stop reason: HOSPADM

## 2022-08-16 RX ORDER — ONDANSETRON 4 MG/1
4 TABLET, ORALLY DISINTEGRATING ORAL ONCE
Status: COMPLETED | OUTPATIENT
Start: 2022-08-16 | End: 2022-08-16

## 2022-08-16 RX ORDER — BISACODYL 10 MG
10 SUPPOSITORY, RECTAL RECTAL
Status: DISCONTINUED | OUTPATIENT
Start: 2022-08-16 | End: 2022-08-18 | Stop reason: HOSPADM

## 2022-08-16 RX ORDER — ONDANSETRON 2 MG/ML
4 INJECTION INTRAMUSCULAR; INTRAVENOUS EVERY 6 HOURS PRN
Status: DISCONTINUED | OUTPATIENT
Start: 2022-08-16 | End: 2022-08-18 | Stop reason: HOSPADM

## 2022-08-16 RX ORDER — ATORVASTATIN CALCIUM 20 MG/1
20 TABLET, FILM COATED ORAL DAILY
Status: DISCONTINUED | OUTPATIENT
Start: 2022-08-17 | End: 2022-08-17

## 2022-08-16 RX ORDER — PROCHLORPERAZINE EDISYLATE 5 MG/ML
10 INJECTION INTRAMUSCULAR; INTRAVENOUS ONCE
Status: COMPLETED | OUTPATIENT
Start: 2022-08-16 | End: 2022-08-16

## 2022-08-16 RX ORDER — AMOXICILLIN 250 MG
2 CAPSULE ORAL 2 TIMES DAILY
Status: DISCONTINUED | OUTPATIENT
Start: 2022-08-16 | End: 2022-08-18 | Stop reason: HOSPADM

## 2022-08-16 RX ORDER — ONDANSETRON 2 MG/ML
4 INJECTION INTRAMUSCULAR; INTRAVENOUS ONCE
Status: COMPLETED | OUTPATIENT
Start: 2022-08-16 | End: 2022-08-16

## 2022-08-16 RX ORDER — SODIUM CHLORIDE, SODIUM LACTATE, POTASSIUM CHLORIDE, CALCIUM CHLORIDE 600; 310; 30; 20 MG/100ML; MG/100ML; MG/100ML; MG/100ML
INJECTION, SOLUTION INTRAVENOUS CONTINUOUS
Status: DISCONTINUED | OUTPATIENT
Start: 2022-08-16 | End: 2022-08-18 | Stop reason: HOSPADM

## 2022-08-16 RX ORDER — KETOROLAC TROMETHAMINE 30 MG/ML
15 INJECTION, SOLUTION INTRAMUSCULAR; INTRAVENOUS ONCE
Status: COMPLETED | OUTPATIENT
Start: 2022-08-16 | End: 2022-08-16

## 2022-08-16 RX ORDER — SODIUM CHLORIDE 9 MG/ML
1000 INJECTION, SOLUTION INTRAVENOUS ONCE
Status: COMPLETED | OUTPATIENT
Start: 2022-08-16 | End: 2022-08-16

## 2022-08-16 RX ORDER — LISINOPRIL 20 MG/1
40 TABLET ORAL
Status: DISCONTINUED | OUTPATIENT
Start: 2022-08-17 | End: 2022-08-18 | Stop reason: HOSPADM

## 2022-08-16 RX ORDER — KETOROLAC TROMETHAMINE 30 MG/ML
15 INJECTION, SOLUTION INTRAMUSCULAR; INTRAVENOUS EVERY 6 HOURS PRN
Status: ACTIVE | OUTPATIENT
Start: 2022-08-16 | End: 2022-08-17

## 2022-08-16 RX ORDER — PROCHLORPERAZINE EDISYLATE 5 MG/ML
10 INJECTION INTRAMUSCULAR; INTRAVENOUS EVERY 6 HOURS PRN
Status: DISCONTINUED | OUTPATIENT
Start: 2022-08-16 | End: 2022-08-18 | Stop reason: HOSPADM

## 2022-08-16 RX ADMIN — SODIUM CHLORIDE, POTASSIUM CHLORIDE, SODIUM LACTATE AND CALCIUM CHLORIDE 125 ML: 600; 310; 30; 20 INJECTION, SOLUTION INTRAVENOUS at 17:00

## 2022-08-16 RX ADMIN — PROCHLORPERAZINE EDISYLATE 10 MG: 5 INJECTION INTRAMUSCULAR; INTRAVENOUS at 15:45

## 2022-08-16 RX ADMIN — KETOROLAC TROMETHAMINE 15 MG: 30 INJECTION, SOLUTION INTRAMUSCULAR at 15:45

## 2022-08-16 RX ADMIN — ONDANSETRON 4 MG: 2 INJECTION INTRAMUSCULAR; INTRAVENOUS at 15:15

## 2022-08-16 RX ADMIN — ONDANSETRON 4 MG: 4 TABLET, ORALLY DISINTEGRATING ORAL at 10:44

## 2022-08-16 RX ADMIN — DOCUSATE SODIUM 50 MG AND SENNOSIDES 8.6 MG 2 TABLET: 8.6; 5 TABLET, FILM COATED ORAL at 18:00

## 2022-08-16 RX ADMIN — SODIUM CHLORIDE 1000 ML: 9 INJECTION, SOLUTION INTRAVENOUS at 12:15

## 2022-08-16 RX ADMIN — CEFTRIAXONE SODIUM 1 G: 10 INJECTION, POWDER, FOR SOLUTION INTRAVENOUS at 16:45

## 2022-08-16 RX ADMIN — IOHEXOL 100 ML: 350 INJECTION, SOLUTION INTRAVENOUS at 14:13

## 2022-08-16 ASSESSMENT — COGNITIVE AND FUNCTIONAL STATUS - GENERAL
MOBILITY SCORE: 24
SUGGESTED CMS G CODE MODIFIER MOBILITY: CH
DAILY ACTIVITIY SCORE: 24
SUGGESTED CMS G CODE MODIFIER DAILY ACTIVITY: CH

## 2022-08-16 ASSESSMENT — LIFESTYLE VARIABLES
TOTAL SCORE: 0
HAVE PEOPLE ANNOYED YOU BY CRITICIZING YOUR DRINKING: NO
TOTAL SCORE: 0
EVER FELT BAD OR GUILTY ABOUT YOUR DRINKING: NO
HOW MANY TIMES IN THE PAST YEAR HAVE YOU HAD 5 OR MORE DRINKS IN A DAY: 0
AVERAGE NUMBER OF DAYS PER WEEK YOU HAVE A DRINK CONTAINING ALCOHOL: 0
DOES PATIENT WANT TO STOP DRINKING: NO
HAVE YOU EVER FELT YOU SHOULD CUT DOWN ON YOUR DRINKING: NO
ALCOHOL_USE: NO
ON A TYPICAL DAY WHEN YOU DRINK ALCOHOL HOW MANY DRINKS DO YOU HAVE: 0
EVER HAD A DRINK FIRST THING IN THE MORNING TO STEADY YOUR NERVES TO GET RID OF A HANGOVER: NO
CONSUMPTION TOTAL: NEGATIVE
TOTAL SCORE: 0

## 2022-08-16 ASSESSMENT — ENCOUNTER SYMPTOMS
NAUSEA: 1
VOMITING: 0
CHILLS: 0
FLANK PAIN: 1
ABDOMINAL PAIN: 0

## 2022-08-16 ASSESSMENT — FIBROSIS 4 INDEX: FIB4 SCORE: 1.69

## 2022-08-16 ASSESSMENT — PAIN DESCRIPTION - PAIN TYPE: TYPE: ACUTE PAIN

## 2022-08-16 NOTE — ED TRIAGE NOTES
Pt to triage in .  Chief Complaint   Patient presents with    Flank Pain    N/V     Pt reports no urine output from left nephrostomy tube since yesterday.  Pt dry heaving in triage.  Medicated w/ Zofran.

## 2022-08-16 NOTE — ED PROVIDER NOTES
ED Provider Note    CHIEF COMPLAINT  Chief Complaint   Patient presents with    Flank Pain    N/V       HPI  Nessa Dennis is a 76 y.o. female who presents with chief complaint of flank pain and nausea and vomiting.  Patient has a history of hypertension, hyperlipidemia, cervical cancer status post hysterectomy bilateral salpingectomy, and oophorectomy followed by Dr. Hays for chemoradiation.  Patient also with a history of ureteral strictures with recurrent stenting, patient had a nephrostomy placed in March.  Patient presents today with severe flank pain, decreased output from her nephrostomy tube and nausea and vomiting.    REVIEW OF SYSTEMS  ROS    See HPI for further details. All other systems are negative.     PAST MEDICAL HISTORY   has a past medical history of Adverse effect of anesthesia, Anesthesia (02/23/2022), Cancer ([facility]) (2005, 2015), Cancer ([facility]) (02/23/2022), Colostomy in place ([facility]), Dental disorder (05/12/2022), Environmental and seasonal allergies, Environmental and seasonal allergies, High cholesterol (05/12/2022), Hydronephrosis with ureteral stricture (12/30/2021), Hypertension (05/12/2022), Lung nodules, Unspecified urinary incontinence, and Urinary bladder disorder.    SOCIAL HISTORY  Social History     Tobacco Use    Smoking status: Never    Smokeless tobacco: Never   Vaping Use    Vaping Use: Never used   Substance and Sexual Activity    Alcohol use: Not Currently    Drug use: Not Currently    Sexual activity: Not on file       SURGICAL HISTORY   has a past surgical history that includes recovery (4/6/2015); colostomy creation laparoscopic (4/14/2015); cystoscopy (7/31/2015); cysto stent placemnt pre surg (Left, 8/29/2019); gyn surgery (5/2005); pelvic exam under anesthesia (7/31/2015); cervical conization (7/31/2015); cystoscopy,insert ureteral stent (Left, 5/21/2020); cystoscopy,insert ureteral stent (Left, 9/3/2020); cystourethroscopy,ureter catheter (Left, 9/3/2020);  "cystourethroscopy,ureter catheter (Left, 12/31/2020); cysto stent placemnt pre surg (Left, 12/31/2020); cystoscopy,insert ureteral stent (Left, 3/25/2021); other abdominal surgery (2015); cysto stent placemnt pre surg (Left, 8/26/2021); other (11/2021); cystoscopy,insert ureteral stent (Left, 12/30/2021); thoracoscopy,dx no bx (Left, 2/23/2022); lymph node sampling (2/23/2022); lobectomy (Left, 2/23/2022); cystoscopy,insert ureteral stent (Left, 3/13/2022); laryngoscopy,direct,scope,inj cords (5/26/2022); and laryngoscopy (Left, 5/26/2022).    CURRENT MEDICATIONS  Home Medications    **Home medications have not yet been reviewed for this encounter**         ALLERGIES  Allergies   Allergen Reactions    Sulfa Drugs Unspecified and Rash     Pt states \" it really runs me down.\"       PHYSICAL EXAM  Vitals:    08/16/22 1019   BP: (!) 168/94   Pulse: 97   Resp: 18   Temp: 36.4 °C (97.5 °F)   SpO2: 99%       Physical Exam  Constitutional:       Appearance: She is well-developed.   HENT:      Head: Normocephalic and atraumatic.   Eyes:      Conjunctiva/sclera: Conjunctivae normal.   Cardiovascular:      Rate and Rhythm: Normal rate and regular rhythm.   Pulmonary:      Effort: Pulmonary effort is normal.      Breath sounds: Normal breath sounds.   Abdominal:      General: Bowel sounds are normal. There is no distension.      Palpations: Abdomen is soft.      Tenderness: There is no abdominal tenderness. There is no rebound.   Musculoskeletal:      Cervical back: Normal range of motion and neck supple.   Skin:     General: Skin is warm and dry.      Findings: No rash.   Neurological:      Mental Status: She is alert and oriented to person, place, and time.   Psychiatric:         Behavior: Behavior normal.         DIAGNOSTIC STUDIES / PROCEDURES    LABS  Results for orders placed or performed during the hospital encounter of 08/16/22   CBC with Differential   Result Value Ref Range    WBC 10.0 4.8 - 10.8 K/uL    RBC 5.85 (H) " 4.20 - 5.40 M/uL    Hemoglobin 15.1 12.0 - 16.0 g/dL    Hematocrit 45.9 37.0 - 47.0 %    MCV 78.5 (L) 81.4 - 97.8 fL    MCH 25.8 (L) 27.0 - 33.0 pg    MCHC 32.9 (L) 33.6 - 35.0 g/dL    RDW 44.0 35.9 - 50.0 fL    Platelet Count 247 164 - 446 K/uL    MPV 10.0 9.0 - 12.9 fL    Neutrophils-Polys 76.20 (H) 44.00 - 72.00 %    Lymphocytes 16.50 (L) 22.00 - 41.00 %    Monocytes 6.00 0.00 - 13.40 %    Eosinophils 0.60 0.00 - 6.90 %    Basophils 0.30 0.00 - 1.80 %    Immature Granulocytes 0.40 0.00 - 0.90 %    Nucleated RBC 0.00 /100 WBC    Neutrophils (Absolute) 7.58 (H) 2.00 - 7.15 K/uL    Lymphs (Absolute) 1.64 1.00 - 4.80 K/uL    Monos (Absolute) 0.60 0.00 - 0.85 K/uL    Eos (Absolute) 0.06 0.00 - 0.51 K/uL    Baso (Absolute) 0.03 0.00 - 0.12 K/uL    Immature Granulocytes (abs) 0.04 0.00 - 0.11 K/uL    NRBC (Absolute) 0.00 K/uL   Complete Metabolic Panel   Result Value Ref Range    Sodium 138 135 - 145 mmol/L    Potassium 4.5 3.6 - 5.5 mmol/L    Chloride 105 96 - 112 mmol/L    Co2 22 20 - 33 mmol/L    Anion Gap 11.0 7.0 - 16.0    Glucose 111 (H) 65 - 99 mg/dL    Bun 26 (H) 8 - 22 mg/dL    Creatinine 0.90 0.50 - 1.40 mg/dL    Calcium 10.3 8.5 - 10.5 mg/dL    AST(SGOT) 15 12 - 45 U/L    ALT(SGPT) 12 2 - 50 U/L    Alkaline Phosphatase 119 (H) 30 - 99 U/L    Total Bilirubin 0.5 0.1 - 1.5 mg/dL    Albumin 4.8 3.2 - 4.9 g/dL    Total Protein 7.5 6.0 - 8.2 g/dL    Globulin 2.7 1.9 - 3.5 g/dL    A-G Ratio 1.8 g/dL   Lipase   Result Value Ref Range    Lipase 38 11 - 82 U/L   Urinalysis    Specimen: Urine   Result Value Ref Range    Color Yellow     Character Cloudy (A)     Specific Gravity 1.039 <1.035    Ph 6.5 5.0 - 8.0    Glucose Negative Negative mg/dL    Ketones Negative Negative mg/dL    Protein 300 (A) Negative mg/dL    Bilirubin Negative Negative    Urobilinogen, Urine 0.2 Negative    Nitrite Negative Negative    Leukocyte Esterase Moderate (A) Negative    Occult Blood Moderate (A) Negative    Micro Urine Req Microscopic     ESTIMATED GFR   Result Value Ref Range    GFR (CKD-EPI) 65 >60 mL/min/1.73 m 2   LACTIC ACID   Result Value Ref Range    Lactic Acid 1.0 0.5 - 2.0 mmol/L   URINE MICROSCOPIC (W/UA)   Result Value Ref Range    WBC Packed (A) /hpf    RBC  (A) /hpf    Bacteria Many (A) None /hpf    Epithelial Cells Negative /hpf    Hyaline Cast 0-2 /lpf         RADIOLOGY  CT-ABDOMEN-PELVIS WITH   Final Result      1.  New 6 x 4 mm mid left ureteral calculus and there is similar severe hydronephrosis with a normal-appearing nephrostomy tube in place. Again there is a delayed left nephrogram      2.  Status post hysterectomy, oophorectomies and there is no evidence of local recurrence or metastasis      3.  Stable severe urinary bladder wall thickening with distal left ureteral stricturing and calcification as before. This is compatible with prior radiation therapy. Superimposed urinary tract infection is not excluded      4.  Left colostomy with similar parastomal hernia that does not result in obstruction. There is above average stool volume      Voalte sent to [facility] before this dictation was created      IR-NEPHROSTOGRAM THROUGH EXT CATH (ALL RADIOLOGY) LEFT    (Results Pending)   IR-CONSULT AND TREAT    (Results Pending)             COURSE & MEDICAL DECISION MAKING  Pertinent Labs & Imaging studies reviewed. (See chart for details)    Patient here with decreased output from her nephrostomy.  Patient continues to make urine and straight caths regularly.  Unclear etiology of patient's decreased urine output from her nephrostomy.  Patient will physical labs to check for possible kidney failure, will also check CT to evaluate for evidence of obstructive process or dislodgment of the nephrostomy tube.  Basic labs are reassuring, CT reveals large 6 mm stone which should not be causing an issue with the nephrostomy tube given that it is distal to its placement.  I discussed the case with urology who graciously had their PA  attempt to flush the nephrostomy tubing, unfortunately this failed to resolve the issue.  Patient case discussed with IR and Dr. Hays's team.  Patient will be admitted to Dr. Saúl Cummings, IR will take patient for likely replacement either later this night or early tomorrow morning.  Patient made NPO.  Patient urinalysis from her bladder via straight catheterization does reveal findings of urinary tract infection.  Patient given IV antibiotics.  She remains stable.  She has received Zofran and analgesics while here in the emergency department.  She did have multiple episodes of vomiting and we did switch to Compazine which did help with patient's symptoms.      FINAL IMPRESSION  1.  Nephrostomy tube failure, nausea vomiting, urinary tract infection      Electronically signed by: Junior Laughlin M.D., 8/16/2022 11:51 AM

## 2022-08-16 NOTE — CONSULTS
[facility] [place] Consult/H&P Note    Primary Service: hospitalist  Attending: Junior Laughlin M.D.  Patient's Name/MRN: Nessa Dennis, [id number]    Admit Date:8/16/2022  Today's Date: 8/16/2022   Length of stay:  LOS: 0 days   Room #:  08/08 [facility]      Reason for consult/chief complaint: clogged left nephrostomy tube  ID/HPI: Nessa Dennis is a 76 y.o. female patient of Dr. Maharaj with chronic indwelling left nephrostomy tube for ureteral stricture related to surgery in the past. Presented to ER with clogged nephrostomy tube since last night, worsening left hydronephrosis and stone found in proximal ureter. We were consulted for further recommendations. She complains of ongoing left flank pain since last night as well. She and her  do not know how to flush the tube, so that was not attempted prior to their arrival. Denies any fevers, chills, trouble with the bag prior to last night. She is set to get the tube replaced next month with no plans, that she or  know of, to have it removed. Cr WNL. UA with packed WBC, many bacteria and moderate amount of leuks/blood.        Past Medical History:   Past Medical History:   Diagnosis Date    Adverse effect of anesthesia     pt states had hallucinations in PACU from drug given intraop - raspy d/t vocal cord injury    Anesthesia 02/23/2022    pt states had hallucinations in PACU from drug given intraop    Cancer ([facility]) 2005, 2015    cervical    Cancer ([facility]) 02/23/2022    left upper lung cancer    Colostomy in place ([facility])     Dental disorder 05/12/2022    upper crowns times 6 teeth    Environmental and seasonal allergies     Environmental and seasonal allergies     High cholesterol 05/12/2022    medicated    Hydronephrosis with ureteral stricture 12/30/2021    stents, removed, and nephrostomy tube placed    Hypertension 05/12/2022    medicated    Lung nodules     Unspecified urinary incontinence     wears pads, does self caths    Urinary bladder  disorder     pt self caths 4x per day         Past Surgical History:   Past Surgical History:   Procedure Laterality Date    CT LARYNGOSCOPY,DIRECT,SCOPE,INJ CORDS  5/26/2022    Procedure: INJECTION, VOCAL CORD, LARYNGOSCOPIC - THERAPEUTIC, WITH OPERATING MICROSCOPE OR TELESCOPE;  Surgeon: Marie Salas M.D.;  Location: SURGERY SAME DAY [facility];  Service: Ent    LARYNGOSCOPY Left 5/26/2022    Procedure: LARYNGOSCOPY - DIRECT;  Surgeon: Marie Salas M.D.;  Location: SURGERY SAME DAY [facility];  Service: Ent    CT CYSTOSCOPY,INSERT URETERAL STENT Left 3/13/2022    Procedure: CYSTOSCOPY, WITH URETERAL STENT REMOVAL ;  Surgeon: Adam Hays M.D.;  Location: SURGERY [facility];  Service: Gynecology Oncology    CT THORACOSCOPY,DX NO BX Left 2/23/2022    Procedure: THORACOSCOPY;  Surgeon: Chriss Etienne M.D.;  Location: [facility];  Service: General    LYMPH NODE SAMPLING  2/23/2022    Procedure: SAMPLING, MULTIPLE LYMPH NODES - MEDIASTINAL;  Surgeon: Chriss Etienne M.D.;  Location: [facility];  Service: General    LOBECTOMY Left 2/23/2022    Procedure: LOBECTOMY - SUBLOBAR RESECTION UPPER LOBE;  Surgeon: Chriss Etienne M.D.;  Location: [facility];  Service: General    CT CYSTOSCOPY,INSERT URETERAL STENT Left 12/30/2021    Procedure: CYSTOSCOPY, WITH URETERAL STENT INSERTION - EXCHANGE;  Surgeon: Adam Hays M.D.;  Location: [facility];  Service: Gynecology Oncology    OTHER  11/2021    left lung biopsy    CYSTO STENT PLACEMNT PRE SURG Left 8/26/2021    Procedure: CYSTOSCOPY, WITH URETERAL STENT INSERTION OR REMOVAL - RIGHT AND/OR LEFT STENT EXCHANGE.;  Surgeon: Adam Hays M.D.;  Location: [facility];  Service: Gynecology Oncology    CT CYSTOSCOPY,INSERT URETERAL STENT Left 3/25/2021    Procedure: CYSTOSCOPY, WITH URETERAL STENT REMOVAL LEFT;  Surgeon: Adam Hays M.D.;  Location: [facility];  Service: Gynecology Oncology    CT CYSTOURETHROSCOPY,URETER CATHETER  Left 12/31/2020    Procedure: CYSTOSCOPY, WITH left RETROGRADE PYELOGRAM;  Surgeon: Adam Hays M.D.;  Location: SURGERY [facility];  Service: Gynecology Oncology    CYSTO STENT PLACEMNT PRE SURG Left 12/31/2020    Procedure: CYSTOSCOPY, WITH Left URETERAL STENT  removal and replacement;  Surgeon: Adam Hays M.D.;  Location: SURGERY [facility];  Service: Gynecology Oncology    ME CYSTOSCOPY,INSERT URETERAL STENT Left 9/3/2020    Procedure: CYSTOSCOPY, WITH URETERAL STENT INSERTION- RIGHT AND OR LEFT EXCHANGE;  Surgeon: Adam Hays M.D.;  Location: SURGERY [facility];  Service: Gynecology Oncology    ME CYSTOURETHROSCOPY,URETER CATHETER Left 9/3/2020    Procedure: RETROGRADE PYELOGRAM;  Surgeon: Adam Hays M.D.;  Location: SURGERY [facility];  Service: Gynecology Oncology    ME CYSTOSCOPY,INSERT URETERAL STENT Left 5/21/2020    Procedure: CYSTOSCOPY, WITH URETERAL STENT INSERTION;  Surgeon: Adam Hays M.D.;  Location: SURGERY [facility];  Service: Gynecology Oncology    CYSTO STENT PLACEMNT PRE SURG Left 8/29/2019    Procedure: CYSTOSCOPY, WITH URETERAL STENT REMOVAL;  Surgeon: Adam Hays M.D.;  Location: SURGERY [facility];  Service: Gynecology    CYSTOSCOPY  7/31/2015    Procedure: CYSTOSCOPY;  Surgeon: Adam Hays M.D.;  Location: SURGERY [facility];  Service:     PELVIC EXAM UNDER ANESTHESIA  7/31/2015    Procedure: PELVIC EXAM UNDER ANESTHESIA;  Surgeon: Adam Hays M.D.;  Location: SURGERY [facility];  Service:     CERVICAL CONIZATION  7/31/2015    Procedure: CERVICAL CONIZATION FOR: CONE BIOPSY;  Surgeon: Adam Hays M.D.;  Location: SURGERY [facility];  Service:     COLOSTOMY CREATION LAPAROSCOPIC  4/14/2015    Performed by Mariusz Barajas M.D. at [facility]    RECOVERY  4/6/2015    Performed by [facility] at SURGERY PRE-POST PROC UNIT [facility] [place] [place]    OTHER ABDOMINAL SURGERY  2015    bowel resection- has colostomy    GYN SURGERY  5/2005    hysterectomy         Family History:   Family History   Problem Relation Age of Onset    Cancer Father     Hypertension Father     Cancer Sister     Stroke Brother          Social History:   Social History     Tobacco Use    Smoking status: Never    Smokeless tobacco: Never   Vaping Use    Vaping Use: Never used   Substance Use Topics    Alcohol use: Not Currently    Drug use: Not Currently      Social History     Social History Narrative    Not on file        Allergies: she Sulfa drugs    Medications:   (Not in a hospital admission)        Review of Systems  Review of Systems   Constitutional:  Negative for chills.   Cardiovascular:  Negative for chest pain.   Gastrointestinal:  Positive for nausea. Negative for abdominal pain and vomiting.   Genitourinary:  Positive for flank pain (left). Negative for dysuria, frequency, hematuria and urgency.   All other systems reviewed and are negative.     Physical Exam  VITAL SIGNS: BP (!) 177/97   Pulse (!) 102   Temp 36.4 °C (97.5 °F) (Temporal)   Resp 18   Ht 1.524 m (5')   Wt 59 kg (130 lb)   LMP  (LMP Unknown) Comment: 3/13/22 HCG negative  SpO2 93%   BMI 25.39 kg/m²   Physical Exam  Vitals and nursing note reviewed. Exam conducted with a chaperone present.   Constitutional:       Appearance: Normal appearance.   HENT:      Head: Normocephalic and atraumatic.      Mouth/Throat:      Mouth: Mucous membranes are dry.   Eyes:      Pupils: Pupils are equal, round, and reactive to light.   Pulmonary:      Effort: Pulmonary effort is normal.   Abdominal:      Palpations: Abdomen is soft.   Genitourinary:     Comments: Left NPT in place with sedimented/gunky filled urine within the tube.   Insertion site clean, no s/s of infection.  Neurological:      Mental Status: She is alert.   Psychiatric:         Mood and Affect: Mood normal.         Behavior: Behavior normal.         Labs:  Recent Labs     08/16/22  1052   WBC 10.0   RBC 5.85*   HEMOGLOBIN 15.1   HEMATOCRIT 45.9   MCV 78.5*    MCH 25.8*   MCHC 32.9*   RDW 44.0   PLATELETCT 247   MPV 10.0     Recent Labs     08/16/22  1052   SODIUM 138   POTASSIUM 4.5   CHLORIDE 105   CO2 22   GLUCOSE 111*   BUN 26*   CREATININE 0.90   CALCIUM 10.3         Glucose:  Recent Labs     08/16/22  1052   GLUCOSE 111*     Coags:  No results for input(s): INR in the last 72 hours.      Urinalysis:   Recent Labs     08/16/22  1600   COLORURINE Yellow   CLARITY Cloudy*   SPECGRAVITY 1.039   PHURINE 6.5   GLUCOSEUR Negative   KETONES Negative   NITRITE Negative   OCCULTBLOOD Moderate*   RBCURINE *   BACTERIA Many*   EPITHELCELL Negative       Imaging:  CT-ABDOMEN-PELVIS WITH   Final Result      1.  New 6 x 4 mm mid left ureteral calculus and there is similar severe hydronephrosis with a normal-appearing nephrostomy tube in place. Again there is a delayed left nephrogram      2.  Status post hysterectomy, oophorectomies and there is no evidence of local recurrence or metastasis      3.  Stable severe urinary bladder wall thickening with distal left ureteral stricturing and calcification as before. This is compatible with prior radiation therapy. Superimposed urinary tract infection is not excluded      4.  Left colostomy with similar parastomal hernia that does not result in obstruction. There is above average stool volume      Voalte sent to [facility] before this dictation was created          @[facility]@     Assessment/Recommendation   76 y.o. female with chronic left nephrostomy  tube d/t hx of ureteral stricture, presenting for evaluation for non-functioning L nephrostomy tube and ongoing flank pain. CT revealed worsening left hydronephrosis and kidney stone in L proximal ureter.     PLAN:  I attempted to manually flush the NPT without success. The tubing is quite gunky and not able to be irrigated. Recommend IR to replace nephrostomy tube.   It sounds as if there are no plans to remove nephrostomy tube in the future, thus treatment of the stone is not  necessary and would be tricky d/t  her history of ureteral stricture.   We ask the patient be admitted to the hospital team overnight to initiate abx treatment.   Dr. Lee is aware of this consult and dictated the plan of care. Urology to follow along until NPT placement then will likely sign off.        ERNESTINA Day.-C.   [address] [place] [id number]   [phone]

## 2022-08-17 ENCOUNTER — APPOINTMENT (OUTPATIENT)
Dept: RADIOLOGY | Facility: MEDICAL CENTER | Age: 77
DRG: 699 | End: 2022-08-17
Attending: PHYSICIAN ASSISTANT
Payer: MEDICARE

## 2022-08-17 ENCOUNTER — APPOINTMENT (OUTPATIENT)
Dept: RADIOLOGY | Facility: MEDICAL CENTER | Age: 77
DRG: 699 | End: 2022-08-17
Attending: OBSTETRICS & GYNECOLOGY
Payer: MEDICARE

## 2022-08-17 LAB
ALBUMIN SERPL BCP-MCNC: 3.8 G/DL (ref 3.2–4.9)
ALBUMIN/GLOB SERPL: 1.3 G/DL
ALP SERPL-CCNC: 98 U/L (ref 30–99)
ALT SERPL-CCNC: 9 U/L (ref 2–50)
ANION GAP SERPL CALC-SCNC: 11 MMOL/L (ref 7–16)
ANION GAP SERPL CALC-SCNC: 13 MMOL/L (ref 7–16)
AST SERPL-CCNC: 16 U/L (ref 12–45)
BASOPHILS # BLD AUTO: 0.3 % (ref 0–1.8)
BASOPHILS # BLD: 0.02 K/UL (ref 0–0.12)
BILIRUB SERPL-MCNC: 0.6 MG/DL (ref 0.1–1.5)
BUN SERPL-MCNC: 19 MG/DL (ref 8–22)
BUN SERPL-MCNC: 24 MG/DL (ref 8–22)
CALCIUM SERPL-MCNC: 8.9 MG/DL (ref 8.5–10.5)
CALCIUM SERPL-MCNC: 9.2 MG/DL (ref 8.5–10.5)
CHLORIDE SERPL-SCNC: 107 MMOL/L (ref 96–112)
CHLORIDE SERPL-SCNC: 108 MMOL/L (ref 96–112)
CO2 SERPL-SCNC: 19 MMOL/L (ref 20–33)
CO2 SERPL-SCNC: 19 MMOL/L (ref 20–33)
CREAT SERPL-MCNC: 0.83 MG/DL (ref 0.5–1.4)
CREAT SERPL-MCNC: 0.89 MG/DL (ref 0.5–1.4)
EKG IMPRESSION: NORMAL
EOSINOPHIL # BLD AUTO: 0.03 K/UL (ref 0–0.51)
EOSINOPHIL NFR BLD: 0.4 % (ref 0–6.9)
ERYTHROCYTE [DISTWIDTH] IN BLOOD BY AUTOMATED COUNT: 44.6 FL (ref 35.9–50)
ERYTHROCYTE [DISTWIDTH] IN BLOOD BY AUTOMATED COUNT: 44.8 FL (ref 35.9–50)
GFR SERPLBLD CREATININE-BSD FMLA CKD-EPI: 66 ML/MIN/1.73 M 2
GFR SERPLBLD CREATININE-BSD FMLA CKD-EPI: 72 ML/MIN/1.73 M 2
GLOBULIN SER CALC-MCNC: 2.9 G/DL (ref 1.9–3.5)
GLUCOSE SERPL-MCNC: 102 MG/DL (ref 65–99)
GLUCOSE SERPL-MCNC: 93 MG/DL (ref 65–99)
GRAM STN SPEC: NORMAL
HCT VFR BLD AUTO: 36.9 % (ref 37–47)
HCT VFR BLD AUTO: 41.5 % (ref 37–47)
HGB BLD-MCNC: 12.2 G/DL (ref 12–16)
HGB BLD-MCNC: 13.4 G/DL (ref 12–16)
IMM GRANULOCYTES # BLD AUTO: 0.03 K/UL (ref 0–0.11)
IMM GRANULOCYTES NFR BLD AUTO: 0.4 % (ref 0–0.9)
LACTATE SERPL-SCNC: 1.3 MMOL/L (ref 0.5–2)
LYMPHOCYTES # BLD AUTO: 1.19 K/UL (ref 1–4.8)
LYMPHOCYTES NFR BLD: 15.3 % (ref 22–41)
MCH RBC QN AUTO: 25.4 PG (ref 27–33)
MCH RBC QN AUTO: 26.1 PG (ref 27–33)
MCHC RBC AUTO-ENTMCNC: 32.3 G/DL (ref 33.6–35)
MCHC RBC AUTO-ENTMCNC: 33.1 G/DL (ref 33.6–35)
MCV RBC AUTO: 78.6 FL (ref 81.4–97.8)
MCV RBC AUTO: 78.8 FL (ref 81.4–97.8)
MONOCYTES # BLD AUTO: 0.45 K/UL (ref 0–0.85)
MONOCYTES NFR BLD AUTO: 5.8 % (ref 0–13.4)
NEUTROPHILS # BLD AUTO: 6.06 K/UL (ref 2–7.15)
NEUTROPHILS NFR BLD: 77.8 % (ref 44–72)
NRBC # BLD AUTO: 0 K/UL
NRBC BLD-RTO: 0 /100 WBC
PLATELET # BLD AUTO: 217 K/UL (ref 164–446)
PLATELET # BLD AUTO: 224 K/UL (ref 164–446)
PMV BLD AUTO: 10.4 FL (ref 9–12.9)
PMV BLD AUTO: 9.4 FL (ref 9–12.9)
POTASSIUM SERPL-SCNC: 4.2 MMOL/L (ref 3.6–5.5)
POTASSIUM SERPL-SCNC: 4.4 MMOL/L (ref 3.6–5.5)
PROT SERPL-MCNC: 6.7 G/DL (ref 6–8.2)
RBC # BLD AUTO: 4.68 M/UL (ref 4.2–5.4)
RBC # BLD AUTO: 5.28 M/UL (ref 4.2–5.4)
SIGNIFICANT IND 70042: NORMAL
SITE SITE: NORMAL
SODIUM SERPL-SCNC: 138 MMOL/L (ref 135–145)
SODIUM SERPL-SCNC: 139 MMOL/L (ref 135–145)
SOURCE SOURCE: NORMAL
TROPONIN T SERPL-MCNC: <6 NG/L (ref 6–19)
WBC # BLD AUTO: 6.4 K/UL (ref 4.8–10.8)
WBC # BLD AUTO: 7.8 K/UL (ref 4.8–10.8)

## 2022-08-17 PROCEDURE — 87205 SMEAR GRAM STAIN: CPT

## 2022-08-17 PROCEDURE — 36415 COLL VENOUS BLD VENIPUNCTURE: CPT

## 2022-08-17 PROCEDURE — 71045 X-RAY EXAM CHEST 1 VIEW: CPT

## 2022-08-17 PROCEDURE — 700111 HCHG RX REV CODE 636 W/ 250 OVERRIDE (IP)

## 2022-08-17 PROCEDURE — 700102 HCHG RX REV CODE 250 W/ 637 OVERRIDE(OP): Performed by: SPECIALIST

## 2022-08-17 PROCEDURE — 83605 ASSAY OF LACTIC ACID: CPT

## 2022-08-17 PROCEDURE — C1729 CATH, DRAINAGE: HCPCS

## 2022-08-17 PROCEDURE — A9270 NON-COVERED ITEM OR SERVICE: HCPCS | Performed by: STUDENT IN AN ORGANIZED HEALTH CARE EDUCATION/TRAINING PROGRAM

## 2022-08-17 PROCEDURE — 87086 URINE CULTURE/COLONY COUNT: CPT

## 2022-08-17 PROCEDURE — 700102 HCHG RX REV CODE 250 W/ 637 OVERRIDE(OP): Performed by: STUDENT IN AN ORGANIZED HEALTH CARE EDUCATION/TRAINING PROGRAM

## 2022-08-17 PROCEDURE — 93010 ELECTROCARDIOGRAM REPORT: CPT | Performed by: INTERNAL MEDICINE

## 2022-08-17 PROCEDURE — 302111 WAFER OST 2.25IN N IMG RD 2 PC (BARRIER): Performed by: OBSTETRICS & GYNECOLOGY

## 2022-08-17 PROCEDURE — 85027 COMPLETE CBC AUTOMATED: CPT

## 2022-08-17 PROCEDURE — A9270 NON-COVERED ITEM OR SERVICE: HCPCS | Performed by: SPECIALIST

## 2022-08-17 PROCEDURE — 302102 BAG OST N IMG 2.25IN 2PC (FECAL): Performed by: OBSTETRICS & GYNECOLOGY

## 2022-08-17 PROCEDURE — 85025 COMPLETE CBC W/AUTO DIFF WBC: CPT

## 2022-08-17 PROCEDURE — 302098 PASTE RING (FLAT): Performed by: OBSTETRICS & GYNECOLOGY

## 2022-08-17 PROCEDURE — 700111 HCHG RX REV CODE 636 W/ 250 OVERRIDE (IP): Performed by: STUDENT IN AN ORGANIZED HEALTH CARE EDUCATION/TRAINING PROGRAM

## 2022-08-17 PROCEDURE — 80053 COMPREHEN METABOLIC PANEL: CPT

## 2022-08-17 PROCEDURE — 0T25X0Z CHANGE DRAINAGE DEVICE IN KIDNEY, EXTERNAL APPROACH: ICD-10-PCS | Performed by: RADIOLOGY

## 2022-08-17 PROCEDURE — 770004 HCHG ROOM/CARE - ONCOLOGY PRIVATE *

## 2022-08-17 PROCEDURE — 80048 BASIC METABOLIC PNL TOTAL CA: CPT

## 2022-08-17 PROCEDURE — 700105 HCHG RX REV CODE 258: Performed by: STUDENT IN AN ORGANIZED HEALTH CARE EDUCATION/TRAINING PROGRAM

## 2022-08-17 PROCEDURE — 84484 ASSAY OF TROPONIN QUANT: CPT

## 2022-08-17 PROCEDURE — 87077 CULTURE AEROBIC IDENTIFY: CPT

## 2022-08-17 PROCEDURE — 93005 ELECTROCARDIOGRAM TRACING: CPT | Performed by: OBSTETRICS & GYNECOLOGY

## 2022-08-17 PROCEDURE — 87186 SC STD MICRODIL/AGAR DIL: CPT | Mod: 91

## 2022-08-17 PROCEDURE — 700117 HCHG RX CONTRAST REV CODE 255: Performed by: PHYSICIAN ASSISTANT

## 2022-08-17 RX ORDER — IODIXANOL 270 MG/ML
10 INJECTION, SOLUTION INTRAVASCULAR ONCE
Status: COMPLETED | OUTPATIENT
Start: 2022-08-17 | End: 2022-08-17

## 2022-08-17 RX ORDER — CALCIUM CARBONATE 500 MG/1
500 TABLET, CHEWABLE ORAL 4 TIMES DAILY PRN
Status: DISCONTINUED | OUTPATIENT
Start: 2022-08-17 | End: 2022-08-18 | Stop reason: HOSPADM

## 2022-08-17 RX ORDER — OXYCODONE HYDROCHLORIDE 5 MG/1
5 TABLET ORAL
Status: ACTIVE | OUTPATIENT
Start: 2022-08-17 | End: 2022-08-18

## 2022-08-17 RX ORDER — ATORVASTATIN CALCIUM 20 MG/1
20 TABLET, FILM COATED ORAL EVERY EVENING
Status: DISCONTINUED | OUTPATIENT
Start: 2022-08-18 | End: 2022-08-18 | Stop reason: HOSPADM

## 2022-08-17 RX ADMIN — PROCHLORPERAZINE EDISYLATE 10 MG: 5 INJECTION INTRAMUSCULAR; INTRAVENOUS at 20:31

## 2022-08-17 RX ADMIN — FENTANYL CITRATE 50 MCG: 50 INJECTION, SOLUTION INTRAMUSCULAR; INTRAVENOUS at 10:30

## 2022-08-17 RX ADMIN — DOCUSATE SODIUM 50 MG AND SENNOSIDES 8.6 MG 2 TABLET: 8.6; 5 TABLET, FILM COATED ORAL at 05:55

## 2022-08-17 RX ADMIN — LISINOPRIL 40 MG: 20 TABLET ORAL at 05:55

## 2022-08-17 RX ADMIN — CEFTRIAXONE SODIUM 1 G: 10 INJECTION, POWDER, FOR SOLUTION INTRAVENOUS at 14:34

## 2022-08-17 RX ADMIN — ONDANSETRON 4 MG: 2 INJECTION INTRAMUSCULAR; INTRAVENOUS at 16:15

## 2022-08-17 RX ADMIN — IODIXANOL 10 ML: 270 INJECTION, SOLUTION INTRAVASCULAR at 11:00

## 2022-08-17 RX ADMIN — SODIUM CHLORIDE, POTASSIUM CHLORIDE, SODIUM LACTATE AND CALCIUM CHLORIDE: 600; 310; 30; 20 INJECTION, SOLUTION INTRAVENOUS at 22:29

## 2022-08-17 RX ADMIN — ATORVASTATIN CALCIUM 20 MG: 20 TABLET, FILM COATED ORAL at 05:55

## 2022-08-17 RX ADMIN — ONDANSETRON 4 MG: 2 INJECTION INTRAMUSCULAR; INTRAVENOUS at 10:53

## 2022-08-17 RX ADMIN — DOCUSATE SODIUM 50 MG AND SENNOSIDES 8.6 MG 2 TABLET: 8.6; 5 TABLET, FILM COATED ORAL at 17:14

## 2022-08-17 RX ADMIN — POLYETHYLENE GLYCOL 3350 1 PACKET: 17 POWDER, FOR SOLUTION ORAL at 17:14

## 2022-08-17 ASSESSMENT — PAIN DESCRIPTION - PAIN TYPE
TYPE: ACUTE PAIN
TYPE: ACUTE PAIN

## 2022-08-17 ASSESSMENT — ENCOUNTER SYMPTOMS
FLANK PAIN: 0
VOMITING: 1
NAUSEA: 1
COUGH: 0
CHILLS: 0
ABDOMINAL PAIN: 1
FEVER: 0

## 2022-08-17 NOTE — PROGRESS NOTES
Patient underwent a left nephrostomy tube exchange by Dr. Gonzalez. Procedure site was marked by MD and verified using imaging guidance. Patient was placed in a prone position. Vitals were taken every 5 minutes and remained stable during procedure (see doc flow sheet for results). CO2 waveform capnography was monitored and remained stable throughout procedure. A gauze and medipore  dressing was placed over surgical site. Report called to Jamee SEVERINO. Pt transported by shola with RN to [id number]. Core Labs X 20ML SYRINGE hand delivered to lab.      12Fr left nephrostomy tube   Ref# [id number] Lot# [id number] Exp: 7/8/24

## 2022-08-17 NOTE — H&P
Gynecologic Oncology H&P    Date: 8/16/2022    Admitting Physician: Fernanda Zhou P.A.-C. /Dr. Iris Escalera     CC: Nausea/vomiting, flank pain     HPI: Mrs. Dennis is a 75 yo female who was diagnosed with Stage Ib1 G3 cervical cancer, after undergoing a laparoscopic radical hysteretcomy with bilateral salphingo-oophorectomy, and bilateral pelvic/aortic lymph node dissection on 5/27/2005. She did not require further adjuvant therapy. She did well and was in remission for over 10 years, until 3/2015, when she presented with rectal bleeding. She was seen in our office for her annual examination on 3/04/2015 and was noted to have a vaginal/vulvar mass on examination. A biopsy was performed which showed at least SCC in situ. She underwent a PET scan for metastatic workup which showed no localized disease. The mass involved vulva and extended into the vagina and parametria. The lesion was too large for surgical resection thus she was advised to undergo concurrent chemoradiation.    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 She completed concurrent chemoradiation therapy consisting of weekly Cisplatin from 4/20/15 to 5/18/15. She had her radiation treatment under Dr. Aldrich and completed therapy on 5/27/15. SPC placed under Dr. Aldrich in May 2015 for urinary retention. Post treatment CT scan showed a questionable residual tumor. She underwent an EUA, biopsy, and cystoscopy on 7/31/2015. Pathology showed benign squamous mucosa with ulceration and granulation tissue, no SCC identified. Intraoperatively, a persistent rectovaginal fistula was noted. Following extensive discussion regarding options, she elected not to pursue any surgical intervention and be monitored.    Ultimately SPC was dc'd and she has subsequently required routine self cath for atonic bladder. She was hospitalized in 6/2018 to [facility] for pyelo and she was found to have left hydronephrosis. She required a  left NT and left stent was not placed. Since her discharged, she underwent further study of her left kidney for hydro. NT removed and then repeat renal U/S 9/19/18 showing mild left hydronephrosis and right sub centimeter cyst no blockages. Chronically dilated left ureter. The renal Lasix washout showed split function is 41% on the left and 59% on the right.    She was admitted several times for recurrent urinary infection and NT exchange.     She underwent a CT guided lung biopsy on 10/8/21 at [facility]. Pathology results revealed reactive alveolar parenchyma with moderate mixed inflammation. No dysplasia or carcinoma identified, however, PET CT showed enlarging hypermetabolic mass in the left upper lobe measuring 3.0 cm consistent with primary lung malignancy, or metastatic disease.      She saw Dr. Etienne for surgery on 2/23/22 for resection of her left upper lobe mass. 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 Given her clear margins and negative lymph nodes, the plan was to monitor her every 6 months with Dr. Etienne.     She underwent a PET CT on 6/8/22 which showed new 1.6 cm intensely hypermetabolic soft tissue mass in the posterior-lateral lower left abdomen posterior to the lower spleen involving the left retroperitoneum and intercostal muscles between the left posterior-lateral 11th and 12th ribs. This finding is suspicious for metastatic disease.  Recommend CT-guided needle biopsy.  New post surgical changes of the chest with wedge resection of the previously seen hypermetabolic 3.0 cm left upper lobe mass consistent with malignancy.  No recurrent pulmonary mass seen.    She was counseled about surgery and elected to proceed. She subsequently [place] a CT guided bx of right retroperitoneal [place] [facility] 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  The plan was to follow  up with our office for further treatment planning.    She was in her usual state of health until yesterday morning when she noted she had no left NT output. She then developed left flank pain as well as nausea and vomiting.  She presented to the ED for the above reason. Her labs were remarkable for +UA and she was given a dose of Rocephin. She underwent a CT which revealed new 6 x 4 mm mid left ureteral calculus, similar severe left hydronephrosis, normal appearing NT in place. Urology was consulted and they attempted to flush her NT without success and found it quite gunky. An exchange was ordered. She was having intractable nausea and vomiting and thus was admitted for NT exchange and supportive care.     She was seen at bedside this AM. She is feeling well today, no nausea or vomiting. Her pain is minimal. She states she has had normal urine output while self catheterizing over the last several days. She denies fever or chills. No cough chest pain or shortness of breath. She has normal ostomy output.      Review of Systems:  Constitutional: Negative for fever, chills, weight loss, malaise/fatigue and diaphoresis.   HENT: Negative for hearing loss, ear pain, nosebleeds, congestion, sore throat, neck pain, tinnitus and ear discharge.    Eyes: Negative for blurred vision, double vision, photophobia, pain, discharge and redness.   Respiratory: Negative for cough, hemoptysis, sputum production, shortness of breath, wheezing and stridor.    Cardiovascular: Negative for chest pain, palpitations, orthopnea, claudication, leg swelling and PND.   Gastrointestinal: Positive for nausea, vomiting. Negative for abdominal pain, diarrhea, constipation, blood in stool and melena.   Genitourinary: Negative for dysuria, urgency, frequency, hematuria. Positive for flank pain.   Musculoskeletal: Negative for myalgias, back pain, joint pain and falls.   Skin: Negative for itching and rash.  Neurological: Negative for dizziness,  tingling, tremors, sensory change, speech change, focal weakness, seizures, loss of consciousness, weakness and headaches.   Endo/Heme/Allergies: Negative for environmental allergies and polydipsia. Does not bruise/bleed easily.   Psychiatric/Behavioral: Negative for depression, suicidal ideas, hallucinations, memory loss and substance abuse. The patient is not nervous/anxious and does not have insomnia.        Past Medical History:   Diagnosis Date    Adverse effect of anesthesia     pt states had hallucinations in PACU from drug given intraop - raspy d/t vocal cord injury    Anesthesia 02/23/2022    pt states had hallucinations in PACU from drug given intraop    Cancer ([facility]) 2005, 2015    cervical    Cancer ([facility]) 02/23/2022    left upper lung cancer    Colostomy in place ([facility])     Dental disorder 05/12/2022    upper crowns times 6 teeth    Environmental and seasonal allergies     Environmental and seasonal allergies     High cholesterol 05/12/2022    medicated    Hydronephrosis with ureteral stricture 12/30/2021    stents, removed, and nephrostomy tube placed    Hypertension 05/12/2022    medicated    Lung nodules     Unspecified urinary incontinence     wears pads, does self caths    Urinary bladder disorder     pt self caths 4x per day        Past Surgical History:   Procedure Laterality Date    IL LARYNGOSCOPY,DIRECT,SCOPE,INJ CORDS  5/26/2022    Procedure: INJECTION, VOCAL CORD, LARYNGOSCOPIC - THERAPEUTIC, WITH OPERATING MICROSCOPE OR TELESCOPE;  Surgeon: Marie Salas M.D.;  Location: SURGERY SAME DAY [facility];  Service: Ent    LARYNGOSCOPY Left 5/26/2022    Procedure: LARYNGOSCOPY - DIRECT;  Surgeon: Marie Salas M.D.;  Location: SURGERY SAME DAY [facility];  Service: Ent    IL CYSTOSCOPY,INSERT URETERAL STENT Left 3/13/2022    Procedure: CYSTOSCOPY, WITH URETERAL STENT REMOVAL ;  Surgeon: Adam Hays M.D.;  Location: SURGERY [facility];  Service: Gynecology Oncology    IL THORACOSCOPY,DX NO BX Left 2/23/2022     Procedure: THORACOSCOPY;  Surgeon: Chriss Etienne M.D.;  Location: [facility];  Service: General    LYMPH NODE SAMPLING  2/23/2022    Procedure: SAMPLING, MULTIPLE LYMPH NODES - MEDIASTINAL;  Surgeon: Chriss Etienne M.D.;  Location: [facility];  Service: General    LOBECTOMY Left 2/23/2022    Procedure: LOBECTOMY - SUBLOBAR RESECTION UPPER LOBE;  Surgeon: Chriss Etienne M.D.;  Location: [facility];  Service: General    OR CYSTOSCOPY,INSERT URETERAL STENT Left 12/30/2021    Procedure: CYSTOSCOPY, WITH URETERAL STENT INSERTION - EXCHANGE;  Surgeon: Adam Hays M.D.;  Location: [facility];  Service: Gynecology Oncology    OTHER  11/2021    left lung biopsy    CYSTO STENT PLACEMNT PRE SURG Left 8/26/2021    Procedure: CYSTOSCOPY, WITH URETERAL STENT INSERTION OR REMOVAL - RIGHT AND/OR LEFT STENT EXCHANGE.;  Surgeon: Adam Hays M.D.;  Location: [facility];  Service: Gynecology Oncology    OR CYSTOSCOPY,INSERT URETERAL STENT Left 3/25/2021    Procedure: CYSTOSCOPY, WITH URETERAL STENT REMOVAL LEFT;  Surgeon: Adam Hays M.D.;  Location: [facility];  Service: Gynecology Oncology    OR CYSTOURETHROSCOPY,URETER CATHETER Left 12/31/2020    Procedure: CYSTOSCOPY, WITH left RETROGRADE PYELOGRAM;  Surgeon: Adam Hays M.D.;  Location: [facility];  Service: Gynecology Oncology    CYSTO STENT PLACEMNT PRE SURG Left 12/31/2020    Procedure: CYSTOSCOPY, WITH Left URETERAL STENT  removal and replacement;  Surgeon: Adam Hays M.D.;  Location: [facility];  Service: Gynecology Oncology    OR CYSTOSCOPY,INSERT URETERAL STENT Left 9/3/2020    Procedure: CYSTOSCOPY, WITH URETERAL STENT INSERTION- RIGHT AND OR LEFT EXCHANGE;  Surgeon: Adam Hays M.D.;  Location: [facility];  Service: Gynecology Oncology    OR CYSTOURETHROSCOPY,URETER CATHETER Left 9/3/2020    Procedure: RETROGRADE PYELOGRAM;  Surgeon: Adam Hays M.D.;  Location: [facility]  [place];  Service: Gynecology Oncology    MN CYSTOSCOPY,INSERT URETERAL STENT Left 5/21/2020    Procedure: CYSTOSCOPY, WITH URETERAL STENT INSERTION;  Surgeon: Adam Hays M.D.;  Location: SURGERY [facility];  Service: Gynecology Oncology    CYSTO STENT PLACEMNT PRE SURG Left 8/29/2019    Procedure: CYSTOSCOPY, WITH URETERAL STENT REMOVAL;  Surgeon: Adam Hays M.D.;  Location: SURGERY [facility];  Service: Gynecology    CYSTOSCOPY  7/31/2015    Procedure: CYSTOSCOPY;  Surgeon: Adam Hays M.D.;  Location: SURGERY [facility];  Service:     PELVIC EXAM UNDER ANESTHESIA  7/31/2015    Procedure: PELVIC EXAM UNDER ANESTHESIA;  Surgeon: Adam Hays M.D.;  Location: SURGERY [facility];  Service:     CERVICAL CONIZATION  7/31/2015    Procedure: CERVICAL CONIZATION FOR: CONE BIOPSY;  Surgeon: Adam Hays M.D.;  Location: SURGERY [facility];  Service:     COLOSTOMY CREATION LAPAROSCOPIC  4/14/2015    Performed by Mariusz Barajas M.D. at SURGERY [facility]    RECOVERY  4/6/2015    Performed by [facility] at SURGERY PRE-POST PROC UNIT [place]    OTHER ABDOMINAL SURGERY  2015    bowel resection- has colostomy    GYN SURGERY  5/2005    hysterectomy         Current Facility-Administered Medications   Medication Dose Route Frequency Provider Last Rate Last Admin    senna-docusate (PERICOLACE or SENOKOT S) 8.6-50 MG per tablet 2 Tablet  2 Tablet Oral BID Fernanda Zhou, P.A.-C.   2 Tablet at 08/16/22 1800    And    polyethylene glycol/lytes (MIRALAX) PACKET 1 Packet  1 Packet Oral QDAY PRN Fernanda LEONEL Zhou, P.A.-C.        And    magnesium hydroxide (MILK OF MAGNESIA) suspension 30 mL  30 mL Oral QDAY PRN Fernanda LEONEL Zhou, P.A.-C.        And    bisacodyl (DULCOLAX) suppository 10 mg  10 mg Rectal QDAY PRN Fernanda LEONEL Zhou, P.A.-C.        lactated ringers infusion   Intravenous Continuous Fernanda LEONEL Zhou P.A.-C. 125 mL/hr at 08/16/22 1700 125 mL at 08/16/22 1700    ondansetron (ZOFRAN)  syringe/vial injection 4 mg  4 mg Intravenous Q6HRS PRN Fernanda Zhou P.A.-C.        prochlorperazine (COMPAZINE) injection 10 mg  10 mg Intravenous Q6HRS PRN Fernanda Zhou P.A.-C.        ketorolac (TORADOL) injection 15 mg  15 mg Intravenous Q6HRS PRN Adam Hays M.D.        [START ON 8/17/2022] lisinopril (PRINIVIL) tablet 40 mg  40 mg Oral Q DAY Adam Hays M.D.        [START ON 8/17/2022] atorvastatin (LIPITOR) tablet 20 mg  20 mg Oral DAILY Adam Hays M.D.           Allergies:  Sulfa drugs    Social History     Socioeconomic History    Marital status:      Spouse name: Heron Dennis    Number of children: Not on file    Years of education: Not on file    Highest education level: Not on file   Occupational History    Occupation: Retired    Tobacco Use    Smoking status: Never    Smokeless tobacco: Never   Vaping Use    Vaping Use: Never used   Substance and Sexual Activity    Alcohol use: Not Currently    Drug use: Not Currently    Sexual activity: Not on file   Other Topics Concern    Not on file   Social History Narrative    Not on file     Social Determinants of Health     Financial Resource Strain: Low Risk     Difficulty of Paying Living Expenses: Not hard at all   Food Insecurity: No Food Insecurity    Worried About Running Out of Food in the Last Year: Never true    Ran Out of Food in the Last Year: Never true   Transportation Needs: No Transportation Needs    Lack of Transportation (Medical): No    Lack of Transportation (Non-Medical): No   Physical Activity: Not on file   Stress: Not on file   Social Connections: Not on file   Intimate Partner Violence: Not on file   Housing Stability: Not on file       Family History   Problem Relation Age of Onset    Cancer Father     Hypertension Father     Cancer Sister     Stroke Brother          Physical Exam:  /54   Pulse (!) 114   Temp 37.7 °C (99.9 °F) (Temporal)   Resp 17   Ht 1.524 m (5')   Wt 59 kg (130 lb)   SpO2 95%        Physical Exam  Constitutional:       General: She is not in acute distress.     Appearance: She is not ill-appearing.   HENT:      Head: Normocephalic.      Mouth/Throat:      Mouth: Mucous membranes are moist.   Cardiovascular:      Rate and Rhythm: Normal rate and regular rhythm.      Pulses: Normal pulses.   Pulmonary:      Effort: Pulmonary effort is normal.   Abdominal:      General: Abdomen is flat. There is no distension.      Palpations: Abdomen is soft. There is no mass.      Tenderness: There is no abdominal tenderness.      Comments: Ostomy to left lower quadrant, stoma pink and well perfused.    Genitourinary:     Comments: Left NT with no output   Musculoskeletal:         General: Normal range of motion.      Cervical back: Normal range of motion.      Right lower leg: No edema.      Left lower leg: No edema.   Skin:     General: Skin is warm and dry.   Neurological:      Mental Status: She is alert and oriented to person, place, and time.        Labs:  Recent Labs     08/16/22  1052   WBC 10.0   RBC 5.85*   HEMOGLOBIN 15.1   HEMATOCRIT 45.9   MCV 78.5*   MCH 25.8*   MCHC 32.9*   RDW 44.0   PLATELETCT 247   MPV 10.0     Recent Labs     08/16/22  1052   SODIUM 138   POTASSIUM 4.5   CHLORIDE 105   CO2 22   GLUCOSE 111*   BUN 26*   CREATININE 0.90   CALCIUM 10.3         Recent Labs     08/16/22  1052   ASTSGOT 15   ALTSGPT 12   TBILIRUBIN 0.5   ALKPHOSPHAT 119*   GLOBULIN 2.7       Radiology:  CT Abd Pelvis      1.  New 6 x 4 mm mid left ureteral calculus and there is similar severe hydronephrosis with a normal-appearing nephrostomy tube in place. Again there is a delayed left nephrogram     2.  Status post hysterectomy, oophorectomies and there is no evidence of local recurrence or metastasis     3.  Stable severe urinary bladder wall thickening with distal left ureteral stricturing and calcification as before. This is compatible with prior radiation therapy. Superimposed urinary tract infection is  not excluded     4.  Left colostomy with similar parastomal hernia that does not result in obstruction. There is above average stool volume    Assessment: This is a 76 y.o. female who presents with    Plan:   1. NT malfunction: urology tried to flush but were unable too and found to be gunky, recommend IR replacement. Ordered, plan for today, NPO prior to procedure.   2. Nausea and vomiting: anti emetic prn, improved today   3. UTI: UA+, Rocephin and follow cultures   4. Cervical cancer: recent diagnosis of recurrence, pending tumor board presentation and further treatment planning   5. GI/FEN: regular diet after procedure, monitor lytes and replete as needed   6. Ppx: SCDs, ambulation   7. Dispo: inpatient management of nausea and NT replacement     Case and plan discussed with Dr. Jw Zhou, PA-C  Gynecologic Oncology  [facility]

## 2022-08-17 NOTE — PROGRESS NOTES
4 Eyes Skin Assessment Completed by Anjali MILLER, RN and Hanna LAZO RN.    Head WDL  Ears WDL  Nose WDL  Mouth WDL  Neck WDL  Breast/Chest WDL  Shoulder Blades WDL  Spine WDL- Lower back on left side has a nephrostomy tube present  (R) Arm/Elbow/Hand WDL  (L) Arm/Elbow/Hand WDL  Abdomen WDL- colostomy present mid lower abdomen  Groin WDL  Scrotum/Coccyx/Buttocks Redness and Blanching  (R) Leg WDL  (L) Leg WDL  (R) Heel/Foot/Toe WDL  (L) Heel/Foot/Toe WDL          Devices In Places Blood Pressure Cuff      Interventions In Place Waffle Overlay and Pressure Redistribution Mattress    Possible Skin Injury No    Pictures Uploaded Into Epic N/A  Wound Consult Placed N/A  RN Wound Prevention Protocol Ordered No

## 2022-08-17 NOTE — CARE PLAN
The patient is Stable - Low risk of patient condition declining or worsening    Shift Goals  Clinical Goals: Nephrostomy replacement, monitor nausea/vomiting  Patient Goals: rest    Progress made toward(s) clinical / shift goals:      Problem: Pain - Standard  Goal: Alleviation of pain or a reduction in pain to the patient’s comfort goal  Outcome: Progressing     Problem: Knowledge Deficit - Standard  Goal: Patient and family/care givers will demonstrate understanding of plan of care, disease process/condition, diagnostic tests and medications  Outcome: Progressing  A/Ox4, pt is able to understand plan of care. All questions answered at the moment.       Problem: Urinary Elimination  Goal: Establish and maintain regular urinary output  Outcome: Progressing  Pt self straight caths, pt has supplies at bedside.      Problem: Mobility  Goal: Patient's capacity to carry out activities will improve  Outcome: Progressing     Problem: Self Care  Goal: Patient will have the ability to perform ADLs independently or with assistance (bathe, groom, dress, toilet and feed)  Outcome: Progressing     Problem: Infection - Standard  Goal: Patient will remain free from infection  Outcome: Progressing       Patient is not progressing towards the following goals:

## 2022-08-17 NOTE — PROGRESS NOTES
Note to reader: this note follows the APSO format rather than the historical SOAP format. Assessment and plan located at the top of the note for ease of use.    Chief Complaint  76 y.o. year old female here with Flank Pain and N/V      Assessment/Plan  Interval History   Active Hospital Problems    Diagnosis     Urinary tract infection [N39.0]      Gu patient seen and examined     8/17- s/p left NPT replacement. Urine draining, light cherry red but with good output. H/h stable 12.2/36.9, Cr 0.89. no left flank pain to complain of. No fevers overnight. +nausea.     Disposition  Stable    PLAN:  Continue with NPT exchanges as scheduled through Dr. Hays. As it seems she is to continue with the nephrostomy tube, there is no indication for surgical intervention of the left renal stone. Urology signing off.   Call with questions.    Review of Systems  Physical Exam   Review of Systems   Constitutional:  Negative for chills and fever.   Respiratory:  Negative for cough.    Cardiovascular:  Negative for chest pain.   Gastrointestinal:  Positive for abdominal pain, nausea and vomiting.   Genitourinary:  Positive for hematuria. Negative for flank pain.   All other systems reviewed and are negative.  Vitals:    08/17/22 1018 08/17/22 1023 08/17/22 1028 08/17/22 1053   BP:  131/70 128/82 (!) (P) 149/88   Pulse: 100 100 (!) 101 (P) 86   Resp: (!) 24 (!) 27 17 (P) 18   Temp:    (P) 36.5 °C (97.7 °F)   TempSrc:    (P) Temporal   SpO2: 94% 93% 96% (P) 94%   Weight:       Height:         Physical Exam  Vitals and nursing note reviewed. Exam conducted with a chaperone present.   Constitutional:       Appearance: Normal appearance.   HENT:      Head: Normocephalic and atraumatic.      Mouth/Throat:      Mouth: Mucous membranes are moist.   Eyes:      Pupils: Pupils are equal, round, and reactive to light.   Abdominal:      Palpations: Abdomen is soft.   Genitourinary:     Comments: Left NPT in place with light red tinged  urine  Neurological:      Mental Status: She is alert.   Psychiatric:         Mood and Affect: Mood normal.         Behavior: Behavior normal.        Hematology Chemistry   Lab Results   Component Value Date/Time    WBC 6.4 08/17/2022 01:03 AM    HEMOGLOBIN 12.2 08/17/2022 01:03 AM    HEMATOCRIT 36.9 (L) 08/17/2022 01:03 AM    PLATELETCT 217 08/17/2022 01:03 AM     Lab Results   Component Value Date/Time    SODIUM 138 08/17/2022 01:03 AM    POTASSIUM 4.2 08/17/2022 01:03 AM    CHLORIDE 108 08/17/2022 01:03 AM    CO2 19 (L) 08/17/2022 01:03 AM    GLUCOSE 93 08/17/2022 01:03 AM    BUN 24 (H) 08/17/2022 01:03 AM    CREATININE 0.89 08/17/2022 01:03 AM    CREATININE 0.7 03/27/2006 02:58 PM         Labs not explicitly included in this progress note were reviewed by the author.   Radiology/imaging not explicitly included in this progress note was reviewed by the author.     Core Measures

## 2022-08-17 NOTE — CARE PLAN
The patient is Watcher - Medium risk of patient condition declining or worsening    Shift Goals  Clinical Goals: NPO @ midnight, monitor nausea &vomiting, monitor pain  Patient Goals: rest, fix nephrostomy tube    Progress made toward(s) clinical / shift goals:    Problem: Pain - Standard  Goal: Alleviation of pain or a reduction in pain to the patient’s comfort goal  8/16/2022 2253 by Anjali Mcconnell R.N.  Outcome: Progressing  Note: Pt states she not in pain since arrival to unit. Had toradol ordered in case pain increases during the night. Per Dr. Hays 2 doses of 15mg of IV toradol.  8/16/2022 2253 by Anjali Mcconnell R.N.  Outcome: Progressing     Problem: Knowledge Deficit - Standard  Goal: Patient and family/care givers will demonstrate understanding of plan of care, disease process/condition, diagnostic tests and medications  Outcome: Progressing     Problem: Urinary Elimination  Goal: Establish and maintain regular urinary output  Outcome: Progressing  Note: Pt states her nephrostomy tube has started to drain     Problem: Mobility  Goal: Patient's capacity to carry out activities will improve  Outcome: Progressing     Problem: Self Care  Goal: Patient will have the ability to perform ADLs independently or with assistance (bathe, groom, dress, toilet and feed)  Outcome: Progressing       Patient is not progressing towards the following goals:

## 2022-08-17 NOTE — OR SURGEON
Immediate Post- Operative Note        Findings: Obstructed Left Neph Tube      Procedure(s): Left Neph Tube EX      Estimated Blood Loss: Less than 5 ml        Complications: None            8/17/2022     10:37 AM     Wade Gonzalez M.D.

## 2022-08-17 NOTE — PROGRESS NOTES
Pt arrived from IR, neph tube replaced. Dressing is clean dry and intact.   Pt with complaints of nausea medicated per MAR. VSS.

## 2022-08-18 VITALS
TEMPERATURE: 98.8 F | DIASTOLIC BLOOD PRESSURE: 83 MMHG | BODY MASS INDEX: 25.52 KG/M2 | RESPIRATION RATE: 18 BRPM | OXYGEN SATURATION: 98 % | HEIGHT: 60 IN | WEIGHT: 130 LBS | SYSTOLIC BLOOD PRESSURE: 128 MMHG | HEART RATE: 92 BPM

## 2022-08-18 PROCEDURE — 700105 HCHG RX REV CODE 258: Performed by: STUDENT IN AN ORGANIZED HEALTH CARE EDUCATION/TRAINING PROGRAM

## 2022-08-18 PROCEDURE — A9270 NON-COVERED ITEM OR SERVICE: HCPCS | Performed by: STUDENT IN AN ORGANIZED HEALTH CARE EDUCATION/TRAINING PROGRAM

## 2022-08-18 PROCEDURE — A9270 NON-COVERED ITEM OR SERVICE: HCPCS | Performed by: SPECIALIST

## 2022-08-18 PROCEDURE — 700102 HCHG RX REV CODE 250 W/ 637 OVERRIDE(OP): Performed by: STUDENT IN AN ORGANIZED HEALTH CARE EDUCATION/TRAINING PROGRAM

## 2022-08-18 PROCEDURE — 700111 HCHG RX REV CODE 636 W/ 250 OVERRIDE (IP): Performed by: STUDENT IN AN ORGANIZED HEALTH CARE EDUCATION/TRAINING PROGRAM

## 2022-08-18 PROCEDURE — 700102 HCHG RX REV CODE 250 W/ 637 OVERRIDE(OP): Performed by: SPECIALIST

## 2022-08-18 RX ADMIN — DOCUSATE SODIUM 50 MG AND SENNOSIDES 8.6 MG 2 TABLET: 8.6; 5 TABLET, FILM COATED ORAL at 05:52

## 2022-08-18 RX ADMIN — PROCHLORPERAZINE EDISYLATE 10 MG: 5 INJECTION INTRAMUSCULAR; INTRAVENOUS at 05:52

## 2022-08-18 RX ADMIN — CEFTRIAXONE SODIUM 1 G: 10 INJECTION, POWDER, FOR SOLUTION INTRAVENOUS at 14:48

## 2022-08-18 RX ADMIN — LISINOPRIL 40 MG: 20 TABLET ORAL at 05:52

## 2022-08-18 RX ADMIN — SODIUM CHLORIDE, POTASSIUM CHLORIDE, SODIUM LACTATE AND CALCIUM CHLORIDE: 600; 310; 30; 20 INJECTION, SOLUTION INTRAVENOUS at 06:45

## 2022-08-18 NOTE — DISCHARGE INSTRUCTIONS
Nephrostomy   Care After  Refer to this sheet in the next few weeks. These instructions provide you with information on caring for yourself after your procedure. Your caregiver may also give you specific instructions. Your treatment has been planned according to current medical practices, but problems sometimes occur. Call your caregiver if you have any problems or questions after your procedure.  HOME CARE INSTRUCTIONS  Avoid taking aspirin or non-steroidal anti-inflammatory drugs (NSAIDs).  Only take over-the-counter or prescription medicines for pain, discomfort, or fever as directed by your caregiver.  Take your antibiotics as directed. Finish them even if you start to feel better.  Rest for the remainder of the day. Do not operate machinery, drive, or make legal decisions for 24 hours after your procedure.  Have someone drive you home.  You may resume your usual diet after the procedure. Drink extra fluids while the catheter is in place. Avoid alcoholic beverages for 24 hours after the procedure.  Keep the skin around your catheter dry.  You may shower. Cover the area with plastic wrap and tape the edges of the plastic wrap to your skin so your skin remains dry under the plastic. If the area does get wet, dry the skin completely.  Avoid baths or swimming.  SEEK MEDICAL CARE IF:   Redness, increased soreness, or swelling develops.  Your symptoms persist after several days or worsen.  If you seeblood in the urine (hematuria) for over 48 hours.  SEEK IMMEDIATE MEDICAL CARE IF:   Your flexible tube (catheter) accidentally gets pulled out.  You have chills or increased pain.  You have an oral temperature above 102° F (38.9° C), not controlled by medicine.  The skin breaks down around the catheter.  Your catheter stops draining.The catheter may be blocked.  There is leakage of urine around catheter.  Document Released: 08/10/2005 Document Revised: 03/11/2013 Document Reviewed: 02/16/2010  ExitCare® Patient  Information ©2014 [employer], [employer].      Notes from your provider:   - A prescription for Cipro has been sent to your pharmacy.   - Flush your nephrostomy tube with a syringe of normal saline once a day.   - Our office will call you with your next appointment. ([facility])

## 2022-08-18 NOTE — WOUND TEAM
"In to see pt r/t having a colostomy. She has had it 5.5-6 years. Appliance was changed on Monday and she usually changes it every 3 days. Stoma is red and round. She is in 21/4\" two piece appliance with a paste ring. Her pouch has a filter. Appliance is intact at this time. Extra supplies with scissors left with pt in case she needs them. Pt is expecting to DC tomorrow and if not she feels comfortable changing appliance tomorrow. She and her spouse are aware if need arises an ostomy RN can revisit with request. Nsg to assist pt with ostomy care as needed.   "

## 2022-08-18 NOTE — CARE PLAN
The patient is Stable - Low risk of patient condition declining or worsening    Shift Goals  Clinical Goals: Control n/v  Patient Goals: Comfort, rest    Progress made toward(s) clinical / shift goals: N/v managed appropriately.    Patient is not progressing towards the following goals: N/A    Problem: Pain - Standard  Goal: Alleviation of pain or a reduction in pain to the patient’s comfort goal  Outcome: Progressing     Problem: Knowledge Deficit - Standard  Goal: Patient and family/care givers will demonstrate understanding of plan of care, disease process/condition, diagnostic tests and medications  Outcome: Progressing     Problem: Urinary Elimination  Goal: Establish and maintain regular urinary output  Outcome: Progressing     Problem: Mobility  Goal: Patient's capacity to carry out activities will improve  Outcome: Progressing     Problem: Self Care  Goal: Patient will have the ability to perform ADLs independently or with assistance (bathe, groom, dress, toilet and feed)  Outcome: Progressing     Problem: Infection - Standard  Goal: Patient will remain free from infection  Outcome: Progressing

## 2022-08-18 NOTE — PROGRESS NOTES
Rapid Response Summary     Rapid response called at 1556 for: Chest Pain     VS: WDL (See Vitals Flowsheet)  Additional info: s/p nephrostomy tube replacement  MD Paged: Escalera  Interventions:    Imaging/Tests: Chest X-ray and EKG    Labs: CBC, CMP, Lactic Acid, and Troponin   Medications:  Zofran   Other: PIV started   Disposition: Improved with rapid response team interventions. Primary RN updated on plan of care. Transfer not indicated at this time.

## 2022-08-18 NOTE — DISCHARGE SUMMARY
Discharge Summary    CHIEF COMPLAINT ON ADMISSION  Chief Complaint   Patient presents with    Flank Pain    N/V       Reason for Admission  Other      Admission Date  8/16/2022    CODE STATUS  Full Code    HPI & HOSPITAL COURSE  Mrs. Dennis is a 77 yo female who was diagnosed with Stage Ib1 G3 cervical cancer, after undergoing a laparoscopic radical hysteretcomy with bilateral salphingo-oophorectomy, and bilateral pelvic/aortic lymph node dissection on 5/27/2005. She did not require further adjuvant therapy. She did well and was in remission for over 10 years, until 3/2015, when she presented with rectal bleeding. She was seen in our office for her annual examination on 3/04/2015 and was noted to have a vaginal/vulvar mass on examination. A biopsy was performed which showed at least SCC in situ. She underwent a PET scan for metastatic workup which showed no localized disease. The mass involved vulva and extended into the vagina and parametria. The lesion was too large for surgical resection thus she was advised to undergo concurrent chemoradiation.     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 She completed concurrent chemoradiation therapy consisting of weekly Cisplatin from 4/20/15 to 5/18/15. She had her radiation treatment under Dr. Aldrich and completed therapy on 5/27/15. SPC placed under Dr. Aldrich in May 2015 for urinary retention. Post treatment CT scan showed a questionable residual tumor. She underwent an EUA, biopsy, and cystoscopy on 7/31/2015. Pathology showed benign squamous mucosa with ulceration and granulation tissue, no SCC identified. Intraoperatively, a persistent rectovaginal fistula was noted. Following extensive discussion regarding options, she elected not to pursue any surgical intervention and be monitored.     Ultimately SPC was dc'd and she has subsequently required routine self cath for atonic bladder. She was hospitalized in 6/2018 to [facility]  for pyelo and she was found to have left hydronephrosis. She required a left NT and left stent was not placed. Since her discharged, she underwent further study of her left kidney for hydro. NT removed and then repeat renal U/S 9/19/18 showing mild left hydronephrosis and right sub centimeter cyst no blockages. Chronically dilated left ureter. The renal Lasix washout showed split function is 41% on the left and 59% on the right.     She was admitted several times for recurrent urinary infection and NT exchange.      She underwent a CT guided lung biopsy on 10/8/21 at [facility]. Pathology results revealed reactive alveolar parenchyma with moderate mixed inflammation. No dysplasia or carcinoma identified, however, PET CT showed enlarging hypermetabolic mass in the left upper lobe measuring 3.0 cm consistent with primary lung malignancy, or metastatic disease.       She saw Dr. Etienne for surgery on 2/23/22 for resection of her left upper lobe mass. 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 Given her clear margins and negative lymph nodes, the plan was to monitor her every 6 months with Dr. Etienne.      She underwent a PET CT on 6/8/22 which showed new 1.6 cm intensely hypermetabolic soft tissue mass in the posterior-lateral lower left abdomen posterior to the lower spleen involving the left retroperitoneum and intercostal muscles between the left posterior-lateral 11th and 12th ribs. This finding is suspicious for metastatic disease.  Recommend CT-guided needle biopsy.  New post surgical changes of the chest with wedge resection of the previously seen hypermetabolic 3.0 cm left upper lobe mass consistent with malignancy.  No recurrent pulmonary mass seen.     She was counseled about surgery and elected to proceed. She subsequently underwent a CT guided bx of right retroperitoneal [place] [facility] 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  The plan was to follow up with our office for further treatment planning.     She was in her usual state of health until yesterday morning when she noted she had no left NT output. She then developed left flank pain as well as nausea and vomiting.  She presented to the ED for the above reason. Her labs were remarkable for +UA and she was given a dose of Rocephin. She underwent a CT which revealed new 6 x 4 mm mid left ureteral calculus, similar severe left hydronephrosis, normal appearing NT in place. Urology was consulted and they attempted to flush her NT without success and found it quite gunky. An exchange was ordered. She was having intractable nausea and vomiting and thus was admitted for NT exchange and supportive care.      She was admitted for the above reason. She underwent a NT exchange with no complications. She had an episode of nausea and chest pain after the procedure, which resolved with Zofran.  A EKG, CXR and troponin were completed and unremarkable.  Her urine culture grew E.coli and she was treated with rocephin, transitioned to Cipro as outpatient.  She was feeling well, no nausea or vomiting, no pain, tolerating PO and had good L NT output. Her physical exam was unremarkable, her NT site had a dressing that was clear, dry and intact with clear yellow urine output. Otherwise her physical exam was unremarkable.     Therefore, she is discharged in good and stable condition to home with close outpatient follow-up.    The patient recovered much more quickly than anticipated on admission.    Discharge Date  8/18/2022    FOLLOW UP ITEMS POST DISCHARGE  Take Cipro BID for 7 days  Flush your nephrostomy tube, the RN will give you instructions and supplies, call [phone] with any questions or concerns.     DISCHARGE DIAGNOSES  History of Cervical Cancer   History rectovaginal fistula  Atonic bladder   Pyelonephritis   Colostomy status  Left  "Nephrostomy tube status    HTN   HLD    FOLLOW UP  [facility] will call you with your next appointment     MEDICATIONS ON DISCHARGE     Medication List        ASK your doctor about these medications        Instructions   alendronate 70 MG Tabs  Commonly known as: FOSAMAX   Take 70 mg by mouth every 7 days.  Dose: 70 mg     artificial tears 1.4 % Soln   Administer 1 Drop into both eyes 1 time a day as needed (dry eyes).  Dose: 1 Drop     atorvastatin 20 MG Tabs  Commonly known as: LIPITOR   Take 20 mg by mouth every evening.  Dose: 20 mg     Co Q10 60 MG Caps  Ask about: Which instructions should I use?   Take 60 mg by mouth every evening.  Dose: 60 mg     lisinopril 40 MG tablet  Commonly known as: PRINIVIL   Take 40 mg by mouth every morning. Indications: High Blood Pressure Disorder  Dose: 40 mg     Probiotic Advanced Caps   Take 1 Capsule by mouth every evening.  Dose: 1 Capsule     * therapeutic multivitamin-minerals Tabs   Take 1 Tablet by mouth every day.  Dose: 1 Tablet     * Systane ICaps AREDS2 Tabs   Take 1 Tablet by mouth every day.  Dose: 1 Tablet     Vitamin C 1000 MG Tabs   Take 1,000 mg by mouth 2 times a day.  Dose: 1,000 mg     vitamin D3 1000 Unit (25 mcg) Tabs  Commonly known as: cholecalciferol   Take 1,000 Units by mouth every day.  Dose: 1,000 Units           * This list has 2 medication(s) that are the same as other medications prescribed for you. Read the directions carefully, and ask your doctor or other care provider to review them with you.                  Allergies  Allergies   Allergen Reactions    Sulfa Drugs Rash and Unspecified     Pt states \" it really runs me down.\" \"Feel Tired\"       DIET  Orders Placed This Encounter   Procedures    Diet Order Diet: Regular     Standing Status:   Standing     Number of Occurrences:   1     Order Specific Question:   Diet:     Answer:   Regular [1]       ACTIVITY  As tolerated.  Weight bearing as tolerated    CONSULTATIONS  Urology "     PROCEDURES  Left nephrostomy tube exchange    LABORATORY  Lab Results   Component Value Date    SODIUM 139 08/17/2022    POTASSIUM 4.4 08/17/2022    CHLORIDE 107 08/17/2022    CO2 19 (L) 08/17/2022    GLUCOSE 102 (H) 08/17/2022    BUN 19 08/17/2022    CREATININE 0.83 08/17/2022    CREATININE 0.7 03/27/2006        Lab Results   Component Value Date    WBC 7.8 08/17/2022    HEMOGLOBIN 13.4 08/17/2022    HEMATOCRIT 41.5 08/17/2022    PLATELETCT 224 08/17/2022        Total time of the discharge process exceeds 20 minutes.

## 2022-08-18 NOTE — PROGRESS NOTES
Pt with no complaints of chest pressure at this time. Is currently resting comfortably. Nausea subsided after being medicated per MAR.

## 2022-09-09 ENCOUNTER — HOSPITAL ENCOUNTER (OUTPATIENT)
Dept: RADIATION ONCOLOGY | Facility: MEDICAL CENTER | Age: 77
End: 2022-09-30
Attending: RADIOLOGY
Payer: MEDICARE

## 2022-09-09 VITALS
DIASTOLIC BLOOD PRESSURE: 89 MMHG | HEIGHT: 60 IN | BODY MASS INDEX: 25.91 KG/M2 | SYSTOLIC BLOOD PRESSURE: 135 MMHG | HEART RATE: 93 BPM | OXYGEN SATURATION: 94 % | WEIGHT: 132 LBS

## 2022-09-09 DIAGNOSIS — C78.6 SECONDARY MALIGNANT NEOPLASM OF RETROPERITONEUM AND PERITONEUM (HCC): ICD-10-CM

## 2022-09-09 DIAGNOSIS — C53.8 MALIGNANT NEOPLASM OF OVERLAPPING SITES OF CERVIX (HCC): ICD-10-CM

## 2022-09-09 PROCEDURE — 99214 OFFICE O/P EST MOD 30 MIN: CPT | Performed by: RADIOLOGY

## 2022-09-09 PROCEDURE — 99205 OFFICE O/P NEW HI 60 MIN: CPT | Performed by: RADIOLOGY

## 2022-09-09 ASSESSMENT — PAIN SCALES - GENERAL: PAINLEVEL: NO PAIN

## 2022-09-09 ASSESSMENT — FIBROSIS 4 INDEX: FIB4 SCORE: 1.833333333333333333

## 2022-09-09 NOTE — CT SIMULATION
PATIENT NAME Nessa Dennis   PRIMARY PHYSICIAN Cody Julio TONNY [id number]   REFERRING PHYSICIAN Adam Hays M.D. 1945     History of Cervical cancer (HCC)  Staging form: Cervix Uteri, AJCC Version 9  - Clinical stage from 9/9/2022: FIGO Stage IVB (pM1) - Signed by Sari BRICENO M.D. on 9/9/2022  Histopathologic type: Basaloid squamous cell carcinoma  Stage prefix: Recurrence         Treatment Planning CT Simulation        Order Questions       Question Answer    Is this for a new course of treatment? Yes    Is this an Addendum? No    Implanted Device/Pacemaker No    Simulation Status Initial    Planned Start Date 9/26/2022    Treatment Site Bone - Rib    Laterality Left    Treatment Technique SBRT    Treatment Pattern/Frequency Single Fx    Concurrent Chemotherapy No    CT Technique 3D    Slice Thickness 1mm    Scan Extent Chest    Bowel Preparation No    Treatment Device(s) Body Fix    Patient Attire Gown    Patient Position Supine    Patient Orientation Head First    Arm Position Up    Treatment Machine TB1 - STx    Treatment Image Guidance CBCT    Frequency (CBCT) Daily    Image Guidance Match Bone    Treatment Planning Image Fusion CT/PET    Special Physics Consult Stereotactic    Other Orders Special Tx Procedure

## 2022-09-09 NOTE — PROGRESS NOTES
Patient was seen today in clinic with Dr. Aldrich for consultation.  Vitals signs and weight were obtained and pain assessment was completed.  Allergies and medications were reviewed with the patient.      Vitals/Pain:  Vitals:    09/09/22 1311   BP: 135/89   BP Location: Left arm   Patient Position: Sitting   BP Cuff Size: Adult   Pulse: 93   SpO2: 94%   Weight: 59.9 kg (132 lb)   Height: 1.524 m (5')   Pain Score: No pain        Allergies:   Sulfa drugs    Current Medications:  Current Outpatient Medications   Medication Sig Dispense Refill    Coenzyme Q10 (CO Q10) 60 MG Cap Take 60 mg by mouth every evening.      artificial tears 1.4 % Solution Administer 1 Drop into both eyes 1 time a day as needed (dry eyes).      vitamin D3 (CHOLECALCIFEROL) 1000 Unit (25 mcg) Tab Take 1,000 Units by mouth every day.      therapeutic multivitamin-minerals (THERAGRAN-M) Tab Take 1 Tablet by mouth every day.      Probiotic Product (PROBIOTIC ADVANCED) Cap Take 1 Capsule by mouth every evening.      Ascorbic Acid (VITAMIN C) 1000 MG Tab Take 1,000 mg by mouth 2 times a day.      atorvastatin (LIPITOR) 20 MG Tab Take 20 mg by mouth every evening.      lisinopril (PRINIVIL, ZESTRIL) 40 MG tablet Take 40 mg by mouth every morning. Indications: High Blood Pressure Disorder      alendronate (FOSAMAX) 70 MG Tab Take 70 mg by mouth every 7 days. (Patient not taking: Reported on 9/9/2022)      Multiple Vitamins-Minerals (SYSTANE ICAPS AREDS2) Tab Take 1 Tablet by mouth every day. (Patient not taking: Reported on 9/9/2022)       No current facility-administered medications for this encounter.         PCP:  Cody Gutierrez R.N.

## 2022-09-09 NOTE — CONSULTS
RADIATION ONCOLOGY CONSULT  Patient name:  Nessa Dennis    Primary Physician:  Julio Ross M.D. MRN: [id number]  [facility]: [id number]   Referring physician:  Adam Hays M.D.  : 1945, 76 y.o.       DATE OF SERVICE: 2022    IDENTIFICATION: A 76 y.o. female with  Visit Diagnoses     ICD-10-CM   1. 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 Malignant neoplasm of overlapping sites of cervix (HCC)  C53.8      History of Cervical cancer (HCC)  Staging form: Cervix Uteri, AJCC Version 9  - Clinical stage from 2022: FIGO Stage IVB (pM1) - Signed by Sari BRICENO M.D. on 2022  Histopathologic type: Basaloid squamous cell carcinoma  Stage prefix: Recurrence    She is here at the kind request of Adam Mckeon M.D.     HISTORY OF PRESENT ILLNESS:   Subjective     76-year-old female with history of squamous cell carcinoma of the cervix.  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  She was treated with chemoradiotherapy receiving 6000 cGy in 25 fractions over 5 weeks.    Patient states she did well for 7years postradiation.  She received no adjuvant therapy.  She subsequently developed a left upper lobe mass and underwent resection on 2022.  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  Margins were clear.  Negative lymph nodes.    She underwent surveillance PET/CT on 2022.  This showed a new 1.6 cm hypermetabolic soft tissue mass involving the chest wall between the 11th posterior lateral 11th and 12th ribs.  Biopsy performed on 2022 demonstrating basaloid squamous cell carcinoma.  Currently having no pain.    She also has a left nephrostomy tube in place.  She has a functional  colostomy.  She does self-catheterization as well.        PAST MEDICAL HISTORY:   Past Medical History:   Diagnosis Date    Adverse effect of anesthesia     pt states had hallucinations in PACU from drug given intraop - raspy d/t vocal cord injury    Anesthesia 02/23/2022    pt states had hallucinations in PACU from drug given intraop    Cancer ([facility]) 2005, 2015    cervical    Cancer ([facility]) 02/23/2022    left upper lung cancer    Colostomy in place ([facility])     Dental disorder 05/12/2022    upper crowns times 6 teeth    Environmental and seasonal allergies     Environmental and seasonal allergies     High cholesterol 05/12/2022    medicated    Hydronephrosis with ureteral stricture 12/30/2021    stents, removed, and nephrostomy tube placed    Hypertension 05/12/2022    medicated    Lung nodules     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[facility])     Unspecified urinary incontinence     wears pads, does self caths    Urinary bladder disorder     pt self caths 4x per day        PAST SURGICAL HISTORY:  Past Surgical History:   Procedure Laterality Date    NV LARYNGOSCOPY,DIRECT,SCOPE,INJ CORDS  5/26/2022    Procedure: INJECTION, VOCAL CORD, LARYNGOSCOPIC - THERAPEUTIC, WITH OPERATING MICROSCOPE OR TELESCOPE;  Surgeon: Marie Salas M.D.;  Location: SURGERY SAME DAY [facility];  Service: Ent    LARYNGOSCOPY Left 5/26/2022    Procedure: LARYNGOSCOPY - DIRECT;  Surgeon: Marie Salas M.D.;  Location: SURGERY SAME DAY [facility];  Service: Ent    NV CYSTOSCOPY,INSERT URETERAL STENT Left 3/13/2022    Procedure: CYSTOSCOPY, WITH URETERAL STENT REMOVAL ;  Surgeon: Adam Hays M.D.;  Location: SURGERY [facility];  Service: Gynecology Oncology    NV THORACOSCOPY,DX NO BX Left 2/23/2022    Procedure: THORACOSCOPY;  Surgeon: Chriss Etienne M.D.;  Location: SURGERY [facility];  Service: General    LYMPH NODE SAMPLING  2/23/2022    Procedure: SAMPLING, MULTIPLE LYMPH NODES - MEDIASTINAL;  Surgeon: Chriss Etienne M.D.;   Location: [facility];  Service: General    LOBECTOMY Left 2/23/2022    Procedure: LOBECTOMY - SUBLOBAR RESECTION UPPER LOBE;  Surgeon: Chriss Etienne M.D.;  Location: [facility];  Service: General    AL CYSTOSCOPY,INSERT URETERAL STENT Left 12/30/2021    Procedure: CYSTOSCOPY, WITH URETERAL STENT INSERTION - EXCHANGE;  Surgeon: Adam Hays M.D.;  Location: [facility];  Service: Gynecology Oncology    OTHER  11/2021    left lung biopsy    CYSTO STENT PLACEMNT PRE SURG Left 8/26/2021    Procedure: CYSTOSCOPY, WITH URETERAL STENT INSERTION OR REMOVAL - RIGHT AND/OR LEFT STENT EXCHANGE.;  Surgeon: Adam Hays M.D.;  Location: [facility];  Service: Gynecology Oncology    AL CYSTOSCOPY,INSERT URETERAL STENT Left 3/25/2021    Procedure: CYSTOSCOPY, WITH URETERAL STENT REMOVAL LEFT;  Surgeon: Adam Hays M.D.;  Location: [facility];  Service: Gynecology Oncology    AL CYSTOURETHROSCOPY,URETER CATHETER Left 12/31/2020    Procedure: CYSTOSCOPY, WITH left RETROGRADE PYELOGRAM;  Surgeon: Adam Hays M.D.;  Location: [facility];  Service: Gynecology Oncology    CYSTO STENT PLACEMNT PRE SURG Left 12/31/2020    Procedure: CYSTOSCOPY, WITH Left URETERAL STENT  removal and replacement;  Surgeon: Adam Hays M.D.;  Location: [facility];  Service: Gynecology Oncology    AL CYSTOSCOPY,INSERT URETERAL STENT Left 9/3/2020    Procedure: CYSTOSCOPY, WITH URETERAL STENT INSERTION- RIGHT AND OR LEFT EXCHANGE;  Surgeon: Adam Hays M.D.;  Location: [facility];  Service: Gynecology Oncology    AL CYSTOURETHROSCOPY,URETER CATHETER Left 9/3/2020    Procedure: RETROGRADE PYELOGRAM;  Surgeon: Adam Hays M.D.;  Location: [facility];  Service: Gynecology Oncology    AL CYSTOSCOPY,INSERT URETERAL STENT Left 5/21/2020    Procedure: CYSTOSCOPY, WITH URETERAL STENT INSERTION;  Surgeon: Adam Hays M.D.;  Location: [facility];  Service: Gynecology  Oncology    CYSTO STENT PLACEMNT PRE SURG Left 8/29/2019    Procedure: CYSTOSCOPY, WITH URETERAL STENT REMOVAL;  Surgeon: Adam Hays M.D.;  Location: SURGERY [facility];  Service: Gynecology    CYSTOSCOPY  7/31/2015    Procedure: CYSTOSCOPY;  Surgeon: Adam Hays M.D.;  Location: SURGERY [facility];  Service:     PELVIC EXAM UNDER ANESTHESIA  7/31/2015    Procedure: PELVIC EXAM UNDER ANESTHESIA;  Surgeon: Adam Hays M.D.;  Location: SURGERY [facility];  Service:     CERVICAL CONIZATION  7/31/2015    Procedure: CERVICAL CONIZATION FOR: CONE BIOPSY;  Surgeon: Adam Hays M.D.;  Location: SURGERY [facility];  Service:     COLOSTOMY CREATION LAPAROSCOPIC  4/14/2015    Performed by Mariusz Barajas M.D. at SURGERY [facility]    RECOVERY  4/6/2015    Performed by [facility] at SURGERY PRE-POST PROC UNIT [facility]    OTHER ABDOMINAL SURGERY  2015    bowel resection- has colostomy    GYN SURGERY  5/2005    hysterectomy       CURRENT MEDICATIONS:  Current Outpatient Medications   Medication Sig Dispense Refill    Coenzyme Q10 (CO Q10) 60 MG Cap Take 60 mg by mouth every evening.      artificial tears 1.4 % Solution Administer 1 Drop into both eyes 1 time a day as needed (dry eyes).      vitamin D3 (CHOLECALCIFEROL) 1000 Unit (25 mcg) Tab Take 1,000 Units by mouth every day.      therapeutic multivitamin-minerals (THERAGRAN-M) Tab Take 1 Tablet by mouth every day.      Probiotic Product (PROBIOTIC ADVANCED) Cap Take 1 Capsule by mouth every evening.      Ascorbic Acid (VITAMIN C) 1000 MG Tab Take 1,000 mg by mouth 2 times a day.      atorvastatin (LIPITOR) 20 MG Tab Take 20 mg by mouth every evening.      lisinopril (PRINIVIL, ZESTRIL) 40 MG tablet Take 40 mg by mouth every morning. Indications: High Blood Pressure Disorder      alendronate (FOSAMAX) 70 MG Tab Take 70 mg by mouth every 7 days. (Patient not taking: Reported on 9/9/2022)      Multiple Vitamins-Minerals (SYSTANE ICAPS AREDS2)  Tab Take 1 Tablet by mouth every day. (Patient not taking: Reported on 9/9/2022)       No current facility-administered medications for this encounter.       ALLERGIES:    Sulfa drugs    FAMILY HISTORY:    family history includes Breast Cancer in her sister; Cancer in her father and sister; Hypertension in her father; Stroke in her brother.    SOCIAL HISTORY:     reports that she has never smoked. She has never used smokeless tobacco. She reports that she does not currently use alcohol. She reports that she does not currently use drugs.  Patient is a 76 year old female who resides in [place] with her Spouse. She is a retired .    REVIEW OF SYSTEMS: Complete review of systems taken.  Pertinent items in HPI.  All others negative.    PAIN ASSESSMENT:  Pain Assessment 9/9/2022   Pain Score 0   Some recent data might be hidden         PHYSICAL EXAM:   PERFORMANCE STATUS:  Karnofsky Score 9/9/2022   Karnofsky Score 90   Some recent data might be hidden     ECOG Performance Review 9/9/2022   ECOG Performance Status Fully active, able to carry on all pre-disease performance without restriction   Some recent data might be hidden     /89 (BP Location: Left arm, Patient Position: Sitting, BP Cuff Size: Adult)   Pulse 93   Ht 1.524 m (5')   Wt 59.9 kg (132 lb)   LMP  (LMP Unknown) Comment: 3/13/22 HCG negative  SpO2 94%   BMI 25.78 kg/m²   Physical Exam  Vitals and nursing note reviewed.   Constitutional:       Appearance: She is well-developed.   HENT:      Head: Normocephalic.   Musculoskeletal:         General: No tenderness.   Skin:     General: Skin is warm and dry.      Findings: No erythema.   Neurological:      Mental Status: She is alert and oriented to person, place, and time.   Psychiatric:         Behavior: Behavior normal.         Thought Content: Thought content normal.         Judgment: Judgment normal.            LABORATORY DATA:  Lab Results   Component Value Date/Time    WBC 7.8  08/17/2022 04:25 PM    RBC 5.28 08/17/2022 04:25 PM    HEMOGLOBIN 13.4 08/17/2022 04:25 PM    HEMATOCRIT 41.5 08/17/2022 04:25 PM    MCV 78.6 (L) 08/17/2022 04:25 PM    MCH 25.4 (L) 08/17/2022 04:25 PM    MCHC 32.3 (L) 08/17/2022 04:25 PM    RDW 44.6 08/17/2022 04:25 PM    PLATELETCT 224 08/17/2022 04:25 PM    MPV 9.4 08/17/2022 04:25 PM    NEUTSPOLYS 77.80 (H) 08/17/2022 04:25 PM    LYMPHOCYTES 15.30 (L) 08/17/2022 04:25 PM    MONOCYTES 5.80 08/17/2022 04:25 PM    EOSINOPHILS 0.40 08/17/2022 04:25 PM    BASOPHILS 0.30 08/17/2022 04:25 PM      Lab Results   Component Value Date/Time    SODIUM 139 08/17/2022 04:25 PM    POTASSIUM 4.4 08/17/2022 04:25 PM    CHLORIDE 107 08/17/2022 04:25 PM    CO2 19 (L) 08/17/2022 04:25 PM    GLUCOSE 102 (H) 08/17/2022 04:25 PM    BUN 19 08/17/2022 04:25 PM    CREATININE 0.83 08/17/2022 04:25 PM    CREATININE 0.7 03/27/2006 02:58 PM         RADIOLOGY DATA:  CT-ABDOMEN-PELVIS WITH    Result Date: 8/16/2022  1.  New 6 x 4 mm mid left ureteral calculus and there is similar severe hydronephrosis with a normal-appearing nephrostomy tube in place. Again there is a delayed left nephrogram 2.  Status post hysterectomy, oophorectomies and there is no evidence of local recurrence or metastasis 3.  Stable severe urinary bladder wall thickening with distal left ureteral stricturing and calcification as before. This is compatible with prior radiation therapy. Superimposed urinary tract infection is not excluded 4.  Left colostomy with similar parastomal hernia that does not result in obstruction. There is above average stool volume Voalte sent to [facility] before this dictation was created    DX-CHEST-PORTABLE (1 VIEW)    Result Date: 8/17/2022  No acute cardiac or pulmonary abnormalities are identified.    IR-NEPHROSTOGRAM THROUGH EXT CATH (ALL RADIOLOGY) LEFT    Result Date: 8/17/2022  1.  Successful removal and upsizing of left-sided nephrostomy tube to 12 Gabonese. 2.  Purulent urine was  aspirated from the new nephrostomy tube and sent to the lab for analysis. 3.  Left nephrostogram demonstrates good positioning of the new left nephrostomy tube in a markedly dilated left renal pelvis.      IMPRESSION:    A 76 y.o. with  Visit Diagnoses     ICD-10-CM   1. 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 Malignant neoplasm of overlapping sites of cervix (HCC)  C53.8     History of Cervical cancer (HCC)  Staging form: Cervix Uteri, AJCC Version 9  - Clinical stage from 9/9/2022: FIGO Stage IVB (pM1) - Signed by Sari BRICENO M.D. on 9/9/2022  Histopathologic type: Basaloid squamous cell carcinoma  Stage prefix: Recurrence      RECOMMENDATIONS:   76-year-old female with history of cervical cancer with oligometastatic deposit involving the left chest wall.  Measuring approximately 1.6 cm in size.  Currently painless.  She does occasionally experience some discomfort.  Reviewed imaging with her and did recommend short course stereotactic treatment to the left chest wall mass.  Treatments involve delivering 50 Gray 5 fractions every other day.  Technical aspects benefits risks associated with therapy were reviewed.  She may experience some rib discomfort which should resolve with anti-inflammatories.  Is also a small risk of rib fracture.  After discussion she and her  understand and would like to proceed.  He will return for simulation on September 13 with treatment anticipated to start the 26th and complete by October 5.    Thank you for the opportunity to participate in her care.  If any questions or comments, please do not hesitate in calling.    Orders Placed This Encounter    Rad Onc Treatment Planning CT Simulation    REFERRAL TO ONCOLOGY NURSE NAVIGATOR

## 2022-09-12 ENCOUNTER — PATIENT OUTREACH (OUTPATIENT)
Dept: ONCOLOGY | Facility: MEDICAL CENTER | Age: 77
End: 2022-09-12
Payer: MEDICARE

## 2022-09-13 ENCOUNTER — HOSPITAL ENCOUNTER (OUTPATIENT)
Dept: LAB | Facility: MEDICAL CENTER | Age: 77
End: 2022-09-13
Attending: NURSE PRACTITIONER
Payer: MEDICARE

## 2022-09-13 ENCOUNTER — HOSPITAL ENCOUNTER (OUTPATIENT)
Dept: RADIATION ONCOLOGY | Facility: MEDICAL CENTER | Age: 77
End: 2022-09-13

## 2022-09-13 LAB
APPEARANCE UR: CLEAR
BACTERIA #/AREA URNS HPF: ABNORMAL /HPF
BILIRUB UR QL STRIP.AUTO: NEGATIVE
COLOR UR: YELLOW
EPI CELLS #/AREA URNS HPF: ABNORMAL /HPF
GLUCOSE UR STRIP.AUTO-MCNC: NEGATIVE MG/DL
HYALINE CASTS #/AREA URNS LPF: ABNORMAL /LPF
KETONES UR STRIP.AUTO-MCNC: NEGATIVE MG/DL
LEUKOCYTE ESTERASE UR QL STRIP.AUTO: ABNORMAL
MICRO URNS: ABNORMAL
NITRITE UR QL STRIP.AUTO: NEGATIVE
PH UR STRIP.AUTO: 5.5 [PH] (ref 5–8)
PROT UR QL STRIP: 30 MG/DL
RBC # URNS HPF: ABNORMAL /HPF
RBC UR QL AUTO: ABNORMAL
SP GR UR STRIP.AUTO: 1.02
UROBILINOGEN UR STRIP.AUTO-MCNC: 0.2 MG/DL
WBC #/AREA URNS HPF: ABNORMAL /HPF

## 2022-09-13 PROCEDURE — 81001 URINALYSIS AUTO W/SCOPE: CPT

## 2022-09-13 PROCEDURE — 77334 RADIATION TREATMENT AID(S): CPT | Performed by: RADIOLOGY

## 2022-09-13 PROCEDURE — 87186 SC STD MICRODIL/AGAR DIL: CPT

## 2022-09-13 PROCEDURE — 77263 THER RADIOLOGY TX PLNG CPLX: CPT | Performed by: RADIOLOGY

## 2022-09-13 PROCEDURE — 77290 THER RAD SIMULAJ FIELD CPLX: CPT | Mod: 26 | Performed by: RADIOLOGY

## 2022-09-13 PROCEDURE — 77470 SPECIAL RADIATION TREATMENT: CPT | Performed by: RADIOLOGY

## 2022-09-13 PROCEDURE — 77470 SPECIAL RADIATION TREATMENT: CPT | Mod: 26 | Performed by: RADIOLOGY

## 2022-09-13 PROCEDURE — 77290 THER RAD SIMULAJ FIELD CPLX: CPT | Performed by: RADIOLOGY

## 2022-09-13 PROCEDURE — 77334 RADIATION TREATMENT AID(S): CPT | Mod: 26 | Performed by: RADIOLOGY

## 2022-09-13 PROCEDURE — 87086 URINE CULTURE/COLONY COUNT: CPT

## 2022-09-13 NOTE — RADIATION COMPLETION NOTES
PATIENT NAME Nessa Dennis   PRIMARY PHYSICIAN Julio Ross [id number]   REFERRING PHYSICIAN No ref. provider found 1945     DATE OF SERVICE: 9/13/2022    DIAGNOSIS:  History of Cervical cancer (HCC)  Staging form: Cervix Uteri, AJCC Version 9  - Clinical stage from 9/9/2022: FIGO Stage IVB (pM1) - Signed by Sari BRICENO M.D. on 9/9/2022  Histopathologic type: Basaloid squamous cell carcinoma  Stage prefix: Recurrence         SPECIAL TREATMENT PROCEDURE NOTE:  Considerable additional effort required in the management of this case because of administration of Stereotactic Radiotherapy, which may result in increased normal tissue toxicity and require greater effort in contouring and treatment because of greater precision.

## 2022-09-13 NOTE — RADIATION COMPLETION NOTES
Clinical Treatment Planning Note    DATE OF SERVICE: 9/13/2022    DIAGNOSIS:  History of Cervical cancer (HCC)  Staging form: Cervix Uteri, AJCC Version 9  - Clinical stage from 9/9/2022: FIGO Stage IVB (pM1) - Signed by Sari BRICENO M.D. on 9/9/2022  Histopathologic type: Basaloid squamous cell carcinoma  Stage prefix: Recurrence         IMAGING REVIEWED:  [] CT     [] MRI     [x] PET/CT     [] BONE SCAN     [] MAMMO     [] OTHER      TREATMENT INTENT:   [] CURATIVE     [] MAINTENANCE     []  PALLIATIVE      []  SUPPORTIVE     []  PROPHYLACTIC     [] BENIGN     []  CONSOLIDATIVE      [] DEFINITIVE   [x]  OLOGIMETASTATIC      LINE OF TREATMENT:  [] ADJUVANT   [x] DEFINITIVE   [] NEOADJUVANT   [] RE-TREATMENT      TECHNIQUE PLANNED:  [] IMRT   [] 3D   [x] SBRT   [] SRS/SRT   [] HDR   [] ELECTRON       IMRT JUSTIFICATION:  []   An immediately adjacent area has been previously irradiated and abutting portals must be established with high precision.    []  Dose escalation is planned to deliver radiation doses in excess of those commonly utilized for similar tumors with conventional treatment.    []  The target volume is concave or convex, and the critical normal tissues are within or around that convexity or concavity.    []  The target volume is in close proximity to critical structures that must be protected.    []  The volume of interest must be covered with narrow margins to adequately protect  immediately adjacent structures.      FIELDS & BLOCKING:  [] COMPLEX BLOCKS     []  = 3 TX AREAS     [x]  ARCS     []  CUSTOM SHEILD        []  SIMPLE BLOCK      CHEMOTHERAPY:  []  CONCURRENT     []  INDUCTION     [] SEQUENTIAL     []  <30 DAYS FROM XRT      NOTES:  OAR CONSTRAINTS: (GUIDELINES ONLY NOT ABSOLUTE)   QUANTEC OR AS STATED  Critical Structure Dose/fx Volume Dose Max Dose Protocol   Brachial Plexus 10-12 Gy x5 3 cc 6 Gy/fx  813   Brachial Plexus 10-12 Gy x5   6.4 Gy/fx 813   Brachial Plexus 20 Gy x3   8  Gy/fx 618   Bronchus/Trachea 10-12 Gy x5 4cc 3.6 Gy/fx 105%    Bronchus/Trachea 10-12 Gy x5   105%    Bronchus/Trachea 20 Gy x3   8 Gy/fx 618   Esophagus, nonadjacent wall 10-12 Gy x5 5 cc 5.5 Gy/fx  813   Esophagus, nonadjacent wall 10-12 Gy x5   105%    Esophagus 20 Gy x3   9 Gy/fx 618   Great Vessels 10-12 Gy x5 10 cc 9.4 Gy/fx  813   Great Vessels 10-12 Gy x5   105%    Heart 10-12 Gy x5 15 cc 5.5 Gy/fx  813   Heart 10-12 Gy x5   105%    Heart 20 Gy x3   8 Gy/fx 618   Lungs, total 10-12 Gy x5 1500 cc 2.5 Gy/fx  813   Lungs, total 10-12 Gy x5 1000 cc 2.7 Gy/fx  813   Lungs, total 20 Gy x3 V20 10% 8 Gy/fx 618   Skin 10-12 Gy x5 10 cc 6 Gy/fx  813   Skin 10-12 Gy x5   6.4 Gy/fx 813   Skin 20 Gy x3   8 Gy/fx 618   Spinal Cord 10-12 Gy x5 0.25 cc 4.5 Gy/fx  813   Spinal Cord 10-12 Gy x5 0.5 cc 2.7 Gy/fx  813   Spinal Cord 10-12 Gy x5   6 Gy/fx 813   Spinal Cord 12 Gy x4 0.35 cc 5.2 Gy/fx  915   Spinal Cord 12 Gy x4 1.2 cc 3.4 Gy/fx  915   Spinal Cord 20 Gy x3   6 Gy/fx 618

## 2022-09-13 NOTE — RADIATION COMPLETION NOTES
DATE OF SERVICE: 9/13/2022    DIAGNOSIS:  History of Cervical cancer (HCC)  Staging form: Cervix Uteri, AJCC Version 9  - Clinical stage from 9/9/2022: FIGO Stage IVB (pM1) - Signed by Sari BRICENO M.D. on 9/9/2022  Histopathologic type: Basaloid squamous cell carcinoma  Stage prefix: Recurrence       DATE OF SERVICE: 9/13/2022    TYPE OF SIMULATION: SBRT    GOAL OF TREATMENT:   [] Curative  [] Palliative  [x] Oligometastatic    CONTRAST:    [] IV Contrast*  [] Oral Contrast               POSITION:    [x]  Supine  [] Prone     IMMOBILIZATION DEVICE: [x]  Body-Fix  [] Omniboard  []  Bellyboard    PROCEDURE: Patient placed in immobilization device. non-gated CT obtained through [facility].  Images viewed and approved.  Images reconstructed as need.  Image data sets transferred to [employer] for planning.    I have personally reviewed the relevant data, performed the target localization, and determined all relevant factors for this patient’s simulation.    *Omnipaque 80 -100cc IVP in conjunction with 500cc NS

## 2022-09-14 ENCOUNTER — PATIENT OUTREACH (OUTPATIENT)
Dept: ONCOLOGY | Facility: MEDICAL CENTER | Age: 77
End: 2022-09-14
Payer: MEDICARE

## 2022-09-14 NOTE — LETTER
[facility]   [address]  [place] [address]                Nessa Dennis  [address] [place] [address]     Date: 09/14/22  Medical Record Number: [id number]    Dear Nessa,    I am a Cancer Nurse Navigator, a certified oncology nurse. My role is to assess any needs you may have with education, guidance and support.     I am available to address your needs during your journey with the following services:     Care Coordination  I can assist you in facilitating communication between your cancer care treatment team to ensure timely treatment and follow-up.  I can also assist with transition of care back to your primary care provider, or other specialist, as needed.  My goal is to bridge gaps for you throughout the course of your active treatment.       Education Services  Understanding the recommended treatment course by your physician is key. I can provide educational resources personalized to your cancer diagnosis to help you understand your diagnosis and treatment. Please let me know if you would like to receive information about your diagnosis and treatment plan.  I am here to help.     Support Services/Resource Information  [facility] we offer a full scope of support services.  I can assist you with referral information to:  · Cancer Clinical Trials & Research  · Nutrition counseling  · Support groups  · Complementary Therapies such as Mind-Body Techniques Meditation  · Patient Financial Advocates  ·   · [facility], an [employer] affiliate office, our volunteers can assist you with accessing our lending library, support services information, head coverings and comfort items  · Community and national resources, included eligibility based mckenzie assistance and pharmaceutical access programs, if you are in need of additional information.     [facility] offers services that include:  · Behavioral Health  · Genetic  counseling & testing  · Acupuncture  · Lymphedema prevention/treatment program  · Palliative care services.       I hope you have an excellent patient experience.  Please feel free to share with me your comments regarding the care you have received- we value your feedback.    Sincerely,     Logan Finnegan R.N.  Cancer Nurse Navigator    Office: [phone] / [phone]  Email:  [email or web address]

## 2022-09-14 NOTE — PROGRESS NOTES
Pt returns call.  She reports some fatigue but denies any pain or discomfort.  Pt denies any barriers at this time.  Reviewed support services.  Pt agrees to contact ONN should any questions or concerns arise.

## 2022-09-21 PROCEDURE — 77334 RADIATION TREATMENT AID(S): CPT | Mod: 26 | Performed by: RADIOLOGY

## 2022-09-21 PROCEDURE — 77295 3-D RADIOTHERAPY PLAN: CPT | Mod: 26 | Performed by: RADIOLOGY

## 2022-09-21 PROCEDURE — 77334 RADIATION TREATMENT AID(S): CPT | Performed by: RADIOLOGY

## 2022-09-21 PROCEDURE — 77295 3-D RADIOTHERAPY PLAN: CPT | Performed by: RADIOLOGY

## 2022-09-21 PROCEDURE — 77300 RADIATION THERAPY DOSE PLAN: CPT | Mod: 26 | Performed by: RADIOLOGY

## 2022-09-21 PROCEDURE — 77300 RADIATION THERAPY DOSE PLAN: CPT | Performed by: RADIOLOGY

## 2022-09-26 ENCOUNTER — HOSPITAL ENCOUNTER (OUTPATIENT)
Dept: RADIATION ONCOLOGY | Facility: MEDICAL CENTER | Age: 77
End: 2022-09-26
Payer: MEDICARE

## 2022-09-26 LAB
CHEMOTHERAPY INFUSION START DATE: NORMAL
CHEMOTHERAPY RECORDS: 4500
CHEMOTHERAPY RECORDS: 9
CHEMOTHERAPY RECORDS: NORMAL
CHEMOTHERAPY RX CANCER: NORMAL
DATE 1ST CHEMO CANCER: NORMAL
RAD ONC ARIA COURSE LAST TREATMENT DATE: NORMAL
RAD ONC ARIA COURSE TREATMENT ELAPSED DAYS: NORMAL
RAD ONC ARIA REFERENCE POINT DOSAGE GIVEN TO DATE: 9
RAD ONC ARIA REFERENCE POINT DOSAGE GIVEN TO DATE: 9.42
RAD ONC ARIA REFERENCE POINT ID: NORMAL
RAD ONC ARIA REFERENCE POINT ID: NORMAL
RAD ONC ARIA REFERENCE POINT SESSION DOSAGE GIVEN: 9
RAD ONC ARIA REFERENCE POINT SESSION DOSAGE GIVEN: 9.42

## 2022-09-26 PROCEDURE — 77373 STRTCTC BDY RAD THER TX DLVR: CPT | Performed by: RADIOLOGY

## 2022-09-26 PROCEDURE — 77280 THER RAD SIMULAJ FIELD SMPL: CPT | Mod: 26 | Performed by: RADIOLOGY

## 2022-09-26 PROCEDURE — 77280 THER RAD SIMULAJ FIELD SMPL: CPT | Performed by: RADIOLOGY

## 2022-09-26 PROCEDURE — 77370 RADIATION PHYSICS CONSULT: CPT | Performed by: RADIOLOGY

## 2022-09-28 ENCOUNTER — HOSPITAL ENCOUNTER (OUTPATIENT)
Dept: RADIATION ONCOLOGY | Facility: MEDICAL CENTER | Age: 77
End: 2022-09-28
Payer: MEDICARE

## 2022-09-28 LAB
CHEMOTHERAPY INFUSION START DATE: NORMAL
CHEMOTHERAPY RECORDS: 4500
CHEMOTHERAPY RECORDS: 9
CHEMOTHERAPY RECORDS: NORMAL
CHEMOTHERAPY RX CANCER: NORMAL
DATE 1ST CHEMO CANCER: NORMAL
RAD ONC ARIA COURSE LAST TREATMENT DATE: NORMAL
RAD ONC ARIA COURSE TREATMENT ELAPSED DAYS: NORMAL
RAD ONC ARIA REFERENCE POINT DOSAGE GIVEN TO DATE: 18
RAD ONC ARIA REFERENCE POINT DOSAGE GIVEN TO DATE: 18.83
RAD ONC ARIA REFERENCE POINT ID: NORMAL
RAD ONC ARIA REFERENCE POINT ID: NORMAL
RAD ONC ARIA REFERENCE POINT SESSION DOSAGE GIVEN: 9
RAD ONC ARIA REFERENCE POINT SESSION DOSAGE GIVEN: 9.42

## 2022-09-28 PROCEDURE — 77280 THER RAD SIMULAJ FIELD SMPL: CPT | Mod: 26 | Performed by: RADIOLOGY

## 2022-09-28 PROCEDURE — 77280 THER RAD SIMULAJ FIELD SMPL: CPT | Performed by: RADIOLOGY

## 2022-09-28 PROCEDURE — 77373 STRTCTC BDY RAD THER TX DLVR: CPT | Performed by: RADIOLOGY

## 2022-09-30 ENCOUNTER — HOSPITAL ENCOUNTER (OUTPATIENT)
Dept: LAB | Facility: MEDICAL CENTER | Age: 77
End: 2022-09-30
Attending: SPECIALIST
Payer: MEDICARE

## 2022-09-30 ENCOUNTER — HOSPITAL ENCOUNTER (OUTPATIENT)
Dept: RADIATION ONCOLOGY | Facility: MEDICAL CENTER | Age: 77
End: 2022-09-30

## 2022-09-30 LAB
CHEMOTHERAPY INFUSION START DATE: NORMAL
CHEMOTHERAPY INFUSION START DATE: NORMAL
CHEMOTHERAPY INFUSION STOP DATE: NORMAL
CHEMOTHERAPY RECORDS: 2.4
CHEMOTHERAPY RECORDS: 4500
CHEMOTHERAPY RECORDS: 6000
CHEMOTHERAPY RECORDS: 9
CHEMOTHERAPY RECORDS: NORMAL
CHEMOTHERAPY RX CANCER: NORMAL
DATE 1ST CHEMO CANCER: NORMAL
RAD ONC ARIA COURSE LAST TREATMENT DATE: NORMAL
RAD ONC ARIA COURSE TREATMENT ELAPSED DAYS: NORMAL
RAD ONC ARIA COURSE TREATMENT ELAPSED DAYS: NORMAL
RAD ONC ARIA REFERENCE POINT DOSAGE GIVEN TO DATE: 0
RAD ONC ARIA REFERENCE POINT DOSAGE GIVEN TO DATE: 0
RAD ONC ARIA REFERENCE POINT DOSAGE GIVEN TO DATE: 27
RAD ONC ARIA REFERENCE POINT DOSAGE GIVEN TO DATE: 28.25
RAD ONC ARIA REFERENCE POINT ID: NORMAL
RAD ONC ARIA REFERENCE POINT SESSION DOSAGE GIVEN: 9
RAD ONC ARIA REFERENCE POINT SESSION DOSAGE GIVEN: 9.42

## 2022-09-30 PROCEDURE — 77373 STRTCTC BDY RAD THER TX DLVR: CPT | Performed by: RADIOLOGY

## 2022-09-30 PROCEDURE — 77280 THER RAD SIMULAJ FIELD SMPL: CPT | Mod: 26 | Performed by: RADIOLOGY

## 2022-09-30 PROCEDURE — 77336 RADIATION PHYSICS CONSULT: CPT | Mod: XU | Performed by: RADIOLOGY

## 2022-09-30 PROCEDURE — 77280 THER RAD SIMULAJ FIELD SMPL: CPT | Mod: XU | Performed by: RADIOLOGY

## 2022-09-30 NOTE — PROCEDURES
DATE OF SERVICE: 9/30/2022    DIAGNOSIS:  History of Cervical cancer (HCC)  Staging form: Cervix Uteri, AJCC Version 9  - Clinical stage from 9/9/2022: FIGO Stage IVB (pM1) - Signed by Sari BRICENO M.D. on 9/9/2022  Histopathologic type: Basaloid squamous cell carcinoma  Stage prefix: Recurrence       TREATMENT:  Radiation Therapy Episodes       Active Episodes       Radiation Therapy: SBRT (9/26/2022)                   Radiation Treatments         Plan Last Treated On Elapsed Days Fractions Treated Prescribed Fraction Dose (cGy) Prescribed Total Dose (cGy)    SBRT L Rib 9/30/2022 4 @ [id number] 3 of 5 900 4,500                  Reference Point Last Treated On Elapsed Days Most Recent Session Dose (cGy) Total Dose (cGy)    SBRT L Rib 9/30/2022 4 @ [id number] 900 2,700    SBRT L Rib CP 9/30/2022 4 @ [id number] 942 2,825                            STEREOTACTIC PROCEDURE NOTE:    Called by [employer] machine to verify treatment parameters including:  treatment site, treatment dose, and treatment setup prior to stereotactic treatment..    Patient was placed in the treatment position with use of immobilization device and  laser guidance. CBCT images were acquired for target localization.  Images were reviewed in the axial, coronal, and saggital views and shifts were made as necessary to ensure that patient position matched simulation position.      Treatment delivered per  prescription.  The medical physicist was present throughout the set-up, verification and treatment delivery to oversee the procedure and ensure all parameters agreed with the computerized plan.    I have personally reviewed the relevant data, performed the target localization, and determined all relevant factors for this patient’s simulation.

## 2022-10-03 ENCOUNTER — HOSPITAL ENCOUNTER (OUTPATIENT)
Dept: RADIATION ONCOLOGY | Facility: MEDICAL CENTER | Age: 77
End: 2022-10-31
Attending: RADIOLOGY
Payer: MEDICARE

## 2022-10-03 ENCOUNTER — HOSPITAL ENCOUNTER (OUTPATIENT)
Dept: RADIATION ONCOLOGY | Facility: MEDICAL CENTER | Age: 77
End: 2022-10-03

## 2022-10-03 LAB
CHEMOTHERAPY INFUSION START DATE: NORMAL
CHEMOTHERAPY RECORDS: 4500
CHEMOTHERAPY RECORDS: 9
CHEMOTHERAPY RECORDS: NORMAL
CHEMOTHERAPY RX CANCER: NORMAL
DATE 1ST CHEMO CANCER: NORMAL
RAD ONC ARIA COURSE LAST TREATMENT DATE: NORMAL
RAD ONC ARIA COURSE TREATMENT ELAPSED DAYS: NORMAL
RAD ONC ARIA REFERENCE POINT DOSAGE GIVEN TO DATE: 36
RAD ONC ARIA REFERENCE POINT DOSAGE GIVEN TO DATE: 37.67
RAD ONC ARIA REFERENCE POINT ID: NORMAL
RAD ONC ARIA REFERENCE POINT ID: NORMAL
RAD ONC ARIA REFERENCE POINT SESSION DOSAGE GIVEN: 9
RAD ONC ARIA REFERENCE POINT SESSION DOSAGE GIVEN: 9.42

## 2022-10-03 PROCEDURE — 77280 THER RAD SIMULAJ FIELD SMPL: CPT | Mod: 26 | Performed by: RADIOLOGY

## 2022-10-03 PROCEDURE — 77280 THER RAD SIMULAJ FIELD SMPL: CPT | Performed by: RADIOLOGY

## 2022-10-03 PROCEDURE — 77373 STRTCTC BDY RAD THER TX DLVR: CPT | Performed by: RADIOLOGY

## 2022-10-05 ENCOUNTER — HOSPITAL ENCOUNTER (OUTPATIENT)
Dept: RADIATION ONCOLOGY | Facility: MEDICAL CENTER | Age: 77
End: 2022-10-05

## 2022-10-05 VITALS
TEMPERATURE: 97 F | SYSTOLIC BLOOD PRESSURE: 151 MMHG | DIASTOLIC BLOOD PRESSURE: 88 MMHG | OXYGEN SATURATION: 95 % | RESPIRATION RATE: 16 BRPM | BODY MASS INDEX: 25.78 KG/M2 | WEIGHT: 132 LBS | HEART RATE: 82 BPM

## 2022-10-05 LAB
CHEMOTHERAPY INFUSION START DATE: NORMAL
CHEMOTHERAPY INFUSION START DATE: NORMAL
CHEMOTHERAPY INFUSION STOP DATE: NORMAL
CHEMOTHERAPY RECORDS: 4500
CHEMOTHERAPY RECORDS: 4500
CHEMOTHERAPY RECORDS: 9
CHEMOTHERAPY RECORDS: 9
CHEMOTHERAPY RECORDS: NORMAL
CHEMOTHERAPY RX CANCER: NORMAL
CHEMOTHERAPY RX CANCER: NORMAL
DATE 1ST CHEMO CANCER: NORMAL
DATE 1ST CHEMO CANCER: NORMAL
RAD ONC ARIA COURSE LAST TREATMENT DATE: NORMAL
RAD ONC ARIA COURSE LAST TREATMENT DATE: NORMAL
RAD ONC ARIA COURSE TREATMENT ELAPSED DAYS: NORMAL
RAD ONC ARIA COURSE TREATMENT ELAPSED DAYS: NORMAL
RAD ONC ARIA REFERENCE POINT DOSAGE GIVEN TO DATE: 45
RAD ONC ARIA REFERENCE POINT DOSAGE GIVEN TO DATE: 45
RAD ONC ARIA REFERENCE POINT DOSAGE GIVEN TO DATE: 47.08
RAD ONC ARIA REFERENCE POINT DOSAGE GIVEN TO DATE: 47.08
RAD ONC ARIA REFERENCE POINT ID: NORMAL
RAD ONC ARIA REFERENCE POINT SESSION DOSAGE GIVEN: 9
RAD ONC ARIA REFERENCE POINT SESSION DOSAGE GIVEN: 9.42

## 2022-10-05 PROCEDURE — 77373 STRTCTC BDY RAD THER TX DLVR: CPT | Performed by: RADIOLOGY

## 2022-10-05 PROCEDURE — 77280 THER RAD SIMULAJ FIELD SMPL: CPT | Performed by: RADIOLOGY

## 2022-10-05 PROCEDURE — 77435 SBRT MANAGEMENT: CPT | Performed by: RADIOLOGY

## 2022-10-05 PROCEDURE — 77280 THER RAD SIMULAJ FIELD SMPL: CPT | Mod: 26 | Performed by: RADIOLOGY

## 2022-10-05 ASSESSMENT — PAIN SCALES - GENERAL: PAINLEVEL: 5=MODERATE PAIN

## 2022-10-05 ASSESSMENT — FIBROSIS 4 INDEX: FIB4 SCORE: 1.857142857142857143

## 2022-10-05 NOTE — PROCEDURES
DATE OF SERVICE: 10/5/2022    DIAGNOSIS:  History of Cervical cancer (HCC)  Staging form: Cervix Uteri, AJCC Version 9  - Clinical stage from 9/9/2022: FIGO Stage IVB (pM1) - Signed by Sari BRICENO M.D. on 9/9/2022  Histopathologic type: Basaloid squamous cell carcinoma  Stage prefix: Recurrence       TREATMENT:  Radiation Therapy Episodes       Active Episodes       Radiation Therapy: SBRT (9/26/2022)                   Radiation Treatments         Plan Last Treated On Elapsed Days Fractions Treated Prescribed Fraction Dose (cGy) Prescribed Total Dose (cGy)    Pelvis 13F # 9/30/2022  @  0 of 25 240 6,000    SBRT L Rib 10/5/2022 9 @ [id number] 5 of 5 900 4,500                  Reference Point Last Treated On Elapsed Days Most Recent Session Dose (cGy) Total Dose (cGy)    Pelvis 9/30/2022  @  -- 0    ptv45 9/30/2022  @  -- 0    SBRT L Rib 10/5/2022 9 @ [id number] 900 4,500    SBRT L Rib CP 10/5/2022 9 @ [id number] 942 4,708                            STEREOTACTIC PROCEDURE NOTE:    Called by Truebeam machine to verify treatment parameters including:  treatment site, treatment dose, and treatment setup prior to stereotactic treatment..    Patient was placed in the treatment position with use of immobilization device and  laser guidance. CBCT images were acquired for target localization.  Images were reviewed in the axial, coronal, and saggital views and shifts were made as necessary to ensure that patient position matched simulation position.      Treatment delivered per  prescription.  The medical physicist was present throughout the set-up, verification and treatment delivery to oversee the procedure and ensure all parameters agreed with the computerized plan.    I have personally reviewed the relevant data, performed the target localization, and determined all relevant factors for this patient’s simulation.

## 2022-10-05 NOTE — ON TREATMENT VISIT
"  ON TREATMENT  NOTE  RADIATION ONCOLOGY DEPARTMENT    Patient name:  Nessa Dennis    Primary Physician:  Julio Ross M.D. MRN: [id number]  CSN: [id number]   Referring physician:  No ref. provider found   : 1945, 77 y.o.     ENCOUNTER DATE:  10/5/2022      DIAGNOSIS:  History of Cervical cancer (HCC)  Staging form: Cervix Uteri, AJCC Version 9  - Clinical stage from 2022: FIGO Stage IVB (pM1) - Signed by Sari BRICENO M.D. on 2022  Histopathologic type: Basaloid squamous cell carcinoma  Stage prefix: Recurrence      TREATMENT SUMMARY:  Course First Treatment Date 2022  Course Last Treatment Date 10/05/2022  Radiation Treatments       Active   Plans   Pelvis 13F #   Most recent treatment: Dose planned: 240 cGy (fraction 0 of 25 on 2022)   Total: Dose planned: 6,000 cGy   Elapsed Days:  @       SBRT L Rib   Most recent treatment: Dose planned: 900 cGy (fraction 5 of 5 on 10/5/2022)   Total: Dose planned: 4,500 cGy   Elapsed Days: 9 @ [id number]           Historical   No historical radiation treatments to show.                 SUBJECTIVE:  Tolerated SBRT well no complaints.  Having some pain at the stoma site.  Thinks is associated with the radiation.  Reviewed plan and pointed out there is no radiation dose being received by the stoma.    VITAL SIGNS:  Vitals 10/5/2022 2022 2022 2022 2022 2022 3/19/2022   SYSTOLIC 151 135 128 - 145 164 152   DIASTOLIC 88 89 83 - 75 90 90   PULSE 82 93 92 - 84 83 84   TEMPERATURE 97 - 98.8 - 97.1 97.6 97.7   RESPIRATIONS 16 - 18 - 18 17 19   WEIGHT 132 132 - 130 126.54 125.66 -   HEIGHT - 5' 0\" - 5' 0\" 5' 0\" 5' 0\" -   BMI 25.78 kg/m2 25.78 kg/m2 - 25.39 kg/m2 24.71 kg/m2 24.54 kg/m2 -   SPO2 95 94 98 - 97 94 94     KPS: 70, Cares for self; unable to carry on normal activity or to do active work (ECOG equivalent 1)  Encounter Vitals  Temperature: 36.1 °C (97 °F)  Blood Pressure : (!) 151/88  Pulse: 82  Respiration: " 16  Pulse Oximetry: 95 %  Weight: 59.9 kg (132 lb)  Pain Score: 5=Moderate Pain  Pain Assessment 10/5/2022 9/9/2022   Pain Score 5 0   Pain Loc Abdomen -   Some recent data might be hidden          PHYSICAL EXAM:  Physical Exam  Vitals and nursing note reviewed.   Constitutional:       Appearance: She is well-developed.   HENT:      Head: Normocephalic.   Skin:     General: Skin is warm and dry.      Findings: No erythema.   Neurological:      Mental Status: She is alert and oriented to person, place, and time.   Psychiatric:         Behavior: Behavior normal.         Thought Content: Thought content normal.         Judgment: Judgment normal.        Toxicity Assessment 10/5/2022   Toxicity Assessment Bone   Fatigue (lethargy, malaise, asthenia) None   Radiation Dermatitis None   Photosensitivity None   Tumor Pain (onset or exacerbation of tumor pain due to treatment) Moderate pain, pain or analgesics interfering with function, but not interfering with activities of daily living   Musculoskeletal - Other/Please specify None   Skin - Late RT No change from baseline       CURRENT MEDICATIONS:    Current Outpatient Medications:     Coenzyme Q10 (CO Q10) 60 MG Cap, Take 60 mg by mouth every evening., Disp: , Rfl:     alendronate (FOSAMAX) 70 MG Tab, Take 70 mg by mouth every 7 days. (Patient not taking: Reported on 9/9/2022), Disp: , Rfl:     artificial tears 1.4 % Solution, Administer 1 Drop into both eyes 1 time a day as needed (dry eyes)., Disp: , Rfl:     vitamin D3 (CHOLECALCIFEROL) 1000 Unit (25 mcg) Tab, Take 1,000 Units by mouth every day., Disp: , Rfl:     therapeutic multivitamin-minerals (THERAGRAN-M) Tab, Take 1 Tablet by mouth every day., Disp: , Rfl:     Multiple Vitamins-Minerals (SYSTANE ICAPS AREDS2) Tab, Take 1 Tablet by mouth every day. (Patient not taking: Reported on 9/9/2022), Disp: , Rfl:     Probiotic Product (PROBIOTIC ADVANCED) Cap, Take 1 Capsule by mouth every evening., Disp: , Rfl:      Ascorbic Acid (VITAMIN C) 1000 MG Tab, Take 1,000 mg by mouth 2 times a day., Disp: , Rfl:     atorvastatin (LIPITOR) 20 MG Tab, Take 20 mg by mouth every evening., Disp: , Rfl:     lisinopril (PRINIVIL, ZESTRIL) 40 MG tablet, Take 40 mg by mouth every morning. Indications: High Blood Pressure Disorder, Disp: , Rfl:     LABORATORY DATA:   Lab Results   Component Value Date/Time    SODIUM 139 08/17/2022 04:25 PM    POTASSIUM 4.4 08/17/2022 04:25 PM    CHLORIDE 107 08/17/2022 04:25 PM    CO2 19 (L) 08/17/2022 04:25 PM    GLUCOSE 102 (H) 08/17/2022 04:25 PM    BUN 19 08/17/2022 04:25 PM    CREATININE 0.83 08/17/2022 04:25 PM    CREATININE 0.7 03/27/2006 02:58 PM       Lab Results   Component Value Date/Time    WBC 7.8 08/17/2022 04:25 PM    RBC 5.28 08/17/2022 04:25 PM    HEMOGLOBIN 13.4 08/17/2022 04:25 PM    HEMATOCRIT 41.5 08/17/2022 04:25 PM    MCV 78.6 (L) 08/17/2022 04:25 PM    MCH 25.4 (L) 08/17/2022 04:25 PM    MCHC 32.3 (L) 08/17/2022 04:25 PM    PLATELETCT 224 08/17/2022 04:25 PM         RADIOLOGY DATA:  CT-ABDOMEN-PELVIS WITH    Result Date: 8/16/2022  1.  New 6 x 4 mm mid left ureteral calculus and there is similar severe hydronephrosis with a normal-appearing nephrostomy tube in place. Again there is a delayed left nephrogram 2.  Status post hysterectomy, oophorectomies and there is no evidence of local recurrence or metastasis 3.  Stable severe urinary bladder wall thickening with distal left ureteral stricturing and calcification as before. This is compatible with prior radiation therapy. Superimposed urinary tract infection is not excluded 4.  Left colostomy with similar parastomal hernia that does not result in obstruction. There is above average stool volume Voalte sent to [facility] before this dictation was created    DX-CHEST-PORTABLE (1 VIEW)    Result Date: 8/17/2022  No acute cardiac or pulmonary abnormalities are identified.    IR-NEPHROSTOGRAM THROUGH EXT CATH (ALL RADIOLOGY)  LEFT    Result Date: 8/17/2022  1.  Successful removal and upsizing of left-sided nephrostomy tube to 12 English. 2.  Purulent urine was aspirated from the new nephrostomy tube and sent to the lab for analysis. 3.  Left nephrostogram demonstrates good positioning of the new left nephrostomy tube in a markedly dilated left renal pelvis.      IMPRESSION:  Cancer Staging  History of Cervical cancer (HCC)  Staging form: Cervix Uteri, AJCC Version 9  - Clinical stage from 9/9/2022: FIGO Stage IVB (pM1) - Signed by Sari BRICENO M.D. on 9/9/2022      PLAN:  No change in treatment plan  Telephone visit follow-up 8 weeks.    Disposition:  Treatment plan and imaging reviewed. Questions answered. Continue therapy outlined.     Sari BRICENO M.D.    No orders of the defined types were placed in this encounter.

## 2022-10-06 NOTE — RADIATION COMPLETION NOTES
END OF TREATMENT SUMMARY    Patient name:  Nessa Dennis    Primary Physician:  Julio Ross M.D. MRN: [id number]  CSN: [id number]   Referring physician:  Adam Hays M.D.   : 1945, 77 y.o.       TREATMENT SUMMARY:        Course First Treatment Date 2022    Course Last Treatment Date 10/05/2022   Course Elapsed Days 9 @ [id number]   Course Intent Palliative     Radiation Therapy Episodes       Active Episodes       Radiation Therapy: SBRT (2022)                   Radiation Treatments         Plan Last Treated On Elapsed Days Fractions Treated Prescribed Fraction Dose (cGy) Prescribed Total Dose (cGy)    Pelvis 13F # 2022  @  0 of 25 240 6,000    SBRT L Rib 10/5/2022 9 @ [id number] 5 of 5 900 4,500                  Reference Point Last Treated On Elapsed Days Most Recent Session Dose (cGy) Total Dose (cGy)    Pelvis 2022  @  -- 0    ptv45 2022  @  -- 0    SBRT L Rib 10/5/2022 9 @ [id number] -- 4,500    SBRT L Rib CP 10/5/2022 9 @ [id number] -- 4,708                                     STAGE:   History of Cervical cancer (HCC)  Staging form: Cervix Uteri, AJCC Version 9  - Clinical stage from 2022: FIGO Stage IVB (pM1) - Signed by Sari BRICENO M.D. on 2022  Histopathologic type: Basaloid squamous cell carcinoma  Stage prefix: Recurrence       TREATMENT INDICATION:   Metastatic cervix cancer with oligometastatic solitary left chest wall metastasis status post SBRT     CONCURRENT SYSTEMIC TREATMENT:   None     RT COURSE DISCONTINUED EARLY:   No     PATIENT EXPERIENCE:   Toxicity Assessment 10/5/2022   Toxicity Assessment Bone   Fatigue (lethargy, malaise, asthenia) None   Radiation Dermatitis None   Photosensitivity None   Tumor Pain (onset or exacerbation of tumor pain due to treatment) Moderate pain, pain or analgesics interfering with function, but not interfering with activities of daily living   Musculoskeletal - Other/Please specify None   Skin  - Late RT No change from baseline        FOLLOW-UP PLAN:   8 Week virtual visit     COMMENT:          ANATOMIC TARGET SUMMARY    ANATOMIC TARGET MODALITY TECHNIQUE   Left chest wall   External beam, photons SBRT            COMMENT:         DIAGRAMS:      DOSE VOLUME HISTOGRAMS:

## 2022-10-06 NOTE — ADDENDUM NOTE
Encounter addended by: Kierra Weir, Med Ass't on: 10/6/2022 9:17 AM   Actions taken: Pend clinical note

## 2022-10-10 ENCOUNTER — HOSPITAL ENCOUNTER (EMERGENCY)
Facility: MEDICAL CENTER | Age: 77
End: 2022-10-10
Payer: MEDICARE

## 2022-10-10 VITALS
HEART RATE: 100 BPM | SYSTOLIC BLOOD PRESSURE: 162 MMHG | RESPIRATION RATE: 16 BRPM | TEMPERATURE: 98.1 F | OXYGEN SATURATION: 94 % | WEIGHT: 133.82 LBS | DIASTOLIC BLOOD PRESSURE: 86 MMHG | BODY MASS INDEX: 26.27 KG/M2 | HEIGHT: 60 IN

## 2022-10-10 PROCEDURE — 302449 STATCHG TRIAGE ONLY (STATISTIC)

## 2022-10-10 ASSESSMENT — FIBROSIS 4 INDEX: FIB4 SCORE: 1.857142857142857143

## 2022-10-11 NOTE — ADDENDUM NOTE
Encounter addended by: Sari BRICENO M.D. on: 10/10/2022 5:59 PM   Actions taken: Clinical Note Signed

## 2022-10-11 NOTE — ED TRIAGE NOTES
Nessa Dennis  77 y.o. female  Chief Complaint   Patient presents with    Urinary Retention     Pt states she is having significantly less urine output through her nephrostomy tube today than she would normally.        Vitals:    10/10/22 1904   BP: (!) 162/86   Pulse: 100   Resp: 16   Temp: 36.7 °C (98.1 °F)   SpO2: 94%       Patient educated on triage process and encouraged to alert staff of any changes in condition.    Pt states she had her nephrostomy tube placed 2x years ago, with last change in the end of August.

## 2022-10-11 NOTE — ED NOTES
"Pt to triage desk stating they would like to leave as her \"problem has resolved itself\". Risks explained to pt. Pt signed LWBS and dismissed per EPIC.  "

## 2022-10-13 ENCOUNTER — HOSPITAL ENCOUNTER (OUTPATIENT)
Dept: RADIOLOGY | Facility: MEDICAL CENTER | Age: 77
DRG: 699 | End: 2022-10-13
Attending: SURGERY
Payer: MEDICARE

## 2022-10-13 DIAGNOSIS — Z85.118 PERSONAL HISTORY OF MALIGNANT NEOPLASM OF BRONCHUS AND LUNG: ICD-10-CM

## 2022-10-13 PROCEDURE — 71250 CT THORAX DX C-: CPT

## 2022-10-15 ENCOUNTER — APPOINTMENT (OUTPATIENT)
Dept: RADIOLOGY | Facility: MEDICAL CENTER | Age: 77
DRG: 699 | End: 2022-10-15
Payer: MEDICARE

## 2022-10-15 ENCOUNTER — HOSPITAL ENCOUNTER (INPATIENT)
Facility: MEDICAL CENTER | Age: 77
LOS: 1 days | DRG: 699 | End: 2022-10-16
Attending: EMERGENCY MEDICINE | Admitting: STUDENT IN AN ORGANIZED HEALTH CARE EDUCATION/TRAINING PROGRAM
Payer: MEDICARE

## 2022-10-15 ENCOUNTER — APPOINTMENT (OUTPATIENT)
Dept: RADIOLOGY | Facility: MEDICAL CENTER | Age: 77
DRG: 699 | End: 2022-10-15
Attending: EMERGENCY MEDICINE
Payer: MEDICARE

## 2022-10-15 DIAGNOSIS — T83.092A OBSTRUCTION OF NEPHROSTOMY TUBE (HCC): ICD-10-CM

## 2022-10-15 DIAGNOSIS — R33.9 URINARY RETENTION: Primary | ICD-10-CM

## 2022-10-15 DIAGNOSIS — N39.0 ACUTE UTI: ICD-10-CM

## 2022-10-15 LAB
ALBUMIN SERPL BCP-MCNC: 4.6 G/DL (ref 3.2–4.9)
ALBUMIN/GLOB SERPL: 1.6 G/DL
ALP SERPL-CCNC: 116 U/L (ref 30–99)
ALT SERPL-CCNC: 9 U/L (ref 2–50)
AMORPH CRY #/AREA URNS HPF: PRESENT /HPF
ANION GAP SERPL CALC-SCNC: 14 MMOL/L (ref 7–16)
APPEARANCE UR: ABNORMAL
AST SERPL-CCNC: 16 U/L (ref 12–45)
BACTERIA #/AREA URNS HPF: ABNORMAL /HPF
BASOPHILS # BLD AUTO: 0.3 % (ref 0–1.8)
BASOPHILS # BLD: 0.04 K/UL (ref 0–0.12)
BILIRUB SERPL-MCNC: 0.5 MG/DL (ref 0.1–1.5)
BILIRUB UR QL STRIP.AUTO: NEGATIVE
BUN SERPL-MCNC: 23 MG/DL (ref 8–22)
CALCIUM SERPL-MCNC: 10.5 MG/DL (ref 8.5–10.5)
CHLORIDE SERPL-SCNC: 106 MMOL/L (ref 96–112)
CO2 SERPL-SCNC: 19 MMOL/L (ref 20–33)
COLOR UR: YELLOW
CREAT SERPL-MCNC: 0.75 MG/DL (ref 0.5–1.4)
EOSINOPHIL # BLD AUTO: 0.06 K/UL (ref 0–0.51)
EOSINOPHIL NFR BLD: 0.5 % (ref 0–6.9)
EPI CELLS #/AREA URNS HPF: ABNORMAL /HPF
ERYTHROCYTE [DISTWIDTH] IN BLOOD BY AUTOMATED COUNT: 45.2 FL (ref 35.9–50)
GFR SERPLBLD CREATININE-BSD FMLA CKD-EPI: 81 ML/MIN/1.73 M 2
GLOBULIN SER CALC-MCNC: 2.8 G/DL (ref 1.9–3.5)
GLUCOSE SERPL-MCNC: 109 MG/DL (ref 65–99)
GLUCOSE UR STRIP.AUTO-MCNC: NEGATIVE MG/DL
HCT VFR BLD AUTO: 44.3 % (ref 37–47)
HGB BLD-MCNC: 13.9 G/DL (ref 12–16)
HYALINE CASTS #/AREA URNS LPF: ABNORMAL /LPF
IMM GRANULOCYTES # BLD AUTO: 0.04 K/UL (ref 0–0.11)
IMM GRANULOCYTES NFR BLD AUTO: 0.3 % (ref 0–0.9)
KETONES UR STRIP.AUTO-MCNC: NEGATIVE MG/DL
LEUKOCYTE ESTERASE UR QL STRIP.AUTO: ABNORMAL
LIPASE SERPL-CCNC: 33 U/L (ref 11–82)
LYMPHOCYTES # BLD AUTO: 1.03 K/UL (ref 1–4.8)
LYMPHOCYTES NFR BLD: 9 % (ref 22–41)
MCH RBC QN AUTO: 26.5 PG (ref 27–33)
MCHC RBC AUTO-ENTMCNC: 31.4 G/DL (ref 33.6–35)
MCV RBC AUTO: 84.5 FL (ref 81.4–97.8)
MICRO URNS: ABNORMAL
MONOCYTES # BLD AUTO: 0.98 K/UL (ref 0–0.85)
MONOCYTES NFR BLD AUTO: 8.5 % (ref 0–13.4)
NEUTROPHILS # BLD AUTO: 9.34 K/UL (ref 2–7.15)
NEUTROPHILS NFR BLD: 81.4 % (ref 44–72)
NITRITE UR QL STRIP.AUTO: POSITIVE
NRBC # BLD AUTO: 0 K/UL
NRBC BLD-RTO: 0 /100 WBC
PH UR STRIP.AUTO: >=9 [PH] (ref 5–8)
PLATELET # BLD AUTO: 252 K/UL (ref 164–446)
PMV BLD AUTO: 9.6 FL (ref 9–12.9)
POTASSIUM SERPL-SCNC: 4.3 MMOL/L (ref 3.6–5.5)
PROT SERPL-MCNC: 7.4 G/DL (ref 6–8.2)
PROT UR QL STRIP: 300 MG/DL
RBC # BLD AUTO: 5.24 M/UL (ref 4.2–5.4)
RBC # URNS HPF: ABNORMAL /HPF
RBC UR QL AUTO: NEGATIVE
SODIUM SERPL-SCNC: 139 MMOL/L (ref 135–145)
SP GR UR STRIP.AUTO: 1.01
TRI-PHOS CRY #/AREA URNS HPF: ABNORMAL /HPF
UROBILINOGEN UR STRIP.AUTO-MCNC: 0.2 MG/DL
WBC # BLD AUTO: 11.5 K/UL (ref 4.8–10.8)
WBC #/AREA URNS HPF: ABNORMAL /HPF

## 2022-10-15 PROCEDURE — 96375 TX/PRO/DX INJ NEW DRUG ADDON: CPT

## 2022-10-15 PROCEDURE — 770006 HCHG ROOM/CARE - MED/SURG/GYN SEMI*

## 2022-10-15 PROCEDURE — 700102 HCHG RX REV CODE 250 W/ 637 OVERRIDE(OP): Performed by: STUDENT IN AN ORGANIZED HEALTH CARE EDUCATION/TRAINING PROGRAM

## 2022-10-15 PROCEDURE — 85025 COMPLETE CBC W/AUTO DIFF WBC: CPT

## 2022-10-15 PROCEDURE — 80053 COMPREHEN METABOLIC PANEL: CPT

## 2022-10-15 PROCEDURE — 700105 HCHG RX REV CODE 258: Performed by: EMERGENCY MEDICINE

## 2022-10-15 PROCEDURE — A9270 NON-COVERED ITEM OR SERVICE: HCPCS | Performed by: STUDENT IN AN ORGANIZED HEALTH CARE EDUCATION/TRAINING PROGRAM

## 2022-10-15 PROCEDURE — 99223 1ST HOSP IP/OBS HIGH 75: CPT | Mod: AI | Performed by: STUDENT IN AN ORGANIZED HEALTH CARE EDUCATION/TRAINING PROGRAM

## 2022-10-15 PROCEDURE — 700117 HCHG RX CONTRAST REV CODE 255

## 2022-10-15 PROCEDURE — 36415 COLL VENOUS BLD VENIPUNCTURE: CPT

## 2022-10-15 PROCEDURE — 87086 URINE CULTURE/COLONY COUNT: CPT

## 2022-10-15 PROCEDURE — 99285 EMERGENCY DEPT VISIT HI MDM: CPT

## 2022-10-15 PROCEDURE — 76080 X-RAY EXAM OF FISTULA: CPT

## 2022-10-15 PROCEDURE — 83690 ASSAY OF LIPASE: CPT

## 2022-10-15 PROCEDURE — 96374 THER/PROPH/DIAG INJ IV PUSH: CPT

## 2022-10-15 PROCEDURE — 81001 URINALYSIS AUTO W/SCOPE: CPT

## 2022-10-15 PROCEDURE — 700111 HCHG RX REV CODE 636 W/ 250 OVERRIDE (IP): Performed by: EMERGENCY MEDICINE

## 2022-10-15 RX ORDER — POLYETHYLENE GLYCOL 3350 17 G/17G
1 POWDER, FOR SOLUTION ORAL
Status: DISCONTINUED | OUTPATIENT
Start: 2022-10-15 | End: 2022-10-16 | Stop reason: HOSPADM

## 2022-10-15 RX ORDER — ATORVASTATIN CALCIUM 20 MG/1
20 TABLET, FILM COATED ORAL EVERY EVENING
Status: DISCONTINUED | OUTPATIENT
Start: 2022-10-15 | End: 2022-10-16 | Stop reason: HOSPADM

## 2022-10-15 RX ORDER — ACETAMINOPHEN 160 MG
4000 TABLET,DISINTEGRATING ORAL EVERY MORNING
COMMUNITY

## 2022-10-15 RX ORDER — LABETALOL HYDROCHLORIDE 5 MG/ML
10 INJECTION, SOLUTION INTRAVENOUS EVERY 4 HOURS PRN
Status: DISCONTINUED | OUTPATIENT
Start: 2022-10-15 | End: 2022-10-16 | Stop reason: HOSPADM

## 2022-10-15 RX ORDER — ACETAMINOPHEN 325 MG/1
650 TABLET ORAL EVERY 6 HOURS PRN
Status: DISCONTINUED | OUTPATIENT
Start: 2022-10-15 | End: 2022-10-16 | Stop reason: HOSPADM

## 2022-10-15 RX ORDER — ONDANSETRON 2 MG/ML
4 INJECTION INTRAMUSCULAR; INTRAVENOUS ONCE
Status: COMPLETED | OUTPATIENT
Start: 2022-10-15 | End: 2022-10-15

## 2022-10-15 RX ORDER — ANTIOX #8/OM3/DHA/EPA/LUT/ZEAX 250-2.5 MG
1 CAPSULE ORAL EVERY MORNING
COMMUNITY

## 2022-10-15 RX ORDER — BISACODYL 10 MG
10 SUPPOSITORY, RECTAL RECTAL
Status: DISCONTINUED | OUTPATIENT
Start: 2022-10-15 | End: 2022-10-16 | Stop reason: HOSPADM

## 2022-10-15 RX ORDER — LISINOPRIL 20 MG/1
40 TABLET ORAL EVERY MORNING
Status: DISCONTINUED | OUTPATIENT
Start: 2022-10-16 | End: 2022-10-16 | Stop reason: HOSPADM

## 2022-10-15 RX ORDER — CEFTRIAXONE 2 G/1
2 INJECTION, POWDER, FOR SOLUTION INTRAMUSCULAR; INTRAVENOUS ONCE
Status: COMPLETED | OUTPATIENT
Start: 2022-10-15 | End: 2022-10-15

## 2022-10-15 RX ORDER — ASPIRIN 325 MG
325 TABLET ORAL EVERY 6 HOURS PRN
Status: ON HOLD | COMMUNITY
End: 2022-10-16

## 2022-10-15 RX ORDER — ONDANSETRON 2 MG/ML
4 INJECTION INTRAMUSCULAR; INTRAVENOUS EVERY 4 HOURS PRN
Status: DISCONTINUED | OUTPATIENT
Start: 2022-10-15 | End: 2022-10-16 | Stop reason: HOSPADM

## 2022-10-15 RX ORDER — ONDANSETRON 4 MG/1
4 TABLET, ORALLY DISINTEGRATING ORAL EVERY 4 HOURS PRN
Status: DISCONTINUED | OUTPATIENT
Start: 2022-10-15 | End: 2022-10-16 | Stop reason: HOSPADM

## 2022-10-15 RX ORDER — SODIUM CHLORIDE 9 MG/ML
1000 INJECTION, SOLUTION INTRAVENOUS ONCE
Status: COMPLETED | OUTPATIENT
Start: 2022-10-15 | End: 2022-10-15

## 2022-10-15 RX ADMIN — CEFTRIAXONE SODIUM 2 G: 2 INJECTION, POWDER, FOR SOLUTION INTRAMUSCULAR; INTRAVENOUS at 20:23

## 2022-10-15 RX ADMIN — ONDANSETRON 4 MG: 2 INJECTION INTRAMUSCULAR; INTRAVENOUS at 20:23

## 2022-10-15 RX ADMIN — SODIUM CHLORIDE 1000 ML: 9 INJECTION, SOLUTION INTRAVENOUS at 20:15

## 2022-10-15 RX ADMIN — IOHEXOL 50 ML: 350 INJECTION, SOLUTION INTRAVENOUS at 19:21

## 2022-10-15 RX ADMIN — ATORVASTATIN CALCIUM 20 MG: 20 TABLET, FILM COATED ORAL at 22:11

## 2022-10-15 ASSESSMENT — ENCOUNTER SYMPTOMS
NAUSEA: 0
CHILLS: 1
ABDOMINAL PAIN: 0
VOMITING: 0
FEVER: 0
PALPITATIONS: 0
FLANK PAIN: 1

## 2022-10-15 ASSESSMENT — LIFESTYLE VARIABLES
EVER HAD A DRINK FIRST THING IN THE MORNING TO STEADY YOUR NERVES TO GET RID OF A HANGOVER: NO
TOTAL SCORE: 0
DOES PATIENT WANT TO STOP DRINKING: CANNOT ASSESS
HAVE YOU EVER FELT YOU SHOULD CUT DOWN ON YOUR DRINKING: NO
CONSUMPTION TOTAL: NEGATIVE
TOTAL SCORE: 0
HAVE PEOPLE ANNOYED YOU BY CRITICIZING YOUR DRINKING: NO
EVER FELT BAD OR GUILTY ABOUT YOUR DRINKING: NO
TOTAL SCORE: 0
AVERAGE NUMBER OF DAYS PER WEEK YOU HAVE A DRINK CONTAINING ALCOHOL: 0
ON A TYPICAL DAY WHEN YOU DRINK ALCOHOL HOW MANY DRINKS DO YOU HAVE: 0
ALCOHOL_USE: NO
HOW MANY TIMES IN THE PAST YEAR HAVE YOU HAD 5 OR MORE DRINKS IN A DAY: 0

## 2022-10-15 ASSESSMENT — COGNITIVE AND FUNCTIONAL STATUS - GENERAL
MOBILITY SCORE: 24
SUGGESTED CMS G CODE MODIFIER DAILY ACTIVITY: CH
DAILY ACTIVITIY SCORE: 24
SUGGESTED CMS G CODE MODIFIER MOBILITY: CH

## 2022-10-15 ASSESSMENT — FIBROSIS 4 INDEX: FIB4 SCORE: 1.857142857142857143

## 2022-10-16 ENCOUNTER — APPOINTMENT (OUTPATIENT)
Dept: RADIOLOGY | Facility: MEDICAL CENTER | Age: 77
DRG: 699 | End: 2022-10-16
Attending: EMERGENCY MEDICINE
Payer: MEDICARE

## 2022-10-16 VITALS
HEIGHT: 60 IN | HEART RATE: 92 BPM | SYSTOLIC BLOOD PRESSURE: 117 MMHG | TEMPERATURE: 97.4 F | WEIGHT: 133 LBS | RESPIRATION RATE: 19 BRPM | OXYGEN SATURATION: 92 % | DIASTOLIC BLOOD PRESSURE: 66 MMHG | BODY MASS INDEX: 26.11 KG/M2

## 2022-10-16 LAB
ANION GAP SERPL CALC-SCNC: 12 MMOL/L (ref 7–16)
BUN SERPL-MCNC: 18 MG/DL (ref 8–22)
CALCIUM SERPL-MCNC: 9.3 MG/DL (ref 8.5–10.5)
CHLORIDE SERPL-SCNC: 106 MMOL/L (ref 96–112)
CO2 SERPL-SCNC: 21 MMOL/L (ref 20–33)
CREAT SERPL-MCNC: 0.73 MG/DL (ref 0.5–1.4)
ERYTHROCYTE [DISTWIDTH] IN BLOOD BY AUTOMATED COUNT: 43.4 FL (ref 35.9–50)
GFR SERPLBLD CREATININE-BSD FMLA CKD-EPI: 84 ML/MIN/1.73 M 2
GLUCOSE SERPL-MCNC: 113 MG/DL (ref 65–99)
HCT VFR BLD AUTO: 36.1 % (ref 37–47)
HGB BLD-MCNC: 12 G/DL (ref 12–16)
INR BLD: 1.1
MCH RBC QN AUTO: 26.7 PG (ref 27–33)
MCHC RBC AUTO-ENTMCNC: 33.2 G/DL (ref 33.6–35)
MCV RBC AUTO: 80.4 FL (ref 81.4–97.8)
PLATELET # BLD AUTO: 208 K/UL (ref 164–446)
PMV BLD AUTO: 10.4 FL (ref 9–12.9)
POTASSIUM SERPL-SCNC: 4 MMOL/L (ref 3.6–5.5)
RBC # BLD AUTO: 4.49 M/UL (ref 4.2–5.4)
SODIUM SERPL-SCNC: 139 MMOL/L (ref 135–145)
WBC # BLD AUTO: 6.6 K/UL (ref 4.8–10.8)

## 2022-10-16 PROCEDURE — A9270 NON-COVERED ITEM OR SERVICE: HCPCS | Performed by: STUDENT IN AN ORGANIZED HEALTH CARE EDUCATION/TRAINING PROGRAM

## 2022-10-16 PROCEDURE — 0T25X0Z CHANGE DRAINAGE DEVICE IN KIDNEY, EXTERNAL APPROACH: ICD-10-PCS | Performed by: RADIOLOGY

## 2022-10-16 PROCEDURE — 99239 HOSP IP/OBS DSCHRG MGMT >30: CPT | Performed by: INTERNAL MEDICINE

## 2022-10-16 PROCEDURE — 700102 HCHG RX REV CODE 250 W/ 637 OVERRIDE(OP): Performed by: STUDENT IN AN ORGANIZED HEALTH CARE EDUCATION/TRAINING PROGRAM

## 2022-10-16 PROCEDURE — 85027 COMPLETE CBC AUTOMATED: CPT

## 2022-10-16 PROCEDURE — 85610 PROTHROMBIN TIME: CPT

## 2022-10-16 PROCEDURE — C1729 CATH, DRAINAGE: HCPCS

## 2022-10-16 PROCEDURE — 80048 BASIC METABOLIC PNL TOTAL CA: CPT

## 2022-10-16 PROCEDURE — 700117 HCHG RX CONTRAST REV CODE 255: Performed by: EMERGENCY MEDICINE

## 2022-10-16 PROCEDURE — 700111 HCHG RX REV CODE 636 W/ 250 OVERRIDE (IP)

## 2022-10-16 PROCEDURE — 36415 COLL VENOUS BLD VENIPUNCTURE: CPT

## 2022-10-16 RX ORDER — IODIXANOL 270 MG/ML
15 INJECTION, SOLUTION INTRAVASCULAR ONCE
Status: COMPLETED | OUTPATIENT
Start: 2022-10-16 | End: 2022-10-16

## 2022-10-16 RX ORDER — MIDAZOLAM HYDROCHLORIDE 1 MG/ML
INJECTION INTRAMUSCULAR; INTRAVENOUS
Status: COMPLETED
Start: 2022-10-16 | End: 2022-10-16

## 2022-10-16 RX ORDER — SODIUM CHLORIDE 9 MG/ML
500 INJECTION, SOLUTION INTRAVENOUS
Status: DISCONTINUED | OUTPATIENT
Start: 2022-10-16 | End: 2022-10-16 | Stop reason: HOSPADM

## 2022-10-16 RX ORDER — MIDAZOLAM HYDROCHLORIDE 1 MG/ML
.5-2 INJECTION INTRAMUSCULAR; INTRAVENOUS PRN
Status: DISCONTINUED | OUTPATIENT
Start: 2022-10-16 | End: 2022-10-16 | Stop reason: HOSPADM

## 2022-10-16 RX ORDER — ONDANSETRON 2 MG/ML
4 INJECTION INTRAMUSCULAR; INTRAVENOUS PRN
Status: DISCONTINUED | OUTPATIENT
Start: 2022-10-16 | End: 2022-10-16 | Stop reason: HOSPADM

## 2022-10-16 RX ADMIN — MIDAZOLAM HYDROCHLORIDE 1 MG: 1 INJECTION INTRAMUSCULAR; INTRAVENOUS at 09:33

## 2022-10-16 RX ADMIN — MIDAZOLAM 1 MG: 1 INJECTION INTRAMUSCULAR; INTRAVENOUS at 09:33

## 2022-10-16 RX ADMIN — LISINOPRIL 40 MG: 20 TABLET ORAL at 06:19

## 2022-10-16 RX ADMIN — IODIXANOL 15 ML: 270 INJECTION, SOLUTION INTRAVASCULAR at 10:15

## 2022-10-16 NOTE — PROGRESS NOTES
Received report from PM RN at 0700. Patient is A&Ox4. Stated pain level is 0/10. Bed in lowest position with bed rails up, call light in place, and patient belongings near patient. Patient expresses no needs at this time and hourly rounding in place. Patient straight caths herself, ostomy emptied. No new issues noted.

## 2022-10-16 NOTE — ED TRIAGE NOTES
Chief Complaint   Patient presents with    Urinary Retention     Arrives with c/o L sided nephrostomy tube not draining  Pt states tube placement earlier this year, the past week the drainage would stop one day and restart the next, pt has tried flushing the tube  Past 2 days she has had minimal output, generalized malaise, nausea, pain on L flank   R kidney functional but pt self caths  Hx of radiation therapy for L sided tumor near ribs, last treatment last week       BP (!) 153/105   Pulse (!) 110   Temp 37.4 °C (99.4 °F) (Temporal)   Resp 15   Ht 1.524 m (5')   Wt 60.3 kg (133 lb)   LMP  (LMP Unknown) Comment: 3/13/22 HCG negative  SpO2 93%   BMI 25.97 kg/m²     Pt made aware of triage process, placed back into lobby, educated pt to tell staff of any worsening of symptoms

## 2022-10-16 NOTE — HOSPITAL COURSE
Nessa Dennis is a 77 y.o. female who presented 10/15/2022 with a history of metastatic cancer, chronic nephrostomy tube, hypertension who presents with malfunctioning left-sided nephrostomy tube.  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  Nephrostomy tube replaced in August 2022. She reports that over the last 2 days she has had minimal drainage from the nephrostomy tube.  She had presented on Wednesday with a similar complaint however while in the waiting room it suddenly began to drain.  Her kidney function is normal and she reports self catheterizations.  She has had multiple urinary tract infections in the past. IR was consulted and completed L antegrade tube nephrostogram and tube exchange. Good urine output in nephrostomy bag after procedure, with clear urine. Recommend flushing twice daily. Urostomy tube replace per urology recs, consider every 6 weeks if further problems. Patient was determined satisfactory for discharge with appropriate follow up.

## 2022-10-16 NOTE — ASSESSMENT & PLAN NOTE
Left sided flank pain. Positive urinalysis with a white count of 11.5.  Patient has a chronic nephrostomy tube, which is likely the source of the infection.  Ceftriaxone  Urine culture

## 2022-10-16 NOTE — ED NOTES
Med rec completed per patient and spouse at bedside.  Allergies reviewed with patient.  No outpatient antibiotics in the last 30 days.  Patient's preferred pharmacy: [facility] on [address].

## 2022-10-16 NOTE — DISCHARGE INSTRUCTIONS
Discharge Instructions    Discharged to home by car with relative. Discharged via wheelchair, hospital escort: Yes.  Special equipment needed: Not Applicable    Be sure to schedule a follow-up appointment with your primary care doctor or any specialists as instructed.     Discharge Plan:   Diet Plan: Discussed  Activity Level: Discussed  Confirmed Follow up Appointment: Appointment Scheduled  Confirmed Symptoms Management: Discussed  Medication Reconciliation Updated: Yes  Influenza Vaccine Indication: Not indicated: Previously immunized this influenza season and > 8 years of age    I understand that a diet low in cholesterol, fat, and sodium is recommended for good health. Unless I have been given specific instructions below for another diet, I accept this instruction as my diet prescription.   Other diet: Regular    Special Instructions: None    -Is this patient being discharged with medication to prevent blood clots?  No    Is patient discharged on Warfarin / Coumadin?   No     Nephrostomy   Care After  Refer to this sheet in the next few weeks. These instructions provide you with information on caring for yourself after your procedure. Your caregiver may also give you specific instructions. Your treatment has been planned according to current medical practices, but problems sometimes occur. Call your caregiver if you have any problems or questions after your procedure.  HOME CARE INSTRUCTIONS  Avoid taking aspirin or non-steroidal anti-inflammatory drugs (NSAIDs).  Only take over-the-counter or prescription medicines for pain, discomfort, or fever as directed by your caregiver.  Take your antibiotics as directed. Finish them even if you start to feel better.  Rest for the remainder of the day. Do not operate machinery, drive, or make legal decisions for 24 hours after your procedure.  Have someone drive you home.  You may resume your usual diet after the procedure. Drink extra fluids while the catheter is in  place. Avoid alcoholic beverages for 24 hours after the procedure.  Keep the skin around your catheter dry.  You may shower. Cover the area with plastic wrap and tape the edges of the plastic wrap to your skin so your skin remains dry under the plastic. If the area does get wet, dry the skin completely.  Avoid baths or swimming.  SEEK MEDICAL CARE IF:   Redness, increased soreness, or swelling develops.  Your symptoms persist after several days or worsen.  If you seeblood in the urine (hematuria) for over 48 hours.  SEEK IMMEDIATE MEDICAL CARE IF:   Your flexible tube (catheter) accidentally gets pulled out.  You have chills or increased pain.  You have an oral temperature above 102° F (38.9° C), not controlled by medicine.  The skin breaks down around the catheter.  Your catheter stops draining.The catheter may be blocked.  There is leakage of urine around catheter.  Document Released: 08/10/2005 Document Revised: 03/11/2013 Document Reviewed: 02/16/2010  ® Patient Information ©2014 , [employer].

## 2022-10-16 NOTE — DISCHARGE SUMMARY
Discharge Summary    CHIEF COMPLAINT ON ADMISSION  Chief Complaint   Patient presents with    Urinary Retention     Arrives with c/o L sided nephrostomy tube not draining  Pt states tube placement earlier this year, the past week the drainage would stop one day and restart the next, pt has tried flushing the tube  Past 2 days she has had minimal output, generalized malaise, nausea, pain on L flank   R kidney functional but pt self caths  Hx of radiation therapy for L sided tumor near ribs, last treatment last week       Reason for Admission  other     Admission Date  10/15/2022    CODE STATUS  Full Code    HPI & HOSPITAL COURSE  Nessa Dennis is a 77 y.o. female who presented 10/15/2022 with a history of metastatic cancer, chronic nephrostomy tube, hypertension who presents with malfunctioning left-sided nephrostomy tube.  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  Nephrostomy tube replaced in August 2022. She reports that over the last 2 days she has had minimal drainage from the nephrostomy tube.  She had presented on Wednesday with a similar complaint however while in the waiting room it suddenly began to drain.  Her kidney function is normal and she reports self catheterizations.  She has had multiple urinary tract infections in the past. IR was consulted and completed L antegrade tube nephrostogram and tube exchange. Good urine output in nephrostomy bag after procedure, with clear urine. Recommend flushing twice daily. Urostomy tube replace per urology recs, consider every 6 weeks if further problems. Patient was determined satisfactory for discharge with appropriate follow up.    Therefore, she is discharged in fair and stable condition to home with close outpatient follow-up.    The patient recovered much more quickly than anticipated on admission.    Discharge Date  10/16/2022    FOLLOW UP ITEMS POST  "DISCHARGE  Please follow up with PCP in 3-5 days for post hospitalization follow up and medication reconciliation.     DISCHARGE DIAGNOSES  Principal Problem:    Obstructed nephrostomy tube (HCC) POA: Yes  Active Problems:    History of Cervical cancer (HCC) POA: Yes    Pyelonephritis POA: Yes    HTN (hypertension) POA: Yes    Mixed hyperlipidemia POA: Yes  Resolved Problems:    Complicated UTI  POA: Yes      FOLLOW UP  Future Appointments   Date Time Provider Department Center   11/29/2022  3:30 PM SID Hassan None     No follow-up provider specified.    MEDICATIONS ON DISCHARGE     Medication List        CONTINUE taking these medications        Instructions   alendronate 70 MG Tabs  Commonly known as: FOSAMAX   Take 70 mg by mouth every 7 days.  Dose: 70 mg     atorvastatin 20 MG Tabs  Commonly known as: LIPITOR   Take 20 mg by mouth every evening.  Dose: 20 mg     CO Q10 PO   Take 1 Capsule by mouth every evening.  Dose: 1 Capsule     lisinopril 40 MG tablet  Commonly known as: PRINIVIL   Take 40 mg by mouth every morning. Indications: High Blood Pressure Disorder  Dose: 40 mg     * therapeutic multivitamin-minerals Tabs   Take 1 Tablet by mouth every morning.  Dose: 1 Tablet     * PreserVision AREDS 2 Caps   Take 1 Capsule by mouth every morning.  Dose: 1 Capsule     Vitamin C 1000 MG Tabs   Take 1,000 mg by mouth 2 times a day.  Dose: 1,000 mg     Vitamin D3 2000 UNIT Caps   Take 4,000 Units by mouth every morning. 2 capsules = 4,000 units.  Dose: 4,000 Units           * This list has 2 medication(s) that are the same as other medications prescribed for you. Read the directions carefully, and ask your doctor or other care provider to review them with you.                STOP taking these medications      aspirin 325 MG Tabs  Commonly known as: ASA              Allergies  Allergies   Allergen Reactions    Sulfa Drugs Unspecified     Pt states \"It really runs me down. I feel tired.\" "       DIET  Orders Placed This Encounter   Procedures    Diet Order Diet: Regular     Standing Status:   Standing     Number of Occurrences:   1     Order Specific Question:   Diet:     Answer:   Regular [1]       ACTIVITY  As tolerated.  Weight bearing as tolerated    CONSULTATIONS  IR    PROCEDURES  10/16: L antegrade tube nephrostogram and tube exchange.    LABORATORY  Lab Results   Component Value Date    SODIUM 139 10/16/2022    POTASSIUM 4.0 10/16/2022    CHLORIDE 106 10/16/2022    CO2 21 10/16/2022    GLUCOSE 113 (H) 10/16/2022    BUN 18 10/16/2022    CREATININE 0.73 10/16/2022    CREATININE 0.7 03/27/2006        Lab Results   Component Value Date    WBC 6.6 10/16/2022    HEMOGLOBIN 12.0 10/16/2022    HEMATOCRIT 36.1 (L) 10/16/2022    PLATELETCT 208 10/16/2022        Total time of the discharge process exceeds 39 minutes.

## 2022-10-16 NOTE — H&P
[facility] Medicine History & Physical Note    Date of Service  10/15/2022    Primary Care Physician  Julio Ross M.D.    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 female who presented 10/15/2022 with a history of metastatic cancer, chronic nephrostomy tube, hypertension who presents with malfunctioning left-sided nephrostomy tube.  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  She reports that over the last 2 days she has had minimal drainage from the nephrostomy tube.  She had presented on Wednesday with a similar complaint however while in the waiting room it suddenly began to drain.  Her kidney function is normal and she reports self catheterizations.  She has had multiple urinary tract infections in the past. Reports chills and left sided flank pain.    I discussed the plan of care with patient.    Review of Systems  Review of Systems   Constitutional:  Positive for chills. Negative for fever.   Cardiovascular:  Negative for chest pain, palpitations and leg swelling.   Gastrointestinal:  Negative for abdominal pain, nausea and vomiting.   Genitourinary:  Positive for flank pain (left). Negative for dysuria.   All other systems reviewed and are negative.    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    Surgical History   has a past surgical history that includes recovery (4/6/2015); colostomy creation laparoscopic (4/14/2015); cystoscopy (7/31/2015); cysto stent placemnt pre surg (Left, 8/29/2019); gyn surgery (5/2005); pelvic exam under anesthesia (7/31/2015); cervical conization (7/31/2015); pr cystoscopy,insert ureteral stent (Left, 5/21/2020); pr cystoscopy,insert ureteral stent (Left, 9/3/2020); pr cystourethroscopy,ureter catheter (Left, 9/3/2020); pr cystourethroscopy,ureter catheter (Left, 12/31/2020); cysto stent placemnt pre surg (Left, 12/31/2020); pr cystoscopy,insert ureteral stent (Left, 3/25/2021); other abdominal surgery (2015); cysto stent placemnt pre surg (Left, 8/26/2021); other (11/2021); pr cystoscopy,insert ureteral stent (Left, 12/30/2021); pr thoracoscopy,dx no bx (Left, 2/23/2022); lymph node sampling (2/23/2022); lobectomy (Left, 2/23/2022); pr cystoscopy,insert ureteral stent (Left, 3/13/2022); pr laryngoscopy,direct,scope,inj cords (5/26/2022); and laryngoscopy (Left, 5/26/2022).     Family History  family history includes Breast Cancer in her sister; Cancer in her father and sister; Hypertension in her father; Stroke in her brother.   Family history reviewed with patient. There is no family history that is pertinent to the chief complaint.     Social History   reports that she has never smoked. She has never used smokeless tobacco. She reports that she does not currently use alcohol. She reports that she does  "not currently use drugs.    Allergies  Allergies   Allergen Reactions    Sulfa Drugs Unspecified     Pt states \"It really runs me down. I feel tired.\"       Medications  Prior to Admission Medications   Prescriptions Last Dose Informant Patient Reported? Taking?   Ascorbic Acid (VITAMIN C) 1000 MG Tab 10/15/2022 at 1100 Patient Yes No   Sig: Take 1,000 mg by mouth 2 times a day.   Cholecalciferol (VITAMIN D3) 2000 UNIT Cap 10/15/2022 at 1100 Patient Yes Yes   Sig: Take 4,000 Units by mouth every morning. 2 capsules = 4,000 units.   Coenzyme Q10 (CO Q10 PO) 10/14/2022 at 2100 Patient Yes No   Sig: Take 1 Capsule by mouth every evening.   Multiple Vitamins-Minerals (PRESERVISION AREDS 2) Cap 10/15/2022 at 1100 Patient Yes Yes   Sig: Take 1 Capsule by mouth every morning.   alendronate (FOSAMAX) 70 MG Tab > 2 WEEKS at AM Patient Yes No   Sig: Take 70 mg by mouth every 7 days.   aspirin (ASA) 325 MG Tab > 1 WEEK at PRN  Yes Yes   Sig: Take 325 mg by mouth every 6 hours as needed for Mild Pain.   atorvastatin (LIPITOR) 20 MG Tab 10/14/2022 at 2100 Patient Yes No   Sig: Take 20 mg by mouth every evening.   lisinopril (PRINIVIL, ZESTRIL) 40 MG tablet 10/15/2022 at 1100 Patient Yes No   Sig: Take 40 mg by mouth every morning. Indications: High Blood Pressure Disorder   therapeutic multivitamin-minerals (THERAGRAN-M) Tab 10/15/2022 at 1100 Patient Yes No   Sig: Take 1 Tablet by mouth every morning.      Facility-Administered Medications: None       Physical Exam  Temp:  [36.4 °C (97.5 °F)-37.4 °C (99.4 °F)] 36.4 °C (97.5 °F)  Pulse:  [] 102  Resp:  [14-17] 17  BP: (153-159)/() 158/90  SpO2:  [93 %-94 %] 94 %  Blood Pressure : (!) 159/89   Temperature: 37.2 °C (99 °F)   Pulse: 100   Respiration: 14   Pulse Oximetry: 94 %       Physical Exam  Vitals reviewed.   Constitutional:       General: She is not in acute distress.     Appearance: Normal appearance. She is ill-appearing.   HENT:      Head: Normocephalic and " atraumatic.   Eyes:      General: No scleral icterus.        Right eye: No discharge.         Left eye: No discharge.   Cardiovascular:      Rate and Rhythm: Normal rate and regular rhythm.      Heart sounds: Normal heart sounds.   Pulmonary:      Effort: No respiratory distress.      Breath sounds: Normal breath sounds. No wheezing.   Abdominal:      General: There is no distension.      Tenderness: There is no abdominal tenderness. There is left CVA tenderness. There is no right CVA tenderness or guarding.   Musculoskeletal:         General: No tenderness.      Right lower leg: No edema.      Left lower leg: No edema.   Skin:     General: Skin is warm and dry.      Coloration: Skin is not jaundiced.   Neurological:      General: No focal deficit present.      Mental Status: She is alert and oriented to person, place, and time.      Cranial Nerves: No cranial nerve deficit.   Psychiatric:         Mood and Affect: Mood normal.         Behavior: Behavior normal.         Judgment: Judgment normal.       Laboratory:  Recent Labs     10/15/22  1803   WBC 11.5*   RBC 5.24   HEMOGLOBIN 13.9   HEMATOCRIT 44.3   MCV 84.5   MCH 26.5*   MCHC 31.4*   RDW 45.2   PLATELETCT 252   MPV 9.6     Recent Labs     10/15/22  1803   SODIUM 139   POTASSIUM 4.3   CHLORIDE 106   CO2 19*   GLUCOSE 109*   BUN 23*   CREATININE 0.75   CALCIUM 10.5     Recent Labs     10/15/22  1803   ALTSGPT 9   ASTSGOT 16   ALKPHOSPHAT 116*   TBILIRUBIN 0.5   LIPASE 33   GLUCOSE 109*         No results for input(s): NTPROBNP in the last 72 hours.      No results for input(s): TROPONINT in the last 72 hours.    Imaging:  DX-TUBE CHECK OTHER THAN SINUS-BILIARY   Final Result      No contrast could be injected through the left-sided nephrostomy tube suggesting likely obstruction/occlusion.      Fluoroscopy Time:  12 seconds.  3 fluoroscopic images were obtained.      IR-NEPHROSTOGRAM THROUGH EXT CATH (ALL RADIOLOGY) LEFT    (Results Pending)   IR-CONSULT AND  TREAT    (Results Pending)   IR-NEPHROSTOGRAM W/ NEW TUBE PLACEMENT (ALL RADIOLOGY) LEFT    (Results Pending)           Assessment/Plan:  Justification for Admission Status  I anticipate this patient will require at least two midnights for appropriate medical management, necessitating inpatient admission because malfunctioning nephrostomy tube, complicated UTI    Patient will need a Med/Surg bed on MEDICAL service .  The need is secondary to IR replacement of nephrostomy tube and treatment of complicated UTI.    * Obstructed nephrostomy tube (HCC)- (present on admission)  Assessment & Plan  Patient's nephrostomy tube was found to be obstructed.  IR consulted  N.p.o. at midnight  No DVT prophylaxis until after the procedure    Mixed hyperlipidemia- (present on admission)  Assessment & Plan  Continue home medication atorvastatin    HTN (hypertension)- (present on admission)  Assessment & Plan  Continue home medication lisinopril 40 mg  As needed labetalol    Pyelonephritis- (present on admission)  Assessment & Plan  Left sided flank pain. Positive urinalysis with a white count of 11.5.  Patient has a chronic nephrostomy tube, which is likely the source of the infection.  Ceftriaxone  Urine culture    History of Cervical cancer (HCC)- (present on admission)  Assessment & Plan  Will follow-up as an outpatient      VTE prophylaxis: SCDs/TEDs

## 2022-10-16 NOTE — OR SURGEON
Immediate Post- Operative Note        Findings: L PCN dysfunction.       Procedure(s): L antegrade tube nephrostogram and tube exchange.      Estimated Blood Loss: Less than 5 ml        Complications: None            10/16/2022     0944 AM     Toro Gillette M.D.

## 2022-10-16 NOTE — PROGRESS NOTES
4 Eyes Skin Assessment Completed by Aminah RN and MAYCOL Langford.    Head WDL  Ears WDL  Nose WDL  Mouth WDL  Neck WDL  Breast/Chest WDL  Shoulder Blades WDL  Spine - Nephrostomy insertion site to L lower back CDI dressing; redness round dressing site  (R) Arm/Elbow/Hand WDL  (L) Arm/Elbow/Hand WDL  Abdomen - Colostomy to LLQ  Groin WDL  Scrotum/Coccyx/Buttocks WDL  (R) Leg WDL  (L) Leg WDL  (R) Heel/Foot/Toe WDL  (L) Heel/Foot/Toe WDL          Devices In Places Blood Pressure Cuff, PIV      Interventions In Place Pressure Redistribution Mattress    Possible Skin Injury No    Pictures Uploaded Into Epic N/A  Wound Consult Placed N/A  RN Wound Prevention Protocol Ordered No

## 2022-10-16 NOTE — ASSESSMENT & PLAN NOTE
Positive urinalysis with a white count of 11.5.  Patient has a chronic nephrostomy tube, which is likely the source of the infection.  Ceftriaxone

## 2022-10-16 NOTE — ED NOTES
Self cath supplies ordered for patient, normally uses 16F red/styled firm catheter. Resting comfortably, updated on plan of care. Denies need. Family at bedside.    Medicated per MAR for nausea, fluids and abx.

## 2022-10-16 NOTE — PROGRESS NOTES
Pt presents to IR 2. Pt was consented by MD at bedside, confirmed by this RN and consent at bedside. Pt transferred to IR table in prone position. Patient underwent a left nephrostomy tube replacement by Dr. Gillette. Procedure site was marked by MD and verified using imaging guidance. Pt placed on monitor, prepped and draped in a sterile fashion. Vitals were taken every 5 minutes and remained stable during procedure (see doc flow sheet for results). CO2 waveform capnography was monitored and remained WNL throughout procedure.     Report called to Monalisa RN. Pt transported by shola with RN to S531.        Left Nephrostomy Tube  Gameyeeeah  Flexima Regular  Nephrostomy Catheter System  12 F x 25 cm  REF: [id number]  LOT: [id number]  Exp: 04.10.2025

## 2022-10-16 NOTE — ASSESSMENT & PLAN NOTE
Patient's nephrostomy tube was found to be obstructed.  IR consulted  N.p.o. at midnight  No DVT prophylaxis until after the procedure

## 2022-10-16 NOTE — ED PROVIDER NOTES
ED Provider Note    Scribed for Jarod Lynch by Francisco J Sauceda. 10/15/2022  6:22 PM    Primary care provider: Julio Ross M.D.  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 female who presents to the Emergency Department for urinary retention onset 2 days ago. She had a left sided nephrostomy tube placed earlier this year due to radiation damage to the ureter. Her first nephrostomy tube was placed over a year ago and has been changed every four months, her husbands states that she is due for another change in November. She notes that over the past week her drainage would be intermittent with one day having drainage the next none. Over the past two days she has had minimal output, which is what prompted her to present to the ED. She has tried flushing the tube with no alleviation. She currently also is complaining of generalized malaise, nausea, and left sided flank pain. She denies any vomiting. On Wednesday she presents to the ED but while in the waiting room it drained. Her right kidney is functional, and she self-catheterizes. She has a history of radiation therapy to treat a left sided tumor located near her ribs with her last treatment being last Wednesday.    Quality: Aching  Duration: 3 days  Severity: Mild to moderate  Associated sx: Fatigue    REVIEW OF SYSTEMS  As above, all other systems reviewed and are negative.   See HPI for  further details.     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  SURGICAL HISTORY   has a past surgical history that includes recovery (4/6/2015); colostomy creation laparoscopic (4/14/2015); cystoscopy (7/31/2015); cysto stent placemnt pre surg (Left, 8/29/2019); gyn surgery (5/2005); pelvic exam under anesthesia (7/31/2015); cervical conization (7/31/2015); cystoscopy,insert ureteral stent (Left, 5/21/2020); cystoscopy,insert ureteral stent (Left, 9/3/2020); cystourethroscopy,ureter catheter (Left, 9/3/2020); cystourethroscopy,ureter catheter (Left, 12/31/2020); cysto stent placemnt pre surg (Left, 12/31/2020); cystoscopy,insert ureteral stent (Left, 3/25/2021); other abdominal surgery (2015); cysto stent placemnt pre surg (Left, 8/26/2021); other (11/2021); cystoscopy,insert ureteral stent (Left, 12/30/2021); thoracoscopy,dx no bx (Left, 2/23/2022); lymph node sampling (2/23/2022); lobectomy (Left, 2/23/2022); cystoscopy,insert ureteral stent (Left, 3/13/2022); laryngoscopy,direct,scope,inj cords (5/26/2022); and laryngoscopy (Left, 5/26/2022).  SOCIAL HISTORY  Social History     Tobacco Use    Smoking status: Never    Smokeless tobacco: Never   Vaping Use    Vaping Use: Never used   Substance Use Topics    Alcohol use: Not Currently    Drug use: Not Currently      Social History     Substance and Sexual Activity   Drug Use Not Currently     FAMILY HISTORY  Family History   Problem Relation Age of Onset    Cancer  "Father         Prostate    Hypertension Father     Breast Cancer Sister     Cancer Sister     Stroke Brother      CURRENT MEDICATIONS  Home Medications       Reviewed by Liz Hidalgo (Pharmacy Tech) on 10/15/22 at 1857  Med List Status: Complete     Medication Last Dose Status   alendronate (FOSAMAX) 70 MG Tab ~ 2 WEEKS AGO Active   Ascorbic Acid (VITAMIN C) 1000 MG Tab 10/15/2022 Active   atorvastatin (LIPITOR) 20 MG Tab 10/14/2022 Active   Cholecalciferol (VITAMIN D3) 2000 UNIT Cap 10/15/2022 Active   Coenzyme Q10 (CO Q10 PO) 10/14/2022 Active   lisinopril (PRINIVIL, ZESTRIL) 40 MG tablet 10/15/2022 Active   Multiple Vitamins-Minerals (PRESERVISION AREDS 2) Cap 10/15/2022 Active   therapeutic multivitamin-minerals (THERAGRAN-M) Tab 10/15/2022 Active                  ALLERGIES  Allergies   Allergen Reactions    Sulfa Drugs Unspecified     Pt states \"It really runs me down. I feel tired.\"     PHYSICAL EXAM    VITAL SIGNS:   Vitals:    10/15/22 1742 10/15/22 1745 10/15/22 1915 10/15/22 2000   BP: (!) 153/105  (!) 157/94 (!) 159/89   Pulse: (!) 110  97 100   Resp: 15  14    Temp: 37.4 °C (99.4 °F)  37.2 °C (99 °F)    TempSrc: Temporal  Temporal    SpO2: 93%  94% 94%   Weight:  60.3 kg (133 lb)     Height: 1.524 m (5') 1.524 m (5')       Vitals: My interpretation: hypertensive, tachycardic, afebrile, not hypoxic    Reinterpretation of vitals: Unchanged, tachycardia improved, not hypoxic, afebrile    PE:   Constitutional: Well developed, Well nourished, No acute distress, Non-toxic appearance.   HENT: Normocephalic, Atraumatic, Bilateral external ears normal, Oropharynx is clear mucous membranes are moist. No oral exudates or nasal discharge.   Eyes: Pupils are equal round and reactive, EOMI, Conjunctiva normal, No discharge.   Neck: Normal range of motion, No tenderness, Supple, No stridor. No meningismus.  Lymphatic: No lymphadenopathy noted.   Cardiovascular: Regular rate and rhythm without murmur rub or " gallop.  Thorax & Lungs: Clear breath sounds bilaterally without wheezes, rhonchi or rales. There is no chest wall tenderness.   Abdomen: Left sided nephrostomy tube present. Soft non-tender non-distended. There is no rebound or guarding. No organomegaly is appreciated. Bowel sounds are normal.  Skin: Normal without rash.   Back: No CVA or spinal tenderness.   Extremities: Intact distal pulses, No edema, No tenderness, No cyanosis, No clubbing. Capillary refill is less than 2 seconds.  Musculoskeletal: Good range of motion in all major joints. No tenderness to palpation or major deformities noted.   Neurologic: Alert & oriented x 3, Normal motor function, Normal sensory function, No focal deficits noted. Reflexes are normal.  Psychiatric: Affect normal, Judgment normal, Mood normal. There is no suicidal ideation or patient reported hallucinations.     DIAGNOSTIC STUDIES / PROCEDURES    LABS  Results for orders placed or performed during the hospital encounter of 10/15/22   CBC with Differential   Result Value Ref Range    WBC 11.5 (H) 4.8 - 10.8 K/uL    RBC 5.24 4.20 - 5.40 M/uL    Hemoglobin 13.9 12.0 - 16.0 g/dL    Hematocrit 44.3 37.0 - 47.0 %    MCV 84.5 81.4 - 97.8 fL    MCH 26.5 (L) 27.0 - 33.0 pg    MCHC 31.4 (L) 33.6 - 35.0 g/dL    RDW 45.2 35.9 - 50.0 fL    Platelet Count 252 164 - 446 K/uL    MPV 9.6 9.0 - 12.9 fL    Neutrophils-Polys 81.40 (H) 44.00 - 72.00 %    Lymphocytes 9.00 (L) 22.00 - 41.00 %    Monocytes 8.50 0.00 - 13.40 %    Eosinophils 0.50 0.00 - 6.90 %    Basophils 0.30 0.00 - 1.80 %    Immature Granulocytes 0.30 0.00 - 0.90 %    Nucleated RBC 0.00 /100 WBC    Neutrophils (Absolute) 9.34 (H) 2.00 - 7.15 K/uL    Lymphs (Absolute) 1.03 1.00 - 4.80 K/uL    Monos (Absolute) 0.98 (H) 0.00 - 0.85 K/uL    Eos (Absolute) 0.06 0.00 - 0.51 K/uL    Baso (Absolute) 0.04 0.00 - 0.12 K/uL    Immature Granulocytes (abs) 0.04 0.00 - 0.11 K/uL    NRBC (Absolute) 0.00 K/uL   Complete Metabolic Panel   Result  Value Ref Range    Sodium 139 135 - 145 mmol/L    Potassium 4.3 3.6 - 5.5 mmol/L    Chloride 106 96 - 112 mmol/L    Co2 19 (L) 20 - 33 mmol/L    Anion Gap 14.0 7.0 - 16.0    Glucose 109 (H) 65 - 99 mg/dL    Bun 23 (H) 8 - 22 mg/dL    Creatinine 0.75 0.50 - 1.40 mg/dL    Calcium 10.5 8.5 - 10.5 mg/dL    AST(SGOT) 16 12 - 45 U/L    ALT(SGPT) 9 2 - 50 U/L    Alkaline Phosphatase 116 (H) 30 - 99 U/L    Total Bilirubin 0.5 0.1 - 1.5 mg/dL    Albumin 4.6 3.2 - 4.9 g/dL    Total Protein 7.4 6.0 - 8.2 g/dL    Globulin 2.8 1.9 - 3.5 g/dL    A-G Ratio 1.6 g/dL   Lipase   Result Value Ref Range    Lipase 33 11 - 82 U/L   Urinalysis    Specimen: Urine   Result Value Ref Range    Color Yellow     Character Turbid (A)     Specific Gravity 1.013 <1.035    Ph >=9.0 (A) 5.0 - 8.0    Glucose Negative Negative mg/dL    Ketones Negative Negative mg/dL    Protein 300 (A) Negative mg/dL    Bilirubin Negative Negative    Urobilinogen, Urine 0.2 Negative    Nitrite Positive (A) Negative    Leukocyte Esterase Moderate (A) Negative    Occult Blood Negative Negative    Micro Urine Req Microscopic    ESTIMATED GFR   Result Value Ref Range    GFR (CKD-EPI) 81 >60 mL/min/1.73 m 2   URINE MICROSCOPIC (W/UA)   Result Value Ref Range    WBC 5-10 (A) /hpf    RBC Rare /hpf    Bacteria Many (A) None /hpf    Epithelial Cells Few /hpf    Amorphous Crystal Present /hpf    Triple Phos Crystal Many /hpf    Hyaline Cast 0-2 /lpf      All labs reviewed by me. Significant for no leukocytosis of significance, no anemia, normal electrolytes, normal renal function, normal liver enzymes, normal bilirubin, lipase normal    RADIOLOGY  DX-TUBE CHECK OTHER THAN SINUS-BILIARY   Final Result      No contrast could be injected through the left-sided nephrostomy tube suggesting likely obstruction/occlusion.      Fluoroscopy Time:  12 seconds.  3 fluoroscopic images were obtained.      IR-NEPHROSTOGRAM THROUGH EXT CATH (ALL RADIOLOGY) LEFT    (Results Pending)    IR-CONSULT AND TREAT    (Results Pending)   IR-NEPHROSTOGRAM W/ NEW TUBE PLACEMENT (ALL RADIOLOGY) LEFT    (Results Pending)     The radiologist's interpretation of all radiological studies have been reviewed by me.    COURSE & MEDICAL DECISION MAKING  Nursing notes, VS, PMSFHx, labs, imaging, EKG reviewed in chart.    MDM: 6:22 PM Nessa Dennis is a 77 y.o. female who presented with obstructed nephrostomy tube on the left.  Scheduled to have it replaced in approximately a month.  Obstructed for the past few days.  Patient has had fatigue but denies any fevers.  Vital signs are unremarkable other than some mild tachycardia that resolved without treatment here.  Labs show normal renal function but do demonstrate UTI she was started on ceftriaxone.  Nephrostogram shows obstruction.  Discussed with IR and they would like the patient admitted and will change at the tube in the morning.  Discussed with the hospitalist and they are amenable to admission.  Discussed with family and patient and they are amenable.    FINAL IMPRESSION  1. Urinary retention Acute   2. Obstruction of nephrostomy tube (HCC) Acute   3. Acute UTI Acute       Francisco J BRADY (Fatuma), am scribing for, and in the presence of, Jarod Lynch.    Electronically signed by: Francisco J Sauceda (Fatuma), 10/15/2022    IJarod personally performed the services described in this documentation, as scribed by Francisco J Sauceda in my presence, and it is both accurate and complete.    The note accurately reflects work and decisions made by me.  Jarod Lynch  10/15/2022  8:14 PM

## 2022-10-16 NOTE — CARE PLAN
The patient is Stable - Low risk of patient condition declining or worsening    Shift Goals  Clinical Goals: comfort; monitor drain  Patient Goals: comfort, food  Family Goals: N/A    Progress made toward(s) clinical / shift goals:    Problem: Pain - Standard  Goal: Alleviation of pain or a reduction in pain to the patient’s comfort goal  Outcome: Progressing     Problem: Knowledge Deficit - Standard  Goal: Patient and family/care givers will demonstrate understanding of plan of care, disease process/condition, diagnostic tests and medications  Outcome: Progressing       Patient is not progressing towards the following goals:

## 2022-10-17 LAB
BACTERIA UR CULT: NORMAL
SIGNIFICANT IND 70042: NORMAL
SITE SITE: NORMAL
SOURCE SOURCE: NORMAL

## 2022-10-20 ENCOUNTER — HOSPITAL ENCOUNTER (OUTPATIENT)
Dept: RADIOLOGY | Facility: MEDICAL CENTER | Age: 77
End: 2022-10-20
Attending: STUDENT IN AN ORGANIZED HEALTH CARE EDUCATION/TRAINING PROGRAM
Payer: MEDICARE

## 2022-10-20 DIAGNOSIS — C78.6 SECONDARY MALIGNANT NEOPLASM OF RETROPERITONEUM AND PERITONEUM (HCC): ICD-10-CM

## 2022-10-20 DIAGNOSIS — N10 ACUTE PYELITIS: ICD-10-CM

## 2022-10-20 DIAGNOSIS — R30.9 PAINFUL MICTURITION, UNSPECIFIED: ICD-10-CM

## 2022-10-20 DIAGNOSIS — N82.9: ICD-10-CM

## 2022-10-20 DIAGNOSIS — R19.00 ABDOMINAL MASS, UNSPECIFIED ABDOMINAL LOCATION: ICD-10-CM

## 2022-10-20 DIAGNOSIS — N13.1 HYDRONEPHROSIS WITH URETERAL STRICTURE: ICD-10-CM

## 2022-10-20 DIAGNOSIS — C53.8 MALIGNANT NEOPLASM OF CERVICAL STUMP (HCC): ICD-10-CM

## 2022-10-20 DIAGNOSIS — R31.9 HEMATURIA SYNDROME: ICD-10-CM

## 2022-10-20 DIAGNOSIS — C78.00 MALIGNANT NEOPLASM METASTATIC TO LUNG, UNSPECIFIED LATERALITY (HCC): ICD-10-CM

## 2022-10-20 DIAGNOSIS — N82.3 FISTULA OF VAGINA TO LARGE INTESTINE: ICD-10-CM

## 2022-10-20 DIAGNOSIS — R91.8 OTHER NONSPECIFIC ABNORMAL FINDING OF LUNG FIELD: ICD-10-CM

## 2022-10-20 DIAGNOSIS — Z96.0 PRESENCE OF UROGENITAL IMPLANT: ICD-10-CM

## 2022-11-03 ENCOUNTER — PATIENT MESSAGE (OUTPATIENT)
Dept: HEALTH INFORMATION MANAGEMENT | Facility: OTHER | Age: 77
End: 2022-11-03

## 2022-11-23 ENCOUNTER — HOSPITAL ENCOUNTER (OUTPATIENT)
Dept: LAB | Facility: MEDICAL CENTER | Age: 77
End: 2022-11-23
Attending: FAMILY MEDICINE
Payer: MEDICARE

## 2022-11-23 LAB
25(OH)D3 SERPL-MCNC: 63 NG/ML (ref 30–100)
ALBUMIN SERPL BCP-MCNC: 4.4 G/DL (ref 3.2–4.9)
ALBUMIN/GLOB SERPL: 1.8 G/DL
ALP SERPL-CCNC: 91 U/L (ref 30–99)
ALT SERPL-CCNC: 15 U/L (ref 2–50)
ANION GAP SERPL CALC-SCNC: 11 MMOL/L (ref 7–16)
AST SERPL-CCNC: 20 U/L (ref 12–45)
BASOPHILS # BLD AUTO: 0.3 % (ref 0–1.8)
BASOPHILS # BLD: 0.02 K/UL (ref 0–0.12)
BILIRUB SERPL-MCNC: 0.5 MG/DL (ref 0.1–1.5)
BUN SERPL-MCNC: 22 MG/DL (ref 8–22)
CALCIUM SERPL-MCNC: 10.3 MG/DL (ref 8.5–10.5)
CHLORIDE SERPL-SCNC: 105 MMOL/L (ref 96–112)
CHOLEST SERPL-MCNC: 168 MG/DL (ref 100–199)
CO2 SERPL-SCNC: 22 MMOL/L (ref 20–33)
CREAT SERPL-MCNC: 0.76 MG/DL (ref 0.5–1.4)
EOSINOPHIL # BLD AUTO: 0.09 K/UL (ref 0–0.51)
EOSINOPHIL NFR BLD: 1.5 % (ref 0–6.9)
ERYTHROCYTE [DISTWIDTH] IN BLOOD BY AUTOMATED COUNT: 46.1 FL (ref 35.9–50)
FASTING STATUS PATIENT QL REPORTED: NORMAL
GFR SERPLBLD CREATININE-BSD FMLA CKD-EPI: 80 ML/MIN/1.73 M 2
GLOBULIN SER CALC-MCNC: 2.5 G/DL (ref 1.9–3.5)
GLUCOSE SERPL-MCNC: 79 MG/DL (ref 65–99)
HCT VFR BLD AUTO: 42.5 % (ref 37–47)
HDLC SERPL-MCNC: 59 MG/DL
HGB BLD-MCNC: 13.7 G/DL (ref 12–16)
IMM GRANULOCYTES # BLD AUTO: 0.01 K/UL (ref 0–0.11)
IMM GRANULOCYTES NFR BLD AUTO: 0.2 % (ref 0–0.9)
LDLC SERPL CALC-MCNC: 86 MG/DL
LYMPHOCYTES # BLD AUTO: 1.04 K/UL (ref 1–4.8)
LYMPHOCYTES NFR BLD: 17.4 % (ref 22–41)
MCH RBC QN AUTO: 26.9 PG (ref 27–33)
MCHC RBC AUTO-ENTMCNC: 32.2 G/DL (ref 33.6–35)
MCV RBC AUTO: 83.3 FL (ref 81.4–97.8)
MONOCYTES # BLD AUTO: 0.46 K/UL (ref 0–0.85)
MONOCYTES NFR BLD AUTO: 7.7 % (ref 0–13.4)
NEUTROPHILS # BLD AUTO: 4.35 K/UL (ref 2–7.15)
NEUTROPHILS NFR BLD: 72.9 % (ref 44–72)
NRBC # BLD AUTO: 0 K/UL
NRBC BLD-RTO: 0 /100 WBC
PLATELET # BLD AUTO: 208 K/UL (ref 164–446)
PMV BLD AUTO: 10.7 FL (ref 9–12.9)
POTASSIUM SERPL-SCNC: 4.3 MMOL/L (ref 3.6–5.5)
PROT SERPL-MCNC: 6.9 G/DL (ref 6–8.2)
RBC # BLD AUTO: 5.1 M/UL (ref 4.2–5.4)
SODIUM SERPL-SCNC: 138 MMOL/L (ref 135–145)
TRIGL SERPL-MCNC: 113 MG/DL (ref 0–149)
WBC # BLD AUTO: 6 K/UL (ref 4.8–10.8)

## 2022-11-23 PROCEDURE — 82306 VITAMIN D 25 HYDROXY: CPT

## 2022-11-23 PROCEDURE — 36415 COLL VENOUS BLD VENIPUNCTURE: CPT

## 2022-11-23 PROCEDURE — 80061 LIPID PANEL: CPT

## 2022-11-23 PROCEDURE — 85025 COMPLETE CBC W/AUTO DIFF WBC: CPT

## 2022-11-23 PROCEDURE — 80053 COMPREHEN METABOLIC PANEL: CPT

## 2022-11-29 ENCOUNTER — HOSPITAL ENCOUNTER (OUTPATIENT)
Dept: RADIATION ONCOLOGY | Facility: MEDICAL CENTER | Age: 77
End: 2022-11-30
Attending: RADIOLOGY
Payer: MEDICARE

## 2022-11-30 NOTE — PROGRESS NOTES
As a means of avoiding spread of COVID-19, this visit is being conducted by telephone. This telephone visit was initiated by the patient and they verbally consented.    Time at start of call: 3 pm    Reason for Call:  Post Treatment Follow Up    HPI:    SBRT painful left rib metastasis    Radiation Oncology          10/3/2022 10/5/2022   Aria Course Treatment Dates   Course First Treatment Date 09/26/2022 09/26/2022         Course Last Treatment Date 10/03/2022   10/05/2022         Aria Treatment Summary   SBRT L Rib  Plan from Course C2_L rib   Fraction 4 of 5 5 of 5    5 of 5   Elapsed Course Days 7 @ [id number] 9 @ [id number]    9 @ [id number]   Prescribed Fraction Dose 900 cGy 900 cGy    900 cGy   Prescribed Total Dose 4,500 cGy 4,500 cGy    4,500 cGy   SBRT L Rib  Reference Point from Course C2_L rib   Elapsed Course Days 7 @ [id number] 9 @ [id number]    9 @ [id number]   Session Dose 900 cGy 900 cGy    --   Total Dose 3,600 cGy 4,500 cGy    4,500 cGy   SBRT L Rib CP  Reference Point from Course C2_L rib   Elapsed Course Days 7 @ [id number] 9 @ [id number]    9 @ [id number]   Session Dose 942 cGy 942 cGy    --   Total Dose 3,767 cGy 4,708 cGy    4,708 cGy       More values are hidden. Newest values shown. Go to activity for more data.        INTERVAL HISTORY:  Initial follow-up post SBRT.  Patient experienced no adverse effects from treatment.  Reports complete resolution of pain.    Labs / Images Reviewed:   CT-CHEST (THORAX) W/O    Result Date: 10/13/2022  1.  Postoperative changes of the left upper lobe without evidence of recurrence or metastatic disease 2.  Multiple calcified granuloma 3.  Left hydronephrosis and a left nephrostomy tube is present which appears appropriately located Fleischner Society pulmonary nodule recommendations: Not Applicable     DX-TUBE CHECK OTHER THAN SINUS-BILIARY    Result Date: 10/15/2022  No contrast could be injected through the left-sided nephrostomy tube  suggesting likely obstruction/occlusion. Fluoroscopy Time:  12 seconds.  3 fluoroscopic images were obtained.    DX-ABORTED DX PROCEDURE    Result Date: 10/20/2022  Left nephrostomy tube projects appropriately. Nephrostogram was not performed, due to recent tube exchange.    IR-NEPHROSTOGRAM W/ NEW TUBE PLACEMENT (ALL RADIOLOGY) LEFT    Result Date: 10/16/2022  1.  Successful removal and exchange of left-sided nephrostomy tube to 12 French. 2.  Left nephrostogram demonstrates good positioning of the new left nephrostomy tube in a markedly dilated left renal pelvis.       Assessment and Plan:   History of Cervical cancer (HCC)  Staging form: Cervix Uteri, AJCC Version 9  - Clinical stage from 9/9/2022: FIGO Stage IVB (pM1) - Signed by Sari BRICENO M.D. on 9/9/2022  Histopathologic type: Basaloid squamous cell carcinoma  Stage prefix: Recurrence      PET scan scheduled for 12/28/2022.  We will track PET results.    Follow-up: As needed    Time at end of call: 3:05 PM    Total Time Spent:5-10 minutes    Sari BRICENO M.D.

## 2022-12-11 ENCOUNTER — APPOINTMENT (OUTPATIENT)
Dept: RADIOLOGY | Facility: MEDICAL CENTER | Age: 77
DRG: 699 | End: 2022-12-11
Attending: EMERGENCY MEDICINE
Payer: MEDICARE

## 2022-12-11 ENCOUNTER — HOSPITAL ENCOUNTER (INPATIENT)
Facility: MEDICAL CENTER | Age: 77
LOS: 1 days | DRG: 699 | End: 2022-12-12
Attending: EMERGENCY MEDICINE | Admitting: STUDENT IN AN ORGANIZED HEALTH CARE EDUCATION/TRAINING PROGRAM
Payer: MEDICARE

## 2022-12-11 DIAGNOSIS — N99.528 NEPHROSTOMY COMPLICATION (HCC): ICD-10-CM

## 2022-12-11 LAB
ALBUMIN SERPL BCP-MCNC: 4.7 G/DL (ref 3.2–4.9)
ALBUMIN/GLOB SERPL: 1.7 G/DL
ALP SERPL-CCNC: 109 U/L (ref 30–99)
ALT SERPL-CCNC: 13 U/L (ref 2–50)
ANION GAP SERPL CALC-SCNC: 12 MMOL/L (ref 7–16)
AST SERPL-CCNC: 17 U/L (ref 12–45)
BASOPHILS # BLD AUTO: 0.4 % (ref 0–1.8)
BASOPHILS # BLD: 0.04 K/UL (ref 0–0.12)
BILIRUB SERPL-MCNC: 0.5 MG/DL (ref 0.1–1.5)
BUN SERPL-MCNC: 26 MG/DL (ref 8–22)
CALCIUM SERPL-MCNC: 10.7 MG/DL (ref 8.5–10.5)
CHLORIDE SERPL-SCNC: 103 MMOL/L (ref 96–112)
CO2 SERPL-SCNC: 24 MMOL/L (ref 20–33)
CREAT SERPL-MCNC: 0.89 MG/DL (ref 0.5–1.4)
EOSINOPHIL # BLD AUTO: 0.01 K/UL (ref 0–0.51)
EOSINOPHIL NFR BLD: 0.1 % (ref 0–6.9)
ERYTHROCYTE [DISTWIDTH] IN BLOOD BY AUTOMATED COUNT: 42 FL (ref 35.9–50)
GFR SERPLBLD CREATININE-BSD FMLA CKD-EPI: 66 ML/MIN/1.73 M 2
GLOBULIN SER CALC-MCNC: 2.7 G/DL (ref 1.9–3.5)
GLUCOSE SERPL-MCNC: 142 MG/DL (ref 65–99)
HCT VFR BLD AUTO: 42.4 % (ref 37–47)
HGB BLD-MCNC: 14.1 G/DL (ref 12–16)
IMM GRANULOCYTES # BLD AUTO: 0.06 K/UL (ref 0–0.11)
IMM GRANULOCYTES NFR BLD AUTO: 0.6 % (ref 0–0.9)
LYMPHOCYTES # BLD AUTO: 0.73 K/UL (ref 1–4.8)
LYMPHOCYTES NFR BLD: 6.8 % (ref 22–41)
MCH RBC QN AUTO: 27.1 PG (ref 27–33)
MCHC RBC AUTO-ENTMCNC: 33.3 G/DL (ref 33.6–35)
MCV RBC AUTO: 81.4 FL (ref 81.4–97.8)
MONOCYTES # BLD AUTO: 0.48 K/UL (ref 0–0.85)
MONOCYTES NFR BLD AUTO: 4.5 % (ref 0–13.4)
NEUTROPHILS # BLD AUTO: 9.44 K/UL (ref 2–7.15)
NEUTROPHILS NFR BLD: 87.6 % (ref 44–72)
NRBC # BLD AUTO: 0 K/UL
NRBC BLD-RTO: 0 /100 WBC
PLATELET # BLD AUTO: 242 K/UL (ref 164–446)
PMV BLD AUTO: 9.4 FL (ref 9–12.9)
POTASSIUM SERPL-SCNC: 4.6 MMOL/L (ref 3.6–5.5)
PROT SERPL-MCNC: 7.4 G/DL (ref 6–8.2)
RBC # BLD AUTO: 5.21 M/UL (ref 4.2–5.4)
SODIUM SERPL-SCNC: 139 MMOL/L (ref 135–145)
WBC # BLD AUTO: 10.8 K/UL (ref 4.8–10.8)

## 2022-12-11 PROCEDURE — 700111 HCHG RX REV CODE 636 W/ 250 OVERRIDE (IP)

## 2022-12-11 PROCEDURE — 700111 HCHG RX REV CODE 636 W/ 250 OVERRIDE (IP): Performed by: EMERGENCY MEDICINE

## 2022-12-11 PROCEDURE — 74177 CT ABD & PELVIS W/CONTRAST: CPT

## 2022-12-11 PROCEDURE — 96374 THER/PROPH/DIAG INJ IV PUSH: CPT

## 2022-12-11 PROCEDURE — 85025 COMPLETE CBC W/AUTO DIFF WBC: CPT

## 2022-12-11 PROCEDURE — 96376 TX/PRO/DX INJ SAME DRUG ADON: CPT

## 2022-12-11 PROCEDURE — 99285 EMERGENCY DEPT VISIT HI MDM: CPT

## 2022-12-11 PROCEDURE — 80053 COMPREHEN METABOLIC PANEL: CPT

## 2022-12-11 PROCEDURE — 700105 HCHG RX REV CODE 258: Performed by: EMERGENCY MEDICINE

## 2022-12-11 PROCEDURE — 36415 COLL VENOUS BLD VENIPUNCTURE: CPT

## 2022-12-11 RX ORDER — ONDANSETRON 2 MG/ML
4 INJECTION INTRAMUSCULAR; INTRAVENOUS ONCE
Status: COMPLETED | OUTPATIENT
Start: 2022-12-12 | End: 2022-12-11

## 2022-12-11 RX ORDER — ONDANSETRON 2 MG/ML
4 INJECTION INTRAMUSCULAR; INTRAVENOUS ONCE
Status: COMPLETED | OUTPATIENT
Start: 2022-12-11 | End: 2022-12-11

## 2022-12-11 RX ORDER — SODIUM CHLORIDE, SODIUM LACTATE, POTASSIUM CHLORIDE, CALCIUM CHLORIDE 600; 310; 30; 20 MG/100ML; MG/100ML; MG/100ML; MG/100ML
1000 INJECTION, SOLUTION INTRAVENOUS ONCE
Status: COMPLETED | OUTPATIENT
Start: 2022-12-11 | End: 2022-12-11

## 2022-12-11 RX ADMIN — ONDANSETRON 4 MG: 2 INJECTION INTRAMUSCULAR; INTRAVENOUS at 23:42

## 2022-12-11 RX ADMIN — SODIUM CHLORIDE, POTASSIUM CHLORIDE, SODIUM LACTATE AND CALCIUM CHLORIDE 1000 ML: 600; 310; 30; 20 INJECTION, SOLUTION INTRAVENOUS at 22:15

## 2022-12-11 RX ADMIN — ONDANSETRON 4 MG: 2 INJECTION INTRAMUSCULAR; INTRAVENOUS at 21:52

## 2022-12-11 ASSESSMENT — FIBROSIS 4 INDEX: FIB4 SCORE: 1.936491673103708443

## 2022-12-12 ENCOUNTER — APPOINTMENT (OUTPATIENT)
Dept: RADIOLOGY | Facility: MEDICAL CENTER | Age: 77
DRG: 699 | End: 2022-12-12
Attending: STUDENT IN AN ORGANIZED HEALTH CARE EDUCATION/TRAINING PROGRAM
Payer: MEDICARE

## 2022-12-12 ENCOUNTER — PHARMACY VISIT (OUTPATIENT)
Dept: PHARMACY | Facility: MEDICAL CENTER | Age: 77
End: 2022-12-12
Payer: MEDICARE

## 2022-12-12 VITALS
RESPIRATION RATE: 16 BRPM | BODY MASS INDEX: 26.79 KG/M2 | DIASTOLIC BLOOD PRESSURE: 74 MMHG | HEART RATE: 102 BPM | SYSTOLIC BLOOD PRESSURE: 132 MMHG | OXYGEN SATURATION: 97 % | WEIGHT: 136.47 LBS | HEIGHT: 60 IN | TEMPERATURE: 98.4 F

## 2022-12-12 PROBLEM — R10.9 FLANK PAIN: Status: ACTIVE | Noted: 2022-12-12

## 2022-12-12 PROBLEM — N99.528 NEPHROSTOMY COMPLICATION (HCC): Status: ACTIVE | Noted: 2022-12-12

## 2022-12-12 PROBLEM — R11.2 INTRACTABLE NAUSEA AND VOMITING: Status: ACTIVE | Noted: 2022-12-12

## 2022-12-12 LAB
ALBUMIN SERPL BCP-MCNC: 4.1 G/DL (ref 3.2–4.9)
BASOPHILS # BLD AUTO: 0.2 % (ref 0–1.8)
BASOPHILS # BLD: 0.02 K/UL (ref 0–0.12)
BUN SERPL-MCNC: 24 MG/DL (ref 8–22)
CALCIUM SERPL-MCNC: 10 MG/DL (ref 8.5–10.5)
CHLORIDE SERPL-SCNC: 104 MMOL/L (ref 96–112)
CO2 SERPL-SCNC: 23 MMOL/L (ref 20–33)
CREAT SERPL-MCNC: 0.93 MG/DL (ref 0.5–1.4)
EOSINOPHIL # BLD AUTO: 0 K/UL (ref 0–0.51)
EOSINOPHIL NFR BLD: 0 % (ref 0–6.9)
ERYTHROCYTE [DISTWIDTH] IN BLOOD BY AUTOMATED COUNT: 41.7 FL (ref 35.9–50)
GFR SERPLBLD CREATININE-BSD FMLA CKD-EPI: 63 ML/MIN/1.73 M 2
GLUCOSE SERPL-MCNC: 144 MG/DL (ref 65–99)
HCT VFR BLD AUTO: 38.8 % (ref 37–47)
HGB BLD-MCNC: 12.7 G/DL (ref 12–16)
IMM GRANULOCYTES # BLD AUTO: 0.03 K/UL (ref 0–0.11)
IMM GRANULOCYTES NFR BLD AUTO: 0.3 % (ref 0–0.9)
INR PPP: 1.02 (ref 0.87–1.13)
LYMPHOCYTES # BLD AUTO: 0.5 K/UL (ref 1–4.8)
LYMPHOCYTES NFR BLD: 4.7 % (ref 22–41)
MAGNESIUM SERPL-MCNC: 1.6 MG/DL (ref 1.5–2.5)
MCH RBC QN AUTO: 26.7 PG (ref 27–33)
MCHC RBC AUTO-ENTMCNC: 32.7 G/DL (ref 33.6–35)
MCV RBC AUTO: 81.7 FL (ref 81.4–97.8)
MONOCYTES # BLD AUTO: 0.45 K/UL (ref 0–0.85)
MONOCYTES NFR BLD AUTO: 4.2 % (ref 0–13.4)
NEUTROPHILS # BLD AUTO: 9.61 K/UL (ref 2–7.15)
NEUTROPHILS NFR BLD: 90.6 % (ref 44–72)
NRBC # BLD AUTO: 0 K/UL
NRBC BLD-RTO: 0 /100 WBC
PHOSPHATE SERPL-MCNC: 2.5 MG/DL (ref 2.5–4.5)
PLATELET # BLD AUTO: 219 K/UL (ref 164–446)
PMV BLD AUTO: 10.1 FL (ref 9–12.9)
POTASSIUM SERPL-SCNC: 4.3 MMOL/L (ref 3.6–5.5)
PROTHROMBIN TIME: 13.3 SEC (ref 12–14.6)
RBC # BLD AUTO: 4.75 M/UL (ref 4.2–5.4)
SODIUM SERPL-SCNC: 137 MMOL/L (ref 135–145)
WBC # BLD AUTO: 10.6 K/UL (ref 4.8–10.8)

## 2022-12-12 PROCEDURE — 700111 HCHG RX REV CODE 636 W/ 250 OVERRIDE (IP)

## 2022-12-12 PROCEDURE — RXMED WILLOW AMBULATORY MEDICATION CHARGE: Performed by: HOSPITALIST

## 2022-12-12 PROCEDURE — 700111 HCHG RX REV CODE 636 W/ 250 OVERRIDE (IP): Performed by: STUDENT IN AN ORGANIZED HEALTH CARE EDUCATION/TRAINING PROGRAM

## 2022-12-12 PROCEDURE — 700105 HCHG RX REV CODE 258: Performed by: STUDENT IN AN ORGANIZED HEALTH CARE EDUCATION/TRAINING PROGRAM

## 2022-12-12 PROCEDURE — 96375 TX/PRO/DX INJ NEW DRUG ADDON: CPT

## 2022-12-12 PROCEDURE — 770001 HCHG ROOM/CARE - MED/SURG/GYN PRIV*

## 2022-12-12 PROCEDURE — 80069 RENAL FUNCTION PANEL: CPT

## 2022-12-12 PROCEDURE — 36415 COLL VENOUS BLD VENIPUNCTURE: CPT

## 2022-12-12 PROCEDURE — C1729 CATH, DRAINAGE: HCPCS

## 2022-12-12 PROCEDURE — 85025 COMPLETE CBC W/AUTO DIFF WBC: CPT

## 2022-12-12 PROCEDURE — 700102 HCHG RX REV CODE 250 W/ 637 OVERRIDE(OP): Performed by: STUDENT IN AN ORGANIZED HEALTH CARE EDUCATION/TRAINING PROGRAM

## 2022-12-12 PROCEDURE — 85610 PROTHROMBIN TIME: CPT

## 2022-12-12 PROCEDURE — A9270 NON-COVERED ITEM OR SERVICE: HCPCS | Performed by: STUDENT IN AN ORGANIZED HEALTH CARE EDUCATION/TRAINING PROGRAM

## 2022-12-12 PROCEDURE — 99235 HOSP IP/OBS SAME DATE MOD 70: CPT | Performed by: STUDENT IN AN ORGANIZED HEALTH CARE EDUCATION/TRAINING PROGRAM

## 2022-12-12 PROCEDURE — 83735 ASSAY OF MAGNESIUM: CPT

## 2022-12-12 PROCEDURE — 0T25X0Z CHANGE DRAINAGE DEVICE IN KIDNEY, EXTERNAL APPROACH: ICD-10-PCS | Performed by: STUDENT IN AN ORGANIZED HEALTH CARE EDUCATION/TRAINING PROGRAM

## 2022-12-12 PROCEDURE — 700117 HCHG RX CONTRAST REV CODE 255: Performed by: STUDENT IN AN ORGANIZED HEALTH CARE EDUCATION/TRAINING PROGRAM

## 2022-12-12 PROCEDURE — 96376 TX/PRO/DX INJ SAME DRUG ADON: CPT

## 2022-12-12 PROCEDURE — 700117 HCHG RX CONTRAST REV CODE 255: Performed by: EMERGENCY MEDICINE

## 2022-12-12 RX ORDER — SODIUM CHLORIDE 9 MG/ML
INJECTION, SOLUTION INTRAVENOUS CONTINUOUS
Status: DISCONTINUED | OUTPATIENT
Start: 2022-12-12 | End: 2022-12-12

## 2022-12-12 RX ORDER — ACETAMINOPHEN 160 MG
4000 TABLET,DISINTEGRATING ORAL EVERY MORNING
Status: DISCONTINUED | OUTPATIENT
Start: 2022-12-12 | End: 2022-12-12

## 2022-12-12 RX ORDER — ONDANSETRON 4 MG/1
4 TABLET, ORALLY DISINTEGRATING ORAL EVERY 4 HOURS PRN
Status: DISCONTINUED | OUTPATIENT
Start: 2022-12-12 | End: 2022-12-12 | Stop reason: HOSPADM

## 2022-12-12 RX ORDER — M-VIT,TX,IRON,MINS/CALC/FOLIC 27MG-0.4MG
1 TABLET ORAL EVERY MORNING
Status: DISCONTINUED | OUTPATIENT
Start: 2022-12-12 | End: 2022-12-12

## 2022-12-12 RX ORDER — ONDANSETRON 2 MG/ML
INJECTION INTRAMUSCULAR; INTRAVENOUS
Status: COMPLETED
Start: 2022-12-12 | End: 2022-12-12

## 2022-12-12 RX ORDER — METOCLOPRAMIDE HYDROCHLORIDE 5 MG/ML
5 INJECTION INTRAMUSCULAR; INTRAVENOUS EVERY 6 HOURS
Status: COMPLETED | OUTPATIENT
Start: 2022-12-12 | End: 2022-12-12

## 2022-12-12 RX ORDER — LABETALOL HYDROCHLORIDE 5 MG/ML
10 INJECTION, SOLUTION INTRAVENOUS EVERY 4 HOURS PRN
Status: DISCONTINUED | OUTPATIENT
Start: 2022-12-12 | End: 2022-12-12 | Stop reason: HOSPADM

## 2022-12-12 RX ORDER — AMOXICILLIN 250 MG
2 CAPSULE ORAL 2 TIMES DAILY
Status: DISCONTINUED | OUTPATIENT
Start: 2022-12-12 | End: 2022-12-12 | Stop reason: HOSPADM

## 2022-12-12 RX ORDER — MULTIVIT WITH MINERALS/LUTEIN
1000 TABLET ORAL 2 TIMES DAILY
Status: DISCONTINUED | OUTPATIENT
Start: 2022-12-12 | End: 2022-12-12

## 2022-12-12 RX ORDER — ACETAMINOPHEN 325 MG/1
650 TABLET ORAL EVERY 6 HOURS PRN
Status: DISCONTINUED | OUTPATIENT
Start: 2022-12-12 | End: 2022-12-12 | Stop reason: HOSPADM

## 2022-12-12 RX ORDER — LISINOPRIL 10 MG/1
40 TABLET ORAL EVERY MORNING
Status: DISCONTINUED | OUTPATIENT
Start: 2022-12-12 | End: 2022-12-12 | Stop reason: HOSPADM

## 2022-12-12 RX ORDER — MIDAZOLAM HYDROCHLORIDE 1 MG/ML
INJECTION INTRAMUSCULAR; INTRAVENOUS
Status: COMPLETED
Start: 2022-12-12 | End: 2022-12-12

## 2022-12-12 RX ORDER — MORPHINE SULFATE 4 MG/ML
4 INJECTION INTRAVENOUS
Status: DISCONTINUED | OUTPATIENT
Start: 2022-12-12 | End: 2022-12-12 | Stop reason: HOSPADM

## 2022-12-12 RX ORDER — ONDANSETRON 2 MG/ML
4 INJECTION INTRAMUSCULAR; INTRAVENOUS PRN
Status: ACTIVE | OUTPATIENT
Start: 2022-12-12 | End: 2022-12-12

## 2022-12-12 RX ORDER — METOCLOPRAMIDE HYDROCHLORIDE 5 MG/ML
10 INJECTION INTRAMUSCULAR; INTRAVENOUS ONCE
Status: COMPLETED | OUTPATIENT
Start: 2022-12-12 | End: 2022-12-12

## 2022-12-12 RX ORDER — HYDROCODONE BITARTRATE AND ACETAMINOPHEN 5; 325 MG/1; MG/1
1 TABLET ORAL EVERY 8 HOURS PRN
Qty: 3 TABLET | Refills: 0 | Status: SHIPPED | OUTPATIENT
Start: 2022-12-12 | End: 2022-12-13

## 2022-12-12 RX ORDER — POLYETHYLENE GLYCOL 3350 17 G/17G
1 POWDER, FOR SOLUTION ORAL
Status: DISCONTINUED | OUTPATIENT
Start: 2022-12-12 | End: 2022-12-12 | Stop reason: HOSPADM

## 2022-12-12 RX ORDER — GUAIFENESIN/DEXTROMETHORPHAN 100-10MG/5
10 SYRUP ORAL EVERY 6 HOURS PRN
Status: DISCONTINUED | OUTPATIENT
Start: 2022-12-12 | End: 2022-12-12 | Stop reason: HOSPADM

## 2022-12-12 RX ORDER — MIDAZOLAM HYDROCHLORIDE 1 MG/ML
.5-2 INJECTION INTRAMUSCULAR; INTRAVENOUS PRN
Status: ACTIVE | OUTPATIENT
Start: 2022-12-12 | End: 2022-12-12

## 2022-12-12 RX ORDER — ONDANSETRON 2 MG/ML
4 INJECTION INTRAMUSCULAR; INTRAVENOUS EVERY 4 HOURS PRN
Status: DISCONTINUED | OUTPATIENT
Start: 2022-12-12 | End: 2022-12-12 | Stop reason: HOSPADM

## 2022-12-12 RX ORDER — ATORVASTATIN CALCIUM 20 MG/1
20 TABLET, FILM COATED ORAL EVERY EVENING
Status: DISCONTINUED | OUTPATIENT
Start: 2022-12-12 | End: 2022-12-12 | Stop reason: HOSPADM

## 2022-12-12 RX ORDER — CEFAZOLIN SODIUM 1 G/3ML
INJECTION, POWDER, FOR SOLUTION INTRAMUSCULAR; INTRAVENOUS
Status: DISCONTINUED
Start: 2022-12-12 | End: 2022-12-12 | Stop reason: HOSPADM

## 2022-12-12 RX ORDER — OXYCODONE HYDROCHLORIDE 5 MG/1
5 TABLET ORAL
Status: DISCONTINUED | OUTPATIENT
Start: 2022-12-12 | End: 2022-12-12 | Stop reason: HOSPADM

## 2022-12-12 RX ORDER — OXYCODONE HYDROCHLORIDE 10 MG/1
10 TABLET ORAL
Status: DISCONTINUED | OUTPATIENT
Start: 2022-12-12 | End: 2022-12-12 | Stop reason: HOSPADM

## 2022-12-12 RX ORDER — BISACODYL 10 MG
10 SUPPOSITORY, RECTAL RECTAL
Status: DISCONTINUED | OUTPATIENT
Start: 2022-12-12 | End: 2022-12-12 | Stop reason: HOSPADM

## 2022-12-12 RX ORDER — SODIUM CHLORIDE 9 MG/ML
INJECTION, SOLUTION INTRAVENOUS CONTINUOUS
Status: DISCONTINUED | OUTPATIENT
Start: 2022-12-12 | End: 2022-12-12 | Stop reason: HOSPADM

## 2022-12-12 RX ORDER — SODIUM CHLORIDE 9 MG/ML
500 INJECTION, SOLUTION INTRAVENOUS
Status: ACTIVE | OUTPATIENT
Start: 2022-12-12 | End: 2022-12-12

## 2022-12-12 RX ADMIN — IOHEXOL 8 ML: 300 INJECTION, SOLUTION INTRAVENOUS at 14:15

## 2022-12-12 RX ADMIN — ONDANSETRON HYDROCHLORIDE 4 MG: 2 SOLUTION INTRAMUSCULAR; INTRAVENOUS at 02:00

## 2022-12-12 RX ADMIN — CEFAZOLIN 2 G: 2 INJECTION, POWDER, FOR SOLUTION INTRAMUSCULAR; INTRAVENOUS at 15:18

## 2022-12-12 RX ADMIN — METOCLOPRAMIDE 5 MG: 5 INJECTION, SOLUTION INTRAMUSCULAR; INTRAVENOUS at 12:35

## 2022-12-12 RX ADMIN — SODIUM CHLORIDE: 9 INJECTION, SOLUTION INTRAVENOUS at 06:56

## 2022-12-12 RX ADMIN — ONDANSETRON 4 MG: 2 INJECTION INTRAMUSCULAR; INTRAVENOUS at 14:05

## 2022-12-12 RX ADMIN — IOHEXOL 100 ML: 350 INJECTION, SOLUTION INTRAVENOUS at 00:00

## 2022-12-12 RX ADMIN — MIDAZOLAM HYDROCHLORIDE 0.5 MG: 1 INJECTION, SOLUTION INTRAMUSCULAR; INTRAVENOUS at 13:42

## 2022-12-12 RX ADMIN — SODIUM CHLORIDE: 9 INJECTION, SOLUTION INTRAVENOUS at 02:15

## 2022-12-12 RX ADMIN — MIDAZOLAM HYDROCHLORIDE 0.5 MG: 1 INJECTION INTRAMUSCULAR; INTRAVENOUS at 13:42

## 2022-12-12 RX ADMIN — LISINOPRIL 40 MG: 10 TABLET ORAL at 06:57

## 2022-12-12 RX ADMIN — SODIUM CHLORIDE: 9 INJECTION, SOLUTION INTRAVENOUS at 12:43

## 2022-12-12 RX ADMIN — METOCLOPRAMIDE 5 MG: 5 INJECTION, SOLUTION INTRAMUSCULAR; INTRAVENOUS at 07:51

## 2022-12-12 RX ADMIN — METOCLOPRAMIDE 10 MG: 5 INJECTION, SOLUTION INTRAMUSCULAR; INTRAVENOUS at 01:07

## 2022-12-12 RX ADMIN — FENTANYL CITRATE 25 MCG: 50 INJECTION, SOLUTION INTRAMUSCULAR; INTRAVENOUS at 13:42

## 2022-12-12 ASSESSMENT — LIFESTYLE VARIABLES
TOTAL SCORE: 0
DO YOU DRINK ALCOHOL: NO
ON A TYPICAL DAY WHEN YOU DRINK ALCOHOL HOW MANY DRINKS DO YOU HAVE: 0
TOTAL SCORE: 0
HAVE PEOPLE ANNOYED YOU BY CRITICIZING YOUR DRINKING: NO
EVER FELT BAD OR GUILTY ABOUT YOUR DRINKING: NO
SUBSTANCE_ABUSE: 0
CONSUMPTION TOTAL: NEGATIVE
AVERAGE NUMBER OF DAYS PER WEEK YOU HAVE A DRINK CONTAINING ALCOHOL: 0
TOTAL SCORE: 0
HOW MANY TIMES IN THE PAST YEAR HAVE YOU HAD 5 OR MORE DRINKS IN A DAY: 0
EVER HAD A DRINK FIRST THING IN THE MORNING TO STEADY YOUR NERVES TO GET RID OF A HANGOVER: NO
HAVE YOU EVER FELT YOU SHOULD CUT DOWN ON YOUR DRINKING: NO

## 2022-12-12 ASSESSMENT — ENCOUNTER SYMPTOMS
NAUSEA: 1
VOMITING: 1
DIZZINESS: 0
ABDOMINAL PAIN: 0
PALPITATIONS: 0
DOUBLE VISION: 0
FLANK PAIN: 1
CHILLS: 0
BLURRED VISION: 0
COUGH: 0
BACK PAIN: 1
WEAKNESS: 1
DEPRESSION: 0
FEVER: 0
SHORTNESS OF BREATH: 0
BRUISES/BLEEDS EASILY: 0
HEADACHES: 0
HEARTBURN: 1
FOCAL WEAKNESS: 0

## 2022-12-12 NOTE — ED TRIAGE NOTES
Chief Complaint   Patient presents with    Other     Pt is having complications with her nephrostomy tube on the left side.  Pt states it is not draining and she flushes it twice a day.      BP (!) 178/110   Pulse (!) 111   Temp 36.2 °C (97.2 °F) (Temporal)   Resp 16   Ht 1.524 m (5')   Wt 61.9 kg (136 lb 7.4 oz)   LMP  (LMP Unknown) Comment: 3/13/22 HCG negative  SpO2 96%   BMI 26.65 kg/m²     Pt ambulatory from lobby with steady gait. Pt states output slowed down today. Pt has about 25ml in currently nephrostomy bag. Pt denies fever.    Pt is alert and oriented, speaking in full sentences, follows commands and responds appropriately to questions. Resp are even and unlabored.      Pt placed in lobby. Pt educated on triage process. Pt encouraged to alert staff for any changes.     Patient and staff wearing appropriate PPE

## 2022-12-12 NOTE — ED PROVIDER NOTES
ED Provider Note    CHIEF COMPLAINT  Chief Complaint   Patient presents with    Flank Pain     Pt is having complications with her nephrostomy tube on the left side.  Pt states it is not draining and she flushes it twice a day.        HPI  Nessa Dennis is a 77 y.o. female who presents for evaluation of left flank pain and inability to flush her left nephrostomy tube.  The patient has a complex history as listed below.  She has previous history of significant radiation exposure to cancer treatment and she developed a left-sided ureteral obstruction and therefore around a year ago required a nephrostomy tube.  It has been replaced several times and she reports typically when it gets either clogged or dislodged it stops draining.  She reports the nephrostomy bag is only collected around 10 cc of urine usually she produces around 400 a day.  She tried to flush it at home with no improvement.  She reports nausea and dull aching left flank pain    REVIEW OF SYSTEMS  See HPI for further details.  No high fevers chills night sweats or weight loss all other systems are negative.     PAST MEDICAL HISTORY  Past Medical History:   Diagnosis Date    High cholesterol 05/12/2022    medicated    Hypertension 05/12/2022    medicated    Dental disorder 05/12/2022    upper crowns times 6 teeth    Anesthesia 02/23/2022    pt states had hallucinations in PACU from drug given intraop    Cancer (HCC) 02/23/2022    left upper lung cancer    Hydronephrosis with ureteral stricture 12/30/2021    stents, removed, and nephrostomy tube placed    Adverse effect of anesthesia     pt states had hallucinations in PACU from drug given intraop - raspy d/t vocal cord injury    Cancer (HCC) 2005, 2015    cervical    Colostomy in place ([facility])     Environmental and seasonal allergies     Environmental and seasonal allergies     Lung nodules     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[facility])     Unspecified urinary  incontinence     wears pads, does self caths    Urinary bladder disorder     pt self caths 4x per day        FAMILY HISTORY  Noncontributory    SOCIAL HISTORY  Social History     Socioeconomic History    Marital status:      Spouse name: Heron Dennis   Occupational History    Occupation: Retired    Tobacco Use    Smoking status: Never    Smokeless tobacco: Never   Vaping Use    Vaping Use: Never used   Substance and Sexual Activity    Alcohol use: Not Currently    Drug use: Not Currently     Social Determinants of Health     Financial Resource Strain: Low Risk     Difficulty of Paying Living Expenses: Not hard at all   Food Insecurity: No Food Insecurity    Worried About Running Out of Food in the Last Year: Never true    Ran Out of Food in the Last Year: Never true   Transportation Needs: No Transportation Needs    Lack of Transportation (Medical): No    Lack of Transportation (Non-Medical): No       SURGICAL HISTORY  Past Surgical History:   Procedure Laterality Date    PA LARYNGOSCOPY,DIRECT,SCOPE,INJ CORDS  5/26/2022    Procedure: INJECTION, VOCAL CORD, LARYNGOSCOPIC - THERAPEUTIC, WITH OPERATING MICROSCOPE OR TELESCOPE;  Surgeon: Marie Salas M.D.;  Location: SURGERY SAME DAY [facility];  Service: Ent    LARYNGOSCOPY Left 5/26/2022    Procedure: LARYNGOSCOPY - DIRECT;  Surgeon: Marie Salas M.D.;  Location: SURGERY SAME DAY [facility];  Service: Ent    PA CYSTOSCOPY,INSERT URETERAL STENT Left 3/13/2022    Procedure: CYSTOSCOPY, WITH URETERAL STENT REMOVAL ;  Surgeon: Adam Hays M.D.;  Location: [facility];  Service: Gynecology Oncology    PA THORACOSCOPY,DX NO BX Left 2/23/2022    Procedure: THORACOSCOPY;  Surgeon: Chriss Etienne M.D.;  Location: [facility];  Service: General    LYMPH NODE SAMPLING  2/23/2022    Procedure: SAMPLING, MULTIPLE LYMPH NODES - MEDIASTINAL;  Surgeon: Chriss Etienne M.D.;  Location: [facility];  Service: General    LOBECTOMY Left 2/23/2022     Procedure: LOBECTOMY - SUBLOBAR RESECTION UPPER LOBE;  Surgeon: Chriss Etienne M.D.;  Location: [facility];  Service: General    OR CYSTOSCOPY,INSERT URETERAL STENT Left 12/30/2021    Procedure: CYSTOSCOPY, WITH URETERAL STENT INSERTION - EXCHANGE;  Surgeon: Adam Hays M.D.;  Location: [facility];  Service: Gynecology Oncology    OTHER  11/2021    left lung biopsy    CYSTO STENT PLACEMNT PRE SURG Left 8/26/2021    Procedure: CYSTOSCOPY, WITH URETERAL STENT INSERTION OR REMOVAL - RIGHT AND/OR LEFT STENT EXCHANGE.;  Surgeon: Adam Hays M.D.;  Location: [facility];  Service: Gynecology Oncology    OR CYSTOSCOPY,INSERT URETERAL STENT Left 3/25/2021    Procedure: CYSTOSCOPY, WITH URETERAL STENT REMOVAL LEFT;  Surgeon: Adam Hays M.D.;  Location: [facility];  Service: Gynecology Oncology    OR CYSTOURETHROSCOPY,URETER CATHETER Left 12/31/2020    Procedure: CYSTOSCOPY, WITH left RETROGRADE PYELOGRAM;  Surgeon: Adam Hays M.D.;  Location: [facility];  Service: Gynecology Oncology    CYSTO STENT PLACEMNT PRE SURG Left 12/31/2020    Procedure: CYSTOSCOPY, WITH Left URETERAL STENT  removal and replacement;  Surgeon: Adam Hays M.D.;  Location: [facility];  Service: Gynecology Oncology    OR CYSTOSCOPY,INSERT URETERAL STENT Left 9/3/2020    Procedure: CYSTOSCOPY, WITH URETERAL STENT INSERTION- RIGHT AND OR LEFT EXCHANGE;  Surgeon: Adam Hays M.D.;  Location: [facility];  Service: Gynecology Oncology    OR CYSTOURETHROSCOPY,URETER CATHETER Left 9/3/2020    Procedure: RETROGRADE PYELOGRAM;  Surgeon: Adam Hays M.D.;  Location: [facility];  Service: Gynecology Oncology    OR CYSTOSCOPY,INSERT URETERAL STENT Left 5/21/2020    Procedure: CYSTOSCOPY, WITH URETERAL STENT INSERTION;  Surgeon: Adam Hays M.D.;  Location: [facility];  Service: Gynecology Oncology    CYSTO STENT PLACEMNT PRE SURG Left 8/29/2019    Procedure: CYSTOSCOPY,  "WITH URETERAL STENT REMOVAL;  Surgeon: Adam Hays M.D.;  Location: SURGERY [facility];  Service: Gynecology    CYSTOSCOPY  7/31/2015    Procedure: CYSTOSCOPY;  Surgeon: Adam Hays M.D.;  Location: SURGERY [facility];  Service:     PELVIC EXAM UNDER ANESTHESIA  7/31/2015    Procedure: PELVIC EXAM UNDER ANESTHESIA;  Surgeon: Adam Hays M.D.;  Location: SURGERY [facility];  Service:     CERVICAL CONIZATION  7/31/2015    Procedure: CERVICAL CONIZATION FOR: CONE BIOPSY;  Surgeon: Adam Hays M.D.;  Location: SURGERY [facility];  Service:     COLOSTOMY CREATION LAPAROSCOPIC  4/14/2015    Performed by Mariusz Barajas M.D. at SURGERY [facility]    RECOVERY  4/6/2015    Performed by [facility] at SURGERY PRE-POST PROC UNIT [facility]    OTHER ABDOMINAL SURGERY  2015    bowel resection- has colostomy    GYN SURGERY  5/2005    hysterectomy       CURRENT MEDICATIONS  Home Medications       Reviewed by Raya Sánchez R.N. (Registered Nurse) on 12/11/22 at 1854  Med List Status: Not Addressed     Medication Last Dose Status   alendronate (FOSAMAX) 70 MG Tab  Active   Ascorbic Acid (VITAMIN C) 1000 MG Tab  Active   atorvastatin (LIPITOR) 20 MG Tab  Active   Cholecalciferol (VITAMIN D3) 2000 UNIT Cap  Active   Coenzyme Q10 (CO Q10 PO)  Active   lisinopril (PRINIVIL, ZESTRIL) 40 MG tablet  Active   Multiple Vitamins-Minerals (PRESERVISION AREDS 2) Cap  Active   therapeutic multivitamin-minerals (THERAGRAN-M) Tab  Active                    ALLERGIES  Allergies   Allergen Reactions    Sulfa Drugs Unspecified     Pt states \"It really runs me down. I feel tired.\"       PHYSICAL EXAM  VITAL SIGNS: BP (!) 174/84   Pulse (!) 111   Temp 36.2 °C (97.2 °F) (Temporal)   Resp 16   Ht 1.524 m (5')   Wt 61.9 kg (136 lb 7.4 oz)   LMP  (LMP Unknown) Comment: 3/13/22 HCG negative  SpO2 95%   BMI 26.65 kg/m²       Constitutional: Appears uncomfortable   HENT: Normocephalic, Atraumatic, Bilateral " external ears normal, Oropharynx moist, No oral exudates, Nose normal.   Eyes: PERRLA, EOMI, Conjunctiva normal, No discharge.   Neck: Normal range of motion, No tenderness, Supple, No stridor.   Cardiovascular: Normal heart rate, Normal rhythm, No murmurs, No rubs, No gallops.   Thorax & Lungs: Normal breath sounds, No respiratory distress, No wheezing, No chest tenderness.   Abdomen: Bowel sounds normal, Soft, No tenderness, No masses, No pulsatile masses.   Skin: Warm, Dry, No erythema, No rash.   Back: Left-sided CVA tenderness.  Left nephrostomy tube dressing is clean dry and intact.  The bag is only containing around 10 cc of yellow urine  Extremities: Intact distal pulses, No edema, No tenderness, No cyanosis, No clubbing.   Musculoskeletal: Good range of motion in all major joints. No tenderness to palpation or major deformities noted.   Neurologic: Alert & oriented x 3, Normal motor function, Normal sensory function, No focal deficits noted.   Psychiatric: Anxious  Results for orders placed or performed during the hospital encounter of 12/11/22   Comp Metabolic Panel   Result Value Ref Range    Sodium 139 135 - 145 mmol/L    Potassium 4.6 3.6 - 5.5 mmol/L    Chloride 103 96 - 112 mmol/L    Co2 24 20 - 33 mmol/L    Anion Gap 12.0 7.0 - 16.0    Glucose 142 (H) 65 - 99 mg/dL    Bun 26 (H) 8 - 22 mg/dL    Creatinine 0.89 0.50 - 1.40 mg/dL    Calcium 10.7 (H) 8.5 - 10.5 mg/dL    AST(SGOT) 17 12 - 45 U/L    ALT(SGPT) 13 2 - 50 U/L    Alkaline Phosphatase 109 (H) 30 - 99 U/L    Total Bilirubin 0.5 0.1 - 1.5 mg/dL    Albumin 4.7 3.2 - 4.9 g/dL    Total Protein 7.4 6.0 - 8.2 g/dL    Globulin 2.7 1.9 - 3.5 g/dL    A-G Ratio 1.7 g/dL   CBC WITH DIFFERENTIAL   Result Value Ref Range    WBC 10.8 4.8 - 10.8 K/uL    RBC 5.21 4.20 - 5.40 M/uL    Hemoglobin 14.1 12.0 - 16.0 g/dL    Hematocrit 42.4 37.0 - 47.0 %    MCV 81.4 81.4 - 97.8 fL    MCH 27.1 27.0 - 33.0 pg    MCHC 33.3 (L) 33.6 - 35.0 g/dL    RDW 42.0 35.9 - 50.0 fL     Platelet Count 242 164 - 446 K/uL    MPV 9.4 9.0 - 12.9 fL    Neutrophils-Polys 87.60 (H) 44.00 - 72.00 %    Lymphocytes 6.80 (L) 22.00 - 41.00 %    Monocytes 4.50 0.00 - 13.40 %    Eosinophils 0.10 0.00 - 6.90 %    Basophils 0.40 0.00 - 1.80 %    Immature Granulocytes 0.60 0.00 - 0.90 %    Nucleated RBC 0.00 /100 WBC    Neutrophils (Absolute) 9.44 (H) 2.00 - 7.15 K/uL    Lymphs (Absolute) 0.73 (L) 1.00 - 4.80 K/uL    Monos (Absolute) 0.48 0.00 - 0.85 K/uL    Eos (Absolute) 0.01 0.00 - 0.51 K/uL    Baso (Absolute) 0.04 0.00 - 0.12 K/uL    Immature Granulocytes (abs) 0.06 0.00 - 0.11 K/uL    NRBC (Absolute) 0.00 K/uL   ESTIMATED GFR   Result Value Ref Range    GFR (CKD-EPI) 66 >60 mL/min/1.73 m 2      CT-ABDOMEN-PELVIS WITH    (Results Pending)         COURSE & MEDICAL DECISION MAKING  Pertinent Labs & Imaging studies reviewed. (See chart for details)  An IV was established.  There is significant high volumes today due to trauma and influenza epidemic.  There are delay getting CT scan.  Reviewed the images and there clearly appears to be a nephrostomy tube that is not draining as there is pronounced and significant left-sided hydronephrosis.  The patient will be admitted to the hospitalist service, n.p.o. at midnight and have interventional radiology swap out the nephrostomy tube in the morning    FINAL IMPRESSION  1.  Complication of left-sided nephrostomy tube with hydronephrosis      Electronically signed by: Errol Sheikh M.D., 12/11/2022 10:07 PM

## 2022-12-12 NOTE — PROGRESS NOTES
IR Nursing Note    Left nephrostomy tube exchange by MD Aranda assisted by RT Hebert, left flank access site.  Patient was consented by MD and confirmed by this RN.     Patient was placed in a prone position,  prepped and draped in a sterile fashion.   Vitals were taken every 5 minutes including ETCO2, and remained stable during procedure (see doc flow sheet for results).      A 12fr C 25cm Flexima nephrostomy tube exchanged over a wire.  A gauze dressing was placed over surgical site and a gravity drainage bag attached.       Report given to MAYCOL Child.  Patient transported via bed by the IR RN to [id number]. Patient reassessed by this writer and receiving nurse. Patient released into the care of the receiving nurse.

## 2022-12-12 NOTE — ED NOTES
Pt medicated per MAR. Pt rovided w/ warm blankets and resting comfortably w/ equal chest rise/fall.

## 2022-12-12 NOTE — ED NOTES
Called and gave report to MAYCOL Child. Discussed POC. All questions answered. Pt transported upstairs with transport. Care released.

## 2022-12-12 NOTE — ASSESSMENT & PLAN NOTE
Dependent on chronic left-sided nephrostomy tube, exchanged x4 since 06/2022    CT AP: Suggestive of obstruction of left nephrostomy tube with worsening hydronephrosis    IR consulted for nephrostomy tube exchange

## 2022-12-12 NOTE — ED NOTES
Pt returned from CT.    Introduced self to Pt; informed her that I am part of her care team.  Rounded on Pt. Updated Pt on plan of care. Pt verbalized understanding.  No acute distress at this time.  Will continue to monitor.

## 2022-12-12 NOTE — OR SURGEON
Immediate Post- Operative Note        Findings: Left nephrostomy tube      Procedure(s): Left neph tube replacement      Estimated Blood Loss: Less than 5 ml        Complications: None            12/12/2022     1:52 PM     Roby Aranda M.D.

## 2022-12-12 NOTE — ED NOTES
Pt medicated per MAR for N/V prior to going to CT  Pt to CT at this time    Report to Yunior SEVERINO  Pt to be moved to [address] [facility] after scan

## 2022-12-12 NOTE — H&P
Hospital Medicine History & Physical Note    Date of Service  12/12/2022    Primary Care Physician  Julio Ross M.D.    Consultants  IR    Code Status  Full Code    Chief Complaint  Chief Complaint   Patient presents with    Flank Pain     Pt is having complications with her nephrostomy tube on the left side.  Pt states it is not draining and she flushes it twice a day.        History of Presenting Illness  Nessa Dennis is a 77 y.o. female with history of metastatic cervical cancer followed by Dr. Hays and Dr. Aldrich, chronic left-sided nephrostomy tube, hypertension who presented 12/11/2022 with left flank pain, unable to drain left nephrostomy tube.  Patient had left nephrostomy tube exchange x4 by IR since 06/2022.  CTAP in ER notable for increased left hydronephrosis compared to prior study, likely representing obstructed nephrostomy tube.  IR was consulted.  Admitted to medicine service for further evaluation and treatment.    I discussed the plan of care with patient and bedside RN.    Review of Systems  Review of Systems   Constitutional:  Negative for chills and fever.   HENT:  Negative for hearing loss and tinnitus.    Eyes:  Negative for blurred vision and double vision.   Respiratory:  Negative for cough and shortness of breath.    Cardiovascular:  Negative for chest pain and palpitations.   Gastrointestinal:  Positive for heartburn, nausea and vomiting. Negative for abdominal pain.   Genitourinary:  Positive for flank pain. Negative for dysuria, frequency and urgency.   Musculoskeletal:  Positive for back pain.   Skin:  Negative for itching and rash.   Neurological:  Positive for weakness. Negative for dizziness, focal weakness and headaches.   Endo/Heme/Allergies:  Negative for environmental allergies. Does not bruise/bleed easily.   Psychiatric/Behavioral:  Negative for depression and substance abuse.    All other systems reviewed and are negative.    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    Surgical History   has a past surgical history that includes recovery (4/6/2015); colostomy creation laparoscopic (4/14/2015); cystoscopy (7/31/2015); cysto stent placemnt pre surg (Left, 8/29/2019); gyn surgery (5/2005); pelvic exam under anesthesia (7/31/2015); cervical conization (7/31/2015); pr cystoscopy,insert ureteral stent (Left, 5/21/2020); pr cystoscopy,insert ureteral stent (Left, 9/3/2020); pr cystourethroscopy,ureter catheter (Left, 9/3/2020); pr cystourethroscopy,ureter catheter (Left, 12/31/2020); cysto stent placemnt pre surg (Left, 12/31/2020); pr cystoscopy,insert ureteral stent (Left, 3/25/2021); other abdominal surgery (2015); cysto stent placemnt pre surg (Left, 8/26/2021); other (11/2021); pr cystoscopy,insert ureteral stent (Left, 12/30/2021); pr thoracoscopy,dx no bx (Left, 2/23/2022); lymph node sampling (2/23/2022); lobectomy (Left, 2/23/2022); pr cystoscopy,insert ureteral stent (Left, 3/13/2022); pr laryngoscopy,direct,scope,inj cords (5/26/2022); and laryngoscopy (Left, 5/26/2022).     Family History  family history includes Breast Cancer in her sister; Cancer in her father and sister; Hypertension in her father; Stroke in her brother.   Family history reviewed with patient. There is family history that is pertinent to the chief complaint.     Social History   reports that she has never smoked. She has never used smokeless tobacco. She reports that she does not currently use  "alcohol. She reports that she does not currently use drugs.    Allergies  Allergies   Allergen Reactions    Sulfa Drugs Unspecified     Pt states \"It really runs me down. I feel tired.\"       Medications  Prior to Admission Medications   Prescriptions Last Dose Informant Patient Reported? Taking?   Ascorbic Acid (VITAMIN C) 1000 MG Tab  Patient Yes No   Sig: Take 1,000 mg by mouth 2 times a day.   Cholecalciferol (VITAMIN D3) 2000 UNIT Cap  Patient Yes No   Sig: Take 4,000 Units by mouth every morning. 2 capsules = 4,000 units.   Coenzyme Q10 (CO Q10 PO)  Patient Yes No   Sig: Take 1 Capsule by mouth every evening.   Multiple Vitamins-Minerals (PRESERVISION AREDS 2) Cap  Patient Yes No   Sig: Take 1 Capsule by mouth every morning.   alendronate (FOSAMAX) 70 MG Tab  Patient Yes No   Sig: Take 70 mg by mouth every 7 days.   atorvastatin (LIPITOR) 20 MG Tab  Patient Yes No   Sig: Take 20 mg by mouth every evening.   lisinopril (PRINIVIL, ZESTRIL) 40 MG tablet  Patient Yes No   Sig: Take 40 mg by mouth every morning. Indications: High Blood Pressure Disorder   therapeutic multivitamin-minerals (THERAGRAN-M) Tab  Patient Yes No   Sig: Take 1 Tablet by mouth every morning.      Facility-Administered Medications: None       Physical Exam  Temp:  [36.2 °C (97.2 °F)] 36.2 °C (97.2 °F)  Pulse:  [111] 111  Resp:  [16] 16  BP: (166-180)/() 174/81  SpO2:  [86 %-96 %] 96 %  Blood Pressure : (!) 174/81   Temperature: 36.2 °C (97.2 °F)   Pulse: (!) 111   Respiration: 16   Pulse Oximetry: 96 %       Physical Exam  Vitals and nursing note reviewed.   Constitutional:       General: She is not in acute distress.  HENT:      Head: Normocephalic and atraumatic.      Nose: Nose normal.      Mouth/Throat:      Mouth: Mucous membranes are dry.      Pharynx: Oropharynx is clear.   Eyes:      General: No scleral icterus.     Extraocular Movements: Extraocular movements intact.   Cardiovascular:      Rate and Rhythm: Regular rhythm. " Tachycardia present.      Pulses: Normal pulses.      Heart sounds:     No friction rub.   Pulmonary:      Effort: No respiratory distress.      Breath sounds: No stridor. No wheezing or rales.   Abdominal:      General: There is no distension.      Tenderness: There is no abdominal tenderness. There is no guarding or rebound.   Genitourinary:     Comments: Left nephrostomy tube in place, no output  Negative CVA tenderness bilaterally  Musculoskeletal:         General: No swelling or tenderness. Normal range of motion.      Cervical back: Neck supple. No tenderness.   Skin:     General: Skin is warm and dry.      Capillary Refill: Capillary refill takes less than 2 seconds.   Neurological:      General: No focal deficit present.      Mental Status: She is alert and oriented to person, place, and time.   Psychiatric:         Mood and Affect: Mood normal.       Laboratory:  Recent Labs     12/11/22 2121   WBC 10.8   RBC 5.21   HEMOGLOBIN 14.1   HEMATOCRIT 42.4   MCV 81.4   MCH 27.1   MCHC 33.3*   RDW 42.0   PLATELETCT 242   MPV 9.4     Recent Labs     12/11/22 2121   SODIUM 139   POTASSIUM 4.6   CHLORIDE 103   CO2 24   GLUCOSE 142*   BUN 26*   CREATININE 0.89   CALCIUM 10.7*     Recent Labs     12/11/22 2121   ALTSGPT 13   ASTSGOT 17   ALKPHOSPHAT 109*   TBILIRUBIN 0.5   GLUCOSE 142*         No results for input(s): NTPROBNP in the last 72 hours.      No results for input(s): TROPONINT in the last 72 hours.    Imaging:  CT-ABDOMEN-PELVIS WITH   Final Result         1.  Left nephrostomy tube is in place, there is increased left hydronephrosis compared to prior study, given history likely represents obstructed nephrostomy tube   2.  Left hydronephrosis with clustered mid ureteral stones, overall appears similar to prior study.   3.  Probable left lower quadrant parastomal hernia containing loop of ostomy bowel.   4.  Atherosclerosis      IR-CONSULT AND TREAT    (Results Pending)       no X-Ray or EKG requiring  interpretation    Assessment/Plan:  Justification for Admission Status  I anticipate this patient will require least two midnights of hospitalization, therefore appropriate for inpatient status.      * Nephrostomy complication (HCC)- (present on admission)  Assessment & Plan  Dependent on chronic left-sided nephrostomy tube, exchanged x4 since 06/2022    CT AP: Suggestive of obstruction of left nephrostomy tube with worsening hydronephrosis    IR consulted for nephrostomy tube exchange    Intractable nausea and vomiting  Assessment & Plan  Supportive antiemetic  IVF    Flank pain  Assessment & Plan  Likely due to above  Supportive pain control  Does not appear infected    Mixed hyperlipidemia- (present on admission)  Assessment & Plan  Statin    HTN (hypertension)- (present on admission)  Assessment & Plan  Lisinopril    Malignant neoplasm of endocervix (HCC)- (present on admission)  Assessment & Plan  Outpatient follow-up    History of Cervical cancer (HCC)- (present on admission)  Assessment & Plan  Followed by Dr. Hays and Dr. Aldrich  Outpatient follow-up      VTE prophylaxis: SCDs/TEDs

## 2022-12-12 NOTE — CARE PLAN
The patient is Stable - Low risk of patient condition declining or worsening    Shift Goals  Clinical Goals: IR  Patient Goals: Fix neph tube, rest    Progress made toward(s) clinical / shift goals:  Discussed plan of care with patient before procedure. No complaints of pain at this time. Patient states she self caths. Reminded to call when needing assistance. Bed alarm on.       Problem: Knowledge Deficit - Standard  Goal: Patient and family/care givers will demonstrate understanding of plan of care, disease process/condition, diagnostic tests and medications  Outcome: Progressing     Problem: Pain - Standard  Goal: Alleviation of pain or a reduction in pain to the patient’s comfort goal  Outcome: Progressing     Problem: Urinary Elimination  Goal: Establish and maintain regular urinary output  Outcome: Progressing     Problem: Self Care  Goal: Patient will have the ability to perform ADLs independently or with assistance (bathe, groom, dress, toilet and feed)  Outcome: Progressing       Patient is not progressing towards the following goals:

## 2022-12-13 NOTE — DISCHARGE SUMMARY
Hospital Medicine Discharge Note     Admit Date:  12/11/2022       Discharge Date:   12/12/2022    Attending Physician:  John Falcon M.D.     Diagnoses (includes active and resolved):   Principal Problem:    Nephrostomy complication (HCC) POA: Yes  Active Problems:    History of Cervical cancer (HCC) POA: Yes    Malignant neoplasm of endocervix (HCC) POA: Yes    HTN (hypertension) POA: Yes    Mixed hyperlipidemia POA: Yes    Flank pain POA: Unknown    Intractable nausea and vomiting POA: Unknown  Resolved Problems:    * No resolved hospital problems. *      Hospital Summary (Brief Narrative):       77 y.o. female with history of metastatic cervical cancer followed by Dr. Hays and Dr. Aldrich, chronic left-sided nephrostomy tube, hypertension who presented 12/11/2022 with left flank pain, unable to drain left nephrostomy tube.  Patient had left nephrostomy tube exchange x4 by IR since 06/2022.  CTAP in ER notable for increased left hydronephrosis compared to prior study, likely representing obstructed nephrostomy tube.  IR was consulted.    She was admitted and taken by IR for replacement.  This was done without issue.  Patient completed bedrest afterwards and was able to be discharged home with a short course of pain medications in case she develops pain after the procedure.     No notes on file  The patient recovered much more quickly than anticipated on admission.During admission, she  was admitted inpatient because it was expected that it would take greater than 2 midnights for medical management. Because of the remarkable speed of which the interventions were done, along w/ the patient's own unexpectedly quick recovery, she was discharged after only one night.     Consultants:      None    Procedures:        Findings: Left nephrostomy tube  Procedure(s): Left neph tube replacement    Discharge Medications:           Medication List        START taking these medications        Instructions    HYDROcodone-acetaminophen 5-325 MG Tabs per tablet  Commonly known as: Norco   Take 1 Tablet by mouth every 8 hours as needed (pain) for up to 1 day.  Dose: 1 Tablet            CONTINUE taking these medications        Instructions   alendronate 70 MG Tabs  Commonly known as: FOSAMAX   Take 70 mg by mouth every 7 days. Tuesday  Dose: 70 mg     atorvastatin 20 MG Tabs  Commonly known as: LIPITOR   Take 20 mg by mouth every evening.  Dose: 20 mg     CO Q10 PO   Take 1 Capsule by mouth every evening.  Dose: 1 Capsule     lisinopril 40 MG tablet  Commonly known as: PRINIVIL   Take 40 mg by mouth every morning. Indications: High Blood Pressure Disorder  Dose: 40 mg     * therapeutic multivitamin-minerals Tabs   Take 1 Tablet by mouth every morning.  Dose: 1 Tablet     * PreserVision AREDS 2 Caps   Take 1 Capsule by mouth every morning.  Dose: 1 Capsule     Vitamin C 1000 MG Tabs   Take 1,000 mg by mouth 2 times a day.  Dose: 1,000 mg     Vitamin D3 2000 UNIT Caps   Take 4,000 Units by mouth every morning. 2 capsules = 4,000 units.  Dose: 4,000 Units           * This list has 2 medication(s) that are the same as other medications prescribed for you. Read the directions carefully, and ask your doctor or other care provider to review them with you.                Disposition:   Discharge home    Activity:   As tolerated    Code status:   Full code    Primary Care Provider:    Julio Ross M.D.    Follow up appointment details :      PCP in 2 weeks  No follow-up provider specified.  No future appointments.    Pending Studies:        None    Time spent on discharge day patient visit: 40 minutes    #################################################    Interval history/exam for day of discharge:    Vitals:    12/12/22 1350 12/12/22 1355 12/12/22 1400 12/12/22 1605   BP: (!) 142/82 131/70 128/82 132/74   Pulse: (!) 106 (!) 101 (!) 103 (!) 102   Resp: 14 (!) 22 (!) 25 16   Temp:    36.9 °C (98.4 °F)   TempSrc:    Temporal    SpO2: 96% 97% 97%    Weight:       Height:         Weight/BMI: Body mass index is 26.65 kg/m².  Pulse Oximetry: 97 %, O2 (LPM): 0, O2 Delivery Device: Room air w/o2 available    General:  NAD  CVS:  tachy  PULM:  no respiratory distress    Most Recent Labs:    Lab Results   Component Value Date/Time    WBC 10.6 12/12/2022 06:26 AM    RBC 4.75 12/12/2022 06:26 AM    HEMOGLOBIN 12.7 12/12/2022 06:26 AM    HEMATOCRIT 38.8 12/12/2022 06:26 AM    MCV 81.7 12/12/2022 06:26 AM    MCH 26.7 (L) 12/12/2022 06:26 AM    MCHC 32.7 (L) 12/12/2022 06:26 AM    MPV 10.1 12/12/2022 06:26 AM    NEUTSPOLYS 90.60 (H) 12/12/2022 06:26 AM    LYMPHOCYTES 4.70 (L) 12/12/2022 06:26 AM    MONOCYTES 4.20 12/12/2022 06:26 AM    EOSINOPHILS 0.00 12/12/2022 06:26 AM    BASOPHILS 0.20 12/12/2022 06:26 AM      Lab Results   Component Value Date/Time    SODIUM 137 12/12/2022 06:26 AM    POTASSIUM 4.3 12/12/2022 06:26 AM    CHLORIDE 104 12/12/2022 06:26 AM    CO2 23 12/12/2022 06:26 AM    GLUCOSE 144 (H) 12/12/2022 06:26 AM    BUN 24 (H) 12/12/2022 06:26 AM    CREATININE 0.93 12/12/2022 06:26 AM    CREATININE 0.7 03/27/2006 02:58 PM      Lab Results   Component Value Date/Time    ALTSGPT 13 12/11/2022 09:21 PM    ASTSGOT 17 12/11/2022 09:21 PM    ALKPHOSPHAT 109 (H) 12/11/2022 09:21 PM    TBILIRUBIN 0.5 12/11/2022 09:21 PM    LIPASE 33 10/15/2022 06:03 PM    ALBUMIN 4.1 12/12/2022 06:26 AM    GLOBULIN 2.7 12/11/2022 09:21 PM    INR 1.02 12/12/2022 06:26 AM     Lab Results   Component Value Date/Time    PROTHROMBTM 13.3 12/12/2022 06:26 AM    INR 1.02 12/12/2022 06:26 AM        Imaging/ Testing:      IR-EXCHANGE PERQ NEPH CATH (ALL RADIOLOGY) LEFT   Final Result      1. FLUOROSCOPIC GUIDED EXCHANGE OF A LEFT 12 [place]  PIGTAIL LOCKING LOOP PERCUTANEOUS NEPHROSTOMY CATHETER FOR A NEW 12 [place] LOCKING LOOP NEPHROSTOMY.      CT-ABDOMEN-PELVIS WITH   Final Result         1.  Left nephrostomy tube is in place, there is increased left hydronephrosis  compared to prior study, given history likely represents obstructed nephrostomy tube   2.  Left hydronephrosis with clustered mid ureteral stones, overall appears similar to prior study.   3.  Probable left lower quadrant parastomal hernia containing loop of ostomy bowel.   4.  Atherosclerosis          Instructions:      The patient was instructed to return to the ER in the event of worsening symptoms. I have counseled the patient on the importance of compliance and the patient has agreed to proceed with all medical recommendations and follow up plan indicated above.   The patient understands that all medications come with benefits and risks. Risks may include permanent injury or death and these risks can be minimized with close reassessment and monitoring.

## 2022-12-20 ENCOUNTER — HOSPITAL ENCOUNTER (OUTPATIENT)
Dept: LAB | Facility: MEDICAL CENTER | Age: 77
End: 2022-12-20
Attending: SPECIALIST
Payer: MEDICARE

## 2022-12-20 LAB
APPEARANCE UR: ABNORMAL
BACTERIA #/AREA URNS HPF: ABNORMAL /HPF
BILIRUB UR QL STRIP.AUTO: NEGATIVE
COLOR UR: YELLOW
EPI CELLS #/AREA URNS HPF: ABNORMAL /HPF
GLUCOSE UR STRIP.AUTO-MCNC: NEGATIVE MG/DL
GRAN CASTS #/AREA URNS LPF: ABNORMAL /LPF
HYALINE CASTS #/AREA URNS LPF: ABNORMAL /LPF
KETONES UR STRIP.AUTO-MCNC: NEGATIVE MG/DL
LEUKOCYTE ESTERASE UR QL STRIP.AUTO: ABNORMAL
MICRO URNS: ABNORMAL
NITRITE UR QL STRIP.AUTO: POSITIVE
PH UR STRIP.AUTO: 8.5 [PH] (ref 5–8)
PROT UR QL STRIP: 300 MG/DL
RBC # URNS HPF: ABNORMAL /HPF
RBC UR QL AUTO: ABNORMAL
SP GR UR STRIP.AUTO: 1.01
TRI-PHOS CRY #/AREA URNS HPF: ABNORMAL /HPF
UROBILINOGEN UR STRIP.AUTO-MCNC: 0.2 MG/DL
WBC #/AREA URNS HPF: ABNORMAL /HPF

## 2022-12-20 PROCEDURE — 87186 SC STD MICRODIL/AGAR DIL: CPT

## 2022-12-20 PROCEDURE — 81001 URINALYSIS AUTO W/SCOPE: CPT

## 2022-12-20 PROCEDURE — 87077 CULTURE AEROBIC IDENTIFY: CPT

## 2022-12-20 PROCEDURE — 87086 URINE CULTURE/COLONY COUNT: CPT

## 2022-12-29 ENCOUNTER — HOSPITAL ENCOUNTER (OUTPATIENT)
Dept: RADIOLOGY | Facility: MEDICAL CENTER | Age: 77
End: 2022-12-29
Attending: SPECIALIST
Payer: MEDICARE

## 2023-02-20 ENCOUNTER — HOSPITAL ENCOUNTER (OUTPATIENT)
Facility: MEDICAL CENTER | Age: 78
End: 2023-02-20
Attending: EMERGENCY MEDICINE | Admitting: HOSPITALIST
Payer: MEDICARE

## 2023-02-20 ENCOUNTER — APPOINTMENT (OUTPATIENT)
Dept: RADIOLOGY | Facility: MEDICAL CENTER | Age: 78
End: 2023-02-20
Attending: EMERGENCY MEDICINE
Payer: MEDICARE

## 2023-02-20 VITALS
OXYGEN SATURATION: 94 % | HEIGHT: 60 IN | HEART RATE: 90 BPM | DIASTOLIC BLOOD PRESSURE: 68 MMHG | BODY MASS INDEX: 26.36 KG/M2 | TEMPERATURE: 98.2 F | RESPIRATION RATE: 23 BRPM | WEIGHT: 134.26 LBS | SYSTOLIC BLOOD PRESSURE: 132 MMHG

## 2023-02-20 PROBLEM — N13.5 URETERAL OBSTRUCTION, LEFT: Status: ACTIVE | Noted: 2023-02-20

## 2023-02-20 LAB
ALBUMIN SERPL BCP-MCNC: 5.1 G/DL (ref 3.2–4.9)
ALBUMIN/GLOB SERPL: 1.9 G/DL
ALP SERPL-CCNC: 113 U/L (ref 30–99)
ALT SERPL-CCNC: 15 U/L (ref 2–50)
ANION GAP SERPL CALC-SCNC: 12 MMOL/L (ref 7–16)
AST SERPL-CCNC: 20 U/L (ref 12–45)
BASOPHILS # BLD AUTO: 0.2 % (ref 0–1.8)
BASOPHILS # BLD: 0.02 K/UL (ref 0–0.12)
BILIRUB SERPL-MCNC: 0.6 MG/DL (ref 0.1–1.5)
BUN SERPL-MCNC: 23 MG/DL (ref 8–22)
CALCIUM ALBUM COR SERPL-MCNC: 10.1 MG/DL (ref 8.5–10.5)
CALCIUM SERPL-MCNC: 11 MG/DL (ref 8.5–10.5)
CHLORIDE SERPL-SCNC: 103 MMOL/L (ref 96–112)
CO2 SERPL-SCNC: 24 MMOL/L (ref 20–33)
CREAT SERPL-MCNC: 0.74 MG/DL (ref 0.5–1.4)
EOSINOPHIL # BLD AUTO: 0.01 K/UL (ref 0–0.51)
EOSINOPHIL NFR BLD: 0.1 % (ref 0–6.9)
ERYTHROCYTE [DISTWIDTH] IN BLOOD BY AUTOMATED COUNT: 42.1 FL (ref 35.9–50)
GFR SERPLBLD CREATININE-BSD FMLA CKD-EPI: 83 ML/MIN/1.73 M 2
GLOBULIN SER CALC-MCNC: 2.7 G/DL (ref 1.9–3.5)
GLUCOSE SERPL-MCNC: 126 MG/DL (ref 65–99)
HCT VFR BLD AUTO: 45.4 % (ref 37–47)
HGB BLD-MCNC: 14.9 G/DL (ref 12–16)
IMM GRANULOCYTES # BLD AUTO: 0.04 K/UL (ref 0–0.11)
IMM GRANULOCYTES NFR BLD AUTO: 0.4 % (ref 0–0.9)
LYMPHOCYTES # BLD AUTO: 0.53 K/UL (ref 1–4.8)
LYMPHOCYTES NFR BLD: 4.9 % (ref 22–41)
MCH RBC QN AUTO: 27.2 PG (ref 27–33)
MCHC RBC AUTO-ENTMCNC: 32.8 G/DL (ref 33.6–35)
MCV RBC AUTO: 82.8 FL (ref 81.4–97.8)
MONOCYTES # BLD AUTO: 0.24 K/UL (ref 0–0.85)
MONOCYTES NFR BLD AUTO: 2.2 % (ref 0–13.4)
NEUTROPHILS # BLD AUTO: 9.97 K/UL (ref 2–7.15)
NEUTROPHILS NFR BLD: 92.2 % (ref 44–72)
NRBC # BLD AUTO: 0 K/UL
NRBC BLD-RTO: 0 /100 WBC
PLATELET # BLD AUTO: 243 K/UL (ref 164–446)
PMV BLD AUTO: 10 FL (ref 9–12.9)
POTASSIUM SERPL-SCNC: 4.8 MMOL/L (ref 3.6–5.5)
PROT SERPL-MCNC: 7.8 G/DL (ref 6–8.2)
RBC # BLD AUTO: 5.48 M/UL (ref 4.2–5.4)
SODIUM SERPL-SCNC: 139 MMOL/L (ref 135–145)
WBC # BLD AUTO: 10.8 K/UL (ref 4.8–10.8)

## 2023-02-20 PROCEDURE — 700111 HCHG RX REV CODE 636 W/ 250 OVERRIDE (IP)

## 2023-02-20 PROCEDURE — C1729 CATH, DRAINAGE: HCPCS

## 2023-02-20 PROCEDURE — 74176 CT ABD & PELVIS W/O CONTRAST: CPT

## 2023-02-20 PROCEDURE — 700102 HCHG RX REV CODE 250 W/ 637 OVERRIDE(OP): Performed by: STUDENT IN AN ORGANIZED HEALTH CARE EDUCATION/TRAINING PROGRAM

## 2023-02-20 PROCEDURE — 36415 COLL VENOUS BLD VENIPUNCTURE: CPT

## 2023-02-20 PROCEDURE — G0378 HOSPITAL OBSERVATION PER HR: HCPCS

## 2023-02-20 PROCEDURE — 700117 HCHG RX CONTRAST REV CODE 255: Performed by: RADIOLOGY

## 2023-02-20 PROCEDURE — A9270 NON-COVERED ITEM OR SERVICE: HCPCS | Performed by: STUDENT IN AN ORGANIZED HEALTH CARE EDUCATION/TRAINING PROGRAM

## 2023-02-20 PROCEDURE — 96375 TX/PRO/DX INJ NEW DRUG ADDON: CPT | Mod: XU

## 2023-02-20 PROCEDURE — 80053 COMPREHEN METABOLIC PANEL: CPT

## 2023-02-20 PROCEDURE — 700111 HCHG RX REV CODE 636 W/ 250 OVERRIDE (IP): Performed by: EMERGENCY MEDICINE

## 2023-02-20 PROCEDURE — 700105 HCHG RX REV CODE 258: Performed by: EMERGENCY MEDICINE

## 2023-02-20 PROCEDURE — 99285 EMERGENCY DEPT VISIT HI MDM: CPT

## 2023-02-20 PROCEDURE — 96365 THER/PROPH/DIAG IV INF INIT: CPT | Mod: XU

## 2023-02-20 PROCEDURE — 99223 1ST HOSP IP/OBS HIGH 75: CPT | Mod: GC | Performed by: HOSPITALIST

## 2023-02-20 PROCEDURE — 85025 COMPLETE CBC W/AUTO DIFF WBC: CPT

## 2023-02-20 RX ORDER — MIDAZOLAM HYDROCHLORIDE 1 MG/ML
.5-2 INJECTION INTRAMUSCULAR; INTRAVENOUS PRN
Status: DISCONTINUED | OUTPATIENT
Start: 2023-02-20 | End: 2023-02-20 | Stop reason: HOSPADM

## 2023-02-20 RX ORDER — MIDAZOLAM HYDROCHLORIDE 1 MG/ML
INJECTION INTRAMUSCULAR; INTRAVENOUS
Status: COMPLETED
Start: 2023-02-20 | End: 2023-02-20

## 2023-02-20 RX ORDER — MORPHINE SULFATE 4 MG/ML
2 INJECTION INTRAVENOUS ONCE
Status: COMPLETED | OUTPATIENT
Start: 2023-02-20 | End: 2023-02-20

## 2023-02-20 RX ORDER — ENOXAPARIN SODIUM 100 MG/ML
40 INJECTION SUBCUTANEOUS DAILY
Status: DISCONTINUED | OUTPATIENT
Start: 2023-02-20 | End: 2023-02-20 | Stop reason: HOSPADM

## 2023-02-20 RX ORDER — ONDANSETRON 2 MG/ML
4 INJECTION INTRAMUSCULAR; INTRAVENOUS EVERY 6 HOURS PRN
Status: DISCONTINUED | OUTPATIENT
Start: 2023-02-20 | End: 2023-02-20 | Stop reason: HOSPADM

## 2023-02-20 RX ORDER — NITROFURANTOIN MACROCRYSTALS 50 MG/1
50 CAPSULE ORAL NIGHTLY
Status: SHIPPED | COMMUNITY
End: 2023-02-27

## 2023-02-20 RX ORDER — ONDANSETRON 2 MG/ML
4 INJECTION INTRAMUSCULAR; INTRAVENOUS ONCE
Status: COMPLETED | OUTPATIENT
Start: 2023-02-20 | End: 2023-02-20

## 2023-02-20 RX ORDER — NITROFURANTOIN MACROCRYSTALS 50 MG/1
50 CAPSULE ORAL
Status: SHIPPED | COMMUNITY
End: 2023-02-20

## 2023-02-20 RX ORDER — AMOXICILLIN 250 MG
2 CAPSULE ORAL 2 TIMES DAILY
Status: DISCONTINUED | OUTPATIENT
Start: 2023-02-20 | End: 2023-02-20 | Stop reason: HOSPADM

## 2023-02-20 RX ORDER — LIDOCAINE HYDROCHLORIDE 20 MG/ML
JELLY TOPICAL
Status: DISCONTINUED
Start: 2023-02-20 | End: 2023-02-20 | Stop reason: HOSPADM

## 2023-02-20 RX ORDER — LISINOPRIL 20 MG/1
40 TABLET ORAL EVERY MORNING
Status: DISCONTINUED | OUTPATIENT
Start: 2023-02-20 | End: 2023-02-20 | Stop reason: HOSPADM

## 2023-02-20 RX ORDER — ACETAMINOPHEN 325 MG/1
650 TABLET ORAL ONCE
Status: DISCONTINUED | OUTPATIENT
Start: 2023-02-20 | End: 2023-02-20 | Stop reason: HOSPADM

## 2023-02-20 RX ORDER — ATORVASTATIN CALCIUM 20 MG/1
20 TABLET, FILM COATED ORAL EVERY EVENING
Status: DISCONTINUED | OUTPATIENT
Start: 2023-02-20 | End: 2023-02-20 | Stop reason: HOSPADM

## 2023-02-20 RX ORDER — BISACODYL 10 MG
10 SUPPOSITORY, RECTAL RECTAL
Status: DISCONTINUED | OUTPATIENT
Start: 2023-02-20 | End: 2023-02-20 | Stop reason: HOSPADM

## 2023-02-20 RX ORDER — ACETAMINOPHEN 325 MG/1
650 TABLET ORAL EVERY 6 HOURS PRN
Status: DISCONTINUED | OUTPATIENT
Start: 2023-02-20 | End: 2023-02-20 | Stop reason: HOSPADM

## 2023-02-20 RX ORDER — NITROFURANTOIN 25; 75 MG/1; MG/1
100 CAPSULE ORAL DAILY
Status: DISCONTINUED | OUTPATIENT
Start: 2023-02-20 | End: 2023-02-20 | Stop reason: HOSPADM

## 2023-02-20 RX ORDER — SODIUM CHLORIDE 9 MG/ML
500 INJECTION, SOLUTION INTRAVENOUS
Status: DISCONTINUED | OUTPATIENT
Start: 2023-02-20 | End: 2023-02-20 | Stop reason: HOSPADM

## 2023-02-20 RX ORDER — POLYETHYLENE GLYCOL 3350 17 G/17G
1 POWDER, FOR SOLUTION ORAL
Status: DISCONTINUED | OUTPATIENT
Start: 2023-02-20 | End: 2023-02-20 | Stop reason: HOSPADM

## 2023-02-20 RX ADMIN — AMPICILLIN AND SULBACTAM 3 G: 1; 2 INJECTION, POWDER, FOR SOLUTION INTRAMUSCULAR; INTRAVENOUS at 12:39

## 2023-02-20 RX ADMIN — MORPHINE SULFATE 2 MG: 4 INJECTION INTRAVENOUS at 12:36

## 2023-02-20 RX ADMIN — ONDANSETRON 4 MG: 2 INJECTION INTRAMUSCULAR; INTRAVENOUS at 11:25

## 2023-02-20 RX ADMIN — NITROFURANTOIN MONOHYDRATE/MACROCRYSTALLINE 100 MG: 25; 75 CAPSULE ORAL at 16:36

## 2023-02-20 RX ADMIN — MIDAZOLAM HYDROCHLORIDE 2 MG: 1 INJECTION INTRAMUSCULAR; INTRAVENOUS at 15:13

## 2023-02-20 RX ADMIN — IOHEXOL 18 ML: 300 INJECTION, SOLUTION INTRAVENOUS at 16:00

## 2023-02-20 RX ADMIN — MIDAZOLAM HYDROCHLORIDE 2 MG: 1 INJECTION, SOLUTION INTRAMUSCULAR; INTRAVENOUS at 15:13

## 2023-02-20 ASSESSMENT — ENCOUNTER SYMPTOMS
HEADACHES: 0
SPUTUM PRODUCTION: 0
COUGH: 0
CHILLS: 0
NECK PAIN: 0
SHORTNESS OF BREATH: 0
FEVER: 0
DOUBLE VISION: 0
PALPITATIONS: 0
NAUSEA: 1
SEIZURES: 0
VOMITING: 0
BLURRED VISION: 0
CONSTIPATION: 0
BACK PAIN: 1
DIZZINESS: 0
WEAKNESS: 0
EYE PAIN: 0
DIARRHEA: 0
ABDOMINAL PAIN: 0

## 2023-02-20 ASSESSMENT — FIBROSIS 4 INDEX
FIB4 SCORE: 1.66
FIB4 SCORE: 1.68

## 2023-02-20 ASSESSMENT — PAIN DESCRIPTION - PAIN TYPE: TYPE: ACUTE PAIN

## 2023-02-20 NOTE — PROGRESS NOTES
IR Procedure Note:    Dr. Tirado consented patient prior to procedure; all questions answered.    Site confirmed with imaging guidance by patient, physician, RT, and RN.     Dr. Tirado completed a left nephrostomy tube exchange with single agent sedation.  The patient tolerated the procedure well; ETCo2 with consistent waveform during the procedure.      Suture, split gauze and metapore applied to left flank, CDI and soft.  Patient alert, oriented and verbally appropriate post procedure, patient remained hemodynamically stable during procedure and transport, see flow sheet for vital signs.  Report given to MAYCOL Longoria.  RN transported patient to T209 with SaO2 monitor @ 99 % on oxygen via NC on  3 lpm.     Procedure End Time: 1531    [employer] Flexima Regular Neophrostomy Catheter, 12 F x 25 cm, REF [id number], LOT [id number], EXP 2025-08-03

## 2023-02-20 NOTE — ASSESSMENT & PLAN NOTE
Pt with hx of chronic L ureteral obstruction from stricture s/p radiation treatment for cervical cancer  Has tried ureteral stents in the past but failed  Nephrostomy tube in place, has been replaced multiple times  Admitted once again for dysfunctional nephrostomy tube  -IR consulted, nephrostomy tube replacement to be done 2/20  -Zofran prn for nausea  -F/u with Dr. Lee as outpatient  -Likely DC home if no significant or intractable nausea, vomiting, abdominal pain after IR procedure

## 2023-02-20 NOTE — ED NOTES
PIV inserted. Blood drawn and sent to lab. Pt medicated for nausea. Pt states she wants to wait on taking tylenol.

## 2023-02-20 NOTE — PROGRESS NOTES
Received from IR. Denies pain. States feels great. Neph tube to left kidney in place with 300 cc clear urine. A/O, requesting to go home tonight. Admission process started but not completed as she will DC when spouse arrives. VS WNL.

## 2023-02-20 NOTE — ED TRIAGE NOTES
"Chief Complaint   Patient presents with    Other     Nephrostomy tube isn't draining - its been in place for 2 months     Pt to triage in . Pt states for the last 24 hours she's had minimal output from her nephrostomy tube, has had no output for the last 9 hours. Pt states this is \"making her sick\" - nauseated/vomiting in triage room.     Pt is alert and oriented, speaking in full sentences, follows commands and responds appropriately to questions.      Pt placed in lobby. Pt educated on triage process and apologized for wait time. Pt encouraged to alert staff for any changes.     Patient and staff wearing appropriate PPE      BP (!) 142/88   Pulse 100   Temp 36.1 °C (96.9 °F) (Temporal)   Resp 18   Ht 1.524 m (5')   Wt 61.2 kg (135 lb)   SpO2 95%       "

## 2023-02-20 NOTE — OR SURGEON
Immediate Post- Operative Note        Findings: nearly occluded LEFT neph tube, severe L hydronephrosis      Procedure(s): L nephrostogram and tube change      Estimated Blood Loss: Less than 5 ml        Complications: None            2/20/2023     3:28 PM     Hebert Tirado M.D.

## 2023-02-20 NOTE — ED PROVIDER NOTES
ED Provider Note    CHIEF COMPLAINT  Chief Complaint   Patient presents with    Other     Nephrostomy tube isn't draining - its been in place for 2 months       EXTERNAL RECORDS REVIEWED  Hospitalization in December with nephrostomy tube obstruction.  Had this replaced    HPI/ROS    OUTSIDE HISTORIAN(S):  Been reports very limited output of nephrostomy    Nessa Dennis is a 77 y.o. female who presents with left flank pain.  Has a history of metastatic cervical cancer.  Had radiation resulting in obstruction of her left ureter.  She initially had ureteral stents, but this was unsuccessful.  Neck step was nephrostomy tube.  She has had nephrostomy tube exchanged several times over the last year.  She is followed by Dr. Lee.  There is discussion about having nephrectomy.  Her tube stopped working recently.  No drainage in the bag.  She tried flushing her tubing without success.  She has not had a fever but has worsening left flank pain with associated nausea.  No vomiting.  No chest pain shortness of breath.    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    SURGICAL HISTORY   has a past surgical history that includes recovery (4/6/2015); colostomy creation laparoscopic (4/14/2015); cystoscopy (7/31/2015); cysto stent placemnt pre surg (Left, 8/29/2019); gyn surgery (5/2005); pelvic exam under anesthesia (7/31/2015); cervical conization (7/31/2015); cystoscopy,insert ureteral stent (Left, 5/21/2020);  "cystoscopy,insert ureteral stent (Left, 9/3/2020); cystourethroscopy,ureter catheter (Left, 9/3/2020); cystourethroscopy,ureter catheter (Left, 12/31/2020); cysto stent placemnt pre surg (Left, 12/31/2020); cystoscopy,insert ureteral stent (Left, 3/25/2021); other abdominal surgery (2015); cysto stent placemnt pre surg (Left, 8/26/2021); other (11/2021); cystoscopy,insert ureteral stent (Left, 12/30/2021); thoracoscopy,dx no bx (Left, 2/23/2022); lymph node sampling (2/23/2022); lobectomy (Left, 2/23/2022); cystoscopy,insert ureteral stent (Left, 3/13/2022); laryngoscopy,direct,scope,inj cords (5/26/2022); and laryngoscopy (Left, 5/26/2022).    FAMILY HISTORY  Family History   Problem Relation Age of Onset    Cancer Father         Prostate    Hypertension Father     Breast Cancer Sister     Cancer Sister     Stroke Brother        SOCIAL HISTORY  Social History     Tobacco Use    Smoking status: Never    Smokeless tobacco: Never   Vaping Use    Vaping Use: Never used   Substance and Sexual Activity    Alcohol use: Not Currently    Drug use: Not Currently    Sexual activity: Not on file       CURRENT MEDICATIONS  Home Medications       Reviewed by Clau López PhT (Pharmacy Tech) on 02/20/23 at 1302  Med List Status: Complete     Medication Last Dose Status   alendronate (FOSAMAX) 70 MG Tab 2/14/2023 Active   Ascorbic Acid (VITAMIN C) 1000 MG Tab 2/19/2023 Active   atorvastatin (LIPITOR) 20 MG Tab 2/19/2023 Active   Cholecalciferol (VITAMIN D3) 2000 UNIT Cap 2/19/2023 Active   Coenzyme Q10 (CO Q10 PO) 2/19/2023 Active   lisinopril (PRINIVIL, ZESTRIL) 40 MG tablet 2/19/2023 Active   Multiple Vitamins-Minerals (PRESERVISION AREDS 2) Cap 2/19/2023 Active   nitrofurantoin (MACRODANTIN) 50 MG Cap  Active                    ALLERGIES  Allergies   Allergen Reactions    Sulfa Drugs Unspecified     Pt states \"It really runs me down. I feel tired.\"       PHYSICAL EXAM  VITAL SIGNS: /75   Pulse 97   Temp 36.1 °C (96.9 " °F) (Temporal)   Resp 14   Ht 1.524 m (5')   Wt 61.2 kg (135 lb)   LMP  (LMP Unknown) Comment: 3/13/22 HCG negative  SpO2 93%   BMI 26.37 kg/m²    Constitutional: Awake and alert  HENT: Normal inspection  Eyes: Normal inspection  Neck: Grossly normal range of motion.  Cardiovascular: Normal heart rate, Normal rhythm.  Symmetric peripheral pulses.   Thorax & Lungs: No respiratory distress, No wheezing, No rales, No rhonchi, No chest tenderness.   Abdomen: No focal tenderness.  Colostomy.  Skin: No obvious rash.  Back: No tenderness, nephrostomy and tube in place.  The distal tubing is quite cloudy.  This was removed.  I tried flushing the nephrostomy tube with transient drainage of a small amount of urine but this stopped shortly.  Extremities: No clubbing, cyanosis, edema, no Homans or cords.  Neurologic: Grossly normal   Psychiatric: Normal for situation    DIAGNOSTIC STUDIES / PROCEDURES    LABS  Results for orders placed or performed during the hospital encounter of 02/20/23   CBC WITH DIFFERENTIAL   Result Value Ref Range    WBC 10.8 4.8 - 10.8 K/uL    RBC 5.48 (H) 4.20 - 5.40 M/uL    Hemoglobin 14.9 12.0 - 16.0 g/dL    Hematocrit 45.4 37.0 - 47.0 %    MCV 82.8 81.4 - 97.8 fL    MCH 27.2 27.0 - 33.0 pg    MCHC 32.8 (L) 33.6 - 35.0 g/dL    RDW 42.1 35.9 - 50.0 fL    Platelet Count 243 164 - 446 K/uL    MPV 10.0 9.0 - 12.9 fL    Neutrophils-Polys 92.20 (H) 44.00 - 72.00 %    Lymphocytes 4.90 (L) 22.00 - 41.00 %    Monocytes 2.20 0.00 - 13.40 %    Eosinophils 0.10 0.00 - 6.90 %    Basophils 0.20 0.00 - 1.80 %    Immature Granulocytes 0.40 0.00 - 0.90 %    Nucleated RBC 0.00 /100 WBC    Neutrophils (Absolute) 9.97 (H) 2.00 - 7.15 K/uL    Lymphs (Absolute) 0.53 (L) 1.00 - 4.80 K/uL    Monos (Absolute) 0.24 0.00 - 0.85 K/uL    Eos (Absolute) 0.01 0.00 - 0.51 K/uL    Baso (Absolute) 0.02 0.00 - 0.12 K/uL    Immature Granulocytes (abs) 0.04 0.00 - 0.11 K/uL    NRBC (Absolute) 0.00 K/uL   COMP METABOLIC PANEL    Result Value Ref Range    Sodium 139 135 - 145 mmol/L    Potassium 4.8 3.6 - 5.5 mmol/L    Chloride 103 96 - 112 mmol/L    Co2 24 20 - 33 mmol/L    Anion Gap 12.0 7.0 - 16.0    Glucose 126 (H) 65 - 99 mg/dL    Bun 23 (H) 8 - 22 mg/dL    Creatinine 0.74 0.50 - 1.40 mg/dL    Calcium 11.0 (H) 8.5 - 10.5 mg/dL    AST(SGOT) 20 12 - 45 U/L    ALT(SGPT) 15 2 - 50 U/L    Alkaline Phosphatase 113 (H) 30 - 99 U/L    Total Bilirubin 0.6 0.1 - 1.5 mg/dL    Albumin 5.1 (H) 3.2 - 4.9 g/dL    Total Protein 7.8 6.0 - 8.2 g/dL    Globulin 2.7 1.9 - 3.5 g/dL    A-G Ratio 1.9 g/dL   CORRECTED CALCIUM   Result Value Ref Range    Correct Calcium 10.1 8.5 - 10.5 mg/dL   ESTIMATED GFR   Result Value Ref Range    GFR (CKD-EPI) 83 >60 mL/min/1.73 m 2        RADIOLOGY  I have independently interpreted the diagnostic imaging associated with this visit and am waiting the final reading from the radiologist.   My preliminary interpretation is a follows: CT scan with hydronephrosis on the left  Radiologist interpretation:   CT-RENAL COLIC EVALUATION(A/P W/O)   Final Result      1.  No significant interval change in the marked left hydronephrosis and hydroureter with multiple peripheral and central mid or distal ureteral calcifications are similar to prior study which does not appear obstructive.   2.  The left nephrostomy tube appears satisfactory position.   3.  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      IR-NEPHROSTOGRAM W/ NEW TUBE PLACEMENT (ALL RADIOLOGY) LEFT    (Results Pending)         COURSE & MEDICAL DECISION MAKING    ED Observation Status? Yes; I am placing the patient in to an observation status due to a diagnostic uncertainty as well as therapeutic intensity. Patient placed in observation status at 11 AM PM, 2/20/2023.     Observation plan is as follows: Obtain labs.  Monitor for continued nephrostomy output after flushing.  Consider imaging if no improvement.  May need  replacement of nephrostomy tube.    Upon Reevaluation, the patient's condition has: not improved; and will be escalated to hospitalization.    Patient discharged from ED Observation status at 1:30 PM 2/20/2023     INITIAL ASSESSMENT, COURSE AND PLAN  Care Narrative: With left-sided flank pain.  History and physical as above.  Suspect nephrostomy occlusion.  Infection is within differential.  Initially I thought I had success with flushing the nephrostomy, but it was not functioning appropriately.  Obtain CT scan with persistent hydronephrosis and hydroureter ureter.  She needs replacement of her nephrostomy.  The urine that was produced was cloudy out of the nephrostomy and I gave her empiric Unasyn based on previous cultures.  I discussed case with Dr. Castro who agrees nephrostomy tube requires replacement.  I placed an order for IR nephrostomy exchange.        DISPOSITION AND DISCUSSIONS  I have discussed management of the patient with the following physicians and DEISY's: Dr. Castro, urology.  Agrees with nephrostomy plan exchange. Dr. Cheung hospitalist      FINAL DIAGNOSIS  Flank pain.  Obstructed nephrostomy       Electronically signed by: Harjeet Pederson M.D., 2/20/2023 1:03 PM

## 2023-02-20 NOTE — ED NOTES
Pt brought back to room in WC.  Pt c/o nausea. Pt states she flushes her nephrostomy tube twice a day. Pt states the last time she emptied her drawn was 2300 last night and minimal output present in bag at this time.  Output is yellow with small amount of sediment. Site otherwise CDI.

## 2023-02-20 NOTE — H&P
[facility] Internal Medicine History & Physical Note    Date of Service  2/20/2023    R Team: R IM Orange Team   Attending: Gail Peterson M.d.  Senior Resident: Dr. Ponce  Intern:  Dr. Cruz  Contact Number: [phone]    Primary Care Physician  Julio Ross M.D.    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 States she feels like this when her nephrostomy tube stops working, which usually happens every 2-3 months. Last night she flushed her tubing with no issues, but woke up at 0100 this morning with L flank pain and little output from tube. This progressed to nausea and dry heaves so she came to the ER. States she does not urinate on her own, relies on nephrostomy tube. Denies fevers, chills, chest pain, or leg swelling. Follows with Dr. Lee outpatient and is on Nitrofurantoin 50mg daily for UTI ppx. 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     In the ED, vitals significant for /88. Labs wnl. 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 Given Unasyn, IV morphine 2mg, and IV Zofran 4mg in the ED.     I discussed the plan of care with patient.    Review of Systems  Review of Systems   Constitutional:  Positive for malaise/fatigue. Negative for chills and fever.   HENT:  Negative for ear pain, hearing loss and tinnitus.     Eyes:  Negative for blurred vision, double vision and pain.   Respiratory:  Negative for cough, sputum production and shortness of breath.    Cardiovascular:  Negative for chest pain, palpitations and leg swelling.   Gastrointestinal:  Positive for nausea. Negative for abdominal pain, constipation, diarrhea and vomiting.   Genitourinary:         Does not urinate on own   Musculoskeletal:  Positive for back pain. Negative for neck pain.   Neurological:  Negative for dizziness, seizures, weakness and headaches.     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    Surgical History   has a past surgical history that includes recovery (4/6/2015); colostomy creation laparoscopic (4/14/2015); cystoscopy (7/31/2015); cysto stent placemnt pre surg (Left, 8/29/2019); gyn surgery (5/2005); pelvic exam under anesthesia (7/31/2015); cervical conization (7/31/2015); pr cystoscopy,insert ureteral stent (Left, 5/21/2020); pr cystoscopy,insert ureteral stent (Left, 9/3/2020); pr cystourethroscopy,ureter catheter (Left, 9/3/2020); pr cystourethroscopy,ureter catheter (Left, 12/31/2020); cysto stent placemnt pre surg (Left, 12/31/2020); pr cystoscopy,insert ureteral stent (Left, 3/25/2021); other abdominal surgery (2015); cysto stent placemnt pre surg (Left, 8/26/2021); other (11/2021); pr cystoscopy,insert ureteral stent (Left, 12/30/2021); pr thoracoscopy,dx no bx (Left, 2/23/2022); lymph node sampling (2/23/2022); lobectomy (Left,  "2/23/2022); pr cystoscopy,insert ureteral stent (Left, 3/13/2022); pr laryngoscopy,direct,scope,inj cords (5/26/2022); and laryngoscopy (Left, 5/26/2022).     Family History  family history includes Breast Cancer in her sister; Cancer in her father and sister; Hypertension in her father; Stroke in her brother.   Family history reviewed with patient.     Social History  Tobacco: Never used  Alcohol: No current alcohol use  Recreational drugs (illegal or prescription): No current rec drug use  Employment: Retired  Living Situation: Lives with   Recent Travel: None  Primary Care Provider: Reviewed Julio Ross M.D.    Allergies  Allergies   Allergen Reactions    Sulfa Drugs Unspecified     Pt states \"It really runs me down. I feel tired.\"       Medications  Prior to Admission Medications   Prescriptions Last Dose Informant Patient Reported? Taking?   Ascorbic Acid (VITAMIN C) 1000 MG Tab 2/19/2023 at [facility] Patient Yes No   Sig: Take 1,000 mg by mouth 2 times a day.   Cholecalciferol (VITAMIN D3) 2000 UNIT Cap 2/19/2023 at [facility] Patient Yes No   Sig: Take 4,000 Units by mouth every morning. 2 capsules = 4,000 units.   Coenzyme Q10 (CO Q10 PO) 2/19/2023 at [facility] Patient Yes No   Sig: Take 1 Capsule by mouth every evening.   Multiple Vitamins-Minerals (PRESERVISION AREDS 2) Cap 2/19/2023 at [facility] Patient Yes No   Sig: Take 1 Capsule by mouth every morning.   alendronate (FOSAMAX) 70 MG Tab 2/14/2023 at [facility] Patient Yes No   Sig: Take 70 mg by mouth every Tuesday.   atorvastatin (LIPITOR) 20 MG Tab 2/19/2023 at [facility] Patient Yes No   Sig: Take 20 mg by mouth every evening.   lisinopril (PRINIVIL, ZESTRIL) 40 MG tablet 2/19/2023 at [facility] Patient Yes No   Sig: Take 40 mg by mouth every morning. Indications: High Blood Pressure Disorder   nitrofurantoin (MACRODANTIN) 50 MG Cap 2/19/2023 at [facility]  Yes Yes   Sig: Take 50 mg by mouth every evening.      Facility-Administered Medications: None       Physical Exam  Temp:  [36.1 °C " (96.9 °F)] 36.1 °C (96.9 °F)  Pulse:  [] 95  Resp:  [14-18] 14  BP: (136-154)/(72-88) 141/72  SpO2:  [93 %-95 %] 94 %  Blood Pressure : (!) 141/72   Temperature: 36.1 °C (96.9 °F)   Pulse: 95   Respiration: 14   Pulse Oximetry: 94 %       Physical Exam  Constitutional:       General: She is not in acute distress.     Appearance: Normal appearance.   HENT:      Head: Normocephalic and atraumatic.      Nose: Nose normal.      Mouth/Throat:      Mouth: Mucous membranes are moist.      Pharynx: Oropharynx is clear.   Eyes:      General: No scleral icterus.     Conjunctiva/sclera: Conjunctivae normal.   Cardiovascular:      Rate and Rhythm: Normal rate and regular rhythm.      Pulses: Normal pulses.      Heart sounds: Normal heart sounds.   Pulmonary:      Effort: Pulmonary effort is normal. No respiratory distress.      Breath sounds: Normal breath sounds. No wheezing or rales.   Abdominal:      General: Abdomen is flat. Bowel sounds are normal. There is no distension.      Palpations: Abdomen is soft.      Tenderness: There is abdominal tenderness (LUQ only to deep palpation).      Comments: Ostomy site clean and intact. Nephrostomy tube site on L flank   Musculoskeletal:         General: No swelling or tenderness. Normal range of motion.   Skin:     General: Skin is warm and dry.      Coloration: Skin is not jaundiced or pale.   Neurological:      General: No focal deficit present.      Mental Status: She is alert and oriented to person, place, and time.       Laboratory:  Recent Labs     02/20/23  1124   WBC 10.8   RBC 5.48*   HEMOGLOBIN 14.9   HEMATOCRIT 45.4   MCV 82.8   MCH 27.2   MCHC 32.8*   RDW 42.1   PLATELETCT 243   MPV 10.0     Recent Labs     02/20/23  1124   SODIUM 139   POTASSIUM 4.8   CHLORIDE 103   CO2 24   GLUCOSE 126*   BUN 23*   CREATININE 0.74   CALCIUM 11.0*     Recent Labs     02/20/23  1124   ALTSGPT 15   ASTSGOT 20   ALKPHOSPHAT 113*   TBILIRUBIN 0.6   GLUCOSE 126*         No results for  input(s): NTPROBNP in the last 72 hours.      No results for input(s): TROPONINT in the last 72 hours.    Imaging:  CT-RENAL COLIC EVALUATION(A/P W/O)   Final Result      1.  No significant interval change in the marked left hydronephrosis and hydroureter with multiple peripheral and central mid or distal ureteral calcifications are similar to prior study which does not appear obstructive.   2.  The left nephrostomy tube appears satisfactory position.   3.  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      IR-NEPHROSTOGRAM W/ NEW TUBE PLACEMENT (ALL RADIOLOGY) LEFT    (Results Pending)       no X-Ray or EKG requiring interpretation    Assessment/Plan:  Problem Representation:   76 y/o woman with hx of metastatic cervical cancer and chronic L nephrostomy tube here for dysfunctional nephrostomy tube. Pending IR procedure for nephrostomy tube replacement, monitor post-procedure, then likely DC home.     I anticipate this patient is appropriate for observation status at this time because likely will DC home after IR nephrostomy tube replacement.    Patient will need a Med/Surg bed on MEDICAL service .  The need is secondary to monitoring after IR procedure.    * Ureteral obstruction, left- (present on admission)  Assessment & Plan  Pt with hx of chronic L ureteral obstruction from stricture s/p radiation treatment for cervical cancer  Has tried ureteral stents in the past but failed  Nephrostomy tube in place, has been replaced multiple times  Admitted once again for dysfunctional nephrostomy tube  -IR consulted, nephrostomy tube replacement to be done 2/20  -Zofran prn for nausea  -F/u with Dr. Lee as outpatient  -Likely DC home if no significant or intractable nausea, vomiting, abdominal pain after IR procedure    Mixed hyperlipidemia- (present on admission)  Assessment & Plan  Continue home Atorvastatin 20mg daily    HTN (hypertension)- (present on  admission)  Assessment & Plan  Pain and hydronephrosis likely contributing to baseline HTN  Continue home Lisinopril 40mg daily      VTE prophylaxis: enoxaparin ppx

## 2023-02-20 NOTE — ED NOTES
Dr. Pederson updated that nephrostomy tube has not had much output since being flushed and there is not enough urine for a UA yet.

## 2023-02-21 NOTE — DISCHARGE INSTRUCTIONS
Discharge Instructions    Discharged to home by car with . Discharged via wheelchair, hospital escort: Yes.  Special equipment needed: Not Applicable    Be sure to schedule a follow-up appointment with your primary care doctor or any specialists as instructed.     Discharge Plan:   Influenza Vaccine Indication: Not indicated: Previously immunized this influenza season and > 8 years of age    I understand that a diet low in cholesterol, fat, and sodium is recommended for good health. Unless I have been given specific instructions below for another diet, I accept this instruction as my diet prescription.     Special Instructions: None    -Is this patient being discharged with medication to prevent blood clots?  No    Is patient discharged on Warfarin / Coumadin?   No     Percutaneous Nephrostomy Home Guide  Percutaneous nephrostomy is a procedure to insert a flexible tube into your kidney so that urine can leave your body. This procedure may be done if a medical condition prevents urine from leaving your kidney in the usual way. During the procedure, the nephrostomy tube is inserted in the right or left side of your lower back and is connected to an external drainage bag.  After you have a nephrostomy tube placed, urine will collect in the drainage bag outside of your body. You will need to empty and change the drainage bag as needed. You will also need to take steps to care for the area where the nephrostomy tube was inserted (tube insertion site).  How do I care for my nephrostomy tube?  Always keep your tubing, the leg bag, or the bedside drainage bag below the level of your kidney so that your urine drains freely.  Avoid activities that would cause bending or pulling of your tubing. Ask your health care provider what activities are safe for you.  When connecting your nephrostomy tube to a drainage bag, make sure that there are no kinks in the tubing and that your urine is draining freely. You may want to  gently wrap an elastic bandage over the tubing. This will help keep the tubing in place and prevent it from kinking. Make sure there is no tension on the tubing so it does not become dislodged.  At night, you may want to connect your nephrostomy tube or the leg bag to a larger bedside drainage bag.  How do I empty the drainage bag?  Empty the leg bag or bedside drainage bag whenever it becomes ? full. Also empty it before you go to sleep. Most drainage bags have a drain at the bottom that allows urine to be emptied. Follow these basic steps:  Hold the drainage bag over a toilet or collection container. Use a measuring container if your health care provider told you to measure your urine.  Open the drain of the bag and allow the urine to drain out.  After all the urine has drained from the drainage bag, close the drain fully.  Flush the urine down the toilet. If a collection container was used, rinse the container.  How do I change the dressing around the nephrostomy tube?  Change your dressing and clean your tube exit site as told by your health care provider. You may need to change the dressing every day for the first 2 weeks after having a nephrostomy tube inserted. After the first 2 weeks, you may be told to change the dressing two times a week.  Supplies needed:  Mild soap and water.  Split gauze pads, 4 × 4 inches (10 x 10 cm).  Gauze pads, 4 × 4 inches (10 x 10 cm).  Paper tape.  How to change the dressing:  Because of the location of your nephrostomy tube, you may need help from another person to complete dressing changes. Follow these basic steps:  Wash hands with soap and water.  Gently remove the tape and dressing from around the nephrostomy tube. Be careful not to pull on the tube while removing the dressing. Avoid using scissors because they may damage the tube.  Wash the skin around the tube with mild soap and water, rinse well, and pat the skin dry with a clean cloth.  Check the skin around the drain  for redness, swelling, pus, warmth, or a bad smell.  If the drain was sutured to the skin, check the suture to verify that it is still anchored in the skin.  Place two split gauze pads in and around the tube exit site. Do not apply ointments or alcohol to the site.  Place a gauze pad on top of the split gauze pad.  Coil the tube on top of the gauze. The tubing should rest on the gauze, not on the skin.  Place tape around each edge of the gauze pad.  Secure the nephrostomy tubing. Make sure that the tube does not kink or become pinched. The tubing should rest on the gauze pad, not on the skin.  Dispose of used supplies properly.    How do I flush my nephrostomy tube?  Use a saline syringe to rinse out (flush) your nephrostomy tube as told by your health care provider. Flushing is easier if a three-way stopcock is placed between the tube and the drainage bag. One connection of the stopcock connects to your tube, the second connects to the drainage bag, and the third is usually covered with a cap. The lever on the stopcock points to the direction on the stopcock that is closed to flow. Normally, the lever points in the direction of the cap to allow urine to drain from the tube to the drainage bag.  Supplies needed:  Rubbing alcohol wipe.  10 mL 0.9% saline syringe.  How to flush the tube:  Move the lever of the three-way stopcock so it points toward the drainage bag.  Clean the cap with a rubbing alcohol wipe.  Screw the tip of a 10 mL 0.9% saline syringe onto the cap.  Using the syringe plunger, slowly push the 10 mL 0.9% saline in the syringe over 5-10 seconds. If resistance is met or pain occurs while pushing, stop pushing the saline.  Remove the syringe from the cap.  Return the stopcock lever to the usual position, pointing in the direction of the cap.  Dispose of used supplies properly.  How do I replace the drainage bag?  Replace the drainage bag, three-way stopcock, and any extension tubing as told by your  health care provider. Make sure you always have an extra drainage bag and connecting tubing available.  Empty urine from your drainage bag.  Gather a new drainage bag, three-way stopcock, and any extension tubing.  Remove the drainage bag, three-way stopcock, and any extension tubing from the nephrostomy tube.  Attach the new leg bag or bedside drainage bag, three-way stopcock, and any extension tubing to the nephrostomy tube.  Dispose of the used drainage bag, three-way stopcock, and any extension.  Contact a health care provider if:  You have problems with any of the valves or tubing.  You have persistent pain or soreness in your back.  You have more redness, swelling, or pain around your tube insertion site.  You have more fluid or blood coming from your tube insertion site.  Your tube insertion site feels warm to the touch.  You have pus or a bad smell coming from your tube insertion site.  You have increased urine output or you feel burning when urinating.  Get help right away if:  You have pain in your abdomen during the first week.  You have chest pain or have trouble breathing.  You have a new appearance of blood in your urine.  You have a fever or chills.  You have back pain that is not relieved by your medicine.  You have decreased urine output.  Your nephrostomy tube comes out.  This information is not intended to replace advice given to you by your health care provider. Make sure you discuss any questions you have with your health care provider.  Document Released: 10/08/2014 Document Revised: 04/09/2020 Document Reviewed: 09/29/2017  [employer] Patient Education © 2020 [employer]      Nephrostomy   Care After  Refer to this sheet in the next few weeks. These instructions provide you with information on caring for yourself after your procedure. Your caregiver may also give you specific instructions. Your treatment has been planned according to current medical practices, but problems sometimes occur.  Call your caregiver if you have any problems or questions after your procedure.  HOME CARE INSTRUCTIONS  Avoid taking aspirin or non-steroidal anti-inflammatory drugs (NSAIDs).  Only take over-the-counter or prescription medicines for pain, discomfort, or fever as directed by your caregiver.  Take your antibiotics as directed. Finish them even if you start to feel better.  Rest for the remainder of the day. Do not operate machinery, drive, or make legal decisions for 24 hours after your procedure.  Have someone drive you home.  You may resume your usual diet after the procedure. Drink extra fluids while the catheter is in place. Avoid alcoholic beverages for 24 hours after the procedure.  Keep the skin around your catheter dry.  You may shower. Cover the area with plastic wrap and tape the edges of the plastic wrap to your skin so your skin remains dry under the plastic. If the area does get wet, dry the skin completely.  Avoid baths or swimming.  SEEK MEDICAL CARE IF:   Redness, increased soreness, or swelling develops.  Your symptoms persist after several days or worsen.  If you seeblood in the urine (hematuria) for over 48 hours.  SEEK IMMEDIATE MEDICAL CARE IF:   Your flexible tube (catheter) accidentally gets pulled out.  You have chills or increased pain.  You have an oral temperature above 102° F (38.9° C), not controlled by medicine.  The skin breaks down around the catheter.  Your catheter stops draining.The catheter may be blocked.  There is leakage of urine around catheter.  Document Released: 08/10/2005 Document Revised: 03/11/2013 Document Reviewed: 02/16/2010  [employer]® Patient Information ©2014 [employer].

## 2023-02-21 NOTE — DISCHARGE SUMMARY
[facility] Discharge Summary    Attending: Gail Peterson M.d.  Senior Resident: Dr. Ponce  Intern:  Dr. Cruz  Contact Number: [phone]    CHIEF COMPLAINT ON ADMISSION  Chief Complaint   Patient presents with    Other     Nephrostomy tube isn't draining - its been in place for 2 months       Reason for Admission  Other     Admission Date  2/20/2023    CODE STATUS  Full Code    HPI & HOSPITAL COURSE    #Chronic L ureteral obstruction  #Metastatic cervical cancer  76 y/o woman with hx of metastatic cervical cancer complicated by rectovaginal fistula (s/p colostomy), chemo and radiation complicated by L ureteral stricture (s/p nephrostomy tube), HTN, and HLD who presented 2/20/2023 with generalized malaise and nausea. Has had multiple tube replacements. 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 Received Unasyn, Zofran, and Morphine in ER. IR performed L nephrostogram and tube change with no complications.   -Pt requesting to DC home tonight, medically stable at this time with no nausea or pain  -Continue home meds  -Outpatient f/u with urology as scheduled    Therefore, she is discharged in good and stable condition to home with close outpatient follow-up.    The patient recovered much more quickly than anticipated on admission.    Discharge Date  2/20/23    Physical Exam on Day of Discharge  Physical Exam  Constitutional:       General: She is not in acute distress.     Appearance: Normal appearance.   HENT:      Head: Normocephalic and atraumatic.      Nose: Nose normal.      Mouth/Throat:      Mouth: Mucous membranes are moist.      Pharynx: Oropharynx is clear.   Eyes:      General: No scleral icterus.     Conjunctiva/sclera: Conjunctivae normal.   Cardiovascular:      Rate and Rhythm: Normal rate and regular rhythm.      Pulses: Normal pulses.       Heart sounds: Normal heart sounds.   Pulmonary:      Effort: Pulmonary effort is normal. No respiratory distress.      Breath sounds: Normal breath sounds. No wheezing or rales.   Abdominal:      General: Abdomen is flat. Bowel sounds are normal. There is no distension.      Palpations: Abdomen is soft.      Tenderness: There is abdominal tenderness (LUQ only to deep palpation).      Comments: Ostomy site clean and intact. Nephrostomy tube site on L flank   Musculoskeletal:         General: No swelling or tenderness. Normal range of motion.   Skin:     General: Skin is warm and dry.      Coloration: Skin is not jaundiced or pale.   Neurological:      General: No focal deficit present.      Mental Status: She is alert and oriented to person, place, and time.     FOLLOW UP ITEMS POST DISCHARGE  F/u with urology as outpatient    DISCHARGE DIAGNOSES  Principal Problem:    Ureteral obstruction, left POA: Yes  Active Problems:    HTN (hypertension) POA: Yes    Mixed hyperlipidemia POA: Yes  Resolved Problems:    * No resolved hospital problems. *      FOLLOW UP  No future appointments.  No follow-up provider specified.    MEDICATIONS ON DISCHARGE     Medication List        ASK your doctor about these medications        Instructions   alendronate 70 MG Tabs  Commonly known as: FOSAMAX   Take 70 mg by mouth every Tuesday.  Dose: 70 mg     atorvastatin 20 MG Tabs  Commonly known as: LIPITOR   Take 20 mg by mouth every evening.  Dose: 20 mg     CO Q10 PO   Take 1 Capsule by mouth every evening.  Dose: 1 Capsule     lisinopril 40 MG tablet  Commonly known as: PRINIVIL   Take 40 mg by mouth every morning. Indications: High Blood Pressure Disorder  Dose: 40 mg     nitrofurantoin 50 MG Caps  Commonly known as: MACRODANTIN  Ask about: Which instructions should I use?   Take 50 mg by mouth every evening.  Dose: 50 mg     PreserVision AREDS 2 Caps  Ask about: Which instructions should I use?   Take 1 Capsule by mouth every  "morning.  Dose: 1 Capsule     Vitamin C 1000 MG Tabs   Take 1,000 mg by mouth 2 times a day.  Dose: 1,000 mg     Vitamin D3 2000 UNIT Caps   Take 4,000 Units by mouth every morning. 2 capsules = 4,000 units.  Dose: 4,000 Units              Allergies  Allergies   Allergen Reactions    Sulfa Drugs Unspecified     Pt states \"It really runs me down. I feel tired.\"       DIET  Orders Placed This Encounter   Procedures    Diet NPO Restrict to: Sips with Medications     Standing Status:   Standing     Number of Occurrences:   1     Order Specific Question:   Diet NPO Restrict to:     Answer:   Sips with Medications [3]       ACTIVITY  As tolerated.  Weight bearing as tolerated    CONSULTATIONS  Interventional Radiology    PROCEDURES  IR L nephrostogram and tube change on 2/20    LABORATORY  Lab Results   Component Value Date    SODIUM 139 02/20/2023    POTASSIUM 4.8 02/20/2023    CHLORIDE 103 02/20/2023    CO2 24 02/20/2023    GLUCOSE 126 (H) 02/20/2023    BUN 23 (H) 02/20/2023    CREATININE 0.74 02/20/2023    CREATININE 0.7 03/27/2006        Lab Results   Component Value Date    WBC 10.8 02/20/2023    HEMOGLOBIN 14.9 02/20/2023    HEMATOCRIT 45.4 02/20/2023    PLATELETCT 243 02/20/2023        Total time of the discharge process exceeds 30 minutes.  "

## 2023-02-21 NOTE — PROGRESS NOTES
Received bedside report from Swati RN. Plans to discharge patient. Turned SPO2 down from 3L to 1L NC. Will continue to monitor.

## 2023-02-21 NOTE — PROGRESS NOTES
Patient ready for discharge. Clarified with Dr. Danielito Cruz that the patient does not need a prescription at this time. Completed discharge instruction and provided handout. PIV removed. Patient will be going home with nephrostomy tube. Dressing is clean, dry, and intact. Patient discharged via wheelchair to private vehicle.  will be driving. Answered all questions and concerns.

## 2023-02-21 NOTE — PROGRESS NOTES
Placed patient on room air. Patient is using IS, reaching 1500ml. Goals to be discharged when off oxygen. Will monitor the next 30min to ensure oxygenation.

## 2023-02-27 ENCOUNTER — PRE-ADMISSION TESTING (OUTPATIENT)
Dept: ADMISSIONS | Facility: MEDICAL CENTER | Age: 78
End: 2023-02-27
Attending: EMERGENCY MEDICINE
Payer: MEDICARE

## 2023-02-27 DIAGNOSIS — Z01.810 PRE-OPERATIVE CARDIOVASCULAR EXAMINATION: ICD-10-CM

## 2023-02-27 LAB — EKG IMPRESSION: NORMAL

## 2023-02-27 PROCEDURE — 93005 ELECTROCARDIOGRAM TRACING: CPT

## 2023-02-27 PROCEDURE — 93010 ELECTROCARDIOGRAM REPORT: CPT | Performed by: INTERNAL MEDICINE

## 2023-02-27 ASSESSMENT — FIBROSIS 4 INDEX: FIB4 SCORE: 1.66

## 2023-02-28 NOTE — OR NURSING
RN preadmission appointment completed with Nessa Dennis. I told Nessa to hold vitamins and supplements one week prior to surgery and to hold lisinopril the morning of surgery per anesthesia guidelines. Nessa verbalizes understanding of above instructions.

## 2023-03-09 ENCOUNTER — ANESTHESIA EVENT (OUTPATIENT)
Dept: SURGERY | Facility: MEDICAL CENTER | Age: 78
End: 2023-03-09
Payer: MEDICARE

## 2023-03-09 ENCOUNTER — ANESTHESIA (OUTPATIENT)
Dept: SURGERY | Facility: MEDICAL CENTER | Age: 78
End: 2023-03-09
Payer: MEDICARE

## 2023-03-09 ENCOUNTER — HOSPITAL ENCOUNTER (OUTPATIENT)
Facility: MEDICAL CENTER | Age: 78
End: 2023-03-09
Attending: OTOLARYNGOLOGY | Admitting: OTOLARYNGOLOGY
Payer: MEDICARE

## 2023-03-09 VITALS
WEIGHT: 131.84 LBS | SYSTOLIC BLOOD PRESSURE: 156 MMHG | DIASTOLIC BLOOD PRESSURE: 77 MMHG | BODY MASS INDEX: 25.88 KG/M2 | RESPIRATION RATE: 22 BRPM | TEMPERATURE: 97.3 F | HEIGHT: 60 IN | OXYGEN SATURATION: 91 % | HEART RATE: 93 BPM

## 2023-03-09 PROCEDURE — 99100 ANES PT EXTEME AGE<1 YR&>70: CPT | Performed by: ANESTHESIOLOGY

## 2023-03-09 PROCEDURE — 700101 HCHG RX REV CODE 250: Performed by: ANESTHESIOLOGY

## 2023-03-09 PROCEDURE — 160025 RECOVERY II MINUTES (STATS): Performed by: OTOLARYNGOLOGY

## 2023-03-09 PROCEDURE — C1878 MATRL FOR VOCAL CORD: HCPCS | Performed by: OTOLARYNGOLOGY

## 2023-03-09 PROCEDURE — 00320 ANES ALL PX NECK NOS 1YR/>: CPT | Performed by: ANESTHESIOLOGY

## 2023-03-09 PROCEDURE — 160048 HCHG OR STATISTICAL LEVEL 1-5: Performed by: OTOLARYNGOLOGY

## 2023-03-09 PROCEDURE — 160035 HCHG PACU - 1ST 60 MINS PHASE I: Performed by: OTOLARYNGOLOGY

## 2023-03-09 PROCEDURE — 160002 HCHG RECOVERY MINUTES (STAT): Performed by: OTOLARYNGOLOGY

## 2023-03-09 PROCEDURE — 700111 HCHG RX REV CODE 636 W/ 250 OVERRIDE (IP): Performed by: ANESTHESIOLOGY

## 2023-03-09 PROCEDURE — A9270 NON-COVERED ITEM OR SERVICE: HCPCS | Performed by: ANESTHESIOLOGY

## 2023-03-09 PROCEDURE — 160009 HCHG ANES TIME/MIN: Performed by: OTOLARYNGOLOGY

## 2023-03-09 PROCEDURE — 700102 HCHG RX REV CODE 250 W/ 637 OVERRIDE(OP): Performed by: ANESTHESIOLOGY

## 2023-03-09 PROCEDURE — 160039 HCHG SURGERY MINUTES - EA ADDL 1 MIN LEVEL 3: Performed by: OTOLARYNGOLOGY

## 2023-03-09 PROCEDURE — 160036 HCHG PACU - EA ADDL 30 MINS PHASE I: Performed by: OTOLARYNGOLOGY

## 2023-03-09 PROCEDURE — 160028 HCHG SURGERY MINUTES - 1ST 30 MINS LEVEL 3: Performed by: OTOLARYNGOLOGY

## 2023-03-09 PROCEDURE — 700105 HCHG RX REV CODE 258: Performed by: OTOLARYNGOLOGY

## 2023-03-09 PROCEDURE — 160046 HCHG PACU - 1ST 60 MINS PHASE II: Performed by: OTOLARYNGOLOGY

## 2023-03-09 DEVICE — KIT IMPLANT W/2 NEEDLES PROLARYN PLUS 1CC: Type: IMPLANTABLE DEVICE | Site: VOCAL CORD | Status: FUNCTIONAL

## 2023-03-09 RX ORDER — LIDOCAINE HYDROCHLORIDE 20 MG/ML
INJECTION, SOLUTION EPIDURAL; INFILTRATION; INTRACAUDAL; PERINEURAL PRN
Status: DISCONTINUED | OUTPATIENT
Start: 2023-03-09 | End: 2023-03-09 | Stop reason: SURG

## 2023-03-09 RX ORDER — HYDROMORPHONE HYDROCHLORIDE 1 MG/ML
0.2 INJECTION, SOLUTION INTRAMUSCULAR; INTRAVENOUS; SUBCUTANEOUS
Status: DISCONTINUED | OUTPATIENT
Start: 2023-03-09 | End: 2023-03-09 | Stop reason: HOSPADM

## 2023-03-09 RX ORDER — DIPHENHYDRAMINE HYDROCHLORIDE 50 MG/ML
12.5 INJECTION INTRAMUSCULAR; INTRAVENOUS
Status: DISCONTINUED | OUTPATIENT
Start: 2023-03-09 | End: 2023-03-09 | Stop reason: HOSPADM

## 2023-03-09 RX ORDER — SODIUM CHLORIDE, SODIUM LACTATE, POTASSIUM CHLORIDE, CALCIUM CHLORIDE 600; 310; 30; 20 MG/100ML; MG/100ML; MG/100ML; MG/100ML
INJECTION, SOLUTION INTRAVENOUS CONTINUOUS
Status: ACTIVE | OUTPATIENT
Start: 2023-03-09 | End: 2023-03-09

## 2023-03-09 RX ORDER — MIDAZOLAM HYDROCHLORIDE 1 MG/ML
1 INJECTION INTRAMUSCULAR; INTRAVENOUS
Status: DISCONTINUED | OUTPATIENT
Start: 2023-03-09 | End: 2023-03-09 | Stop reason: HOSPADM

## 2023-03-09 RX ORDER — HYDROMORPHONE HYDROCHLORIDE 1 MG/ML
0.5 INJECTION, SOLUTION INTRAMUSCULAR; INTRAVENOUS; SUBCUTANEOUS
Status: DISCONTINUED | OUTPATIENT
Start: 2023-03-09 | End: 2023-03-09 | Stop reason: HOSPADM

## 2023-03-09 RX ORDER — MEPERIDINE HYDROCHLORIDE 25 MG/ML
25 INJECTION INTRAMUSCULAR; INTRAVENOUS; SUBCUTANEOUS
Status: DISCONTINUED | OUTPATIENT
Start: 2023-03-09 | End: 2023-03-09 | Stop reason: HOSPADM

## 2023-03-09 RX ORDER — ONDANSETRON 2 MG/ML
INJECTION INTRAMUSCULAR; INTRAVENOUS PRN
Status: DISCONTINUED | OUTPATIENT
Start: 2023-03-09 | End: 2023-03-09 | Stop reason: SURG

## 2023-03-09 RX ORDER — PHENYLEPHRINE HYDROCHLORIDE 10 MG/ML
INJECTION, SOLUTION INTRAMUSCULAR; INTRAVENOUS; SUBCUTANEOUS PRN
Status: DISCONTINUED | OUTPATIENT
Start: 2023-03-09 | End: 2023-03-09 | Stop reason: SURG

## 2023-03-09 RX ORDER — IPRATROPIUM BROMIDE AND ALBUTEROL SULFATE 2.5; .5 MG/3ML; MG/3ML
3 SOLUTION RESPIRATORY (INHALATION)
Status: DISCONTINUED | OUTPATIENT
Start: 2023-03-09 | End: 2023-03-09 | Stop reason: HOSPADM

## 2023-03-09 RX ORDER — OXYCODONE HCL 5 MG/5 ML
5 SOLUTION, ORAL ORAL
Status: COMPLETED | OUTPATIENT
Start: 2023-03-09 | End: 2023-03-09

## 2023-03-09 RX ORDER — SODIUM CHLORIDE, SODIUM LACTATE, POTASSIUM CHLORIDE, CALCIUM CHLORIDE 600; 310; 30; 20 MG/100ML; MG/100ML; MG/100ML; MG/100ML
INJECTION, SOLUTION INTRAVENOUS CONTINUOUS
Status: DISCONTINUED | OUTPATIENT
Start: 2023-03-09 | End: 2023-03-09 | Stop reason: HOSPADM

## 2023-03-09 RX ORDER — DEXAMETHASONE SODIUM PHOSPHATE 4 MG/ML
INJECTION, SOLUTION INTRA-ARTICULAR; INTRALESIONAL; INTRAMUSCULAR; INTRAVENOUS; SOFT TISSUE PRN
Status: DISCONTINUED | OUTPATIENT
Start: 2023-03-09 | End: 2023-03-09 | Stop reason: SURG

## 2023-03-09 RX ORDER — KETOROLAC TROMETHAMINE 30 MG/ML
INJECTION, SOLUTION INTRAMUSCULAR; INTRAVENOUS PRN
Status: DISCONTINUED | OUTPATIENT
Start: 2023-03-09 | End: 2023-03-09 | Stop reason: SURG

## 2023-03-09 RX ORDER — HYDROMORPHONE HYDROCHLORIDE 1 MG/ML
0.1 INJECTION, SOLUTION INTRAMUSCULAR; INTRAVENOUS; SUBCUTANEOUS
Status: DISCONTINUED | OUTPATIENT
Start: 2023-03-09 | End: 2023-03-09 | Stop reason: HOSPADM

## 2023-03-09 RX ORDER — ONDANSETRON 2 MG/ML
4 INJECTION INTRAMUSCULAR; INTRAVENOUS ONCE
Status: DISCONTINUED | OUTPATIENT
Start: 2023-03-09 | End: 2023-03-09 | Stop reason: HOSPADM

## 2023-03-09 RX ORDER — OXYCODONE HCL 5 MG/5 ML
10 SOLUTION, ORAL ORAL
Status: COMPLETED | OUTPATIENT
Start: 2023-03-09 | End: 2023-03-09

## 2023-03-09 RX ADMIN — OXYCODONE HYDROCHLORIDE 5 MG: 5 SOLUTION ORAL at 17:26

## 2023-03-09 RX ADMIN — PROPOFOL 100 MG: 10 INJECTION, EMULSION INTRAVENOUS at 16:12

## 2023-03-09 RX ADMIN — ROCURONIUM BROMIDE 40 MG: 10 INJECTION, SOLUTION INTRAVENOUS at 16:12

## 2023-03-09 RX ADMIN — SODIUM CHLORIDE, POTASSIUM CHLORIDE, SODIUM LACTATE AND CALCIUM CHLORIDE: 600; 310; 30; 20 INJECTION, SOLUTION INTRAVENOUS at 16:12

## 2023-03-09 RX ADMIN — KETOROLAC TROMETHAMINE 30 MG: 30 INJECTION, SOLUTION INTRAMUSCULAR at 16:24

## 2023-03-09 RX ADMIN — PHENYLEPHRINE HYDROCHLORIDE 100 MCG: 10 INJECTION INTRAVENOUS at 16:37

## 2023-03-09 RX ADMIN — SUGAMMADEX 120 MG: 100 INJECTION, SOLUTION INTRAVENOUS at 16:49

## 2023-03-09 RX ADMIN — ONDANSETRON 4 MG: 2 INJECTION INTRAMUSCULAR; INTRAVENOUS at 16:24

## 2023-03-09 RX ADMIN — FENTANYL CITRATE 100 MCG: 50 INJECTION, SOLUTION INTRAMUSCULAR; INTRAVENOUS at 16:12

## 2023-03-09 RX ADMIN — DEXAMETHASONE SODIUM PHOSPHATE 8 MG: 4 INJECTION, SOLUTION INTRA-ARTICULAR; INTRALESIONAL; INTRAMUSCULAR; INTRAVENOUS; SOFT TISSUE at 16:24

## 2023-03-09 RX ADMIN — PROPOFOL 100 MG: 10 INJECTION, EMULSION INTRAVENOUS at 16:18

## 2023-03-09 RX ADMIN — MIDAZOLAM 2 MG: 1 INJECTION INTRAMUSCULAR; INTRAVENOUS at 16:12

## 2023-03-09 RX ADMIN — LIDOCAINE HYDROCHLORIDE 40 MG: 20 INJECTION, SOLUTION EPIDURAL; INFILTRATION; INTRACAUDAL at 16:12

## 2023-03-09 ASSESSMENT — PAIN DESCRIPTION - PAIN TYPE
TYPE: SURGICAL PAIN

## 2023-03-09 ASSESSMENT — PAIN SCALES - GENERAL: PAIN_LEVEL: 1

## 2023-03-09 ASSESSMENT — FIBROSIS 4 INDEX: FIB4 SCORE: 1.66

## 2023-03-09 NOTE — ANESTHESIA PREPROCEDURE EVALUATION
Case: [id number] Date/Time: 03/09/23 1400    Procedure: LEFT LARYNGOPLASTY, MEDIALIZATION, UNILATERAL (Neck)    Pre-op diagnosis: J38.01    Location: [facility] ROOM 23 / SURGERY SAME DAY [facility]    Surgeons: Chriss Merritt M.D.          Relevant Problems   CARDIAC   (positive) HTN (hypertension)       Physical Exam    Airway   Mallampati: II  TM distance: >3 FB  Neck ROM: full       Cardiovascular - normal exam  Rhythm: regular  Rate: normal  (-) murmur     Dental - normal exam           Pulmonary - normal exam  Breath sounds clear to auscultation     Abdominal    Neurological - normal exam                 Anesthesia Plan    ASA 3       Plan - general       Airway plan will be ETT          Induction: intravenous    Postoperative Plan: Postoperative administration of opioids is intended.    Pertinent diagnostic labs and testing reviewed    Informed Consent:    Anesthetic plan and risks discussed with patient.    Use of blood products discussed with: patient whom consented to blood products.

## 2023-03-09 NOTE — OR NURSING
Pt has left nephrostomy tube to leg bag and colostomy on left abdomen present. Pt self cathed bladder and nephrostomy leg bag emptied 50ml at 1430.

## 2023-03-10 NOTE — ANESTHESIA TIME REPORT
Anesthesia Start and Stop Event Times     Date Time Event    3/9/2023 1511 Ready for Procedure     1608 Anesthesia Start     1700 Anesthesia Stop        Responsible Staff  03/09/23    Name Role Begin End    Adam Helms M.D. Anesth 1608 1700        Overtime Reason:  no overtime (within assigned shift)    Comments:

## 2023-03-10 NOTE — OP REPORT
DATE OF SERVICE:  03/09/2023     PREOPERATIVE DIAGNOSIS:  Left true vocal cord paralysis.     POSTOPERATIVE DIAGNOSIS:  Left true vocal cord paralysis.     PROCEDURE:  Direct laryngoscopy with Prolaryn Plus injection, left side.     SURGEON:  Chriss Merritt MD     ANESTHESIOLOGIST:  Adam Helms MD     INDICATIONS:  The patient is a 77-year-old who underwent surgery a year ago to   remove a left-sided lung cancer.  Afterwards, she became hoarse and has been   noted to have a left true vocal cord paralysis.  The patient has had an   injection medialization thyroplasty in the past by  ____.  She presents now   for another as she continues to struggle with her voice.     DESCRIPTION OF PROCEDURE:  The patient was taken to the operating room and   placed in the supine position.  General anesthesia was induced and the patient   was easily intubated with a #5 cuffed endotracheal tube.  The tube was   brought out through the patient's left side of the mouth.  The table rotated   90 degrees.  She was placed in about a 20-degree reverse Trendelenburg   position and then draped in the usual fashion for this type of procedure.  A   tooth guard was placed.  Direct laryngoscopy was performed and the scope was   advanced into the laryngeal introitus and suspended with good visualization of   the cords.  Using a 0-degree endoscope, injections were made at the junction   of the posterior and middle third of the cord and the middle and anterior   third of the cord, delivering approximately three-tenths of a mL of Prolaryn   Plus at both sites.  This significantly medialized the cord.  The patient was   then awakened and taken to the recovery room in stable condition.  She   tolerated the procedure well and there were no complications.        ______________________________  CHRISS MERRITT MD    DLM/MOH/NIT    DD:  03/09/2023 17:01  DT:  03/09/2023 17:36    Job#:  [id number]

## 2023-03-10 NOTE — ANESTHESIA PROCEDURE NOTES
Airway    Date/Time: 3/9/2023 4:13 PM  Performed by: Adam Helms M.D.  Authorized by: Adam Helms M.D.     Location:  OR  Urgency:  Elective  Indications for Airway Management:  Anesthesia      Spontaneous Ventilation: absent    Sedation Level:  Deep  Preoxygenated: Yes    Patient Position:  Sniffing  Final Airway Type:  Endotracheal airway  Final Endotracheal Airway:  ETT  Cuffed: Yes    Technique Used for Successful ETT Placement:  Direct laryngoscopy    Insertion Site:  Oral  Blade Type:  Mary Beth  Laryngoscope Blade/Videolaryngoscope Blade Size:  3  ETT Size (mm):  5.0  Measured from:  Teeth  ETT to Teeth (cm):  21  Placement Verified by: auscultation and capnometry    Cormack-Lehane Classification:  Grade I - full view of glottis  Number of Attempts at Approach:  1

## 2023-03-10 NOTE — OR SURGEON
Immediate Post OP Note    PreOp Diagnosis: Paralysis left true vocal cord      PostOp Diagnosis: same      Procedure(s):  LEFT LARYNGOPLASTY, MEDIALIZATION, UNILATERAL - Wound Class: Clean    Surgeon(s):  Chriss Merritt M.D.    Anesthesiologist/Type of Anesthesia:  Anesthesiologist: Adam Helms M.D./General    Surgical Staff:  Circulator: Barbie Lara R.N.  Scrub Person: ANGELA JerryNNANCY    Specimens removed if any:  * No specimens in log *    Estimated Blood Loss: minimal    Findings: normal cords    Complications: none        3/9/2023 4:54 PM Chriss Merritt M.D.

## 2023-03-10 NOTE — DISCHARGE INSTRUCTIONS
Diet as tolerated, vocal rest for three days.    Follow up 3/21 at 11:15.    Take Tylenol and Ibuprofen as needed for pain.    If any questions arise, call your provider.  If your provider is not available, please feel free to call the Surgical Center at [phone].    MEDICATIONS: Resume taking daily medication.  Take prescribed pain medication with food.  If no medication is prescribed, you may take non-aspirin pain medication if needed.  PAIN MEDICATION CAN BE VERY CONSTIPATING.  Take a stool softener or laxative such as senokot, pericolace, or milk of magnesia if needed.    Last pain medication given Oxycodone at 5:30pm    What to Expect Post Anesthesia    Rest and take it easy for the first 24 hours.  A responsible adult is recommended to remain with you during that time.  It is normal to feel sleepy.  We encourage you to not do anything that requires balance, judgment or coordination.    FOR 24 HOURS DO NOT:  Drive, operate machinery or run household appliances.  Drink beer or alcoholic beverages.  Make important decisions or sign legal documents.    To avoid nausea, slowly advance diet as tolerated, avoiding spicy or greasy foods for the first day.  Add more substantial food to your diet according to your provider's instructions.  Babies can be fed formula or breast milk as soon as they are hungry.  INCREASE FLUIDS AND FIBER TO AVOID CONSTIPATION.    MILD FLU-LIKE SYMPTOMS ARE NORMAL.  YOU MAY EXPERIENCE GENERALIZED MUSCLE ACHES, THROAT IRRITATION, HEADACHE AND/OR SOME NAUSEA.

## 2023-03-10 NOTE — ANESTHESIA POSTPROCEDURE EVALUATION
Patient: Nessa Dennis    Procedure Summary     Date: 03/09/23 Room / Location: [facility] 23 / SURGERY SAME DAY [facility]    Anesthesia Start: 1608 Anesthesia Stop: 1700    Procedure: LEFT LARYNGOPLASTY, MEDIALIZATION, UNILATERAL (Left: Neck) Diagnosis: (Unilateral partial paralysis of vocal cords or larynx)    Surgeons: Chriss Merritt M.D. Responsible Provider: Adam Helms M.D.    Anesthesia Type: general ASA Status: 3          Final Anesthesia Type: general  Last vitals  BP   Blood Pressure : (!) 167/93    Temp   36.3 °C (97.3 °F)    Pulse   (!) 109   Resp   20    SpO2   95 %      Anesthesia Post Evaluation    Patient location during evaluation: PACU  Patient participation: complete - patient participated  Level of consciousness: awake and alert  Pain score: 1    Airway patency: patent  Anesthetic complications: no  Cardiovascular status: hemodynamically stable  Respiratory status: acceptable  Hydration status: euvolemic    PONV: none          No notable events documented.     Nurse Pain Score: 0 (NPRS)

## 2023-03-10 NOTE — ANESTHESIA PROCEDURE NOTES
Peripheral IV    Date/Time: 3/9/2023 4:10 PM  Performed by: Adam Helms M.D.  Authorized by: Adam Helms M.D.     Size:  22 G  Laterality:  Left  Peripheral IV Location:  Wrist  Site Prep:  Alcohol  Technique:  Direct puncture  Attempts:  1

## 2023-03-10 NOTE — OR NURSING
1655 Patient arrived to PACU. Report received from anesthesia and OR RN. Patient on 4L of oxygen via mask. Placed on monitor. Patient sleeping.     1726 Pain medication given. Patient tolerating room air.    1800 Patient states pain tolerable and tolerating PO.    1820 Patient discharged with spouse. Discharge education provided and questions answered. Educated on medications sent to pharmacy. PIV removed. All belongings gathered and sent with patient.

## 2023-04-03 ENCOUNTER — HOSPITAL ENCOUNTER (OUTPATIENT)
Dept: RADIOLOGY | Facility: MEDICAL CENTER | Age: 78
End: 2023-04-03
Attending: SURGERY
Payer: MEDICARE

## 2023-04-03 DIAGNOSIS — Z85.118 PERSONAL HISTORY OF MALIGNANT NEOPLASM OF BRONCHUS AND LUNG: ICD-10-CM

## 2023-04-03 PROCEDURE — 71250 CT THORAX DX C-: CPT

## 2023-04-05 NOTE — DOCUMENTATION QUERY
[facility]                                                                       Query Response Note      PATIENT:               LAURIE BRUNNER  ACCT #:                  [id number]  MRN:                     [id number]  :                      1945  ADMIT DATE:       3/9/2023 11:50 AM  DISCH DATE:        3/9/2023 6:20 PM  RESPONDING  PROVIDER #:        [id number]           QUERY TEXT:    Please clarify if the lung surgery was related to the left true vocal cord paralysis.      The patient's clinical indicators include:  Clinical Indicators:  OP Report 3/9 Indications: The patient is a 77-year-old who underwent surgery a year ago to remove  left-sided lung cancer. Afterwards, she became hoarse and has been noted to have a let true vocal cord paralysis.  H&P 3/9 Chief Complaint/Diagnosis: L TVC paralysis began after L Lung surgery.      Treatment: Direct laryngoscopy with Prolaryn Plus injection.    Risk Factors: None    Thank you,  Elizabeth Joe, [facility]  Outpatient   [email or web address]  Options provided:   -- Left true vocal cord paralysis was a  complication of lung surgery   -- Left true vocal cord paralysis was NOT a complication of lung surgery   -- Other-Please specify in the medical record      Query created by: Elizabeth Joe on 3/24/2023 3:27 PM    RESPONSE TEXT:    Left true vocal cord paralysis was a complication of lung surgery          Electronically signed by:  MITZY AMBROSE MD 2023 2:12 PM

## 2023-04-27 ENCOUNTER — HOSPITAL ENCOUNTER (OUTPATIENT)
Dept: LAB | Facility: MEDICAL CENTER | Age: 78
End: 2023-04-27
Attending: UROLOGY
Payer: MEDICARE

## 2023-04-27 LAB
ALBUMIN SERPL BCP-MCNC: 4.6 G/DL (ref 3.2–4.9)
ALBUMIN/GLOB SERPL: 1.7 G/DL
ALP SERPL-CCNC: 105 U/L (ref 30–99)
ALT SERPL-CCNC: 11 U/L (ref 2–50)
ANION GAP SERPL CALC-SCNC: 14 MMOL/L (ref 7–16)
AST SERPL-CCNC: 22 U/L (ref 12–45)
BILIRUB SERPL-MCNC: 0.5 MG/DL (ref 0.1–1.5)
BUN SERPL-MCNC: 21 MG/DL (ref 8–22)
CALCIUM ALBUM COR SERPL-MCNC: 10.4 MG/DL (ref 8.5–10.5)
CALCIUM SERPL-MCNC: 10.9 MG/DL (ref 8.5–10.5)
CHLORIDE SERPL-SCNC: 104 MMOL/L (ref 96–112)
CO2 SERPL-SCNC: 21 MMOL/L (ref 20–33)
CREAT SERPL-MCNC: 0.94 MG/DL (ref 0.5–1.4)
GFR SERPLBLD CREATININE-BSD FMLA CKD-EPI: 62 ML/MIN/1.73 M 2
GLOBULIN SER CALC-MCNC: 2.7 G/DL (ref 1.9–3.5)
GLUCOSE SERPL-MCNC: 113 MG/DL (ref 65–99)
POTASSIUM SERPL-SCNC: 4.6 MMOL/L (ref 3.6–5.5)
PROT SERPL-MCNC: 7.3 G/DL (ref 6–8.2)
SODIUM SERPL-SCNC: 139 MMOL/L (ref 135–145)

## 2023-04-27 PROCEDURE — 80053 COMPREHEN METABOLIC PANEL: CPT

## 2023-04-27 PROCEDURE — 36415 COLL VENOUS BLD VENIPUNCTURE: CPT

## 2023-05-25 ENCOUNTER — HOSPITAL ENCOUNTER (OUTPATIENT)
Facility: MEDICAL CENTER | Age: 78
End: 2023-05-25
Attending: UROLOGY
Payer: MEDICARE

## 2023-05-25 PROCEDURE — 83970 ASSAY OF PARATHORMONE: CPT

## 2023-05-25 PROCEDURE — 80053 COMPREHEN METABOLIC PANEL: CPT

## 2023-05-26 LAB
ALBUMIN SERPL BCP-MCNC: 4.4 G/DL (ref 3.2–4.9)
ALBUMIN/GLOB SERPL: 1.7 G/DL
ALP SERPL-CCNC: 96 U/L (ref 30–99)
ALT SERPL-CCNC: 16 U/L (ref 2–50)
ANION GAP SERPL CALC-SCNC: 13 MMOL/L (ref 7–16)
AST SERPL-CCNC: 21 U/L (ref 12–45)
BILIRUB SERPL-MCNC: 0.4 MG/DL (ref 0.1–1.5)
BUN SERPL-MCNC: 21 MG/DL (ref 8–22)
CALCIUM ALBUM COR SERPL-MCNC: 10.6 MG/DL (ref 8.5–10.5)
CALCIUM SERPL-MCNC: 10.9 MG/DL (ref 8.5–10.5)
CHLORIDE SERPL-SCNC: 104 MMOL/L (ref 96–112)
CO2 SERPL-SCNC: 22 MMOL/L (ref 20–33)
CREAT SERPL-MCNC: 1.08 MG/DL (ref 0.5–1.4)
GFR SERPLBLD CREATININE-BSD FMLA CKD-EPI: 52 ML/MIN/1.73 M 2
GLOBULIN SER CALC-MCNC: 2.6 G/DL (ref 1.9–3.5)
GLUCOSE SERPL-MCNC: 93 MG/DL (ref 65–99)
POTASSIUM SERPL-SCNC: 4.5 MMOL/L (ref 3.6–5.5)
PROT SERPL-MCNC: 7 G/DL (ref 6–8.2)
PTH-INTACT SERPL-MCNC: 54.1 PG/ML (ref 14–72)
SODIUM SERPL-SCNC: 139 MMOL/L (ref 135–145)

## 2023-06-23 ENCOUNTER — HOSPITAL ENCOUNTER (OUTPATIENT)
Dept: LAB | Facility: MEDICAL CENTER | Age: 78
End: 2023-06-23
Attending: STUDENT IN AN ORGANIZED HEALTH CARE EDUCATION/TRAINING PROGRAM
Payer: MEDICARE

## 2023-06-23 LAB
25(OH)D3 SERPL-MCNC: 63 NG/ML (ref 30–100)
ALBUMIN SERPL BCP-MCNC: 4.5 G/DL (ref 3.2–4.9)
ALBUMIN/GLOB SERPL: 1.8 G/DL
ALP SERPL-CCNC: 94 U/L (ref 30–99)
ALT SERPL-CCNC: 12 U/L (ref 2–50)
ANION GAP SERPL CALC-SCNC: 12 MMOL/L (ref 7–16)
AST SERPL-CCNC: 19 U/L (ref 12–45)
BASOPHILS # BLD AUTO: 0.4 % (ref 0–1.8)
BASOPHILS # BLD: 0.03 K/UL (ref 0–0.12)
BILIRUB SERPL-MCNC: 0.6 MG/DL (ref 0.1–1.5)
BUN SERPL-MCNC: 22 MG/DL (ref 8–22)
CALCIUM ALBUM COR SERPL-MCNC: 10.5 MG/DL (ref 8.5–10.5)
CALCIUM SERPL-MCNC: 10.9 MG/DL (ref 8.5–10.5)
CHLORIDE SERPL-SCNC: 105 MMOL/L (ref 96–112)
CHOLEST SERPL-MCNC: 172 MG/DL (ref 100–199)
CO2 SERPL-SCNC: 24 MMOL/L (ref 20–33)
CREAT SERPL-MCNC: 0.93 MG/DL (ref 0.5–1.4)
EOSINOPHIL # BLD AUTO: 0.12 K/UL (ref 0–0.51)
EOSINOPHIL NFR BLD: 1.8 % (ref 0–6.9)
ERYTHROCYTE [DISTWIDTH] IN BLOOD BY AUTOMATED COUNT: 45.1 FL (ref 35.9–50)
FASTING STATUS PATIENT QL REPORTED: NORMAL
GFR SERPLBLD CREATININE-BSD FMLA CKD-EPI: 63 ML/MIN/1.73 M 2
GLOBULIN SER CALC-MCNC: 2.5 G/DL (ref 1.9–3.5)
GLUCOSE SERPL-MCNC: 89 MG/DL (ref 65–99)
HCT VFR BLD AUTO: 42.2 % (ref 37–47)
HDLC SERPL-MCNC: 54 MG/DL
HGB BLD-MCNC: 13.6 G/DL (ref 12–16)
IMM GRANULOCYTES # BLD AUTO: 0.03 K/UL (ref 0–0.11)
IMM GRANULOCYTES NFR BLD AUTO: 0.4 % (ref 0–0.9)
LDLC SERPL CALC-MCNC: 89 MG/DL
LYMPHOCYTES # BLD AUTO: 1.46 K/UL (ref 1–4.8)
LYMPHOCYTES NFR BLD: 21.7 % (ref 22–41)
MCH RBC QN AUTO: 27.1 PG (ref 27–33)
MCHC RBC AUTO-ENTMCNC: 32.2 G/DL (ref 32.2–35.5)
MCV RBC AUTO: 84.1 FL (ref 81.4–97.8)
MONOCYTES # BLD AUTO: 0.64 K/UL (ref 0–0.85)
MONOCYTES NFR BLD AUTO: 9.5 % (ref 0–13.4)
NEUTROPHILS # BLD AUTO: 4.44 K/UL (ref 1.82–7.42)
NEUTROPHILS NFR BLD: 66.2 % (ref 44–72)
NRBC # BLD AUTO: 0 K/UL
NRBC BLD-RTO: 0 /100 WBC (ref 0–0.2)
PLATELET # BLD AUTO: 215 K/UL (ref 164–446)
PMV BLD AUTO: 10.4 FL (ref 9–12.9)
POTASSIUM SERPL-SCNC: 5 MMOL/L (ref 3.6–5.5)
PROT SERPL-MCNC: 7 G/DL (ref 6–8.2)
RBC # BLD AUTO: 5.02 M/UL (ref 4.2–5.4)
SODIUM SERPL-SCNC: 141 MMOL/L (ref 135–145)
TRIGL SERPL-MCNC: 144 MG/DL (ref 0–149)
WBC # BLD AUTO: 6.7 K/UL (ref 4.8–10.8)

## 2023-06-23 PROCEDURE — 36415 COLL VENOUS BLD VENIPUNCTURE: CPT

## 2023-06-23 PROCEDURE — 80053 COMPREHEN METABOLIC PANEL: CPT

## 2023-06-23 PROCEDURE — 82306 VITAMIN D 25 HYDROXY: CPT

## 2023-06-23 PROCEDURE — 80061 LIPID PANEL: CPT

## 2023-06-23 PROCEDURE — 85025 COMPLETE CBC W/AUTO DIFF WBC: CPT

## 2023-07-31 ENCOUNTER — HOSPITAL ENCOUNTER (OUTPATIENT)
Dept: LAB | Facility: MEDICAL CENTER | Age: 78
End: 2023-07-31
Attending: PHYSICIAN ASSISTANT
Payer: MEDICARE

## 2023-07-31 LAB
ANION GAP SERPL CALC-SCNC: 11 MMOL/L (ref 7–16)
BASOPHILS # BLD AUTO: 0.6 % (ref 0–1.8)
BASOPHILS # BLD: 0.04 K/UL (ref 0–0.12)
BUN SERPL-MCNC: 23 MG/DL (ref 8–22)
CALCIUM SERPL-MCNC: 10.9 MG/DL (ref 8.5–10.5)
CHLORIDE SERPL-SCNC: 106 MMOL/L (ref 96–112)
CO2 SERPL-SCNC: 23 MMOL/L (ref 20–33)
CREAT SERPL-MCNC: 1.04 MG/DL (ref 0.5–1.4)
EOSINOPHIL # BLD AUTO: 0.1 K/UL (ref 0–0.51)
EOSINOPHIL NFR BLD: 1.5 % (ref 0–6.9)
ERYTHROCYTE [DISTWIDTH] IN BLOOD BY AUTOMATED COUNT: 41.8 FL (ref 35.9–50)
FASTING STATUS PATIENT QL REPORTED: NORMAL
GFR SERPLBLD CREATININE-BSD FMLA CKD-EPI: 55 ML/MIN/1.73 M 2
GLUCOSE SERPL-MCNC: 96 MG/DL (ref 65–99)
HCT VFR BLD AUTO: 43.3 % (ref 37–47)
HGB BLD-MCNC: 14.5 G/DL (ref 12–16)
IMM GRANULOCYTES # BLD AUTO: 0.01 K/UL (ref 0–0.11)
IMM GRANULOCYTES NFR BLD AUTO: 0.2 % (ref 0–0.9)
LYMPHOCYTES # BLD AUTO: 1.31 K/UL (ref 1–4.8)
LYMPHOCYTES NFR BLD: 20.1 % (ref 22–41)
MCH RBC QN AUTO: 27.4 PG (ref 27–33)
MCHC RBC AUTO-ENTMCNC: 33.5 G/DL (ref 32.2–35.5)
MCV RBC AUTO: 81.9 FL (ref 81.4–97.8)
MONOCYTES # BLD AUTO: 0.56 K/UL (ref 0–0.85)
MONOCYTES NFR BLD AUTO: 8.6 % (ref 0–13.4)
NEUTROPHILS # BLD AUTO: 4.51 K/UL (ref 1.82–7.42)
NEUTROPHILS NFR BLD: 69 % (ref 44–72)
NRBC # BLD AUTO: 0 K/UL
NRBC BLD-RTO: 0 /100 WBC (ref 0–0.2)
PLATELET # BLD AUTO: 221 K/UL (ref 164–446)
PMV BLD AUTO: 10.6 FL (ref 9–12.9)
POTASSIUM SERPL-SCNC: 4.3 MMOL/L (ref 3.6–5.5)
PTH-INTACT SERPL-MCNC: 66.9 PG/ML (ref 14–72)
RBC # BLD AUTO: 5.29 M/UL (ref 4.2–5.4)
SODIUM SERPL-SCNC: 140 MMOL/L (ref 135–145)
WBC # BLD AUTO: 6.5 K/UL (ref 4.8–10.8)

## 2023-07-31 PROCEDURE — 83970 ASSAY OF PARATHORMONE: CPT

## 2023-07-31 PROCEDURE — 80048 BASIC METABOLIC PNL TOTAL CA: CPT

## 2023-07-31 PROCEDURE — 85025 COMPLETE CBC W/AUTO DIFF WBC: CPT

## 2023-07-31 PROCEDURE — 36415 COLL VENOUS BLD VENIPUNCTURE: CPT

## 2023-08-10 ENCOUNTER — HOSPITAL ENCOUNTER (OUTPATIENT)
Dept: RADIOLOGY | Facility: MEDICAL CENTER | Age: 78
End: 2023-08-10
Attending: UROLOGY
Payer: MEDICARE

## 2023-08-10 DIAGNOSIS — N13.30 HYDRONEPHROSIS, UNSPECIFIED HYDRONEPHROSIS TYPE: ICD-10-CM

## 2023-08-10 PROCEDURE — 74176 CT ABD & PELVIS W/O CONTRAST: CPT

## 2023-08-17 ENCOUNTER — HOSPITAL ENCOUNTER (OUTPATIENT)
Facility: MEDICAL CENTER | Age: 78
End: 2023-08-17
Attending: UROLOGY
Payer: MEDICARE

## 2023-08-17 PROCEDURE — 87077 CULTURE AEROBIC IDENTIFY: CPT

## 2023-08-17 PROCEDURE — 87086 URINE CULTURE/COLONY COUNT: CPT

## 2023-08-28 LAB — TEST NAME 95000: NORMAL

## 2023-10-19 ENCOUNTER — HOSPITAL ENCOUNTER (OUTPATIENT)
Dept: LAB | Facility: MEDICAL CENTER | Age: 78
End: 2023-10-19
Attending: STUDENT IN AN ORGANIZED HEALTH CARE EDUCATION/TRAINING PROGRAM
Payer: MEDICARE

## 2023-10-19 PROCEDURE — 87086 URINE CULTURE/COLONY COUNT: CPT

## 2023-10-19 PROCEDURE — 87077 CULTURE AEROBIC IDENTIFY: CPT

## 2023-10-19 PROCEDURE — 87186 SC STD MICRODIL/AGAR DIL: CPT

## 2024-01-26 ENCOUNTER — HOSPITAL ENCOUNTER (OUTPATIENT)
Facility: MEDICAL CENTER | Age: 79
End: 2024-01-26
Payer: MEDICARE

## 2024-01-26 PROCEDURE — 87077 CULTURE AEROBIC IDENTIFY: CPT

## 2024-01-26 PROCEDURE — 87086 URINE CULTURE/COLONY COUNT: CPT

## 2024-02-01 ENCOUNTER — HOSPITAL ENCOUNTER (OUTPATIENT)
Dept: RADIOLOGY | Facility: MEDICAL CENTER | Age: 79
End: 2024-02-01
Payer: MEDICARE

## 2024-02-01 DIAGNOSIS — C53.8 MALIGNANT NEOPLASM OF CERVICAL STUMP (HCC): ICD-10-CM

## 2024-02-01 PROCEDURE — 71046 X-RAY EXAM CHEST 2 VIEWS: CPT

## 2024-02-26 ENCOUNTER — HOSPITAL ENCOUNTER (OUTPATIENT)
Dept: LAB | Facility: MEDICAL CENTER | Age: 79
End: 2024-02-26
Attending: STUDENT IN AN ORGANIZED HEALTH CARE EDUCATION/TRAINING PROGRAM
Payer: MEDICARE

## 2024-02-26 LAB
25(OH)D3 SERPL-MCNC: 56 NG/ML (ref 30–100)
ALBUMIN SERPL BCP-MCNC: 4 G/DL (ref 3.2–4.9)
ALBUMIN/GLOB SERPL: 1.3 G/DL
ALP SERPL-CCNC: 105 U/L (ref 30–99)
ALT SERPL-CCNC: 12 U/L (ref 2–50)
ANION GAP SERPL CALC-SCNC: 12 MMOL/L (ref 7–16)
AST SERPL-CCNC: 20 U/L (ref 12–45)
BASOPHILS # BLD AUTO: 0.5 % (ref 0–1.8)
BASOPHILS # BLD: 0.03 K/UL (ref 0–0.12)
BILIRUB SERPL-MCNC: 0.4 MG/DL (ref 0.1–1.5)
BUN SERPL-MCNC: 19 MG/DL (ref 8–22)
CALCIUM ALBUM COR SERPL-MCNC: 10.3 MG/DL (ref 8.5–10.5)
CALCIUM SERPL-MCNC: 10.3 MG/DL (ref 8.5–10.5)
CHLORIDE SERPL-SCNC: 105 MMOL/L (ref 96–112)
CHOLEST SERPL-MCNC: 162 MG/DL (ref 100–199)
CO2 SERPL-SCNC: 22 MMOL/L (ref 20–33)
CREAT SERPL-MCNC: 0.9 MG/DL (ref 0.5–1.4)
EOSINOPHIL # BLD AUTO: 0.1 K/UL (ref 0–0.51)
EOSINOPHIL NFR BLD: 1.5 % (ref 0–6.9)
ERYTHROCYTE [DISTWIDTH] IN BLOOD BY AUTOMATED COUNT: 42 FL (ref 35.9–50)
FASTING STATUS PATIENT QL REPORTED: NORMAL
FOLATE SERPL-MCNC: >40 NG/ML
GFR SERPLBLD CREATININE-BSD FMLA CKD-EPI: 65 ML/MIN/1.73 M 2
GLOBULIN SER CALC-MCNC: 3 G/DL (ref 1.9–3.5)
GLUCOSE SERPL-MCNC: 85 MG/DL (ref 65–99)
HCT VFR BLD AUTO: 41.5 % (ref 37–47)
HDLC SERPL-MCNC: 55 MG/DL
HGB BLD-MCNC: 13.8 G/DL (ref 12–16)
IMM GRANULOCYTES # BLD AUTO: 0.02 K/UL (ref 0–0.11)
IMM GRANULOCYTES NFR BLD AUTO: 0.3 % (ref 0–0.9)
LDLC SERPL CALC-MCNC: 81 MG/DL
LYMPHOCYTES # BLD AUTO: 1.32 K/UL (ref 1–4.8)
LYMPHOCYTES NFR BLD: 20.4 % (ref 22–41)
MCH RBC QN AUTO: 27.3 PG (ref 27–33)
MCHC RBC AUTO-ENTMCNC: 33.3 G/DL (ref 32.2–35.5)
MCV RBC AUTO: 82.2 FL (ref 81.4–97.8)
MONOCYTES # BLD AUTO: 0.61 K/UL (ref 0–0.85)
MONOCYTES NFR BLD AUTO: 9.4 % (ref 0–13.4)
NEUTROPHILS # BLD AUTO: 4.38 K/UL (ref 1.82–7.42)
NEUTROPHILS NFR BLD: 67.9 % (ref 44–72)
NRBC # BLD AUTO: 0 K/UL
NRBC BLD-RTO: 0 /100 WBC (ref 0–0.2)
PLATELET # BLD AUTO: 234 K/UL (ref 164–446)
PMV BLD AUTO: 10.7 FL (ref 9–12.9)
POTASSIUM SERPL-SCNC: 4.6 MMOL/L (ref 3.6–5.5)
PROT SERPL-MCNC: 7 G/DL (ref 6–8.2)
PTH-INTACT SERPL-MCNC: 85.2 PG/ML (ref 14–72)
RBC # BLD AUTO: 5.05 M/UL (ref 4.2–5.4)
SODIUM SERPL-SCNC: 139 MMOL/L (ref 135–145)
T3FREE SERPL-MCNC: 2.84 PG/ML (ref 2–4.4)
T4 FREE SERPL-MCNC: 1.29 NG/DL (ref 0.93–1.7)
TRIGL SERPL-MCNC: 130 MG/DL (ref 0–149)
TSH SERPL DL<=0.005 MIU/L-ACNC: 1.53 UIU/ML (ref 0.38–5.33)
VIT B12 SERPL-MCNC: 822 PG/ML (ref 211–911)
WBC # BLD AUTO: 6.5 K/UL (ref 4.8–10.8)

## 2024-02-26 PROCEDURE — 36415 COLL VENOUS BLD VENIPUNCTURE: CPT

## 2024-02-26 PROCEDURE — 82607 VITAMIN B-12: CPT

## 2024-02-26 PROCEDURE — 82306 VITAMIN D 25 HYDROXY: CPT

## 2024-02-26 PROCEDURE — 83970 ASSAY OF PARATHORMONE: CPT

## 2024-02-26 PROCEDURE — 82746 ASSAY OF FOLIC ACID SERUM: CPT

## 2024-02-26 PROCEDURE — 84481 FREE ASSAY (FT-3): CPT

## 2024-02-26 PROCEDURE — 80053 COMPREHEN METABOLIC PANEL: CPT

## 2024-02-26 PROCEDURE — 80061 LIPID PANEL: CPT

## 2024-02-26 PROCEDURE — 85025 COMPLETE CBC W/AUTO DIFF WBC: CPT

## 2024-02-26 PROCEDURE — 84439 ASSAY OF FREE THYROXINE: CPT

## 2024-02-26 PROCEDURE — 84443 ASSAY THYROID STIM HORMONE: CPT

## 2024-03-27 ENCOUNTER — HOSPITAL ENCOUNTER (OUTPATIENT)
Facility: MEDICAL CENTER | Age: 79
End: 2024-03-27
Attending: STUDENT IN AN ORGANIZED HEALTH CARE EDUCATION/TRAINING PROGRAM
Payer: MEDICARE

## 2024-03-27 PROCEDURE — 87086 URINE CULTURE/COLONY COUNT: CPT

## 2024-03-30 LAB
BACTERIA UR CULT: NORMAL
SIGNIFICANT IND 70042: NORMAL
SITE SITE: NORMAL
SOURCE SOURCE: NORMAL

## 2024-04-05 ENCOUNTER — HOSPITAL ENCOUNTER (OUTPATIENT)
Dept: LAB | Facility: MEDICAL CENTER | Age: 79
End: 2024-04-05
Attending: STUDENT IN AN ORGANIZED HEALTH CARE EDUCATION/TRAINING PROGRAM
Payer: MEDICARE

## 2024-04-05 PROCEDURE — 87086 URINE CULTURE/COLONY COUNT: CPT

## 2024-04-05 PROCEDURE — 87077 CULTURE AEROBIC IDENTIFY: CPT | Mod: 91

## 2024-04-05 PROCEDURE — 87186 SC STD MICRODIL/AGAR DIL: CPT

## 2024-05-31 ENCOUNTER — HOSPITAL ENCOUNTER (OUTPATIENT)
Facility: MEDICAL CENTER | Age: 79
End: 2024-05-31
Payer: MEDICARE

## 2024-05-31 PROCEDURE — 87077 CULTURE AEROBIC IDENTIFY: CPT

## 2024-05-31 PROCEDURE — 87086 URINE CULTURE/COLONY COUNT: CPT

## 2024-06-03 LAB
BACTERIA UR CULT: NORMAL
SIGNIFICANT IND 70042: NORMAL
SITE SITE: NORMAL
SOURCE SOURCE: NORMAL

## 2024-06-07 ENCOUNTER — HOSPITAL ENCOUNTER (OUTPATIENT)
Dept: LAB | Facility: MEDICAL CENTER | Age: 79
End: 2024-06-07
Payer: MEDICARE

## 2024-06-07 PROCEDURE — 87086 URINE CULTURE/COLONY COUNT: CPT

## 2024-06-09 LAB
BACTERIA UR CULT: NORMAL
SIGNIFICANT IND 70042: NORMAL
SITE SITE: NORMAL
SOURCE SOURCE: NORMAL

## 2024-08-19 ENCOUNTER — HOSPITAL ENCOUNTER (OUTPATIENT)
Facility: MEDICAL CENTER | Age: 79
End: 2024-08-19
Payer: MEDICARE

## 2024-08-19 PROCEDURE — 87086 URINE CULTURE/COLONY COUNT: CPT

## 2024-08-19 PROCEDURE — 87077 CULTURE AEROBIC IDENTIFY: CPT

## 2024-08-19 PROCEDURE — 87186 SC STD MICRODIL/AGAR DIL: CPT

## 2024-09-23 ENCOUNTER — HOSPITAL ENCOUNTER (OUTPATIENT)
Dept: RADIOLOGY | Facility: MEDICAL CENTER | Age: 79
End: 2024-09-23
Attending: UROLOGY
Payer: MEDICARE

## 2024-09-23 DIAGNOSIS — N13.30 HYDROURETERONEPHROSIS: ICD-10-CM

## 2024-09-23 PROCEDURE — 74176 CT ABD & PELVIS W/O CONTRAST: CPT

## 2024-09-27 ENCOUNTER — APPOINTMENT (OUTPATIENT)
Dept: ADMISSIONS | Facility: MEDICAL CENTER | Age: 79
End: 2024-09-27
Attending: UROLOGY
Payer: MEDICARE

## 2024-09-27 ENCOUNTER — HOSPITAL ENCOUNTER (OUTPATIENT)
Facility: MEDICAL CENTER | Age: 79
End: 2024-09-27
Attending: UROLOGY | Admitting: UROLOGY
Payer: MEDICARE

## 2024-10-03 ENCOUNTER — HOSPITAL ENCOUNTER (OUTPATIENT)
Facility: MEDICAL CENTER | Age: 79
End: 2024-10-03
Attending: UROLOGY
Payer: MEDICARE

## 2024-10-03 ENCOUNTER — PRE-ADMISSION TESTING (OUTPATIENT)
Dept: ADMISSIONS | Facility: MEDICAL CENTER | Age: 79
End: 2024-10-03
Attending: UROLOGY
Payer: MEDICARE

## 2024-10-03 PROCEDURE — 87077 CULTURE AEROBIC IDENTIFY: CPT

## 2024-10-03 PROCEDURE — 87186 SC STD MICRODIL/AGAR DIL: CPT

## 2024-10-03 PROCEDURE — 87086 URINE CULTURE/COLONY COUNT: CPT

## 2024-10-04 ENCOUNTER — APPOINTMENT (OUTPATIENT)
Dept: ADMISSIONS | Facility: MEDICAL CENTER | Age: 79
End: 2024-10-04
Attending: UROLOGY
Payer: MEDICARE

## 2024-10-04 DIAGNOSIS — Z01.812 PRE-OPERATIVE LABORATORY EXAMINATION: ICD-10-CM

## 2024-10-16 ENCOUNTER — APPOINTMENT (OUTPATIENT)
Dept: RADIOLOGY | Facility: MEDICAL CENTER | Age: 79
End: 2024-10-16
Attending: UROLOGY
Payer: MEDICARE

## 2024-11-11 ENCOUNTER — HOSPITAL ENCOUNTER (OUTPATIENT)
Dept: LAB | Facility: MEDICAL CENTER | Age: 79
End: 2024-11-11
Attending: UROLOGY
Payer: MEDICARE

## 2024-11-11 PROCEDURE — 36415 COLL VENOUS BLD VENIPUNCTURE: CPT

## 2024-11-11 PROCEDURE — 80048 BASIC METABOLIC PNL TOTAL CA: CPT

## 2024-11-12 LAB
ANION GAP SERPL CALC-SCNC: 13 MMOL/L (ref 7–16)
BUN SERPL-MCNC: 14 MG/DL (ref 8–22)
CALCIUM SERPL-MCNC: 10.7 MG/DL (ref 8.5–10.5)
CHLORIDE SERPL-SCNC: 102 MMOL/L (ref 96–112)
CO2 SERPL-SCNC: 21 MMOL/L (ref 20–33)
CREAT SERPL-MCNC: 1 MG/DL (ref 0.5–1.4)
GFR SERPLBLD CREATININE-BSD FMLA CKD-EPI: 57 ML/MIN/1.73 M 2
GLUCOSE SERPL-MCNC: 91 MG/DL (ref 65–99)
POTASSIUM SERPL-SCNC: 4.6 MMOL/L (ref 3.6–5.5)
SODIUM SERPL-SCNC: 136 MMOL/L (ref 135–145)

## 2024-11-14 ENCOUNTER — HOSPITAL ENCOUNTER (OUTPATIENT)
Facility: MEDICAL CENTER | Age: 79
End: 2024-11-14
Payer: MEDICARE

## 2024-11-14 PROCEDURE — 87086 URINE CULTURE/COLONY COUNT: CPT

## 2024-11-16 LAB
BACTERIA UR CULT: NORMAL
SIGNIFICANT IND 70042: NORMAL
SITE SITE: NORMAL
SOURCE SOURCE: NORMAL

## 2024-11-22 ENCOUNTER — HOSPITAL ENCOUNTER (OUTPATIENT)
Dept: RADIOLOGY | Facility: MEDICAL CENTER | Age: 79
End: 2024-11-22
Payer: MEDICARE

## 2024-11-22 DIAGNOSIS — N13.30: ICD-10-CM

## 2024-11-22 PROCEDURE — 74176 CT ABD & PELVIS W/O CONTRAST: CPT

## 2024-12-06 ENCOUNTER — HOSPITAL ENCOUNTER (OUTPATIENT)
Dept: RADIATION ONCOLOGY | Facility: MEDICAL CENTER | Age: 79
End: 2024-12-06
Attending: RADIOLOGY
Payer: MEDICARE

## 2024-12-06 ENCOUNTER — HOSPITAL ENCOUNTER (OUTPATIENT)
Dept: RADIATION ONCOLOGY | Facility: MEDICAL CENTER | Age: 79
End: 2024-12-06

## 2024-12-06 VITALS
SYSTOLIC BLOOD PRESSURE: 139 MMHG | BODY MASS INDEX: 24.8 KG/M2 | OXYGEN SATURATION: 97 % | WEIGHT: 126.98 LBS | DIASTOLIC BLOOD PRESSURE: 85 MMHG | RESPIRATION RATE: 14 BRPM | HEART RATE: 110 BPM

## 2024-12-06 DIAGNOSIS — C53.0 MALIGNANT NEOPLASM OF ENDOCERVIX (HCC): ICD-10-CM

## 2024-12-06 PROCEDURE — 77290 THER RAD SIMULAJ FIELD CPLX: CPT | Performed by: RADIOLOGY

## 2024-12-06 PROCEDURE — 77470 SPECIAL RADIATION TREATMENT: CPT | Performed by: RADIOLOGY

## 2024-12-06 PROCEDURE — 77334 RADIATION TREATMENT AID(S): CPT | Performed by: RADIOLOGY

## 2024-12-06 PROCEDURE — 77290 THER RAD SIMULAJ FIELD CPLX: CPT | Mod: 26 | Performed by: RADIOLOGY

## 2024-12-06 PROCEDURE — 77470 SPECIAL RADIATION TREATMENT: CPT | Mod: 26 | Performed by: RADIOLOGY

## 2024-12-06 PROCEDURE — 99215 OFFICE O/P EST HI 40 MIN: CPT | Mod: 25 | Performed by: RADIOLOGY

## 2024-12-06 PROCEDURE — 77263 THER RADIOLOGY TX PLNG CPLX: CPT | Performed by: RADIOLOGY

## 2024-12-06 PROCEDURE — 77334 RADIATION TREATMENT AID(S): CPT | Mod: 26 | Performed by: RADIOLOGY

## 2024-12-06 PROCEDURE — 99212 OFFICE O/P EST SF 10 MIN: CPT | Mod: 25 | Performed by: RADIOLOGY

## 2024-12-06 RX ORDER — METHYLPREDNISOLONE 4 MG/1
TABLET ORAL
Qty: 1 EACH | Refills: 0 | Status: SHIPPED | OUTPATIENT
Start: 2024-12-06

## 2024-12-06 ASSESSMENT — FIBROSIS 4 INDEX: FIB4 SCORE: 1.97

## 2024-12-06 ASSESSMENT — PAIN SCALES - GENERAL: PAINLEVEL_OUTOF10: 3=SLIGHT PAIN

## 2024-12-06 NOTE — PROGRESS NOTES
Patient was seen today in clinic with Dr. Aldrich for follow up.  Vitals signs and weight were obtained and pain assessment was completed.  Allergies and medications were reviewed with the patient.       Vitals/Pain:  Vitals:    12/06/24 1258   BP: 139/85   BP Location: Right arm   Patient Position: Sitting   Pulse: (!) 110   Resp: 14   SpO2: 97%   Weight: 57.6 kg (126 lb 15.8 oz)   Pain Score: 3=Slight Pain        Allergies:   Sulfa drugs    Current Medications:  Current Outpatient Medications   Medication Sig Dispense Refill    Multiple Vitamins-Minerals (PRESERVISION AREDS 2) Cap Take 1 Capsule by mouth every morning.      Cholecalciferol (VITAMIN D3) 2000 UNIT Cap Take 4,000 Units by mouth every morning. 2 capsules = 4,000 units.      Coenzyme Q10 (CO Q10 PO) Take 1 Capsule by mouth every evening.      Ascorbic Acid (VITAMIN C) 1000 MG Tab Take 1,000 mg by mouth 2 times a day.      atorvastatin (LIPITOR) 20 MG Tab Take 20 mg by mouth every evening.      lisinopril (PRINIVIL, ZESTRIL) 40 MG tablet Take 40 mg by mouth every morning. Indications: High Blood Pressure Disorder       No current facility-administered medications for this encounter.         PCP:  Cody Porras R.N.

## 2024-12-06 NOTE — CT SIMULATION
PATIENT NAME Nessa Dennis   PRIMARY PHYSICIAN Sidney Ross [id number]   REFERRING PHYSICIAN Adam Hays M.D. 1945     History of Cervical cancer (HCC)  Staging form: Cervix Uteri, AJCC Version 9  - Clinical stage from 9/9/2022: FIGO Stage IVB (pM1) - Signed by Sari BRICENO M.D. on 9/9/2022  Histopathologic type: Basaloid squamous cell carcinoma  Stage prefix: Recurrence         Treatment Planning CT Simulation        Order Questions       Question Answer    Is this for a new course of treatment? Yes    Is this an Addendum? No    Implanted Device/Pacemaker No    Simulation Status Initial    Planned Start Date 12/16/2024    Treatment Site Bone - Rib    Laterality Left    Treatment Technique SBRT    Treatment Pattern/Frequency Daily    Number of fractions: 5    Concurrent Chemotherapy No    CT Technique 3D    Slice Thickness 2mm    Scan Extent Chest    Treatment Device(s) OmniBoard    Patient Attire Gown    Patient Position Supine    Patient Orientation Head First    Arm Position Up    Treatment Machine TB1 - STx    Treatment Image Guidance CBCT    Frequency (CBCT) Daily    Image Guidance Match PTV - Soft Tissue    Treatment Planning Image Fusion CT/CT    Special Physics Consult Stereotactic    Other Orders Weekly Physics Check     Special Tx Procedure    Release to patient Immediate

## 2024-12-06 NOTE — PROGRESS NOTES
RADIATION ONCOLOGY FOLLOW-UP    Patient name:  Nessa Dennis    Primary Physician:  Sidney Ross M.D. MRN: [id number]  [facility]: [id number]   Referring physician:  Adam Hays M.D.  : 1945, 79 y.o.     DATE OF SERVICE: 2024    IDENTIFICATION:   A 79 y.o. female with    History of Cervical cancer (HCC)  Staging form: Cervix Uteri, AJCC Version 9  - Clinical stage from 2022: FIGO Stage IVB (pM1) - Signed by Sari BRICENO M.D. on 2022  Histopathologic type: Basaloid squamous cell carcinoma  Stage prefix: Recurrence      RADIATION SUMMARY:  Radiation Oncology          10/3/2022 10/5/2022   Aria Course Treatment Dates   Course First Treatment Date 2022    Course Last Treatment Date 10/03/2022   10/05/2022    Aria Treatment Summary   SBRT L Rib  Plan from Course C2_L rib   Fraction 4 of 5 5 of 5    5 of 5   Elapsed Course Days 7 @  9 @     9 @    Prescribed Fraction Dose 900 cGy 900 cGy    900 cGy   Prescribed Total Dose 4,500 cGy 4,500 cGy    4,500 cGy   SBRT L Rib  Reference Point from Course C2_L rib   Elapsed Course Days 7 @ [id number] 9 @ [id number]    9 @ [id number]   Session Dose 900 cGy 900 cGy    --   Total Dose 3,600 cGy 4,500 cGy    4,500 cGy   SBRT L Rib CP  Reference Point from Course C2_L rib   Elapsed Course Days 7 @  9 @ [id number]    9 @    Session Dose 942 cGy 942 cGy    --   Total Dose 3,767 cGy 4,708 cGy    4,708 cGy      Details          More values are hidden. Newest values shown. Go to activity for more data.                HISTORY OF PRESENT ILLNESS:  Subjective      76-year-old female with history of squamous cell carcinoma of the cervix.  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  She was treated with chemoradiotherapy receiving 6000 cGy in 25 fractions over 5 weeks.    Patient states she did well for 7years postradiation.  She received no adjuvant therapy.  She subsequently developed a left upper lobe mass and underwent resection on 2/23/2022.  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  Margins were clear.  Negative lymph nodes.    She underwent surveillance PET/CT on 6/8/2022.  This showed a new 1.6 cm hypermetabolic soft tissue mass involving the chest wall between the 11th posterior lateral 11th and 12th ribs.  Biopsy performed on 6/30/2022 demonstrating basaloid squamous cell carcinoma.  Currently having no pain.     She also has a left nephrostomy tube in place.  She has a functional colostomy.  She does self-catheterization as well.    11/29/2022  INTERVAL HISTORY:  Initial follow-up post SBRT.  Patient experienced no adverse effects from treatment.  Reports complete resolution of pain.              INTERVAL HISTORY:  12/6/2024.  Amalia presents because of pain involving the left lateral chest wall.  Imaging demonstrates a large approximate 5 cm mass left lateral chest wall adjacent to the previously treated area in 2022.  Patient states mass is causing pain.  She takes Tylenol around-the-clock and oxycodone at night to help her sleep.    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    She is being followed by Dr. Hays's office for her's metastatic cervical cancer.  Currently not on any systemic therapy.    PROBLEM LIST:  Patient Active Problem List   Diagnosis    History of Cervical cancer (HCC)    Malignant neoplasm of endocervix (HCC)    Pyelonephritis    HTN (hypertension)    Metabolic acidosis    Mixed hyperlipidemia    Dysphonia    Urinary tract infection    Malignant neoplasm of overlapping sites of cervix (HCC)    Obstructed nephrostomy tube  (HCC)    Nephrostomy complication (HCC)    Flank pain    Intractable nausea and vomiting    Ureteral obstruction, left       CURRENT MEDICATIONS:  Current Outpatient Medications   Medication Sig Dispense Refill    methylPREDNISolone (MEDROL DOSEPAK) 4 MG Tablet Therapy Pack Take kit as directed on package. 1 Each 0    Multiple Vitamins-Minerals (PRESERVISION AREDS 2) Cap Take 1 Capsule by mouth every morning.      Cholecalciferol (VITAMIN D3) 2000 UNIT Cap Take 4,000 Units by mouth every morning. 2 capsules = 4,000 units.      Coenzyme Q10 (CO Q10 PO) Take 1 Capsule by mouth every evening.      Ascorbic Acid (VITAMIN C) 1000 MG Tab Take 1,000 mg by mouth 2 times a day.      atorvastatin (LIPITOR) 20 MG Tab Take 20 mg by mouth every evening.      lisinopril (PRINIVIL, ZESTRIL) 40 MG tablet Take 40 mg by mouth every morning. Indications: High Blood Pressure Disorder       No current facility-administered medications for this encounter.       ALLERGIES:  Sulfa drugs    REVIEW OF SYSTEMS:    A complete review of system taken. Pertinent items in HPI.  All others negative.    PHYSICAL EXAM:  PERFORMANCE STATUS:      12/6/2024     1:05 PM   ECOG Performance Review   ECOG Performance Status Restricted in physically strenuous activity but ambulatory and able to carry out work of a light or sedentary nature, e.g., light house work, office work         12/6/2024     1:07 PM   Karnofsky Score   Karnofsky Score 80     /85 (BP Location: Right arm, Patient Position: Sitting)   Pulse (!) 110   Resp 14   Wt 57.6 kg (126 lb 15.8 oz)   LMP  (LMP Unknown) Comment: 3/13/22 HCG negative  SpO2 97%   BMI 24.80 kg/m²   Physical Exam  Vitals and nursing note reviewed.   Constitutional:       Appearance: She is well-developed.   HENT:      Head: Normocephalic.   Musculoskeletal:         General: Swelling and tenderness (Left posterior lateral chest wall) present.   Skin:     General: Skin is warm and dry.      Findings: No  erythema.   Neurological:      Mental Status: She is alert and oriented to person, place, and time.   Psychiatric:         Behavior: Behavior normal.         Thought Content: Thought content normal.         Judgment: Judgment normal.         LABORATORY DATA:   Lab Results   Component Value Date/Time    WBC 6.5 02/26/2024 10:21 AM    RBC 5.05 02/26/2024 10:21 AM    HEMOGLOBIN 13.8 02/26/2024 10:21 AM    HEMATOCRIT 41.5 02/26/2024 10:21 AM    MCV 82.2 02/26/2024 10:21 AM    MCH 27.3 02/26/2024 10:21 AM    MCHC 33.3 02/26/2024 10:21 AM    RDW 42.0 02/26/2024 10:21 AM    PLATELETCT 234 02/26/2024 10:21 AM    MPV 10.7 02/26/2024 10:21 AM    NEUTSPOLYS 67.90 02/26/2024 10:21 AM    LYMPHOCYTES 20.40 (L) 02/26/2024 10:21 AM    MONOCYTES 9.40 02/26/2024 10:21 AM    EOSINOPHILS 1.50 02/26/2024 10:21 AM    BASOPHILS 0.50 02/26/2024 10:21 AM      Lab Results   Component Value Date/Time    SODIUM 136 11/11/2024 12:35 PM    POTASSIUM 4.6 11/11/2024 12:35 PM    CHLORIDE 102 11/11/2024 12:35 PM    CO2 21 11/11/2024 12:35 PM    GLUCOSE 91 11/11/2024 12:35 PM    BUN 14 11/11/2024 12:35 PM    CREATININE 1.00 11/11/2024 12:35 PM    CREATININE 0.7 03/27/2006 02:58 PM         RADIOLOGY DATA:  CT-ABDOMEN-PELVIS W/O    Result Date: 11/25/2024  1. Post left nephrectomy without complication evident. No right hydronephrosis. 2. Growing 4.7 cm mass in left low lateral chest/flank wall causing bone destruction of the left 10th and 11th ribs. Metastasis or other malignancy likely. 3. No change left ostomy with peristomal hernia. No bowel distention.       IMPRESSION:    A 79 y.o. with   History of Cervical cancer (HCC)  Staging form: Cervix Uteri, AJCC Version 9  - Clinical stage from 9/9/2022: FIGO Stage IVB (pM1) - Signed by Sari BRICENO M.D. on 9/9/2022  Histopathologic type: Basaloid squamous cell carcinoma  Stage prefix: Recurrence      CANCER STATUS:  Disease Progression Distant    RECOMMENDATIONS:   Reviewed imaging with patient.   She has a large approximate 5 cm left lateral chest wall/flank mass which is causing pain and discomfort.  Recommended course of stereotactic therapy delivering 40 Gray 5 fractions over 1 week.  Technical aspects benefits risks reviewed.  She understands and wishes to proceed.  She will undergo simulation today with treatment anticipated to start on December 16 and complete by December 20.    Thank you for the opportunity to participate in her care.  If any questions or comments, please do not hesitate in calling.    Orders Placed This Encounter    Rad Onc Treatment Planning CT Simulation    methylPREDNISolone (MEDROL DOSEPAK) 4 MG Tablet Therapy Pack

## 2024-12-06 NOTE — RADIATION PLANNING NOTES
Clinical Treatment Planning Note    DATE OF SERVICE: 12/6/2024    DIAGNOSIS:  History of Cervical cancer (HCC)  Staging form: Cervix Uteri, AJCC Version 9  - Clinical stage from 9/9/2022: FIGO Stage IVB (pM1) - Signed by Sari BRICENO M.D. on 9/9/2022  Histopathologic type: Basaloid squamous cell carcinoma  Stage prefix: Recurrence         IMAGING REVIEWED:  [x] CT     [] MRI     [] PET/CT     [] BONE SCAN     [] MAMMO     [] OTHER      TREATMENT INTENT:   [] CURATIVE     [] MAINTENANCE     [x]  PALLIATIVE      []  SUPPORTIVE     []  PROPHYLACTIC     [] BENIGN     []  CONSOLIDATIVE      [] DEFINITIVE   []  OLOGIMETASTATIC      LINE OF TREATMENT:  [] ADJUVANT   [x] DEFINITIVE   [] NEOADJUVANT   [] RE-TREATMENT      TECHNIQUE PLANNED:  [x] IMRT   [] 3D   [] SBRT   [] SRS/SRT   [] HDR   [] ELECTRON       IMRT JUSTIFICATION:  [x]   An immediately adjacent area has been previously irradiated and abutting portals must be established with high precision.    [x]  Dose escalation is planned to deliver radiation doses in excess of those commonly utilized for similar tumors with conventional treatment.    []  The target volume is concave or convex, and the critical normal tissues are within or around that convexity or concavity.    []  The target volume is in close proximity to critical structures that must be protected.    []  The volume of interest must be covered with narrow margins to adequately protect  immediately adjacent structures.      FIELDS & BLOCKING:  [] COMPLEX BLOCKS     []  = 3 TX AREAS     [x]  ARCS     []  CUSTOM SHEILD        []  SIMPLE BLOCK      CHEMOTHERAPY:  []  CONCURRENT     []  INDUCTION     [] SEQUENTIAL     []  <30 DAYS FROM XRT      NOTES:  OAR CONSTRAINTS: (GUIDELINES ONLY NOT ABSOLUTE)  Target Prescribed Coverage   PTV 95% of PTV covered by 100% (Gy) of RX Dose     PTV 99% of PTV covered by 90% (Gy) of RX Dose       GIOVANNA Goal   Total Lung Vol. (cc) critical structure   Total Lung (cc)  V12.5Gy < 1500cc   Total Lung (cc) V13.5Gy < 1000cc   Liver V21Gy < 700cc   Cord V22Gy < 0.35cc   Cord V14.5Gy <1.2cc   Cord Max Dose < 28Gy   Ipsilateral Brachial Plexus V27Gy < 3cc   Ipsilateral Brachial Plexus Max Dose < 32.5Gy   Esophagus V19.5Gy < 5cc   Esophagus Max Dose < 35Gy   Heart V32Gy < 15cc   Heart Max Dose < 38Gy   Great Vessels V47Gy < 10cc   Great Vessels Max Dose < 53Gy   Trachea/Large Bronchus V32Gy < 5cc   Trachea/Large Bronchus Max Dose < 40Gy   Small Bronchus V21Gy < 0.5cc   Small Bronchus Max Dose < 33Gy   Skin V36.5Gy < 10cc   Skin Max Dose < 38.5Gy   Ribs V45Gy < 5cc   Ribs Max Dose < 57Gy   *RTOG [id number]

## 2024-12-06 NOTE — RADIATION PLANNING NOTES
DATE OF SERVICE: 12/6/2024    DIAGNOSIS:  History of Cervical cancer (HCC)  Staging form: Cervix Uteri, AJCC Version 9  - Clinical stage from 9/9/2022: FIGO Stage IVB (pM1) - Signed by Sari BRICENO M.D. on 9/9/2022  Histopathologic type: Basaloid squamous cell carcinoma  Stage prefix: Recurrence       DATE OF SERVICE: 12/6/2024    TYPE OF SIMULATION: Thorax    GOAL OF TREATMENT:   [] Curative  [x] Palliative  [] Oligometastatic    CONTRAST:    [] IV Contrast*  [] Oral Contrast                POSITION:    [x]  Supine  [] Prone     COMPLEX:  [] Complex Blocking   [x]Arcs  [] Custom Blocks  [] >3 Sites    PROCEDURE: Patient positioned on CT table in Vac-Chapis immobilization device. CT acquired thorough the entire volume of interest.  Images reviewed and exported to treatment planning system.    I have personally reviewed the relevant data, performed the target localization, and determined all relevant factors for this patient’s simulation.    *Omnipaque 80 -100cc IVP in conjunction with 500cc NS

## 2024-12-06 NOTE — ADDENDUM NOTE
Encounter addended by: Sari BRICENO M.D. on: 12/6/2024 3:42 PM   Actions taken: Clinical Note Signed

## 2024-12-06 NOTE — RADIATION PLANNING NOTES
PATIENT NAME Nessa Dennis   PRIMARY PHYSICIAN Sidney Ross [id number]   REFERRING PHYSICIAN No ref. provider found 1945     DATE OF SERVICE: 12/6/2024    DIAGNOSIS:  History of Cervical cancer (HCC)  Staging form: Cervix Uteri, AJCC Version 9  - Clinical stage from 9/9/2022: FIGO Stage IVB (pM1) - Signed by Sari BRICENO M.D. on 9/9/2022  Histopathologic type: Basaloid squamous cell carcinoma  Stage prefix: Recurrence         SPECIAL TREATMENT PROCEDURE NOTE:  Considerable additional effort required in the management of this case because of administration of Stereotactic Radiotherapy, which may result in increased normal tissue toxicity and require greater effort in contouring and treatment because of greater precision.

## 2024-12-09 ENCOUNTER — PATIENT OUTREACH (OUTPATIENT)
Dept: ONCOLOGY | Facility: MEDICAL CENTER | Age: 79
End: 2024-12-09
Payer: MEDICARE

## 2024-12-09 DIAGNOSIS — Z65.8 PSYCHOSOCIAL DISTRESS: ICD-10-CM

## 2024-12-09 NOTE — PROGRESS NOTES
Outbound call to patient for [facility] intake, no answer. Left voice message requesting a call back.

## 2024-12-09 NOTE — PROGRESS NOTES
"[facility] Intake    Diagnosis Type: \"Cervical cancer\"  Preferred Language: english  Insurance: Medicare & [employer]   Primary Care Provider Reviewed: yes  Last Appointment Date: \"about 6 months ago with Dr. Ross\"  Introduced Nessa Dennis to Oncology Support Services and provided contact information on 12/9/2024 .  Patient Care Team: [facility] Adam Hays & [facility]  Current Barriers Identified Include: None at this time  MyChart Status: Activated, does note use.   Communication Preferences:Emails or phone call'; Best Contact Number:[phone]  Patient Contact's Reviewed: yes     Inbound call from patient returning voice message. Patient states she says family is her support system. Educated patient on Cancer Support Group & Classes, reviewed the Resource Center, and patient agreed to receive [facility] Resource Folder. Verified address. Administered Distress Screening, patient scored a four stating \"I trust Dr. Garibay\". Encouraged patient to reach out to cancer Support Services Team if needs arise.    Mailed patient [facility] Resource Folder.   OFRA & ONN referral.    Outcome:  Pt. Has no further needs at this time.          "

## 2024-12-11 PROCEDURE — 77334 RADIATION TREATMENT AID(S): CPT | Performed by: RADIOLOGY

## 2024-12-11 PROCEDURE — 77300 RADIATION THERAPY DOSE PLAN: CPT | Mod: 26 | Performed by: RADIOLOGY

## 2024-12-11 PROCEDURE — 77295 3-D RADIOTHERAPY PLAN: CPT | Mod: 26 | Performed by: RADIOLOGY

## 2024-12-11 PROCEDURE — 77334 RADIATION TREATMENT AID(S): CPT | Mod: 26 | Performed by: RADIOLOGY

## 2024-12-11 PROCEDURE — 77370 RADIATION PHYSICS CONSULT: CPT | Performed by: RADIOLOGY

## 2024-12-11 PROCEDURE — 77295 3-D RADIOTHERAPY PLAN: CPT | Performed by: RADIOLOGY

## 2024-12-11 PROCEDURE — 77300 RADIATION THERAPY DOSE PLAN: CPT | Performed by: RADIOLOGY

## 2024-12-16 ENCOUNTER — HOSPITAL ENCOUNTER (OUTPATIENT)
Dept: RADIATION ONCOLOGY | Facility: MEDICAL CENTER | Age: 79
End: 2024-12-16

## 2024-12-16 LAB
CHEMOTHERAPY INFUSION START DATE: NORMAL
CHEMOTHERAPY RECORDS: 4000 CGY
CHEMOTHERAPY RECORDS: 8 GY
CHEMOTHERAPY RECORDS: NORMAL
CHEMOTHERAPY RX CANCER: NORMAL
DATE 1ST CHEMO CANCER: NORMAL
RAD ONC ARIA COURSE LAST TREATMENT DATE: NORMAL
RAD ONC ARIA COURSE TREATMENT ELAPSED DAYS: NORMAL
RAD ONC ARIA REFERENCE POINT DOSAGE GIVEN TO DATE: 8 GY
RAD ONC ARIA REFERENCE POINT ID: NORMAL
RAD ONC ARIA REFERENCE POINT SESSION DOSAGE GIVEN: 8 GY

## 2024-12-16 PROCEDURE — 77280 THER RAD SIMULAJ FIELD SMPL: CPT | Mod: 26 | Performed by: RADIOLOGY

## 2024-12-16 PROCEDURE — 77280 THER RAD SIMULAJ FIELD SMPL: CPT | Performed by: RADIOLOGY

## 2024-12-16 PROCEDURE — 77373 STRTCTC BDY RAD THER TX DLVR: CPT | Performed by: RADIOLOGY

## 2024-12-16 NOTE — PROCEDURES
DATE OF SERVICE: 12/16/2024    DIAGNOSIS:  History of Cervical cancer (HCC)  Staging form: Cervix Uteri, AJCC Version 9  - Clinical stage from 9/9/2022: FIGO Stage IVB (pM1) - Signed by Sari BRICENO M.D. on 9/9/2022  Histopathologic type: Basaloid squamous cell carcinoma  Stage prefix: Recurrence       TREATMENT:  Radiation Therapy Episodes       Active Episodes       Radiation Therapy: SBRT (12/16/2024)    Radiation Therapy: SBRT (9/26/2022)                   Radiation Treatments         Plan Last Treated On Elapsed Days Fractions Treated Prescribed Fraction Dose (cGy) Prescribed Total Dose (cGy)    SBRT L Rib 12/16/2024 0 @ 12/16/2024 1 of 5 800 4,000                  Reference Point Last Treated On Elapsed Days Most Recent Session Dose (cGy) Total Dose (cGy)    SBRT L Rib C3 12/16/2024 0 @ 12/16/2024 800 800                      Historical Treatments (Plans: 2)      Plan Last Treated On Elapsed Days Fractions Treated Prescribed Fraction Dose (cGy) Prescribed Total Dose (cGy)   Pelvis 13F # 9/30/2022  @  0 of 25 240 6,000   SBRT L Rib 10/5/2022 9 @ [id number] 5 of 5 900 4,500                Reference Point Last Treated On Elapsed Days Most Recent Session Dose (cGy) Total Dose (cGy)   Pelvis 9/30/2022  @  -- 0   ptv45 9/30/2022  @  -- 0   SBRT L Rib 10/5/2022 9 @ [id number] -- 4,500   SBRT L Rib CP 10/5/2022 9 @ [id number] -- 4,708                            STEREOTACTIC PROCEDURE NOTE:    Called by [employer] machine to verify treatment parameters including:  treatment site, treatment dose, and treatment setup prior to stereotactic treatment..    Patient was placed in the treatment position with use of immobilization device and  laser guidance. CBCT images were acquired for target localization.  Images were reviewed in the axial, coronal, and saggital views and shifts were made as necessary to ensure that patient position matched simulation position.      Treatment delivered per  prescription.  The  medical physicist was present throughout the set-up, verification and treatment delivery to oversee the procedure and ensure all parameters agreed with the computerized plan.    I have personally reviewed the relevant data, performed the target localization, and determined all relevant factors for this patient’s simulation.

## 2024-12-17 ENCOUNTER — HOSPITAL ENCOUNTER (OUTPATIENT)
Dept: RADIATION ONCOLOGY | Facility: MEDICAL CENTER | Age: 79
End: 2024-12-17

## 2024-12-17 LAB
CHEMOTHERAPY INFUSION START DATE: NORMAL
CHEMOTHERAPY RECORDS: 4000 CGY
CHEMOTHERAPY RECORDS: 8 GY
CHEMOTHERAPY RECORDS: NORMAL
CHEMOTHERAPY RX CANCER: NORMAL
DATE 1ST CHEMO CANCER: NORMAL
RAD ONC ARIA COURSE LAST TREATMENT DATE: NORMAL
RAD ONC ARIA COURSE TREATMENT ELAPSED DAYS: NORMAL
RAD ONC ARIA REFERENCE POINT DOSAGE GIVEN TO DATE: 16 GY
RAD ONC ARIA REFERENCE POINT ID: NORMAL
RAD ONC ARIA REFERENCE POINT SESSION DOSAGE GIVEN: 8 GY

## 2024-12-17 PROCEDURE — 77280 THER RAD SIMULAJ FIELD SMPL: CPT | Mod: 26 | Performed by: RADIOLOGY

## 2024-12-17 PROCEDURE — 77373 STRTCTC BDY RAD THER TX DLVR: CPT | Performed by: RADIOLOGY

## 2024-12-17 PROCEDURE — 77435 SBRT MANAGEMENT: CPT | Performed by: RADIOLOGY

## 2024-12-17 PROCEDURE — 77280 THER RAD SIMULAJ FIELD SMPL: CPT | Performed by: RADIOLOGY

## 2024-12-17 NOTE — PROCEDURES
DATE OF SERVICE: 12/17/2024    DIAGNOSIS:  History of Cervical cancer (HCC)  Staging form: Cervix Uteri, AJCC Version 9  - Clinical stage from 9/9/2022: FIGO Stage IVB (pM1) - Signed by Sari BRICENO M.D. on 9/9/2022  Histopathologic type: Basaloid squamous cell carcinoma  Stage prefix: Recurrence       TREATMENT:  Radiation Therapy Episodes       Active Episodes       Radiation Therapy: SBRT (12/16/2024)    Radiation Therapy: SBRT (9/26/2022)                   Radiation Treatments         Plan Last Treated On Elapsed Days Fractions Treated Prescribed Fraction Dose (cGy) Prescribed Total Dose (cGy)    SBRT L Rib 12/17/2024 1 @ 12/17/2024 2 of 5 800 4,000                  Reference Point Last Treated On Elapsed Days Most Recent Session Dose (cGy) Total Dose (cGy)    SBRT L Rib C3 12/17/2024 1 @ 12/17/2024 800 1,600                      Historical Treatments (Plans: 2)      Plan Last Treated On Elapsed Days Fractions Treated Prescribed Fraction Dose (cGy) Prescribed Total Dose (cGy)   Pelvis 13F # 9/30/2022  @  0 of 25 240 6,000   SBRT L Rib 10/5/2022 9 @ [id number] 5 of 5 900 4,500                Reference Point Last Treated On Elapsed Days Most Recent Session Dose (cGy) Total Dose (cGy)   Pelvis 9/30/2022  @  -- 0   ptv45 9/30/2022  @  -- 0   SBRT L Rib 10/5/2022 9 @ [id number] -- 4,500   SBRT L Rib CP 10/5/2022 9 @ [id number] -- 4,708                            STEREOTACTIC PROCEDURE NOTE:    Called by [employer] machine to verify treatment parameters including:  treatment site, treatment dose, and treatment setup prior to stereotactic treatment..    Patient was placed in the treatment position with use of immobilization device and  laser guidance. CBCT images were acquired for target localization.  Images were reviewed in the axial, coronal, and saggital views and shifts were made as necessary to ensure that patient position matched simulation position.      Treatment delivered per  prescription.  The  medical physicist was present throughout the set-up, verification and treatment delivery to oversee the procedure and ensure all parameters agreed with the computerized plan.    I have personally reviewed the relevant data, performed the target localization, and determined all relevant factors for this patient’s simulation.

## 2024-12-18 ENCOUNTER — HOSPITAL ENCOUNTER (OUTPATIENT)
Dept: RADIATION ONCOLOGY | Facility: MEDICAL CENTER | Age: 79
End: 2024-12-18

## 2024-12-18 ENCOUNTER — HOSPITAL ENCOUNTER (OUTPATIENT)
Dept: RADIATION ONCOLOGY | Facility: MEDICAL CENTER | Age: 79
End: 2024-12-18
Attending: RADIOLOGY
Payer: MEDICARE

## 2024-12-18 LAB
CHEMOTHERAPY INFUSION START DATE: NORMAL
CHEMOTHERAPY RECORDS: 4000 CGY
CHEMOTHERAPY RECORDS: 8 GY
CHEMOTHERAPY RECORDS: NORMAL
CHEMOTHERAPY RX CANCER: NORMAL
DATE 1ST CHEMO CANCER: NORMAL
RAD ONC ARIA COURSE LAST TREATMENT DATE: NORMAL
RAD ONC ARIA COURSE TREATMENT ELAPSED DAYS: NORMAL
RAD ONC ARIA REFERENCE POINT DOSAGE GIVEN TO DATE: 24 GY
RAD ONC ARIA REFERENCE POINT ID: NORMAL
RAD ONC ARIA REFERENCE POINT SESSION DOSAGE GIVEN: 8 GY

## 2024-12-18 PROCEDURE — 77373 STRTCTC BDY RAD THER TX DLVR: CPT | Performed by: RADIOLOGY

## 2024-12-18 PROCEDURE — 77280 THER RAD SIMULAJ FIELD SMPL: CPT | Performed by: RADIOLOGY

## 2024-12-18 PROCEDURE — 77280 THER RAD SIMULAJ FIELD SMPL: CPT | Mod: 26 | Performed by: RADIOLOGY

## 2024-12-18 NOTE — PROCEDURES
DATE OF SERVICE: 12/18/2024    DIAGNOSIS:  History of Cervical cancer (HCC)  Staging form: Cervix Uteri, AJCC Version 9  - Clinical stage from 9/9/2022: FIGO Stage IVB (pM1) - Signed by Sari BRICENO M.D. on 9/9/2022  Histopathologic type: Basaloid squamous cell carcinoma  Stage prefix: Recurrence       TREATMENT:  Radiation Therapy Episodes       Active Episodes       Radiation Therapy: SBRT (12/16/2024)    Radiation Therapy: SBRT (9/26/2022)                   Radiation Treatments         Plan Last Treated On Elapsed Days Fractions Treated Prescribed Fraction Dose (cGy) Prescribed Total Dose (cGy)    SBRT L Rib 12/18/2024 2 @ 12/18/2024 3 of 5 800 4,000                  Reference Point Last Treated On Elapsed Days Most Recent Session Dose (cGy) Total Dose (cGy)    SBRT L Rib C3 12/18/2024 2 @ 12/18/2024 800 2,400                      Historical Treatments (Plans: 2)      Plan Last Treated On Elapsed Days Fractions Treated Prescribed Fraction Dose (cGy) Prescribed Total Dose (cGy)   Pelvis 13F # 9/30/2022  @  0 of 25 240 6,000   SBRT L Rib 10/5/2022 9 @ [id number] 5 of 5 900 4,500                Reference Point Last Treated On Elapsed Days Most Recent Session Dose (cGy) Total Dose (cGy)   Pelvis 9/30/2022  @  -- 0   ptv45 9/30/2022  @  -- 0   SBRT L Rib 10/5/2022 9 @ [id number] -- 4,500   SBRT L Rib CP 10/5/2022 9 @ [id number] -- 4,708                            STEREOTACTIC PROCEDURE NOTE:    Called by [employer] machine to verify treatment parameters including:  treatment site, treatment dose, and treatment setup prior to stereotactic treatment..    Patient was placed in the treatment position with use of immobilization device and  laser guidance. CBCT images were acquired for target localization.  Images were reviewed in the axial, coronal, and saggital views and shifts were made as necessary to ensure that patient position matched simulation position.      Treatment delivered per  prescription.  The  medical physicist was present throughout the set-up, verification and treatment delivery to oversee the procedure and ensure all parameters agreed with the computerized plan.    I have personally reviewed the relevant data, performed the target localization, and determined all relevant factors for this patient’s simulation.

## 2024-12-19 ENCOUNTER — HOSPITAL ENCOUNTER (OUTPATIENT)
Dept: RADIATION ONCOLOGY | Facility: MEDICAL CENTER | Age: 79
End: 2024-12-19

## 2024-12-19 LAB
CHEMOTHERAPY INFUSION START DATE: NORMAL
CHEMOTHERAPY RECORDS: 4000 CGY
CHEMOTHERAPY RECORDS: 8 GY
CHEMOTHERAPY RECORDS: NORMAL
CHEMOTHERAPY RX CANCER: NORMAL
DATE 1ST CHEMO CANCER: NORMAL
RAD ONC ARIA COURSE LAST TREATMENT DATE: NORMAL
RAD ONC ARIA COURSE TREATMENT ELAPSED DAYS: NORMAL
RAD ONC ARIA REFERENCE POINT DOSAGE GIVEN TO DATE: 32 GY
RAD ONC ARIA REFERENCE POINT ID: NORMAL
RAD ONC ARIA REFERENCE POINT SESSION DOSAGE GIVEN: 8 GY

## 2024-12-19 PROCEDURE — 77280 THER RAD SIMULAJ FIELD SMPL: CPT | Performed by: RADIOLOGY

## 2024-12-19 PROCEDURE — 77373 STRTCTC BDY RAD THER TX DLVR: CPT | Performed by: RADIOLOGY

## 2024-12-19 PROCEDURE — 77280 THER RAD SIMULAJ FIELD SMPL: CPT | Mod: 26 | Performed by: RADIOLOGY

## 2024-12-19 NOTE — PROCEDURES
DATE OF SERVICE: 12/19/2024    DIAGNOSIS:  History of Cervical cancer (HCC)  Staging form: Cervix Uteri, AJCC Version 9  - Clinical stage from 9/9/2022: FIGO Stage IVB (pM1) - Signed by Sari BRICENO M.D. on 9/9/2022  Histopathologic type: Basaloid squamous cell carcinoma  Stage prefix: Recurrence       TREATMENT:  Radiation Therapy Episodes       Active Episodes       Radiation Therapy: SBRT (12/16/2024)    Radiation Therapy: SBRT (9/26/2022)                   Radiation Treatments         Plan Last Treated On Elapsed Days Fractions Treated Prescribed Fraction Dose (cGy) Prescribed Total Dose (cGy)    SBRT L Rib 12/19/2024 3 @ 12/19/2024 4 of 5 800 4,000                  Reference Point Last Treated On Elapsed Days Most Recent Session Dose (cGy) Total Dose (cGy)    SBRT L Rib C3 12/19/2024 3 @ 12/19/2024 800 3,200                      Historical Treatments (Plans: 2)      Plan Last Treated On Elapsed Days Fractions Treated Prescribed Fraction Dose (cGy) Prescribed Total Dose (cGy)   Pelvis 13F # 9/30/2022  @  0 of 25 240 6,000   SBRT L Rib 10/5/2022 9 @ [id number] 5 of 5 900 4,500                Reference Point Last Treated On Elapsed Days Most Recent Session Dose (cGy) Total Dose (cGy)   Pelvis 9/30/2022  @  -- 0   ptv45 9/30/2022  @  -- 0   SBRT L Rib 10/5/2022 9 @ [id number] -- 4,500   SBRT L Rib CP 10/5/2022 9 @ [id number] -- 4,708                            STEREOTACTIC PROCEDURE NOTE:    Called by [employer] machine to verify treatment parameters including:  treatment site, treatment dose, and treatment setup prior to stereotactic treatment..    Patient was placed in the treatment position with use of immobilization device and  laser guidance. CBCT images were acquired for target localization.  Images were reviewed in the axial, coronal, and saggital views and shifts were made as necessary to ensure that patient position matched simulation position.      Treatment delivered per  prescription.  The  medical physicist was present throughout the set-up, verification and treatment delivery to oversee the procedure and ensure all parameters agreed with the computerized plan.    I have personally reviewed the relevant data, performed the target localization, and determined all relevant factors for this patient’s simulation.

## 2024-12-20 ENCOUNTER — HOSPITAL ENCOUNTER (OUTPATIENT)
Dept: RADIATION ONCOLOGY | Facility: MEDICAL CENTER | Age: 79
End: 2024-12-20

## 2024-12-20 ENCOUNTER — PHARMACY VISIT (OUTPATIENT)
Dept: PHARMACY | Facility: MEDICAL CENTER | Age: 79
End: 2024-12-20
Payer: MEDICARE

## 2024-12-20 DIAGNOSIS — C53.0 MALIGNANT NEOPLASM OF ENDOCERVIX (HCC): ICD-10-CM

## 2024-12-20 LAB
CHEMOTHERAPY INFUSION START DATE: NORMAL
CHEMOTHERAPY INFUSION START DATE: NORMAL
CHEMOTHERAPY INFUSION STOP DATE: NORMAL
CHEMOTHERAPY RECORDS: 4000 CGY
CHEMOTHERAPY RECORDS: 4000 CGY
CHEMOTHERAPY RECORDS: 8 GY
CHEMOTHERAPY RECORDS: 8 GY
CHEMOTHERAPY RECORDS: NORMAL
CHEMOTHERAPY RX CANCER: NORMAL
CHEMOTHERAPY RX CANCER: NORMAL
DATE 1ST CHEMO CANCER: NORMAL
DATE 1ST CHEMO CANCER: NORMAL
RAD ONC ARIA COURSE LAST TREATMENT DATE: NORMAL
RAD ONC ARIA COURSE LAST TREATMENT DATE: NORMAL
RAD ONC ARIA COURSE TREATMENT ELAPSED DAYS: NORMAL
RAD ONC ARIA COURSE TREATMENT ELAPSED DAYS: NORMAL
RAD ONC ARIA REFERENCE POINT DOSAGE GIVEN TO DATE: 40 GY
RAD ONC ARIA REFERENCE POINT DOSAGE GIVEN TO DATE: 40 GY
RAD ONC ARIA REFERENCE POINT ID: NORMAL
RAD ONC ARIA REFERENCE POINT ID: NORMAL
RAD ONC ARIA REFERENCE POINT SESSION DOSAGE GIVEN: 8 GY

## 2024-12-20 PROCEDURE — 77373 STRTCTC BDY RAD THER TX DLVR: CPT | Performed by: RADIOLOGY

## 2024-12-20 PROCEDURE — 77280 THER RAD SIMULAJ FIELD SMPL: CPT | Performed by: RADIOLOGY

## 2024-12-20 PROCEDURE — 77336 RADIATION PHYSICS CONSULT: CPT | Mod: XU | Performed by: RADIOLOGY

## 2024-12-20 PROCEDURE — RXMED WILLOW AMBULATORY MEDICATION CHARGE: Performed by: RADIOLOGY

## 2024-12-20 PROCEDURE — 77280 THER RAD SIMULAJ FIELD SMPL: CPT | Mod: 26 | Performed by: RADIOLOGY

## 2024-12-20 RX ORDER — HYDROMORPHONE HYDROCHLORIDE 2 MG/1
2 TABLET ORAL EVERY 4 HOURS PRN
Qty: 42 TABLET | Refills: 0 | Status: SHIPPED | OUTPATIENT
Start: 2024-12-20 | End: 2024-12-27

## 2024-12-20 NOTE — PROCEDURES
DATE OF SERVICE: 12/20/2024    DIAGNOSIS:  History of Cervical cancer (HCC)  Staging form: Cervix Uteri, AJCC Version 9  - Clinical stage from 9/9/2022: FIGO Stage IVB (pM1) - Signed by Sari BRICENO M.D. on 9/9/2022  Histopathologic type: Basaloid squamous cell carcinoma  Stage prefix: Recurrence       TREATMENT:  Radiation Therapy Episodes       Active Episodes       Radiation Therapy: SBRT (12/16/2024)    Radiation Therapy: SBRT (9/26/2022)                   Radiation Treatments         Plan Last Treated On Elapsed Days Fractions Treated Prescribed Fraction Dose (cGy) Prescribed Total Dose (cGy)    SBRT L Rib 12/20/2024 4 @ 12/20/2024 5 of 5 800 4,000                  Reference Point Last Treated On Elapsed Days Most Recent Session Dose (cGy) Total Dose (cGy)    SBRT L Rib C3 12/20/2024 4 @ 12/20/2024 800 4,000                      Historical Treatments (Plans: 2)      Plan Last Treated On Elapsed Days Fractions Treated Prescribed Fraction Dose (cGy) Prescribed Total Dose (cGy)   Pelvis 13F # 9/30/2022  @  0 of 25 240 6,000   SBRT L Rib 10/5/2022 9 @ [id number] 5 of 5 900 4,500                Reference Point Last Treated On Elapsed Days Most Recent Session Dose (cGy) Total Dose (cGy)   Pelvis 9/30/2022  @  -- 0   ptv45 9/30/2022  @  -- 0   SBRT L Rib 10/5/2022 9 @ [id number] -- 4,500   SBRT L Rib CP 10/5/2022 9 @ [id number] -- 4,708                            STEREOTACTIC PROCEDURE NOTE:    Called by [employer] machine to verify treatment parameters including:  treatment site, treatment dose, and treatment setup prior to stereotactic treatment..    Patient was placed in the treatment position with use of immobilization device and  laser guidance. CBCT images were acquired for target localization.  Images were reviewed in the axial, coronal, and saggital views and shifts were made as necessary to ensure that patient position matched simulation position.      Treatment delivered per  prescription.  The  medical physicist was present throughout the set-up, verification and treatment delivery to oversee the procedure and ensure all parameters agreed with the computerized plan.    I have personally reviewed the relevant data, performed the target localization, and determined all relevant factors for this patient’s simulation.

## 2024-12-23 NOTE — RADIATION COMPLETION NOTES
END OF TREATMENT SUMMARY    Patient name:  Nessa Dennis    Primary Physician:  Sidney Ross M.D. MRN: [id number]  CSN: [id number]   Referring physician:  Adam Hays M.D : 1945, 79 y.o.       TREATMENT SUMMARY:        Course First Treatment Date 2024    Course Last Treatment Date 2024   Course Elapsed Days 4 @ 2024   Course Intent Palliative     Radiation Therapy Episodes       Active Episodes       Radiation Therapy: SBRT (2024)    Radiation Therapy: SBRT (2022)                   Radiation Treatments         Plan Last Treated On Elapsed Days Fractions Treated Prescribed Fraction Dose (cGy) Prescribed Total Dose (cGy)    SBRT L Rib 2024 4 @ 2024 5 of 5 800 4,000                  Reference Point Last Treated On Elapsed Days Most Recent Session Dose (cGy) Total Dose (cGy)    SBRT L Rib C3 2024 4 @ 2024 -- 4,000                      Historical Treatments (Plans: 2)      Plan Last Treated On Elapsed Days Fractions Treated Prescribed Fraction Dose (cGy) Prescribed Total Dose (cGy)   Pelvis 13F # 2022  @  0 of 25 240 6,000   SBRT L Rib 10/5/2022 9 @ [id number] 5 of 5 900 4,500                Reference Point Last Treated On Elapsed Days Most Recent Session Dose (cGy) Total Dose (cGy)   Pelvis 2022  @  -- 0   ptv45 2022  @  -- 0   SBRT L Rib 10/5/2022 9 @ [id number] -- 4,500   SBRT L Rib CP 10/5/2022 9 @ [id number] -- 4,708                                     STAGE:   History of Cervical cancer (HCC)  Staging form: Cervix Uteri, AJCC Version 9  - Clinical stage from 2022: FIGO Stage IVB (pM1) - Signed by Sari BRICENO M.D. on 2022  Histopathologic type: Basaloid squamous cell carcinoma  Stage prefix: Recurrence       TREATMENT INDICATION:   Palliative radiotherapy for painful rib metastasis     CONCURRENT SYSTEMIC TREATMENT:   None     RT COURSE DISCONTINUED EARLY:   No     PATIENT EXPERIENCE:       10/5/2022      2:04 PM   Toxicity Assessment   Toxicity Assessment Bone   Fatigue (lethargy, malaise, asthenia) None   Radiation Dermatitis None   Photosensitivity None   Tumor Pain (onset or exacerbation of tumor pain due to treatment) Moderate pain, pain or analgesics interfering with function, but not interfering with activities of daily living   Musculoskeletal - Other/Please specify None   Skin - Late RT No change from baseline        FOLLOW-UP PLAN:   8 Week virtual visit     COMMENT:          ANATOMIC TARGET SUMMARY    ANATOMIC TARGET MODALITY TECHNIQUE   Left rib   External beam, photons SBRT            COMMENT:         DIAGRAMS:      DOSE VOLUME HISTOGRAMS:          Sair BRICENO M.D.  Electronically signed by: Sari BRICENO M.D., 12/30/2024 8:51 AM  [phone]

## 2024-12-23 NOTE — ADDENDUM NOTE
Encounter addended by: Kierra Weir, Med Ass't on: 12/23/2024 8:11 AM   Actions taken: Pend clinical note

## 2024-12-30 ENCOUNTER — HOSPITAL ENCOUNTER (OUTPATIENT)
Dept: LAB | Facility: MEDICAL CENTER | Age: 79
End: 2024-12-30
Attending: STUDENT IN AN ORGANIZED HEALTH CARE EDUCATION/TRAINING PROGRAM
Payer: MEDICARE

## 2024-12-30 ENCOUNTER — PATIENT OUTREACH (OUTPATIENT)
Dept: ONCOLOGY | Facility: MEDICAL CENTER | Age: 79
End: 2024-12-30
Payer: MEDICARE

## 2024-12-30 LAB
25(OH)D3 SERPL-MCNC: 58 NG/ML (ref 30–100)
ALBUMIN SERPL BCP-MCNC: 4.4 G/DL (ref 3.2–4.9)
APPEARANCE UR: CLEAR
BACTERIA #/AREA URNS HPF: NORMAL /HPF
BASOPHILS # BLD AUTO: 0.2 % (ref 0–1.8)
BASOPHILS # BLD: 0.02 K/UL (ref 0–0.12)
BILIRUB UR QL STRIP.AUTO: NEGATIVE
BUN SERPL-MCNC: 19 MG/DL (ref 8–22)
CALCIUM ALBUM COR SERPL-MCNC: 10.3 MG/DL (ref 8.5–10.5)
CALCIUM SERPL-MCNC: 10.6 MG/DL (ref 8.5–10.5)
CASTS URNS QL MICRO: NORMAL /LPF (ref 0–2)
CHLORIDE SERPL-SCNC: 102 MMOL/L (ref 96–112)
CO2 SERPL-SCNC: 22 MMOL/L (ref 20–33)
COLLECT DURATION TIME SPEC: NORMAL HRS
COLOR UR: YELLOW
CREAT SERPL-MCNC: 0.86 MG/DL (ref 0.5–1.4)
CREAT UR-MCNC: 64.8 MG/DL
EOSINOPHIL # BLD AUTO: 0.15 K/UL (ref 0–0.51)
EOSINOPHIL NFR BLD: 1.4 % (ref 0–6.9)
EPITHELIAL CELLS 1715: NORMAL /HPF (ref 0–5)
ERYTHROCYTE [DISTWIDTH] IN BLOOD BY AUTOMATED COUNT: 50.9 FL (ref 35.9–50)
GFR SERPLBLD CREATININE-BSD FMLA CKD-EPI: 68 ML/MIN/1.73 M 2
GLUCOSE SERPL-MCNC: 94 MG/DL (ref 65–99)
GLUCOSE UR STRIP.AUTO-MCNC: NEGATIVE MG/DL
HCT VFR BLD AUTO: 37.3 % (ref 37–47)
HGB BLD-MCNC: 11.9 G/DL (ref 12–16)
IMM GRANULOCYTES # BLD AUTO: 0.04 K/UL (ref 0–0.11)
IMM GRANULOCYTES NFR BLD AUTO: 0.4 % (ref 0–0.9)
KETONES UR STRIP.AUTO-MCNC: NEGATIVE MG/DL
LEUKOCYTE ESTERASE UR QL STRIP.AUTO: ABNORMAL
LYMPHOCYTES # BLD AUTO: 0.89 K/UL (ref 1–4.8)
LYMPHOCYTES NFR BLD: 8.5 % (ref 22–41)
MAGNESIUM SERPL-MCNC: 2 MG/DL (ref 1.5–2.5)
MCH RBC QN AUTO: 26.6 PG (ref 27–33)
MCHC RBC AUTO-ENTMCNC: 31.9 G/DL (ref 32.2–35.5)
MCV RBC AUTO: 83.3 FL (ref 81.4–97.8)
MICRO URNS: ABNORMAL
MONOCYTES # BLD AUTO: 1 K/UL (ref 0–0.85)
MONOCYTES NFR BLD AUTO: 9.5 % (ref 0–13.4)
NEUTROPHILS # BLD AUTO: 8.4 K/UL (ref 1.82–7.42)
NEUTROPHILS NFR BLD: 80 % (ref 44–72)
NITRITE UR QL STRIP.AUTO: NEGATIVE
NRBC # BLD AUTO: 0 K/UL
NRBC BLD-RTO: 0 /100 WBC (ref 0–0.2)
PH UR STRIP.AUTO: 6 [PH] (ref 5–8)
PHOSPHATE SERPL-MCNC: 2.7 MG/DL (ref 2.5–4.5)
PLATELET # BLD AUTO: 285 K/UL (ref 164–446)
PMV BLD AUTO: 9.9 FL (ref 9–12.9)
POTASSIUM SERPL-SCNC: 4.5 MMOL/L (ref 3.6–5.5)
PROT UR QL STRIP: NEGATIVE MG/DL
PROT UR-MCNC: 8.5 MG/DL (ref 0–15)
PROT/CREAT UR: 131 MG/G (ref 10–107)
PTH-INTACT SERPL-MCNC: 67.7 PG/ML (ref 14–72)
RBC # BLD AUTO: 4.48 M/UL (ref 4.2–5.4)
RBC # URNS HPF: NORMAL /HPF (ref 0–2)
RBC UR QL AUTO: NEGATIVE
SODIUM SERPL-SCNC: 138 MMOL/L (ref 135–145)
SP GR UR STRIP.AUTO: 1.02
SPECIMEN VOL ?TM UR: NORMAL ML
UROBILINOGEN UR STRIP.AUTO-MCNC: 0.2 EU/DL
WBC # BLD AUTO: 10.5 K/UL (ref 4.8–10.8)
WBC #/AREA URNS HPF: NORMAL /HPF

## 2024-12-30 PROCEDURE — 84156 ASSAY OF PROTEIN URINE: CPT

## 2024-12-30 PROCEDURE — 82043 UR ALBUMIN QUANTITATIVE: CPT

## 2024-12-30 PROCEDURE — 82306 VITAMIN D 25 HYDROXY: CPT | Mod: GA

## 2024-12-30 PROCEDURE — 83735 ASSAY OF MAGNESIUM: CPT | Mod: GA

## 2024-12-30 PROCEDURE — 80069 RENAL FUNCTION PANEL: CPT

## 2024-12-30 PROCEDURE — 85025 COMPLETE CBC W/AUTO DIFF WBC: CPT

## 2024-12-30 PROCEDURE — 82570 ASSAY OF URINE CREATININE: CPT

## 2024-12-30 PROCEDURE — 83970 ASSAY OF PARATHORMONE: CPT

## 2024-12-30 PROCEDURE — 81001 URINALYSIS AUTO W/SCOPE: CPT

## 2024-12-30 PROCEDURE — 36415 COLL VENOUS BLD VENIPUNCTURE: CPT

## 2024-12-30 NOTE — ADDENDUM NOTE
Encounter addended by: Sari BRICENO M.D. on: 12/30/2024 8:51 AM   Actions taken: Clinical Note Signed

## 2024-12-30 NOTE — PROGRESS NOTES
Oncology Nurse Navigation  XRT complete.  Phoned to ensure follow up with Dr. Hays.  No answer, left message.

## 2025-01-01 LAB
COLLECT DURATION TIME SPEC: NORMAL HR
CREAT 24H UR-MCNC: 64 MG/DL
MICROALBUMIN 24H UR-MCNC: 0.5 MG/DL
MICROALBUMIN/CREAT 24H UR: 8 MG/G (ref 0–30)
SPECIMEN VOL ?TM UR: NORMAL ML

## 2025-01-14 ENCOUNTER — HOSPITAL ENCOUNTER (OUTPATIENT)
Dept: LAB | Facility: MEDICAL CENTER | Age: 80
End: 2025-01-14
Attending: STUDENT IN AN ORGANIZED HEALTH CARE EDUCATION/TRAINING PROGRAM
Payer: MEDICARE

## 2025-01-14 LAB
BASOPHILS # BLD AUTO: 0.4 % (ref 0–1.8)
BASOPHILS # BLD: 0.03 K/UL (ref 0–0.12)
EOSINOPHIL # BLD AUTO: 0.13 K/UL (ref 0–0.51)
EOSINOPHIL NFR BLD: 1.9 % (ref 0–6.9)
ERYTHROCYTE [DISTWIDTH] IN BLOOD BY AUTOMATED COUNT: 51.8 FL (ref 35.9–50)
HCT VFR BLD AUTO: 40.2 % (ref 37–47)
HGB BLD-MCNC: 12.6 G/DL (ref 12–16)
IMM GRANULOCYTES # BLD AUTO: 0.02 K/UL (ref 0–0.11)
IMM GRANULOCYTES NFR BLD AUTO: 0.3 % (ref 0–0.9)
LYMPHOCYTES # BLD AUTO: 1.01 K/UL (ref 1–4.8)
LYMPHOCYTES NFR BLD: 14.9 % (ref 22–41)
MCH RBC QN AUTO: 26.1 PG (ref 27–33)
MCHC RBC AUTO-ENTMCNC: 31.3 G/DL (ref 32.2–35.5)
MCV RBC AUTO: 83.4 FL (ref 81.4–97.8)
MONOCYTES # BLD AUTO: 0.65 K/UL (ref 0–0.85)
MONOCYTES NFR BLD AUTO: 9.6 % (ref 0–13.4)
NEUTROPHILS # BLD AUTO: 4.93 K/UL (ref 1.82–7.42)
NEUTROPHILS NFR BLD: 72.9 % (ref 44–72)
NRBC # BLD AUTO: 0 K/UL
NRBC BLD-RTO: 0 /100 WBC (ref 0–0.2)
PLATELET # BLD AUTO: 236 K/UL (ref 164–446)
PMV BLD AUTO: 9.8 FL (ref 9–12.9)
PTH-INTACT SERPL-MCNC: 69.5 PG/ML (ref 14–72)
RBC # BLD AUTO: 4.82 M/UL (ref 4.2–5.4)
WBC # BLD AUTO: 6.8 K/UL (ref 4.8–10.8)

## 2025-01-14 PROCEDURE — 80053 COMPREHEN METABOLIC PANEL: CPT

## 2025-01-14 PROCEDURE — 80061 LIPID PANEL: CPT

## 2025-01-14 PROCEDURE — 36415 COLL VENOUS BLD VENIPUNCTURE: CPT

## 2025-01-14 PROCEDURE — 85025 COMPLETE CBC W/AUTO DIFF WBC: CPT

## 2025-01-14 PROCEDURE — 83970 ASSAY OF PARATHORMONE: CPT

## 2025-01-14 PROCEDURE — 82306 VITAMIN D 25 HYDROXY: CPT

## 2025-01-15 LAB
25(OH)D3 SERPL-MCNC: 51 NG/ML (ref 30–100)
ALBUMIN SERPL BCP-MCNC: 4.4 G/DL (ref 3.2–4.9)
ALBUMIN/GLOB SERPL: 1.8 G/DL
ALP SERPL-CCNC: 103 U/L (ref 30–99)
ALT SERPL-CCNC: 14 U/L (ref 2–50)
ANION GAP SERPL CALC-SCNC: 12 MMOL/L (ref 7–16)
AST SERPL-CCNC: 18 U/L (ref 12–45)
BILIRUB SERPL-MCNC: 0.5 MG/DL (ref 0.1–1.5)
BUN SERPL-MCNC: 21 MG/DL (ref 8–22)
CALCIUM ALBUM COR SERPL-MCNC: 10.4 MG/DL (ref 8.5–10.5)
CALCIUM SERPL-MCNC: 10.7 MG/DL (ref 8.5–10.5)
CHLORIDE SERPL-SCNC: 103 MMOL/L (ref 96–112)
CHOLEST SERPL-MCNC: 192 MG/DL (ref 100–199)
CO2 SERPL-SCNC: 23 MMOL/L (ref 20–33)
CREAT SERPL-MCNC: 1.02 MG/DL (ref 0.5–1.4)
FASTING STATUS PATIENT QL REPORTED: NORMAL
GFR SERPLBLD CREATININE-BSD FMLA CKD-EPI: 56 ML/MIN/1.73 M 2
GLOBULIN SER CALC-MCNC: 2.5 G/DL (ref 1.9–3.5)
GLUCOSE SERPL-MCNC: 87 MG/DL (ref 65–99)
HDLC SERPL-MCNC: 61 MG/DL
LDLC SERPL CALC-MCNC: 98 MG/DL
POTASSIUM SERPL-SCNC: 4.5 MMOL/L (ref 3.6–5.5)
PROT SERPL-MCNC: 6.9 G/DL (ref 6–8.2)
SODIUM SERPL-SCNC: 138 MMOL/L (ref 135–145)
TRIGL SERPL-MCNC: 165 MG/DL (ref 0–149)

## 2025-02-13 ENCOUNTER — HOSPITAL ENCOUNTER (OUTPATIENT)
Dept: RADIATION ONCOLOGY | Facility: MEDICAL CENTER | Age: 80
End: 2025-02-13
Attending: RADIOLOGY
Payer: MEDICARE

## 2025-02-13 NOTE — PROGRESS NOTES
This visit is being conducted by telephone. This telephone visit was initiated by the patient and they verbally consented.  Patient at home in [facility].      Reason for Call:  Follow up post SBRT.     Treatment History:     Radiation Oncology          12/19/2024 12/20/2024   [facility] Course Treatment Dates   Course First Treatment Date 12/16/2024 12/16/2024    Course Last Treatment Date 12/19/2024 12/20/2024    [facility] Treatment Summary   SBRT L Rib  Plan from Course [id number]   Fraction 4 of 5 5 of 5    5 of 5   Elapsed Course Days 3 @ 12/19/2024 4 @ 12/20/2024    4 @ 12/20/2024   Prescribed Fraction Dose 800 cGy 800 cGy    800 cGy   Prescribed Total Dose 4,000 cGy 4,000 cGy    4,000 cGy   SBRT L Rib C3  Reference Point from Course [id number]   Elapsed Course Days 3 @ 12/19/2024 4 @ 12/20/2024    4 @ 12/20/2024   Session Dose 800 cGy 800 cGy    --   Total Dose 3,200 cGy 4,000 cGy    4,000 cGy      Details          More values are hidden. Newest values shown. Go to activity for more data.                HPI:    Subjective    76-year-old female with history of squamous cell carcinoma of the cervix.  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  She was treated with chemoradiotherapy receiving 6000 cGy in 25 fractions over 5 weeks.    Patient states she did well for 7years postradiation.  She received no adjuvant therapy.  She subsequently developed a left upper lobe mass and underwent resection on 2/23/2022.  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  Margins were clear.  Negative lymph nodes.    She underwent surveillance PET/CT on 6/8/2022.  This showed a new 1.6 cm hypermetabolic soft tissue mass involving the chest wall between the 11th posterior  lateral 11th and 12th ribs.  Biopsy performed on 6/30/2022 demonstrating basaloid squamous cell carcinoma.  Currently having no pain.     She also has a left nephrostomy tube in place.  She has a functional colostomy.  She does self-catheterization as well.    11/29/2022  INTERVAL HISTORY:  Initial follow-up post SBRT.  Patient experienced no adverse effects from treatment.  Reports complete resolution of pain.        Interval History:   No answer left VM    Labs / Images Reviewed:   No results found.    Assessment:   History of Cervical cancer (HCC)  Staging form: Cervix Uteri, AJCC Version 9  - Clinical stage from 9/9/2022: FIGO Stage IVB (pM1) - Signed by Sari BRICENO M.D. on 9/9/2022  Histopathologic type: Basaloid squamous cell carcinoma  Stage prefix: Recurrence      Cancer Status:       Plan:       Time Spent on Call:       Time Spent in Preparation:    Total Time Spent:     Sari BRICENO M.D.

## 2025-03-24 ENCOUNTER — HOSPITAL ENCOUNTER (OUTPATIENT)
Dept: LAB | Facility: MEDICAL CENTER | Age: 80
End: 2025-03-24
Attending: STUDENT IN AN ORGANIZED HEALTH CARE EDUCATION/TRAINING PROGRAM
Payer: MEDICARE

## 2025-03-24 LAB
25(OH)D3 SERPL-MCNC: 58 NG/ML (ref 30–100)
ALBUMIN SERPL BCP-MCNC: 4.4 G/DL (ref 3.2–4.9)
APPEARANCE UR: CLEAR
BACTERIA #/AREA URNS HPF: ABNORMAL /HPF
BASOPHILS # BLD AUTO: 0.4 % (ref 0–1.8)
BASOPHILS # BLD: 0.03 K/UL (ref 0–0.12)
BILIRUB UR QL STRIP.AUTO: NEGATIVE
BUN SERPL-MCNC: 24 MG/DL (ref 8–22)
CALCIUM ALBUM COR SERPL-MCNC: 10.5 MG/DL (ref 8.5–10.5)
CALCIUM SERPL-MCNC: 10.8 MG/DL (ref 8.5–10.5)
CASTS URNS QL MICRO: ABNORMAL /LPF (ref 0–2)
CHLORIDE SERPL-SCNC: 107 MMOL/L (ref 96–112)
CO2 SERPL-SCNC: 22 MMOL/L (ref 20–33)
COLOR UR: YELLOW
CREAT SERPL-MCNC: 0.96 MG/DL (ref 0.5–1.4)
CREAT UR-MCNC: 37.2 MG/DL
EOSINOPHIL # BLD AUTO: 0.13 K/UL (ref 0–0.51)
EOSINOPHIL NFR BLD: 1.9 % (ref 0–6.9)
EPITHELIAL CELLS 1715: ABNORMAL /HPF (ref 0–5)
ERYTHROCYTE [DISTWIDTH] IN BLOOD BY AUTOMATED COUNT: 42.7 FL (ref 35.9–50)
GFR SERPLBLD CREATININE-BSD FMLA CKD-EPI: 60 ML/MIN/1.73 M 2
GLUCOSE SERPL-MCNC: 94 MG/DL (ref 65–99)
GLUCOSE UR STRIP.AUTO-MCNC: NEGATIVE MG/DL
HCT VFR BLD AUTO: 41.8 % (ref 37–47)
HGB BLD-MCNC: 13.4 G/DL (ref 12–16)
IMM GRANULOCYTES # BLD AUTO: 0.02 K/UL (ref 0–0.11)
IMM GRANULOCYTES NFR BLD AUTO: 0.3 % (ref 0–0.9)
KETONES UR STRIP.AUTO-MCNC: NEGATIVE MG/DL
LEUKOCYTE ESTERASE UR QL STRIP.AUTO: ABNORMAL
LYMPHOCYTES # BLD AUTO: 1.03 K/UL (ref 1–4.8)
LYMPHOCYTES NFR BLD: 15.1 % (ref 22–41)
MAGNESIUM SERPL-MCNC: 1.8 MG/DL (ref 1.5–2.5)
MCH RBC QN AUTO: 27.1 PG (ref 27–33)
MCHC RBC AUTO-ENTMCNC: 32.1 G/DL (ref 32.2–35.5)
MCV RBC AUTO: 84.4 FL (ref 81.4–97.8)
MICRO URNS: ABNORMAL
MONOCYTES # BLD AUTO: 0.51 K/UL (ref 0–0.85)
MONOCYTES NFR BLD AUTO: 7.5 % (ref 0–13.4)
NEUTROPHILS # BLD AUTO: 5.11 K/UL (ref 1.82–7.42)
NEUTROPHILS NFR BLD: 74.8 % (ref 44–72)
NITRITE UR QL STRIP.AUTO: NEGATIVE
NRBC # BLD AUTO: 0 K/UL
NRBC BLD-RTO: 0 /100 WBC (ref 0–0.2)
PH UR STRIP.AUTO: 6 [PH] (ref 5–8)
PHOSPHATE SERPL-MCNC: 2.4 MG/DL (ref 2.5–4.5)
PLATELET # BLD AUTO: 201 K/UL (ref 164–446)
PMV BLD AUTO: 10.3 FL (ref 9–12.9)
POTASSIUM SERPL-SCNC: 4.5 MMOL/L (ref 3.6–5.5)
PROT UR QL STRIP: NEGATIVE MG/DL
PROT UR-MCNC: 7.8 MG/DL (ref 0–15)
PROT/CREAT UR: 210 MG/G (ref 10–107)
PTH-INTACT SERPL-MCNC: 75.2 PG/ML (ref 14–72)
RBC # BLD AUTO: 4.95 M/UL (ref 4.2–5.4)
RBC # URNS HPF: ABNORMAL /HPF (ref 0–2)
RBC UR QL AUTO: NEGATIVE
SODIUM SERPL-SCNC: 139 MMOL/L (ref 135–145)
SP GR UR STRIP.AUTO: 1.01
UROBILINOGEN UR STRIP.AUTO-MCNC: 0.2 EU/DL
WBC # BLD AUTO: 6.8 K/UL (ref 4.8–10.8)
WBC #/AREA URNS HPF: ABNORMAL /HPF

## 2025-03-24 PROCEDURE — 36415 COLL VENOUS BLD VENIPUNCTURE: CPT

## 2025-03-24 PROCEDURE — 82306 VITAMIN D 25 HYDROXY: CPT | Mod: GA

## 2025-03-24 PROCEDURE — 85025 COMPLETE CBC W/AUTO DIFF WBC: CPT

## 2025-03-24 PROCEDURE — 82570 ASSAY OF URINE CREATININE: CPT

## 2025-03-24 PROCEDURE — 83521 IG LIGHT CHAINS FREE EACH: CPT | Mod: 91

## 2025-03-24 PROCEDURE — 80069 RENAL FUNCTION PANEL: CPT

## 2025-03-24 PROCEDURE — 84156 ASSAY OF PROTEIN URINE: CPT

## 2025-03-24 PROCEDURE — 83970 ASSAY OF PARATHORMONE: CPT

## 2025-03-24 PROCEDURE — 84155 ASSAY OF PROTEIN SERUM: CPT

## 2025-03-24 PROCEDURE — 83735 ASSAY OF MAGNESIUM: CPT | Mod: GA

## 2025-03-24 PROCEDURE — 84165 PROTEIN E-PHORESIS SERUM: CPT

## 2025-03-24 PROCEDURE — 82043 UR ALBUMIN QUANTITATIVE: CPT

## 2025-03-24 PROCEDURE — 81001 URINALYSIS AUTO W/SCOPE: CPT

## 2025-03-25 LAB
COLLECT DURATION TIME SPEC: NORMAL HR
CREAT 24H UR-MCNC: 37 MG/DL
MICROALBUMIN 24H UR-MCNC: 0.9 MG/DL
MICROALBUMIN/CREAT 24H UR: 24 MG/G (ref 0–30)
SPECIMEN VOL ?TM UR: NORMAL ML

## 2025-03-26 LAB
KAPPA LC FREE SER-MCNC: 16.15 MG/L (ref 3.3–19.4)
KAPPA LC FREE/LAMBDA FREE SER NEPH: 1.34 {RATIO} (ref 0.26–1.65)
LAMBDA LC FREE SERPL-MCNC: 12.01 MG/L (ref 5.71–26.3)

## 2025-03-27 LAB
ALBUMIN SERPL ELPH-MCNC: 4.4 G/DL (ref 3.75–5.01)
ALPHA1 GLOB SERPL ELPH-MCNC: 0.32 G/DL (ref 0.19–0.46)
ALPHA2 GLOB SERPL ELPH-MCNC: 0.66 G/DL (ref 0.48–1.05)
B-GLOBULIN SERPL ELPH-MCNC: 0.67 G/DL (ref 0.48–1.1)
EER PROT ELECT SER Q1092: NORMAL
GAMMA GLOB SERPL ELPH-MCNC: 0.65 G/DL (ref 0.62–1.51)
INTERPRETATION SERPL IFE-IMP: NORMAL
MONOCLONAL PROTEIN NL11656: NORMAL G/DL
PROT SERPL-MCNC: 6.7 G/DL (ref 6.3–8.2)

## 2025-04-28 ENCOUNTER — HOSPITAL ENCOUNTER (OUTPATIENT)
Dept: LAB | Facility: MEDICAL CENTER | Age: 80
End: 2025-04-28
Attending: STUDENT IN AN ORGANIZED HEALTH CARE EDUCATION/TRAINING PROGRAM
Payer: MEDICARE

## 2025-04-28 LAB
ALBUMIN SERPL BCP-MCNC: 4.4 G/DL (ref 3.2–4.9)
BUN SERPL-MCNC: 24 MG/DL (ref 8–22)
CALCIUM ALBUM COR SERPL-MCNC: 10.2 MG/DL (ref 8.5–10.5)
CALCIUM SERPL-MCNC: 10.5 MG/DL (ref 8.5–10.5)
CHLORIDE SERPL-SCNC: 105 MMOL/L (ref 96–112)
CO2 SERPL-SCNC: 25 MMOL/L (ref 20–33)
CREAT SERPL-MCNC: 1.14 MG/DL (ref 0.5–1.4)
GFR SERPLBLD CREATININE-BSD FMLA CKD-EPI: 48 ML/MIN/1.73 M 2
GLUCOSE SERPL-MCNC: 94 MG/DL (ref 65–99)
MAGNESIUM SERPL-MCNC: 2 MG/DL (ref 1.5–2.5)
PHOSPHATE SERPL-MCNC: 2.6 MG/DL (ref 2.5–4.5)
POTASSIUM SERPL-SCNC: 4.6 MMOL/L (ref 3.6–5.5)
PTH-INTACT SERPL-MCNC: 81.1 PG/ML (ref 14–72)
SODIUM SERPL-SCNC: 138 MMOL/L (ref 135–145)

## 2025-04-28 PROCEDURE — 83970 ASSAY OF PARATHORMONE: CPT

## 2025-04-28 PROCEDURE — 83735 ASSAY OF MAGNESIUM: CPT | Mod: GA

## 2025-04-28 PROCEDURE — 80069 RENAL FUNCTION PANEL: CPT

## 2025-04-28 PROCEDURE — 36415 COLL VENOUS BLD VENIPUNCTURE: CPT

## 2025-07-07 ENCOUNTER — HOSPITAL ENCOUNTER (OUTPATIENT)
Dept: LAB | Facility: MEDICAL CENTER | Age: 80
End: 2025-07-07
Attending: STUDENT IN AN ORGANIZED HEALTH CARE EDUCATION/TRAINING PROGRAM
Payer: MEDICARE

## 2025-07-07 LAB
25(OH)D3 SERPL-MCNC: 59 NG/ML (ref 30–100)
ALBUMIN SERPL BCP-MCNC: 4.5 G/DL (ref 3.2–4.9)
ALBUMIN/GLOB SERPL: 1.8 G/DL
ALP SERPL-CCNC: 86 U/L (ref 30–99)
ALT SERPL-CCNC: 23 U/L (ref 2–50)
ANION GAP SERPL CALC-SCNC: 12 MMOL/L (ref 7–16)
AST SERPL-CCNC: 24 U/L (ref 12–45)
BASOPHILS # BLD AUTO: 0.3 % (ref 0–1.8)
BASOPHILS # BLD: 0.03 K/UL (ref 0–0.12)
BILIRUB SERPL-MCNC: 0.5 MG/DL (ref 0.1–1.5)
BUN SERPL-MCNC: 21 MG/DL (ref 8–22)
CALCIUM ALBUM COR SERPL-MCNC: 10.1 MG/DL (ref 8.5–10.5)
CALCIUM SERPL-MCNC: 10.5 MG/DL (ref 8.5–10.5)
CHLORIDE SERPL-SCNC: 105 MMOL/L (ref 96–112)
CHOLEST SERPL-MCNC: 172 MG/DL (ref 100–199)
CO2 SERPL-SCNC: 19 MMOL/L (ref 20–33)
CREAT SERPL-MCNC: 0.92 MG/DL (ref 0.5–1.4)
EOSINOPHIL # BLD AUTO: 0.13 K/UL (ref 0–0.51)
EOSINOPHIL NFR BLD: 1.3 % (ref 0–6.9)
ERYTHROCYTE [DISTWIDTH] IN BLOOD BY AUTOMATED COUNT: 44.2 FL (ref 35.9–50)
FASTING STATUS PATIENT QL REPORTED: NORMAL
GFR SERPLBLD CREATININE-BSD FMLA CKD-EPI: 63 ML/MIN/1.73 M 2
GLOBULIN SER CALC-MCNC: 2.5 G/DL (ref 1.9–3.5)
GLUCOSE SERPL-MCNC: 82 MG/DL (ref 65–99)
HCT VFR BLD AUTO: 42.1 % (ref 37–47)
HDLC SERPL-MCNC: 54 MG/DL
HGB BLD-MCNC: 13.5 G/DL (ref 12–16)
IMM GRANULOCYTES # BLD AUTO: 0.03 K/UL (ref 0–0.11)
IMM GRANULOCYTES NFR BLD AUTO: 0.3 % (ref 0–0.9)
LDLC SERPL CALC-MCNC: 76 MG/DL
LYMPHOCYTES # BLD AUTO: 1.16 K/UL (ref 1–4.8)
LYMPHOCYTES NFR BLD: 11.2 % (ref 22–41)
MCH RBC QN AUTO: 27.1 PG (ref 27–33)
MCHC RBC AUTO-ENTMCNC: 32.1 G/DL (ref 32.2–35.5)
MCV RBC AUTO: 84.4 FL (ref 81.4–97.8)
MONOCYTES # BLD AUTO: 0.78 K/UL (ref 0–0.85)
MONOCYTES NFR BLD AUTO: 7.5 % (ref 0–13.4)
NEUTROPHILS # BLD AUTO: 8.25 K/UL (ref 1.82–7.42)
NEUTROPHILS NFR BLD: 79.4 % (ref 44–72)
NRBC # BLD AUTO: 0 K/UL
NRBC BLD-RTO: 0 /100 WBC (ref 0–0.2)
PLATELET # BLD AUTO: 223 K/UL (ref 164–446)
PMV BLD AUTO: 10.4 FL (ref 9–12.9)
POTASSIUM SERPL-SCNC: 4.3 MMOL/L (ref 3.6–5.5)
PROT SERPL-MCNC: 7 G/DL (ref 6–8.2)
RBC # BLD AUTO: 4.99 M/UL (ref 4.2–5.4)
SODIUM SERPL-SCNC: 136 MMOL/L (ref 135–145)
TRIGL SERPL-MCNC: 212 MG/DL (ref 0–149)
WBC # BLD AUTO: 10.4 K/UL (ref 4.8–10.8)

## 2025-07-07 PROCEDURE — 36415 COLL VENOUS BLD VENIPUNCTURE: CPT

## 2025-07-07 PROCEDURE — 80061 LIPID PANEL: CPT

## 2025-07-07 PROCEDURE — 85025 COMPLETE CBC W/AUTO DIFF WBC: CPT

## 2025-07-07 PROCEDURE — 82306 VITAMIN D 25 HYDROXY: CPT

## 2025-07-07 PROCEDURE — 80053 COMPREHEN METABOLIC PANEL: CPT

## 2025-07-17 ENCOUNTER — HOSPITAL ENCOUNTER (OUTPATIENT)
Facility: MEDICAL CENTER | Age: 80
End: 2025-07-17
Attending: PHYSICIAN ASSISTANT
Payer: MEDICARE

## 2025-07-17 PROCEDURE — 87086 URINE CULTURE/COLONY COUNT: CPT

## 2025-07-20 LAB
BACTERIA UR CULT: NORMAL
SIGNIFICANT IND 70042: NORMAL
SITE SITE: NORMAL
SOURCE SOURCE: NORMAL

## 2025-07-28 ENCOUNTER — HOSPITAL ENCOUNTER (OUTPATIENT)
Dept: LAB | Facility: MEDICAL CENTER | Age: 80
End: 2025-07-28
Attending: STUDENT IN AN ORGANIZED HEALTH CARE EDUCATION/TRAINING PROGRAM
Payer: MEDICARE

## 2025-07-28 LAB
ALBUMIN SERPL BCP-MCNC: 4.4 G/DL (ref 3.2–4.9)
ANION GAP SERPL CALC-SCNC: 14 MMOL/L (ref 7–16)
APPEARANCE UR: CLEAR
BACTERIA #/AREA URNS HPF: ABNORMAL /HPF
BILIRUB UR QL STRIP.AUTO: NEGATIVE
BUN SERPL-MCNC: 26 MG/DL (ref 8–22)
CALCIUM ALBUM COR SERPL-MCNC: 10.6 MG/DL (ref 8.5–10.5)
CALCIUM SERPL-MCNC: 10.9 MG/DL (ref 8.5–10.5)
CASTS URNS QL MICRO: ABNORMAL /LPF (ref 0–2)
CHLORIDE SERPL-SCNC: 106 MMOL/L (ref 96–112)
CO2 SERPL-SCNC: 19 MMOL/L (ref 20–33)
COLOR UR: YELLOW
CREAT SERPL-MCNC: 0.98 MG/DL (ref 0.5–1.4)
CREAT UR-MCNC: 26 MG/DL
EPITHELIAL CELLS 1715: ABNORMAL /HPF (ref 0–5)
GFR SERPLBLD CREATININE-BSD FMLA CKD-EPI: 58 ML/MIN/1.73 M 2
GLUCOSE SERPL-MCNC: 85 MG/DL (ref 65–99)
GLUCOSE UR STRIP.AUTO-MCNC: NEGATIVE MG/DL
KETONES UR STRIP.AUTO-MCNC: NEGATIVE MG/DL
LEUKOCYTE ESTERASE UR QL STRIP.AUTO: ABNORMAL
MAGNESIUM SERPL-MCNC: 2 MG/DL (ref 1.5–2.5)
MICRO URNS: ABNORMAL
NITRITE UR QL STRIP.AUTO: POSITIVE
PH UR STRIP.AUTO: 6.5 [PH] (ref 5–8)
PHOSPHATE SERPL-MCNC: 2.4 MG/DL (ref 2.5–4.5)
POTASSIUM SERPL-SCNC: 4.9 MMOL/L (ref 3.6–5.5)
PROT UR QL STRIP: NEGATIVE MG/DL
PROT UR-MCNC: 7 MG/DL (ref 0–15)
PROT/CREAT UR: 269 MG/G (ref 10–107)
RBC # URNS HPF: ABNORMAL /HPF (ref 0–2)
RBC UR QL AUTO: ABNORMAL
SODIUM SERPL-SCNC: 139 MMOL/L (ref 135–145)
SP GR UR STRIP.AUTO: 1.01
UROBILINOGEN UR STRIP.AUTO-MCNC: 0.2 EU/DL
WBC #/AREA URNS HPF: >100 /HPF

## 2025-07-28 PROCEDURE — 87086 URINE CULTURE/COLONY COUNT: CPT

## 2025-07-28 PROCEDURE — 80069 RENAL FUNCTION PANEL: CPT

## 2025-07-28 PROCEDURE — 87186 SC STD MICRODIL/AGAR DIL: CPT

## 2025-07-28 PROCEDURE — 83735 ASSAY OF MAGNESIUM: CPT | Mod: GA

## 2025-07-28 PROCEDURE — 82570 ASSAY OF URINE CREATININE: CPT

## 2025-07-28 PROCEDURE — 82043 UR ALBUMIN QUANTITATIVE: CPT

## 2025-07-28 PROCEDURE — 84156 ASSAY OF PROTEIN URINE: CPT

## 2025-07-28 PROCEDURE — 36415 COLL VENOUS BLD VENIPUNCTURE: CPT

## 2025-07-28 PROCEDURE — 87077 CULTURE AEROBIC IDENTIFY: CPT

## 2025-07-28 PROCEDURE — 81001 URINALYSIS AUTO W/SCOPE: CPT

## 2025-07-31 LAB
BACTERIA UR CULT: ABNORMAL
BACTERIA UR CULT: ABNORMAL
COLLECT DURATION TIME SPEC: ABNORMAL HR
CREAT 24H UR-MCNC: 27 MG/DL
MICROALBUMIN 24H UR-MCNC: 1.2 MG/DL
MICROALBUMIN/CREAT 24H UR: 44 MG/G (ref 0–30)
SIGNIFICANT IND 70042: ABNORMAL
SITE SITE: ABNORMAL
SOURCE SOURCE: ABNORMAL
SPECIMEN VOL ?TM UR: ABNORMAL ML

## (undated) DEVICE — TUBING CLEARLINK DUO-VENT - C-FLO (48EA/CA)

## (undated) DEVICE — WIRE GUIDE SENSOR DUAL FLEX - 5/BX

## (undated) DEVICE — TRAY SRGPRP PVP IOD WT PRP - (20/CA)

## (undated) DEVICE — SET LEADWIRE 5 LEAD BEDSIDE DISPOSABLE ECG (1SET OF 5/EA)

## (undated) DEVICE — SLEEVE, VASO, THIGH, MED

## (undated) DEVICE — VESSELOOP MAXI BLUE STERILE- SURG-I-LOOP (10EA/BX)

## (undated) DEVICE — DRAPE CHEST/BREAST - (12EA/CA)

## (undated) DEVICE — TUBE CONNECT SUCTION CLEAR 120 X 1/4" (50EA/CA)"

## (undated) DEVICE — GOWN SURGEONS X-LARGE - DISP. (30/CA)

## (undated) DEVICE — GLOVE SZ 6.5 BIOGEL PI MICRO - PF LF (50PR/BX)

## (undated) DEVICE — SET SUCTION/IRRIGATION WITH DISPOSABLE TIP (6/CA )PART #0250-070-520 IS A SUB

## (undated) DEVICE — SENSOR SPO2 NEO LNCS ADHESIVE (20/BX) SEE USER NOTES

## (undated) DEVICE — ELECTRODE 850 FOAM ADHESIVE - HYDROGEL RADIOTRNSPRNT (50/PK)

## (undated) DEVICE — TUBE CONNECTING SUCTION - CLEAR PLASTIC STERILE 72 IN (50EA/CA)

## (undated) DEVICE — SENSOR OXIMETER ADULT SPO2 RD SET (20EA/BX)

## (undated) DEVICE — GOWN WARMING STANDARD FLEX - (30/CA)

## (undated) DEVICE — NEPTUNE 4 PORT MANIFOLD - (20/PK)

## (undated) DEVICE — LACTATED RINGERS INJ 1000 ML - (14EA/CA 60CA/PF)

## (undated) DEVICE — HEAD HOLDER JUNIOR/ADULT

## (undated) DEVICE — GLOVE BIOGEL PI INDICATOR SZ 6.5 SURGICAL PF LF - (50/BX 4BX/CA)

## (undated) DEVICE — CATHETER URETHRAL OPEN END AXXCESS (10EA/BX)

## (undated) DEVICE — CANISTER SUCTION 3000ML MECHANICAL FILTER AUTO SHUTOFF MEDI-VAC NONSTERILE LF DISP  (40EA/CA)

## (undated) DEVICE — SPONGE GAUZESTER 4 X 4 4PLY - (128PK/CA)

## (undated) DEVICE — NEEDLE NON SAFETY HYPO 22 GA X 1 1/2 IN (100/BX)

## (undated) DEVICE — GLOVE BIOGEL SZ 7 SURGICAL PF LTX - (50PR/BX 4BX/CA)

## (undated) DEVICE — WATER IRRIG. STER 3000 ML - (4/CA)

## (undated) DEVICE — GLOVE BIOGEL SZ 6.5 SURGICAL PF LTX (50PR/BX 4BX/CA)

## (undated) DEVICE — TUBE ENDOBRONCHEAL 35FR - (1/BX)

## (undated) DEVICE — PACK CYSTOSCOPY - (14/CA)

## (undated) DEVICE — TOWEL STOP TIMEOUT SAFETY FLAG (40EA/CA)

## (undated) DEVICE — WIRE GUIDE .038 X 150 FLEX - .

## (undated) DEVICE — GLOVE BIOGEL PI INDICATOR SZ 7.5 SURGICAL PF LF -(50/BX 4BX/CA)

## (undated) DEVICE — KIT ANESTHESIA W/CIRCUIT & 3/LT BAG W/FILTER (20EA/CA)

## (undated) DEVICE — TRANSDUCER ADULT DISP. SINGLE BONDED STERILE - (20EA/CA)

## (undated) DEVICE — SET IRRIGATION CYSTOSCOPY TUBE L80 IN (20EA/CA)

## (undated) DEVICE — STAPLER POWERED 60MM (3EA/BX)

## (undated) DEVICE — GLOVE COTTON STERILE (50/CA)

## (undated) DEVICE — ANTI-FOG SOLUTION - 60BTL/CA

## (undated) DEVICE — MASK ANESTHESIA ADULT  - (100/CA)

## (undated) DEVICE — TROCARCANN&SEAL 5X55 ZTHREAD - 12/BX

## (undated) DEVICE — MASK, LARYNGEAL AIRWAY #4

## (undated) DEVICE — TUBE E-T HI-LO CUFF 5.0MM (10EA/PK)

## (undated) DEVICE — WATER IRRIG. STER. 1500 ML - (9/CA)

## (undated) DEVICE — DRAPESURG STERI-DRAPE LONG - (10/BX 4BX/CA)

## (undated) DEVICE — PACK ENT OR - (2EA/CA)

## (undated) DEVICE — SUTURE GENERAL

## (undated) DEVICE — TROCAR 12MM THORACOPORT (6EA/BX)

## (undated) DEVICE — SUTURE 2-0 VICRYL PLUS CT-1 36 (36PK/BX)"

## (undated) DEVICE — SUTURE 2-0 VICRYL PLUS CT-2 - 27 INCH (36/BX)

## (undated) DEVICE — WATER IRRIGATION STERILE 1000ML (12EA/CA)

## (undated) DEVICE — SYRINGE DISP. 12 CC LL - (100/BX)

## (undated) DEVICE — PACK CYSTO III (2EA/CA)

## (undated) DEVICE — BAG SPONGE COUNT 10.25 X 32 - BLUE (250/CA)

## (undated) DEVICE — SUCTION INSTRUMENT YANKAUER BULBOUS TIP W/O VENT (50EA/CA)

## (undated) DEVICE — CONNECTOR Y TBG CRTY 5 IN 1 STERILE (50EA/CA)

## (undated) DEVICE — CANISTER SUCTION RIGID RED 1500CC (40EA/CA)

## (undated) DEVICE — RELOAD WITH GRIPPING SURFACE TECHNOLOGY BLUE 60MM (12EA/BX)

## (undated) DEVICE — DRAPE UNDER BUTTOCKS FLUID - (20/CA)

## (undated) DEVICE — SYRINGE 10 ML CONTROL LL (25EA/BX 4BX/CA)

## (undated) DEVICE — CATHETER IV 20 GA X 1-1/4 ---SURG.& SDS ONLY--- (50EA/BX)

## (undated) DEVICE — DRAPE C-ARM LARGE 41IN X 74 IN - (10/BX 2BX/CA)

## (undated) DEVICE — ELECTRODE DUAL RETURN W/ CORD - (50/PK)

## (undated) DEVICE — DRAPE IOBAN II INCISE 23X17 - (10EA/BX 4BX/CA)

## (undated) DEVICE — CATHETER THORACIC 28FR - W/ SENTINEL EYE (10/CA)

## (undated) DEVICE — GLOVE BIOGEL PI ORTHO SZ 7.5 PF LF (40PR/BX)

## (undated) DEVICE — JELLY, KY 2 0Z STERILE

## (undated) DEVICE — SYSTEM CHEST DRAIN ADULT/PEDS W/AUTO TRANSFUSION CAPABILITY SAHARA (6EA/CA)

## (undated) DEVICE — PACK MAJOR BASIN - (2EA/CA)

## (undated) DEVICE — KIT RADIAL ARTERY 20GA W/MAX BARRIER AND BIOPATCH  (5EA/CA) #10740 IS FOR THE SET RADIAL ARTERIAL

## (undated) DEVICE — TROCAR 5X100 BLADED Z-THREAD - KII (6/BX)

## (undated) DEVICE — PROTECTOR ULNA NERVE - (36PR/CA)

## (undated) DEVICE — SET EXTENSION WITH 2 PORTS (48EA/CA) ***PART #2C8610 IS A SUBSTITUTE*****

## (undated) DEVICE — STAPLER SKIN DISP - (6/BX 10BX/CA) VISISTAT

## (undated) DEVICE — KIT  I.V. START (100EA/CA)

## (undated) DEVICE — GLOVE BIOGEL PI ORTHO SZ 7 PF LF (40PR/BX)

## (undated) DEVICE — SPONGE PEANUT - (5/PK 50PK/CA)

## (undated) DEVICE — DRAPE 36X28IN RAD CARM BND BG - (25/CA) O

## (undated) DEVICE — DRAPE UNDER BUTTOCK LRG (40/CA)

## (undated) DEVICE — GLOVE BIOGEL INDICATOR SZ 7SURGICAL PF LTX - (50/BX 4BX/CA)

## (undated) DEVICE — GLOVE BIOGEL PI ORTHO SZ 6 SURGICAL PF LF (40PR/BX)

## (undated) DEVICE — SLEEVE VASO CALF MED - (10PR/CA)

## (undated) DEVICE — ELECTRODE 5MM LHK LAPSCP STERILE DISP- MEGADYNE  (5/CA)

## (undated) DEVICE — CHLORAPREP 26 ML APPLICATOR - ORANGE TINT(25/CA)

## (undated) DEVICE — SODIUM CHL IRRIGATION 0.9% 1000ML (12EA/CA)

## (undated) DEVICE — ENDO PEANUT 5MM DEVICE (12EA/BX)

## (undated) DEVICE — GLOVE BIOGEL SZ 7.5 SURGICAL PF LTX - (50PR/BX 4BX/CA)

## (undated) DEVICE — GOWN SURGICAL X-LARGE ULTRA - FILM-REINFORCED (20/CA)

## (undated) DEVICE — SUTURE 4-0 VICRYL PLUS FS-2 - 27 INCH (36/BX)

## (undated) DEVICE — GLOVE BIOGEL INDICATOR SZ 8 SURGICAL PF LTX - (50/BX 4BX/CA)

## (undated) DEVICE — TOWELS CLOTH SURGICAL - (4/PK 20PK/CA)

## (undated) DEVICE — CONTAINER, SPECIMEN, STERILE

## (undated) DEVICE — CANNULA W/ SUPPLY TUBING O2 - (50/CA)

## (undated) DEVICE — TROCAR SEPARATOR 5X55 - 6/BX

## (undated) DEVICE — TEETHGUARD ENT -2BX MIN ORDER- (6EA/BX)

## (undated) DEVICE — DRESSING NON-ADHERING 8 X 3 - (50/BX)

## (undated) DEVICE — DRAPE MAGNETIC (INSTRA-MAG) - (30/CA)

## (undated) DEVICE — GLOVE SIZE 7.5 SURGEON ACCELERATOR FREE GREEN (50PR/BX)

## (undated) DEVICE — SUTURE 0 SILK TIES (36PK/BX)

## (undated) DEVICE — SOD. CHL. INJ. 0.9% 1000 ML - (14EA/CA 60CA/PF)

## (undated) DEVICE — SODIUM CHL. INJ. 0.9% 500ML (24EA/CA 50CA/PF)

## (undated) DEVICE — PATTIES SURG NEURO X-RAY 1/2X1/2 (10EA/PK 20PK/CS)

## (undated) DEVICE — TRAY CATHETER FOLEY URINE METER W/STATLOCK 350ML (10EA/CA)

## (undated) DEVICE — PAD OR TABLE DA VINCI 2IN X 20IN X 72IN - (12EA/CA)

## (undated) DEVICE — CONTAINER SPECIMEN BAG OR - STERILE 4 OZ W/LID (100EA/CA)

## (undated) DEVICE — TRAP SPECIMEN MUCUS STERILE - (50/CA)

## (undated) DEVICE — KIT ROOM DECONTAMINATION

## (undated) DEVICE — MASK OXYGEN VNYL ADLT MED CONC WITH 7 FOOT TUBING  - (50EA/CA)

## (undated) DEVICE — VESSELOOP MINI BLUE STERILE - SURG-I-LOOP (10EA/BX)

## (undated) DEVICE — SUTURE 4-0 VICRYL PLUSFS-1 - 27 INCH (36/BX)

## (undated) DEVICE — GLOVE BIOGEL INDICATOR SZ 7.5 SURGICAL PF LTX - (50PR/BX 4BX/CA)

## (undated) DEVICE — SODIUM CHL. IRRIGATION 0.9% 3000ML (4EA/CA 65CA/PF)

## (undated) DEVICE — GLOVE SZ 6 BIOGEL PI MICRO - PF LF (50PR/BX 4BX/CA)

## (undated) DEVICE — ZIPWIRE .025 STRAIGHT TIP (5EA/BX)

## (undated) DEVICE — LIGASURE 5MM BLUNT TIP LONG - 44CM (6EA/PK)

## (undated) DEVICE — CLIP MED LG INTNL HRZN TI ESCP - (20/BX)

## (undated) DEVICE — CATHETER URET OPEN END 6FR (10EA/BX)

## (undated) DEVICE — DERMABOND ADVANCED - (12EA/BX)